# Patient Record
Sex: FEMALE | Race: WHITE | NOT HISPANIC OR LATINO | Employment: UNEMPLOYED | URBAN - METROPOLITAN AREA
[De-identification: names, ages, dates, MRNs, and addresses within clinical notes are randomized per-mention and may not be internally consistent; named-entity substitution may affect disease eponyms.]

---

## 2017-01-04 ENCOUNTER — HOME CARE/HOSPICE - HEALTHEAST (OUTPATIENT)
Dept: HOME HEALTH SERVICES | Facility: HOME HEALTH | Age: 57
End: 2017-01-04

## 2017-01-06 ENCOUNTER — TELEPHONE (OUTPATIENT)
Dept: PEDIATRICS | Facility: CLINIC | Age: 57
End: 2017-01-06

## 2017-01-06 NOTE — TELEPHONE ENCOUNTER
Call from [facility].  She can be reached at [phone]    Approved SN 1 x week for 9 weeks for wound care and assessment of nonhealing ulcer on the right ankle and INR checks.    Zohreh Marquez RN  Message handled by Nurse Triage.

## 2017-01-10 ENCOUNTER — ANTICOAGULATION THERAPY VISIT (OUTPATIENT)
Dept: NURSING | Facility: CLINIC | Age: 57
End: 2017-01-10
Payer: MEDICARE

## 2017-01-10 ENCOUNTER — HOME CARE/HOSPICE - HEALTHEAST (OUTPATIENT)
Dept: HOME HEALTH SERVICES | Facility: HOME HEALTH | Age: 57
End: 2017-01-10

## 2017-01-10 DIAGNOSIS — Z79.01 LONG-TERM (CURRENT) USE OF ANTICOAGULANTS: Primary | ICD-10-CM

## 2017-01-10 DIAGNOSIS — I82.409 ACUTE DEEP VENOUS THROMBOSIS (H): ICD-10-CM

## 2017-01-10 LAB — INR PPP: 1.8

## 2017-01-10 PROCEDURE — 99207 ZZC NO CHARGE NURSE ONLY: CPT

## 2017-01-10 NOTE — PROGRESS NOTES
ANTICOAGULATION FOLLOW-UP     Patient Name:  Linda Otero  Date:  1/10/2017  Contact Type:  Telephone  Call received from [facility] with INR result.   Follow up instructions given over the phone to Home Care nurse for coumadin management.    SUBJECTIVE:     Patient Findings     Positives Nose Bleeds, Med error    Comments She had a nose bleed on Saturday. Took 5 minutes to stop it.  She took 10 mg on Saturday instead of her usual 12.5mg.    Recommended she:  - increase the humidity in her home  - try putting a small amount of vaseline inside her nose to lubricate her nasal passages.             OBJECTIVE    INR   Date Value Ref Range Status   01/10/2017 1.8  Final       ASSESSMENT / PLAN  INR assessment SUB    Recheck INR In: 2 WEEKS    INR Location [facility] INR      Anticoagulation Summary as of 1/10/2017     INR goal 2.0-3.0   Selected INR 1.8! (1/10/2017)   Maintenance plan 10 mg (5 mg x 2) on Sun; 12.5 mg (5 mg x 2.5) all other days   Full instructions 10 mg on Sun; 12.5 mg all other days   Weekly total 85 mg   No change documented Vy Gallo, RN   Plan last modified Vy Gallo, RN (12/20/2016)   Next INR check 1/24/2017   Target end date 4/20/2016    Indications   Long-term (current) use of anticoagulants [Z79.01] [Z79.01]  Acute deep venous thrombosis (H) [I82.409]         Anticoagulation Episode Summary     INR check location Home Draw    Preferred lab     Send INR reminders to [facility]    Comments 5mg tabs // [facility] [phone] Ch[place]      Anticoagulation Care Providers     Provider Role Specialty Phone number    Jaja Orta MD Referring Internal Medicine [phone]            See the Encounter Report to view Anticoagulation Flowsheet and Dosing Calendar (Go to Encounters tab in chart review, and find the Anticoagulation Therapy Visit)        Vy Gallo RN

## 2017-01-11 ENCOUNTER — TRANSFERRED RECORDS (OUTPATIENT)
Dept: HEALTH INFORMATION MANAGEMENT | Facility: CLINIC | Age: 57
End: 2017-01-11

## 2017-01-17 ENCOUNTER — HOME CARE/HOSPICE - HEALTHEAST (OUTPATIENT)
Dept: HOME HEALTH SERVICES | Facility: HOME HEALTH | Age: 57
End: 2017-01-17

## 2017-01-17 ENCOUNTER — ANTICOAGULATION THERAPY VISIT (OUTPATIENT)
Dept: NURSING | Facility: CLINIC | Age: 57
End: 2017-01-17
Payer: MEDICARE

## 2017-01-17 DIAGNOSIS — Z79.01 LONG-TERM (CURRENT) USE OF ANTICOAGULANTS: Primary | ICD-10-CM

## 2017-01-17 DIAGNOSIS — I82.409 ACUTE DEEP VENOUS THROMBOSIS (H): ICD-10-CM

## 2017-01-17 LAB — INR PPP: 2.3

## 2017-01-17 PROCEDURE — 99207 ZZC NO CHARGE NURSE ONLY: CPT

## 2017-01-17 NOTE — PROGRESS NOTES
ANTICOAGULATION FOLLOW-UP     Patient Name:  Linda Otero  Date:  1/17/2017  Contact Type:  Telephone  Call received [facility], with INR result.   Follow up instructions given over the phone to Home Care nurse for coumadin management.    SUBJECTIVE:     Patient Findings     Positives No Problem Findings    Comments She was ill with stomach flu symptoms, but they have resolved.           OBJECTIVE    INR   Date Value Ref Range Status   01/17/2017 2.3  Final       ASSESSMENT / PLAN  INR assessment THER    Recheck INR In: 2 WEEKS    INR Location [facility] INR      Anticoagulation Summary as of 1/17/2017     INR goal 2.0-3.0   Selected INR 2.3 (1/17/2017)   Maintenance plan 10 mg (5 mg x 2) on Sun; 12.5 mg (5 mg x 2.5) all other days   Full instructions 10 mg on Sun; 12.5 mg all other days   Weekly total 85 mg   No change documented Vy Gallo RN   Plan last modified Vy Gallo RN (12/20/2016)   Next INR check 1/31/2017   Target end date 4/20/2016    Indications   Long-term (current) use of anticoagulants [Z79.01] [Z79.01]  Acute deep venous thrombosis (H) [I82.409]         Anticoagulation Episode Summary     INR check location Home Draw    Preferred lab     Send INR reminders to [facility]    Comments 5mg tabs // [facility] [phone] [place]      Anticoagulation Care Providers     Provider Role Specialty Phone number    Jaja Orta MD Referring Internal Medicine [phone]            See the Encounter Report to view Anticoagulation Flowsheet and Dosing Calendar (Go to Encounters tab in chart review, and find the Anticoagulation Therapy Visit)        Vy Gallo RN

## 2017-01-24 ENCOUNTER — MEDICAL CORRESPONDENCE (OUTPATIENT)
Dept: HEALTH INFORMATION MANAGEMENT | Facility: CLINIC | Age: 57
End: 2017-01-24

## 2017-01-24 ENCOUNTER — HOME CARE/HOSPICE - HEALTHEAST (OUTPATIENT)
Dept: HOME HEALTH SERVICES | Facility: HOME HEALTH | Age: 57
End: 2017-01-24

## 2017-01-31 ENCOUNTER — ANTICOAGULATION THERAPY VISIT (OUTPATIENT)
Dept: NURSING | Facility: CLINIC | Age: 57
End: 2017-01-31
Payer: MEDICARE

## 2017-01-31 ENCOUNTER — HOME CARE/HOSPICE - HEALTHEAST (OUTPATIENT)
Dept: HOME HEALTH SERVICES | Facility: HOME HEALTH | Age: 57
End: 2017-01-31

## 2017-01-31 DIAGNOSIS — I82.409 ACUTE DEEP VENOUS THROMBOSIS (H): ICD-10-CM

## 2017-01-31 DIAGNOSIS — Z79.01 LONG-TERM (CURRENT) USE OF ANTICOAGULANTS: Primary | ICD-10-CM

## 2017-01-31 LAB — INR PPP: 1.9

## 2017-01-31 PROCEDURE — 99207 ZZC NO CHARGE NURSE ONLY: CPT

## 2017-01-31 NOTE — PROGRESS NOTES
ANTICOAGULATION FOLLOW-UP CLINIC VISIT    Patient Name:  Linda Otero  Date:  1/31/2017  Contact Type:  Telephone  Call received from [facility], with INR result.   Follow up instructions given over the phone to Home Care nurse for coumadin management.    SUBJECTIVE:     Patient Findings     Positives No Problem Findings           OBJECTIVE    INR   Date Value Ref Range Status   01/31/2017 1.9  Final       ASSESSMENT / PLAN  INR assessment THER    Recheck INR In: 2 WEEKS    INR Location [facility] INR      Anticoagulation Summary as of 1/31/2017     INR goal 2.0-3.0   Selected INR 1.9! (1/31/2017)   Maintenance plan 10 mg (5 mg x 2) on Sun; 12.5 mg (5 mg x 2.5) all other days   Full instructions 10 mg on Sun; 12.5 mg all other days   Weekly total 85 mg   No change documented Vy Gallo RN   Plan last modified Vy Gallo RN (12/20/2016)   Next INR check 2/14/2017   Target end date 4/20/2016    Indications   Long-term (current) use of anticoagulants [Z79.01] [Z79.01]  Acute deep venous thrombosis (H) [I82.409]         Anticoagulation Episode Summary     INR check location Home Draw    Preferred lab     Send INR reminders to [facility]    Comments 5mg tabs // [facility] [phone] Chio      Anticoagulation Care Providers     Provider Role Specialty Phone number    Jaja Orta MD Referring Internal Medicine [phone]            See the Encounter Report to view Anticoagulation Flowsheet and Dosing Calendar (Go to Encounters tab in chart review, and find the Anticoagulation Therapy Visit)        Vy Gallo RN

## 2017-02-06 ENCOUNTER — HOME CARE/HOSPICE - HEALTHEAST (OUTPATIENT)
Dept: HOME HEALTH SERVICES | Facility: HOME HEALTH | Age: 57
End: 2017-02-06

## 2017-02-08 ENCOUNTER — HOME CARE/HOSPICE - HEALTHEAST (OUTPATIENT)
Dept: HOME HEALTH SERVICES | Facility: HOME HEALTH | Age: 57
End: 2017-02-08

## 2017-02-14 ENCOUNTER — ANTICOAGULATION THERAPY VISIT (OUTPATIENT)
Dept: NURSING | Facility: CLINIC | Age: 57
End: 2017-02-14
Payer: MEDICARE

## 2017-02-14 ENCOUNTER — HOME CARE/HOSPICE - HEALTHEAST (OUTPATIENT)
Dept: HOME HEALTH SERVICES | Facility: HOME HEALTH | Age: 57
End: 2017-02-14

## 2017-02-14 DIAGNOSIS — Z79.01 LONG-TERM (CURRENT) USE OF ANTICOAGULANTS: ICD-10-CM

## 2017-02-14 DIAGNOSIS — I82.409 ACUTE DEEP VENOUS THROMBOSIS (H): ICD-10-CM

## 2017-02-14 LAB — INR PPP: 2

## 2017-02-14 PROCEDURE — 99207 ZZC NO CHARGE NURSE ONLY: CPT

## 2017-02-14 NOTE — MR AVS SNAPSHOT
Linda Shanna   2/14/2017 4:15 PM   Anticoagulation Therapy Visit    Description:  56 year old female   Provider:  [facility]   Department:  [facility]           INR as of 2/14/2017     Today's INR 2.0      Anticoagulation Summary as of 2/14/2017     INR goal 2.0-3.0   Today's INR 2.0   Full instructions 10 mg on Sun; 12.5 mg all other days   Next INR check 2/28/2017    Indications   Long-term (current) use of anticoagulants [Z79.01] [Z79.01]  Acute deep venous thrombosis (H) [I82.409]         Your next Anticoagulation Clinic appointment(s)     Feb 28, 2017  4:15 PM CST   Anticoagulation Visit with  ANTICOAGULATION CLINIC   [facility]    [address] [place] [address]  [address]  [facility] [address] [phone]              Contact Numbers     [facility]  Please call  [phone] to cancel and/or reschedule your appointment   Please call  [phone] with any problems or questions regarding your therapy.        February 2017 Details    Sun Mon Tue Wed Thu Fri Sat        1               2               3               4                 5               6               7               8               9               10               11                 12               13               14      12.5 mg   See details      15      12.5 mg         16      12.5 mg         17      12.5 mg         18      12.5 mg           19      10 mg         20      12.5 mg         21      12.5 mg         22      12.5 mg         23      12.5 mg         24      12.5 mg         25      12.5 mg           26      10 mg         27      12.5 mg         28                 Date Details   02/14 This INR check       Date of next INR:  2/28/2017         How to take your warfarin dose     To take:  10 mg Take 2 of the 5 mg tablets.    To take:  12.5 mg Take 2.5 of the 5 mg tablets.

## 2017-02-14 NOTE — PROGRESS NOTES
ANTICOAGULATION FOLLOW-UP CLINIC VISIT    Patient Name:  Linda Otero  Date:  2/14/2017  Contact Type:  Telephone  Call received [facility], with INR result.   Follow up instructions given over the phone to Home Care nurse for coumadin management.    SUBJECTIVE:     Patient Findings     Positives No Problem Findings           OBJECTIVE    INR   Date Value Ref Range Status   02/14/2017 2.0  Final       ASSESSMENT / PLAN  INR assessment THER    Recheck INR In: 2 WEEKS    INR Location [facility] INR      Anticoagulation Summary as of 2/14/2017     INR goal 2.0-3.0   Today's INR 2.0   Maintenance plan 10 mg (5 mg x 2) on Sun; 12.5 mg (5 mg x 2.5) all other days   Full instructions 10 mg on Sun; 12.5 mg all other days   Weekly total 85 mg   No change documented Vy Gallo RN   Plan last modified Vy Gallo RN (12/20/2016)   Next INR check 2/28/2017   Target end date 4/20/2016    Indications   Long-term (current) use of anticoagulants [Z79.01] [Z79.01]  Acute deep venous thrombosis (H) [I82.409]         Anticoagulation Episode Summary     INR check location Home Draw    Preferred lab     Send INR reminders to [facility]    Comments 5mg tabs // Count includes the [facility] [phone] [place]      Anticoagulation Care Providers     Provider Role Specialty Phone number    Jaja Orta MD Referring Internal Medicine [phone]            See the Encounter Report to view Anticoagulation Flowsheet and Dosing Calendar (Go to Encounters tab in chart review, and find the Anticoagulation Therapy Visit)        Vy Gallo RN

## 2017-02-16 ENCOUNTER — TELEPHONE (OUTPATIENT)
Dept: PEDIATRICS | Facility: CLINIC | Age: 57
End: 2017-02-16

## 2017-02-16 DIAGNOSIS — J42 CHRONIC BRONCHITIS, UNSPECIFIED CHRONIC BRONCHITIS TYPE (H): ICD-10-CM

## 2017-02-16 NOTE — TELEPHONE ENCOUNTER
Ventolin       Last Written Prescription Date: 11-1-16  Last Fill Quantity: 2, # refills: 1    Last Office Visit with [facility] or [facility] prescribing provider:  11-1-16   Future Office Visit:       Date of Last Asthma Action Plan Letter:   There are no preventive care reminders to display for this patient.   Asthma Control Test: No flowsheet data found.    Date of Last Spirometry Test:   No results found for this or any previous visit.        [facility]  [facility]  On Behalf of [facility]

## 2017-02-17 RX ORDER — ALBUTEROL SULFATE 90 UG/1
AEROSOL, METERED RESPIRATORY (INHALATION)
Qty: 2 INHALER | Refills: 1 | Status: SHIPPED | OUTPATIENT
Start: 2017-02-17 | End: 2017-05-30

## 2017-02-17 NOTE — TELEPHONE ENCOUNTER
Routing refill request to provider for review/approval because:  Drug not on the [facility] refill protocol for this indication (bronchitis)    Bernadette Cyr, PharmD  [facility]  On behalf of [facility]

## 2017-02-21 ENCOUNTER — HOME CARE/HOSPICE - HEALTHEAST (OUTPATIENT)
Dept: HOME HEALTH SERVICES | Facility: HOME HEALTH | Age: 57
End: 2017-02-21

## 2017-02-22 ENCOUNTER — HOME CARE/HOSPICE - HEALTHEAST (OUTPATIENT)
Dept: HOME HEALTH SERVICES | Facility: HOME HEALTH | Age: 57
End: 2017-02-22

## 2017-02-28 ENCOUNTER — HOME CARE/HOSPICE - HEALTHEAST (OUTPATIENT)
Dept: HOME HEALTH SERVICES | Facility: HOME HEALTH | Age: 57
End: 2017-02-28

## 2017-03-02 ENCOUNTER — HOME CARE/HOSPICE - HEALTHEAST (OUTPATIENT)
Dept: HOME HEALTH SERVICES | Facility: HOME HEALTH | Age: 57
End: 2017-03-02

## 2017-03-02 ENCOUNTER — ANTICOAGULATION THERAPY VISIT (OUTPATIENT)
Dept: NURSING | Facility: CLINIC | Age: 57
End: 2017-03-02
Payer: MEDICARE

## 2017-03-02 DIAGNOSIS — I82.409 ACUTE DEEP VENOUS THROMBOSIS (H): ICD-10-CM

## 2017-03-02 DIAGNOSIS — Z79.01 LONG-TERM (CURRENT) USE OF ANTICOAGULANTS: ICD-10-CM

## 2017-03-02 LAB — INR PPP: 2.5

## 2017-03-02 PROCEDURE — 99207 ZZC NO CHARGE NURSE ONLY: CPT

## 2017-03-02 NOTE — MR AVS SNAPSHOT
Linda Otero   3/2/2017 4:30 PM   Anticoagulation Therapy Visit    Description:  56 year old female   Provider:  [facility]   Department:  [facility]           INR as of 3/2/2017     Today's INR 2.5      Anticoagulation Summary as of 3/2/2017     INR goal 2.0-3.0   Today's INR 2.5   Full instructions 10 mg on Sun; 12.5 mg all other days   Next INR check 3/14/2017    Indications   Long-term (current) use of anticoagulants [Z79.01] [Z79.01]  Acute deep venous thrombosis (H) [I82.409]         Your next Anticoagulation Clinic appointment(s)     Mar 14, 2017  4:30 PM CDT   Anticoagulation Visit with  ANTICOAGULATION CLINIC   [facility] ([facility]    [address] [place] [address]  [address]  [facility] [address] [phone]              Contact Numbers     [facility]  Please call  [phone] to cancel and/or reschedule your appointment   Please call  [phone] with any problems or questions regarding your therapy.        March 2017 Details    Sun Mon Tue Wed Thu Fri Sat        1               2      12.5 mg   See details      3      12.5 mg         4      12.5 mg           5      10 mg         6      12.5 mg         7      12.5 mg         8      12.5 mg         9      12.5 mg         10      12.5 mg         11      12.5 mg           12      10 mg         13      12.5 mg         14            15               16               17               18                 19               20               21               22               23               24               25                 26               27               28               29               30               31                 Date Details   03/02 This INR check       Date of next INR:  3/14/2017         How to take your warfarin dose     To take:  10 mg Take 2 of the 5 mg tablets.    To take:  12.5 mg Take 2.5 of the 5 mg tablets.

## 2017-03-03 ENCOUNTER — DOCUMENTATION ONLY (OUTPATIENT)
Dept: PEDIATRICS | Facility: CLINIC | Age: 57
End: 2017-03-03

## 2017-03-03 NOTE — PROGRESS NOTES
[facility] left a voicemail on my triage line requesting a call back to ok verbal orders.     Called back, left voicemail giving verbal ok per standing order for weekly SN visits from 3/6-5/6.

## 2017-03-03 NOTE — PROGRESS NOTES
ANTICOAGULATION FOLLOW-UP     Patient Name:  Linda Otero  Date:  3/2/2017  Contact Type:  Telephone  Call received [facility], with INR result.   Follow up instructions given over the phone to Home Care nurse for coumadin management.    SUBJECTIVE:     Patient Findings     Positives No Problem Findings           OBJECTIVE    INR   Date Value Ref Range Status   03/02/2017 2.5  Final       ASSESSMENT / PLAN  INR assessment THER    Recheck INR In: 2 WEEKS    INR Location [facility] INR      Anticoagulation Summary as of 3/2/2017     INR goal 2.0-3.0   Today's INR 2.5   Maintenance plan 10 mg (5 mg x 2) on Sun; 12.5 mg (5 mg x 2.5) all other days   Full instructions 10 mg on Sun; 12.5 mg all other days   Weekly total 85 mg   No change documented Vy Gallo RN   Plan last modified Vy Gallo RN (12/20/2016)   Next INR check 3/14/2017   Target end date 4/20/2016    Indications   Long-term (current) use of anticoagulants [Z79.01] [Z79.01]  Acute deep venous thrombosis (H) [I82.409]         Anticoagulation Episode Summary     INR check location Home Draw    Preferred lab     Send INR reminders to [facility]    Comments 5mg tabs // [facility] [phone] [place]      Anticoagulation Care Providers     Provider Role Specialty Phone number    Jaja Orta MD Referring Internal Medicine [phone]            See the Encounter Report to view Anticoagulation Flowsheet and Dosing Calendar (Go to Encounters tab in chart review, and find the Anticoagulation Therapy Visit)        Vy Gallo RN

## 2017-03-06 PROCEDURE — 99207 C MD CERTIFICATION HHA PATIENT: CPT | Performed by: INTERNAL MEDICINE

## 2017-03-07 ENCOUNTER — HOME CARE/HOSPICE - HEALTHEAST (OUTPATIENT)
Dept: HOME HEALTH SERVICES | Facility: HOME HEALTH | Age: 57
End: 2017-03-07

## 2017-03-13 DIAGNOSIS — I27.20 PULMONARY HYPERTENSION (H): ICD-10-CM

## 2017-03-13 DIAGNOSIS — I10 HYPERTENSION GOAL BP (BLOOD PRESSURE) < 140/90: ICD-10-CM

## 2017-03-13 DIAGNOSIS — G47.33 OBSTRUCTIVE SLEEP APNEA: Primary | ICD-10-CM

## 2017-03-13 DIAGNOSIS — R60.0 BILATERAL LEG EDEMA: ICD-10-CM

## 2017-03-13 DIAGNOSIS — J42 CHRONIC BRONCHITIS, UNSPECIFIED CHRONIC BRONCHITIS TYPE (H): ICD-10-CM

## 2017-03-13 NOTE — TELEPHONE ENCOUNTER
Order was printed and faxed to [facility] as that is where she wanted to go for ECHO.  Agree that doing it locally is likely to be easier for her and order placed.  Agree with cpap strap order - ok to sign.

## 2017-03-13 NOTE — TELEPHONE ENCOUNTER
1. Patient needs order faxed to [facility] for new CPAP cushion mask aguilar with strap.  Order is pending.  Triage, would you please print this, stamp and fax to [facility] at [phone].  Thanks.    2.  Dr. Orta-Per phone encounter of 12/29/16-Needs echo to better understand cause of edema.    Should be seen to  discuss options for treating edema   Patient waiting to schedule echo after 1/11/17 after she sees Dr. Solis. She continues to do well on Spiriva, using oxygen less.     Patient understood that she should have the echo done prior to seeing Dr. Orta-is this correct.   There is no order placed and it appears Dr Orta wanted to see patient to assess edema?  We will have Echocardiogram here at the end of March-would patient be able to wait until then?  States she has transportation issues, and when she uses [facility] she always runs out of oxygen.  Prefers to come to our location for the Echo or to go somewhere in [facility] where she is more familiar.  GODFREY Bazzi RN

## 2017-03-14 ENCOUNTER — ANTICOAGULATION THERAPY VISIT (OUTPATIENT)
Dept: NURSING | Facility: CLINIC | Age: 57
End: 2017-03-14
Payer: MEDICARE

## 2017-03-14 ENCOUNTER — HOME CARE/HOSPICE - HEALTHEAST (OUTPATIENT)
Dept: HOME HEALTH SERVICES | Facility: HOME HEALTH | Age: 57
End: 2017-03-14

## 2017-03-14 DIAGNOSIS — Z79.01 LONG-TERM (CURRENT) USE OF ANTICOAGULANTS: ICD-10-CM

## 2017-03-14 DIAGNOSIS — I82.409 ACUTE DEEP VENOUS THROMBOSIS (H): ICD-10-CM

## 2017-03-14 LAB — INR PPP: 3.2

## 2017-03-14 PROCEDURE — 99207 ZZC NO CHARGE NURSE ONLY: CPT

## 2017-03-14 NOTE — PROGRESS NOTES
ANTICOAGULATION FOLLOW-UP     Patient Name:  Linda Otero  Date:  3/14/2017  Contact Type:  Telephone  Call received from [facility], with INR result.   Follow up instructions given over the phone to Home Care nurse for coumadin management.    SUBJECTIVE:     Patient Findings     Positives Change in diet/appetite    Comments Eating less fresh green vegetables. She is having some financial trouble.  She is getting some legal help. [employer] ran out.  Recommended changing to frozen vegetables, they can be cheaper.           OBJECTIVE    INR   Date Value Ref Range Status   03/14/2017 3.2  Final       ASSESSMENT / PLAN  INR assessment SUPRA    Recheck INR In: 1 WEEK    INR Location [facility] INR      Anticoagulation Summary as of 3/14/2017     INR goal 2.0-3.0   Today's INR 3.2!   Maintenance plan 10 mg (5 mg x 2) on Sun, Thu; 12.5 mg (5 mg x 2.5) all other days   Full instructions 10 mg on Sun, Thu; 12.5 mg all other days   Weekly total 82.5 mg   Plan last modified Vy Gallo RN (3/14/2017)   Next INR check 3/21/2017   Target end date 4/20/2016    Indications   Long-term (current) use of anticoagulants [Z79.01] [Z79.01]  Acute deep venous thrombosis (H) [I82.409]         Anticoagulation Episode Summary     INR check location Home Draw    Preferred lab     Send INR reminders to [facility]    Comments 5mg tabs // [facility] [phone] Chio      Anticoagulation Care Providers     Provider Role Specialty Phone number    Jaja Orta MD Referring Internal Medicine [phone]            See the Encounter Report to view Anticoagulation Flowsheet and Dosing Calendar (Go to Encounters tab in chart review, and find the Anticoagulation Therapy Visit)        Vy Gallo, FRANCISCO

## 2017-03-14 NOTE — MR AVS SNAPSHOT
Linda Dallasstan   3/14/2017 4:30 PM   Anticoagulation Therapy Visit    Description:  56 year old female   Provider:  [facility]   Department:  [facility]           INR as of 3/14/2017     Today's INR 3.2!      Anticoagulation Summary as of 3/14/2017     INR goal 2.0-3.0   Today's INR 3.2!   Full instructions 10 mg on Sun, Thu; 12.5 mg all other days   Next INR check 3/21/2017    Indications   Long-term (current) use of anticoagulants [Z79.01] [Z79.01]  Acute deep venous thrombosis (H) [I82.409]         Contact Numbers     [facility]  Please call  [phone] to cancel and/or reschedule your appointment   Please call  [phone] with any problems or questions regarding your therapy.        March 2017 Details    Sun Mon Tue Wed Thu Fri Sat        1               2               3               4                 5               6               7               8               9               10               11                 12               13               14      12.5 mg   See details      15      12.5 mg         16      10 mg         17      12.5 mg         18      12.5 mg           19      10 mg         20      12.5 mg         21            22               23               24               25                 26               27               28               29               30               31                 Date Details   03/14 This INR check       Date of next INR:  3/21/2017         How to take your warfarin dose     To take:  10 mg Take 2 of the 5 mg tablets.    To take:  12.5 mg Take 2.5 of the 5 mg tablets.

## 2017-03-21 ENCOUNTER — HOME CARE/HOSPICE - HEALTHEAST (OUTPATIENT)
Dept: HOME HEALTH SERVICES | Facility: HOME HEALTH | Age: 57
End: 2017-03-21

## 2017-03-21 ENCOUNTER — ANTICOAGULATION THERAPY VISIT (OUTPATIENT)
Dept: NURSING | Facility: CLINIC | Age: 57
End: 2017-03-21
Payer: MEDICARE

## 2017-03-21 DIAGNOSIS — Z79.01 LONG-TERM (CURRENT) USE OF ANTICOAGULANTS: ICD-10-CM

## 2017-03-21 DIAGNOSIS — I82.409 ACUTE DEEP VENOUS THROMBOSIS (H): ICD-10-CM

## 2017-03-21 LAB — INR PPP: 2.3

## 2017-03-21 PROCEDURE — 99207 ZZC NO CHARGE NURSE ONLY: CPT

## 2017-03-21 RX ORDER — WARFARIN SODIUM 5 MG/1
TABLET ORAL
Qty: 220 TABLET | Refills: 0
Start: 2017-03-14 | End: 2017-09-12

## 2017-03-21 NOTE — MR AVS SNAPSHOT
Linda Dallasstan   3/21/2017 4:30 PM   Anticoagulation Therapy Visit    Description:  57 year old female   Provider:  [facility]   Department:   Nurse           INR as of 3/21/2017     Today's INR 2.3      Anticoagulation Summary as of 3/21/2017     INR goal 2.0-3.0   Today's INR 2.3   Full instructions 10 mg on Sun, Thu; 12.5 mg all other days   Next INR check 4/4/2017    Indications   Long-term (current) use of anticoagulants [Z79.01] [Z79.01]  Acute deep venous thrombosis (H) [I82.409]         Your next Anticoagulation Clinic appointment(s)     Apr 04, 2017  4:00 PM CDT   Anticoagulation Visit with  ANTICOAGULATION CLINIC   [facility] ([facility]    [address] [place] [address]  [address]  [facility] [address] [phone]              Contact Numbers     [facility]  Please call  [phone] to cancel and/or reschedule your appointment   Please call  [phone] with any problems or questions regarding your therapy.        March 2017 Details    Sun Mon Tue Wed Thu Fri Sat        1               2               3               4                 5               6               7               8               9               10               11                 12               13               14               15               16               17               18                 19               20               21      12.5 mg   See details      22      12.5 mg         23      10 mg         24      12.5 mg         25      12.5 mg           26      10 mg         27      12.5 mg         28      12.5 mg         29      12.5 mg         30      10 mg         31      12.5 mg           Date Details   03/21 This INR check               How to take your warfarin dose     To take:  10 mg Take 2 of the 5 mg tablets.    To take:  12.5 mg Take 2.5 of the 5 mg tablets.           April 2017 Details    Sun Mon Tue Wed Thu Fri Sat           1      12.5 mg           2      10  mg         3      12.5 mg         4            5               6               7               8                 9               10               11               12               13               14               15                 16               17               18               19               20               21               22                 23               24               25               26               27               28               29                 30                      Date Details   No additional details    Date of next INR:  4/4/2017         How to take your warfarin dose     To take:  10 mg Take 2 of the 5 mg tablets.    To take:  12.5 mg Take 2.5 of the 5 mg tablets.

## 2017-03-21 NOTE — PROGRESS NOTES
ANTICOAGULATION FOLLOW-UP     Patient Name:  Linda Otero  Date:  3/21/2017  Contact Type:  Telephone  Call received from [facility], with INR result.   Follow up instructions given over the phone to Home Care nurse for coumadin management.    SUBJECTIVE:     Patient Findings     Positives No Problem Findings           OBJECTIVE    INR   Date Value Ref Range Status   03/21/2017 2.3  Final       ASSESSMENT / PLAN  INR assessment THER    Recheck INR In: 2 WEEKS    INR Location [facility] INR      Anticoagulation Summary as of 3/21/2017     INR goal 2.0-3.0   Today's INR 2.3   Maintenance plan 10 mg (5 mg x 2) on Sun, Thu; 12.5 mg (5 mg x 2.5) all other days   Full instructions 10 mg on Sun, Thu; 12.5 mg all other days   Weekly total 82.5 mg   No change documented Vy Gallo RN   Plan last modified Vy Gallo RN (3/14/2017)   Next INR check 4/4/2017   Target end date 4/20/2016    Indications   Long-term (current) use of anticoagulants [Z79.01] [Z79.01]  Acute deep venous thrombosis (H) [I82.409]         Anticoagulation Episode Summary     INR check location Home Draw    Preferred lab     Send INR reminders to [facility]    Comments 5mg tabs // [facility] [phone] [place]      Anticoagulation Care Providers     Provider Role Specialty Phone number    Jaja Orta MD Referring Internal Medicine [phone]            See the Encounter Report to view Anticoagulation Flowsheet and Dosing Calendar (Go to Encounters tab in chart review, and find the Anticoagulation Therapy Visit)        Vy Gallo RN

## 2017-03-22 DIAGNOSIS — Z79.01 LONG-TERM (CURRENT) USE OF ANTICOAGULANTS: ICD-10-CM

## 2017-03-22 DIAGNOSIS — S91.309D: ICD-10-CM

## 2017-03-23 NOTE — TELEPHONE ENCOUNTER
B COMPLEX C TABS      Last Written Prescription Date: 1/29/2016  Last Fill Quantity: 100,  # refills: 3   Last Office Visit with [facility], [facility] or [facility] prescribing provider: 11/1/2016    *COULDN'T FIND JANTOVEN IN MED LIST OR HX.

## 2017-03-27 RX ORDER — WARFARIN SODIUM 5 MG/1
TABLET ORAL
Qty: 220 TABLET | Refills: 0 | Status: SHIPPED | OUTPATIENT
Start: 2017-03-27 | End: 2017-06-28

## 2017-03-27 RX ORDER — MULTIVITAMIN WITH IRON
TABLET ORAL
Qty: 100 TABLET | Refills: 3 | Status: SHIPPED | OUTPATIENT
Start: 2017-03-27 | End: 2024-01-01

## 2017-03-27 NOTE — TELEPHONE ENCOUNTER
Routing refill request to provider for review/approval because:  Drug not active on patient's medication list-Jantoven.  Please advise. Routing to partner in PCP absence.  Evelyn Pritchard RN  Triage Nurse

## 2017-03-29 ENCOUNTER — HOME CARE/HOSPICE - HEALTHEAST (OUTPATIENT)
Dept: HOME HEALTH SERVICES | Facility: HOME HEALTH | Age: 57
End: 2017-03-29

## 2017-04-04 ENCOUNTER — ANTICOAGULATION THERAPY VISIT (OUTPATIENT)
Dept: NURSING | Facility: CLINIC | Age: 57
End: 2017-04-04
Payer: MEDICARE

## 2017-04-04 ENCOUNTER — HOME CARE/HOSPICE - HEALTHEAST (OUTPATIENT)
Dept: HOME HEALTH SERVICES | Facility: HOME HEALTH | Age: 57
End: 2017-04-04

## 2017-04-04 DIAGNOSIS — I82.409 ACUTE DEEP VENOUS THROMBOSIS (H): ICD-10-CM

## 2017-04-04 DIAGNOSIS — Z79.01 LONG-TERM (CURRENT) USE OF ANTICOAGULANTS: ICD-10-CM

## 2017-04-04 LAB — INR PPP: 2.5

## 2017-04-04 PROCEDURE — 99207 ZZC NO CHARGE NURSE ONLY: CPT

## 2017-04-04 NOTE — PROGRESS NOTES
ANTICOAGULATION FOLLOW-UP     Patient Name:  Linda Otero  Date:  4/4/2017  Contact Type:  Telephone  Call received from [facility], with INR result.   Follow up instructions given over the phone to Home Care nurse for coumadin management.    SUBJECTIVE:     Patient Findings     Positives No Problem Findings           OBJECTIVE    INR   Date Value Ref Range Status   04/04/2017 2.5  Final       ASSESSMENT / PLAN  INR assessment THER    Recheck INR In: 2 WEEKS    INR Location [facility]      Anticoagulation Summary as of 4/4/2017     INR goal 2.0-3.0   Today's INR 2.5   Maintenance plan 10 mg (5 mg x 2) on Sun, Thu; 12.5 mg (5 mg x 2.5) all other days   Full instructions 10 mg on Sun, Thu; 12.5 mg all other days   Weekly total 82.5 mg   No change documented Vy Gallo RN   Plan last modified Vy Gallo RN (3/14/2017)   Next INR check 4/18/2017   Target end date 4/20/2016    Indications   Long-term (current) use of anticoagulants [Z79.01] [Z79.01]  Acute deep venous thrombosis (H) [I82.409]         Anticoagulation Episode Summary     INR check location Home [facility]    Preferred lab     Send INR reminders to [facility]    Comments 5mg tabs // [facility] [phone] [place]      Anticoagulation Care Providers     Provider Role Specialty Phone number    Jaja Orta MD Referring Internal Medicine [phone]            See the Encounter Report to view Anticoagulation Flowsheet and Dosing Calendar (Go to Encounters tab in chart review, and find the Anticoagulation Therapy Visit)        Vy Gallo RN

## 2017-04-04 NOTE — MR AVS SNAPSHOT
Lindaaureliano Otero   4/4/2017 4:00 PM   Anticoagulation Therapy Visit    Description:  57 year old female   Provider:  [facility]   Department:  [facility]           INR as of 4/4/2017     Today's INR 2.5      Anticoagulation Summary as of 4/4/2017     INR goal 2.0-3.0   Today's INR 2.5   Full instructions 10 mg on Sun, Thu; 12.5 mg all other days   Next INR check 4/18/2017    Indications   Long-term (current) use of anticoagulants [Z79.01] [Z79.01]  Acute deep venous thrombosis (H) [I82.409]         Your next Anticoagulation Clinic appointment(s)     Apr 18, 2017  3:15 PM CDT   Anticoagulation Visit with  ANTICOAGULATION CLINIC   [facility] ([facility])    [address]  [address]  [place] [address] [phone]              Contact Numbers     [facility]  Please call  [phone] to cancel and/or reschedule your appointment   Please call  [phone] with any problems or questions regarding your therapy.        April 2017 Details    Sun Mon Tue Wed Thu Fri Sat           1                 2               3               4      12.5 mg   See details      5      12.5 mg         6      10 mg         7      12.5 mg         8      12.5 mg           9      10 mg         10      12.5 mg         11      12.5 mg         12      12.5 mg         13      10 mg         14      12.5 mg         15      12.5 mg           16      10 mg         17      12.5 mg         18            19               20               21               22                 23               24               25               26               27               28               29                 30                      Date Details   04/04 This INR check       Date of next INR:  4/18/2017         How to take your warfarin dose     To take:  10 mg Take 2 of the 5 mg tablets.    To take:  12.5 mg Take 2.5 of the 5 mg tablets.

## 2017-04-12 ENCOUNTER — HOME CARE/HOSPICE - HEALTHEAST (OUTPATIENT)
Dept: HOME HEALTH SERVICES | Facility: HOME HEALTH | Age: 57
End: 2017-04-12

## 2017-04-18 ENCOUNTER — ANTICOAGULATION THERAPY VISIT (OUTPATIENT)
Dept: NURSING | Facility: CLINIC | Age: 57
End: 2017-04-18
Payer: MEDICARE

## 2017-04-18 ENCOUNTER — HOME CARE/HOSPICE - HEALTHEAST (OUTPATIENT)
Dept: HOME HEALTH SERVICES | Facility: HOME HEALTH | Age: 57
End: 2017-04-18

## 2017-04-18 DIAGNOSIS — Z79.01 LONG-TERM (CURRENT) USE OF ANTICOAGULANTS: ICD-10-CM

## 2017-04-18 DIAGNOSIS — I82.409 ACUTE DEEP VENOUS THROMBOSIS (H): ICD-10-CM

## 2017-04-18 LAB — INR PPP: 2.5

## 2017-04-18 PROCEDURE — 99207 ZZC NO CHARGE NURSE ONLY: CPT

## 2017-04-18 NOTE — MR AVS SNAPSHOT
Linda Dallasstan   4/18/2017 3:15 PM   Anticoagulation Therapy Visit    Description:  57 year old female   Provider:  [facility]   Department:  [facility]           INR as of 4/18/2017     Today's INR 2.5      Anticoagulation Summary as of 4/18/2017     INR goal 2.0-3.0   Today's INR 2.5   Full instructions 10 mg on Sun, Thu; 12.5 mg all other days   Next INR check 5/2/2017    Indications   Long-term (current) use of anticoagulants [Z79.01] [Z79.01]  Acute deep venous thrombosis (H) [I82.409]         Contact Numbers     [facility]  Please call  [phone] to cancel and/or reschedule your appointment   Please call  [phone] with any problems or questions regarding your therapy.        April 2017 Details    Sun Mon Tue Wed Thu Fri Sat           1                 2               3               4               5               6               7               8                 9               10               11               12               13               14               15                 16               17               18      12.5 mg   See details      19      12.5 mg         20      10 mg         21      12.5 mg         22      12.5 mg           23      10 mg         24      12.5 mg         25      12.5 mg         26      12.5 mg         27      10 mg         28      12.5 mg         29      12.5 mg           30      10 mg                Date Details   04/18 This INR check               How to take your warfarin dose     To take:  10 mg Take 2 of the 5 mg tablets.    To take:  12.5 mg Take 2.5 of the 5 mg tablets.           May 2017 Details    Sun Mon Tue Wed Thu Fri Sat      1      12.5 mg         2            3               4               5               6                 7               8               9               10               11               12               13                 14               15               16               17               18               19                20                 21               22               23               24               25               26               27                 28               29               30               31                   Date Details   No additional details    Date of next INR:  5/2/2017         How to take your warfarin dose     To take:  12.5 mg Take 2.5 of the 5 mg tablets.

## 2017-04-19 NOTE — PROGRESS NOTES
ANTICOAGULATION FOLLOW-UP     Patient Name:  Linda Otero  Date:  4/18/2017  Contact Type:  Telephone  [facility] received [facility], with INR result.   Follow up instructions given over the phone to Home Care nurse for coumadin management.    SUBJECTIVE:     Patient Findings     Positives No Problem Findings           OBJECTIVE    INR   Date Value Ref Range Status   04/18/2017 2.5  Final       ASSESSMENT / PLAN  INR assessment THER    Recheck INR In: 2 WEEKS    INR Location [facility]      Anticoagulation Summary as of 4/18/2017     INR goal 2.0-3.0   Today's INR 2.5   Maintenance plan 10 mg (5 mg x 2) on Sun, Thu; 12.5 mg (5 mg x 2.5) all other days   Full instructions 10 mg on Sun, Thu; 12.5 mg all other days   Weekly total 82.5 mg   No change documented Vy Gallo RN   Plan last modified Vy Gallo RN (3/14/2017)   Next INR check 5/2/2017   Target end date 4/20/2016    Indications   Long-term (current) use of anticoagulants [Z79.01] [Z79.01]  Acute deep venous thrombosis (H) [I82.409]         Anticoagulation Episode Summary     INR check location [facility]    Preferred lab     Send INR reminders to [facility]    Comments 5mg tabs // [facility] [phone] [place]      Anticoagulation Care Providers     Provider Role Specialty Phone number    Jaja Orta MD Referring Internal Medicine [phone]            See the Encounter Report to view Anticoagulation Flowsheet and Dosing Calendar (Go to Encounters tab in chart review, and find the Anticoagulation Therapy Visit)        Vy Gallo RN

## 2017-04-25 ENCOUNTER — HOME CARE/HOSPICE - HEALTHEAST (OUTPATIENT)
Dept: HOME HEALTH SERVICES | Facility: HOME HEALTH | Age: 57
End: 2017-04-25

## 2017-04-25 ENCOUNTER — TELEPHONE (OUTPATIENT)
Dept: PEDIATRICS | Facility: CLINIC | Age: 57
End: 2017-04-25

## 2017-04-25 NOTE — TELEPHONE ENCOUNTER
JOAQUIN to Dr. Orta & Nurse Angel, watch for form from [employer]. I have the info below that is needed for the form.     Leonie, RN with [facility] calling back ([phone]).     She reviewed current wound care treatment & wound care details. Mainly, we need a rx from [facility] for Dr. Orta to fill out/sign. I see one scanned in from end of January (see Media tab, date 2/21/17).    Called [facility] ([phone]), asked that they fax the rx to us at [phone].     Patient's wound is located on her right outer ankle, measures as 1 cm (length) x 1.3 cm (width) x 1 cm (depth), stasis ulcer, pink wound bed. Wound has been stable.   Patient uses: Xerofoam, Abd pad, gentle edge tape, wound cleanser, 4x4 gauze, Tubi  size G. She changes her dressing daily.

## 2017-05-02 ENCOUNTER — ANTICOAGULATION THERAPY VISIT (OUTPATIENT)
Dept: NURSING | Facility: CLINIC | Age: 57
End: 2017-05-02
Payer: MEDICARE

## 2017-05-02 ENCOUNTER — HOME CARE/HOSPICE - HEALTHEAST (OUTPATIENT)
Dept: HOME HEALTH SERVICES | Facility: HOME HEALTH | Age: 57
End: 2017-05-02

## 2017-05-02 DIAGNOSIS — I82.409 ACUTE DEEP VENOUS THROMBOSIS (H): ICD-10-CM

## 2017-05-02 DIAGNOSIS — Z79.01 LONG-TERM (CURRENT) USE OF ANTICOAGULANTS: ICD-10-CM

## 2017-05-02 LAB — INR PPP: 2.3

## 2017-05-02 PROCEDURE — 99207 ZZC NO CHARGE NURSE ONLY: CPT

## 2017-05-02 NOTE — MR AVS SNAPSHOT
Lindaaureliano Otero   5/2/2017 4:45 PM   Anticoagulation Therapy Visit    Description:  57 year old female   Provider:  [facility]   Department:   Nurse           INR as of 5/2/2017     Today's INR 2.3      Anticoagulation Summary as of 5/2/2017     INR goal 2.0-3.0   Today's INR 2.3   Full instructions 10 mg on Sun, Thu; 12.5 mg all other days   Next INR check 5/16/2017    Indications   Long-term (current) use of anticoagulants [Z79.01] [Z79.01]  Acute deep venous thrombosis (H) [I82.409]         Your next Anticoagulation Clinic appointment(s)     May 16, 2017  3:15 PM CDT   Anticoagulation Visit with  ANTICOAGULATION CLINIC   [facility] ([facility])    [address]  [address]  [place] [address] [phone]              Contact Numbers     [facility]  Please call  [phone] to cancel and/or reschedule your appointment   Please call  [phone] with any problems or questions regarding your therapy.        May 2017 Details    Sun Mon Tue Wed Thu Fri Sat      1               2      12.5 mg   See details      3      12.5 mg         4      10 mg         5      12.5 mg         6      12.5 mg           7      10 mg         8      12.5 mg         9      12.5 mg         10      12.5 mg         11      10 mg         12      12.5 mg         13      12.5 mg           14      10 mg         15      12.5 mg         16            17               18               19               20                 21               22               23               24               25               26               27                 28               29               30               31                   Date Details   05/02 This INR check       Date of next INR:  5/16/2017         How to take your warfarin dose     To take:  10 mg Take 2 of the 5 mg tablets.    To take:  12.5 mg Take 2.5 of the 5 mg tablets.

## 2017-05-02 NOTE — PROGRESS NOTES
ANTICOAGULATION FOLLOW-UP     Patient Name:  Linda Otero  Date:  5/2/2017  Contact Type:  Telephone  Call received [facility], with INR result.   Follow up instructions given over the phone to Home Care nurse for coumadin management.    SUBJECTIVE:     Patient Findings     Positives No Problem Findings           OBJECTIVE    INR   Date Value Ref Range Status   05/02/2017 2.3  Final       ASSESSMENT / PLAN  INR assessment THER    Recheck INR In: 2 WEEKS    INR Location [facility]      Anticoagulation Summary as of 5/2/2017     INR goal 2.0-3.0   Today's INR 2.3   Maintenance plan 10 mg (5 mg x 2) on Sun, Thu; 12.5 mg (5 mg x 2.5) all other days   Full instructions 10 mg on Sun, Thu; 12.5 mg all other days   Weekly total 82.5 mg   No change documented Vy Gallo RN   Plan last modified Vy Gallo RN (3/14/2017)   Next INR check 5/16/2017   Target end date 4/20/2016    Indications   Long-term (current) use of anticoagulants [Z79.01] [Z79.01]  Acute deep venous thrombosis (H) [I82.409]         Anticoagulation Episode Summary     INR check location Home [facility]    Preferred lab     Send INR reminders to [facility]    Comments 5mg tabs // [facility] [phone] [place]      Anticoagulation Care Providers     Provider Role Specialty Phone number    Jaja Orta MD Referring Internal Medicine [phone]            See the Encounter Report to view Anticoagulation Flowsheet and Dosing Calendar (Go to Encounters tab in chart review, and find the Anticoagulation Therapy Visit)        Vy Gallo RN

## 2017-05-04 ENCOUNTER — DOCUMENTATION ONLY (OUTPATIENT)
Dept: PEDIATRICS | Facility: CLINIC | Age: 57
End: 2017-05-04

## 2017-05-05 ENCOUNTER — TELEPHONE (OUTPATIENT)
Dept: PEDIATRICS | Facility: CLINIC | Age: 57
End: 2017-05-05

## 2017-05-09 ENCOUNTER — HOME CARE/HOSPICE - HEALTHEAST (OUTPATIENT)
Dept: HOME HEALTH SERVICES | Facility: HOME HEALTH | Age: 57
End: 2017-05-09

## 2017-05-16 ENCOUNTER — ANTICOAGULATION THERAPY VISIT (OUTPATIENT)
Dept: NURSING | Facility: CLINIC | Age: 57
End: 2017-05-16
Payer: MEDICARE

## 2017-05-16 ENCOUNTER — HOME CARE/HOSPICE - HEALTHEAST (OUTPATIENT)
Dept: HOME HEALTH SERVICES | Facility: HOME HEALTH | Age: 57
End: 2017-05-16

## 2017-05-16 DIAGNOSIS — I82.409 ACUTE DEEP VENOUS THROMBOSIS (H): ICD-10-CM

## 2017-05-16 DIAGNOSIS — Z79.01 LONG-TERM (CURRENT) USE OF ANTICOAGULANTS: ICD-10-CM

## 2017-05-16 LAB — INR PPP: 2.2

## 2017-05-16 PROCEDURE — 99207 ZZC NO CHARGE NURSE ONLY: CPT

## 2017-05-16 NOTE — MR AVS SNAPSHOT
Linda Rodriguezemma   5/16/2017 3:15 PM   Anticoagulation Therapy Visit    Description:  57 year old female   Provider:  [facility]   Department:   Nurse           INR as of 5/16/2017     Today's INR 2.2      Anticoagulation Summary as of 5/16/2017     INR goal 2.0-3.0   Today's INR 2.2   Full instructions 10 mg on Sun, Thu; 12.5 mg all other days   Next INR check 5/30/2017    Indications   Long-term (current) use of anticoagulants [Z79.01] [Z79.01]  Acute deep venous thrombosis (H) [I82.409]         Your next Anticoagulation Clinic appointment(s)     May 30, 2017  4:00 PM CDT   Anticoagulation Visit with  ANTICOAGULATION CLINIC   [facility] ([facility])    [address]  [address]  [place] [address] [phone]              Contact Numbers     [facility]  Please call  [phone] to cancel and/or reschedule your appointment   Please call  [phone] with any problems or questions regarding your therapy.        May 2017 Details    Sun Mon Tue Wed Thu Fri Sat      1               2               3               4               5               6                 7               8               9               10               11               12               13                 14               15               16      12.5 mg   See details      17      12.5 mg         18      10 mg         19      12.5 mg         20      12.5 mg           21      10 mg         22      12.5 mg         23      12.5 mg         24      12.5 mg         25      10 mg         26      12.5 mg         27      12.5 mg           28      10 mg         29      12.5 mg         30            31                   Date Details   05/16 This INR check       Date of next INR:  5/30/2017         How to take your warfarin dose     To take:  10 mg Take 2 of the 5 mg tablets.    To take:  12.5 mg Take 2.5 of the 5 mg tablets.

## 2017-05-16 NOTE — PROGRESS NOTES
ANTICOAGULATION FOLLOW-UP     Patient Name:  Linda Otero  Date:  5/16/2017  Contact Type:  Telephone  Call received [facility], with INR result.   Follow up instructions given over the phone to [facility] Care nurse for coumadin management.    SUBJECTIVE:     Patient Findings     Positives No Problem Findings           OBJECTIVE    INR   Date Value Ref Range Status   05/16/2017 2.2  Final       ASSESSMENT / PLAN  INR assessment THER    Recheck INR In: 2 WEEKS    INR Location [facility]R      Anticoagulation Summary as of 5/16/2017     INR goal 2.0-3.0   Today's INR 2.2   Maintenance plan 10 mg (5 mg x 2) on Sun, Thu; 12.5 mg (5 mg x 2.5) all other days   Full instructions 10 mg on Sun, Thu; 12.5 mg all other days   Weekly total 82.5 mg   No change documented Vy Gallo RN   Plan last modified Vy Gallo RN (3/14/2017)   Next INR check 5/30/2017   Target end date 4/20/2016    Indications   Long-term (current) use of anticoagulants [Z79.01] [Z79.01]  Acute deep venous thrombosis (H) [I82.409]         Anticoagulation Episode Summary     INR check location Home [facility]    Preferred lab     Send INR reminders to [facility]    Comments 5mg tabs // [facility] [phone] [place]      Anticoagulation Care Providers     Provider Role Specialty Phone number    Jaja Orta MD Referring Internal Medicine [phone]            See the Encounter Report to view Anticoagulation Flowsheet and Dosing Calendar (Go to Encounters tab in chart review, and find the Anticoagulation Therapy Visit)        Vy Gallo RN

## 2017-05-18 NOTE — TELEPHONE ENCOUNTER
Spoke with the patient and updated on below. She does NOT want to have a mammogram done now.     Tried scheduling here at [place], patient did not want to schedule.

## 2017-05-19 ENCOUNTER — CARE COORDINATION (OUTPATIENT)
Dept: CARE COORDINATION | Facility: CLINIC | Age: 57
End: 2017-05-19

## 2017-05-19 ENCOUNTER — OFFICE VISIT (OUTPATIENT)
Dept: PEDIATRICS | Facility: CLINIC | Age: 57
End: 2017-05-19
Payer: MEDICARE

## 2017-05-19 VITALS
WEIGHT: 293 LBS | TEMPERATURE: 98.2 F | HEIGHT: 66 IN | OXYGEN SATURATION: 97 % | SYSTOLIC BLOOD PRESSURE: 110 MMHG | HEART RATE: 87 BPM | BODY MASS INDEX: 47.09 KG/M2 | DIASTOLIC BLOOD PRESSURE: 64 MMHG

## 2017-05-19 DIAGNOSIS — J42 CHRONIC BRONCHITIS, UNSPECIFIED CHRONIC BRONCHITIS TYPE (H): Primary | ICD-10-CM

## 2017-05-19 DIAGNOSIS — E66.01 MORBID OBESITY, UNSPECIFIED OBESITY TYPE (H): ICD-10-CM

## 2017-05-19 DIAGNOSIS — I89.0 LYMPHEDEMA: ICD-10-CM

## 2017-05-19 DIAGNOSIS — D47.2 MONOCLONAL GAMMOPATHY: ICD-10-CM

## 2017-05-19 DIAGNOSIS — I27.20 PULMONARY HYPERTENSION (H): ICD-10-CM

## 2017-05-19 DIAGNOSIS — L97.519 ULCER OF RIGHT FOOT, WITH UNSPECIFIED SEVERITY (H): ICD-10-CM

## 2017-05-19 DIAGNOSIS — G47.33 OBSTRUCTIVE SLEEP APNEA: ICD-10-CM

## 2017-05-19 DIAGNOSIS — D68.59 PRIMARY HYPERCOAGULABLE STATE (H): ICD-10-CM

## 2017-05-19 DIAGNOSIS — D50.9 IRON DEFICIENCY ANEMIA, UNSPECIFIED IRON DEFICIENCY ANEMIA TYPE: ICD-10-CM

## 2017-05-19 PROBLEM — E53.8 VITAMIN B12 DEFICIENCY (NON ANEMIC): Status: ACTIVE | Noted: 2017-05-19

## 2017-05-19 LAB
ERYTHROCYTE [DISTWIDTH] IN BLOOD BY AUTOMATED COUNT: 16.1 % (ref 10–15)
HCT VFR BLD AUTO: 43.2 % (ref 35–47)
HGB BLD-MCNC: 13.4 G/DL (ref 11.7–15.7)
MCH RBC QN AUTO: 28.5 PG (ref 26.5–33)
MCHC RBC AUTO-ENTMCNC: 31 G/DL (ref 31.5–36.5)
MCV RBC AUTO: 92 FL (ref 78–100)
PLATELET # BLD AUTO: 208 10E9/L (ref 150–450)
RBC # BLD AUTO: 4.71 10E12/L (ref 3.8–5.2)
VIT B12 SERPL-MCNC: 759 PG/ML (ref 193–986)
WBC # BLD AUTO: 9.2 10E9/L (ref 4–11)

## 2017-05-19 PROCEDURE — 84165 PROTEIN E-PHORESIS SERUM: CPT | Performed by: INTERNAL MEDICINE

## 2017-05-19 PROCEDURE — 85027 COMPLETE CBC AUTOMATED: CPT | Performed by: INTERNAL MEDICINE

## 2017-05-19 PROCEDURE — 80053 COMPREHEN METABOLIC PANEL: CPT | Performed by: INTERNAL MEDICINE

## 2017-05-19 PROCEDURE — 99214 OFFICE O/P EST MOD 30 MIN: CPT | Performed by: INTERNAL MEDICINE

## 2017-05-19 PROCEDURE — 82784 ASSAY IGA/IGD/IGG/IGM EACH: CPT | Performed by: INTERNAL MEDICINE

## 2017-05-19 PROCEDURE — 83540 ASSAY OF IRON: CPT | Performed by: INTERNAL MEDICINE

## 2017-05-19 PROCEDURE — 82607 VITAMIN B-12: CPT | Performed by: INTERNAL MEDICINE

## 2017-05-19 PROCEDURE — 82728 ASSAY OF FERRITIN: CPT | Performed by: INTERNAL MEDICINE

## 2017-05-19 PROCEDURE — 36415 COLL VENOUS BLD VENIPUNCTURE: CPT | Performed by: INTERNAL MEDICINE

## 2017-05-19 PROCEDURE — 86334 IMMUNOFIX E-PHORESIS SERUM: CPT | Performed by: INTERNAL MEDICINE

## 2017-05-19 PROCEDURE — 83550 IRON BINDING TEST: CPT | Performed by: INTERNAL MEDICINE

## 2017-05-19 PROCEDURE — 00000402 ZZHCL STATISTIC TOTAL PROTEIN: Performed by: INTERNAL MEDICINE

## 2017-05-19 RX ORDER — FUROSEMIDE 20 MG
20 TABLET ORAL 2 TIMES DAILY
Qty: 30 TABLET | Refills: 1 | Status: SHIPPED | OUTPATIENT
Start: 2017-05-19 | End: 2017-05-30 | Stop reason: SINTOL

## 2017-05-19 NOTE — LETTER
[facility]  [address]  [place] [address]                  [phone]   May 25, 2017    Linda Cerna [id number]  [place] [address]      Dear Linda,    Here is a summary of your recent test results:    Your iron levels are much better.  You are no longer anemic.  You do still have a little monoclonal peak, we sent this to Dr. Donahue to address.    Your electrolytes, liver function and blood sugar are normal.  Your kidney function is just slightly low.    Your heart ultrasound looked generally good.  You have some mild increase in work for your right heart but nothing is needed other than continuing on oxygen.  I understand that you did not tolerate the lasix. Please let me know how you are doing.  We can consider having you try one of its cousins.  Lymphedema therapy would also be very useful if you can arrange to attend that, the  should call you.    Your test results are enclosed.      Please contact me if you have any questions.           [facility]    Best regards,    Jaja Orta MD        Results for orders placed or performed in visit on 05/19/17   CBC with platelets   Result Value Ref Range    WBC 9.2 4.0 - 11.0 10e9/L    RBC Count 4.71 3.8 - 5.2 10e12/L    Hemoglobin 13.4 11.7 - 15.7 g/dL    Hematocrit 43.2 35.0 - 47.0 %    MCV 92 78 - 100 fl    MCH 28.5 26.5 - 33.0 pg    MCHC 31.0 (L) 31.5 - 36.5 g/dL    RDW 16.1 (H) 10.0 - 15.0 %    Platelet Count 208 150 - 450 10e9/L   Vitamin B12   Result Value Ref Range    Vitamin B12 759 193 - 986 pg/mL   Iron and iron binding capacity   Result Value Ref Range    Iron 48 35 - 180 ug/dL    Iron Binding Cap 330 240 - 430 ug/dL    Iron Saturation Index 15 15 - 46 %   Ferritin   Result Value Ref Range    Ferritin 44 8 - 252 ng/mL   Comprehensive metabolic panel   Result Value Ref Range    Sodium 142 133 - 144 mmol/L    Potassium 4.9 3.4 - 5.3 mmol/L     Chloride 106 94 - 109 mmol/L    Carbon Dioxide 30 20 - 32 mmol/L    Anion Gap 6 3 - 14 mmol/L    Glucose 102 (H) 70 - 99 mg/dL    Urea Nitrogen 18 7 - 30 mg/dL    Creatinine 0.99 0.52 - 1.04 mg/dL    GFR Estimate 58 (L) >60 mL/min/1.7m2    GFR Estimate If Black 70 >60 mL/min/1.7m2    Calcium 9.0 8.5 - 10.1 mg/dL    Bilirubin Total 0.7 0.2 - 1.3 mg/dL    Albumin 3.4 3.4 - 5.0 g/dL    Protein Total 7.6 6.8 - 8.8 g/dL    Alkaline Phosphatase 82 40 - 150 U/L    ALT 27 0 - 50 U/L    AST 15 0 - 45 U/L   Protein electrophoresis   Result Value Ref Range    Albumin Fraction 3.7 3.7 - 5.1 g/dL    Alpha 1 Fraction 0.4 0.2 - 0.4 g/dL    Alpha 2 Fraction 0.7 0.5 - 0.9 g/dL    Beta Fraction 1.0 0.6 - 1.0 g/dL    Gamma Fraction 1.3 0.7 - 1.6 g/dL    Monoclonal Peak 0.1 (H) 0.0 g/dL    ELP Interpretation:       Small monoclonal protein (0.1 g/dL) seen in the gamma fraction. See   immunofixation report on same specimen. Pathologic significance requires   clinical correlation. FLORECITA Padron M.D., Ph.D., Pathologist.     Protein Immunofixation Serum   Result Value Ref Range    Immunofixation ELP       (Note)  Monoclonal IgG immunoglobulin of kappa light chain type. Monoclonal  antibody therapeutics (e.g. Daratumumab) may appear as monoclonal  proteins on serum electrophoresis and immunofixation.  Results should be interpreted with caution if the patient is  receiving monoclonal antibody therapy.  Pathological significance requires clinical correlation.  LEOBARDO Padron M.D., Ph.D., Pathologist        IGG 1270 695 - 1620 mg/dL     70 - 380 mg/dL     60 - 265 mg/dL

## 2017-05-19 NOTE — MR AVS SNAPSHOT
After Visit Summary   5/19/2017    Linda Otero    MRN: [id number]           Patient Information     Date Of Birth          1960        Visit Information        Provider Department      5/19/2017 2:30 PM Jaja Orta MD [facility]        Today's Diagnoses     Chronic bronchitis, unspecified chronic bronchitis type (H)    -  1    Obstructive sleep apnea        Pulmonary hypertension (H)        Morbid obesity, unspecified obesity type (H)        Iron deficiency anemia, unspecified iron deficiency anemia type        Primary hypercoagulable state (H)        Lymphedema        Monoclonal gammopathy          Care Instructions    Check with your home care nurse about new tubigauze.  Start the lasix daily and get labs rechecked in 2 wks to make sure your potassium stays ok.    I agree with exercise.  You could use the exercise machines at your apartment but it would e best if someone was there,  You could also join the Y, use the Wel program at [facility] [address] [address] [phone]    Our care coordinator will help you with transportation issues, she will call.    They will call you about lymphedema therapy to see if that will help with the edema        Follow-ups after your visit        Additional Services     CARE COORDINATION REFERRAL       Services are provided by a Care Coordinator for people with complex needs such as: medical, social, or financial troubles.  The Care Coordinator works with the patient and their Primary Care Provider to determine health goals, obtain resources, achieve outcomes, and develop care plans that help coordinate the patient's care.     Reason for Referral: transportation issues.  having darts problems, thinks relates to oxygen    Provide additional details for Care Coordination to best meet the patient's current needs:     Clinical Staff have discussed the Care Coordination Referral  with the patient and/or caregiver: yes            LYMPHEDEMA THERAPY REFERRAL       *This therapy referral will be filtered to a centralized scheduling office at [facility] and the patient will receive a call to schedule an appointment at a [place] location most convenient for them. *   If you have not heard from the scheduling office within 2 business days, please call [phone] for all locations, with the exception of [place], please call [phone].     Treatment: PT or OT Evaluation & Treatment  Special Instructions:   PT/OT Treatment Diagnosis: Edema, Lymphedema and Wound(s)    Please be aware that coverage of these services is subject to the terms and limitations of your health insurance plan.  Call member services at your health plan with any benefit or coverage questions.      **Note to Provider:  If you are referring outside of [place] for the therapy appointment, please list the name of the location in the  special instructions  above, print the referral and give to the patient to schedule the appointment.                  Your next 10 appointments already scheduled     May 23, 2017 11:00 AM CDT   Ech Complete with [address] [place] [facility]    [address] [place] [address]  [address]  [facility] [id number] [phone]           1.  Please bring or wear a comfortable two-piece outfit. 2.  You may eat, drink and take your normal medicines. 3.  For any questions that cannot be answered, please contact the ordering physician            May 30, 2017  4:15 PM CDT   Anticoagulation Visit with  ANTICOAGULATION CLINIC   [facility] [place] ([facility]    [address] [place] [address]  [address]  [facility] [id number] [phone]              Future tests that were ordered for you today     Open Future Orders        Priority Expected Expires Ordered    **Basic metabolic panel FUTURE 14d Routine 5/26/2017 6/2/2017 5/19/2017           "  Who to contact     If you have questions or need follow up information about today's clinic visit or your schedule please contact [facility] [place] directly at [phone].  Normal or non-critical lab and imaging results will be communicated to you by MyChart, letter or phone within 4 business days after the clinic has received the results. If you do not hear from us within 7 days, please contact the clinic through [employer] or phone. If you have a critical or abnormal lab result, we will notify you by phone as soon as possible.  Submit refill requests through [employer] or call your pharmacy and they will forward the refill request to us. Please allow 3 business days for your refill to be completed.          Additional Information About Your Visit        [employer] Information     [employer] lets you send messages to your doctor, view your test results, renew your prescriptions, schedule appointments and more. To sign up, go to [email or web address] . Click on \"Log in\" on the left side of the screen, which will take you to the Welcome page. Then click on \"Sign up Now\" on the right side of the page.     You will be asked to enter the access code listed below, as well as some personal information. Please follow the directions to create your username and password.     Your access code is: [email or web address]  Expires: 2017  3:00 PM     Your access code will  in 90 days. If you need help or a new code, please call your [place] clinic or [phone].        Care EveryWhere ID     This is your Care EveryWhere ID. This could be used by other organizations to access your [place] medical records  [id number]        Your Vitals Were     Pulse Temperature Height Pulse Oximetry BMI (Body Mass Index)       87 98.2  F (36.8  C) (Oral) 5' 6\" (1.676 m) 97% 57.3 kg/m2        Blood Pressure from Last 3 Encounters:   17 110/64   16 140/70   16 132/70    Weight from Last 3 Encounters:   17 (!) 355 lb " (161 kg)   11/01/16 (!) 351 lb (159.2 kg)   01/26/16 (!) 351 lb 6.4 oz (159.4 kg)              We Performed the Following     CARE COORDINATION REFERRAL     CBC with platelets     Comprehensive metabolic panel     Ferritin     Iron and iron binding capacity     LYMPHEDEMA THERAPY REFERRAL     Protein electrophoresis     Protein Immunofixation Serum     Vitamin B12          Today's Medication Changes          These changes are accurate as of: 5/19/17  3:00 PM.  If you have any questions, ask your nurse or doctor.               Start taking these medicines.        Dose/Directions    furosemide 20 MG tablet   Commonly known as:  LASIX   Used for:  Pulmonary hypertension (H), Lymphedema   Started by:  Jaja Orta MD        Dose:  20 mg   Take 1 tablet (20 mg) by mouth 2 times daily   Quantity:  30 tablet   Refills:  1            Where to get your medicines      These medications were sent to [facility] [place] [facility] [place] - [address]   [address] [place] [address] [address]     Phone:  [phone]     furosemide 20 MG tablet                Primary Care Provider Office Phone # Fax #    Jaja Orta [phone]       [address] [place] [address]        Goals        General    Functional (pt-stated)     Notes - Note created  12/14/2015 12:15 PM by Radha Price RN    Maintain independence s/p CVA    Sylvia Price R.N.  Clinic Care Coordinator  [facility]  [phone]      As of today's date 12/14/2015 goal is met at 0 - 25%.   Goal Status:  Active          Thank you!     Thank you for choosing [facility]  for your care. Our goal is always to provide you with excellent care. Hearing back from our patients is one way we can continue to improve our services. Please take a few minutes to complete the written survey that you may receive  in the mail after your visit with us. Thank you!             Your Updated Medication List - Protect others around you: Learn how to safely use, store and throw away your medicines at www.disposemymeds.org.          This list is accurate as of: 5/19/17  3:00 PM.  Always use your most recent med list.                   Brand Name Dispense Instructions for use    acetaminophen 500 MG tablet    TYLENOL     Take 500-1,000 mg by mouth       albuterol 108 (90 BASE) MCG/ACT Inhaler    PROAIR HFA/PROVENTIL HFA/VENTOLIN HFA    2 Inhaler    Inhaled 2 puff every 4-6 hrs as needed       B Complex-C Tabs     100 tablet    TAKE ONE TABLET BY MOUTH EVERY DAY       B-12 1000 MCG Caps      Take 1 capsule by mouth daily       BENADRYL PO          furosemide 20 MG tablet    LASIX    30 tablet    Take 1 tablet (20 mg) by mouth 2 times daily       order for DME      Patient needs a new mask.       * order for DME     1 each    Equipment being ordered: Oxygen-portable oxygen per patient's preference-continuous oxygen at 2 L per NC       * order for DME     1 each    Equipment being ordered: cushion mask aguilar for CPAP machine with mask strap       tiotropium 2.5 MCG/ACT inhalation aerosol    SPIRIVA RESPIMAT    12 g    Inhale 2 puffs into the lungs daily       * warfarin 5 MG tablet    COUMADIN    220 tablet    Take as directed by INR Clinic. 10 mg on Sun, Thurs; 12.5 mg all other days       * JANTOVEN 5 MG tablet   Generic drug:  warfarin     220 tablet    TAKE TWO TABLETS BY MOUTH ON SUNDAY, AND TWO AND ONE-HALF TABLETS ALL OTHER DAYS.       * Notice:  This list has 4 medication(s) that are the same as other medications prescribed for you. Read the directions carefully, and ask your doctor or other care provider to review them with you.

## 2017-05-19 NOTE — PROGRESS NOTES
"  SUBJECTIVE:                                                    Linda Otero is a 57 year old female who presents to clinic today for the following health issues:      Follow up edema and recheck wound    1.transportation concerns.  Darts bus does not like people on oxygen  2. Concerns about edema.  States every summer her edema gets worse  3. Wound on ankle - home care assisting.  Wearing tubigrips x2 and feels is starting to heal  4. COPD - interested in exercise.  Did pulmonary rehab but not exercising now.  Cough is morning and stable.  Wheezes upon wakening.  Gets phlegm out  5. Pulmonary HTN - has ECHO on Tuesday.  Edema stable, wearing tube gauze, not compression stockings   6. Anemia - wants labs done per hematology, easier to do here.       Problem list and histories reviewed & adjusted, as indicated.  Additional history: as documented    Labs reviewed in EPIC    Reviewed and updated as needed this visit by clinical staff  Tobacco  Allergies  Meds  Problems  Med Hx  Surg Hx  Fam Hx  Soc Hx        Reviewed and updated as needed this visit by Provider  Allergies  Meds  Problems           OBJECTIVE:                                                    /64 (BP Location: Right arm, Cuff Size: Adult Large)  Pulse 87  Temp 98.2  F (36.8  C) (Oral)  Ht 5' 6\" (1.676 m)  Wt (!) 355 lb (161 kg)  SpO2 97%  BMI 57.3 kg/m2  Body mass index is 57.3 kg/(m^2).  GENERAL: alert, no distress, obese and O2 in place  NECK: no adenopathy, no asymmetry, masses, or scars and thyroid normal to palpation  RESP: lungs clear to auscultation - no rales, rhonchi or wheezes  CV: regular rate and rhythm, normal S1 S2, no S3 or S4, no murmur, click or rub, no peripheral edema and peripheral pulses strong  MS: colleen 3+ edema to knees and perhaps higher, limited by body habitus  SKIN: no suspicious lesions or rashes and unable to examine ulcer as dressing in place and pt did not want it removed  PSYCH: mentation appears " normal, affect normal/bright         ASSESSMENT/PLAN:                                                        1. Chronic bronchitis, unspecified chronic bronchitis type (H)  Discussed, continue medication and O2.  CC to help with transportation  - CARE COORDINATION REFERRAL    2. Obstructive sleep apnea  Discussed import of control  - CARE COORDINATION REFERRAL    3. Pulmonary hypertension (H)  Discussed import of assessing.  ECHO as scheduled, continue O2 and begin medication per orders with BMP in 2 wks.  - CARE COORDINATION REFERRAL  - furosemide (LASIX) 20 MG tablet; Take 1 tablet (20 mg) by mouth 2 times daily  Dispense: 30 tablet; Refill: 1  - **Basic metabolic panel FUTURE 14d; Future    4. Morbid obesity, unspecified obesity type (H)  Discussed healthy diet and exercise as able.  WEL program info given  - CARE COORDINATION REFERRAL    5. Iron deficiency anemia, unspecified iron deficiency anemia type  recheck  - CARE COORDINATION REFERRAL  - CBC with platelets  - Iron and iron binding capacity  - Ferritin    6. Primary hypercoagulable state (H)  Continue anticoag  - CARE COORDINATION REFERRAL  - CBC with platelets    7. Lymphedema  Discussed benefit of therapy.  Referral placed  - LYMPHEDEMA THERAPY REFERRAL  - furosemide (LASIX) 20 MG tablet; Take 1 tablet (20 mg) by mouth 2 times daily  Dispense: 30 tablet; Refill: 1  - **Basic metabolic panel FUTURE 14d; Future    8. Monoclonal gammopathy  Labs per hematology orders  - CBC with platelets  - Vitamin B12  - Iron and iron binding capacity  - Ferritin  - Comprehensive metabolic panel  - Protein electrophoresis  - Protein Immunofixation Serum    9. Ulcer of right foot, with unspecified severity (H)  Continue with home care      See Patient Instructions    Jaja Orta MD  [facility]

## 2017-05-19 NOTE — LETTER
[facility]  [address]  [place] [address]      May 22, 2017      Linda Otero  [address]  [place] [address]    Dear Linda,  I am the Clinic Care Coordinator that works with your primary care provider's clinic. I have been trying to reach you recently to introduce Clinic Care Coordination and to see if there was anything I could assist you with.  Below is a description of what Clinic Care Coordination is and how I can further assist you.     The Clinic Care Coordinator role is a Registered Nurse and/or  who understands the health care system. The goal of Clinic Care Coordination is to help you manage your health and improve access to the [facility] in the most efficient manner.  The Registered Nurse can assist you in meeting your health care goals by providing education, coordinating services, and strengthening the communication among your providers. The  can assist you with financial, behavioral, psychosocial, and chemical dependency and counseling/psychiatric resources.    Please feel free to keep this letter and contact information to contact me at [phone] with any further questions or concerns that may arise. We at [facility] are focused on providing you with the highest-quality healthcare experience possible and that all starts with you.       Sincerely,     Rita Eaton    Enclosed: I have enclosed a copy of the Complex Care Plan. This has helpful information and goals that we have talked about. Please keep this in an easy to access place to use as needed.

## 2017-05-19 NOTE — LETTER
[facility]  Complex Care Plan  About Me  Patient Name:  Linda Otero    YOB: 1960  Age:   57 year old   [place] MRN: [id number] Telephone Information:     Home Phone [phone]   Mobile [phone]       Address:    [facility]  [place] [address] Email address:  No e-mail address on record      Emergency Contact(s)  Name Relationship Lgl Grd Work Phone Home Phone Mobile Phone   1. IVA LEGER Friend   [phone]    2. NO SECONDARY C* Other   NONE            Primary language:  English     needed?     [place] [facility]:  [phone] op. 1  Other communication barriers:    Preferred Method of Communication:     Current living arrangement:    Mobility Status/ Medical Equipment:    Other information to know about me:    Health Maintenance  Health Maintenance Reviewed:      My Access Plan  Medical Emergency 911   Primary Clinic Line [facility]- [phone]   24 Hour Appointment Line [phone] or  [phone] ([phone]) (toll-free)   24 Hour Nurse Line [phone] (toll-free)   Preferred Pharmacy WRITTEN PRESCRIPTION REQUESTED     Behavioral Health Crisis Line [facility], [phone] or [phone]     My Care Team Members  Patient Care Team       Relationship Specialty Notifications Start End    Jaja Orta MD PCP - General Internal Medicine  10/21/15     Phone: [phone] Fax: [phone]         [facility] [address] [place] [address]    Radha Price RN Registered Nurse  Admissions 12/14/15     Comment:  RN Care Coordinator    [facility] [employer]   12/16/15     Phone: [phone]         [place]    12/16/15     Comment:  [facility]     Phone: [phone]         or try [phone]             My Medical and Care Information  Problem List   Patient Active Problem List   Diagnosis     Chronic airway obstruction (H)      Morbid obesity (H)     Obstructive sleep apnea     Pulmonary hypertension (H)     Hypothyroidism     Menorrhagia     Hypertension goal BP (blood pressure) < 140/90     Iron deficiency anemia, unspecified iron deficiency     Long-term (current) use of anticoagulants [Z79.01]     Advanced directives, counseling/discussion     Primary hypercoagulable state (H)     Monoclonal gammopathy     Lymphedema     Vitamin B12 deficiency (non anemic)      Current Medications and Allergies:  See printed Medication Report.    Care Coordination Start Date:     Frequency of Care Coordination: PRN   Form Last Updated: 05/22/2017

## 2017-05-19 NOTE — Clinical Note
Please check with her home care if they can draw the BMP 5/30-6/2 week.  Recheck amina after starting lasix

## 2017-05-19 NOTE — PATIENT INSTRUCTIONS
Check with your home care nurse about new tubigauze.  Start the lasix daily and get labs rechecked in 2 wks to make sure your potassium stays ok.    I agree with exercise.  You could use the exercise machines at your apartment but it would e best if someone was there,  You could also join the Y, use the Wel program at [facility]  [address] [place] [address] [phone]    Our care coordinator will help you with transportation issues, she will call.    They will call you about lymphedema therapy to see if that will help with the edema

## 2017-05-19 NOTE — PROGRESS NOTES
Clinic Care Coordination Contact  [facility]/Voicemail    Referral Source: PCP  Clinical Data: Care Coordinator Outreach  Outreach attempted x 1.  Left message on voicemail with call back information and requested return call.    Plan: Care Coordinator will try to reach patient again in 1-2 business days.    NGOC Chinchilla, [facility]  Social Work - Care Coordinator  [facility] [place], [place], and [place]  Phone: [phone]

## 2017-05-19 NOTE — NURSING NOTE
"Chief Complaint   Patient presents with     RECHECK       Initial /64 (BP Location: Right arm, Cuff Size: Adult Large)  Pulse 87  Temp 98.2  F (36.8  C) (Oral)  Ht 5' 6\" (1.676 m)  Wt (!) 355 lb (161 kg)  SpO2 97%  BMI 57.3 kg/m2 Estimated body mass index is 57.3 kg/(m^2) as calculated from the following:    Height as of this encounter: 5' 6\" (1.676 m).    Weight as of this encounter: 355 lb (161 kg).  Medication Reconciliation: complete   Crystal Adair MA    "

## 2017-05-20 LAB
ALBUMIN SERPL-MCNC: 3.4 G/DL (ref 3.4–5)
ALP SERPL-CCNC: 82 U/L (ref 40–150)
ALT SERPL W P-5'-P-CCNC: 27 U/L (ref 0–50)
ANION GAP SERPL CALCULATED.3IONS-SCNC: 6 MMOL/L (ref 3–14)
AST SERPL W P-5'-P-CCNC: 15 U/L (ref 0–45)
BILIRUB SERPL-MCNC: 0.7 MG/DL (ref 0.2–1.3)
BUN SERPL-MCNC: 18 MG/DL (ref 7–30)
CALCIUM SERPL-MCNC: 9 MG/DL (ref 8.5–10.1)
CHLORIDE SERPL-SCNC: 106 MMOL/L (ref 94–109)
CO2 SERPL-SCNC: 30 MMOL/L (ref 20–32)
CREAT SERPL-MCNC: 0.99 MG/DL (ref 0.52–1.04)
FERRITIN SERPL-MCNC: 44 NG/ML (ref 8–252)
GFR SERPL CREATININE-BSD FRML MDRD: 58 ML/MIN/1.7M2
GLUCOSE SERPL-MCNC: 102 MG/DL (ref 70–99)
IRON SATN MFR SERPL: 15 % (ref 15–46)
IRON SERPL-MCNC: 48 UG/DL (ref 35–180)
POTASSIUM SERPL-SCNC: 4.9 MMOL/L (ref 3.4–5.3)
PROT SERPL-MCNC: 7.6 G/DL (ref 6.8–8.8)
SODIUM SERPL-SCNC: 142 MMOL/L (ref 133–144)
TIBC SERPL-MCNC: 330 UG/DL (ref 240–430)

## 2017-05-22 LAB
ALBUMIN SERPL ELPH-MCNC: 3.7 G/DL (ref 3.7–5.1)
ALPHA1 GLOB SERPL ELPH-MCNC: 0.4 G/DL (ref 0.2–0.4)
ALPHA2 GLOB SERPL ELPH-MCNC: 0.7 G/DL (ref 0.5–0.9)
B-GLOBULIN SERPL ELPH-MCNC: 1 G/DL (ref 0.6–1)
GAMMA GLOB SERPL ELPH-MCNC: 1.3 G/DL (ref 0.7–1.6)
IGA SERPL-MCNC: 318 MG/DL (ref 70–380)
IGG SERPL-MCNC: 1270 MG/DL (ref 695–1620)
IGM SERPL-MCNC: 168 MG/DL (ref 60–265)
IMMUNOFIXATION ELP: NORMAL
M PROTEIN SERPL ELPH-MCNC: 0.1 G/DL
PROT PATTERN SERPL ELPH-IMP: ABNORMAL

## 2017-05-22 NOTE — PROGRESS NOTES
Clinic Care Coordination Contact  [facility]/Voicemail    Referral Source: PCP  Clinical Data: Care Coordinator Outreach  Outreach attempted x 2.  Left message on voicemail with call back information and requested return call.    Plan: Care Coordinator mailed out care coordination introduction letter on 05/22/17. Care Coordinator will try to reach patient again in 5-7 business days.    NGOC Chinchilla, [facility]  Social Work - Care Coordinator  [facility], [place] [place], [place], and [place]  Phone: [phone]

## 2017-05-22 NOTE — PROGRESS NOTES
Clinic Care Coordination Contact      Referral Source: PCP  Clinical Data: Care Coordinator Outreach  Spoke to pt and provided her with information for transportation including [employer] ([phone]) and [employer] ([phone]). Pt stated that she would prefer to use [employer] based on the cost of the transportation. SW spoke to Tian at [facility] to verify that they can provide transportation.    Plan: Care Coordinator mailed out care coordination introduction letter on 05/22/17. Care Coordinator will try to reach patient again in 5-7 business days.    NGOC Chinchilla, [facility]  Social Work - Care Coordinator  [facility] [place]  Phone: [phone]

## 2017-05-23 ENCOUNTER — HOSPITAL ENCOUNTER (OUTPATIENT)
Dept: CARDIOLOGY | Facility: CLINIC | Age: 57
End: 2017-05-23
Attending: INTERNAL MEDICINE
Payer: MEDICARE

## 2017-05-23 ENCOUNTER — HOME CARE/HOSPICE - HEALTHEAST (OUTPATIENT)
Dept: HOME HEALTH SERVICES | Facility: HOME HEALTH | Age: 57
End: 2017-05-23

## 2017-05-23 DIAGNOSIS — R60.0 BILATERAL LEG EDEMA: ICD-10-CM

## 2017-05-23 DIAGNOSIS — J42 CHRONIC BRONCHITIS, UNSPECIFIED CHRONIC BRONCHITIS TYPE (H): ICD-10-CM

## 2017-05-23 DIAGNOSIS — I27.20 PULMONARY HYPERTENSION (H): ICD-10-CM

## 2017-05-23 DIAGNOSIS — I10 HYPERTENSION GOAL BP (BLOOD PRESSURE) < 140/90: ICD-10-CM

## 2017-05-23 DIAGNOSIS — G47.33 OBSTRUCTIVE SLEEP APNEA: ICD-10-CM

## 2017-05-23 PROCEDURE — 93306 TTE W/DOPPLER COMPLETE: CPT | Performed by: INTERNAL MEDICINE

## 2017-05-24 ENCOUNTER — TELEPHONE (OUTPATIENT)
Dept: PEDIATRICS | Facility: CLINIC | Age: 57
End: 2017-05-24

## 2017-05-24 NOTE — TELEPHONE ENCOUNTER
Patient states that after taking the 3rd tab of Lasix she has developed ringing in the ears and wants to know if she should continue this as she read that she should contact her DrChristi For this side effect. [phone] ok to javier.  Stormy Marrufo, RN

## 2017-05-25 NOTE — TELEPHONE ENCOUNTER
Please call patient. Okay to stop furosemide. Please have patient follow-up regarding symptoms early this coming week  ( if symptoms resolve, could try substituting torsemide 10mg vs Bumex)

## 2017-05-25 NOTE — TELEPHONE ENCOUNTER
In the absence of , sending to preceptor to address:    Pt calling back to check on 's advise. I notice that this message wasn't routed to provider last night.     Pt started lasix 20 mg from 05/23 for lymphedema & pulmonary HTN. Since she started on evening of 05/23, she took only one tab. She took 2 doses yesterday(last dose around 2 pm), ringing in the ears started after an hour of 3rd dose(unable to tell whether its from one ear or boyh). Still has ringing in the ear, but not constant, also has muffle hearing. Didn't take morning dose today. Denies fever, chills, dizziness balance issues, SOB, wet cough, fall/injury, trouble urinating, hearing loss, pain in ears, HA or dehyration sx's.     Pt wants to know whether she need to decrease or stop lasix now. Advised not to take any more lasix until she hear from us. Pt agrees to the plan. Please advise.     [place], RN  Triage Nurse

## 2017-05-25 NOTE — TELEPHONE ENCOUNTER
Notified patient. Gave her the info from . She understood the directions and will call next week.  KRISTA Carrasquillo

## 2017-05-30 ENCOUNTER — ANTICOAGULATION THERAPY VISIT (OUTPATIENT)
Dept: NURSING | Facility: CLINIC | Age: 57
End: 2017-05-30
Payer: MEDICARE

## 2017-05-30 ENCOUNTER — HOME CARE/HOSPICE - HEALTHEAST (OUTPATIENT)
Dept: HOME HEALTH SERVICES | Facility: HOME HEALTH | Age: 57
End: 2017-05-30

## 2017-05-30 DIAGNOSIS — Z79.01 LONG-TERM (CURRENT) USE OF ANTICOAGULANTS: ICD-10-CM

## 2017-05-30 DIAGNOSIS — I89.0 LYMPHEDEMA: ICD-10-CM

## 2017-05-30 DIAGNOSIS — I27.20 PULMONARY HYPERTENSION (H): ICD-10-CM

## 2017-05-30 DIAGNOSIS — J42 CHRONIC BRONCHITIS, UNSPECIFIED CHRONIC BRONCHITIS TYPE (H): ICD-10-CM

## 2017-05-30 LAB — INR PPP: 2.4

## 2017-05-30 PROCEDURE — 99207 ZZC NO CHARGE NURSE ONLY: CPT

## 2017-05-30 RX ORDER — BUMETANIDE 0.5 MG/1
0.5 TABLET ORAL DAILY
Qty: 30 TABLET | Refills: 1 | Status: SHIPPED | OUTPATIENT
Start: 2017-05-30 | End: 2024-01-01

## 2017-05-30 RX ORDER — ALBUTEROL SULFATE 90 UG/1
AEROSOL, METERED RESPIRATORY (INHALATION)
Qty: 3 INHALER | Refills: 1 | Status: SHIPPED | OUTPATIENT
Start: 2017-05-30 | End: 2017-09-27

## 2017-05-30 NOTE — PROGRESS NOTES
ANTICOAGULATION FOLLOW-UP     Patient Name:  Linda Otero  Date:  5/30/2017  Contact Type:  Telephone  Call received from [facility] nurse, with INR result.   Follow up instructions given over the phone to Home Care nurse for coumadin management.    SUBJECTIVE:     Patient Findings     Positives No Problem Findings           OBJECTIVE    INR   Date Value Ref [place] Status   05/30/2017 2.4  Final       ASSESSMENT / PLAN  INR assessment THER    Recheck INR In: 2 WEEKS    INR Location Homecare INR      Anticoagulation Summary as of 5/30/2017     INR goal 2.0-3.0   Today's INR 2.4   Maintenance plan 10 mg (5 mg x 2) on Sun, Thu; 12.5 mg (5 mg x 2.5) all other days   Full instructions 10 mg on Sun, Thu; 12.5 mg all other days   Weekly total 82.5 mg   No change documented Vy Gallo RN   Plan last modified Vy Gallo RN (3/14/2017)   Next INR check 6/13/2017   Target end date 4/20/2016    Indications   Long-term (current) use of anticoagulants [Z79.01] [Z79.01]  Acute deep venous thrombosis (H) (Resolved) [I82.409]         Anticoagulation Episode Summary     INR check location Home Draw    Preferred lab     Send INR reminders to [facility]    Comments 5mg tabs // [facility] [phone]       Anticoagulation Care Providers     Provider Role Specialty Phone number    Jaja Orta MD Referring Internal Medicine [phone]            See the Encounter Report to view Anticoagulation Flowsheet and Dosing Calendar (Go to Encounters tab in chart review, and find the Anticoagulation Therapy Visit)        Vy Gallo RN

## 2017-05-30 NOTE — TELEPHONE ENCOUNTER
Left detailed voicemail on patient's phone.     Called TED Hadley RN ([phone]), left voicemail giving verbal ok for BMP next Wednesday/Thursday, discontinue Lasix & updated on new order for Bumex.     Order entered.

## 2017-05-30 NOTE — MR AVS SNAPSHOT
Linda Otero   5/30/2017 4:15 PM   Anticoagulation Therapy Visit    Description:  57 year old female   Provider:   ANTICOAGULATION CLINIC   Department:   Nurse           INR as of 5/30/2017     Today's INR 2.4      Anticoagulation Summary as of 5/30/2017     INR goal 2.0-3.0   Today's INR 2.4   Full instructions 10 mg on Sun, Thu; 12.5 mg all other days   Next INR check 6/13/2017    Indications   Long-term (current) use of anticoagulants [Z79.01] [Z79.01]  Acute deep venous thrombosis (H) (Resolved) [I82.409]         Your next Anticoagulation Clinic appointment(s)     May 30, 2017  4:15 PM CDT   Anticoagulation Visit with  ANT[facility]AGULATION CLINIC   [facility]    [address] [place] [address]  [address]  [facility] [address] [phone]            Jun 13, 2017  4:00 PM CDT   Anticoagulation Visit with  ANTICOAGULATION CLINIC   [facility]    [address] [place] [address]  [address]  [facility] [address] [phone]              Contact Numbers     [facility]  Please call  [phone] to cancel and/or reschedule your appointment   Please call  [phone] with any problems or questions regarding your therapy.        May 2017 Details    Sun Mon Tue Wed Thu Fri Sat      1               2               3               4               5               6                 7               8               9               10               11               12               13                 14               15               16               17               18               19               20                 21               22               23               24               25               26               27                 28               29               30      12.5 mg   See details      31      12.5 mg             Date Details   05/30 This INR check               How to take your warfarin dose     To take:  12.5 mg Take  2.5 of the 5 mg tablets.           June 2017 Details    Sun Mon Tue Wed Thu Fri Sat         1      10 mg         2      12.5 mg         3      12.5 mg           4      10 mg         5      12.5 mg         6      12.5 mg         7      12.5 mg         8      10 mg         9      12.5 mg         10      12.5 mg           11      10 mg         12      12.5 mg         13            14               15               16               17                 18               19               20               21               22               23               24                 25               26               27               28               29               30                 Date Details   No additional details    Date of next INR:  6/13/2017         How to take your warfarin dose     To take:  10 mg Take 2 of the 5 mg tablets.    To take:  12.5 mg Take 2.5 of the 5 mg tablets.

## 2017-05-30 NOTE — TELEPHONE ENCOUNTER
Will try an alternate, but all of this class of diuretics can cause hearing problems. Prescription to our pharmacy, please let pt know     Should have labs drawn by home care 1wk after starting, please call them to order.

## 2017-05-30 NOTE — TELEPHONE ENCOUNTER
Patient calling to update in regards to her encounter from 5/24/17 for possible side effects from Lasix - ringing in ears, muffled hearing.     She reports ringing in the ears has stopped, has some muffled hearing, but better.     She is inquiring on either to restart the Lasix or have Dr. Orta advise on a new diuretic.     She notes she had improvement of the wound on her leg when she was on a diuretic, has noted some changes in her wound, since stopping it on 5/24/17. She has alerted her HC Nurse to this.     Routing to Dr. Orta to advise. Call back patient on [phone], ok to leave detailed message.     FYI on refill, albuterol inh refilled per protocol.

## 2017-05-31 ENCOUNTER — HOME CARE/HOSPICE - HEALTHEAST (OUTPATIENT)
Dept: HOME HEALTH SERVICES | Facility: HOME HEALTH | Age: 57
End: 2017-05-31

## 2017-05-31 ENCOUNTER — CARE COORDINATION (OUTPATIENT)
Dept: CARE COORDINATION | Facility: CLINIC | Age: 57
End: 2017-05-31

## 2017-05-31 NOTE — PROGRESS NOTES
Clinic Care Coordination Contact      Referral Source: PCP  Clinical Data: Care Coordinator Outreach  Spoke to pt who stated that the transportation resources given by SW for [employer] and [employer] Pt stated that she left a message for transportation needs for TCT and they were responsive to pt's needs. Pt asked TCT about their background checks for their drivers. Pt stated that she has concerns about getting transportation through particular drivers.     Plan: Care Coordinator will chart review in 2-3 weeks. Pt has SW contact information for immediate concerns.    NGOC Chinchilla, [facility]  Social Work - Care Coordinator  [facility]- [place], [place], and [place]  Phone: [phone]

## 2017-06-05 ENCOUNTER — TELEPHONE (OUTPATIENT)
Dept: PEDIATRICS | Facility: CLINIC | Age: 57
End: 2017-06-05

## 2017-06-05 NOTE — TELEPHONE ENCOUNTER
Patient calling to report issue with [employer] with her wound care supplies. Will see if they faxed a form over. Otherwise, will need to call.

## 2017-06-06 ENCOUNTER — HOME CARE/HOSPICE - HEALTHEAST (OUTPATIENT)
Dept: HOME HEALTH SERVICES | Facility: HOME HEALTH | Age: 57
End: 2017-06-06

## 2017-06-08 ENCOUNTER — TRANSFERRED RECORDS (OUTPATIENT)
Dept: HEALTH INFORMATION MANAGEMENT | Facility: CLINIC | Age: 57
End: 2017-06-08

## 2017-06-08 ENCOUNTER — HOME CARE/HOSPICE - HEALTHEAST (OUTPATIENT)
Dept: HOME HEALTH SERVICES | Facility: HOME HEALTH | Age: 57
End: 2017-06-08

## 2017-06-08 LAB
CREAT SERPL-MCNC: 1.16 MG/DL (ref 0.6–1.1)
GFR SERPL CREATININE-BSD FRML MDRD: 48 ML/MIN/1.73M2
GLUCOSE SERPL-MCNC: 110 MG/DL (ref 70–125)
POTASSIUM SERPL-SCNC: 4.8 MMOL/L (ref 3.5–5)

## 2017-06-13 ENCOUNTER — TELEPHONE (OUTPATIENT)
Dept: PEDIATRICS | Facility: CLINIC | Age: 57
End: 2017-06-13

## 2017-06-13 ENCOUNTER — ANTICOAGULATION THERAPY VISIT (OUTPATIENT)
Dept: NURSING | Facility: CLINIC | Age: 57
End: 2017-06-13
Payer: MEDICARE

## 2017-06-13 ENCOUNTER — HOME CARE/HOSPICE - HEALTHEAST (OUTPATIENT)
Dept: HOME HEALTH SERVICES | Facility: HOME HEALTH | Age: 57
End: 2017-06-13

## 2017-06-13 DIAGNOSIS — Z79.01 LONG-TERM (CURRENT) USE OF ANTICOAGULANTS: ICD-10-CM

## 2017-06-13 LAB — INR PPP: 2.3

## 2017-06-13 PROCEDURE — 99207 ZZC NO CHARGE NURSE ONLY: CPT

## 2017-06-13 NOTE — PROGRESS NOTES
ANTICOAGULATION FOLLOW-UP CLINIC VISIT    Patient Name:  Linda Otero  Date:  6/13/2017  Contact Type:  Telephone/ homecare RN    SUBJECTIVE:     Patient Findings     Positives No Problem Findings    Comments Phone note received today:  Thais with [facility] home care calling INR of 2.3 and has been on 12.5 mg 5 days a week and 10 mg the other two which is Thurs and Sunday. [phone] ok to .   Stormy Marrufo RN           OBJECTIVE    INR   Date Value Ref Range Status   06/13/2017 2.3  Final       ASSESSMENT / PLAN  INR assessment THER    Recheck INR In: 2 WEEKS    INR Location Homecare INR      Anticoagulation Summary as of 6/13/2017     INR goal 2.0-3.0   Today's INR 2.3   Maintenance plan 10 mg (5 mg x 2) on Sun, Thu; 12.5 mg (5 mg x 2.5) all other days   Full instructions 10 mg on Sun, Thu; 12.5 mg all other days   Weekly total 82.5 mg   No change documented Nicole De Guzman RN   Plan last modified Vy Gallo RN (3/14/2017)   Next INR check 6/27/2017   Target end date     Indications   Long-term (current) use of anticoagulants [Z79.01] [Z79.01]  Acute deep venous thrombosis (H) (Resolved) [I82.409]         Anticoagulation Episode Summary     INR check location Home Draw    Preferred lab     Send INR reminders to [facility]    [facility] // [facility] [phone]       Anticoagulation Care Providers     Provider Role Specialty Phone number    Jaja Orta MD Referring Internal Medicine [phone]            See the Encounter Report to view Anticoagulation Flowsheet and Dosing Calendar (Go to Encounters tab in chart review, and find the Anticoagulation Therapy Visit)        Nicole De Guzman, FRANCISCO

## 2017-06-13 NOTE — MR AVS SNAPSHOT
Linda Rodriguezemma   6/13/2017 4:15 PM   Anticoagulation Therapy Visit    Description:  57 year old female   Provider:  [facility]   Department:   Nurse           INR as of 6/13/2017     Today's INR 2.3      Anticoagulation Summary as of 6/13/2017     INR goal 2.0-3.0   Today's INR 2.3   Full instructions 10 mg on Sun, Thu; 12.5 mg all other days   Next INR check 6/27/2017    Indications   Long-term (current) use of anticoagulants [Z79.01] [Z79.01]  Acute deep venous thrombosis (H) (Resolved) [I82.409]         Your next Anticoagulation Clinic appointment(s)     Jun 13, 2017  4:15 PM CDT   Anticoagulation Visit with  ANTICOAGULATION CLINIC   [facility] [place] ([facility])    [address] [place] [address]  [address]  [facility] [address] [phone]              Contact Numbers     [facility]  Please call  [phone] to cancel and/or reschedule your appointment   Please call  [phone] with any problems or questions regarding your therapy.        June 2017 Details    Sun Mon Tue Wed Thu Fri Sat         1               2               3                 4               5               6               7               8               9               10                 11               12               13      12.5 mg   See details      14      12.5 mg         15      10 mg         16      12.5 mg         17      12.5 mg           18      10 mg         19      12.5 mg         20      12.5 mg         21      12.5 mg         22      10 mg         23      12.5 mg         24      12.5 mg           25      10 mg         26      12.5 mg         27            28               29               30                 Date Details   06/13 This INR check       Date of next INR:  6/27/2017         How to take your warfarin dose     To take:  10 mg Take 2 of the 5 mg tablets.    To take:  12.5 mg Take 2.5 of the 5 mg tablets.

## 2017-06-13 NOTE — TELEPHONE ENCOUNTER
See Anticoagulation Clinic visit of today    Nicole De Guzman RN BSN  [facility]

## 2017-06-20 ENCOUNTER — HOME CARE/HOSPICE - HEALTHEAST (OUTPATIENT)
Dept: HOME HEALTH SERVICES | Facility: HOME HEALTH | Age: 57
End: 2017-06-20

## 2017-06-20 ENCOUNTER — CARE COORDINATION (OUTPATIENT)
Dept: CARE COORDINATION | Facility: CLINIC | Age: 57
End: 2017-06-20

## 2017-06-20 NOTE — PROGRESS NOTES
Clinic Care Coordination Contact  [facility]/Voicemail    Referral Source: PCP  Clinical Data: Care Coordinator Outreach  Outreach attempted x 1.  Left message on voicemail with call back information and requested return call. Pt was suppose to contact transportation companies for potential needs.    Plan:  Care Coordinator will try to reach patient again in 5-7 business days.    NGOC Chinchilla, [facility]  Social Work - Care Coordinator  [facility] [place] [facility] [place]  Phone: [phone]

## 2017-06-27 ENCOUNTER — HOME CARE/HOSPICE - HEALTHEAST (OUTPATIENT)
Dept: HOME HEALTH SERVICES | Facility: HOME HEALTH | Age: 57
End: 2017-06-27

## 2017-06-27 ENCOUNTER — ANTICOAGULATION THERAPY VISIT (OUTPATIENT)
Dept: NURSING | Facility: CLINIC | Age: 57
End: 2017-06-27
Payer: MEDICARE

## 2017-06-27 DIAGNOSIS — Z79.01 LONG-TERM (CURRENT) USE OF ANTICOAGULANTS: ICD-10-CM

## 2017-06-27 LAB — INR PPP: 1.8

## 2017-06-27 PROCEDURE — 99207 ZZC NO CHARGE NURSE ONLY: CPT

## 2017-06-27 NOTE — MR AVS SNAPSHOT
Linda Shanna   6/27/2017 2:45 PM   Anticoagulation Therapy Visit    Description:  57 year old female   Provider:  [facility]   Department:  [facility]           INR as of 6/27/2017     Today's INR 1.8!      Anticoagulation Summary as of 6/27/2017     INR goal 2.0-3.0   Today's INR 1.8!   Full instructions 6/27: 15 mg; Otherwise 10 mg on Sun, Thu; 12.5 mg all other days   Next INR check 7/11/2017    Indications   Long-term (current) use of anticoagulants [Z79.01] [Z79.01]  Acute deep venous thrombosis (H) (Resolved) [I82.409]         Your next Anticoagulation Clinic appointment(s)     Jul 11, 2017  2:45 PM CDT   Anticoagulation Visit with  ANTICOAGULATION CLINIC   [facility] ([facility])    [address] [place] [address]  [address]  [facility] [address] [phone]              Contact Numbers     [facility]  Please call  [phone] to cancel and/or reschedule your appointment   Please call  [phone] with any problems or questions regarding your therapy.        June 2017 Details    Sun Mon Tue Wed Thu Fri Sat         1               2               3                 4               5               6               7               8               9               10                 11               12               13               14               15               16               17                 18               19               20               21               22               23               24                 25               26               27      15 mg   See details      28      12.5 mg         29      10 mg         30      12.5 mg           Date Details   06/27 This INR check               How to take your warfarin dose     To take:  10 mg Take 2 of the 5 mg tablets.    To take:  12.5 mg Take 2.5 of the 5 mg tablets.    To take:  15 mg Take 3 of the 5 mg tablets.           July 2017 Details    Sun Mon Tue Wed Thu Fri Sat           1      12.5 mg            2      10 mg         3      12.5 mg         4      12.5 mg         5      12.5 mg         6      10 mg         7      12.5 mg         8      12.5 mg           9      10 mg         10      12.5 mg         11            12               13               14               15                 16               17               18               19               20               21               22                 23               24               25               26               27               28               29                 30               31                     Date Details   No additional details    Date of next INR:  7/11/2017         How to take your warfarin dose     To take:  10 mg Take 2 of the 5 mg tablets.    To take:  12.5 mg Take 2.5 of the 5 mg tablets.

## 2017-06-27 NOTE — PROGRESS NOTES
ANTICOAGULATION FOLLOW-UP     Patient Name:  Linda Otero  Date:  6/27/2017  Contact Type:  Telephone  Call received from [facility], with INR result.   Follow up instructions given over the phone to Home Care nurse for coumadin management.    SUBJECTIVE:     Patient Findings     Positives Change in diet/appetite (Ate a huge salad last night)           OBJECTIVE    INR   Date Value Ref Range Status   06/27/2017 1.8  Final       ASSESSMENT / PLAN  INR assessment SUB    Recheck INR In: 2 WEEKS    INR Location Homecare INR      Anticoagulation Summary as of 6/27/2017     INR goal 2.0-3.0   Today's INR 1.8!   Maintenance plan 10 mg (5 mg x 2) on Sun, Thu; 12.5 mg (5 mg x 2.5) all other days   Full instructions 6/27: 15 mg; Otherwise 10 mg on Sun, Thu; 12.5 mg all other days   Weekly total 82.5 mg   Plan last modified Vy Gallo RN (3/14/2017)   Next INR check 7/11/2017   Target end date     Indications   Long-term (current) use of anticoagulants [Z79.01] [Z79.01]  Acute deep venous thrombosis (H) (Resolved) [I82.409]         Anticoagulation Episode Summary     INR check location Home Draw    Preferred lab     Send INR reminders to [facility]    Comments 5mg tabs // [facility] [phone]       Anticoagulation Care Providers     Provider Role Specialty Phone number    Jaja Orta MD Referring Internal Medicine [phone]            See the Encounter Report to view Anticoagulation Flowsheet and Dosing Calendar (Go to Encounters tab in chart review, and find the Anticoagulation Therapy Visit)        Vy Gallo RN

## 2017-06-28 ENCOUNTER — CARE COORDINATION (OUTPATIENT)
Dept: CARE COORDINATION | Facility: CLINIC | Age: 57
End: 2017-06-28

## 2017-06-28 DIAGNOSIS — Z79.01 LONG-TERM (CURRENT) USE OF ANTICOAGULANTS: ICD-10-CM

## 2017-06-28 NOTE — PROGRESS NOTES
Clinic Care Coordination Contact      Referral Source: PCP  Clinical Data: Care Coordinator Outreach  Spoke to pt who stated that she has not used [employer] or [employer] for transportation.  Pt stated that she has passed along the information for transportation for INR RN. Pt stated that she is interested in using the transportation when she needs it. Pt stated that she would prefer to use other agencies than [employer]. Pt declined further clinic care coordination needs. Pt mentioned that transportation for seniors to see [employer] and have a thierry outing with others.     Plan: Care Coordinator will do no further outreaches at this time. Pt has SW contact information for immediate concerns.    NGOC Chinchilla, [facility]  Social Work - Care Coordinator  [facility]- [place], [place], and [place]  Phone: [phone]

## 2017-06-28 NOTE — TELEPHONE ENCOUNTER
JANTOVEN 5 MG tablet    Last Written Prescription Date: 3/27/2017  Last Fill Qty: 220, # refills: 0  Last Office Visit with G, P or [facility] prescribing provider: 5/19/2017       Date and Result of Last PT/INR:   Lab Results   Component Value Date    INR 1.8 06/27/2017    INR 2.3 06/13/2017

## 2017-06-30 RX ORDER — WARFARIN SODIUM 5 MG/1
TABLET ORAL
Qty: 220 TABLET | Refills: 0 | Status: SHIPPED | OUTPATIENT
Start: 2017-06-30 | End: 2017-09-27

## 2017-06-30 NOTE — TELEPHONE ENCOUNTER
Prescription approved per [facility] Refill Protocol.    Mariia TERRAZAS RN, BSN, PHN  [place] [facility] RN

## 2017-07-02 ENCOUNTER — HOME CARE/HOSPICE - HEALTHEAST (OUTPATIENT)
Dept: HOME HEALTH SERVICES | Facility: HOME HEALTH | Age: 57
End: 2017-07-02

## 2017-07-06 ENCOUNTER — DOCUMENTATION ONLY (OUTPATIENT)
Dept: PEDIATRICS | Facility: CLINIC | Age: 57
End: 2017-07-06

## 2017-07-06 ENCOUNTER — HOME CARE/HOSPICE - HEALTHEAST (OUTPATIENT)
Dept: HOME HEALTH SERVICES | Facility: HOME HEALTH | Age: 57
End: 2017-07-06

## 2017-07-06 NOTE — PROGRESS NOTES
Leonie RN with [facility] calling ([phone]). Gave verbal ok per standing order for weekly SN visits times 9 weeks with 2 prn, date from 7/4-9/1.     Nehal Nazario RN Triage/Clinic Lead RN  [facility]  [phone]

## 2017-07-11 ENCOUNTER — ANTICOAGULATION THERAPY VISIT (OUTPATIENT)
Dept: NURSING | Facility: CLINIC | Age: 57
End: 2017-07-11
Payer: MEDICARE

## 2017-07-11 ENCOUNTER — HOME CARE/HOSPICE - HEALTHEAST (OUTPATIENT)
Dept: HOME HEALTH SERVICES | Facility: HOME HEALTH | Age: 57
End: 2017-07-11

## 2017-07-11 DIAGNOSIS — Z79.01 LONG-TERM (CURRENT) USE OF ANTICOAGULANTS: ICD-10-CM

## 2017-07-11 LAB — INR PPP: 2.5

## 2017-07-11 PROCEDURE — 99207 ZZC NO CHARGE NURSE ONLY: CPT

## 2017-07-11 NOTE — PROGRESS NOTES
ANTICOAGULATION FOLLOW-UP     Patient Name:  Linda Otero  Date:  7/11/2017  Contact Type:  Telephone  Call received from [facility], with INR result.   Follow up instructions given over the phone to Home Care nurse for coumadin management.    SUBJECTIVE:     Patient Findings     Positives No Problem Findings           OBJECTIVE    INR   Date Value Ref Range Status   07/11/2017 2.5  Final       ASSESSMENT / PLAN  INR assessment THER    Recheck INR In: 2 WEEKS    INR Location Homecare INR      Anticoagulation Summary as of 7/11/2017     INR goal 2.0-3.0   Today's INR 2.5   Maintenance plan 10 mg (5 mg x 2) on Sun, Thu; 12.5 mg (5 mg x 2.5) all other days   Full instructions 10 mg on Sun, Thu; 12.5 mg all other days   Weekly total 82.5 mg   No change documented Vy Gallo RN   Plan last modified Vy Gallo RN (3/14/2017)   Next INR check 7/25/2017   Target end date     Indications   Long-term (current) use of anticoagulants [Z79.01] [Z79.01]  Acute deep venous thrombosis (H) (Resolved) [I82.409]         Anticoagulation Episode Summary     INR check location Home Draw    Preferred lab     Send INR reminders to [facility]    Comments 5mg tabs // [facility] [place] [phone]       Anticoagulation Care Providers     Provider Role Specialty Phone number    Jaja Orta MD Referring Internal Medicine [phone]            See the Encounter Report to view Anticoagulation Flowsheet and Dosing Calendar (Go to Encounters tab in chart review, and find the Anticoagulation Therapy Visit)        Vy Gallo RN

## 2017-07-11 NOTE — MR AVS SNAPSHOT
Linda Dallasstan   7/11/2017 2:45 PM   Anticoagulation Therapy Visit    Description:  57 year old female   Provider:  [facility]   Department:  [facility] Nurse           INR as of 7/11/2017     Today's INR 2.5      Anticoagulation Summary as of 7/11/2017     INR goal 2.0-3.0   Today's INR 2.5   Full instructions 10 mg on Sun, Thu; 12.5 mg all other days   Next INR check 7/25/2017    Indications   Long-term (current) use of anticoagulants [Z79.01] [Z79.01]  Acute deep venous thrombosis (H) (Resolved) [I82.409]         Contact Numbers     [facility]  Please call  [phone] to cancel and/or reschedule your appointment   Please call  [phone] with any problems or questions regarding your therapy.        July 2017 Details    Sun Mon Tue Wed Thu Fri Sat           1                 2               3               4               5               6               7               8                 9               10               11      12.5 mg   See details      12      12.5 mg         13      10 mg         14      12.5 mg         15      12.5 mg           16      10 mg         17      12.5 mg         18      12.5 mg         19      12.5 mg         20      10 mg         21      12.5 mg         22      12.5 mg           23      10 mg         24      12.5 mg         25            26               27               28               29                 30               31                     Date Details   07/11 This INR check       Date of next INR:  7/25/2017         How to take your warfarin dose     To take:  10 mg Take 2 of the 5 mg tablets.    To take:  12.5 mg Take 2.5 of the 5 mg tablets.

## 2017-07-18 ENCOUNTER — HOME CARE/HOSPICE - HEALTHEAST (OUTPATIENT)
Dept: HOME HEALTH SERVICES | Facility: HOME HEALTH | Age: 57
End: 2017-07-18

## 2017-07-25 ENCOUNTER — HOME CARE/HOSPICE - HEALTHEAST (OUTPATIENT)
Dept: HOME HEALTH SERVICES | Facility: HOME HEALTH | Age: 57
End: 2017-07-25

## 2017-07-25 ENCOUNTER — ANTICOAGULATION THERAPY VISIT (OUTPATIENT)
Dept: NURSING | Facility: CLINIC | Age: 57
End: 2017-07-25
Payer: MEDICARE

## 2017-07-25 DIAGNOSIS — Z79.01 LONG-TERM (CURRENT) USE OF ANTICOAGULANTS: ICD-10-CM

## 2017-07-25 LAB — INR PPP: 2.5

## 2017-07-25 PROCEDURE — 99207 ZZC NO CHARGE NURSE ONLY: CPT

## 2017-07-25 NOTE — MR AVS SNAPSHOT
Linda Rodriguezemma   7/25/2017 4:30 PM   Anticoagulation Therapy Visit    Description:  57 year old female   Provider:  [facility]   Department:   Nurse           INR as of 7/25/2017     Today's INR 2.5      Anticoagulation Summary as of 7/25/2017     INR goal 2.0-3.0   Today's INR 2.5   Full instructions 10 mg on Sun, Thu; 12.5 mg all other days   Next INR check 8/8/2017    Indications   Long-term (current) use of anticoagulants [Z79.01] [Z79.01]  Acute deep venous thrombosis (H) (Resolved) [I82.409]         Your next Anticoagulation Clinic appointment(s)     Aug 08, 2017  4:30 PM CDT   Anticoagulation Visit with  ANTICOAGULATION CLINIC   [facility] ([facility])    [address] [place] [address]  [address]  [facility] [address] [phone]              Contact Numbers     [facility]  Please call  [phone] to cancel and/or reschedule your appointment   Please call  [phone] with any problems or questions regarding your therapy.        July 2017 Details    Sun Mon Tue Wed Thu Fri Sat           1                 2               3               4               5               6               7               8                 9               10               11               12               13               14               15                 16               17               18               19               20               21               22                 23               24               25      12.5 mg   See details      26      12.5 mg         27      10 mg         28      12.5 mg         29      12.5 mg           30      10 mg         31      12.5 mg               Date Details   07/25 This INR check               How to take your warfarin dose     To take:  10 mg Take 2 of the 5 mg tablets.    To take:  12.5 mg Take 2.5 of the 5 mg tablets.           August 2017 Details    Sun Mon Tue Wed Thu Fri Sat       1      12.5 mg         2      12.5 mg          3      10 mg         4      12.5 mg         5      12.5 mg           6      10 mg         7      12.5 mg         8            9               10               11               12                 13               14               15               16               17               18               19                 20               21               22               23               24               25               26                 27               28               29               30               31                  Date Details   No additional details    Date of next INR:  8/8/2017         How to take your warfarin dose     To take:  10 mg Take 2 of the 5 mg tablets.    To take:  12.5 mg Take 2.5 of the 5 mg tablets.

## 2017-07-25 NOTE — PROGRESS NOTES
ANTICOAGULATION FOLLOW-UP     Patient Name:  Linda Otero  Date:  7/25/2017  Contact Type:  Telephone  Call received [facility], with INR result.   Follow up instructions given over the phone to Home Care nurse for coumadin management.    SUBJECTIVE:     Patient Findings     Positives No Problem Findings           OBJECTIVE    INR   Date Value Ref Range Status   07/25/2017 2.5  Final       ASSESSMENT / PLAN  INR assessment THER    Recheck INR In: 2 WEEKS    INR Location [facility]care INR      Anticoagulation Summary as of 7/25/2017     INR goal 2.0-3.0   Today's INR 2.5   Maintenance plan 10 mg (5 mg x 2) on Sun, Thu; 12.5 mg (5 mg x 2.5) all other days   Full instructions 10 mg on Sun, Thu; 12.5 mg all other days   Weekly total 82.5 mg   No change documented Vy Gallo RN   Plan last modified Vy Gallo RN (3/14/2017)   Next INR check 8/8/2017   Target end date     Indications   Long-term (current) use of anticoagulants [Z79.01] [Z79.01]  Acute deep venous thrombosis (H) (Resolved) [I82.409]         Anticoagulation Episode Summary     INR check location Home Draw    Preferred lab     Send INR reminders to [facility]    Comments 5mg tabs // [facility] [place] [phone]       Anticoagulation Care Providers     Provider Role Specialty Phone number    Jaja Orta MD Referring Internal Medicine [phone]            See the Encounter Report to view Anticoagulation Flowsheet and Dosing Calendar (Go to Encounters tab in chart review, and find the Anticoagulation Therapy Visit)        Vy Gallo RN

## 2017-08-01 ENCOUNTER — HOME CARE/HOSPICE - HEALTHEAST (OUTPATIENT)
Dept: HOME HEALTH SERVICES | Facility: HOME HEALTH | Age: 57
End: 2017-08-01

## 2017-08-08 ENCOUNTER — ANTICOAGULATION THERAPY VISIT (OUTPATIENT)
Dept: NURSING | Facility: CLINIC | Age: 57
End: 2017-08-08
Payer: MEDICARE

## 2017-08-08 ENCOUNTER — HOME CARE/HOSPICE - HEALTHEAST (OUTPATIENT)
Dept: HOME HEALTH SERVICES | Facility: HOME HEALTH | Age: 57
End: 2017-08-08

## 2017-08-08 DIAGNOSIS — Z79.01 LONG-TERM (CURRENT) USE OF ANTICOAGULANTS: ICD-10-CM

## 2017-08-08 LAB — INR PPP: 2.5

## 2017-08-08 PROCEDURE — 99207 ZZC NO CHARGE NURSE ONLY: CPT

## 2017-08-08 NOTE — PROGRESS NOTES
ANTICOAGULATION FOLLOW-UP     Patient Name:  Linda Otero  Date:  8/8/2017  Contact Type:  Telephone  [facility] received [facility], with INR result.   Follow up instructions given over the phone to Home Care nurse for coumadin management.    SUBJECTIVE:     Patient Findings     Positives No Problem Findings           OBJECTIVE    INR   Date Value Ref Range Status   08/08/2017 2.5  Final       ASSESSMENT / PLAN  INR assessment THER    Recheck INR In: 2 WEEKS    INR Location [facility] INR      Anticoagulation Summary as of 8/8/2017     INR goal 2.0-3.0   Today's INR 2.5   Maintenance plan 10 mg (5 mg x 2) on Sun, Thu; 12.5 mg (5 mg x 2.5) all other days   Full instructions 10 mg on Sun, Thu; 12.5 mg all other days   Weekly total 82.5 mg   No change documented Vy Gallo RN   Plan last modified Vy Gallo RN (3/14/2017)   Next INR check 8/22/2017   Target end date     Indications   Long-term (current) use of anticoagulants [Z79.01] [Z79.01]  Acute deep venous thrombosis (H) (Resolved) [I82.409]         Anticoagulation Episode Summary     INR check location Home Draw    Preferred lab     Send INR reminders to [facility]    Comments 5mg tabs // [facility] [phone]       Anticoagulation Care Providers     Provider Role Specialty Phone number    Jaja Orta MD Referring Internal Medicine [phone]            See the Encounter Report to view Anticoagulation Flowsheet and Dosing Calendar (Go to Encounters tab in chart review, and find the Anticoagulation Therapy Visit)        Vy Gallo RN

## 2017-08-08 NOTE — MR AVS SNAPSHOT
Linda Dallasstan   8/8/2017 4:30 PM   Anticoagulation Therapy Visit    Description:  57 year old female   Provider:  [facility]   Department:  [facility]           INR as of 8/8/2017     Today's INR 2.5      Anticoagulation Summary as of 8/8/2017     INR goal 2.0-3.0   Today's INR 2.5   Full instructions 10 mg on Sun, Thu; 12.5 mg all other days   Next INR check 8/22/2017    Indications   Long-term (current) use of anticoagulants [Z79.01] [Z79.01]  Acute deep venous thrombosis (H) (Resolved) [I82.409]         Contact Numbers     [facility]  Please call  [phone] to cancel and/or reschedule your appointment   Please call  [phone] with any problems or questions regarding your therapy.        August 2017 Details    Sun Mon Tue Wed Thu Fri Sat       1               2               3               4               5                 6               7               8      12.5 mg   See details      9      12.5 mg         10      10 mg         11      12.5 mg         12      12.5 mg           13      10 mg         14      12.5 mg         15      12.5 mg         16      12.5 mg         17      10 mg         18      12.5 mg         19      12.5 mg           20      10 mg         21      12.5 mg         22            23               24               25               26                 27               28               29               30               31                  Date Details   08/08 This INR check       Date of next INR:  8/22/2017         How to take your warfarin dose     To take:  10 mg Take 2 of the 5 mg tablets.    To take:  12.5 mg Take 2.5 of the 5 mg tablets.

## 2017-08-16 ENCOUNTER — HOME CARE/HOSPICE - HEALTHEAST (OUTPATIENT)
Dept: HOME HEALTH SERVICES | Facility: HOME HEALTH | Age: 57
End: 2017-08-16

## 2017-08-17 ENCOUNTER — TELEPHONE (OUTPATIENT)
Dept: PEDIATRICS | Facility: CLINIC | Age: 57
End: 2017-08-17

## 2017-08-17 NOTE — TELEPHONE ENCOUNTER
Needs to be seen.  Today or tomorrow if wants to wait for me and able to speak complete sentences.

## 2017-08-17 NOTE — TELEPHONE ENCOUNTER
"Patient calls.  She states she has been coughing a lot and the nurse saw her yesterday and said that she may need prednisone to treat as patient has COPD.  Patient states it has been 3-4 days with symptoms and the symptoms are worsening.  No fevers, chills, or sweats.  Using both inhalers and does not think that they are helping.  The cough is very productive and persistent.  Appointment was advised, patient said: \"no, thank you.  I only need the prednisone, I know what I need\"  and hung up the phone.    FYI to Dr. Orta.      Zohreh Marquez, RN  Message handled by Nurse Triage.        "

## 2017-08-18 ENCOUNTER — HOME CARE/HOSPICE - HEALTHEAST (OUTPATIENT)
Dept: HOME HEALTH SERVICES | Facility: HOME HEALTH | Age: 57
End: 2017-08-18

## 2017-08-22 ENCOUNTER — HOME CARE/HOSPICE - HEALTHEAST (OUTPATIENT)
Dept: HOME HEALTH SERVICES | Facility: HOME HEALTH | Age: 57
End: 2017-08-22

## 2017-08-22 ENCOUNTER — ANTICOAGULATION THERAPY VISIT (OUTPATIENT)
Dept: NURSING | Facility: CLINIC | Age: 57
End: 2017-08-22
Payer: MEDICARE

## 2017-08-22 DIAGNOSIS — Z79.01 LONG-TERM (CURRENT) USE OF ANTICOAGULANTS: ICD-10-CM

## 2017-08-22 LAB — INR PPP: 2.3

## 2017-08-22 PROCEDURE — 99207 ZZC NO CHARGE NURSE ONLY: CPT

## 2017-08-22 NOTE — MR AVS SNAPSHOT
Linda Dallasstan   8/22/2017 4:15 PM   Anticoagulation Therapy Visit    Description:  57 year old female   Provider:  [facility]   Department:   Nurse           INR as of 8/22/2017     Today's INR 2.3      Anticoagulation Summary as of 8/22/2017     INR goal 2.0-3.0   Today's INR 2.3   Full instructions 10 mg on Sun, Thu; 12.5 mg all other days   Next INR check 9/12/2017    Indications   Long-term (current) use of anticoagulants [Z79.01] [Z79.01]  Acute deep venous thrombosis (H) (Resolved) [I82.409]         Your next Anticoagulation Clinic appointment(s)     Sep 12, 2017  4:45 PM CDT   Anticoagulation Visit with  ANTICOAGULATION CLINIC   [facility] ([facility])    [address] [place] [address]  [address]  [facility] [address] [phone]              Contact Numbers     [facility]  Please call  [phone] to cancel and/or reschedule your appointment   Please call  [phone] with any problems or questions regarding your therapy.        August 2017 Details    Sun Mon Tue Wed Thu Fri Sat       1               2               3               4               5                 6               7               8               9               10               11               12                 13               14               15               16               17               18               19                 20               21               22      12.5 mg   See details      23      12.5 mg         24      10 mg         25      12.5 mg         26      12.5 mg           27      10 mg         28      12.5 mg         29      12.5 mg         30      12.5 mg         31      10 mg            Date Details   08/22 This INR check               How to take your warfarin dose     To take:  10 mg Take 2 of the 5 mg tablets.    To take:  12.5 mg Take 2.5 of the 5 mg tablets.           September 2017 Details    Sun Mon Tue Wed Thu Fri Sat          1      12.5 mg         2       12.5 mg           3      10 mg         4      12.5 mg         5      12.5 mg         6      12.5 mg         7      10 mg         8      12.5 mg         9      12.5 mg           10      10 mg         11      12.5 mg         12            13               14               15               16                 17               18               19               20               21               22               23                 24               25               26               27               28               29               30                Date Details   No additional details    Date of next INR:  9/12/2017         How to take your warfarin dose     To take:  10 mg Take 2 of the 5 mg tablets.    To take:  12.5 mg Take 2.5 of the 5 mg tablets.

## 2017-08-22 NOTE — PROGRESS NOTES
ANTICOAGULATION FOLLOW-UP     Patient Name:  Linda Otero  Date:  8/22/2017  Contact Type:  Telephone  Call received [facility], with INR result.   Follow up instructions given over the phone to Home Care nurse for coumadin management.    SUBJECTIVE:     Patient Findings     Positives No Problem Findings           OBJECTIVE    INR   Date Value Ref Range Status   08/22/2017 2.3  Final       ASSESSMENT / PLAN  INR assessment THER    Recheck INR In: 3 WEEKS    INR Location [facility]R      Anticoagulation Summary as of 8/22/2017     INR goal 2.0-3.0   Today's INR 2.3   Maintenance plan 10 mg (5 mg x 2) on Sun, Thu; 12.5 mg (5 mg x 2.5) all other days   Full instructions 10 mg on Sun, Thu; 12.5 mg all other days   Weekly total 82.5 mg   No change documented Vy Gallo RN   Plan last modified Vy Gallo RN (3/14/2017)   Next INR check 9/12/2017   Target end date     Indications   Long-term (current) use of anticoagulants [Z79.01] [Z79.01]  Acute deep venous thrombosis (H) (Resolved) [I82.409]         Anticoagulation Episode Summary     INR check location Home [facility]    Preferred lab     Send INR reminders to [facility]    Comments 5mg tabs // [facility] [phone]       Anticoagulation Care Providers     Provider Role Specialty Phone number    Jaja Orta MD Referring Internal Medicine [phone]            See the Encounter Report to view Anticoagulation Flowsheet and Dosing Calendar (Go to Encounters tab in chart review, and find the Anticoagulation Therapy Visit)        Vy Gallo RN

## 2017-08-29 ENCOUNTER — HOME CARE/HOSPICE - HEALTHEAST (OUTPATIENT)
Dept: HOME HEALTH SERVICES | Facility: HOME HEALTH | Age: 57
End: 2017-08-29

## 2017-08-31 ENCOUNTER — TELEPHONE (OUTPATIENT)
Dept: PEDIATRICS | Facility: CLINIC | Age: 57
End: 2017-08-31

## 2017-08-31 ENCOUNTER — HOME CARE/HOSPICE - HEALTHEAST (OUTPATIENT)
Dept: HOME HEALTH SERVICES | Facility: HOME HEALTH | Age: 57
End: 2017-08-31

## 2017-08-31 NOTE — TELEPHONE ENCOUNTER
[facility] calling from [phone]:    Requesting orders for:  - Recertify nurse visit for wound monitor & INR check every 2 weeks  - has ulcer on her R ankle, pt does her own wound care qd    Verbal OK given as per nursing protocol.    Dora WATSON, RN  Triage Nurse

## 2017-09-08 ENCOUNTER — HOME CARE/HOSPICE - HEALTHEAST (OUTPATIENT)
Dept: HOME HEALTH SERVICES | Facility: HOME HEALTH | Age: 57
End: 2017-09-08

## 2017-09-12 ENCOUNTER — ANTICOAGULATION THERAPY VISIT (OUTPATIENT)
Dept: NURSING | Facility: CLINIC | Age: 57
End: 2017-09-12
Payer: MEDICARE

## 2017-09-12 ENCOUNTER — HOME CARE/HOSPICE - HEALTHEAST (OUTPATIENT)
Dept: HOME HEALTH SERVICES | Facility: HOME HEALTH | Age: 57
End: 2017-09-12

## 2017-09-12 DIAGNOSIS — Z79.01 LONG-TERM (CURRENT) USE OF ANTICOAGULANTS: ICD-10-CM

## 2017-09-12 LAB — INR PPP: 2.3

## 2017-09-12 PROCEDURE — 99207 ZZC NO CHARGE NURSE ONLY: CPT

## 2017-09-12 NOTE — MR AVS SNAPSHOT
Linda Dallasstan   9/12/2017 4:45 PM   Anticoagulation Therapy Visit    Description:  57 year old female   Provider:  [facility]   Department:  [facility]           INR as of 9/12/2017     Today's INR 2.3      Anticoagulation Summary as of 9/12/2017     INR goal 2.0-3.0   Today's INR 2.3   Full instructions 10 mg on Sun, Thu; 12.5 mg all other days   Next INR check 10/3/2017    Indications   Long-term (current) use of anticoagulants [Z79.01] [Z79.01]  Acute deep venous thrombosis (H) (Resolved) [I82.409]         Your next Anticoagulation Clinic appointment(s)     Oct 03, 2017  3:15 PM CDT   Anticoagulation Visit with  ANTICOAGULATION CLINIC   [facility] ([facility])    [address] [place] [address]  [address]  [facility] [address] [phone]              Contact Numbers     [facility]  Please call  [phone] to cancel and/or reschedule your appointment   Please call  [phone] with any problems or questions regarding your therapy.        September 2017 Details    Sun Mon Tue Wed Thu Fri Sat          1               2                 3               4               5               6               7               8               9                 10               11               12      12.5 mg   See details      13      12.5 mg         14      10 mg         15      12.5 mg         16      12.5 mg           17      10 mg         18      12.5 mg         19      12.5 mg         20      12.5 mg         21      10 mg         22      12.5 mg         23      12.5 mg           24      10 mg         25      12.5 mg         26      12.5 mg         27      12.5 mg         28      10 mg         29      12.5 mg         30      12.5 mg          Date Details   09/12 This INR check               How to take your warfarin dose     To take:  10 mg Take 2 of the 5 mg tablets.    To take:  12.5 mg Take 2.5 of the 5 mg tablets.           October 2017 Details    Sun Mon Tue Wed Thu  Fri Sat     1      10 mg         2      12.5 mg         3            4               5               6               7                 8               9               10               11               12               13               14                 15               16               17               18               19               20               21                 22               23               24               25               26               27               28                 29               30               31                    Date Details   No additional details    Date of next INR:  10/3/2017         How to take your warfarin dose     To take:  10 mg Take 2 of the 5 mg tablets.    To take:  12.5 mg Take 2.5 of the 5 mg tablets.

## 2017-09-12 NOTE — PROGRESS NOTES
[employer]/ ANTICOAGULATION FOLLOW-UP CLINIC VISIT    Patient Name:  Linda Otero  Date:  9/12/2017  Contact Elise [facility], [phone]/ Leonie, [facility], [phone]    SUBJECTIVE:     Patient Findings     Positives No Problem Findings           OBJECTIVE    INR   Date Value Ref Range Status   09/12/2017 2.3  Final       ASSESSMENT / PLAN  INR assessment THER    Recheck INR In: 3 WEEKS    INR Location [facility] INR      Anticoagulation Summary as of 9/12/2017     INR goal 2.0-3.0   Today's INR 2.3   Maintenance plan 10 mg (5 mg x 2) on Sun, Thu; 12.5 mg (5 mg x 2.5) all other days   Full instructions 10 mg on Sun, Thu; 12.5 mg all other days   Weekly total 82.5 mg   No change documented Nessa Feliciano   Plan last modified Vy Gallo RN (3/14/2017)   Next INR check 10/3/2017   Target end date     Indications   Long-term (current) use of anticoagulants [Z79.01] [Z79.01]  Acute deep venous thrombosis (H) (Resolved) [I82.409]         Anticoagulation Episode Summary     INR check location Home [facility]    Preferred lab     Send INR reminders to [facility]    Comments 5mg tabs // [facility] [phone]       Anticoagulation Care Providers     Provider Role Specialty Phone number    Jaja Orta MD Referring Internal Medicine [phone]            See the Encounter Report to view Anticoagulation Flowsheet and Dosing Calendar (Go to Encounters tab in chart review, and find the Anticoagulation Therapy Visit)    Nessa Feliciano RN

## 2017-09-27 DIAGNOSIS — Z79.01 LONG-TERM (CURRENT) USE OF ANTICOAGULANTS: ICD-10-CM

## 2017-09-27 DIAGNOSIS — J42 CHRONIC BRONCHITIS, UNSPECIFIED CHRONIC BRONCHITIS TYPE (H): ICD-10-CM

## 2017-09-27 RX ORDER — WARFARIN SODIUM 5 MG/1
TABLET ORAL
Qty: 220 TABLET | Refills: 1 | Status: SHIPPED | OUTPATIENT
Start: 2017-09-27 | End: 2018-01-17

## 2017-09-27 RX ORDER — ALBUTEROL SULFATE 90 UG/1
AEROSOL, METERED RESPIRATORY (INHALATION)
Qty: 54 G | Refills: 1 | Status: SHIPPED | OUTPATIENT
Start: 2017-09-27 | End: 2017-12-19

## 2017-09-27 NOTE — TELEPHONE ENCOUNTER
Prescription approved per [facility].   -COPD on problem list    Mariia TERRAZAS RN, BSN, PHN  [place] [facility]

## 2017-09-27 NOTE — TELEPHONE ENCOUNTER
VENTOLIN  (90 BASE) MCG/ACT Inhaler       Last Written Prescription Date: 5/30/2017  Last Fill Quantity: 3, # refills: 1    Last Office Visit with [facility], P or [facility] prescribing provider:  5/19/2017   Future Office Visit:       Date of Last Asthma Action Plan Letter:   There are no preventive care reminders to display for this patient.   Asthma Control Test: No flowsheet data found.    Date of Last Spirometry Test:   No results found for this or any previous visit.

## 2017-09-27 NOTE — TELEPHONE ENCOUNTER
JANTOVEN 5 MG tablet    Last Written Prescription Date: 6/30/2017  Last Fill Qty: 220, # refills: 0  Last Office Visit with G, P or [facility] prescribing provider: 5/19/2017       Date and Result of Last PT/INR:   Lab Results   Component Value Date    INR 2.3 09/12/2017    INR 2.3 08/22/2017

## 2017-09-27 NOTE — TELEPHONE ENCOUNTER
Script approved per [facility] Protocol.     Faith Cao RN  [facility]

## 2017-09-28 ENCOUNTER — HOME CARE/HOSPICE - HEALTHEAST (OUTPATIENT)
Dept: HOME HEALTH SERVICES | Facility: HOME HEALTH | Age: 57
End: 2017-09-28

## 2017-10-03 ENCOUNTER — ANTICOAGULATION THERAPY VISIT (OUTPATIENT)
Dept: NURSING | Facility: CLINIC | Age: 57
End: 2017-10-03
Payer: MEDICARE

## 2017-10-03 ENCOUNTER — HOME CARE/HOSPICE - HEALTHEAST (OUTPATIENT)
Dept: HOME HEALTH SERVICES | Facility: HOME HEALTH | Age: 57
End: 2017-10-03

## 2017-10-03 DIAGNOSIS — Z79.01 LONG-TERM (CURRENT) USE OF ANTICOAGULANTS: ICD-10-CM

## 2017-10-03 LAB — INR PPP: 2.6

## 2017-10-03 PROCEDURE — 99207 ZZC NO CHARGE NURSE ONLY: CPT

## 2017-10-03 NOTE — MR AVS SNAPSHOT
Linda Dallasstan   10/3/2017 3:15 PM   Anticoagulation Therapy Visit    Description:  57 year old female   Provider:  [facility]   Department:   Nurse           INR as of 10/3/2017     Today's INR 2.6      Anticoagulation Summary as of 10/3/2017     INR goal 2.0-3.0   Today's INR 2.6   Full instructions 10 mg on Sun, Thu; 12.5 mg all other days   Next INR check 10/24/2017    Indications   Long-term (current) use of anticoagulants [Z79.01] [Z79.01]  Acute deep venous thrombosis (H) (Resolved) [I82.409]         Your next Anticoagulation Clinic appointment(s)     Oct 24, 2017  4:30 PM CDT   Anticoagulation Visit with  ANTICOAGULATION CLINIC   [facility] ([facility])    [address] [place] [address]  [address]  [facility] [address] [phone]              Contact Numbers     [facility]  Please call  [phone] to cancel and/or reschedule your appointment   Please call  [phone] with any problems or questions regarding your therapy.        October 2017 Details    Sun Mon Tue Wed Thu Fri Sat     1               2               3      12.5 mg   See details      4      12.5 mg         5      10 mg         6      12.5 mg         7      12.5 mg           8      10 mg         9      12.5 mg         10      12.5 mg         11      12.5 mg         12      10 mg         13      12.5 mg         14      12.5 mg           15      10 mg         16      12.5 mg         17      12.5 mg         18      12.5 mg         19      10 mg         20      12.5 mg         21      12.5 mg           22      10 mg         23      12.5 mg         24            25               26               27               28                 29               30               31                    Date Details   10/03 This INR check       Date of next INR:  10/24/2017         How to take your warfarin dose     To take:  10 mg Take 2 of the 5 mg tablets.    To take:  12.5 mg Take 2.5 of the 5 mg tablets.

## 2017-10-03 NOTE — PROGRESS NOTES
ANTICOAGULATION FOLLOW-UP     Patient Name:  Linda Otero  Date:  10/3/2017  Contact Type:  Telephone  [facility], with INR result.   Follow up instructions given over the phone to Home Care nurse for coumadin management.    SUBJECTIVE:     Patient Findings     Positives No Problem Findings           OBJECTIVE    INR   Date Value Ref Range Status   10/03/2017 2.6  Final       ASSESSMENT / PLAN  INR assessment THER    Recheck INR In: 3 WEEKS    INR Location [facility]R      Anticoagulation Summary as of 10/3/2017     INR goal 2.0-3.0   Today's INR 2.6   Maintenance plan 10 mg (5 mg x 2) on Sun, Thu; 12.5 mg (5 mg x 2.5) all other days   Full instructions 10 mg on Sun, Thu; 12.5 mg all other days   Weekly total 82.5 mg   No change documented Vy Gallo RN   Plan last modified Vy Gallo RN (3/14/2017)   Next INR check 10/24/2017   Target end date     Indications   Long-term (current) use of anticoagulants [Z79.01] [Z79.01]  Acute deep venous thrombosis (H) (Resolved) [I82.409]         Anticoagulation Episode Summary     INR check location Home Draw    Preferred lab     Send INR reminders to [facility]    Comments 5mg tabs // [facility] [phone]       Anticoagulation Care Providers     Provider Role Specialty Phone number    Jaja Orta MD Referring Internal Medicine [phone]            See the Encounter Report to view Anticoagulation Flowsheet and Dosing Calendar (Go to Encounters tab in chart review, and find the Anticoagulation Therapy Visit)        Vy Gallo RN

## 2017-10-10 ENCOUNTER — HOME CARE/HOSPICE - HEALTHEAST (OUTPATIENT)
Dept: HOME HEALTH SERVICES | Facility: HOME HEALTH | Age: 57
End: 2017-10-10

## 2017-10-17 ENCOUNTER — DOCUMENTATION ONLY (OUTPATIENT)
Dept: PEDIATRICS | Facility: CLINIC | Age: 57
End: 2017-10-17

## 2017-10-17 ENCOUNTER — HOME CARE/HOSPICE - HEALTHEAST (OUTPATIENT)
Dept: HOME HEALTH SERVICES | Facility: HOME HEALTH | Age: 57
End: 2017-10-17

## 2017-10-17 ENCOUNTER — ANTICOAGULATION THERAPY VISIT (OUTPATIENT)
Dept: NURSING | Facility: CLINIC | Age: 57
End: 2017-10-17
Payer: MEDICARE

## 2017-10-17 DIAGNOSIS — Z79.01 LONG-TERM (CURRENT) USE OF ANTICOAGULANTS: ICD-10-CM

## 2017-10-17 LAB — INR PPP: 1.8

## 2017-10-17 PROCEDURE — 99207 ZZC NO CHARGE NURSE ONLY: CPT

## 2017-10-17 NOTE — MR AVS SNAPSHOT
Lindaaureliano Otero   10/17/2017 4:30 PM   Anticoagulation Therapy Visit    Description:  57 year old female   Provider:  [facility]   Department:  [facility]           INR as of 10/17/2017     Today's INR 1.8!      Anticoagulation Summary as of 10/17/2017     INR goal 2.0-3.0   Today's INR 1.8!   Full instructions 10 mg on Sun, Thu; 12.5 mg all other days   Next INR check 10/24/2017    Indications   Long-term (current) use of anticoagulants [Z79.01] [Z79.01]  Acute deep venous thrombosis (H) (Resolved) [I82.409]         Your next Anticoagulation Clinic appointment(s)     Oct 24, 2017  4:30 PM CDT   Anticoagulation Visit with  ANTICOAGULATION CLINIC   [facility] ([facility])    [address] [place] [address]  [address]  [facility] [address] [phone]              Contact Numbers     [facility]  Please call  [phone] to cancel and/or reschedule your appointment   Please call  [phone] with any problems or questions regarding your therapy.        October 2017 Details    Sun Mon Tue Wed Thu Fri Sat     1               2               3               4               5               6               7                 8               9               10               11               12               13               14                 15               16               17      12.5 mg   See details      18      12.5 mg         19      10 mg         20      12.5 mg         21      12.5 mg           22      10 mg         23      12.5 mg         24            25               26               27               28                 29               30               31                    Date Details   10/17 This INR check       Date of next INR:  10/24/2017         How to take your warfarin dose     To take:  10 mg Take 2 of the 5 mg tablets.    To take:  12.5 mg Take 2.5 of the 5 mg tablets.

## 2017-10-17 NOTE — PROGRESS NOTES
Leonie RN with [facility] calling ([phone]). Gave verbal ok per standing order for SN visits - for prn visit today, patient having heavy menstrual bleeding, wanting to have INR checked.     Nehal Plummer RN Triage/Clinic Lead RN  [facility]  [phone]

## 2017-10-17 NOTE — PROGRESS NOTES
ANTICOAGULATION FOLLOW-UP     Patient Name:  Linda Otero  Date:  10/17/2017  Contact Type:  Telephone  [facility] received [facility], with INR result.   Follow up instructions given over the phone to Home Care nurse for coumadin management.    SUBJECTIVE:     Patient Findings     Positives Other complaints    Comments Vaginal bleeding started about a week ago. Normal bleeding amount for her period.   Wed and Thurs started bleeding a lot. Passing clots.   Lowered dose of warfarin on her own.     Encouraged her to schedule an appointment with GYN to investigate source of bleeding.  If she starts to bleed a lot again, will go to the ER.    Barely flowing now.           OBJECTIVE    INR   Date Value Ref Range Status   10/17/2017 1.8  Final       ASSESSMENT / PLAN  INR assessment SUB    Recheck INR In: 1 WEEK    INR Location [facility] INR      Anticoagulation Summary as of 10/17/2017     INR goal 2.0-3.0   Today's INR 1.8!   Maintenance plan 10 mg (5 mg x 2) on Sun, Thu; 12.5 mg (5 mg x 2.5) all other days   Full instructions 10 mg on Sun, Thu; 12.5 mg all other days   Weekly total 82.5 mg   No change documented Vy Gallo, RN   Plan last modified Vy Gallo, RN (3/14/2017)   Next INR check 10/24/2017   Target end date     Indications   Long-term (current) use of anticoagulants [Z79.01] [Z79.01]  Acute deep venous thrombosis (H) (Resolved) [I82.409]         Anticoagulation Episode Summary     INR check location Home Draw    Preferred lab     Send INR reminders to [facility]    Comments 5mg tabs // [facility] [phone]       Anticoagulation Care Providers     Provider Role Specialty Phone number    Jaja Orta MD Referring Internal Medicine [phone]            See the Encounter Report to view Anticoagulation Flowsheet and Dosing Calendar (Go to Encounters tab in chart review, and find the Anticoagulation Therapy Visit)        Vy Gallo  RN

## 2017-10-24 ENCOUNTER — ANTICOAGULATION THERAPY VISIT (OUTPATIENT)
Dept: NURSING | Facility: CLINIC | Age: 57
End: 2017-10-24
Payer: MEDICARE

## 2017-10-24 ENCOUNTER — HOME CARE/HOSPICE - HEALTHEAST (OUTPATIENT)
Dept: HOME HEALTH SERVICES | Facility: HOME HEALTH | Age: 57
End: 2017-10-24

## 2017-10-24 DIAGNOSIS — Z79.01 LONG-TERM (CURRENT) USE OF ANTICOAGULANTS: ICD-10-CM

## 2017-10-24 LAB — INR PPP: 2.7

## 2017-10-24 PROCEDURE — 99207 ZZC NO CHARGE NURSE ONLY: CPT

## 2017-10-24 NOTE — PROGRESS NOTES
ANTICOAGULATION FOLLOW-UP     Patient Name:  Linda Otero  Date:  10/24/2017  Contact Type:  Telephone  Call received from [facility], with INR result.   Follow up instructions given over the phone to Home Care nurse for coumadin management.    SUBJECTIVE:     Patient Findings     Positives No Problem Findings           OBJECTIVE    INR   Date Value Ref Range Status   10/24/2017 2.7  Final       ASSESSMENT / PLAN  INR assessment THER    Recheck INR In: 3 WEEKS    INR Location [facility] INR      Anticoagulation Summary as of 10/24/2017     INR goal 2.0-3.0   Today's INR 2.7   Maintenance plan 10 mg (5 mg x 2) on Sun, Thu; 12.5 mg (5 mg x 2.5) all other days   Full instructions 10 mg on Sun, Thu; 12.5 mg all other days   Weekly total 82.5 mg   No change documented Vy Gallo RN   Plan last modified Vy Gallo RN (3/14/2017)   Next INR check 11/14/2017   Target end date     Indications   Long-term (current) use of anticoagulants [Z79.01] [Z79.01]  Acute deep venous thrombosis (H) (Resolved) [I82.409]         Anticoagulation Episode Summary     INR check location Home Draw    Preferred lab     Send INR reminders to [facility]    Comments 5mg tabs // [facility] [phone]       Anticoagulation Care Providers     Provider Role Specialty Phone number    Jaja Orta MD Referring Internal Medicine [phone]            See the Encounter Report to view Anticoagulation Flowsheet and Dosing Calendar (Go to Encounters tab in chart review, and find the Anticoagulation Therapy Visit)        Vy Gallo RN

## 2017-10-24 NOTE — MR AVS SNAPSHOT
Linda Rodriguezemma   10/24/2017 4:30 PM   Anticoagulation Therapy Visit    Description:  57 year old female   Provider:  [facility]   Department:   Nurse           INR as of 10/24/2017     Today's INR 2.7      Anticoagulation Summary as of 10/24/2017     INR goal 2.0-3.0   Today's INR 2.7   Full instructions 10 mg on Sun, Thu; 12.5 mg all other days   Next INR check 11/14/2017    Indications   Long-term (current) use of anticoagulants [Z79.01] [Z79.01]  Acute deep venous thrombosis (H) (Resolved) [I82.409]         Your next Anticoagulation Clinic appointment(s)     Nov 14, 2017  4:15 PM CST   Anticoagulation Visit with  ANTICOAGULATION CLINIC   [facility] ([facility])    [address] [place] [address]  [address]  [facility] [address] [phone]              Contact Numbers     [facility]  Please call  [phone] to cancel and/or reschedule your appointment   Please call  [phone] with any problems or questions regarding your therapy.        October 2017 Details    Sun Mon Tue Wed Thu Fri Sat     1               2               3               4               5               6               7                 8               9               10               11               12               13               14                 15               16               17               18               19               20               21                 22               23               24      12.5 mg   See details      25      12.5 mg         26      10 mg         27      12.5 mg         28      12.5 mg           29      10 mg         30      12.5 mg         31      12.5 mg              Date Details   10/24 This INR check               How to take your warfarin dose     To take:  10 mg Take 2 of the 5 mg tablets.    To take:  12.5 mg Take 2.5 of the 5 mg tablets.           November 2017 Details    Sun Mon Tue Wed Thu Fri Sat        1      12.5 mg         2      10 mg          3      12.5 mg         4      12.5 mg           5      10 mg         6      12.5 mg         7      12.5 mg         8      12.5 mg         9      10 mg         10      12.5 mg         11      12.5 mg           12      10 mg         13      12.5 mg         14            15               16               17               18                 19               20               21               22               23               24               25                 26               27               28               29               30                  Date Details   No additional details    Date of next INR:  11/14/2017         How to take your warfarin dose     To take:  10 mg Take 2 of the 5 mg tablets.    To take:  12.5 mg Take 2.5 of the 5 mg tablets.

## 2017-10-26 ENCOUNTER — HOME CARE/HOSPICE - HEALTHEAST (OUTPATIENT)
Dept: HOME HEALTH SERVICES | Facility: HOME HEALTH | Age: 57
End: 2017-10-26

## 2017-11-01 ENCOUNTER — HOME CARE/HOSPICE - HEALTHEAST (OUTPATIENT)
Dept: HOME HEALTH SERVICES | Facility: HOME HEALTH | Age: 57
End: 2017-11-01

## 2017-11-01 ENCOUNTER — DOCUMENTATION ONLY (OUTPATIENT)
Dept: PEDIATRICS | Facility: CLINIC | Age: 57
End: 2017-11-01

## 2017-11-01 ASSESSMENT — MIFFLIN-ST. JEOR: SCORE: 2174.34

## 2017-11-01 NOTE — PROGRESS NOTES
Received a voicemail from Leonie with [facility] requesting an order for a 1 time recert visit for today. She will call back to get further orders after recert. [phone]  Called and left message that verbal ok for recert visit.   Stormy Marrufo RN

## 2017-11-02 ENCOUNTER — DOCUMENTATION ONLY (OUTPATIENT)
Dept: PEDIATRICS | Facility: CLINIC | Age: 57
End: 2017-11-02

## 2017-11-02 NOTE — PROGRESS NOTES
Leonie RN with [facility] calling ([phone]). Gave verbal ok per standing order for SN visits - every other week with 4 prn visits, good through 12/30/17.   Nehal Plummer RN Triage/Clinic Lead RN  [facility]  [phone]

## 2017-11-14 ENCOUNTER — HOME CARE/HOSPICE - HEALTHEAST (OUTPATIENT)
Dept: HOME HEALTH SERVICES | Facility: HOME HEALTH | Age: 57
End: 2017-11-14

## 2017-11-14 ENCOUNTER — ANTICOAGULATION THERAPY VISIT (OUTPATIENT)
Dept: NURSING | Facility: CLINIC | Age: 57
End: 2017-11-14
Payer: MEDICARE

## 2017-11-14 DIAGNOSIS — D68.59 PRIMARY HYPERCOAGULABLE STATE (H): ICD-10-CM

## 2017-11-14 DIAGNOSIS — Z79.01 LONG-TERM (CURRENT) USE OF ANTICOAGULANTS: Primary | ICD-10-CM

## 2017-11-14 DIAGNOSIS — Z86.718 HISTORY OF DEEP VENOUS THROMBOSIS: ICD-10-CM

## 2017-11-14 LAB — INR PPP: 2.4

## 2017-11-14 PROCEDURE — 99207 ZZC NO CHARGE NURSE ONLY: CPT

## 2017-11-14 NOTE — PROGRESS NOTES
ANTICOAGULATION FOLLOW-UP     Patient Name:  Linda Otero  Date:  11/14/2017  Contact Type:  Telephone  Call received from [facility], with INR result.   Follow up instructions given over the phone to Home Care nurse for warfarin management.    SUBJECTIVE:     Patient Findings     Positives No Problem Findings           OBJECTIVE    INR   Date Value Ref Range Status   11/14/2017 2.4  Final       ASSESSMENT / PLAN  No question data found.  Anticoagulation Summary as of 11/14/2017     INR goal 2.0-3.0   Today's INR 2.4   Maintenance plan 10 mg (5 mg x 2) on Sun, Thu; 12.5 mg (5 mg x 2.5) all other days   Full instructions 10 mg on Sun, Thu; 12.5 mg all other days   Weekly total 82.5 mg   No change documented Vy Gallo RN   Plan last modified Vy Gallo RN (3/14/2017)   Next INR check 12/5/2017   Target end date     Indications   Long-term (current) use of anticoagulants [Z79.01] [Z79.01]  Acute deep venous thrombosis (H) (Resolved) [I82.409]         Anticoagulation Episode Summary     INR check location Home Draw    Preferred lab     Send INR reminders to [facility]    Comments 5mg tabs // [facility] [phone]       Anticoagulation Care Providers     Provider Role Specialty Phone number    Jaja Orta MD Referring Internal Medicine [phone]            See the Encounter Report to view Anticoagulation Flowsheet and Dosing Calendar (Go to Encounters tab in chart review, and find the Anticoagulation Therapy Visit)        Vy Gallo RN

## 2017-11-29 ENCOUNTER — HOME CARE/HOSPICE - HEALTHEAST (OUTPATIENT)
Dept: HOME HEALTH SERVICES | Facility: HOME HEALTH | Age: 57
End: 2017-11-29

## 2017-12-05 ENCOUNTER — HOME CARE/HOSPICE - HEALTHEAST (OUTPATIENT)
Dept: HOME HEALTH SERVICES | Facility: HOME HEALTH | Age: 57
End: 2017-12-05

## 2017-12-05 ENCOUNTER — ANTICOAGULATION THERAPY VISIT (OUTPATIENT)
Dept: NURSING | Facility: CLINIC | Age: 57
End: 2017-12-05
Payer: MEDICARE

## 2017-12-05 DIAGNOSIS — D68.59 PRIMARY HYPERCOAGULABLE STATE (H): ICD-10-CM

## 2017-12-05 DIAGNOSIS — Z79.01 LONG-TERM (CURRENT) USE OF ANTICOAGULANTS: ICD-10-CM

## 2017-12-05 LAB — INR PPP: 2.2

## 2017-12-05 PROCEDURE — 99207 ZZC NO CHARGE NURSE ONLY: CPT

## 2017-12-05 NOTE — MR AVS SNAPSHOT
Lindaaureliano Otero   12/5/2017 4:15 PM   Anticoagulation Therapy Visit    Description:  57 year old female   Provider:   ANTICOAGULATION CLINIC   Department:   Nurse           INR as of 12/5/2017     Today's INR 2.2      Anticoagulation Summary as of 12/5/2017     INR goal 2.0-3.0   Today's INR 2.2   Full instructions 10 mg on Sun, Thu; 12.5 mg all other days   Next INR check 12/19/2017    Indications   Primary hypercoagulable state (H) [D68.59]  Long-term (current) use of anticoagulants [Z79.01] [Z79.01]         Your next Anticoagulation Clinic appointment(s)     Dec 19, 2017  3:15 PM CST   Anticoagulation Visit with  ANTICOAGULATION CLINIC   [facility] ([facility])    [address]  [address]  [facility] [address] [phone]              Contact Numbers     [facility]  Please call  [phone] to cancel and/or reschedule your appointment   Please call  [phone] with any problems or questions regarding your therapy.        December 2017 Details    Sun Mon Tue Wed Thu Fri Sat          1               2                 3               4               5      12.5 mg   See details      6      12.5 mg         7      10 mg         8      12.5 mg         9      12.5 mg           10      10 mg         11      12.5 mg         12      12.5 mg         13      12.5 mg         14      10 mg         15      12.5 mg         16      12.5 mg           17      10 mg         18      12.5 mg         19            20               21               22               23                 24               25               26               27               28               29               30                 31                      Date Details   12/05 This INR check       Date of next INR:  12/19/2017         How to take your warfarin dose     To take:  10 mg Take 2 of the 5 mg tablets.    To take:  12.5 mg Take 2.5 of the 5 mg tablets.

## 2017-12-05 NOTE — PROGRESS NOTES
ANTICOAGULATION FOLLOW-UP     Patient Name:  Linda Otero  Date:  12/5/2017  Contact Type:  Telephone  Call received [facility], with INR result.   Follow up instructions given over the phone to Home Care nurse for warfarin management.    SUBJECTIVE:     Patient Findings     Positives No Problem Findings           OBJECTIVE    INR   Date Value Ref Range Status   12/05/2017 2.2  Final       ASSESSMENT / PLAN  INR assessment THER    Recheck INR In: 2 WEEKS    INR Location [facility] INR      Anticoagulation Summary as of 12/5/2017     INR goal 2.0-3.0   Today's INR 2.2   Maintenance plan 10 mg (5 mg x 2) on Sun, Thu; 12.5 mg (5 mg x 2.5) all other days   Full instructions 10 mg on Sun, Thu; 12.5 mg all other days   Weekly total 82.5 mg   No change documented Vy Gallo RN   Plan last modified Vy Gallo RN (3/14/2017)   Next INR check 12/19/2017   Target end date     Indications   Primary hypercoagulable state (H) [D68.59]  Long-term (current) use of anticoagulants [Z79.01] [Z79.01]         Anticoagulation Episode Summary     INR check location Home Draw    Preferred lab     Send INR reminders to [facility]    Comments 5mg tabs // [facility] [phone]       Anticoagulation Care Providers     Provider Role Specialty Phone number    Jaja Orta MD Referring Internal Medicine [phone]            See the Encounter Report to view Anticoagulation Flowsheet and Dosing Calendar (Go to Encounters tab in chart review, and find the Anticoagulation Therapy Visit)        Vy Gallo RN

## 2017-12-07 ENCOUNTER — HOME CARE/HOSPICE - HEALTHEAST (OUTPATIENT)
Dept: HOME HEALTH SERVICES | Facility: HOME HEALTH | Age: 57
End: 2017-12-07

## 2017-12-08 ENCOUNTER — HOME CARE/HOSPICE - HEALTHEAST (OUTPATIENT)
Dept: HOME HEALTH SERVICES | Facility: HOME HEALTH | Age: 57
End: 2017-12-08

## 2017-12-11 ENCOUNTER — HOME CARE/HOSPICE - HEALTHEAST (OUTPATIENT)
Dept: HOME HEALTH SERVICES | Facility: HOME HEALTH | Age: 57
End: 2017-12-11

## 2017-12-19 ENCOUNTER — DOCUMENTATION ONLY (OUTPATIENT)
Dept: PEDIATRICS | Facility: CLINIC | Age: 57
End: 2017-12-19

## 2017-12-19 ENCOUNTER — HOME CARE/HOSPICE - HEALTHEAST (OUTPATIENT)
Dept: HOME HEALTH SERVICES | Facility: HOME HEALTH | Age: 57
End: 2017-12-19

## 2017-12-19 ENCOUNTER — ANTICOAGULATION THERAPY VISIT (OUTPATIENT)
Dept: NURSING | Facility: CLINIC | Age: 57
End: 2017-12-19
Payer: MEDICARE

## 2017-12-19 DIAGNOSIS — D68.59 PRIMARY HYPERCOAGULABLE STATE (H): ICD-10-CM

## 2017-12-19 DIAGNOSIS — J42 CHRONIC BRONCHITIS, UNSPECIFIED CHRONIC BRONCHITIS TYPE (H): ICD-10-CM

## 2017-12-19 DIAGNOSIS — Z79.01 LONG-TERM (CURRENT) USE OF ANTICOAGULANTS: ICD-10-CM

## 2017-12-19 LAB — INR PPP: 2.2

## 2017-12-19 PROCEDURE — 99207 ZZC NO CHARGE NURSE ONLY: CPT

## 2017-12-19 NOTE — PROGRESS NOTES
Leonie RN with [facility] calling ([phone]). Gave verbal ok per standing order for NELLI torres to help with monetary issues.     Nehal Plummer RN Triage/Clinic Lead RN  [facility]  [phone]

## 2017-12-19 NOTE — PROGRESS NOTES
ANTICOAGULATION FOLLOW-UP     Patient Name:  Linda Otero  Date:  12/19/2017  Contact Type:  Telephone  Call received from [facility], with INR result.   Follow up instructions given over the phone to Home Care nurse for warfarin management.    SUBJECTIVE:     Patient Findings     Positives Change in diet/appetite, Missed doses    Comments Cut way back on her green vegetables.  She decided to take 10mg daily on her own.  Homecare nurse says patient is worried about money.             OBJECTIVE    INR   Date Value Ref Range Status   12/19/2017 2.2  Final       ASSESSMENT / PLAN  INR assessment THER    Recheck INR In: 2 WEEKS    INR Location [facility] INR      Anticoagulation Summary as of 12/19/2017     INR goal 2.0-3.0   Today's INR 2.2   Maintenance plan 10 mg (5 mg x 2) every day   Full instructions 10 mg every day   Weekly total 70 mg   Plan last modified Vy Gallo, RN (12/19/2017)   Next INR check 1/2/2018   Target end date     Indications   Primary hypercoagulable state (H) [D68.59]  Long-term (current) use of anticoagulants [Z79.01] [Z79.01]         Anticoagulation Episode Summary     INR check location Home Draw    Preferred lab     Send INR reminders to  ANTICO CLINIC    Comments 5mg tabs // [facility] [phone]       Anticoagulation Care Providers     Provider Role Specialty Phone number    Jaja Orta MD Referring Internal Medicine [phone]            See the Encounter Report to view Anticoagulation Flowsheet and Dosing Calendar (Go to Encounters tab in chart review, and find the Anticoagulation Therapy Visit)        Vy Gallo, RN

## 2017-12-19 NOTE — MR AVS SNAPSHOT
Linda Dallasstan   12/19/2017 3:15 PM   Anticoagulation Therapy Visit    Description:  57 year old female   Provider:  [facility]   Department:  [facility]           INR as of 12/19/2017     Today's INR 2.2      Anticoagulation Summary as of 12/19/2017     INR goal 2.0-3.0   Today's INR 2.2   Full instructions 10 mg every day   Next INR check 1/2/2018    Indications   Primary hypercoagulable state (H) [D68.59]  Long-term (current) use of anticoagulants [Z79.01] [Z79.01]         Contact Numbers     [facility]  Please call  [phone] to cancel and/or reschedule your appointment   Please call  [phone] with any problems or questions regarding your therapy.        December 2017 Details    Sun Mon Tue Wed Thu Fri Sat          1               2                 3               4               5               6               7               8               9                 10               11               12               13               14               15               16                 17               18               19      10 mg   See details      20      10 mg         21      10 mg         22      10 mg         23      10 mg           24      10 mg         25      10 mg         26      10 mg         27      10 mg         28      10 mg         29      10 mg         30      10 mg           31      10 mg                Date Details   12/19 This INR check               How to take your warfarin dose     To take:  10 mg Take 2 of the 5 mg tablets.           January 2018 Details    Sun Mon Tue Wed Thu Fri Sat      1      10 mg         2            3               4               5               6                 7               8               9               10               11               12               13                 14               15               16               17               18               19               20                 21               22               23                24               25               26               27                 28               29               30               31                   Date Details   No additional details    Date of next INR:  1/2/2018         How to take your warfarin dose     To take:  10 mg Take 2 of the 5 mg tablets.

## 2017-12-22 ENCOUNTER — HOME CARE/HOSPICE - HEALTHEAST (OUTPATIENT)
Dept: HOME HEALTH SERVICES | Facility: HOME HEALTH | Age: 57
End: 2017-12-22

## 2017-12-22 RX ORDER — ALBUTEROL SULFATE 90 UG/1
AEROSOL, METERED RESPIRATORY (INHALATION)
Qty: 54 G | Refills: 1 | Status: SHIPPED | OUTPATIENT
Start: 2017-12-22 | End: 2018-03-16

## 2017-12-22 NOTE — TELEPHONE ENCOUNTER
Requested Prescriptions   Pending Prescriptions Disp Refills     VENTOLIN  (90 BASE) MCG/ACT Inhaler [Pharmacy Med Name: VENTOLIN HFA 108MCG/ACT AERS] 54 g 1     Sig: INHALE 2 PUFFS INTO THE LUNGS EVERY 4-6 HOURS AS NEEDED.    Asthma Maintenance Inhalers - Anticholinergics Passed    12/20/2017 11:54 AM       Passed - Patient is age 12 years or older       Passed - Recent or future visit with authorizing provider's specialty    Patient had office visit in the last year or has a visit in the next 30 days with authorizing provider.  See chart review.             Called pharmacy to verify if there are refills left, but there are not.  Refills are sent. Uses for her COPD.  Evelyn Pritchard, RN  Triage Nurse

## 2017-12-27 ENCOUNTER — HOME CARE/HOSPICE - HEALTHEAST (OUTPATIENT)
Dept: HOME HEALTH SERVICES | Facility: HOME HEALTH | Age: 57
End: 2017-12-27

## 2017-12-29 ENCOUNTER — DOCUMENTATION ONLY (OUTPATIENT)
Dept: PEDIATRICS | Facility: CLINIC | Age: 57
End: 2017-12-29

## 2017-12-29 NOTE — PROGRESS NOTES
Leonie, RN with [facility] ([phone]) left a voicemail on my triage line requesting a call back to ok orders.     Called Leonie, gave verbal ok per standing order for SN visits - every other week from 12/31/17-2/28/18 with 4 prn visits to continue wound & INR checks.

## 2018-01-02 ENCOUNTER — ANTICOAGULATION THERAPY VISIT (OUTPATIENT)
Dept: NURSING | Facility: CLINIC | Age: 58
End: 2018-01-02
Payer: MEDICARE

## 2018-01-02 ENCOUNTER — HOME CARE/HOSPICE - HEALTHEAST (OUTPATIENT)
Dept: HOME HEALTH SERVICES | Facility: HOME HEALTH | Age: 58
End: 2018-01-02

## 2018-01-02 DIAGNOSIS — D68.59 PRIMARY HYPERCOAGULABLE STATE (H): ICD-10-CM

## 2018-01-02 DIAGNOSIS — Z79.01 LONG-TERM (CURRENT) USE OF ANTICOAGULANTS: ICD-10-CM

## 2018-01-02 LAB
INR POINT OF CARE: NORMAL (ref 0.9–1.1)
INR PPP: 2.2
POC INR - HE - HISTORICAL: 2.2 (ref 0.9–1.1)

## 2018-01-02 PROCEDURE — 99207 ZZC NO CHARGE NURSE ONLY: CPT

## 2018-01-02 PROCEDURE — 85610 PROTHROMBIN TIME: CPT | Mod: QW

## 2018-01-02 PROCEDURE — 36416 COLLJ CAPILLARY BLOOD SPEC: CPT

## 2018-01-02 NOTE — MR AVS SNAPSHOT
Linda Shanna   1/2/2018 4:15 PM   Anticoagulation Therapy Visit    Description:  57 year old female   Provider:   [facility]ICO[facility]ULATION CLINIC   Department:   Nurse           INR as of 1/2/2018     Today's INR 2.2      Anticoagulation Summary as of 1/2/2018     INR goal 2.0-3.0   Today's INR 2.2   Full instructions 10 mg every day   Next INR check 1/16/2018    Indications   Primary hypercoagulable state (H) [D68.59]  Long-term (current) use of anticoagulants [Z79.01] [Z79.01]         Your next Anticoagulation Clinic appointment(s)     Jan 16, 2018  4:30 PM CST   Anticoagulation Visit with  ANTICOAGULATION CLINIC   [facility]    [address]  [address]  [facility] [address] [phone]              Contact Numbers     [facility]  Please call  [phone] to cancel and/or reschedule your appointment   Please call  [phone] with any problems or questions regarding your therapy.        January 2018 Details    Sun Mon Tue Wed Thu Fri Sat      1               2      10 mg   See details      3      10 mg         4      10 mg         5      10 mg         6      10 mg           7      10 mg         8      10 mg         9      10 mg         10      10 mg         11      10 mg         12      10 mg         13      10 mg           14      10 mg         15      10 mg         16            17               18               19               20                 21               22               23               24               25               26               27                 28               29               30               31                   Date Details   01/02 This INR check       Date of next INR:  1/16/2018         How to take your warfarin dose     To take:  10 mg Take 2 of the 5 mg tablets.

## 2018-01-16 ENCOUNTER — HOME CARE/HOSPICE - HEALTHEAST (OUTPATIENT)
Dept: HOME HEALTH SERVICES | Facility: HOME HEALTH | Age: 58
End: 2018-01-16

## 2018-01-17 ENCOUNTER — ANTICOAGULATION THERAPY VISIT (OUTPATIENT)
Dept: NURSING | Facility: CLINIC | Age: 58
End: 2018-01-17
Payer: MEDICARE

## 2018-01-17 ENCOUNTER — HOME CARE/HOSPICE - HEALTHEAST (OUTPATIENT)
Dept: HOME HEALTH SERVICES | Facility: HOME HEALTH | Age: 58
End: 2018-01-17

## 2018-01-17 DIAGNOSIS — Z79.01 LONG-TERM (CURRENT) USE OF ANTICOAGULANTS: ICD-10-CM

## 2018-01-17 DIAGNOSIS — D68.59 PRIMARY HYPERCOAGULABLE STATE (H): ICD-10-CM

## 2018-01-17 LAB
INR PPP: 1.9
POC INR - HE - HISTORICAL: 1.9 (ref 0.9–1.1)

## 2018-01-17 PROCEDURE — 99207 ZZC NO CHARGE NURSE ONLY: CPT

## 2018-01-17 RX ORDER — WARFARIN SODIUM 5 MG/1
TABLET ORAL
Qty: 220 TABLET | Refills: 1 | COMMUNITY
Start: 2018-01-17 | End: 2024-01-01

## 2018-01-17 NOTE — MR AVS SNAPSHOT
Linda Rodriguezemma   1/17/2018 1:45 PM   Anticoagulation Therapy Visit    Description:  57 year old female   Provider:  [facility]   Department:   Nurse           INR as of 1/17/2018     Today's INR 1.9!      Anticoagulation Summary as of 1/17/2018     INR goal 2.0-3.0   Today's INR 1.9!   Full instructions 10 mg every day   Next INR check 1/30/2018    Indications   Primary hypercoagulable state (H) [D68.59]  Long-term (current) use of anticoagulants [Z79.01] [Z79.01]         Your next Anticoagulation Clinic appointment(s)     Jan 30, 2018  1:00 PM CST   Anticoagulation Visit with  ANTICOAGULATION CLINIC   [facility] ([facility]    [address] [place] [address]  [address]  [facility] [address] [phone]              Contact Numbers     [facility]  Please call to cancel and/or reschedule your appointment, or with any problems or questions regarding your therapy.  Anticoagulation Nurse: [phone]  Main Clinic: [phone]             January 2018 Details    Sun Mon Tue Wed Thu Fri Sat      1               2               3               4               5               6                 7               8               9               10               11               12               13                 14               15               16               17      10 mg   See details      18      10 mg         19      10 mg         20      10 mg           21      10 mg         22      10 mg         23      10 mg         24      10 mg         25      10 mg         26      10 mg         27      10 mg           28      10 mg         29      10 mg         30            31                   Date Details   01/17 This INR check       Date of next INR:  1/30/2018         How to take your warfarin dose     To take:  10 mg Take 2 of the 5 mg tablets.

## 2018-01-17 NOTE — PROGRESS NOTES
ANTICOAGULATION FOLLOW-UP    Patient Name:  Linda Otero  Date:  1/17/2018  Contact Type:  Telephone/ [facility]    SUBJECTIVE:     Patient Findings     Positives No Problem Findings           OBJECTIVE    INR   Date Value Ref Range Status   01/17/2018 1.9  Final       ASSESSMENT / PLAN  INR assessment THER    Recheck INR In: 2 WEEKS    INR Location [facility] INR      Anticoagulation Summary as of 1/17/2018     INR goal 2.0-3.0   Today's INR 1.9!   Maintenance plan 10 mg (5 mg x 2) every day   Full instructions 10 mg every day   Weekly total 70 mg   No change documented Faith Cao, RN   Plan last modified Vy Gallo RN (12/19/2017)   Next INR check 1/30/2018   Target end date     Indications   Primary hypercoagulable state (H) [D68.59]  Long-term (current) use of anticoagulants [Z79.01] [Z79.01]         Anticoagulation Episode Summary     INR check location Home Draw    Preferred lab     Send INR reminders to [facility]    Comments 5mg tabs // [facility] [phone]       Anticoagulation Care Providers     Provider Role Specialty Phone number    Jaja Orta MD Referring Internal Medicine [phone]            See the Encounter Report to view Anticoagulation Flowsheet and Dosing Calendar (Go to Encounters tab in chart review, and find the Anticoagulation Therapy Visit)    Pt visited by [facility] today, call received from Jammie with INR result.   Warfarin orders and f/u INR discussed with the Home[facility] nurse.       Faith Cao, RN

## 2018-01-19 ENCOUNTER — HOME CARE/HOSPICE - HEALTHEAST (OUTPATIENT)
Dept: HOME HEALTH SERVICES | Facility: HOME HEALTH | Age: 58
End: 2018-01-19

## 2018-01-24 ENCOUNTER — HOME CARE/HOSPICE - HEALTHEAST (OUTPATIENT)
Dept: HOME HEALTH SERVICES | Facility: HOME HEALTH | Age: 58
End: 2018-01-24

## 2018-02-01 ENCOUNTER — ANTICOAGULATION THERAPY VISIT (OUTPATIENT)
Dept: NURSING | Facility: CLINIC | Age: 58
End: 2018-02-01
Payer: MEDICARE

## 2018-02-01 ENCOUNTER — HOME CARE/HOSPICE - HEALTHEAST (OUTPATIENT)
Dept: HOME HEALTH SERVICES | Facility: HOME HEALTH | Age: 58
End: 2018-02-01

## 2018-02-01 DIAGNOSIS — Z79.01 LONG-TERM (CURRENT) USE OF ANTICOAGULANTS: ICD-10-CM

## 2018-02-01 DIAGNOSIS — D68.59 PRIMARY HYPERCOAGULABLE STATE (H): ICD-10-CM

## 2018-02-01 LAB
INR PPP: 3.5
POC INR - HE - HISTORICAL: 3.5 (ref 0.9–1.1)

## 2018-02-01 PROCEDURE — 99207 ZZC NO CHARGE NURSE ONLY: CPT

## 2018-02-01 NOTE — PROGRESS NOTES
ANTICOAGULATION FOLLOW-UP     Patient Name:  Linda Otero  Date:  2/1/2018  Contact Type:  Telephone  Call received from [facility] nurse, with INR result.   Follow up instructions given over the phone to Home Care nurse for warfarin management. Message left on Christa's voicemail.    SUBJECTIVE:     Patient Findings     Positives Extra doses    Comments She took 12.5 mg yesterday instead of 10 mg.           OBJECTIVE    INR   Date Value Ref Range Status   02/01/2018 3.5  Final       ASSESSMENT / PLAN  INR assessment SUPRA    Recheck INR In: 2 WEEKS    INR Location [facility] INR      Anticoagulation Summary as of 2/1/2018     INR goal 2.0-3.0   Today's INR 3.5!   Maintenance plan 10 mg (5 mg x 2) every day   Full instructions 2/1: 5 mg; Otherwise 10 mg every day   Weekly total 70 mg   Plan last modified Vy Gallo, RN (12/19/2017)   Next INR check 2/15/2018   Target end date     Indications   Primary hypercoagulable state (H) [D68.59]  Long-term (current) use of anticoagulants [Z79.01] [Z79.01]         Anticoagulation Episode Summary     INR check location Home Draw    Preferred lab     Send INR reminders to  [facility]    Comments 5mg tabs // [facility] [phone]       Anticoagulation Care Providers     Provider Role Specialty Phone number    Jaja Orta MD Referring Internal Medicine [phone]            See the Encounter Report to view Anticoagulation Flowsheet and Dosing Calendar (Go to Encounters tab in chart review, and find the Anticoagulation Therapy Visit)        Vy Gallo RN

## 2018-02-01 NOTE — MR AVS SNAPSHOT
Linda Dallasstan   2/1/2018 4:00 PM   Anticoagulation Therapy Visit    Description:  57 year old female   Provider:  [facility]   Department:  [facility]           INR as of 2/1/2018     Today's INR 3.5!      Anticoagulation Summary as of 2/1/2018     INR goal 2.0-3.0   Today's INR 3.5!   Full instructions 2/1: 5 mg; Otherwise 10 mg every day   Next INR check 2/15/2018    Indications   Primary hypercoagulable state (H) [D68.59]  Long-term (current) use of anticoagulants [Z79.01] [Z79.01]         Contact Numbers     [facility]  Please call  [phone] to cancel and/or reschedule your appointment   Please call  [phone] with any problems or questions regarding your therapy.        February 2018 Details    Sun Mon Tue Wed Thu Fri Sat         1      5 mg   See details      2      10 mg         3      10 mg           4      10 mg         5      10 mg         6      10 mg         7      10 mg         8      10 mg         9      10 mg         10      10 mg           11      10 mg         12      10 mg         13      10 mg         14      10 mg         15            16               17                 18               19               20               21               22               23               24                 25               26               27               28                   Date Details   02/01 This INR check       Date of next INR:  2/15/2018         How to take your warfarin dose     To take:  5 mg Take 1 of the 5 mg tablets.    To take:  10 mg Take 2 of the 5 mg tablets.

## 2018-02-03 ENCOUNTER — HOME CARE/HOSPICE - HEALTHEAST (OUTPATIENT)
Dept: HOME HEALTH SERVICES | Facility: HOME HEALTH | Age: 58
End: 2018-02-03

## 2018-02-14 ENCOUNTER — HOME CARE/HOSPICE - HEALTHEAST (OUTPATIENT)
Dept: HOME HEALTH SERVICES | Facility: HOME HEALTH | Age: 58
End: 2018-02-14

## 2018-02-14 ENCOUNTER — ANTICOAGULATION THERAPY VISIT (OUTPATIENT)
Dept: NURSING | Facility: CLINIC | Age: 58
End: 2018-02-14
Payer: MEDICARE

## 2018-02-14 DIAGNOSIS — D68.59 PRIMARY HYPERCOAGULABLE STATE (H): ICD-10-CM

## 2018-02-14 DIAGNOSIS — Z79.01 LONG-TERM (CURRENT) USE OF ANTICOAGULANTS: ICD-10-CM

## 2018-02-14 LAB
INR PPP: 3
INR PPP: NORMAL

## 2018-02-14 PROCEDURE — 99207 ZZC NO CHARGE NURSE ONLY: CPT

## 2018-02-14 NOTE — MR AVS SNAPSHOT
Linda Dallasstan   2/14/2018 2:45 PM   Anticoagulation Therapy Visit    Description:  57 year old female   Provider:   ANTICOAGULATION CLINIC   Department:   Nurse           INR as of 2/14/2018     Today's INR 3.0      Anticoagulation Summary as of 2/14/2018     INR goal 2.0-3.0   Today's INR 3.0   Full instructions 10 mg every day   Next INR check 2/27/2018    Indications   Primary hypercoagulable state (H) [D68.59]  Long-term (current) use of anticoagulants [Z79.01] [Z79.01]         Contact Numbers     [facility]  Please call to cancel and/or reschedule your appointment, or with any problems or questions regarding your therapy.  Anticoagulation Nurse: [phone]  Main Clinic: [phone]             February 2018 Details    Sun Mon Tue Wed Thu Fri Sat         1               2               3                 4               5               6               7               8               9               10                 11               12               13               14      10 mg   See details      15      10 mg         16      10 mg         17      10 mg           18      10 mg         19      10 mg         20      10 mg         21      10 mg         22      10 mg         23      10 mg         24      10 mg           25      10 mg         26      10 mg         27            28                   Date Details   02/14 This INR check       Date of next INR:  2/27/2018         How to take your warfarin dose     To take:  10 mg Take 2 of the 5 mg tablets.

## 2018-02-14 NOTE — PROGRESS NOTES
ANTICOAGULATION FOLLOW-UP    Patient Name:  Linda Otero  Date:  2/14/2018  Contact Type:  Telephone/ Homecare    SUBJECTIVE:     Patient Findings     Positives No Problem Findings    Comments Christa nurse from [facility] called with INR result.   Due to patient's Medicaid [place],   Homecare agency will change to [facility] in about [facility] weeks.       Correction above - nurse's name is Christa.      OBJECTIVE    INR   Date Value Ref Range Status   02/14/2018 d  Corrected   02/14/2018 3.0  Final       ASSESSMENT / PLAN  INR assessment THER    Recheck INR In: 2 WEEKS    INR Location Homecare INR      Anticoagulation Summary as of 2/14/2018     INR goal 2.0-3.0   Today's INR 3.0   Maintenance plan 10 mg (5 mg x 2) every day   Full instructions 10 mg every day   Weekly total 70 mg   No change documented Faith Cao, RN   Plan last modified Vy Gallo RN (12/19/2017)   Next INR check 2/27/2018   Target end date     Indications   Primary hypercoagulable state (H) [D68.59]  Long-term (current) use of anticoagulants [Z79.01] [Z79.01]         Anticoagulation Episode Summary     INR check location Home Draw    Preferred lab     Send INR reminders to [facility]    Comments 5mg tabs // [facility] [phone]       Anticoagulation Care Providers     Provider Role Specialty Phone number    Jaja Orta MD Referring Internal Medicine [phone]            See the Encounter Report to view Anticoagulation Flowsheet and Dosing Calendar (Go to Encounters tab in chart review, and find the Anticoagulation Therapy Visit)    Dosage adjustment made based on physician directed care plan.    Pt visited by [facility] today, call received from Christa with INR result.   Warfarin orders and f/u INR discussed with the Homecare nurse.     Faith Cao, RN

## 2018-02-16 ENCOUNTER — HOME CARE/HOSPICE - HEALTHEAST (OUTPATIENT)
Dept: ADMINISTRATIVE | Facility: OTHER | Age: 58
End: 2018-02-16

## 2018-02-28 ENCOUNTER — ANTICOAGULATION THERAPY VISIT (OUTPATIENT)
Dept: NURSING | Facility: CLINIC | Age: 58
End: 2018-02-28
Payer: MEDICARE

## 2018-02-28 DIAGNOSIS — Z79.01 LONG-TERM (CURRENT) USE OF ANTICOAGULANTS: ICD-10-CM

## 2018-02-28 DIAGNOSIS — D68.59 PRIMARY HYPERCOAGULABLE STATE (H): ICD-10-CM

## 2018-02-28 LAB — INR PPP: 3.1

## 2018-02-28 PROCEDURE — 99207 ZZC NO CHARGE NURSE ONLY: CPT

## 2018-02-28 NOTE — PROGRESS NOTES
ANTICOAGULATION FOLLOW-UP    Patient Name:  Linda Otero  Date:  2/28/2018  Contact Type:  Telephone/ [facility]    SUBJECTIVE:     Patient Findings     Positives Extra doses (patient took Warfarin 12.5mg on 2/17 & 2/24 - instructions were 10mg daily), Other complaints (COPD flare - increased labored breathing, SOB, decreased activity)    Comments [facility] started today - replaces [facility].           OBJECTIVE    INR   Date Value Ref Range Status   02/28/2018 3.1  Final       ASSESSMENT / PLAN  INR assessment THER    Recheck INR In: 8 DAYS    INR Location Homecare INR      Anticoagulation Summary as of 2/28/2018     INR goal 2.0-3.0   Today's INR 3.1!   Maintenance plan 10 mg (5 mg x 2) every day   Full instructions 10 mg every day   Weekly total 70 mg   No change documented Faith Cao, RN   Plan last modified Vy Gallo RN (12/19/2017)   Next INR check 3/8/2018   Target end date     Indications   Primary hypercoagulable state (H) [D68.59]  Long-term (current) use of anticoagulants [Z79.01] [Z79.01]         Anticoagulation Episode Summary     INR check location Home Draw    Preferred lab     Send INR reminders to  ANTICOAG CLINIC    Comments 5mg tabs // [facility] - started 2/28/18 // will be moving to [facility] in March      Anticoagulation Care Providers     Provider Role Specialty Phone number    Jaja Orta MD Referring Internal Medicine [phone]            See the Encounter Report to view Anticoagulation Flowsheet and Dosing Calendar (Go to Encounters tab in chart review, and find the Anticoagulation Therapy Visit)    Dosage adjustment made based on physician directed care plan.    Pt visited by [facility] today, call received from [facility] (ph# [phone]) with INR result.   Warfarin orders and f/u INR discussed with the Homecare nurse.       Faith Cao, RN

## 2018-02-28 NOTE — MR AVS SNAPSHOT
Linda Dallasstan   2/28/2018 1:30 PM   Anticoagulation Therapy Visit    Description:  57 year old female   Provider:  [facility]   Department:   Nurse           INR as of 2/28/2018     Today's INR 3.1!      Anticoagulation Summary as of 2/28/2018     INR goal 2.0-3.0   Today's INR 3.1!   Full instructions 10 mg every day   Next INR check 3/8/2018    Indications   Primary hypercoagulable state (H) [D68.59]  Long-term (current) use of anticoagulants [Z79.01] [Z79.01]         Your next Anticoagulation Clinic appointment(s)     Mar 08, 2018  2:30 PM CST   Anticoagulation Visit with  ANTICOAGULATION CLINIC   [facility]    [address]  [address]  [facility] [place] [address] [phone]              Contact Numbers     [facility]  Please call to cancel and/or reschedule your appointment, or with any problems or questions regarding your therapy.  Anticoagulation Nurse: [phone]  Main Clinic: [phone]             February 2018 Details    Sun Mon Tue Wed Thu Fri Sat         1               2               3                 4               5               6               7               8               9               10                 11               12               13               14               15               16               17                 18               19               20               21               22               23               24                 25               26               27               28      10 mg   See details          Date Details   02/28 This INR check               How to take your warfarin dose     To take:  10 mg Take 2 of the 5 mg tablets.           March 2018 Details    Sun Mon Tue Wed Thu Fri Sat         1      10 mg         2      10 mg         3      10 mg           4      10 mg         5      10 mg         6      10 mg         7      10 mg         8            9               10                  11               12               13               14               15               16               17                 18               19               20               21               22               23               24                 25               26               27               28               29               30               31                Date Details   No additional details    Date of next INR:  3/8/2018         How to take your warfarin dose     To take:  10 mg Take 2 of the 5 mg tablets.

## 2018-03-01 DIAGNOSIS — Z53.9 DIAGNOSIS NOT YET DEFINED: Primary | ICD-10-CM

## 2018-03-01 PROCEDURE — G0180 MD CERTIFICATION HHA PATIENT: HCPCS | Performed by: INTERNAL MEDICINE

## 2018-03-07 ENCOUNTER — TELEPHONE (OUTPATIENT)
Dept: PEDIATRICS | Facility: CLINIC | Age: 58
End: 2018-03-07

## 2018-03-07 DIAGNOSIS — J42 CHRONIC BRONCHITIS, UNSPECIFIED CHRONIC BRONCHITIS TYPE (H): Primary | ICD-10-CM

## 2018-03-07 NOTE — LETTER
[facility]  [address] [place] [address]  [place] [address]  [phone]      March 21, 2018    Linda Otero                                                                                                                                                       [address]  [facility] [address]              Dear Linda,      We received your message for your request for a wheelchair, due to medicare rules we would have to see you in clinic to actually get a wheelchair covered.    You can make an appointment by calling our clinic at [phone].Please let us know if you have any questions or concerns.                Sincerely,  Jaja Orta MD

## 2018-03-07 NOTE — TELEPHONE ENCOUNTER
Patient requesting a prescription for a wheelchair due to her breathing issues from COPD. Her number is [phone] ok to [facility].  Fax to [phone] Jammie Marrufo RN

## 2018-03-07 NOTE — TELEPHONE ENCOUNTER
By medicare rules, I have to actually see her in clinic to get a wheelchair covered.  She needs to make an appointment.

## 2018-03-09 LAB — INR PPP: 2.1

## 2018-03-12 ENCOUNTER — ANTICOAGULATION THERAPY VISIT (OUTPATIENT)
Dept: NURSING | Facility: CLINIC | Age: 58
End: 2018-03-12
Payer: MEDICARE

## 2018-03-12 DIAGNOSIS — D68.59 PRIMARY HYPERCOAGULABLE STATE (H): ICD-10-CM

## 2018-03-12 DIAGNOSIS — Z79.01 LONG-TERM (CURRENT) USE OF ANTICOAGULANTS: ICD-10-CM

## 2018-03-12 PROCEDURE — 99207 ZZC NO CHARGE NURSE ONLY: CPT

## 2018-03-12 NOTE — PROGRESS NOTES
"    ANTICOAGULATION FOLLOW-UP CLINIC VISIT    Patient Name:  Linda Otero  Date:  3/12/2018  Contact Type:  Telephone/ Pat Burns, [facility], [phone] and pt    SUBJECTIVE:     Patient Findings     Positives No Problem Findings    Comments Pat Burns LM on 3-9-18 at 4:45PM that pt's INR was 2.1.  Called today and informed we're not here in the afternoons on any Friday.  She did not know what dose of warfarin pt was taking as pt sets up her own meds.  Called pt and she said she takes 10mg warfarin most days but takes 12.5mg if she eats a lot of greens.  She \"sort of\" says now she has not been taking the 12.5mg as her INRs have been running high.           OBJECTIVE    INR   Date Value Ref Range Status   03/09/2018 2.1  Final       ASSESSMENT / PLAN  INR assessment THER    Recheck INR In: 2 WEEKS    INR Location Homecare INR      Anticoagulation Summary as of 3/12/2018     INR goal 2.0-3.0   Today's INR 2.1 (3/9/2018)   Maintenance plan 10 mg (5 mg x 2) every day   Full instructions 10 mg every day   Weekly total 70 mg   No change documented Nessa Feliciano   Plan last modified Vy Gallo, RN (12/19/2017)   Next INR check 3/22/2018   Target end date     Indications   Primary hypercoagulable state (H) [D68.59]  Long-term (current) use of anticoagulants [Z79.01] [Z79.01]         Anticoagulation Episode Summary     INR check location Home Draw    Preferred lab     Send INR reminders to [facility]    Comments 5mg tabs // [facility] - started 2/28/18 // will be moving to [facility] in March      Anticoagulation Care Providers     Provider Role Specialty Phone number    Jaja Orta MD Referring Internal Medicine [phone]            See the Encounter Report to view Anticoagulation Flowsheet and Dosing Calendar (Go to Encounters tab in chart review, and find the Anticoagulation Therapy Visit)    Dosage adjustment made based on physician directed care plan.  Pat Burns from [facility] " will do next INR on a MT or Th    Nessa Feliciano RN

## 2018-03-13 ENCOUNTER — TELEPHONE (OUTPATIENT)
Dept: PEDIATRICS | Facility: CLINIC | Age: 58
End: 2018-03-13

## 2018-03-13 DIAGNOSIS — Z71.89 ADVANCED DIRECTIVES, COUNSELING/DISCUSSION: Primary | Chronic | ICD-10-CM

## 2018-03-13 NOTE — TELEPHONE ENCOUNTER
[employer] 3/13/2018: Receipt of ACP document:  Received: Resuscitation Guidelines order which was signed and dated by provider on 3/6/18.  Code status updated to reflect choice in document. Copy sent to margarito Chavez RN

## 2018-03-23 ENCOUNTER — ANTICOAGULATION THERAPY VISIT (OUTPATIENT)
Dept: NURSING | Facility: CLINIC | Age: 58
End: 2018-03-23
Payer: MEDICARE

## 2018-03-23 DIAGNOSIS — D68.59 PRIMARY HYPERCOAGULABLE STATE (H): ICD-10-CM

## 2018-03-23 DIAGNOSIS — Z79.01 LONG-TERM (CURRENT) USE OF ANTICOAGULANTS: ICD-10-CM

## 2018-03-23 LAB — INR PPP: 1.9

## 2018-03-23 PROCEDURE — 99207 ZZC NO CHARGE NURSE ONLY: CPT

## 2018-03-23 RX ORDER — WARFARIN SODIUM 5 MG/1
TABLET ORAL
Qty: 220 TABLET | Refills: 0 | Status: SHIPPED | OUTPATIENT
Start: 2018-03-23 | End: 2024-01-01

## 2018-03-23 NOTE — TELEPHONE ENCOUNTER
Patient calling to expedite Warfarin refill. She is moving this weekend. Will be out tomorrow. Confirmed she takes 5 mg tabs.     Generally INR RN refills these. Due to urgency, prescription approved per [facility].

## 2018-03-23 NOTE — MR AVS SNAPSHOT
Linda Rodriugezemma   3/23/2018 11:00 AM   Anticoagulation Therapy Visit    Description:  58 year old female   Provider:  [facility]   Department:  [facility]           INR as of 3/23/2018     Today's INR 1.9!      Anticoagulation Summary as of 3/23/2018     INR goal 2.0-3.0   Today's INR 1.9!   Full instructions 10 mg every day   Next INR check 3/29/2018    Indications   Primary hypercoagulable state (H) [D68.59]  Long-term (current) use of anticoagulants [Z79.01] [Z79.01]         Contact Numbers     [facility]  Please call  [phone] to cancel and/or reschedule your appointment   Please call  [phone] with any problems or questions regarding your therapy.        March 2018 Details    Sun Mon Tue Wed Thu Fri Sat         1               2               3                 4               5               6               7               8               9               10                 11               12               13               14               15               16               17                 18               19               20               21               22               23      10 mg   See details      24      10 mg           25      10 mg         26      10 mg         27      10 mg         28      10 mg         29            30               31                Date Details   03/23 This INR check       Date of next INR:  3/29/2018         How to take your warfarin dose     To take:  10 mg Take 2 of the 5 mg tablets.

## 2018-03-23 NOTE — PROGRESS NOTES
ANTICOAGULATION FOLLOW-UP     Patient Name:  Linda Otero  Date:  3/23/2018  Contact Type:  Telephone  Call received from [facility] nurse, with INR result.   Follow up instructions given over the phone to Home Care nurse for warfarin management.    SUBJECTIVE:     Patient Findings     Positives No Problem Findings    Comments Patient will be moving on 3/31/18.           OBJECTIVE    INR   Date Value Ref Range Status   03/23/2018 1.9  Final       ASSESSMENT / PLAN  INR assessment THER    Recheck INR In: 1 WEEK    INR Location Homecare INR      Anticoagulation Summary as of 3/23/2018     INR goal 2.0-3.0   Today's INR 1.9!   Maintenance plan 10 mg (5 mg x 2) every day   Full instructions 10 mg every day   Weekly total 70 mg   No change documented Vy Gallo RN   Plan last modified Vy Gallo RN (12/19/2017)   Next INR check 3/29/2018   Target end date     Indications   Primary hypercoagulable state (H) [D68.59]  Long-term (current) use of anticoagulants [Z79.01] [Z79.01]         Anticoagulation Episode Summary     INR check location Home Draw    Preferred lab     Send INR reminders to  ANTICO CLINIC    Comments 5mg tabs // [facility] - started 2/28/18 // will be moving to [facility] in March      Anticoagulation Care Providers     Provider Role Specialty Phone number    Jaja Orta MD Referring Internal Medicine [phone]            See the Encounter Report to view Anticoagulation Flowsheet and Dosing Calendar (Go to Encounters tab in chart review, and find the Anticoagulation Therapy Visit)        Vy Gallo, FRANCISCO

## 2018-03-29 ENCOUNTER — ANTICOAGULATION THERAPY VISIT (OUTPATIENT)
Dept: NURSING | Facility: CLINIC | Age: 58
End: 2018-03-29
Payer: MEDICARE

## 2018-03-29 DIAGNOSIS — D68.59 PRIMARY HYPERCOAGULABLE STATE (H): ICD-10-CM

## 2018-03-29 DIAGNOSIS — Z79.01 LONG-TERM (CURRENT) USE OF ANTICOAGULANTS: ICD-10-CM

## 2018-03-29 LAB — INR PPP: 1.4

## 2018-03-29 PROCEDURE — 99207 ZZC NO CHARGE NURSE ONLY: CPT

## 2018-03-29 NOTE — PROGRESS NOTES
ANTICOAGULATION FOLLOW-UP     Patient Name:  Linda Otero  Date:  3/29/2018  Contact Type:  Telephone  Call received from [facility] nurse, with INR result.   Follow up instructions given over the phone to Home Care nurse for warfarin management.      SUBJECTIVE:     Patient Findings     Comments She is very stressed. Denies missing any doses.  Has to move this weekend and   doesn't know yet where she is going to go.  Her truck won't start.   She is going to have someone look at it today.           OBJECTIVE    INR   Date Value Ref Range Status   03/29/2018 1.4  Final       ASSESSMENT / PLAN  INR assessment SUB    Recheck INR In: 1 WEEK    INR Location Homecare INR      Anticoagulation Summary as of 3/29/2018     INR goal 2.0-3.0   Today's INR 1.4!   Maintenance plan 10 mg (5 mg x 2) every day   Full instructions 3/29: 15 mg; Otherwise 10 mg every day   Weekly total 70 mg   Plan last modified Vy Gallo RN (12/19/2017)   Next INR check 4/5/2018   Target end date     Indications   Primary hypercoagulable state (H) [D68.59]  Long-term (current) use of anticoagulants [Z79.01] [Z79.01]         Anticoagulation Episode Summary     INR check location Home Draw    Preferred lab     Send INR reminders to  ANTICO CLINIC    Comments 5mg tabs // [facility] - started 2/28/18 // will be moving to [facility] in March      Anticoagulation Care Providers     Provider Role Specialty Phone number    Jaja Orta MD Referring Internal Medicine [phone]            See the Encounter Report to view Anticoagulation Flowsheet and Dosing Calendar (Go to Encounters tab in chart review, and find the Anticoagulation Therapy Visit)        Vy Gallo RN

## 2018-03-29 NOTE — MR AVS SNAPSHOT
Linda Shanna   3/29/2018 1:30 PM   Anticoagulation Therapy Visit    Description:  58 year old female   Provider:  [facility]   Department:  [facility] Nurse           INR as of 3/29/2018     Today's INR 1.4!      Anticoagulation Summary as of 3/29/2018     INR goal 2.0-3.0   Today's INR 1.4!   Full instructions 3/29: 15 mg; Otherwise 10 mg every day   Next INR check 4/5/2018    Indications   Primary hypercoagulable state (H) [D68.59]  Long-term (current) use of anticoagulants [Z79.01] [Z79.01]         Your next Anticoagulation Clinic appointment(s)     Mar 29, 2018  1:30 PM CDT   Anticoagulation Visit with  ANTICOAGULATION CLINIC   [facility]    [address] [place] [address]  [address]  [facility] [address] [phone]              Contact Numbers     [facility]  Please call  [phone] to cancel and/or reschedule your appointment   Please call  [phone] with any problems or questions regarding your therapy.        March 2018 Details    Sun Mon Tue Wed Thu Fri Sat         1               2               3                 4               5               6               7               8               9               10                 11               12               13               14               15               16               17                 18               19               20               21               22               23               24                 25               26               27               28               29      15 mg   See details      30      10 mg         31      10 mg          Date Details   03/29 This INR check               How to take your warfarin dose     To take:  10 mg Take 2 of the 5 mg tablets.    To take:  15 mg Take 3 of the 5 mg tablets.           April 2018 Details    Sun Mon Tue Wed Thu Fri Sat     1      10 mg         2      10 mg         3      10 mg         4      10 mg         5            6                7                 8               9               10               11               12               13               14                 15               16               17               18               19               20               21                 22               23               24               25               26               27               28                 29               30                     Date Details   No additional details    Date of next INR:  4/5/2018         How to take your warfarin dose     To take:  10 mg Take 2 of the 5 mg tablets.

## 2018-03-30 ENCOUNTER — TELEPHONE (OUTPATIENT)
Dept: PEDIATRICS | Facility: CLINIC | Age: 58
End: 2018-03-30

## 2018-03-30 NOTE — TELEPHONE ENCOUNTER
Received DANK. Emailed med list, and last office visit back to email provided on DANK.    Thanks  Tung TEIXEIRA  Team Coodinator

## 2018-03-30 NOTE — TELEPHONE ENCOUNTER
Patient's CABY  calling. Patient must get out of her residence today but can't get into the new assisted living until tomorrow and only if they receive an updated med list and medical records today. She is not at the office today and is going to call to see if one of her co-workers can fax over the release as she only has e-mail where she is at now. Advised we do not have the ability to receive E-mail. Please watch for release.  Stormy Marrufo RN

## 2018-04-05 ENCOUNTER — ANTICOAGULATION THERAPY VISIT (OUTPATIENT)
Dept: NURSING | Facility: CLINIC | Age: 58
End: 2018-04-05
Payer: MEDICARE

## 2018-04-05 DIAGNOSIS — Z79.01 LONG-TERM (CURRENT) USE OF ANTICOAGULANTS: ICD-10-CM

## 2018-04-05 DIAGNOSIS — D68.59 PRIMARY HYPERCOAGULABLE STATE (H): ICD-10-CM

## 2018-04-05 LAB — INR PPP: 1.9

## 2018-04-05 PROCEDURE — 99207 ZZC NO CHARGE NURSE ONLY: CPT

## 2018-04-05 NOTE — MR AVS SNAPSHOT
Linda Rodriguezemma   4/5/2018 1:45 PM   Anticoagulation Therapy Visit    Description:  58 year old female   Provider:  [facility]   Department:  [facility]           INR as of 4/5/2018     Today's INR 1.9!      Anticoagulation Summary as of 4/5/2018     INR goal 2.0-3.0   Today's INR 1.9!   Full instructions 15 mg on Thu; 10 mg all other days   Next INR check 4/12/2018    Indications   Primary hypercoagulable state (H) [D68.59]  Long-term (current) use of anticoagulants [Z79.01] [Z79.01]         Your next Anticoagulation Clinic appointment(s)     Apr 12, 2018  2:45 PM CDT   Anticoagulation Visit with  ANTICOAGULATION CLINIC   [facility]    [address]  [address]  [facility] [address] [phone]              Contact Numbers     [facility]  Please call  [phone] to cancel and/or reschedule your appointment   Please call  [phone] with any problems or questions regarding your therapy.        April 2018 Details    Sun Mon Tue Wed Thu Fri Sat     1               2               3               4               5      15 mg   See details      6      10 mg         7      10 mg           8      10 mg         9      10 mg         10      10 mg         11      10 mg         12            13               14                 15               16               17               18               19               20               21                 22               23               24               25               26               27               28                 29               30                     Date Details   04/05 This INR check       Date of next INR:  4/12/2018         How to take your warfarin dose     To take:  10 mg Take 2 of the 5 mg tablets.    To take:  15 mg Take 3 of the 5 mg tablets.

## 2018-04-05 NOTE — PROGRESS NOTES
ANTICOAGULATION FOLLOW-UP     Patient Name:  Linda Otero  Date:  4/5/2018  Contact Type:  Telephone  Call received from [facility] nurse, with INR result.   Follow up instructions given over the phone to Home Care nurse for warfarin management.    SUBJECTIVE:     Patient Findings     Comments She was going to move, but new place had too many stairs.  02 levels dropped.  Not sure where she is going now.    Maintenance dose increased.             OBJECTIVE    INR   Date Value Ref Range Status   04/05/2018 1.9  Final       ASSESSMENT / PLAN  INR assessment THER    Recheck INR In: 1 WEEK    INR Location [facility] INR      Anticoagulation Summary as of 4/5/2018     INR goal 2.0-3.0   Today's INR 1.9!   Maintenance plan 15 mg (5 mg x 3) on Thu; 10 mg (5 mg x 2) all other days   Full instructions 15 mg on Thu; 10 mg all other days   Weekly total 75 mg   Plan last modified Vy Gallo RN (4/5/2018)   Next INR check 4/12/2018   Target end date     Indications   Primary hypercoagulable state (H) [D68.59]  Long-term (current) use of anticoagulants [Z79.01] [Z79.01]         Anticoagulation Episode Summary     INR check location Home Draw    Preferred lab     Send INR reminders to  ANTICOAG CLINIC    Comments 5mg tabs // [facility] - started 2/28/18 // will be moving to [facility] in March      Anticoagulation Care Providers     Provider Role Specialty Phone number    Jaja Orta MD Referring Internal Medicine [phone]            See the Encounter Report to view Anticoagulation Flowsheet and Dosing Calendar (Go to Encounters tab in chart review, and find the Anticoagulation Therapy Visit)        Vy Gallo RN

## 2018-04-06 ENCOUNTER — TRANSFERRED RECORDS (OUTPATIENT)
Dept: HEALTH INFORMATION MANAGEMENT | Facility: CLINIC | Age: 58
End: 2018-04-06

## 2018-04-06 ENCOUNTER — RECORDS - HEALTHEAST (OUTPATIENT)
Dept: ADMINISTRATIVE | Facility: OTHER | Age: 58
End: 2018-04-06

## 2018-04-06 ASSESSMENT — MIFFLIN-ST. JEOR
SCORE: 2228.77
SCORE: 2174.34

## 2018-04-09 ENCOUNTER — ANESTHESIA - HEALTHEAST (OUTPATIENT)
Dept: SURGERY | Facility: CLINIC | Age: 58
End: 2018-04-09

## 2018-04-09 ENCOUNTER — SURGERY - HEALTHEAST (OUTPATIENT)
Dept: SURGERY | Facility: CLINIC | Age: 58
End: 2018-04-09

## 2018-04-10 ASSESSMENT — MIFFLIN-ST. JEOR: SCORE: 2273.22

## 2018-04-13 ENCOUNTER — AMBULATORY - HEALTHEAST (OUTPATIENT)
Dept: OTHER | Facility: CLINIC | Age: 58
End: 2018-04-13

## 2018-04-16 ENCOUNTER — RECORDS - HEALTHEAST (OUTPATIENT)
Dept: LAB | Facility: CLINIC | Age: 58
End: 2018-04-16

## 2018-04-16 LAB — INR PPP: 1.86 (ref 0.9–1.1)

## 2018-04-17 ENCOUNTER — ANTICOAGULATION THERAPY VISIT (OUTPATIENT)
Dept: NURSING | Facility: CLINIC | Age: 58
End: 2018-04-17
Payer: MEDICARE

## 2018-04-17 DIAGNOSIS — Z79.01 LONG-TERM (CURRENT) USE OF ANTICOAGULANTS: ICD-10-CM

## 2018-04-17 DIAGNOSIS — D68.59 PRIMARY HYPERCOAGULABLE STATE (H): ICD-10-CM

## 2018-04-17 LAB — INR PPP: 1.86

## 2018-04-17 PROCEDURE — 99207 ZZC NO CHARGE NURSE ONLY: CPT

## 2018-04-17 NOTE — MR AVS SNAPSHOT
Linda Shanna   4/17/2018 3:00 PM   Anticoagulation Therapy Visit    Description:  58 year old female   Provider:  [facility]   Department:   Nurse           INR as of 4/17/2018     Today's INR 1.86!      Anticoagulation Summary as of 4/17/2018     INR goal 2.0-3.0   Today's INR 1.86!   Full instructions 12.5 mg on Tue, Sat; 10 mg all other days   Next INR check 4/24/2018    Indications   Primary hypercoagulable state (H) [D68.59]  Long-term (current) use of anticoagulants [Z79.01] [Z79.01]         Your next Anticoagulation Clinic appointment(s)     Apr 24, 2018  1:15 PM CDT   Anticoagulation Visit with  ANTICOAGULATION CLINIC   [facility]    [address] [place] [address]  [address]  [facility] [address] [phone]              Contact Numbers     [facility]  Please call  [phone] to cancel and/or reschedule your appointment   Please call  [phone] with any problems or questions regarding your therapy.        April 2018 Details    Sun Mon Tue Wed Thu Fri Sat     1               2               3               4               5               6               7                 8               9               10               11               12               13               14                 15               16               17      12.5 mg   See details      18      10 mg         19      10 mg         20      10 mg         21      12.5 mg           22      10 mg         23      10 mg         24            25               26               27               28                 29               30                     Date Details   04/17 This INR check       Date of next INR:  4/24/2018         How to take your warfarin dose     To take:  10 mg Take 2 of the 5 mg tablets.    To take:  12.5 mg Take 2.5 of the 5 mg tablets.

## 2018-04-17 NOTE — PROGRESS NOTES
ANTICOAGULATION FOLLOW-UP     Patient Name:  Linda Otero  Date:  4/17/2018  Contact Type:  Telephone/ Cameron from [facility]    SUBJECTIVE:     Patient Findings     Positives No Problem Findings           OBJECTIVE    INR   Date Value Ref Range Status   04/17/2018 1.86  Final       ASSESSMENT / PLAN  INR assessment THER    Recheck INR In: 1 WEEK    INR Location [facility]      Anticoagulation Summary as of 4/17/2018     INR goal 2.0-3.0   Today's INR 1.86!   Maintenance plan 12.5 mg (5 mg x 2.5) on Tue, Sat; 10 mg (5 mg x 2) all other days   Full instructions 12.5 mg on Tue, Sat; 10 mg all other days   Weekly total 75 mg   Plan last modified Stormy Marrufo RN (4/17/2018)   Next INR check 4/24/2018   Target end date     Indications   Primary hypercoagulable state (H) [D68.59]  Long-term (current) use of anticoagulants [Z79.01] [Z79.01]         Anticoagulation Episode Summary     INR check location Home Draw    Preferred lab     Send INR reminders to [facility]    Comments 5mg tabs // [facility] [phone]      Anticoagulation Care Providers     Provider Role Specialty Phone number    Jaja Orta MD Referring Internal Medicine [phone]            See the Encounter Report to view Anticoagulation Flowsheet and Dosing Calendar (Go to Encounters tab in chart review, and find the Anticoagulation Therapy Visit)  Verbal orders given to Cameron [facility].      Stormy Marrufo, RN

## 2018-04-18 ENCOUNTER — AMBULATORY - HEALTHEAST (OUTPATIENT)
Dept: OTHER | Facility: CLINIC | Age: 58
End: 2018-04-18

## 2018-04-19 DIAGNOSIS — J42 CHRONIC BRONCHITIS, UNSPECIFIED CHRONIC BRONCHITIS TYPE (H): ICD-10-CM

## 2018-04-19 DIAGNOSIS — D50.9 IRON DEFICIENCY ANEMIA, UNSPECIFIED IRON DEFICIENCY ANEMIA TYPE: Primary | ICD-10-CM

## 2018-04-19 NOTE — TELEPHONE ENCOUNTER
Ferrous sulfate, Directions: 1 Tab by mouth daily w/breakfast, is different then what we have listed.

## 2018-04-19 NOTE — TELEPHONE ENCOUNTER
"Requested Prescriptions   Pending Prescriptions Disp Refills     albuterol (VENTOLIN HFA) 108 (90 Base) MCG/ACT Inhaler    *not due for a refill. Last filled on 3/20/2018 qty 54g with 1 refills.    54 g 1    Asthma Maintenance Inhalers - Anticholinergics Passed    4/19/2018 12:11 PM       Passed - Patient is age 12 years or older       Passed - Recent (12 mo) or future (30 days) visit within the authorizing provider's specialty    Patient had office visit in the last 12 months or has a visit in the next 30 days with authorizing provider or within the authorizing provider's specialty.  See \"Patient Info\" tab in inbasket, or \"Choose Columns\" in Meds & Orders section of the refill encounter.            diphenhydrAMINE (BENADRYL) 25 MG capsule    *Not listed in current medication list or history.  56 capsule     Antihistamines Protocol Passed    4/19/2018 12:11 PM       Passed - Recent (12 mo) or future (30 days) visit within the authorizing provider's specialty    Patient had office visit in the last 12 months or has a visit in the next 30 days with authorizing provider or within the authorizing provider's specialty.  See \"Patient Info\" tab in inbasket, or \"Choose Columns\" in Meds & Orders section of the refill encounter.           Passed - Patient is age 3 or older    Apply age and/or weight-based dosing for peds patients age 3 and older.    Forward request to provider for patients under the age of 3.          ferrous sulfate (IRON) 325 (65 Fe) MG tablet    Last Written Prescription Date:  10/29/2015  Last Fill Quantity: 60,  # refills: 2   Last office visit: 5/19/2017 with prescribing provider:  Jaja Orta     Future Office Visit:     60 tablet 2     Sig: Take 1 tablet (325 mg) by mouth 2 times daily    There is no refill protocol information for this order          "

## 2018-04-23 ENCOUNTER — RECORDS - HEALTHEAST (OUTPATIENT)
Dept: LAB | Facility: CLINIC | Age: 58
End: 2018-04-23

## 2018-04-23 RX ORDER — DIPHENHYDRAMINE HCL 25 MG
25-50 CAPSULE ORAL EVERY 6 HOURS PRN
Qty: 100 CAPSULE | Refills: 0 | Status: SHIPPED | OUTPATIENT
Start: 2018-04-23 | End: 2024-01-01

## 2018-04-23 RX ORDER — FERROUS SULFATE 325(65) MG
325 TABLET ORAL
Qty: 30 TABLET | Refills: 0 | Status: SHIPPED | OUTPATIENT
Start: 2018-04-23 | End: 2024-01-01

## 2018-04-23 RX ORDER — ALBUTEROL SULFATE 90 UG/1
AEROSOL, METERED RESPIRATORY (INHALATION)
Qty: 54 G | Refills: 0 | Status: SHIPPED | OUTPATIENT
Start: 2018-04-23 | End: 2022-05-06

## 2018-04-23 NOTE — TELEPHONE ENCOUNTER
Albuterol Inhaler.     Medication is being filled for 1 time refill only due to:  Patient needs to be seen because due for annual physical with medication f/u in May .    Prescription approved per [facility] Refill Protocol.    Mariia TERRAZAS RN, BSN, PHN  [facility] RN

## 2018-04-23 NOTE — TELEPHONE ENCOUNTER
Routing refill request to provider for review/approval because:  Medication is reported/historical    Fe supplement- not active on medication list.     Mariia TERRAZAS RN, BSN, PHN  [place] [facility] RN

## 2018-04-24 ENCOUNTER — ANTICOAGULATION THERAPY VISIT (OUTPATIENT)
Dept: NURSING | Facility: CLINIC | Age: 58
End: 2018-04-24
Payer: MEDICARE

## 2018-04-24 ENCOUNTER — AMBULATORY - HEALTHEAST (OUTPATIENT)
Dept: PULMONOLOGY | Facility: OTHER | Age: 58
End: 2018-04-24

## 2018-04-24 DIAGNOSIS — J44.9 COPD (CHRONIC OBSTRUCTIVE PULMONARY DISEASE) (H): ICD-10-CM

## 2018-04-24 DIAGNOSIS — Z79.01 LONG-TERM (CURRENT) USE OF ANTICOAGULANTS: ICD-10-CM

## 2018-04-24 DIAGNOSIS — D68.59 PRIMARY HYPERCOAGULABLE STATE (H): ICD-10-CM

## 2018-04-24 LAB
INR PPP: 1.69
INR PPP: 1.69 (ref 0.9–1.1)

## 2018-04-24 PROCEDURE — 99207 ZZC NO CHARGE NURSE ONLY: CPT

## 2018-04-24 NOTE — PROGRESS NOTES
ANTICOAGULATION FOLLOW-UP     Patient Name:  Linda Otero  Date:  4/24/2018  Contact Type:  Telephone  Call received from Cameron, [facility], with INR result.   Follow up instructions given over the phone for warfarin management.    SUBJECTIVE:     Patient Findings     Positives Unexplained INR or factor level change           OBJECTIVE    INR   Date Value Ref Range Status   04/24/2018 1.69  Final       ASSESSMENT / PLAN  INR assessment SUB    Recheck INR In: 1 WEEK    INR Location [facility]      Anticoagulation Summary as of 4/24/2018     INR goal 2.0-3.0   Today's INR 1.69!   Maintenance plan 12.5 mg (5 mg x 2.5) on Tue, Sat; 10 mg (5 mg x 2) all other days   Full instructions 4/25: 12.5 mg; 4/26: 12.5 mg; Otherwise 12.5 mg on Tue, Sat; 10 mg all other days   Weekly total 75 mg   Plan last modified Stormy Marrufo RN (4/17/2018)   Next INR check 5/1/2018   Target end date     Indications   Primary hypercoagulable state (H) [D68.59]  Long-term (current) use of anticoagulants [Z79.01] [Z79.01]         Anticoagulation Episode Summary     INR check location Home Draw    Preferred lab     Send INR reminders to  [facility]    Comments 5mg tabs // [facility] [phone] Cameron      Anticoagulation Care Providers     Provider Role Specialty Phone number    Jaja Orta MD Referring Internal Medicine [phone]            See the Encounter Report to view Anticoagulation Flowsheet and Dosing Calendar (Go to Encounters tab in chart review, and find the Anticoagulation Therapy Visit)        Vy Gallo RN

## 2018-04-24 NOTE — MR AVS SNAPSHOT
Linda Dallasstan   4/24/2018 4:00 PM   Anticoagulation Therapy Visit    Description:  58 year old female   Provider:  [facility]   Department:  [facility]           INR as of 4/24/2018     Today's INR 1.69!      Anticoagulation Summary as of 4/24/2018     INR goal 2.0-3.0   Today's INR 1.69!   Full instructions 4/25: 12.5 mg; 4/26: 12.5 mg; Otherwise 12.5 mg on Tue, Sat; 10 mg all other days   Next INR check 5/1/2018    Indications   Primary hypercoagulable state (H) [D68.59]  Long-term (current) use of anticoagulants [Z79.01] [Z79.01]         Contact Numbers     [facility]  Please call  [phone] to cancel and/or reschedule your appointment   Please call  [phone] with any problems or questions regarding your therapy.        April 2018 Details    Sun Mon Tue Wed Thu Fri Sat     1               2               3               4               5               6               7                 8               9               10               11               12               13               14                 15               16               17               18               19               20               21                 22               23               24      12.5 mg   See details      25      12.5 mg         26      12.5 mg         27      10 mg         28      12.5 mg           29      10 mg         30      10 mg               Date Details   04/24 This INR check               How to take your warfarin dose     To take:  10 mg Take 2 of the 5 mg tablets.    To take:  12.5 mg Take 2.5 of the 5 mg tablets.           May 2018 Details    Sun Mon Tue Wed Thu Fri Sat       1            2               3               4               5                 6               7               8               9               10               11               12                 13               14               15               16               17               18               19                 20                21               22               23               24               25               26                 27               28               29               30               31                  Date Details   No additional details    Date of next INR:  5/1/2018         How to take your warfarin dose     To take:  12.5 mg Take 2.5 of the 5 mg tablets.

## 2018-04-30 ENCOUNTER — COMMUNICATION - HEALTHEAST (OUTPATIENT)
Dept: PULMONOLOGY | Facility: OTHER | Age: 58
End: 2018-04-30

## 2018-05-01 ENCOUNTER — ANTICOAGULATION THERAPY VISIT (OUTPATIENT)
Dept: NURSING | Facility: CLINIC | Age: 58
End: 2018-05-01
Payer: MEDICARE

## 2018-05-01 ENCOUNTER — RECORDS - HEALTHEAST (OUTPATIENT)
Dept: LAB | Facility: CLINIC | Age: 58
End: 2018-05-01

## 2018-05-01 DIAGNOSIS — Z79.01 LONG-TERM (CURRENT) USE OF ANTICOAGULANTS: ICD-10-CM

## 2018-05-01 DIAGNOSIS — D68.59 PRIMARY HYPERCOAGULABLE STATE (H): ICD-10-CM

## 2018-05-01 LAB
INR PPP: 2.97
INR PPP: 2.97 (ref 0.9–1.1)

## 2018-05-01 PROCEDURE — 99207 ZZC NO CHARGE NURSE ONLY: CPT

## 2018-05-01 NOTE — PROGRESS NOTES
ANTICOAGULATION FOLLOW-UP     Patient Name:  Linda Otero  Date:  5/1/2018  Contact Type:  Telephone  Call received from [facility], with INR result.   Follow up instructions given over the phone for warfarin management.    SUBJECTIVE:     Patient Findings     Positives No Problem Findings           OBJECTIVE    INR   Date Value Ref Range Status   05/01/2018 2.97  Final       ASSESSMENT / PLAN  INR assessment THER    Recheck INR In: 2 WEEKS    INR Location [facility]      Anticoagulation Summary as of 5/1/2018     INR goal 2.0-3.0   Today's INR 2.97   Maintenance plan 10 mg (5 mg x 2) on Mon, Wed, Fri; 12.5 mg (5 mg x 2.5) all other days   Full instructions 10 mg on Mon, Wed, Fri; 12.5 mg all other days   Weekly total 80 mg   Plan last modified Vy Gallo, RN (5/1/2018)   Next INR check 5/15/2018   Target end date     Indications   Primary hypercoagulable state (H) [D68.59]  Long-term (current) use of anticoagulants [Z79.01] [Z79.01]         Anticoagulation Episode Summary     INR check location Home Draw    Preferred lab     Send INR reminders to [facility]    Comments 5mg tabs // Fax orders to [facility]: Fax [phone] / Ph [phone] Cameron      Anticoagulation Care Providers     Provider Role Specialty Phone number    Jaja Orta MD Referring Internal Medicine [phone]            See the Encounter Report to view Anticoagulation Flowsheet and Dosing Calendar (Go to Encounters tab in chart review, and find the Anticoagulation Therapy Visit)        Vy Gallo, RN

## 2018-05-08 ENCOUNTER — HOME CARE/HOSPICE - HEALTHEAST (OUTPATIENT)
Dept: HOME HEALTH SERVICES | Facility: HOME HEALTH | Age: 58
End: 2018-05-08

## 2018-05-09 ENCOUNTER — HOME CARE/HOSPICE - HEALTHEAST (OUTPATIENT)
Dept: HOME HEALTH SERVICES | Facility: HOME HEALTH | Age: 58
End: 2018-05-09

## 2018-05-10 ENCOUNTER — HOME CARE/HOSPICE - HEALTHEAST (OUTPATIENT)
Dept: HOME HEALTH SERVICES | Facility: HOME HEALTH | Age: 58
End: 2018-05-10

## 2018-05-11 ENCOUNTER — HOME CARE/HOSPICE - HEALTHEAST (OUTPATIENT)
Dept: HOME HEALTH SERVICES | Facility: HOME HEALTH | Age: 58
End: 2018-05-11

## 2018-05-14 ENCOUNTER — HOME CARE/HOSPICE - HEALTHEAST (OUTPATIENT)
Dept: HOME HEALTH SERVICES | Facility: HOME HEALTH | Age: 58
End: 2018-05-14

## 2018-05-14 ENCOUNTER — RECORDS - HEALTHEAST (OUTPATIENT)
Dept: LAB | Facility: CLINIC | Age: 58
End: 2018-05-14

## 2018-05-14 LAB
ANION GAP SERPL CALCULATED.3IONS-SCNC: 11 MMOL/L (ref 5–18)
BUN SERPL-MCNC: 18 MG/DL (ref 8–22)
CALCIUM SERPL-MCNC: 9.9 MG/DL (ref 8.5–10.5)
CHLORIDE BLD-SCNC: 98 MMOL/L (ref 98–107)
CO2 SERPL-SCNC: 32 MMOL/L (ref 22–31)
CREAT SERPL-MCNC: 1.1 MG/DL (ref 0.6–1.1)
GFR SERPL CREATININE-BSD FRML MDRD: 51 ML/MIN/1.73M2
GLUCOSE BLD-MCNC: 80 MG/DL (ref 70–125)
INR PPP: 1.36 (ref 0.9–1.1)
MAGNESIUM SERPL-MCNC: 1.9 MG/DL (ref 1.8–2.6)
POTASSIUM BLD-SCNC: 3.7 MMOL/L (ref 3.5–5)
SODIUM SERPL-SCNC: 141 MMOL/L (ref 136–145)

## 2018-05-15 ENCOUNTER — HOME CARE/HOSPICE - HEALTHEAST (OUTPATIENT)
Dept: HOME HEALTH SERVICES | Facility: HOME HEALTH | Age: 58
End: 2018-05-15

## 2018-05-17 ENCOUNTER — HOME CARE/HOSPICE - HEALTHEAST (OUTPATIENT)
Dept: HOME HEALTH SERVICES | Facility: HOME HEALTH | Age: 58
End: 2018-05-17

## 2018-05-18 ENCOUNTER — DOCUMENTATION ONLY (OUTPATIENT)
Dept: PEDIATRICS | Facility: CLINIC | Age: 58
End: 2018-05-18

## 2018-05-18 ENCOUNTER — HOME CARE/HOSPICE - HEALTHEAST (OUTPATIENT)
Dept: HOME HEALTH SERVICES | Facility: HOME HEALTH | Age: 58
End: 2018-05-18

## 2018-05-18 NOTE — PROGRESS NOTES
Rachell, PT with [facility] calling ([phone]). Gave verbal ok per standing order to continue PT visits - 2 times a week for 3 weeks.     Nehal Plummer RN Triage/Clinic Lead RN  [facility]  [phone]

## 2018-05-21 ENCOUNTER — HOME CARE/HOSPICE - HEALTHEAST (OUTPATIENT)
Dept: HOME HEALTH SERVICES | Facility: HOME HEALTH | Age: 58
End: 2018-05-21

## 2018-05-22 ENCOUNTER — RECORDS - HEALTHEAST (OUTPATIENT)
Dept: LAB | Facility: CLINIC | Age: 58
End: 2018-05-22

## 2018-05-22 LAB
ANION GAP SERPL CALCULATED.3IONS-SCNC: 9 MMOL/L (ref 5–18)
BUN SERPL-MCNC: 11 MG/DL (ref 8–22)
CALCIUM SERPL-MCNC: 9.5 MG/DL (ref 8.5–10.5)
CHLORIDE BLD-SCNC: 103 MMOL/L (ref 98–107)
CO2 SERPL-SCNC: 31 MMOL/L (ref 22–31)
CREAT SERPL-MCNC: 0.84 MG/DL (ref 0.6–1.1)
ERYTHROCYTE [DISTWIDTH] IN BLOOD BY AUTOMATED COUNT: 19 % (ref 11–14.5)
GFR SERPL CREATININE-BSD FRML MDRD: >60 ML/MIN/1.73M2
GLUCOSE BLD-MCNC: 76 MG/DL (ref 70–125)
HCT VFR BLD AUTO: 38.2 % (ref 35–47)
HGB BLD-MCNC: 11.4 G/DL (ref 12–16)
INR PPP: 4.07 (ref 0.9–1.1)
MCH RBC QN AUTO: 27.1 PG (ref 27–34)
MCHC RBC AUTO-ENTMCNC: 29.8 G/DL (ref 32–36)
MCV RBC AUTO: 91 FL (ref 80–100)
PLATELET # BLD AUTO: 202 THOU/UL (ref 140–440)
PMV BLD AUTO: 9.9 FL (ref 8.5–12.5)
POTASSIUM BLD-SCNC: 3.3 MMOL/L (ref 3.5–5)
RBC # BLD AUTO: 4.21 MILL/UL (ref 3.8–5.4)
SODIUM SERPL-SCNC: 143 MMOL/L (ref 136–145)
WBC: 5.4 THOU/UL (ref 4–11)

## 2018-05-23 ENCOUNTER — HOME CARE/HOSPICE - HEALTHEAST (OUTPATIENT)
Dept: HOME HEALTH SERVICES | Facility: HOME HEALTH | Age: 58
End: 2018-05-23

## 2018-05-24 ENCOUNTER — HOME CARE/HOSPICE - HEALTHEAST (OUTPATIENT)
Dept: HOME HEALTH SERVICES | Facility: HOME HEALTH | Age: 58
End: 2018-05-24

## 2018-05-27 NOTE — TELEPHONE ENCOUNTER
Call from patient-states that [employer] needs approval for refills for her wound supplies.  States she uses bandages and a spray.  Per chart review, patient has [facility]-nurse's name is Leonie-She can be reached at [phone].    Call to Leonie to inquire which orders to approve for patient-LM for her to call back.  Is there a form for [employer] to sign, or a verbal approval?    Waiting her call back.    Zohreh Marquez RN  Message handled by Nurse Triage.     No significant past surgical history

## 2018-05-28 ENCOUNTER — HOME CARE/HOSPICE - HEALTHEAST (OUTPATIENT)
Dept: HOME HEALTH SERVICES | Facility: HOME HEALTH | Age: 58
End: 2018-05-28

## 2018-05-29 ENCOUNTER — HOME CARE/HOSPICE - HEALTHEAST (OUTPATIENT)
Dept: HOME HEALTH SERVICES | Facility: HOME HEALTH | Age: 58
End: 2018-05-29

## 2018-05-29 ENCOUNTER — RECORDS - HEALTHEAST (OUTPATIENT)
Dept: LAB | Facility: CLINIC | Age: 58
End: 2018-05-29

## 2018-05-29 LAB — INR PPP: 1.83 (ref 0.9–1.1)

## 2018-05-30 ENCOUNTER — HOME CARE/HOSPICE - HEALTHEAST (OUTPATIENT)
Dept: HOME HEALTH SERVICES | Facility: HOME HEALTH | Age: 58
End: 2018-05-30

## 2018-05-31 ENCOUNTER — HOME CARE/HOSPICE - HEALTHEAST (OUTPATIENT)
Dept: HOME HEALTH SERVICES | Facility: HOME HEALTH | Age: 58
End: 2018-05-31

## 2018-06-05 ENCOUNTER — HOME CARE/HOSPICE - HEALTHEAST (OUTPATIENT)
Dept: HOME HEALTH SERVICES | Facility: HOME HEALTH | Age: 58
End: 2018-06-05

## 2018-06-05 ENCOUNTER — RECORDS - HEALTHEAST (OUTPATIENT)
Dept: LAB | Facility: CLINIC | Age: 58
End: 2018-06-05

## 2018-06-05 LAB — INR PPP: 2.3 (ref 0.9–1.1)

## 2018-06-07 ENCOUNTER — PATIENT OUTREACH (OUTPATIENT)
Dept: CARE COORDINATION | Facility: CLINIC | Age: 58
End: 2018-06-07

## 2018-06-08 ENCOUNTER — HOME CARE/HOSPICE - HEALTHEAST (OUTPATIENT)
Dept: HOME HEALTH SERVICES | Facility: HOME HEALTH | Age: 58
End: 2018-06-08

## 2018-06-11 ENCOUNTER — HOME CARE/HOSPICE - HEALTHEAST (OUTPATIENT)
Dept: HOME HEALTH SERVICES | Facility: HOME HEALTH | Age: 58
End: 2018-06-11

## 2018-06-15 ENCOUNTER — HOME CARE/HOSPICE - HEALTHEAST (OUTPATIENT)
Dept: HOME HEALTH SERVICES | Facility: HOME HEALTH | Age: 58
End: 2018-06-15

## 2018-06-18 ENCOUNTER — HOME CARE/HOSPICE - HEALTHEAST (OUTPATIENT)
Dept: HOME HEALTH SERVICES | Facility: HOME HEALTH | Age: 58
End: 2018-06-18

## 2018-06-19 ENCOUNTER — RECORDS - HEALTHEAST (OUTPATIENT)
Dept: LAB | Facility: CLINIC | Age: 58
End: 2018-06-19

## 2018-06-19 ENCOUNTER — HOME CARE/HOSPICE - HEALTHEAST (OUTPATIENT)
Dept: HOME HEALTH SERVICES | Facility: HOME HEALTH | Age: 58
End: 2018-06-19

## 2018-06-20 LAB — INR PPP: 2.81 (ref 0.9–1.1)

## 2018-06-25 ENCOUNTER — RECORDS - HEALTHEAST (OUTPATIENT)
Dept: LAB | Facility: CLINIC | Age: 58
End: 2018-06-25

## 2018-06-25 LAB
ALBUMIN UR-MCNC: NEGATIVE MG/DL
APPEARANCE UR: CLEAR
BACTERIA #/AREA URNS HPF: ABNORMAL HPF
BILIRUB UR QL STRIP: NEGATIVE
COLOR UR AUTO: ABNORMAL
GLUCOSE UR STRIP-MCNC: NEGATIVE MG/DL
HGB UR QL STRIP: ABNORMAL
KETONES UR STRIP-MCNC: NEGATIVE MG/DL
LEUKOCYTE ESTERASE UR QL STRIP: NEGATIVE
NITRATE UR QL: NEGATIVE
PH UR STRIP: 6.5 [PH] (ref 4.5–8)
RBC #/AREA URNS AUTO: ABNORMAL HPF
SP GR UR STRIP: 1.01 (ref 1–1.03)
SQUAMOUS #/AREA URNS AUTO: ABNORMAL LPF
UROBILINOGEN UR STRIP-ACNC: ABNORMAL
WBC #/AREA URNS AUTO: ABNORMAL HPF

## 2018-07-09 ENCOUNTER — ANTICOAGULATION THERAPY VISIT (OUTPATIENT)
Dept: PEDIATRICS | Facility: CLINIC | Age: 58
End: 2018-07-09
Payer: MEDICARE

## 2018-07-09 ENCOUNTER — TELEPHONE (OUTPATIENT)
Dept: PEDIATRICS | Facility: CLINIC | Age: 58
End: 2018-07-09

## 2018-07-09 DIAGNOSIS — D68.59 PRIMARY HYPERCOAGULABLE STATE (H): ICD-10-CM

## 2018-07-09 PROCEDURE — 99207 ZZC NO CHARGE NURSE ONLY: CPT | Performed by: INTERNAL MEDICINE

## 2018-07-09 NOTE — MR AVS SNAPSHOT
Linda Otero   7/9/2018   Anticoagulation Therapy Visit    Description:  58 year old female   Provider:  Jaja Orta MD   Department:  [facility]           INR as of 7/9/2018     Today's INR       Anticoagulation Summary as of 7/9/2018     INR goal 2.0-3.0   Today's INR    Full warfarin instructions 10 mg on Mon, Wed, Fri; 12.5 mg all other days   Next INR check     Indications   Primary hypercoagulable state (H) [D68.59]  Long-term (current) use of anticoagulants [Z79.01] [Z79.01]         Anticoagulation Episode Summary     Resolved date 7/10/2018    Resolved reason Patient moved

## 2018-07-10 NOTE — TELEPHONE ENCOUNTER
Spoke with pt and states she lives in assisted living now [facility] in [facility] and they have providers there. Routing to PCP.  Aster Henderson MA

## 2018-07-11 ENCOUNTER — RECORDS - HEALTHEAST (OUTPATIENT)
Dept: LAB | Facility: CLINIC | Age: 58
End: 2018-07-11

## 2018-07-11 LAB — INR PPP: 1.76 (ref 0.9–1.1)

## 2018-07-17 ENCOUNTER — RECORDS - HEALTHEAST (OUTPATIENT)
Dept: LAB | Facility: CLINIC | Age: 58
End: 2018-07-17

## 2018-07-17 LAB — INR PPP: 1.8 (ref 0.9–1.1)

## 2018-07-18 ENCOUNTER — OFFICE VISIT - HEALTHEAST (OUTPATIENT)
Dept: PULMONOLOGY | Facility: OTHER | Age: 58
End: 2018-07-18

## 2018-07-18 ENCOUNTER — RECORDS - HEALTHEAST (OUTPATIENT)
Dept: PULMONOLOGY | Facility: OTHER | Age: 58
End: 2018-07-18

## 2018-07-18 ENCOUNTER — RECORDS - HEALTHEAST (OUTPATIENT)
Dept: ADMINISTRATIVE | Facility: OTHER | Age: 58
End: 2018-07-18

## 2018-07-18 ENCOUNTER — AMBULATORY - HEALTHEAST (OUTPATIENT)
Dept: PULMONOLOGY | Facility: OTHER | Age: 58
End: 2018-07-18

## 2018-07-18 DIAGNOSIS — J44.9 CHRONIC OBSTRUCTIVE PULMONARY DISEASE, UNSPECIFIED (H): ICD-10-CM

## 2018-07-18 DIAGNOSIS — J44.9 COPD, GROUP C, BY GOLD 2017 CLASSIFICATION (H): ICD-10-CM

## 2018-07-26 ENCOUNTER — RECORDS - HEALTHEAST (OUTPATIENT)
Dept: LAB | Facility: CLINIC | Age: 58
End: 2018-07-26

## 2018-07-26 LAB — INR PPP: 1.9 (ref 0.9–1.1)

## 2018-07-31 ENCOUNTER — AMBULATORY - HEALTHEAST (OUTPATIENT)
Dept: OTHER | Facility: CLINIC | Age: 58
End: 2018-07-31

## 2018-08-10 ENCOUNTER — RECORDS - HEALTHEAST (OUTPATIENT)
Dept: LAB | Facility: CLINIC | Age: 58
End: 2018-08-10

## 2018-08-10 LAB
INR PPP: 1.51 (ref 0.9–1.1)
TSH SERPL DL<=0.005 MIU/L-ACNC: 2.09 UIU/ML (ref 0.3–5)

## 2018-08-20 ENCOUNTER — RECORDS - HEALTHEAST (OUTPATIENT)
Dept: LAB | Facility: CLINIC | Age: 58
End: 2018-08-20

## 2018-08-20 LAB — INR PPP: 1.09 (ref 0.9–1.1)

## 2018-09-04 ENCOUNTER — RECORDS - HEALTHEAST (OUTPATIENT)
Dept: LAB | Facility: CLINIC | Age: 58
End: 2018-09-04

## 2018-09-04 LAB — INR PPP: 1.41 (ref 0.9–1.1)

## 2018-09-14 ENCOUNTER — RECORDS - HEALTHEAST (OUTPATIENT)
Dept: LAB | Facility: CLINIC | Age: 58
End: 2018-09-14

## 2018-09-17 LAB — INR PPP: 1.6 (ref 0.9–1.1)

## 2018-09-18 ENCOUNTER — RECORDS - HEALTHEAST (OUTPATIENT)
Dept: ADMINISTRATIVE | Facility: OTHER | Age: 58
End: 2018-09-18

## 2018-09-18 ENCOUNTER — COMMUNICATION - HEALTHEAST (OUTPATIENT)
Dept: PULMONOLOGY | Facility: OTHER | Age: 58
End: 2018-09-18

## 2018-09-18 DIAGNOSIS — J44.1 COPD EXACERBATION (H): ICD-10-CM

## 2018-09-25 ENCOUNTER — AMBULATORY - HEALTHEAST (OUTPATIENT)
Dept: OTHER | Facility: CLINIC | Age: 58
End: 2018-09-25

## 2018-10-03 ENCOUNTER — TRANSFERRED RECORDS (OUTPATIENT)
Dept: HEALTH INFORMATION MANAGEMENT | Facility: CLINIC | Age: 58
End: 2018-10-03

## 2018-10-03 ENCOUNTER — OFFICE VISIT - HEALTHEAST (OUTPATIENT)
Dept: PULMONOLOGY | Facility: OTHER | Age: 58
End: 2018-10-03

## 2018-10-03 DIAGNOSIS — I27.20 PULMONARY HYPERTENSION (H): ICD-10-CM

## 2018-10-03 DIAGNOSIS — J44.9 COPD (CHRONIC OBSTRUCTIVE PULMONARY DISEASE) (H): ICD-10-CM

## 2018-10-03 DIAGNOSIS — R06.02 SOB (SHORTNESS OF BREATH): ICD-10-CM

## 2018-10-03 DIAGNOSIS — R94.2 DIFFUSION CAPACITY OF LUNG (DL), DECREASED: ICD-10-CM

## 2018-10-03 ASSESSMENT — MIFFLIN-ST. JEOR: SCORE: 1893.11

## 2018-10-04 ENCOUNTER — RECORDS - HEALTHEAST (OUTPATIENT)
Dept: LAB | Facility: CLINIC | Age: 58
End: 2018-10-04

## 2018-10-04 LAB — INR PPP: 1.63 (ref 0.9–1.1)

## 2018-10-08 NOTE — PROGRESS NOTES
ANTICOAGULATION FOLLOW-UP     Patient Name:  Linda Otero  Date: 7/10/2018    SUBJECTIVE:     Patient Findings     Comments Aster Henderson CMA at 7/10/2018 10:12 AM   Spoke with pt and states she lives in assisted living now [facility] [place] and they have providers there.   Aster Henderson MA    Patient removed from [facility] patient list.             OBJECTIVE        ASSESSMENT / PLAN  No question data found.  Anticoagulation Summary as of 7/9/2018     INR goal 2.0-3.0   Today's INR    Warfarin maintenance plan 10 mg (5 mg x 2) on Mon, Wed, Fri; 12.5 mg (5 mg x 2.5) all other days   Full warfarin instructions 10 mg on Mon, Wed, Fri; 12.5 mg all other days   Weekly warfarin total 80 mg   Plan last modified Vy Gallo RN (5/1/2018)   Next INR check    Target end date     Indications   Primary hypercoagulable state (H) [D68.59]  Long-term (current) use of anticoagulants [Z79.01] [Z79.01]         Anticoagulation Episode Summary     INR check location Home Draw    Preferred lab     Resolved date 7/10/2018    Resolved reason Patient moved    Send INR reminders to  [facility]    Comments 5mg tabs // Fax orders to [facility]: Fax [phone] / Ph [phone] Cameron      Anticoagulation Care Providers     Provider Role Specialty Phone number    Jaja Orta MD Referring Internal Medicine [phone]            See the Encounter Report to view Anticoagulation Flowsheet and Dosing Calendar (Go to Encounters tab in chart review, and find the Anticoagulation Therapy Visit)        Vy Gallo RN

## 2018-10-12 ENCOUNTER — HOSPITAL ENCOUNTER (OUTPATIENT)
Dept: CARDIOLOGY | Facility: CLINIC | Age: 58
Discharge: HOME | End: 2018-10-12
Attending: INTERNAL MEDICINE

## 2018-10-12 DIAGNOSIS — I27.20 PULMONARY HYPERTENSION (H): ICD-10-CM

## 2018-10-12 DIAGNOSIS — R06.02 SOB (SHORTNESS OF BREATH): ICD-10-CM

## 2018-10-12 DIAGNOSIS — R94.2 DIFFUSION CAPACITY OF LUNG (DL), DECREASED: ICD-10-CM

## 2018-10-12 ASSESSMENT — MIFFLIN-ST. JEOR: SCORE: 1893.11

## 2018-10-15 ENCOUNTER — RECORDS - HEALTHEAST (OUTPATIENT)
Dept: LAB | Facility: CLINIC | Age: 58
End: 2018-10-15

## 2018-10-15 LAB
AORTIC VALVE MEAN VELOCITY: 124 CM/S
AV DIMENSIONLESS INDEX VTI: 0.8
AV MEAN GRADIENT: 7 MMHG
AV PEAK GRADIENT: 11.8 MMHG
AV VALVE AREA: 3.2 CM2
AV VELOCITY RATIO: 0.9
BSA FOR ECHO PROCEDURE: 2.46 M2
CV BLOOD PRESSURE: NORMAL MMHG
CV ECHO HEIGHT: 66 IN
CV ECHO WEIGHT: 288 LBS
DOP CALC AO PEAK VEL: 172 CM/S
DOP CALC AO VTI: 39.6 CM
DOP CALC LVOT AREA: 3.8 CM2
DOP CALC LVOT DIAMETER: 2.2 CM
DOP CALC LVOT PEAK VEL: 149 CM/S
DOP CALC LVOT STROKE VOLUME: 127.3 CM3
DOP CALCLVOT PEAK VEL VTI: 33.5 CM
EJECTION FRACTION: 59 % (ref 55–75)
FRACTIONAL SHORTENING: 42.6 % (ref 28–44)
INR PPP: 2.3 (ref 0.9–1.1)
INTERVENTRICULAR SEPTUM IN END DIASTOLE: 1.1 CM (ref 0.6–0.9)
IVS/PW RATIO: 1.4
LA AREA 1: 30.3 CM2
LA AREA 2: 25.9 CM2
LEFT ATRIUM LENGTH: 5.86 CM
LEFT ATRIUM VOLUME INDEX: 46.3 ML/M2
LEFT ATRIUM VOLUME: 113.8 ML
LEFT VENTRICLE DIASTOLIC VOLUME INDEX: 81.3 CM3/M2 (ref 34–74)
LEFT VENTRICLE DIASTOLIC VOLUME: 200 CM3 (ref 46–106)
LEFT VENTRICLE HEART RATE: 68 BPM
LEFT VENTRICLE HEART RATE: 73 BPM
LEFT VENTRICLE HEART RATE: 84 BPM
LEFT VENTRICLE MASS INDEX: 78.6 G/M2
LEFT VENTRICLE SYSTOLIC VOLUME INDEX: 33.7 CM3/M2 (ref 11–31)
LEFT VENTRICLE SYSTOLIC VOLUME: 83 CM3 (ref 14–42)
LEFT VENTRICULAR INTERNAL DIMENSION IN DIASTOLE: 5.4 CM (ref 3.8–5.2)
LEFT VENTRICULAR INTERNAL DIMENSION IN SYSTOLE: 3.1 CM (ref 2.2–3.5)
LEFT VENTRICULAR MASS: 193.3 G
LEFT VENTRICULAR OUTFLOW TRACT MEAN GRADIENT: 4 MMHG
LEFT VENTRICULAR OUTFLOW TRACT MEAN VELOCITY: 95.3 CM/S
LEFT VENTRICULAR OUTFLOW TRACT PEAK GRADIENT: 9 MMHG
LEFT VENTRICULAR POSTERIOR WALL IN END DIASTOLE: 0.8 CM (ref 0.6–0.9)
LV STROKE VOLUME INDEX: 51.7 ML/M2
MITRAL VALVE E/A RATIO: 1
MV AVERAGE E/E' RATIO: 13.2 CM/S
MV DECELERATION TIME: 275 MS
MV E'TISSUE VEL-LAT: 9.94 CM/S
MV E'TISSUE VEL-MED: 7.7 CM/S
MV LATERAL E/E' RATIO: 11.7
MV MEDIAL E/E' RATIO: 15.1
MV PEAK A VELOCITY: 117 CM/S
MV PEAK E VELOCITY: 116 CM/S
NUC REST DIASTOLIC VOLUME INDEX: 4608 LBS
NUC REST SYSTOLIC VOLUME INDEX: 66 IN
TRICUSPID REGURGITATION PEAK PRESSURE GRADIENT: 42.8 MMHG
TRICUSPID VALVE ANULAR PLANE SYSTOLIC EXCURSION: 2.6 CM
TRICUSPID VALVE PEAK REGURGITANT VELOCITY: 327 CM/S

## 2018-10-23 ENCOUNTER — RECORDS - HEALTHEAST (OUTPATIENT)
Dept: LAB | Facility: CLINIC | Age: 58
End: 2018-10-23

## 2018-10-23 LAB — INR PPP: 2.27 (ref 0.9–1.1)

## 2018-10-30 ENCOUNTER — AMBULATORY - HEALTHEAST (OUTPATIENT)
Dept: OTHER | Facility: CLINIC | Age: 58
End: 2018-10-30

## 2018-11-05 ENCOUNTER — RECORDS - HEALTHEAST (OUTPATIENT)
Dept: LAB | Facility: CLINIC | Age: 58
End: 2018-11-05

## 2018-11-05 LAB — INR PPP: 1.89 (ref 0.9–1.1)

## 2018-11-07 ENCOUNTER — COMMUNICATION - HEALTHEAST (OUTPATIENT)
Dept: PULMONOLOGY | Facility: OTHER | Age: 58
End: 2018-11-07

## 2018-11-20 ENCOUNTER — RECORDS - HEALTHEAST (OUTPATIENT)
Dept: LAB | Facility: CLINIC | Age: 58
End: 2018-11-20

## 2018-11-20 LAB
ANION GAP SERPL CALCULATED.3IONS-SCNC: 5 MMOL/L (ref 5–18)
BUN SERPL-MCNC: 14 MG/DL (ref 8–22)
CALCIUM SERPL-MCNC: 9.3 MG/DL (ref 8.5–10.5)
CHLORIDE BLD-SCNC: 103 MMOL/L (ref 98–107)
CO2 SERPL-SCNC: 34 MMOL/L (ref 22–31)
CREAT SERPL-MCNC: 0.92 MG/DL (ref 0.6–1.1)
ERYTHROCYTE [DISTWIDTH] IN BLOOD BY AUTOMATED COUNT: 15.4 % (ref 11–14.5)
GFR SERPL CREATININE-BSD FRML MDRD: >60 ML/MIN/1.73M2
GLUCOSE BLD-MCNC: 99 MG/DL (ref 70–125)
HCT VFR BLD AUTO: 39.1 % (ref 35–47)
HGB BLD-MCNC: 12 G/DL (ref 12–16)
MCH RBC QN AUTO: 29.9 PG (ref 27–34)
MCHC RBC AUTO-ENTMCNC: 30.7 G/DL (ref 32–36)
MCV RBC AUTO: 98 FL (ref 80–100)
PLATELET # BLD AUTO: 184 THOU/UL (ref 140–440)
PMV BLD AUTO: 10.3 FL (ref 8.5–12.5)
POTASSIUM BLD-SCNC: 4.2 MMOL/L (ref 3.5–5)
RBC # BLD AUTO: 4.01 MILL/UL (ref 3.8–5.4)
SODIUM SERPL-SCNC: 142 MMOL/L (ref 136–145)
WBC: 12.1 THOU/UL (ref 4–11)

## 2018-11-21 ENCOUNTER — COMMUNICATION - HEALTHEAST (OUTPATIENT)
Dept: PULMONOLOGY | Facility: OTHER | Age: 58
End: 2018-11-21

## 2018-11-21 ENCOUNTER — RECORDS - HEALTHEAST (OUTPATIENT)
Dept: ADMINISTRATIVE | Facility: OTHER | Age: 58
End: 2018-11-21

## 2018-11-21 DIAGNOSIS — J44.1 COPD EXACERBATION (H): ICD-10-CM

## 2018-11-21 NOTE — TELEPHONE ENCOUNTER
Panel Management Review      Patient has the following on her problem list: None      Composite cancer screening  Chart review shows that this patient is due/due soon for the following Mammogram  Summary:    Patient is due/failing the following:   MAMMOGRAM    Action needed:   Patient needs office visit for mammogram.    Type of outreach:    Phone, spoke to patient.  and she chose not to schedule at this time was ok with clinic checking back at later date    Questions for provider review:    None                                                                                                                                    Bárbara JAMESON, KRISTA,JO ANN       Chart routed to Care Team .          
I will SWITCH the dose or number of times a day I take the medications listed below when I get home from the hospital:    Lantus 100 units/mL subcutaneous solution  -- 20 unit(s) subcutaneous once a day every morning    HumaLOG 100 units/mL injectable solution  -- 5 unit(s) injectable 3 times a day (before meals)

## 2018-11-23 ENCOUNTER — RECORDS - HEALTHEAST (OUTPATIENT)
Dept: LAB | Facility: CLINIC | Age: 58
End: 2018-11-23

## 2018-11-23 LAB — INR PPP: 1.84 (ref 0.9–1.1)

## 2018-11-28 ENCOUNTER — RECORDS - HEALTHEAST (OUTPATIENT)
Dept: LAB | Facility: CLINIC | Age: 58
End: 2018-11-28

## 2018-11-29 LAB — INR PPP: 1.79 (ref 0.9–1.1)

## 2018-12-06 ENCOUNTER — RECORDS - HEALTHEAST (OUTPATIENT)
Dept: LAB | Facility: CLINIC | Age: 58
End: 2018-12-06

## 2018-12-06 LAB — INR PPP: 2.34 (ref 0.9–1.1)

## 2018-12-21 ENCOUNTER — RECORDS - HEALTHEAST (OUTPATIENT)
Dept: LAB | Facility: CLINIC | Age: 58
End: 2018-12-21

## 2018-12-21 LAB — INR PPP: 2.28 (ref 0.9–1.1)

## 2019-01-03 ENCOUNTER — COMMUNICATION - HEALTHEAST (OUTPATIENT)
Dept: SCHEDULING | Facility: CLINIC | Age: 59
End: 2019-01-03

## 2019-01-09 ENCOUNTER — OFFICE VISIT - HEALTHEAST (OUTPATIENT)
Dept: SURGERY | Facility: CLINIC | Age: 59
End: 2019-01-09

## 2019-01-09 DIAGNOSIS — K43.9 VENTRAL HERNIA WITHOUT OBSTRUCTION OR GANGRENE: ICD-10-CM

## 2019-01-09 ASSESSMENT — MIFFLIN-ST. JEOR: SCORE: 1879.5

## 2019-01-10 ENCOUNTER — TRANSFERRED RECORDS (OUTPATIENT)
Dept: HEALTH INFORMATION MANAGEMENT | Facility: CLINIC | Age: 59
End: 2019-01-10

## 2019-01-10 ENCOUNTER — OFFICE VISIT - HEALTHEAST (OUTPATIENT)
Dept: CARDIOLOGY | Facility: CLINIC | Age: 59
End: 2019-01-10

## 2019-01-10 DIAGNOSIS — Z86.73 HISTORY OF CVA (CEREBROVASCULAR ACCIDENT): ICD-10-CM

## 2019-01-10 DIAGNOSIS — Z01.818 PRE-OP EXAM: ICD-10-CM

## 2019-01-10 ASSESSMENT — MIFFLIN-ST. JEOR: SCORE: 1943.01

## 2019-01-11 LAB
ATRIAL RATE - MUSE: 81 BPM
DIASTOLIC BLOOD PRESSURE - MUSE: NORMAL MMHG
INTERPRETATION ECG - MUSE: NORMAL
P AXIS - MUSE: 74 DEGREES
PR INTERVAL - MUSE: 162 MS
QRS DURATION - MUSE: 100 MS
QT - MUSE: 404 MS
QTC - MUSE: 469 MS
R AXIS - MUSE: 72 DEGREES
SYSTOLIC BLOOD PRESSURE - MUSE: NORMAL MMHG
T AXIS - MUSE: 47 DEGREES
VENTRICULAR RATE- MUSE: 81 BPM

## 2019-01-22 ENCOUNTER — COMMUNICATION - HEALTHEAST (OUTPATIENT)
Dept: RESPIRATORY THERAPY | Facility: CLINIC | Age: 59
End: 2019-01-22

## 2019-01-24 ENCOUNTER — COMMUNICATION - HEALTHEAST (OUTPATIENT)
Dept: RESPIRATORY THERAPY | Facility: CLINIC | Age: 59
End: 2019-01-24

## 2019-01-28 ENCOUNTER — COMMUNICATION - HEALTHEAST (OUTPATIENT)
Dept: RESPIRATORY THERAPY | Facility: CLINIC | Age: 59
End: 2019-01-28

## 2019-02-04 ENCOUNTER — COMMUNICATION - HEALTHEAST (OUTPATIENT)
Dept: RESPIRATORY THERAPY | Facility: CLINIC | Age: 59
End: 2019-02-04

## 2019-02-22 ENCOUNTER — COMMUNICATION - HEALTHEAST (OUTPATIENT)
Dept: RESPIRATORY THERAPY | Facility: CLINIC | Age: 59
End: 2019-02-22

## 2019-03-19 ENCOUNTER — OFFICE VISIT - HEALTHEAST (OUTPATIENT)
Dept: PULMONOLOGY | Facility: OTHER | Age: 59
End: 2019-03-19

## 2019-03-19 DIAGNOSIS — J96.11 CHRONIC HYPOXEMIC RESPIRATORY FAILURE (H): ICD-10-CM

## 2019-03-19 DIAGNOSIS — E66.2 OBESITY HYPOVENTILATION SYNDROME (H): ICD-10-CM

## 2019-03-19 ASSESSMENT — MIFFLIN-ST. JEOR: SCORE: 1920.33

## 2019-03-21 ENCOUNTER — COMMUNICATION - HEALTHEAST (OUTPATIENT)
Dept: RESPIRATORY THERAPY | Facility: CLINIC | Age: 59
End: 2019-03-21

## 2019-03-25 ENCOUNTER — COMMUNICATION - HEALTHEAST (OUTPATIENT)
Dept: CARDIOLOGY | Facility: CLINIC | Age: 59
End: 2019-03-25

## 2019-03-27 ENCOUNTER — RECORDS - HEALTHEAST (OUTPATIENT)
Dept: LAB | Facility: CLINIC | Age: 59
End: 2019-03-27

## 2019-03-27 LAB
CHOLEST SERPL-MCNC: 181 MG/DL
FASTING STATUS PATIENT QL REPORTED: NORMAL
HDLC SERPL-MCNC: 61 MG/DL
LDLC SERPL CALC-MCNC: 102 MG/DL
TRIGL SERPL-MCNC: 90 MG/DL

## 2019-04-04 ENCOUNTER — RECORDS - HEALTHEAST (OUTPATIENT)
Dept: ADMINISTRATIVE | Facility: OTHER | Age: 59
End: 2019-04-04

## 2019-04-05 ENCOUNTER — RECORDS - HEALTHEAST (OUTPATIENT)
Dept: ADMINISTRATIVE | Facility: OTHER | Age: 59
End: 2019-04-05

## 2019-04-08 ENCOUNTER — RECORDS - HEALTHEAST (OUTPATIENT)
Dept: ADMINISTRATIVE | Facility: OTHER | Age: 59
End: 2019-04-08

## 2019-04-09 ENCOUNTER — AMBULATORY - HEALTHEAST (OUTPATIENT)
Dept: CARDIOLOGY | Facility: CLINIC | Age: 59
End: 2019-04-09

## 2019-04-09 ENCOUNTER — OFFICE VISIT - HEALTHEAST (OUTPATIENT)
Dept: CARDIOLOGY | Facility: CLINIC | Age: 59
End: 2019-04-09

## 2019-04-09 DIAGNOSIS — J44.1 COPD EXACERBATION (H): ICD-10-CM

## 2019-04-09 DIAGNOSIS — R06.00 DYSPNEA: ICD-10-CM

## 2019-04-09 ASSESSMENT — MIFFLIN-ST. JEOR: SCORE: 2020.12

## 2019-04-11 ENCOUNTER — SURGERY - HEALTHEAST (OUTPATIENT)
Dept: CARDIOLOGY | Facility: CLINIC | Age: 59
End: 2019-04-11

## 2019-04-12 ENCOUNTER — SURGERY - HEALTHEAST (OUTPATIENT)
Dept: CARDIOLOGY | Facility: CLINIC | Age: 59
End: 2019-04-12

## 2019-04-12 ASSESSMENT — MIFFLIN-ST. JEOR: SCORE: 2038.54

## 2019-04-15 ENCOUNTER — COMMUNICATION - HEALTHEAST (OUTPATIENT)
Dept: CARDIOLOGY | Facility: CLINIC | Age: 59
End: 2019-04-15

## 2019-04-15 DIAGNOSIS — I77.0 ACQUIRED ARTERIOVENOUS FISTULA (H): ICD-10-CM

## 2019-04-15 DIAGNOSIS — R06.09 DYSPNEA ON EXERTION: ICD-10-CM

## 2019-04-19 ENCOUNTER — COMMUNICATION - HEALTHEAST (OUTPATIENT)
Dept: PULMONOLOGY | Facility: OTHER | Age: 59
End: 2019-04-19

## 2019-04-19 DIAGNOSIS — J44.9 COPD (CHRONIC OBSTRUCTIVE PULMONARY DISEASE) (H): ICD-10-CM

## 2019-04-22 ENCOUNTER — AMBULATORY - HEALTHEAST (OUTPATIENT)
Dept: CARDIOLOGY | Facility: CLINIC | Age: 59
End: 2019-04-22

## 2019-04-22 ENCOUNTER — HOSPITAL ENCOUNTER (OUTPATIENT)
Dept: ULTRASOUND IMAGING | Facility: CLINIC | Age: 59
Discharge: HOME | End: 2019-04-22
Attending: INTERNAL MEDICINE

## 2019-04-22 ENCOUNTER — COMMUNICATION - HEALTHEAST (OUTPATIENT)
Dept: RESPIRATORY THERAPY | Facility: CLINIC | Age: 59
End: 2019-04-22

## 2019-04-22 ENCOUNTER — RECORDS - HEALTHEAST (OUTPATIENT)
Dept: ADMINISTRATIVE | Facility: OTHER | Age: 59
End: 2019-04-22

## 2019-04-22 DIAGNOSIS — R06.09 OTHER FORMS OF DYSPNEA: ICD-10-CM

## 2019-04-22 DIAGNOSIS — R06.09 DYSPNEA ON EXERTION: ICD-10-CM

## 2019-04-22 DIAGNOSIS — I77.0 ACQUIRED ARTERIOVENOUS FISTULA (H): ICD-10-CM

## 2019-04-30 ENCOUNTER — RECORDS - HEALTHEAST (OUTPATIENT)
Dept: ADMINISTRATIVE | Facility: OTHER | Age: 59
End: 2019-04-30

## 2019-05-06 ENCOUNTER — RECORDS - HEALTHEAST (OUTPATIENT)
Dept: ADMINISTRATIVE | Facility: OTHER | Age: 59
End: 2019-05-06

## 2019-05-10 ENCOUNTER — COMMUNICATION - HEALTHEAST (OUTPATIENT)
Dept: SCHEDULING | Facility: CLINIC | Age: 59
End: 2019-05-10

## 2019-05-10 ENCOUNTER — RECORDS - HEALTHEAST (OUTPATIENT)
Dept: ADMINISTRATIVE | Facility: OTHER | Age: 59
End: 2019-05-10

## 2019-05-21 ENCOUNTER — COMMUNICATION - HEALTHEAST (OUTPATIENT)
Dept: RESPIRATORY THERAPY | Facility: CLINIC | Age: 59
End: 2019-05-21

## 2019-06-21 ENCOUNTER — COMMUNICATION - HEALTHEAST (OUTPATIENT)
Dept: RESPIRATORY THERAPY | Facility: CLINIC | Age: 59
End: 2019-06-21

## 2019-06-24 ENCOUNTER — AMBULATORY - HEALTHEAST (OUTPATIENT)
Dept: OTHER | Facility: CLINIC | Age: 59
End: 2019-06-24

## 2019-06-25 ENCOUNTER — OFFICE VISIT - HEALTHEAST (OUTPATIENT)
Dept: PULMONOLOGY | Facility: OTHER | Age: 59
End: 2019-06-25

## 2019-06-25 DIAGNOSIS — J96.11 CHRONIC HYPOXEMIC RESPIRATORY FAILURE (H): ICD-10-CM

## 2019-06-25 DIAGNOSIS — I77.0 A-V FISTULA (H): ICD-10-CM

## 2019-06-25 DIAGNOSIS — J44.9 COPD, GROUP C, BY GOLD 2017 CLASSIFICATION (H): ICD-10-CM

## 2019-06-25 DIAGNOSIS — E66.2 OBESITY HYPOVENTILATION SYNDROME (H): ICD-10-CM

## 2019-06-25 ASSESSMENT — MIFFLIN-ST. JEOR: SCORE: 1992.9

## 2019-07-02 ENCOUNTER — COMMUNICATION - HEALTHEAST (OUTPATIENT)
Dept: PULMONOLOGY | Facility: OTHER | Age: 59
End: 2019-07-02

## 2019-07-02 DIAGNOSIS — J44.1 COPD EXACERBATION (H): ICD-10-CM

## 2019-07-10 ENCOUNTER — RECORDS - HEALTHEAST (OUTPATIENT)
Dept: ADMINISTRATIVE | Facility: OTHER | Age: 59
End: 2019-07-10

## 2019-07-12 ENCOUNTER — AMBULATORY - HEALTHEAST (OUTPATIENT)
Dept: PULMONOLOGY | Facility: OTHER | Age: 59
End: 2019-07-12

## 2019-07-18 ENCOUNTER — RECORDS - HEALTHEAST (OUTPATIENT)
Dept: LAB | Facility: CLINIC | Age: 59
End: 2019-07-18

## 2019-07-18 LAB
ANION GAP SERPL CALCULATED.3IONS-SCNC: 4 MMOL/L (ref 5–18)
BUN SERPL-MCNC: 23 MG/DL (ref 8–22)
CALCIUM SERPL-MCNC: 9.6 MG/DL (ref 8.5–10.5)
CHLORIDE BLD-SCNC: 104 MMOL/L (ref 98–107)
CO2 SERPL-SCNC: 35 MMOL/L (ref 22–31)
CREAT SERPL-MCNC: 0.95 MG/DL (ref 0.6–1.1)
ERYTHROCYTE [DISTWIDTH] IN BLOOD BY AUTOMATED COUNT: 14.6 % (ref 11–14.5)
GFR SERPL CREATININE-BSD FRML MDRD: 60 ML/MIN/1.73M2
GLUCOSE BLD-MCNC: 102 MG/DL (ref 70–125)
HCT VFR BLD AUTO: 37.4 % (ref 35–47)
HGB BLD-MCNC: 11.2 G/DL (ref 12–16)
MCH RBC QN AUTO: 29.1 PG (ref 27–34)
MCHC RBC AUTO-ENTMCNC: 29.9 G/DL (ref 32–36)
MCV RBC AUTO: 97 FL (ref 80–100)
PLATELET # BLD AUTO: 168 THOU/UL (ref 140–440)
PMV BLD AUTO: 10.5 FL (ref 8.5–12.5)
POTASSIUM BLD-SCNC: 4.5 MMOL/L (ref 3.5–5)
RBC # BLD AUTO: 3.85 MILL/UL (ref 3.8–5.4)
SODIUM SERPL-SCNC: 143 MMOL/L (ref 136–145)
WBC: 7.8 THOU/UL (ref 4–11)

## 2019-07-22 ENCOUNTER — ANESTHESIA - HEALTHEAST (OUTPATIENT)
Dept: SURGERY | Facility: CLINIC | Age: 59
End: 2019-07-22

## 2019-07-22 ENCOUNTER — SURGERY - HEALTHEAST (OUTPATIENT)
Dept: SURGERY | Facility: CLINIC | Age: 59
End: 2019-07-22

## 2019-07-22 ASSESSMENT — MIFFLIN-ST. JEOR
SCORE: 2052.15
SCORE: 2017.85

## 2019-07-24 ENCOUNTER — COMMUNICATION - HEALTHEAST (OUTPATIENT)
Dept: RESPIRATORY THERAPY | Facility: CLINIC | Age: 59
End: 2019-07-24

## 2019-08-13 ENCOUNTER — RECORDS - HEALTHEAST (OUTPATIENT)
Dept: ADMINISTRATIVE | Facility: OTHER | Age: 59
End: 2019-08-13

## 2019-08-13 ENCOUNTER — AMBULATORY - HEALTHEAST (OUTPATIENT)
Dept: OTHER | Facility: CLINIC | Age: 59
End: 2019-08-13

## 2019-08-21 ENCOUNTER — COMMUNICATION - HEALTHEAST (OUTPATIENT)
Dept: RESPIRATORY THERAPY | Facility: CLINIC | Age: 59
End: 2019-08-21

## 2019-08-23 ENCOUNTER — COMMUNICATION - HEALTHEAST (OUTPATIENT)
Dept: CARDIOLOGY | Facility: CLINIC | Age: 59
End: 2019-08-23

## 2019-08-23 DIAGNOSIS — I27.20 PULMONARY HTN (H): ICD-10-CM

## 2019-08-27 ENCOUNTER — HOSPITAL ENCOUNTER (OUTPATIENT)
Dept: CARDIOLOGY | Facility: CLINIC | Age: 59
Discharge: HOME | End: 2019-08-27
Attending: INTERNAL MEDICINE

## 2019-08-27 DIAGNOSIS — I27.20 PULMONARY HTN (H): ICD-10-CM

## 2019-08-27 LAB
BSA FOR ECHO PROCEDURE: 2.61 M2
CV ECHO HEIGHT: 66 IN
CV ECHO WEIGHT: 323 LBS
EJECTION FRACTION: 54 % (ref 55–75)
FRACTIONAL SHORTENING: 41.5 % (ref 28–44)
INTERVENTRICULAR SEPTUM IN END DIASTOLE: 1.3 CM (ref 0.6–0.9)
IVS/PW RATIO: 0.9
LEFT ATRIUM SIZE: 4 CM
LEFT VENTRICLE DIASTOLIC VOLUME INDEX: 36 CM3/M2 (ref 29–61)
LEFT VENTRICLE DIASTOLIC VOLUME: 94 CM3 (ref 46–106)
LEFT VENTRICLE HEART RATE: 85 BPM
LEFT VENTRICLE MASS INDEX: 78.5 G/M2
LEFT VENTRICLE SYSTOLIC VOLUME INDEX: 16.5 CM3/M2 (ref 8–24)
LEFT VENTRICLE SYSTOLIC VOLUME: 43 CM3 (ref 14–42)
LEFT VENTRICULAR INTERNAL DIMENSION IN DIASTOLE: 4.1 CM (ref 3.8–5.2)
LEFT VENTRICULAR INTERNAL DIMENSION IN SYSTOLE: 2.4 CM (ref 2.2–3.5)
LEFT VENTRICULAR MASS: 204.9 G
LEFT VENTRICULAR POSTERIOR WALL IN END DIASTOLE: 1.4 CM (ref 0.6–0.9)
NUC REST DIASTOLIC VOLUME INDEX: 5168 LBS
NUC REST SYSTOLIC VOLUME INDEX: 66 IN
RIGHT VENTRICULAR INTERNAL DIMENSION IN DYSTOLE: 3.7 CM
TRICUSPID REGURGITATION PEAK PRESSURE GRADIENT: 43.8 MMHG
TRICUSPID VALVE PEAK REGURGITANT VELOCITY: 331 CM/S

## 2019-08-27 ASSESSMENT — MIFFLIN-ST. JEOR: SCORE: 2051.87

## 2019-08-28 ENCOUNTER — OFFICE VISIT - HEALTHEAST (OUTPATIENT)
Dept: PULMONOLOGY | Facility: OTHER | Age: 59
End: 2019-08-28

## 2019-08-28 DIAGNOSIS — J44.1 COPD EXACERBATION (H): ICD-10-CM

## 2019-08-28 DIAGNOSIS — J44.9 CHRONIC OBSTRUCTIVE PULMONARY DISEASE, UNSPECIFIED COPD TYPE (H): ICD-10-CM

## 2019-09-18 ENCOUNTER — COMMUNICATION - HEALTHEAST (OUTPATIENT)
Dept: PULMONOLOGY | Facility: OTHER | Age: 59
End: 2019-09-18

## 2019-09-18 DIAGNOSIS — J44.9 COPD, GROUP C, BY GOLD 2017 CLASSIFICATION (H): ICD-10-CM

## 2019-09-19 ENCOUNTER — RECORDS - HEALTHEAST (OUTPATIENT)
Dept: ADMINISTRATIVE | Facility: OTHER | Age: 59
End: 2019-09-19

## 2019-09-19 ENCOUNTER — COMMUNICATION - HEALTHEAST (OUTPATIENT)
Dept: PULMONOLOGY | Facility: OTHER | Age: 59
End: 2019-09-19

## 2019-09-19 DIAGNOSIS — J44.1 COPD EXACERBATION (H): ICD-10-CM

## 2019-09-20 ENCOUNTER — COMMUNICATION - HEALTHEAST (OUTPATIENT)
Dept: RESPIRATORY THERAPY | Facility: CLINIC | Age: 59
End: 2019-09-20

## 2019-09-23 ENCOUNTER — AMBULATORY - HEALTHEAST (OUTPATIENT)
Dept: CARDIOLOGY | Facility: CLINIC | Age: 59
End: 2019-09-23

## 2019-09-23 DIAGNOSIS — I27.20 PULMONARY HTN (H): ICD-10-CM

## 2019-09-27 ENCOUNTER — AMBULATORY - HEALTHEAST (OUTPATIENT)
Dept: OTHER | Facility: CLINIC | Age: 59
End: 2019-09-27

## 2019-10-22 ENCOUNTER — COMMUNICATION - HEALTHEAST (OUTPATIENT)
Dept: RESPIRATORY THERAPY | Facility: CLINIC | Age: 59
End: 2019-10-22

## 2019-11-12 ENCOUNTER — RECORDS - HEALTHEAST (OUTPATIENT)
Dept: ADMINISTRATIVE | Facility: OTHER | Age: 59
End: 2019-11-12

## 2019-11-14 ENCOUNTER — HOSPITAL ENCOUNTER (OUTPATIENT)
Dept: CARDIOLOGY | Facility: CLINIC | Age: 59
Discharge: HOME | End: 2019-11-14
Attending: INTERNAL MEDICINE

## 2019-11-14 DIAGNOSIS — I27.20 PULMONARY HTN (H): ICD-10-CM

## 2019-11-14 LAB
BSA FOR ECHO PROCEDURE: 2.59 M2
CV BLOOD PRESSURE: ABNORMAL MMHG
CV ECHO HEIGHT: 66 IN
CV ECHO WEIGHT: 317 LBS
EJECTION FRACTION: 61 % (ref 55–75)
FRACTIONAL SHORTENING: 37.3 % (ref 28–44)
INTERVENTRICULAR SEPTUM IN END DIASTOLE: 1 CM (ref 0.6–0.9)
IVS/PW RATIO: 0.8
LEFT ATRIUM SIZE: 4.3 CM
LEFT VENTRICLE DIASTOLIC VOLUME INDEX: 36.7 CM3/M2 (ref 29–61)
LEFT VENTRICLE DIASTOLIC VOLUME: 95 CM3 (ref 46–106)
LEFT VENTRICLE HEART RATE: 90 BPM
LEFT VENTRICLE MASS INDEX: 82.6 G/M2
LEFT VENTRICLE SYSTOLIC VOLUME INDEX: 14.3 CM3/M2 (ref 8–24)
LEFT VENTRICLE SYSTOLIC VOLUME: 37 CM3 (ref 14–42)
LEFT VENTRICULAR INTERNAL DIMENSION IN DIASTOLE: 5.1 CM (ref 3.8–5.2)
LEFT VENTRICULAR INTERNAL DIMENSION IN SYSTOLE: 3.2 CM (ref 2.2–3.5)
LEFT VENTRICULAR MASS: 213.9 G
LEFT VENTRICULAR POSTERIOR WALL IN END DIASTOLE: 1.2 CM (ref 0.6–0.9)
NUC REST DIASTOLIC VOLUME INDEX: 5078.4 LBS
NUC REST SYSTOLIC VOLUME INDEX: 66 IN

## 2019-11-14 ASSESSMENT — MIFFLIN-ST. JEOR: SCORE: 2026.47

## 2019-11-19 ENCOUNTER — RECORDS - HEALTHEAST (OUTPATIENT)
Dept: ADMINISTRATIVE | Facility: OTHER | Age: 59
End: 2019-11-19

## 2019-11-20 ENCOUNTER — OFFICE VISIT - HEALTHEAST (OUTPATIENT)
Dept: CARDIOLOGY | Facility: CLINIC | Age: 59
End: 2019-11-20

## 2019-11-20 DIAGNOSIS — M79.671 RIGHT FOOT PAIN: ICD-10-CM

## 2019-11-20 DIAGNOSIS — Z86.79 S/P ARTERIOVENOUS (AV) FISTULA REPAIR: ICD-10-CM

## 2019-11-20 DIAGNOSIS — Z98.890 S/P ARTERIOVENOUS (AV) FISTULA REPAIR: ICD-10-CM

## 2019-11-20 ASSESSMENT — MIFFLIN-ST. JEOR: SCORE: 2041.44

## 2019-11-21 ENCOUNTER — COMMUNICATION - HEALTHEAST (OUTPATIENT)
Dept: RESPIRATORY THERAPY | Facility: CLINIC | Age: 59
End: 2019-11-21

## 2019-11-25 ENCOUNTER — COMMUNICATION - HEALTHEAST (OUTPATIENT)
Dept: OTHER | Facility: CLINIC | Age: 59
End: 2019-11-25

## 2019-12-04 ENCOUNTER — COMMUNICATION - HEALTHEAST (OUTPATIENT)
Dept: OTHER | Facility: CLINIC | Age: 59
End: 2019-12-04

## 2019-12-19 ENCOUNTER — COMMUNICATION - HEALTHEAST (OUTPATIENT)
Dept: RESPIRATORY THERAPY | Facility: CLINIC | Age: 59
End: 2019-12-19

## 2019-12-24 ENCOUNTER — COMMUNICATION - HEALTHEAST (OUTPATIENT)
Dept: OTHER | Facility: CLINIC | Age: 59
End: 2019-12-24

## 2019-12-26 ENCOUNTER — COMMUNICATION - HEALTHEAST (OUTPATIENT)
Dept: PULMONOLOGY | Facility: OTHER | Age: 59
End: 2019-12-26

## 2019-12-26 ENCOUNTER — RECORDS - HEALTHEAST (OUTPATIENT)
Dept: ADMINISTRATIVE | Facility: OTHER | Age: 59
End: 2019-12-26

## 2019-12-26 DIAGNOSIS — J44.1 COPD EXACERBATION (H): ICD-10-CM

## 2020-01-13 ENCOUNTER — COMMUNICATION - HEALTHEAST (OUTPATIENT)
Dept: PULMONOLOGY | Facility: OTHER | Age: 60
End: 2020-01-13

## 2020-01-13 ENCOUNTER — RECORDS - HEALTHEAST (OUTPATIENT)
Dept: ADMINISTRATIVE | Facility: OTHER | Age: 60
End: 2020-01-13

## 2020-01-13 DIAGNOSIS — J44.9 COPD (CHRONIC OBSTRUCTIVE PULMONARY DISEASE) (H): ICD-10-CM

## 2020-01-13 DIAGNOSIS — J44.1 COPD EXACERBATION (H): ICD-10-CM

## 2020-01-16 ENCOUNTER — COMMUNICATION - HEALTHEAST (OUTPATIENT)
Dept: PULMONOLOGY | Facility: OTHER | Age: 60
End: 2020-01-16

## 2020-01-16 ENCOUNTER — RECORDS - HEALTHEAST (OUTPATIENT)
Dept: ADMINISTRATIVE | Facility: OTHER | Age: 60
End: 2020-01-16

## 2020-01-16 ENCOUNTER — RECORDS - HEALTHEAST (OUTPATIENT)
Dept: LAB | Facility: CLINIC | Age: 60
End: 2020-01-16

## 2020-01-18 LAB
BACTERIA SPEC CULT: NORMAL
GRAM STAIN RESULT: NORMAL

## 2020-01-21 ENCOUNTER — COMMUNICATION - HEALTHEAST (OUTPATIENT)
Dept: RESPIRATORY THERAPY | Facility: CLINIC | Age: 60
End: 2020-01-21

## 2020-01-24 ENCOUNTER — RECORDS - HEALTHEAST (OUTPATIENT)
Dept: ADMINISTRATIVE | Facility: OTHER | Age: 60
End: 2020-01-24

## 2020-01-24 ENCOUNTER — RECORDS - HEALTHEAST (OUTPATIENT)
Dept: PULMONOLOGY | Facility: OTHER | Age: 60
End: 2020-01-24

## 2020-01-24 ENCOUNTER — OFFICE VISIT - HEALTHEAST (OUTPATIENT)
Dept: PULMONOLOGY | Facility: OTHER | Age: 60
End: 2020-01-24

## 2020-01-24 DIAGNOSIS — J98.4 SMALL AIRWAYS DISEASE: ICD-10-CM

## 2020-01-24 DIAGNOSIS — J44.9 CHRONIC OBSTRUCTIVE PULMONARY DISEASE, UNSPECIFIED (H): ICD-10-CM

## 2020-01-24 DIAGNOSIS — J44.9 COPD, GROUP C, BY GOLD 2017 CLASSIFICATION (H): ICD-10-CM

## 2020-01-24 LAB — HGB BLD-MCNC: 13 G/DL

## 2020-01-27 ENCOUNTER — COMMUNICATION - HEALTHEAST (OUTPATIENT)
Dept: OTHER | Facility: CLINIC | Age: 60
End: 2020-01-27

## 2020-01-30 ENCOUNTER — COMMUNICATION - HEALTHEAST (OUTPATIENT)
Dept: OTHER | Facility: CLINIC | Age: 60
End: 2020-01-30

## 2020-01-31 ENCOUNTER — AMBULATORY - HEALTHEAST (OUTPATIENT)
Dept: OTHER | Facility: CLINIC | Age: 60
End: 2020-01-31

## 2020-02-05 ENCOUNTER — RECORDS - HEALTHEAST (OUTPATIENT)
Dept: LAB | Facility: CLINIC | Age: 60
End: 2020-02-05

## 2020-02-05 LAB
ANION GAP SERPL CALCULATED.3IONS-SCNC: 7 MMOL/L (ref 5–18)
BUN SERPL-MCNC: 23 MG/DL (ref 8–22)
CALCIUM SERPL-MCNC: 9.7 MG/DL (ref 8.5–10.5)
CHLORIDE BLD-SCNC: 101 MMOL/L (ref 98–107)
CO2 SERPL-SCNC: 35 MMOL/L (ref 22–31)
CREAT SERPL-MCNC: 0.89 MG/DL (ref 0.6–1.1)
ERYTHROCYTE [DISTWIDTH] IN BLOOD BY AUTOMATED COUNT: 14.4 % (ref 11–14.5)
GFR SERPL CREATININE-BSD FRML MDRD: >60 ML/MIN/1.73M2
GLUCOSE BLD-MCNC: 110 MG/DL (ref 70–125)
HCT VFR BLD AUTO: 41.3 % (ref 35–47)
HGB BLD-MCNC: 12.3 G/DL (ref 12–16)
MCH RBC QN AUTO: 28.3 PG (ref 27–34)
MCHC RBC AUTO-ENTMCNC: 29.8 G/DL (ref 32–36)
MCV RBC AUTO: 95 FL (ref 80–100)
PLATELET # BLD AUTO: 204 THOU/UL (ref 140–440)
PMV BLD AUTO: 10.7 FL (ref 8.5–12.5)
POTASSIUM BLD-SCNC: 4.1 MMOL/L (ref 3.5–5)
RBC # BLD AUTO: 4.34 MILL/UL (ref 3.8–5.4)
SODIUM SERPL-SCNC: 143 MMOL/L (ref 136–145)
WBC: 6.5 THOU/UL (ref 4–11)

## 2020-02-27 ENCOUNTER — RECORDS - HEALTHEAST (OUTPATIENT)
Dept: ADMINISTRATIVE | Facility: OTHER | Age: 60
End: 2020-02-27

## 2020-02-27 ENCOUNTER — COMMUNICATION - HEALTHEAST (OUTPATIENT)
Dept: PULMONOLOGY | Facility: OTHER | Age: 60
End: 2020-02-27

## 2020-02-27 DIAGNOSIS — J44.9 COPD (CHRONIC OBSTRUCTIVE PULMONARY DISEASE) (H): ICD-10-CM

## 2020-03-04 ENCOUNTER — COMMUNICATION - HEALTHEAST (OUTPATIENT)
Dept: PULMONOLOGY | Facility: OTHER | Age: 60
End: 2020-03-04

## 2020-03-12 ENCOUNTER — RECORDS - HEALTHEAST (OUTPATIENT)
Dept: LAB | Facility: CLINIC | Age: 60
End: 2020-03-12

## 2020-03-12 LAB
ANION GAP SERPL CALCULATED.3IONS-SCNC: 8 MMOL/L (ref 5–18)
BUN SERPL-MCNC: 18 MG/DL (ref 8–22)
CALCIUM SERPL-MCNC: 9.3 MG/DL (ref 8.5–10.5)
CHLORIDE BLD-SCNC: 101 MMOL/L (ref 98–107)
CO2 SERPL-SCNC: 31 MMOL/L (ref 22–31)
CREAT SERPL-MCNC: 0.89 MG/DL (ref 0.6–1.1)
ERYTHROCYTE [DISTWIDTH] IN BLOOD BY AUTOMATED COUNT: 14.5 % (ref 11–14.5)
GFR SERPL CREATININE-BSD FRML MDRD: >60 ML/MIN/1.73M2
GLUCOSE BLD-MCNC: 128 MG/DL (ref 70–125)
HCT VFR BLD AUTO: 41 % (ref 35–47)
HGB BLD-MCNC: 12.1 G/DL (ref 12–16)
MCH RBC QN AUTO: 28 PG (ref 27–34)
MCHC RBC AUTO-ENTMCNC: 29.5 G/DL (ref 32–36)
MCV RBC AUTO: 95 FL (ref 80–100)
PLATELET # BLD AUTO: 178 THOU/UL (ref 140–440)
PMV BLD AUTO: 10 FL (ref 8.5–12.5)
POTASSIUM BLD-SCNC: 4.5 MMOL/L (ref 3.5–5)
RBC # BLD AUTO: 4.32 MILL/UL (ref 3.8–5.4)
SODIUM SERPL-SCNC: 140 MMOL/L (ref 136–145)
VIT B12 SERPL-MCNC: 422 PG/ML (ref 213–816)
WBC: 7.4 THOU/UL (ref 4–11)

## 2020-03-24 ENCOUNTER — COMMUNICATION - HEALTHEAST (OUTPATIENT)
Dept: OTHER | Facility: CLINIC | Age: 60
End: 2020-03-24

## 2020-04-23 ENCOUNTER — COMMUNICATION - HEALTHEAST (OUTPATIENT)
Dept: PULMONOLOGY | Facility: OTHER | Age: 60
End: 2020-04-23

## 2020-04-23 DIAGNOSIS — J44.9 COPD (CHRONIC OBSTRUCTIVE PULMONARY DISEASE) (H): ICD-10-CM

## 2020-05-08 ENCOUNTER — COMMUNICATION - HEALTHEAST (OUTPATIENT)
Dept: OTHER | Facility: CLINIC | Age: 60
End: 2020-05-08

## 2020-05-11 ENCOUNTER — COMMUNICATION - HEALTHEAST (OUTPATIENT)
Dept: OTHER | Facility: CLINIC | Age: 60
End: 2020-05-11

## 2020-06-23 ENCOUNTER — COMMUNICATION - HEALTHEAST (OUTPATIENT)
Dept: PULMONOLOGY | Facility: OTHER | Age: 60
End: 2020-06-23

## 2020-06-23 ENCOUNTER — AMBULATORY - HEALTHEAST (OUTPATIENT)
Dept: PULMONOLOGY | Facility: OTHER | Age: 60
End: 2020-06-23

## 2020-06-23 DIAGNOSIS — R91.8 PULMONARY NODULES: ICD-10-CM

## 2020-06-25 ENCOUNTER — RECORDS - HEALTHEAST (OUTPATIENT)
Dept: ADMINISTRATIVE | Facility: OTHER | Age: 60
End: 2020-06-25

## 2020-07-15 ENCOUNTER — HOSPITAL ENCOUNTER (OUTPATIENT)
Dept: CT IMAGING | Facility: CLINIC | Age: 60
Discharge: HOME | End: 2020-07-15
Attending: INTERNAL MEDICINE

## 2020-07-15 DIAGNOSIS — R91.8 PULMONARY NODULES: ICD-10-CM

## 2020-07-22 ENCOUNTER — OFFICE VISIT - HEALTHEAST (OUTPATIENT)
Dept: PULMONOLOGY | Facility: OTHER | Age: 60
End: 2020-07-22

## 2020-07-22 DIAGNOSIS — R91.1 PULMONARY NODULE: ICD-10-CM

## 2020-08-20 ENCOUNTER — AMBULATORY - HEALTHEAST (OUTPATIENT)
Dept: OTHER | Facility: CLINIC | Age: 60
End: 2020-08-20

## 2020-08-31 ENCOUNTER — RECORDS - HEALTHEAST (OUTPATIENT)
Dept: LAB | Facility: CLINIC | Age: 60
End: 2020-08-31

## 2020-09-01 LAB
ANION GAP SERPL CALCULATED.3IONS-SCNC: 6 MMOL/L (ref 5–18)
BUN SERPL-MCNC: 18 MG/DL (ref 8–22)
CALCIUM SERPL-MCNC: 8.7 MG/DL (ref 8.5–10.5)
CHLORIDE BLD-SCNC: 102 MMOL/L (ref 98–107)
CO2 SERPL-SCNC: 33 MMOL/L (ref 22–31)
CREAT SERPL-MCNC: 0.94 MG/DL (ref 0.6–1.1)
ERYTHROCYTE [DISTWIDTH] IN BLOOD BY AUTOMATED COUNT: 14.5 % (ref 11–14.5)
GFR SERPL CREATININE-BSD FRML MDRD: >60 ML/MIN/1.73M2
GLUCOSE BLD-MCNC: 117 MG/DL (ref 70–125)
HCT VFR BLD AUTO: 38.2 % (ref 35–47)
HGB BLD-MCNC: 11.6 G/DL (ref 12–16)
MCH RBC QN AUTO: 28.6 PG (ref 27–34)
MCHC RBC AUTO-ENTMCNC: 30.4 G/DL (ref 32–36)
MCV RBC AUTO: 94 FL (ref 80–100)
PLATELET # BLD AUTO: 182 THOU/UL (ref 140–440)
PMV BLD AUTO: 10.5 FL (ref 8.5–12.5)
POTASSIUM BLD-SCNC: 4 MMOL/L (ref 3.5–5)
RBC # BLD AUTO: 4.06 MILL/UL (ref 3.8–5.4)
SODIUM SERPL-SCNC: 141 MMOL/L (ref 136–145)
VIT B12 SERPL-MCNC: 695 PG/ML (ref 213–816)
WBC: 7.3 THOU/UL (ref 4–11)

## 2020-09-21 NOTE — TELEPHONE ENCOUNTER
Chief Complaint   Patient presents with   • Medicare Wellness Visit     Health Maintenance Due   Topic Date Due   • Shingles Vaccine (1 of 2) 03/12/2018   • Medicare Wellness Visit  07/01/2020   • DM/CKD Microalbumin  08/02/2020   • Influenza Vaccine (1) 09/01/2020       Patient is due for topics listed above, he wishes to proceed with MWV (Medicare Wellness Visit), but is not proceeding with Immunization(s) Influenza and Shingles at this time.                  Type of outreach:  Phone, left message for patient to call back.   Health Maintenance Due   Topic Date Due     MAMMO SCREEN Q2 YR (SYSTEM ASSIGNED)  03/21/2001     Liyah Wolfe CMA ([facility])

## 2020-10-27 ENCOUNTER — AMBULATORY - HEALTHEAST (OUTPATIENT)
Dept: OTHER | Facility: CLINIC | Age: 60
End: 2020-10-27

## 2020-11-18 ENCOUNTER — RECORDS - HEALTHEAST (OUTPATIENT)
Dept: LAB | Facility: CLINIC | Age: 60
End: 2020-11-18

## 2020-11-20 LAB
ALBUMIN SERPL-MCNC: 3.4 G/DL (ref 3.5–5)
ALP SERPL-CCNC: 87 U/L (ref 45–120)
ALT SERPL W P-5'-P-CCNC: 18 U/L (ref 0–45)
ANION GAP SERPL CALCULATED.3IONS-SCNC: 10 MMOL/L (ref 5–18)
AST SERPL W P-5'-P-CCNC: 16 U/L (ref 0–40)
BILIRUB SERPL-MCNC: 0.5 MG/DL (ref 0–1)
BUN SERPL-MCNC: 21 MG/DL (ref 8–22)
CALCIUM SERPL-MCNC: 8.7 MG/DL (ref 8.5–10.5)
CHLORIDE BLD-SCNC: 105 MMOL/L (ref 98–107)
CHOLEST SERPL-MCNC: 178 MG/DL
CO2 SERPL-SCNC: 29 MMOL/L (ref 22–31)
CREAT SERPL-MCNC: 0.87 MG/DL (ref 0.6–1.1)
ERYTHROCYTE [DISTWIDTH] IN BLOOD BY AUTOMATED COUNT: 14.5 % (ref 11–14.5)
FASTING STATUS PATIENT QL REPORTED: NORMAL
GFR SERPL CREATININE-BSD FRML MDRD: >60 ML/MIN/1.73M2
GLUCOSE BLD-MCNC: 104 MG/DL (ref 70–125)
HBA1C MFR BLD: 5.1 %
HCT VFR BLD AUTO: 41.2 % (ref 35–47)
HDLC SERPL-MCNC: 59 MG/DL
HGB BLD-MCNC: 12.4 G/DL (ref 12–16)
LDLC SERPL CALC-MCNC: 100 MG/DL
MCH RBC QN AUTO: 28.7 PG (ref 27–34)
MCHC RBC AUTO-ENTMCNC: 30.1 G/DL (ref 32–36)
MCV RBC AUTO: 95 FL (ref 80–100)
PLATELET # BLD AUTO: 200 THOU/UL (ref 140–440)
PMV BLD AUTO: 10.5 FL (ref 8.5–12.5)
POTASSIUM BLD-SCNC: 4.3 MMOL/L (ref 3.5–5)
PROT SERPL-MCNC: 6.5 G/DL (ref 6–8)
RBC # BLD AUTO: 4.32 MILL/UL (ref 3.8–5.4)
SODIUM SERPL-SCNC: 144 MMOL/L (ref 136–145)
TRIGL SERPL-MCNC: 97 MG/DL
VIT B12 SERPL-MCNC: >2000 PG/ML (ref 213–816)
WBC: 7.3 THOU/UL (ref 4–11)

## 2021-03-05 ENCOUNTER — AMBULATORY - HEALTHEAST (OUTPATIENT)
Dept: OTHER | Facility: CLINIC | Age: 61
End: 2021-03-05

## 2021-04-12 ENCOUNTER — RECORDS - HEALTHEAST (OUTPATIENT)
Dept: ADMINISTRATIVE | Facility: OTHER | Age: 61
End: 2021-04-12

## 2021-04-12 ENCOUNTER — COMMUNICATION - HEALTHEAST (OUTPATIENT)
Dept: PULMONOLOGY | Facility: OTHER | Age: 61
End: 2021-04-12

## 2021-04-12 DIAGNOSIS — J44.9 COPD (CHRONIC OBSTRUCTIVE PULMONARY DISEASE) (H): ICD-10-CM

## 2021-04-23 ENCOUNTER — HOSPITAL ENCOUNTER (OUTPATIENT)
Dept: CT IMAGING | Facility: HOSPITAL | Age: 61
Discharge: HOME | End: 2021-04-23
Attending: INTERNAL MEDICINE
Payer: MEDICARE

## 2021-04-23 DIAGNOSIS — R91.1 PULMONARY NODULE: ICD-10-CM

## 2021-04-27 ENCOUNTER — RECORDS - HEALTHEAST (OUTPATIENT)
Dept: ADMINISTRATIVE | Facility: OTHER | Age: 61
End: 2021-04-27

## 2021-04-30 ENCOUNTER — AMBULATORY - HEALTHEAST (OUTPATIENT)
Dept: PULMONOLOGY | Facility: OTHER | Age: 61
End: 2021-04-30

## 2021-05-06 ENCOUNTER — AMBULATORY - HEALTHEAST (OUTPATIENT)
Dept: PULMONOLOGY | Facility: OTHER | Age: 61
End: 2021-05-06

## 2021-05-06 ENCOUNTER — OFFICE VISIT - HEALTHEAST (OUTPATIENT)
Dept: PULMONOLOGY | Facility: OTHER | Age: 61
End: 2021-05-06

## 2021-05-06 DIAGNOSIS — J43.9 PULMONARY EMPHYSEMA, UNSPECIFIED EMPHYSEMA TYPE (H): ICD-10-CM

## 2021-05-27 VITALS
HEART RATE: 96 BPM | SYSTOLIC BLOOD PRESSURE: 110 MMHG | DIASTOLIC BLOOD PRESSURE: 82 MMHG | HEIGHT: 66 IN | BODY MASS INDEX: 57.3 KG/M2 | OXYGEN SATURATION: 97 %

## 2021-05-27 NOTE — TELEPHONE ENCOUNTER
----- Message -----  From: Sparkle Funes RN  Sent: 3/25/2019   3:03 PM  To: Brett Montalvo MD    I called patient to review recommended testing. She requested follow-up appointment with you to review. I sent message to scheduling pool to arrange. -bob

## 2021-05-27 NOTE — TELEPHONE ENCOUNTER
----- Message from Brett Montalvo MD sent at 4/13/2019  9:24 AM CDT -----  Please schedule US of left femoral artery/vein and right brachial artery/cephalic vein to screen for fistula and then referral to surgery  Thank you  Brett

## 2021-05-27 NOTE — TELEPHONE ENCOUNTER
----- Message from Brett Montalvo MD sent at 3/25/2019 11:26 AM CDT -----  Please set up with right, left heart cath, angiography, shunt run and vasodilator study if pulm HTN signfiicant  Thank you

## 2021-05-27 NOTE — PROGRESS NOTES
Linda Otero  [address] [place] [address]  [phone] (home)     Procedure cardiologist:  Dr. Montalvo  PCP:  Jayy Henson MD  H&P completed by:  Dr. Montalvo, 4/9/19  Admit date 4/12/19  Arrival time:  0930  Anticoagulation: None  CPAP: Yes  Previous PCI: yes, 11/2012  Bypass Grafts: No  Renal Issues: No  Diabetic?: No  Device?: No  Type:  n/a    Angiogram Teaching    Reason for Visit:  Patient seen for pre-procedure education in preparation for: Cors poss PCI, RHC    Procedure Prep:  Primary Cardiologist note dated: 4/9/19  EKG results obtained, dated: morning of procedure  Hemogram results obtained: morning of procedure  Basic Metabolic Panel results obtained: morning of procedure  Lipid Profile results obtained: 3/27/19    Patient Education  Patient declined to review indications/risks of procedure.    Pre-procedure instructions  Patient instructed to be NPO after midnight.  Patient instructed to shower the evening before or the morning of the procedure.  Patient instructed to arrange for transportation home following procedure from a responsible family member of friend. No driving for at least 24 hours. Patient taking [employer] for transportation.  Patient instructed to have a responsible adult with them for 24 hours post-procedure. The patient reports she lives in assisted living with staff onsite, who will be present after procedure.  Post-procedure follow up process.  Conscious sedation discussed.    Pre-procedure medication instructions  Patient instructed on antiplatelet medication.  Continue medications as scheduled, with a small amount of water on the day of the procedure unless indicated.  Patient instructed to take 324 mg of Aspirin am of procedure: Yes  Other medication: instructed to only take aspirin a.m. of the procedure.    *PATIENTS RECORDS AVAILABLE IN [facility] UNLESS OTHERWISE INDICATED*      Patient Active Problem List   Diagnosis     Cellulitis and abscess of foot     DVT  (deep venous thrombosis) (H)     Hypertension     Hyperlipidemia     Obesity     Chronic obstructive pulmonary disease, unspecified COPD type (H)     Microcytic anemia     Obstructive sleep apnea treated with BiPAP     Chronic respiratory failure with hypoxia (H)     Iron deficiency     History of DVT (deep vein thrombosis)     HARITHA (iron deficiency anemia)     Acute on chronic respiratory failure with hypoxia and hypercapnia (H)     Morbid obesity (H)     Acute on chronic respiratory failure with hypercapnia (H)     Acute on chronic diastolic heart failure (H)     Pneumonia, organism unspecified(486)     Warfarin-induced coagulopathy (H)     Leukocytosis, unspecified type     History of CVA (cerebrovascular accident)     MGUS (monoclonal gammopathy of unknown significance)     SONAL (acute kidney injury) (H)     COPD (chronic obstructive pulmonary disease) (H)     COPD exacerbation (H)     Dyspnea       Current Outpatient Medications   Medication Sig Dispense Refill     acetaminophen (TYLENOL) 500 MG tablet Take 1 tablet (500 mg total) by mouth every 6 (six) hours as needed for pain or fever. Not to exceed 4000 mg of acetaminophen per 24 hours from any source. 30 tablet 0     albuterol (PROVENTIL HFA;VENTOLIN HFA) 90 mcg/actuation inhaler Inhale 2 puffs every 4 (four) hours as needed.       aspirin 81 mg chewable tablet Chew 81 mg 2 (two) times a day.       calcium-vitamin D (CALCIUM-VITAMIN D) 500 mg(1,250mg) -200 unit per tablet Take 1 tablet by mouth daily.       cyanocobalamin 1,000 mcg/mL injection Inject 1,000 mcg into the shoulder, thigh, or buttocks every 30 (thirty) days.       fluticasone-vilanterol (BREO ELLIPTA) 200-25 mcg/dose DsDv inhaler Inhale 1 puff daily. 1 each 12     furosemide (LASIX) 20 MG tablet Take 1 tablet (20 mg total) by mouth 2 (two) times a day at 9am and 6pm. 60 tablet 3     ipratropium-albuterol (DUO-NEB) 0.5-2.5 mg/3 mL nebulizer Take 3 mL by nebulization every 4 (four) hours as  "needed. 30 vial 0     multivitamin therapeutic tablet Take 1 tablet by mouth daily.       OXYGEN-AIR DELIVERY SYSTEMS MISC 3-4 L/min into each nostril as needed.        tiotropium bromide (SPIRIVA RESPIMAT) 2.5 mcg/actuation Mist Inhale 2 puffs daily. 4 g 0     No current facility-administered medications for this visit.        Allergies   Allergen Reactions     Penicillins Anaphylaxis, Shortness Of Breath and Swelling     Symbicort [Budesonide-Formoterol] Shortness Of Breath     Amoxicillin Other (See Comments)     Chest tightness     Clindamycin Other (See Comments)     Chest tightness     Eliquis [Apixaban] Hives and Itching     Hives/hives down throat was the reaction based off of information from the patient on 12/3/2015. \"Itching\"/\"Eye itching and swelling, throat itching\" per prior allergy documentation.     Erythromycin Unknown     Peanut Hives     Peanut Oil      Tetracycline Unknown         FRANCISCO Funes                           "

## 2021-05-27 NOTE — TELEPHONE ENCOUNTER
Patient called back to review recommendations per Dr. Montalvo. Patient agreeable to proceed with ultrasound. Orders placed and patient was transferred to Central Scheduling to arrange. -bob

## 2021-05-28 NOTE — TELEPHONE ENCOUNTER
Pt calling for pre-op instructions.    Advised pt to contact [facility] where she sees the Surgeon who will be performing surgery.     Pt verbalizes understanding and agrees to plan.     Reason for Disposition    [1] Follow-up call from patient regarding patient's clinical status AND [2] information urgent    Protocols used: PCP CALL - NO TRIAGE-A-AH

## 2021-05-29 NOTE — PROGRESS NOTES
6/24/19 I saw pt today for our [facility] NIV home visit. She feels the pressures on her machine are adequate and feels benefit from it. She uses a full face mask and her settings are as follows: PS-SVT (pressure support with safety tidal volume)  PS: 5-15, EPAP 5, RR 12, , TI 0.5-1.2. She has been on this since April, 2018. She needs a new RX to renew this, will fax to provider as she has an appointment tomorrow. Her vitals are as follows: RR 18, HR: 79, Sats 94% on 3lpm.     TJ Almonte ME ([facility])  [phone]

## 2021-05-30 VITALS — BODY MASS INDEX: 56.49 KG/M2 | WEIGHT: 293 LBS

## 2021-05-30 VITALS — WEIGHT: 293 LBS | BODY MASS INDEX: 56.81 KG/M2

## 2021-05-30 NOTE — PROGRESS NOTES
Call from patient. She wanted Dr. Albright to know that she is having surgery on July 22, 11:00am at [facility] with Dr. Torres.

## 2021-05-30 NOTE — ANESTHESIA POSTPROCEDURE EVALUATION
Patient: Linda Otero  REPAIR LEFT FEMORAL ARTERIOVENOUS FISTULA WITH INTRAOPERATIVE ULTRASOUND  Anesthesia type: general    Patient location: PACU  Last vitals:   Vitals Value Taken Time   /59 7/22/2019  2:00 PM   Temp 36.6  C (97.8  F) 7/22/2019  1:30 PM   Pulse 85 7/22/2019  2:10 PM   Resp 24 7/22/2019  2:10 PM   SpO2 97 % 7/22/2019  2:10 PM   Vitals shown include unvalidated device data.  Post vital signs: stable  Level of consciousness: awake and responds to simple questions  Post-anesthesia pain: pain controlled  Post-anesthesia nausea and vomiting: no  Pulmonary: unassisted, return to baseline, nasal cannula  Cardiovascular: stable and blood pressure at baseline  Hydration: adequate  Anesthetic events: no    QCDR Measures:  ASA# 11 - Caroline-op Cardiac Arrest: ASA11B - Patient did NOT experience unanticipated cardiac arrest  ASA# 12 - Caroline-op Mortality Rate: ASA12B - Patient did NOT die  ASA# 13 - PACU Re-Intubation Rate: ASA13B - Patient did NOT require a new airway mgmt  ASA# 10 - Composite Anes Safety: ASA10A - No serious adverse event    Additional Notes:

## 2021-05-30 NOTE — ANESTHESIA PREPROCEDURE EVALUATION
Anesthesia Evaluation      Patient summary reviewed   No history of anesthetic complications     Airway   Mallampati: II  Neck ROM: full   Pulmonary - normal exam   (+) pneumonia, COPD (on 3L oxygen at home) severe, shortness of breath, sleep apnea on CPAP, severe,                          Cardiovascular - normal exam  Exercise tolerance: < 4 METS  (+) hypertension poorly controlled, CAD, CABG/stent, , PVD     Neuro/Psych    (+) neuromuscular disease,  CVA (residual neuropathy right lower extremity) ,     Endo/Other    (+) hypothyroidism, arthritis, obesity (morbid),      GI/Hepatic/Renal    (+)   chronic renal disease,           Dental    (+) poor dentition                       Anesthesia Plan  Planned anesthetic: general endotracheal  50 mg ketamine IV on induction.    ASA 4   Induction: intravenous   Anesthetic plan and risks discussed with: patient    Post-op plan: routine recovery

## 2021-05-30 NOTE — ANESTHESIA CARE TRANSFER NOTE
Last vitals:   Vitals:    07/22/19 1326   BP:    Pulse: 90   Resp: 20   Temp:    SpO2: 100%     Patient's level of consciousness is drowsy  Spontaneous respirations: yes  Maintains airway independently: yes  Dentition unchanged: yes  Oropharynx: oropharynx clear of all foreign objects    QCDR Measures:  ASA# 20 - Surgical Safety Checklist: WHO surgical safety checklist completed prior to induction    PQRS# 430 - Adult PONV Prevention: 4558F - Pt received => 2 anti-emetic agents (different classes) preop & intraop  ASA# 8 - Peds PONV Prevention: NA - Not pediatric patient, not GA or 2 or more risk factors NOT present  PQRS# 424 - Caroline-op Temp Management: 4559F - At least one body temp DOCUMENTED => 35.5C or 95.9F within required timeframe  PQRS# 426 - PACU Transfer Protocol: - Transfer of care checklist used  ASA# 14 - Acute Post-op Pain: ASA14B - Patient did NOT experience pain >= 7 out of 10

## 2021-05-30 NOTE — TELEPHONE ENCOUNTER
Call from dilan. States she needs to start her prednisone and antibiotic. She is coughing up green phlegm, very tired, hoarse voice.  No fever.     Will send Rx for last medications prescribed:      Prednisone 40mg daily x 5 days  Levaquin 750mg daily x 7 days    Orders faxed to nurse at [facility]  Fax:  [phone]    Orders also sent to [facility] pharmacy    VM message also left on [facility]'s nurse line:  [phone]

## 2021-05-30 NOTE — PROGRESS NOTES
CCx: Follow-up for hypoxia    HPI: Ms. Otero is a 58-year-old lady with a past medical history significant for morbid obesity, cervical carcinoma, COPD, prior history of CVA, diastolic heart failure, right leg DVT, pulmonary emboli on chronic anticoagulation, hyperlipidemia, hypertension, MGUS and arthritis amongst other medical problems who presents to clinic for follow-up visit.  At her last clinic visit we had noted that the patient had developed severe hypoxia requiring 8-10 L of oxygen to bring her oxygen saturations back up above 88% on ambulation.  I discussed this case with Dr. Montalvo as I was concerned for pulmonary hypertension and the patient underwent both a left and right heart cath which actually revealed significant hypoxia on a central arterial gases.  Subsequent shunt study demonstrated an arteriovenous shunt in her left femoral region that was thought to be from when the patient had a PFO closure few years previously.  She was set up to see Dr. Torres in the general surgery clinic to undergo shunt closure and was scheduled to undergo this, however, the procedure has been postponed as the patient needs 2 teeth extracted before this.  She continues to deny any symptoms specifically states that she has no fevers, chills, night sweats or weight change.    ROS:  Positives alluded to in the HPI.  Remainder of 10 point review of systems is negative.    PMH (unchanged from prior visit):  1. Morbid obesity  2. Hypercapnic hypoxemic respiratory failure  3. Cervical carcinoma  4. Very severe gold stage C COPD  5. CVA  6. Diastolic heart failure  7. Right leg DVT  8. Pulmonary emboli on chronic anticoagulation  9. Hyperlipidemia  10. HTN  11. MGUS  12. Arthritis    PSH (unchanged from prior visit):  1. Coronary angiography  2. Colonoscopy    Allergies (unchanged from prior visit):  Reviewed in [facility]    Family HX (unchanged from prior visit):  1. Mother: CVA, tobacco abuse  2. Father: CVA,  at 56  years  3. Brother: MI,  at 52 years    Social Hx (unchanged from prior visit):  Marital status: Single  Number of children: 3 children, the patient has no contact with her children  Resides in a senior assisted living facility.  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 Not to exceed 4000 mg of acetaminophen per 24 hours from any source. 30 tablet 0     albuterol (PROAIR HFA;PROVENTIL HFA;VENTOLIN HFA) 90 mcg/actuation inhaler Inhale 2 puffs every 4 (four) hours as needed. 1 Inhaler 11     aspirin 81 mg chewable tablet Chew 81 mg 2 (two) times a day.       calcium-vitamin D (CALCIUM-VITAMIN D) 500 mg(1,250mg) -200 unit per tablet Take 1 tablet by mouth daily.       furosemide (LASIX) 20 MG tablet Take 1 tablet (20 mg total) by mouth 2 (two) times a day at 9am and 6pm. 60 tablet 3     ipratropium-albuterol (DUO-NEB) 0.5-2.5 mg/3 mL nebulizer Take 3 mL by nebulization every 4 (four) hours as needed. 30 vial 0     multivitamin therapeutic tablet Take 1 tablet by mouth daily.       OXYGEN-AIR DELIVERY SYSTEMS MISC 3-4 L/min into each nostril as needed.        tiotropium bromide (SPIRIVA RESPIMAT) 2.5 mcg/actuation Mist Inhale 2 puffs daily. 4 g 0     cyanocobalamin 1,000 mcg/mL injection Inject 1,000 mcg into the shoulder, thigh, or buttocks every 30 (thirty) days.       fluticasone-vilanterol (BREO ELLIPTA) 200-25 mcg/dose DsDv inhaler Inhale 1 puff daily. 1 each 12     No current facility-administered medications for this visit.        Labs:  Reviewed.     Imaging studies:  US Arterial Left leg 19:  1.   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  2.  Although the distinction indication is not identified sonographically on the CTA I favor there to be a small truncation between the proximal left inferior epigastric artery and vein. This is seen on image 184. The patient does state she has had previous percutaneous access in the groin    CTA Chest Abdomen Pelvis 4/12/19:  1.  Findings consistent with arteriovenous fistula between the left common femoral artery and left common femoral vein. Ultrasound may be useful to better define the fistula.  2.  Cholelithiasis.  3.  Colonic diverticulosis.    Unchanged from prior visit:  CT Chest w/o Contrast 1/20/19:  1.  Prominent areas of groundglass opacity, mosaic perfusion and more focal areas of airspace disease likely infectious or inflammatory. Stable pulmonary nodule when compared to most recent study not identified on older exam. Continued follow-up recommended.  2.  Mildly prominent mediastinal adenopathy likely infectious or inflammatory. Follow-up recommended to confirm and exclude other pathology.    TTE with bubble 10/12/18:    Left ventricle is normal in size and systolic function.    The calculated left ventricular ejection fraction is 59%.    Right ventricle is moderately dilated with normal systolic function.    Both atria are significantly enlarged.    No hemodynamically significant valvular heart abnormalities.    Agitated saline bubble study was negative for intracardiac shunt, however the accuracy of this was limited by body habitus and generally poor acoustic windows.    When compared to the previous study dated 4/10/2018, no significant change.      Unchanged from prior visit:  CTA Chest 7/3/18:  1.  No evidence pulmonary embolism.  2.  Enlarged pulmonary artery and evidence of right ventricular strain consistent with pulmonary hypertension.  3.  Lung mosaic attenuation which could be due to pulmonary hypertension. Other etiologies are  "hypersensitivity pneumonitis or constrictive bronchiolitis.    PFT's 7/18/18 (unchanged from prior visit):  FEV1/FVC is 46% and is reduced.  FEV1 is 0.80L (29%) predicted and is reduced.  FVC is 1.74L (49%) predicted and reduced.  There was no improvement in spirometry after a single inhaled dose of bronchodilator.  TLC is 6.61L (125%) predicted and is reduced.  RV is 4.88L (250%) predicted and is increased.  DLCO is 8.58ml/min/hg (37%) predicted and is reduced when it is corrected for hemoglobin.  Flow volume loops indicate severe scooping of the expiratory limb.    Impression:  Full Pulmonary Function Test is abnormal.  PFTs are consistent with very severe obstructive disease.  Spirometry is not consistent with reversibility.  There is hyperinflation.  There is air-trapping.  Diffusion capacity when corrected for hemoglobin is severely reduced.    Physical Exam:  /70   Pulse (!) 105   Resp 12   Ht 5' 6\" (1.676 m)   Wt (!) 310 lb (140.6 kg)   LMP  (LMP Unknown)   SpO2 92% Comment: 1.5 lpm con  Breastfeeding? No   BMI 50.04 kg/m    Heart Rate: (!) 105  Resp: 12  BP: 118/70  SpO2: 92 % (1.5 lpm con)  Height: 5' 6\" (1.676 m)  Weight: (!) 310 lb (140.6 kg)    Physical Exam   Constitutional:   NAD, slightly disinhibited lady.   HENT:   Head: Normocephalic and atraumatic.   Poor dentition.   Eyes: Pupils are equal, round, and reactive to light.   Neck: Normal range of motion.   Cardiovascular: Normal rate and regular rhythm.   Pulmonary/Chest: Effort normal. No respiratory distress. She has no wheezes.   Abdominal: Soft.   Musculoskeletal: Normal range of motion. She exhibits edema.   Neurological: She is alert.   Skin: Skin is warm.       Assessment and Plan:Linda Otero is a 59 y.o. with a past medical history significant for morbid obesity, cervical carcinoma, COPD, prior history of CVA, diastolic heart failure, right leg DVT, pulmonary emboli on chronic anticoagulation, hyperlipidemia, " hypertension, MGUS and arthritis  who presents to clinic today for follow-up visit for her very severe hypoxia.  The course of her work-up has revealed that this latter is secondary to an AV fistula due to a communication between the left common femoral artery and the left common femoral vein..   For the present we would recommend;    1. 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  2. Gold stage C COPD: Likely secondary to her history of tobacco abuse.  Presently claims significant compliance with both her Breo and Spiriva has significant worsening of symptoms.  She was ambulated in clinic and desaturated to the mid 60s and I wonder if her pulmonary hypertension is worsening and contributing to this.  I suspect that should the patient have severe pulmonary hypertension this would be WHO group 3 and thus would not be amenable to treatment with any vasodilator therapy.    Continue Spiriva 2 puffs daily.    Continue Breo 1 puff daily.  Reminded to gargle after using this inhaler.    As needed albuterol for shortness of breath.    Continued compliance to her supplemental oxygen.    I have sent a note to the patient's primary cardiologist Dr. Montalvo to see if or not we could perform a right and left heart cath on the patient to document severity of her pulmonary hypertension.  3. Pulmonary nodule: Noted to have a stable nodule in the right upper lobe compared to CT scan from July 2018.  We will order a CT scan for 18 months from now per the guidelines.  4. Mosaic attenuation on CT scan: May be related to small airways disease given her body habitus versus pulmonary hypertension which has been noted previously on echocardiogram.  She has no evidence of emphysema which is why her reduced DLCO raises concerns for severe pulmonary hypertension.  5. HCM:     Has been added to the  lung cancer screening registry.    Is up-to-date with her influenza vaccine and Pneumovax 23 booster.  6. Follow-up: 3 months.      Amy DOWNING \[facility]\""  Pulmonary and Critical Care  [id number]

## 2021-05-31 VITALS — WEIGHT: 293 LBS | BODY MASS INDEX: 47.09 KG/M2 | HEIGHT: 66 IN

## 2021-05-31 VITALS — BODY MASS INDEX: 53.26 KG/M2 | WEIGHT: 293 LBS

## 2021-05-31 NOTE — TELEPHONE ENCOUNTER
----- Message from Brett Montalvo MD sent at 8/22/2019  3:32 PM CDT -----  Please set up echo limited to see if pulm HTN improved  thanks  ----- Message -----  From: Thais Harmon  Sent: 8/13/2019  11:02 AM  To: Brett Montalvo MD

## 2021-05-31 NOTE — PROGRESS NOTES
CCx: Follow-up for hypoxia s/p closure of Femoral AV shunt    HPI: Ms. Otero is a 58-year-old lady with a past medical history significant for morbid obesity, cervical carcinoma, COPD, prior history of CVA, diastolic heart failure, right leg DVT, pulmonary emboli on chronic anticoagulation, hyperlipidemia, hypertension, MGUS and arthritis amongst other medical problems who presents to clinic for follow-up visit.  Over the course of the last several months we determined that a significant proportion of her hypoxia was related to the fact that she had developed a left femoral AV fistula likely as a result of her prior coronary procedure several years ago.  She underwent intraoperative imaging studies which did reveal closure of this shunt.  She is here to follow-up with me after this and states that she has continued to be fairly active and often feels fairly lightheaded when she has a supplemental oxygen on and that this improves once she removes this.  She denies any chest pain, chest tightness, fevers, chills but does describe feeling significant fatigue since the time of her discharge.  She is very keen to get back to her prior level of functioning.    Pertinent Medical History:  At her last clinic visit we had noted that the patient had developed severe hypoxia requiring 8-10 L of oxygen to bring her oxygen saturations back up above 88% on ambulation.  I discussed this case with Dr. Montalvo as I was concerned for pulmonary hypertension and the patient underwent both a left and right heart cath which actually revealed significant hypoxia on a central arterial gases.  Subsequent shunt study demonstrated an arteriovenous shunt in her left femoral region that was thought to be from when the patient had a PFO closure few years previously.  She was set up to see Dr. Torres in the general surgery clinic to undergo shunt closure and was scheduled to undergo this, however, the procedure has been postponed as the  patient needs 2 teeth extracted before this.  She continues to deny any symptoms specifically states that she has no fevers, chills, night sweats or weight change.    ROS:  Positives alluded to in the HPI.  Remainder of 10 point review of systems is negative.    PMH (unchanged from prior visit):  1. Morbid obesity  2. Hypercapnic hypoxemic respiratory failure  3. Cervical carcinoma  4. Very severe gold stage C COPD  5. CVA  6. Diastolic heart failure  7. Right leg DVT  8. Pulmonary emboli on chronic anticoagulation  9. Hyperlipidemia  10. HTN  11. MGUS  12. Arthritis    PSH (unchanged from prior visit):  1. Coronary angiography  2. Colonoscopy    Allergies (unchanged from prior visit):  Reviewed in [facility]    Family HX (unchanged from prior visit):  1. Mother: CVA, tobacco abuse  2. Father: CVA,  at 56 years  3. Brother: MI,  at 52 years    Social Hx (unchanged from prior visit):  Marital status: Single  Number of children: 3 children, the patient has no contact with her children  Resides in a senior assisted living facility.  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 Not to exceed 4000 mg of acetaminophen per 24 hours from any source. (Patient taking differently: Take 500-1,000 mg by mouth every 6 (six) hours as needed for pain or fever. Not to exceed 4000 mg of acetaminophen per 24 hours from any source.       ) 30 tablet 0     albuterol (PROAIR HFA;PROVENTIL HFA;VENTOLIN HFA) 90 mcg/actuation inhaler Inhale 2 puffs every 4 (four) hours as needed. 1 Inhaler 11     aspirin 81 mg chewable tablet Chew  81 mg 2 (two) times a day.       calcium-vitamin D (CALCIUM-VITAMIN D) 500 mg(1,250mg) -200 unit per tablet Take 1 tablet by mouth daily.       cyanocobalamin 1,000 mcg/mL injection Inject 1,000 mcg into the shoulder, thigh, or buttocks every 30 (thirty) days.       fluticasone-vilanterol (BREO ELLIPTA) 200-25 mcg/dose DsDv inhaler Inhale 1 puff daily. (Patient taking differently: Inhale 1 puff daily as needed.       ) 1 each 12     furosemide (LASIX) 20 MG tablet Take 1 tablet (20 mg total) by mouth 2 (two) times a day at 9am and 6pm. 60 tablet 3     ipratropium-albuterol (DUO-NEB) 0.5-2.5 mg/3 mL nebulizer Take 3 mL by nebulization every 4 (four) hours as needed. 30 vial 0     multivitamin therapeutic tablet Take 1 tablet by mouth daily.       OXYGEN-AIR DELIVERY SYSTEMS MISC 2-3 L/min into each nostril as needed. 2-3L   [facility]             tiotropium bromide (SPIRIVA RESPIMAT) 2.5 mcg/actuation Mist Inhale 2 puffs daily. (Patient taking differently: Inhale 2 puffs every evening.       ) 4 g 0     No current facility-administered medications for this visit.        Labs:  Reviewed.     Imaging studies:  TTE 8/27/19:    1.Left ventricle ejection fraction is normal. The calculated left ventricular ejection fraction is 54%.    2.Normal right ventricular size and systolic function.    3.No hemodynamically significant valvular heart abnormalities, although this was a limited study without complete Doppler assessment of valves performed.    4.Mild pulmonary hypertension suggested.    5.When compared to the previous study dated 10/12/2018, no significant change. Specifically pulmonic pressure is 54 estimated and prior 51 mmHg.     US Operative 7/22/19:  1.  Apparent successful ligation of the left groin AV fistula communication with return of normal venous waveforms to the common femoral vein during this intraoperative procedure.    CT Abdomen Pelvis without oral with IV contrast 6/27/19:  1.  Small fat-containing  supraumbilical hernia shows no significant change in size since prior, however, demonstrates minimal new fluid and/or inflammation along this hernia. No adjacent skin thickening. No collections.  2.  Cholelithiasis.  3.  No diverticulitis, appendicitis or bowel obstruction.  4.  Significant mosaicism in the lung bases. Constrictive bronchiolitis not excluded. Airway thickening. Bronchiectasis.      Unchanged from prior visit:  US Arterial Left leg 4/22/19:  1.  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  2.  Although the distinction indication is not identified sonographically on the CTA I favor there to be a small truncation between the proximal left inferior epigastric artery and vein. This is seen on image 184. The patient does state she has had previous percutaneous access in the groin    CV Cath 4/12/19:    The LM vessel was moderate and is considered normal.    Estimated blood loss was <20 ml.    The LAD vessel was moderate and is considered normal .    The left circumflex was moderate and is considered normal.    The RCA was moderate and is considered normal.     Pt s/p CVA s/p PFO closure with pulm HTN, morbid obesity and dyspnea, along with JODI on CPAP and home oxygen.     Right heart cath  RA mean 10mmHg  PA 55/30/39mmHg  PCWP mean 15  [id number]  Ao 122/68     On 3 l oxygen:  Ao 99%  PA 80%  SVC 76%  IVC 91%     Mix venous calculated 79.75%     CO enid 7.     Angiography via right radial  Normal right dominant coronary anatomy     CTA Chest Abdomen Pelvis 4/12/19:  1.  Findings consistent with arteriovenous fistula between the left common femoral artery and left common femoral vein. Ultrasound may be useful to better define the fistula.  2.  Cholelithiasis.  3.  Colonic diverticulosis.    CT Chest w/o Contrast 1/20/19:  1.  Prominent areas of groundglass opacity, mosaic perfusion and more focal areas of airspace disease likely infectious or inflammatory. Stable pulmonary nodule when compared to  most recent study not identified on older exam. Continued follow-up recommended.  2.  Mildly prominent mediastinal adenopathy likely infectious or inflammatory. Follow-up recommended to confirm and exclude other pathology.    TTE with bubble 10/12/18:    Left ventricle is normal in size and systolic function.    The calculated left ventricular ejection fraction is 59%.    Right ventricle is moderately dilated with normal systolic function.    Both atria are significantly enlarged.    No hemodynamically significant valvular heart abnormalities.    Agitated saline bubble study was negative for intracardiac shunt, however the accuracy of this was limited by body habitus and generally poor acoustic windows.    When compared to the previous study dated 4/10/2018, no significant change.    CTA Chest 7/3/18:  1.  No evidence pulmonary embolism.  2.  Enlarged pulmonary artery and evidence of right ventricular strain consistent with pulmonary hypertension.  3.  Lung mosaic attenuation which could be due to pulmonary hypertension. Other etiologies are hypersensitivity pneumonitis or constrictive bronchiolitis.    PFT's 7/18/18 (unchanged from prior visit):  FEV1/FVC is 46% and is reduced.  FEV1 is 0.80L (29%) predicted and is reduced.  FVC is 1.74L (49%) predicted and reduced.  There was no improvement in spirometry after a single inhaled dose of bronchodilator.  TLC is 6.61L (125%) predicted and is reduced.  RV is 4.88L (250%) predicted and is increased.  DLCO is 8.58ml/min/hg (37%) predicted and is reduced when it is corrected for hemoglobin.  Flow volume loops indicate severe scooping of the expiratory limb.    Impression:  Full Pulmonary Function Test is abnormal.  PFTs are consistent with very severe obstructive disease.  Spirometry is not consistent with reversibility.  There is hyperinflation.  There is air-trapping.  Diffusion capacity when corrected for hemoglobin is severely reduced.    Physical Exam:  /72    "Pulse 88   Resp 20   Wt (!) 317 lb 6.4 oz (144 kg)   LMP  (LMP Unknown)   SpO2 92% Comment: RA  BMI 51.23 kg/m    Heart Rate: 88  Resp: 20  BP: 114/72  SpO2: 92 % (RA)  Height: 5' 6\" (1.676 m)  Weight: (!) 317 lb 6.4 oz (144 kg)    Physical Exam   Constitutional:   NAD, slightly disinhibited lady.   HENT:   Head: Normocephalic and atraumatic.   Poor dentition.   Eyes: Pupils are equal, round, and reactive to light.   Neck: Normal range of motion.   Cardiovascular: Normal rate and regular rhythm.   Pulmonary/Chest: Effort normal. No respiratory distress. She has no wheezes.   Abdominal: Soft.   Musculoskeletal: Normal range of motion. She exhibits edema.   Neurological: She is alert.   Skin: Skin is warm.       Assessment and Plan:Linda Otero is a 59 y.o. with a past medical history significant for morbid obesity, cervical carcinoma, COPD, prior history of CVA, diastolic heart failure, right leg DVT, pulmonary emboli on chronic anticoagulation, hyperlipidemia, hypertension, MGUS and arthritis  who presents to clinic today for follow-up visit for her very severe hypoxia following a closure of her AV fistula.  For the present we would recommend;    1. AV fistula in the left femoral region: Underwent a closure of this at the end of July and was noted to have radiographic improvement intraoperatively.  Oxygen requirements have significantly improved subsequent to this procedure.    We will obtain ambulatory oximetry;     Patient was noted to desaturate to 87% on room air with minimal activity and required 3-4 L of supplemental oxygen to maintain his saturations above 88%.  It was noted that her saturations normalized within a minute of sitting down and her supplemental oxygen was discontinued at that time.  2. Gold stage C COPD: Likely secondary to her history of tobacco abuse.  Presently claims significant compliance with both her Breo and Spiriva and is doing fairly well.    Continue Spiriva 2 puffs " daily.    Continue Breo 1 puff daily.  Reminded to gargle after using this inhaler.    As needed albuterol for shortness of breath.    Continued compliance to her supplemental oxygen.    I explained to the patient that I was not entirely clear what proportion of her reduced diffusion capacity was secondary to her arteriovenous fistula and have asked that requested her to undergo repeat pulmonary function testing prior to her next clinic visit with me.  3. Pulmonary nodule: Noted to have a stable nodule in the right upper lobe compared to CT scan from July 2018.  We will order a CT scan for 18 months from now per the guidelines.  4. Mosaic attenuation on CT scan: May be related to small airways disease given her body habitus versus pulmonary hypertension which has been noted previously on echocardiogram.   5. HCM:     Has been added to the lung cancer screening registry.    Is up-to-date with her influenza vaccine and Pneumovax 23 booster.  6. Follow-up: 4 months.      Amy Albright  Pulmonary and Critical Care  [id number]

## 2021-05-31 NOTE — PROGRESS NOTES
Oxygen saturation walk test    Patient oxygen saturation on RA at rest is 89-90%.  Oxygen saturation while ambulating 25ft on RA is 86%.     Oxygen continuous dose testing  While ambulating 150ft on 3LPM continuous dose, oxygen saturation is 86%.  While ambulating 150ft on 4LPM continuous dose, oxygen saturation is 88%.      DME Provider: [facility]     Patient is ambulatory within his/her home.

## 2021-05-31 NOTE — TELEPHONE ENCOUNTER
Called patient and reviewed recommendation to complete Limited Echo to assess Pulm HTN.   Patient agreeable.   Order placed per Dr. Montalvo.  Patient transferred to scheduling to arrange. -bob

## 2021-05-31 NOTE — PATIENT INSTRUCTIONS - HE
1. Please use your albuterol for rescue only in the case that rest after walking does not alleviate your shortness of breath. If you chest continues to feel tight or wheezy, certainly go ahead and use this.  2. Please remember to use your oxygen when you walk as your oxygen saturation will likely drop.

## 2021-06-01 VITALS — BODY MASS INDEX: 49.87 KG/M2 | WEIGHT: 293 LBS

## 2021-06-01 VITALS — HEIGHT: 66 IN | BODY MASS INDEX: 47.09 KG/M2 | WEIGHT: 293 LBS

## 2021-06-01 VITALS — BODY MASS INDEX: 47.13 KG/M2 | WEIGHT: 292 LBS

## 2021-06-01 VITALS — WEIGHT: 293 LBS | BODY MASS INDEX: 49.55 KG/M2

## 2021-06-01 VITALS — BODY MASS INDEX: 49.71 KG/M2 | WEIGHT: 293 LBS

## 2021-06-01 VITALS — BODY MASS INDEX: 46.28 KG/M2 | HEIGHT: 66 IN | WEIGHT: 288 LBS

## 2021-06-01 VITALS — HEIGHT: 66 IN | BODY MASS INDEX: 46.28 KG/M2 | WEIGHT: 288 LBS

## 2021-06-01 NOTE — TELEPHONE ENCOUNTER
"Call from Linda.  States she has developed increased shortness of breath and chest \"tightness\"  Is also coughing up lots of really thick green phlegm.  Asking to start her Action Plan.     Will send Rx for last medications prescribed:       Prednisone 40mg daily x 5 days  Levaquin 750mg daily x 7 days     Orders faxed to nurse at [facility]  Fax:  [phone]     Orders also sent to [facility] pharmacy    VM message also left on [facility]'s nurse line:  [phone]  "

## 2021-06-02 VITALS — HEIGHT: 66 IN | WEIGHT: 293 LBS | BODY MASS INDEX: 47.09 KG/M2

## 2021-06-02 VITALS — BODY MASS INDEX: 47.09 KG/M2 | WEIGHT: 293 LBS | HEIGHT: 66 IN

## 2021-06-02 VITALS — HEIGHT: 66 IN | WEIGHT: 285 LBS | BODY MASS INDEX: 45.8 KG/M2

## 2021-06-03 VITALS
RESPIRATION RATE: 24 BRPM | DIASTOLIC BLOOD PRESSURE: 70 MMHG | HEART RATE: 92 BPM | SYSTOLIC BLOOD PRESSURE: 118 MMHG | WEIGHT: 293 LBS | BODY MASS INDEX: 47.09 KG/M2 | HEIGHT: 66 IN

## 2021-06-03 VITALS — WEIGHT: 293 LBS | HEIGHT: 66 IN | BODY MASS INDEX: 47.09 KG/M2

## 2021-06-03 VITALS — WEIGHT: 293 LBS | BODY MASS INDEX: 51.23 KG/M2

## 2021-06-03 VITALS — WEIGHT: 293 LBS | BODY MASS INDEX: 47.09 KG/M2 | HEIGHT: 66 IN

## 2021-06-03 VITALS — HEIGHT: 66 IN | BODY MASS INDEX: 47.09 KG/M2 | WEIGHT: 293 LBS

## 2021-06-03 NOTE — TELEPHONE ENCOUNTER
Reviewed download and patient is using NIV daily. Left patient voicemail to schedule [facility]IV home visit to meet patient as [facility] is out until mid January. Asked that patient return my call, provided my direct phone number.

## 2021-06-04 VITALS
WEIGHT: 293 LBS | RESPIRATION RATE: 24 BRPM | HEART RATE: 104 BPM | SYSTOLIC BLOOD PRESSURE: 128 MMHG | OXYGEN SATURATION: 93 % | BODY MASS INDEX: 51.63 KG/M2 | DIASTOLIC BLOOD PRESSURE: 68 MMHG

## 2021-06-04 NOTE — TELEPHONE ENCOUNTER
Called [facility] and left voicemail for  to return my call regarding patient, provided my direct phone number.

## 2021-06-04 NOTE — TELEPHONE ENCOUNTER
Called emergency contact listed on patient's account. Spoke to Rehana, and informed her that patient is currently renting a non-invasive ventilator through us and we have not been able to get a hold of her. Rehana stated she hasn't been in touch with patient for 6 months. Provided me with patient's phone number that she has which is the same one we've been calling. Will try assisted living.

## 2021-06-04 NOTE — TELEPHONE ENCOUNTER
Phone call from [facility].  Shoshana.    States [facility] told her Dr. Albright was going to send Rx for prednisone and antibiotic, but they have not seen the Rx's yet.     I have not been able to reach Linda to find out where her prescriptions need to be sent to.    Will send orders to [facility]:  Fax:  [phone]  Will send Rx to Located within [facility]:  FAx: [phone]    Shoshana phone: [phone]

## 2021-06-05 NOTE — PROGRESS NOTES
CCx: Follow-up for hypoxia s/p closure of Femoral AV shunt    HPI: Ms. Otero is a 58-year-old lady with a past medical history significant for morbid obesity, cervical carcinoma, COPD, prior history of CVA, diastolic heart failure, right leg DVT, pulmonary emboli on chronic anticoagulation, hyperlipidemia, hypertension, MGUS and arthritis amongst other medical problems who presents to clinic for follow-up visit.  Since the last clinic visit, she has experienced COPD exacerbation likely related to bacterial infection required 2 different courses of Augmentin with prednisone.  She does think that she feels better now compared to that time.  She continues to be as active as possible at her assisted living facility and thinks that her breathing is not holding her back.  Of note she is unable to check oxygen levels at the facility as a device is often do not work and she is trying to look for different assisted living facility to live that.      Pertinent Medical History:  At her last clinic visit we had noted that the patient had developed severe hypoxia requiring 8-10 L of oxygen to bring her oxygen saturations back up above 88% on ambulation.  I discussed this case with Dr. Montalvo as I was concerned for pulmonary hypertension and the patient underwent both a left and right heart cath which actually revealed significant hypoxia on a central arterial gases.  Subsequent shunt study demonstrated an arteriovenous shunt in her left femoral region that was thought to be from when the patient had a PFO closure few years previously.   Over the course of the last several months we determined that a significant proportion of her hypoxia was related to the fact that she had developed a left femoral AV fistula likely as a result of her prior coronary procedure several years ago.  She underwent intraoperative imaging studies which did reveal closure of this shunt.  .    ROS:  Positives alluded to in the HPI.  Remainder of 10  point review of systems is negative.    PMH (unchanged from prior visit):  1. Morbid obesity  2. Hypercapnic hypoxemic respiratory failure  3. Cervical carcinoma  4. Very severe gold stage C COPD  5. CVA  6. Diastolic heart failure  7. Right leg DVT  8. Pulmonary emboli on chronic anticoagulation  9. Hyperlipidemia  10. HTN  11. MGUS  12. Arthritis    PSH (unchanged from prior visit):  1. Coronary angiography  2. Colonoscopy    Allergies (unchanged from prior visit):  Reviewed in [facility]    Family HX (unchanged from prior visit):  1. Mother: CVA, tobacco abuse  2. Father: CVA,  at 56 years  3. Brother: MI,  at 52 years    Social Hx (unchanged from prior visit):  Marital status: Single  Number of children: 3 children, the patient has no contact with her children  Resides in a senior assisted living facility.  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 Not to exceed 4000 mg of acetaminophen per 24 hours from any source. (Patient taking differently: Take 500-1,000 mg by mouth every 6 (six) hours as needed for pain or fever. Not to exceed 4000 mg of acetaminophen per 24 hours from any source.       ) 30 tablet 0     albuterol (PROAIR HFA;PROVENTIL HFA;VENTOLIN HFA) 90 mcg/actuation inhaler Inhale 2 puffs every 4 (four) hours as needed. 1 Inhaler 11     aspirin 81 mg chewable tablet Chew 81 mg 2 (two) times a day.       calcium-vitamin D (CALCIUM-VITAMIN D) 500 mg(1,250mg) -200 unit per tablet Take 1 tablet by mouth daily.       DAILY-CELINA tablet        fluticasone furoate-vilanterol (BREO ELLIPTA) 200-25  mcg/dose DsDv inhaler Inhale 1 puff daily. 1 each 12     furosemide (LASIX) 20 MG tablet Take 1 tablet (20 mg total) by mouth 2 (two) times a day at 9am and 6pm. 60 tablet 3     ipratropium-albuterol (DUO-NEB) 0.5-2.5 mg/3 mL nebulizer Take 3 mL by nebulization every 4 (four) hours as needed. 30 vial 0     miscellaneous medical supply Misc Patient needs a new mask.       multivitamin therapeutic tablet Take 1 tablet by mouth daily.       OXYGEN-AIR DELIVERY SYSTEMS MISC 2-3 L/min into each nostril as needed. 2-3L   [facility]             tiotropium bromide (SPIRIVA RESPIMAT) 2.5 mcg/actuation Mist Inhale 2 puffs daily. (Patient taking differently: Inhale 2 puffs every evening.       ) 4 g 0     cyanocobalamin 1,000 mcg/mL injection Inject 1,000 mcg into the shoulder, thigh, or buttocks every 30 (thirty) days.       No current facility-administered medications for this visit.        Labs:  Reviewed.     Imaging studies:  TTE 8/27/19:    1.Left ventricle ejection fraction is normal. The calculated left ventricular ejection fraction is 54%.    2.Normal right ventricular size and systolic function.    3.No hemodynamically significant valvular heart abnormalities, although this was a limited study without complete Doppler assessment of valves performed.    4.Mild pulmonary hypertension suggested.    5.When compared to the previous study dated 10/12/2018, no significant change. Specifically pulmonic pressure is 54 estimated and prior 51 mmHg.     US Operative 7/22/19:  1.  Apparent successful ligation of the left groin AV fistula communication with return of normal venous waveforms to the common femoral vein during this intraoperative procedure.    CT Abdomen Pelvis without oral with IV contrast 6/27/19:  1.  Small fat-containing supraumbilical hernia shows no significant change in size since prior, however, demonstrates minimal new fluid and/or inflammation along this hernia. No adjacent skin thickening. No  collections.  2.  Cholelithiasis.  3.  No diverticulitis, appendicitis or bowel obstruction.  4.  Significant mosaicism in the lung bases. Constrictive bronchiolitis not excluded. Airway thickening. Bronchiectasis.      Unchanged from prior visit:  US Arterial Left leg 4/22/19:  1.  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  2.  Although the distinction indication is not identified sonographically on the CTA I favor there to be a small truncation between the proximal left inferior epigastric artery and vein. This is seen on image 184. The patient does state she has had previous percutaneous access in the groin    CV Cath 4/12/19:    The LM vessel was moderate and is considered normal.    Estimated blood loss was <20 ml.    The LAD vessel was moderate and is considered normal .    The left circumflex was moderate and is considered normal.    The RCA was moderate and is considered normal.     Pt s/p CVA s/p PFO closure with pulm HTN, morbid obesity and dyspnea, along with JODI on CPAP and home oxygen.     Right heart cath  RA mean 10mmHg  PA 55/30/39mmHg  PCWP mean 15  [id number]  Ao 122/68     On 3 l oxygen:  Ao 99%  PA 80%  SVC 76%  IVC 91%     Mix venous calculated 79.75%     CO enid 7.     Angiography via right radial  Normal right dominant coronary anatomy     CTA Chest Abdomen Pelvis 4/12/19:  1.  Findings consistent with arteriovenous fistula between the left common femoral artery and left common femoral vein. Ultrasound may be useful to better define the fistula.  2.  Cholelithiasis.  3.  Colonic diverticulosis.    CT Chest w/o Contrast 1/20/19:  1.  Prominent areas of groundglass opacity, mosaic perfusion and more focal areas of airspace disease likely infectious or inflammatory. Stable pulmonary nodule when compared to most recent study not identified on older exam. Continued follow-up recommended.  2.  Mildly prominent mediastinal adenopathy likely infectious or inflammatory. Follow-up recommended  to confirm and exclude other pathology.    TTE with bubble 10/12/18:    Left ventricle is normal in size and systolic function.    The calculated left ventricular ejection fraction is 59%.    Right ventricle is moderately dilated with normal systolic function.    Both atria are significantly enlarged.    No hemodynamically significant valvular heart abnormalities.    Agitated saline bubble study was negative for intracardiac shunt, however the accuracy of this was limited by body habitus and generally poor acoustic windows.    When compared to the previous study dated 4/10/2018, no significant change.    CTA Chest 7/3/18:  1.  No evidence pulmonary embolism.  2.  Enlarged pulmonary artery and evidence of right ventricular strain consistent with pulmonary hypertension.  3.  Lung mosaic attenuation which could be due to pulmonary hypertension. Other etiologies are hypersensitivity pneumonitis or constrictive bronchiolitis.    PFT's 1/24/20:  FEV1/FVC is 41% and is reduced.  FEV1 is 0.81L (29%) predicted and is reduced.  FVC is 1.97L (56%) predicted and reduced.  There was no improvement in spirometry after a single inhaled dose of bronchodilator.  TLC is 6.31L (119%) predicted and is normal.  RV is 4.34L (220%) predicted and is increased.  DLCO is 9.11ml/min/hg (41%) predicted and is reduced when it is corrected for hemoglobin.  Flow volume loops indicate severe scooping of the expiratory limb.    Impression:  Full Pulmonary Function Test is abnormal.  PFTs are consistent with very severe obstructive disease.  Spirometry is not consistent with reversibility.  There is no hyperinflation.  There is air-trapping.  Diffusion capacity when corrected for hemoglobin is severely reduced.    The ATS criteria for acceptability and reproducibility was met.  The results of cleft testing appear to be valid although ATS criteria were not met despite 8 efforts and only 2 efforts were saved.      PFT's 7/18/18 (unchanged from prior  visit):  FEV1/FVC is 46% and is reduced.  FEV1 is 0.80L (29%) predicted and is reduced.  FVC is 1.74L (49%) predicted and reduced.  There was no improvement in spirometry after a single inhaled dose of bronchodilator.  TLC is 6.61L (125%) predicted and is reduced.  RV is 4.88L (250%) predicted and is increased.  DLCO is 8.58ml/min/hg (37%) predicted and is reduced when it is corrected for hemoglobin.  Flow volume loops indicate severe scooping of the expiratory limb.    Impression:  Full Pulmonary Function Test is abnormal.  PFTs are consistent with very severe obstructive disease.  Spirometry is not consistent with reversibility.  There is hyperinflation.  There is air-trapping.  Diffusion capacity when corrected for hemoglobin is severely reduced.    Physical Exam:  /68   Pulse (!) 104   Resp 24   Wt (!) 319 lb 14.4 oz (145.1 kg)   LMP  (LMP Unknown)   SpO2 93% Comment: 4 LPM  BMI 51.63 kg/m    Heart Rate: (!) 104  Resp: 24  BP: 128/68  SpO2: 93 % (4 LPM)  Weight: (!) 319 lb 14.4 oz (145.1 kg)    Physical Exam   Constitutional:   NAD, slightly disinhibited lady.   HENT:   Head: Normocephalic and atraumatic.   Poor dentition.   Eyes: Pupils are equal, round, and reactive to light.   Neck: Normal range of motion.   Cardiovascular: Normal rate and regular rhythm.   Pulmonary/Chest: Effort normal. No respiratory distress. She has no wheezes.   Abdominal: Soft.   Musculoskeletal: Normal range of motion.         General: Edema present.   Neurological: She is alert.   Skin: Skin is warm.       Assessment and Plan:Linda Otero is a 59 y.o. with a past medical history significant for morbid obesity, cervical carcinoma, COPD, prior history of CVA, diastolic heart failure, right leg DVT, pulmonary emboli on chronic anticoagulation, hyperlipidemia, hypertension, MGUS and arthritis  who presents to clinic today for follow-up visit for her very severe hypoxia following a closure of her AV fistula.  For the  present we would recommend;    1. AV fistula in the left femoral region: Underwent a closure of this at the end of July and was noted to have radiographic improvement intraoperatively.  Oxygen requirements have significantly improved subsequent to this procedure.    We will obtain ambulatory oximetry;     Patient was noted to desaturate to 87% on room air with minimal activity and required 3-4 L of supplemental oxygen to maintain his saturations above 88%.  It was noted that her saturations normalized within a minute of sitting down and her supplemental oxygen was discontinued at that time.  2. Gold stage C COPD: Likely secondary to her history of tobacco abuse.  Does tell me that she has been using her Brio Ellipta as needed Spiriva only 1 puff daily with intermittent albuterol use.  She underwent repeat pulmonary function testing which did not demonstrate any worsening of her spirometry, however, demonstrated slight improvement in her diffusion capacity.    Continue Spiriva 2 puffs daily.    Minded her to use Breo 1 puff daily.  Reminded to gargle after using this inhaler.    As needed albuterol for shortness of breath.    Continued compliance to her supplemental oxygen.  3. Pulmonary nodule: Noted to have a stable nodule in the right upper lobe compared to CT scan from July 2018.  We will order a CT scan for 18 months from now per the guidelines.  4. Mosaic attenuation on CT scan: May be related to small airways disease given her body habitus versus pulmonary hypertension which has been noted previously on echocardiogram.   5. HCM:     Has been added to the lung cancer screening registry.    Is up-to-date with her influenza vaccine and Pneumovax 23 booster.    Was requesting a refill in ointment for a foot ulcer and also administer B12 injections.  I explained to her that this was her at the purview of the pulmonary clinic but have sent a note to her primary care provider Dr. Henson.  6. Follow-up: 6  months.      Amy Duenasqvi  Pulmonary and Critical Care  [id number]

## 2021-06-05 NOTE — TELEPHONE ENCOUNTER
Left voicemail for patient to schedule home visit as her unit is due for maintenance. Asked that she return my call, provided my direct phone number. If I'm not available to call [phone], choose customer service and ask to speak to RT. I am handing off patient to another fellow colleague.

## 2021-06-05 NOTE — PATIENT INSTRUCTIONS - HE
1. Please use your Breo inhaler once a day every day whether or not you are short of breath, this is not a rescue inhaler so do not use this if you are acutely short of breath.  2. Please be sure to gargle your mouth after using your Breo inhaler.  3. Please use your Spiriva once a day every day whether or not you are short of breath, this is not a rescue inhaler so do not use this if you are acutely short of breath.  4. Please use your albuterol inhaler 2 puffs up to 6 times a day for rescue. If you are not short of breath, you do not need to use this.

## 2021-06-05 NOTE — TELEPHONE ENCOUNTER
Call from Linda mendez am. States she is feeling better after her second round of her action plan (prednisone and Levaquin).  But, is still coughing up green phlegm.  She states she has a day left of her Levaquin.    Reviewed with Dr. Albright in clinic and have ordered a sputum culture.      Orders faxed to [facility] at [phone]  Also, left VM message for nurse, Shoshana, at [phone].  Left VM message for patient as well, was not able to reach her in person.

## 2021-06-05 NOTE — TELEPHONE ENCOUNTER
Patient returned call. Spoke to patient at noon. She states she heard that we've been trying to get a hold of her. I informed her that we have and have left several voicemails. Patient states her voicemail doesn't work and if she doesn't recognize a number, she doesn't answer. She states that she has [phone] saved into her phone as a contact, but not sure why it would not populate when we call her. Informed patient that her unit needs to be exchanged as hers is due for maintenance. Will bring out supplies, as well. Informed her that Keyonna no longer works for [employer], and a new RT will be monitoring her. Patient expressed understanding. Scheduled NIV home visit tomorrow at 10AM with [facility] RT.

## 2021-06-05 NOTE — TELEPHONE ENCOUNTER
Ahh....ok.  I just left a VM message for the nurse (Shoshana) at [facility] where she lives.  I am hoping she can get a message to Linda to give you a call.  Gave her the phone number listed below.      If I hear anything more, will let you know.

## 2021-06-05 NOTE — TELEPHONE ENCOUNTER
"Call from Linda stating she is \"so sick\" and needs to restart her Action Plan.  Completed prednisone and levaquin (x5days) on 12/31.  She states this helped her feel much better, but only lasted about a couple of weeks and then she got sick again.  There are many sick at her assisted living facility right now with some in the hospital as well.    Very congested cough, states she has had \"chills\" but no fever, coughing up green phlegm.      Will re-prescribe her action plan and repeat that.  Instructed that she is still not feeling well, she will need to be seen in the ED for evaluation.    [facility]:  Fax:  [phone]  Will send Rx to [facility]:  FAx: [phone]     Shoshana phone: [phone]  "

## 2021-06-06 NOTE — TELEPHONE ENCOUNTER
"Call from Linda. States she is coughing up \"green phlegm\" again.  Says her nurse at the [facility] has been sick as well as other residents.  Asking to start her action plan again before things get \"out of control\".    Will send action plan to [facility] pharmacy:  Prednisone 40mg daily x 5 days AND levaquin 750mg daily x 5 days.    [facility] to [facility]:  Fax:  [phone]  Shoshana (nurse) phone: [phone]    "

## 2021-06-06 NOTE — TELEPHONE ENCOUNTER
Nursing facility called and Linda received her prednisone as 20 mg a day x 10 instead of the 40 mg x 5. Said her symptoms were better and she is feeling better. Will update Dr. Albright.

## 2021-06-07 NOTE — TELEPHONE ENCOUNTER
Called patient to follow up. Confirmed she is aware that my former colleague that was monitoring and following up no longer works at [employer] [facility] [employer]. I will be stepping in to help manage/monitor, but any of my colleagues are able to assist her as well. Patient expressed understanding. She is not needing anything at this time. Patient would like me to follow up with her every other month; will skip April, and follow up in May. Instructed patient to call if she needs anything sooner. Offered to provide patient with my direct phone number, however, she states she always calls through the main line which is fine, she can ask to speak to a respiratory therapist.

## 2021-06-07 NOTE — TELEPHONE ENCOUNTER
"Call from Linda. States she is more shortness of breath the past few days and coughing up \"green phlegm\" again.  Has no fever.  Left lung \"aches\". Asking to start her action plan again before things get \"out of control\".     Will send action plan to [facility] pharmacy:  Prednisone 40mg daily x 5 days AND levaquin 750mg daily x 5 days.     [facility] to [facility]:  Fax:  [phone]  Shoshana (nurse) phone: [phone]    Also sending orders for a new nebulizer machine as patient states hers is \"broken\". Asking for new order be sent to [facility].     "

## 2021-06-08 NOTE — TELEPHONE ENCOUNTER
Left voicemail for patient to return my call; provided my direct phone number and I'm in the office Mon-Fri 8AM-4:30PM.

## 2021-06-09 NOTE — PROGRESS NOTES
"Linda Otero is a 60 y.o. female who is being evaluated via a billable telephone visit.      The patient has been notified of following:     \"This telephone visit will be conducted via a call between you and your physician/provider. We have found that certain health care needs can be provided without the need for a physical exam.  This service lets us provide the care you need with a short phone conversation.  If a prescription is necessary we can send it directly to your pharmacy.  If lab work is needed we can place an order for that and you can then stop by our lab to have the test done at a later time.    Telephone visits are billed at different rates depending on your insurance coverage. During this emergency period, for some insurers they may be billed the same as an in-person visit.  Please reach out to your insurance provider with any questions.    If during the course of the call the physician/provider feels a telephone visit is not appropriate, you will not be charged for this service.\"    Patient has given verbal consent to a Telephone visit? Yes    What phone number would you like to be contacted at? [phone]    Patient would like to receive their AVS by AVS Preference: Mail a copy.     Additional provider notes:   CCx: Follow-up for hypoxia likely secondary to a combination of emphysema and obesity hypoventilation syndrome s/p closure of Femoral AV shunt    HPI: Ms. Otero is a 58-year-old lady with a past medical history significant for morbid obesity, cervical carcinoma, COPD, prior history of CVA, diastolic heart failure, right leg DVT, pulmonary emboli on chronic anticoagulation, status post closure of AV shunt, hyperlipidemia, hypertension, MGUS and arthritis amongst other medical problems who presents to clinic for follow-up visit.   She states that she continues to be compliant with her Breo and Spiriva inhaler but has found that over the last several weeks particularly on the more humid " days she needs to use her albuterol for rescue 2-3 times a day and another days does not need to use this at all.  She denies fevers, chills, night sweats, change in her weight but does allude to some pain in her foot which is related to a wound that she has developed.  She tells me that her oxygen saturations at rest are about 92-93% on 2 L and that she does bump her oxygen up to 4 L with activity.  COPD Assessment Test  How often do you cough?: 1  How much phlegm (mucous) do you have in your chest?: 2  How tight does your chest feel?: 2  How breathless are you after climbing a hill or flight of stairs?: 5  How limited are your activities at home?: 1  How confident are you leaving home despite your lung condition?: 0  How soundly do you sleep?: 0  How much energy do you have?: 4  Total Score: 15    Pertinent Medical History:  At her last clinic visit we had noted that the patient had developed severe hypoxia requiring 8-10 L of oxygen to bring her oxygen saturations back up above 88% on ambulation.  I discussed this case with Dr. Montalvo as I was concerned for pulmonary hypertension and the patient underwent both a left and right heart cath which actually revealed significant hypoxia on a central arterial gases.  Subsequent shunt study demonstrated an arteriovenous shunt in her left femoral region that was thought to be from when the patient had a PFO closure few years previously.   Over the course of the last several months we determined that a significant proportion of her hypoxia was related to the fact that she had developed a left femoral AV fistula likely as a result of her prior coronary procedure several years ago.  She underwent intraoperative imaging studies which did reveal closure of this shunt.  .    ROS:  Positives alluded to in the HPI.  Remainder of 10 point review of systems is negative.    PMH (unchanged from prior visit):  1. Morbid obesity  2. Hypercapnic hypoxemic respiratory  failure  3. Cervical carcinoma  4. Very severe gold stage C COPD  5. CVA  6. Diastolic heart failure  7. Right leg DVT  8. Pulmonary emboli on chronic anticoagulation  9. Hyperlipidemia  10. HTN  11. MGUS  12. Arthritis    PSH (unchanged from prior visit):  1. Coronary angiography  2. Colonoscopy    Allergies (unchanged from prior visit):  Reviewed in Psych[facility]    Family HX (unchanged from prior visit):  1. Mother: CVA, tobacco abuse  2. Father: CVA,  at 56 years  3. Brother: MI,  at 52 years    Social Hx (unchanged from prior visit):  Marital status: Single  Number of children: 3 children, the patient has no contact with her children  Resides in a senior assisted living facility.  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 Not to exceed 4000 mg of acetaminophen per 24 hours from any source. (Patient taking differently: Take 500-1,000 mg by mouth every 6 (six) hours as needed for pain or fever. Not to exceed 4000 mg of acetaminophen per 24 hours from any source.       ) 30 tablet 0     albuterol (PROAIR HFA;PROVENTIL HFA;VENTOLIN HFA) 90 mcg/actuation inhaler Inhale 2 puffs every 4 (four) hours as needed. 1 Inhaler 11     aspirin 81 mg chewable tablet Chew 81 mg 2 (two) times a day.       calcium-vitamin D (CALCIUM-VITAMIN D) 500 mg(1,250mg) -200 unit per tablet Take 1 tablet by mouth daily.       cyanocobalamin 1,000 mcg/mL injection Inject 1,000 mcg into the shoulder, thigh, or buttocks every 30 (thirty) days.       DAILY-CELINA tablet        fluticasone furoate-vilanterol (BREO ELLIPTA) 200-25 mcg/dose  DsDv inhaler Inhale 1 puff daily. 1 each 12     furosemide (LASIX) 20 MG tablet Take 1 tablet (20 mg total) by mouth 2 (two) times a day at 9am and 6pm. 60 tablet 3     ipratropium-albuterol (DUO-NEB) 0.5-2.5 mg/3 mL nebulizer Take 3 mL by nebulization every 4 (four) hours as needed. 30 vial 0     miscellaneous medical supply Misc Patient needs a new mask.       multivitamin therapeutic tablet Take 1 tablet by mouth daily.       OXYGEN-AIR DELIVERY SYSTEMS MISC 2-3 L/min into each nostril as needed. 2-3L   [facility]             tiotropium bromide (SPIRIVA RESPIMAT) 2.5 mcg/actuation Mist Inhale 2 puffs daily. (Patient taking differently: Inhale 2 puffs every evening.       ) 4 g 0     No current facility-administered medications for this visit.        Labs:  Reviewed.     Imaging studies:  CT Chest W/o Contrast 7/15/20:  1.  Stable pulmonary nodules measuring up to 0.5 cm.    (Unchanged from previous visit)  TTE 8/27/19:    1.Left ventricle ejection fraction is normal. The calculated left ventricular ejection fraction is 54%.    2.Normal right ventricular size and systolic function.    3.No hemodynamically significant valvular heart abnormalities, although this was a limited study without complete Doppler assessment of valves performed.    4.Mild pulmonary hypertension suggested.    5.When compared to the previous study dated 10/12/2018, no significant change. Specifically pulmonic pressure is 54 estimated and prior 51 mmHg.     US Operative 7/22/19:  1.  Apparent successful ligation of the left groin AV fistula communication with return of normal venous waveforms to the common femoral vein during this intraoperative procedure.    CT Abdomen Pelvis without oral with IV contrast 6/27/19:  1.  Small fat-containing supraumbilical hernia shows no significant change in size since prior, however, demonstrates minimal new fluid and/or inflammation along this hernia. No adjacent skin thickening. No collections.  2.   Cholelithiasis.  3.  No diverticulitis, appendicitis or bowel obstruction.  4.  Significant mosaicism in the lung bases. Constrictive bronchiolitis not excluded. Airway thickening. Bronchiectasis.      Unchanged from prior visit:  US Arterial Left leg 4/22/19:  1.  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  2.  Although the distinction indication is not identified sonographically on the CTA I favor there to be a small truncation between the proximal left inferior epigastric artery and vein. This is seen on image 184. The patient does state she has had previous percutaneous access in the groin    CV Cath 4/12/19:    The LM vessel was moderate and is considered normal.    Estimated blood loss was <20 ml.    The LAD vessel was moderate and is considered normal .    The left circumflex was moderate and is considered normal.    The RCA was moderate and is considered normal.     Pt s/p CVA s/p PFO closure with pulm HTN, morbid obesity and dyspnea, along with JODI on CPAP and home oxygen.     Right heart cath  RA mean 10mmHg  PA 55/30/39mmHg  PCWP mean 15  [id number]  Ao 122/68     On 3 l oxygen:  Ao 99%  PA 80%  SVC 76%  IVC 91%     Mix venous calculated 79.75%     CO enid 7.     Angiography via right radial  Normal right dominant coronary anatomy     CTA Chest Abdomen Pelvis 4/12/19:  1.  Findings consistent with arteriovenous fistula between the left common femoral artery and left common femoral vein. Ultrasound may be useful to better define the fistula.  2.  Cholelithiasis.  3.  Colonic diverticulosis.    CT Chest w/o Contrast 1/20/19:  1.  Prominent areas of groundglass opacity, mosaic perfusion and more focal areas of airspace disease likely infectious or inflammatory. Stable pulmonary nodule when compared to most recent study not identified on older exam. Continued follow-up recommended.  2.  Mildly prominent mediastinal adenopathy likely infectious or inflammatory. Follow-up recommended to confirm and  exclude other pathology.    TTE with bubble 10/12/18:    Left ventricle is normal in size and systolic function.    The calculated left ventricular ejection fraction is 59%.    Right ventricle is moderately dilated with normal systolic function.    Both atria are significantly enlarged.    No hemodynamically significant valvular heart abnormalities.    Agitated saline bubble study was negative for intracardiac shunt, however the accuracy of this was limited by body habitus and generally poor acoustic windows.    When compared to the previous study dated 4/10/2018, no significant change.    CTA Chest 7/3/18:  1.  No evidence pulmonary embolism.  2.  Enlarged pulmonary artery and evidence of right ventricular strain consistent with pulmonary hypertension.  3.  Lung mosaic attenuation which could be due to pulmonary hypertension. Other etiologies are hypersensitivity pneumonitis or constrictive bronchiolitis.    PFT's 1/24/20:  FEV1/FVC is 41% and is reduced.  FEV1 is 0.81L (29%) predicted and is reduced.  FVC is 1.97L (56%) predicted and reduced.  There was no improvement in spirometry after a single inhaled dose of bronchodilator.  TLC is 6.31L (119%) predicted and is normal.  RV is 4.34L (220%) predicted and is increased.  DLCO is 9.11ml/min/hg (41%) predicted and is reduced when it is corrected for hemoglobin.  Flow volume loops indicate severe scooping of the expiratory limb.    Impression:  Full Pulmonary Function Test is abnormal.  PFTs are consistent with very severe obstructive disease.  Spirometry is not consistent with reversibility.  There is no hyperinflation.  There is air-trapping.  Diffusion capacity when corrected for hemoglobin is severely reduced.    The ATS criteria for acceptability and reproducibility was met.  The results of cleft testing appear to be valid although ATS criteria were not met despite 8 efforts and only 2 efforts were saved.      PFT's 7/18/18 (unchanged from prior  visit):  FEV1/FVC is 46% and is reduced.  FEV1 is 0.80L (29%) predicted and is reduced.  FVC is 1.74L (49%) predicted and reduced.  There was no improvement in spirometry after a single inhaled dose of bronchodilator.  TLC is 6.61L (125%) predicted and is reduced.  RV is 4.88L (250%) predicted and is increased.  DLCO is 8.58ml/min/hg (37%) predicted and is reduced when it is corrected for hemoglobin.  Flow volume loops indicate severe scooping of the expiratory limb.    Impression:  Full Pulmonary Function Test is abnormal.  PFTs are consistent with very severe obstructive disease.  Spirometry is not consistent with reversibility.  There is hyperinflation.  There is air-trapping.  Diffusion capacity when corrected for hemoglobin is severely reduced.    Assessment and Plan:Linda Otero is a 60 y.o. with a past medical history significant for morbid obesity, cervical carcinoma, COPD, prior history of CVA, diastolic heart failure, right leg DVT, pulmonary emboli on chronic anticoagulation, hyperlipidemia, hypertension, MGUS and arthritis  who presents to clinic today for follow-up visit for her very severe hypoxia following a closure of her AV fistula.  For the present we would recommend;    1. Gold stage C COPD: Likely secondary to her history of tobacco abuse.  Has been compliant with her Breo and Spiriva inhaler and has had days when she has needed to use her albuterol more frequently finds that this is related to the humidity levels.  She underwent repeat pulmonary function testing which did not demonstrate any worsening of her spirometry, however, demonstrated slight improvement in her diffusion capacity.    Continue Spiriva 2 puffs daily.    Minded her to use Breo 1 puff daily.  Reminded to gargle after using this inhaler.    As needed albuterol for shortness of breath.    Continued compliance to her supplemental oxygen.  2. Pulmonary nodule: Noted to have a stable nodule in the right upper lobe compared to  CT scan from July 2018.  Repeat CT scan demonstrates stability in the size of her nodule.    Will order follow-up CT scan for 1 year from now.  3. AV fistula in the left femoral region: Underwent a closure of this at the end of July 2019 and was noted to have radiographic improvement intraoperatively.  Oxygen requirements have significantly improved subsequent to this procedure.  4. Mosaic attenuation on CT scan: May be related to small airways disease given her body habitus versus pulmonary hypertension which has been noted previously on echocardiogram.   5. HCM:     Has been added to the lung cancer screening registry.    Is up-to-date with her influenza vaccine and Pneumovax 23 booster.  6. Follow-up: 6 months.      Amy Albright  Pulmonary and Critical Care  [id number]    Phone call duration: 8 minutes

## 2021-06-16 NOTE — TELEPHONE ENCOUNTER
Phone call from Linda. States she is having increased coughing and thick greenish/yellow secretions.  Asking to start her action plan.  State she has these symptoms every spring.      Will prescribe prednisone 40mg daily x 5 and Levaquin 750mg daily x 5 days      Faxing orders to [facility], ATTN: Maday.  Fax:  [phone]    Sending prescription orders to [facility] ([facility]) at [phone]

## 2021-06-16 NOTE — TELEPHONE ENCOUNTER
Telephone Encounter by Noris Harmon LRT at 12/4/2019  4:14 PM     Author: Noris Harmon LRT Service: -- Author Type: Respiratory Therapist    Filed: 12/4/2019  4:18 PM Encounter Date: 12/4/2019 Status: Addendum    : Nrois Harmon LRT (Respiratory Therapist)    Related Notes: Original Note by Noris Harmon LRT (Respiratory Therapist) filed at 12/4/2019  4:15 PM       Left voicemail for patient informing her that we've tried several times to get a hold of her to schedule a home visit, and line keeps going straight to voicemail. Asked that she return my call, provided my direct phone number.

## 2021-06-16 NOTE — TELEPHONE ENCOUNTER
Telephone Encounter by Noris Harmon LRT at 12/24/2019  8:48 AM     Author: Noris Harmon LRT Service: -- Author Type: Respiratory Therapist    Filed: 12/24/2019  9:09 AM Encounter Date: 12/24/2019 Status: Signed    : Noris Harmon LRT (Respiratory Therapist)       Left voicemail for patient to return my call regarding NIV, provided my direct phone number. Patient continues to use NIV daily.

## 2021-06-17 NOTE — ANESTHESIA CARE TRANSFER NOTE
Last vitals:   Vitals:    04/09/18 1418   BP: 129/66   Pulse: 80   Resp: 20   Temp: 37.2  C (98.9  F)   SpO2: 100%     Patient's level of consciousness is drowsy  Spontaneous respirations: no: on vent  Maintains airway independently: no: has ETT  Dentition unchanged: yes  Oropharynx: endotracheal tube in place    QCDR Measures:  ASA# 20 - Surgical Safety Checklist: WHO surgical safety checklist completed prior to induction  PQRS# 430 - Adult PONV Prevention: 4558F - Pt received => 2 anti-emetic agents (different classes) preop & intraop  ASA# 8 - Peds PONV Prevention: NA - Not pediatric patient, not GA or 2 or more risk factors NOT present  PQRS# 424 - Caroline-op Temp Management: 4559F - At least one body temp DOCUMENTED => 35.5C or 95.9F within required timeframe  PQRS# 426 - PACU Transfer Protocol: - Transfer of care checklist used  ASA# 14 - Acute Post-op Pain: ASA14B - Patient did NOT experience pain >= 7 out of 10

## 2021-06-17 NOTE — ANESTHESIA PREPROCEDURE EVALUATION
Anesthesia Evaluation      Patient summary reviewed     Airway   Mallampati: III   Pulmonary - normal exam   (+) COPD moderate, shortness of breath, sleep apnea on CPAP, moderate,                          Cardiovascular - normal exam  (+) hypertension, ,     ECG reviewed        Neuro/Psych    (+) CVA ,     Endo/Other       GI/Hepatic/Renal       Other findings: Hb 7.7, K 3.7      Dental - normal exam                        Anesthesia Plan  Planned anesthetic: general endotracheal  Transfuse additional 2units PRBC's  ASA 4   Induction: intravenous   Anesthetic plan and risks discussed with: patient    Post-op plan: routine recovery

## 2021-06-17 NOTE — TELEPHONE ENCOUNTER
Telephone Encounter by Noris Harmon LRT at 5/11/2020  2:39 PM     Author: Noris Harmon LRT Service: -- Author Type: Respiratory Therapist    Filed: 5/11/2020  2:49 PM Encounter Date: 5/11/2020 Status: Addendum    : Noris Harmon LRT (Respiratory Therapist)    Related Notes: Original Note by Noris Harmon LRT (Respiratory Therapist) filed at 5/11/2020  2:46 PM       Called patient to inform her of my leave at [facility] at the end of the month and a new RT will be assigned to her. Patient was upset and asked if she was always going to get a new RT assigned to her. She brought up the high turn around with the PCAs at her assisted living due to not getting paid enough and poor benefits. Informed patient that people leave for their own reasons. However, if her assigned RT is not available to assist in an urgent matter with NIV, then another RT would be able to assist, as we are a team here at [facility]. Patient was not happy with this answer. Offered to have my supervisor call her, patient declined, and stated that is the policy and it won't change, so it doesn't matter. [facility] RT will call again next month. Patient not requiring any supplies for this month.    DX: COPD, CRF    SETUP DATE: 4/13/2018  DEVICE TYPE: RESMED ASTRAL  SETTINGS (include mode): PS SV: PS 5-15, EPAP 5, RR 12, , TI .5-1.2  COMFORT SETTINGS: RISE 200, TRIGGER HIGH, CYCLE 50%  INTERFACE: FFM  CIRCUIT/HUMIDITY: STD TUBING/NO HUMIDITY  ALARMS: HIGH PRESSURE 55, APNEA DETECTION 60S, ALARM VOLUME 3  O2 BLEED IN: YES

## 2021-06-17 NOTE — PROGRESS NOTES
Pt was discharged from [facility] to [facility] living [facility].  I met her there to set her up with the Astral Non Invasive Ventilator.  She was given educated on use and maintenance of the NIV and was able to demonstrate good understanding of it.  Her settings are PS SV PS MIN 5 PS MAX 15,  RR 12, PEEP 5, ,  O2 3-4 LPM BLEED IN.  She is using a Resmed F20 medium full face mask.  She will be followed closely by [facility] for any problem and concerns.  Keyonna Joseph RRT  [facility]  [phone]

## 2021-06-17 NOTE — ANESTHESIA POSTPROCEDURE EVALUATION
Patient: Linda Otero  COLONOSCOPY, ESOPHAGOGASTRODUODENOSCOPY (EGD)  Anesthesia type: general    Patient location: PACU  Last vitals:   Vitals:    04/09/18 1556   BP:    Pulse: 84   Resp:    Temp:    SpO2: 100%     Post vital signs: stable  Level of consciousness: awake and responds to simple questions  Post-anesthesia pain: pain controlled  Post-anesthesia nausea and vomiting: no  Pulmonary: ETT  Cardiovascular: stable  Hydration: adequate  Anesthetic events: no    QCDR Measures:  ASA# 11 - Caroline-op Cardiac Arrest: ASA11B - Patient did NOT experience unanticipated cardiac arrest  ASA# 12 - Caroline-op Mortality Rate: ASA12B - Patient did NOT die  ASA# 13 - PACU Re-Intubation Rate: ASA13X - Exclusion: organ donor or direct ICU transfer  ASA# 10 - Composite Anes Safety: ASA10A - No serious adverse event    Additional Notes: Attempted to extubate patient in OR; however, unable to take adequate TV breaths. Propofol gtt started, placed on vent, and monitored for half hour. Attempted again to extubate, this time in recovery area. Patient able to follow commands and shake head to yes/no questions appropriately. FiO2 reduced to 40% and a trial of CPAP was initiated. Patient desatted and unable to make adequate respiratory effort. Spoke with patient who nodded yes to having difficulty breathing and no she didn't feel she could take a breath. Discussed with her that she will be resedated and brought to the ICU on vent. Signout given to Dr. Kaba, intensivist, around 1530.

## 2021-06-17 NOTE — PROGRESS NOTES
Pt was seen in her home for follow up, she was set up with the Astral NIV on 4/13/18.  I have reviewed her download, her usage is 100% daily, she is using the NIV throughout the day to relieve her SOB and during naps also.   NIV settings remain at PS SV PS MIN 5 PS MAX 15,  RR 12, PEEP 5, ,  O2 3-4 LPM BLEED IN. PT settings:  RR 28, , MV 8.8 Ti .66, 0 LEAK  She has adapted to using the NIV very well, [facility] will continue to monitor and make home visit regularly.  Keyonna Joseph RRT  [facility]  [phone]

## 2021-06-17 NOTE — PROGRESS NOTES
CCx: Follow-up for hypoxia likely secondary to a combination of emphysema and obesity hypoventilation syndrome s/p closure of Femoral AV shunt    HPI: Ms. Otero is a 58-year-old lady with a past medical history significant for morbid obesity, cervical carcinoma, COPD, prior history of CVA, diastolic heart failure, right leg DVT, pulmonary emboli on chronic anticoagulation, status post closure of AV shunt, hyperlipidemia, hypertension, MGUS and arthritis amongst other medical problems who presents to clinic for follow-up visit.   Since her last clinic visit she is continue to be compliant with her Spiriva and Breo Ellipta inhalers and uses her albuterol a couple of times a day with activity.  Because of the current pandemic, she is fairly bound to her room and thinks that she may have gained some weight.  Her oxygen saturations at rest are in the high 90s on 1 L of oxygen.  She denies fevers, chills, night sweats, chest pain, chest tightness, night sweats and endorses some minimal abdominal pain.  How often do you cough?: 3  How much phlegm (mucous) do you have in your chest?: 3  How tight does your chest feel?: 2  How breathless are you after climbing a hill or flight of stairs?: 3  How limited are your activities at home?: 0  How confident are you leaving home despite your lung condition?: 0  How soundly do you sleep?: 0  How much energy do you have?: 4  Total Score: 15      Pertinent Medical History:  At her last clinic visit we had noted that the patient had developed severe hypoxia requiring 8-10 L of oxygen to bring her oxygen saturations back up above 88% on ambulation.  I discussed this case with Dr. Montalvo as I was concerned for pulmonary hypertension and the patient underwent both a left and right heart cath which actually revealed significant hypoxia on a central arterial gases.  Subsequent shunt study demonstrated an arteriovenous shunt in her left femoral region that was thought to be from when the  patient had a PFO closure few years previously.   Over the course of the last several months we determined that a significant proportion of her hypoxia was related to the fact that she had developed a left femoral AV fistula likely as a result of her prior coronary procedure several years ago.  She underwent intraoperative imaging studies which did reveal closure of this shunt.  .    ROS:  Positives alluded to in the HPI.  Remainder of 10 point review of systems is negative.    PMH (unchanged from prior visit):  1. Morbid obesity  2. Hypercapnic hypoxemic respiratory failure  3. Cervical carcinoma  4. Very severe gold stage C COPD  5. CVA  6. Diastolic heart failure  7. Right leg DVT  8. Pulmonary emboli on chronic anticoagulation  9. Hyperlipidemia  10. HTN  11. MGUS  12. Arthritis    PSH (unchanged from prior visit):  1. Coronary angiography  2. Colonoscopy    Allergies (unchanged from prior visit):  Reviewed in [facility]    Family HX (unchanged from prior visit):  1. Mother: CVA, tobacco abuse  2. Father: CVA,  at 56 years  3. Brother: MI,  at 52 years    Social Hx (unchanged from prior visit):  Marital status: Single  Number of children: 3 children, the patient has no contact with her children  Resides in a senior assisted living facility.  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 Not to exceed 4000 mg of acetaminophen per 24 hours from any source. (Patient taking differently: Take 500-1,000 mg by mouth every 6 (six) hours as needed for pain or fever. Not to exceed 4000 mg of acetaminophen per 24  hours from any source.       ) 30 tablet 0     albuterol (PROAIR HFA;PROVENTIL HFA;VENTOLIN HFA) 90 mcg/actuation inhaler Inhale 2 puffs every 4 (four) hours as needed. 1 Inhaler 11     aspirin 81 mg chewable tablet Chew 81 mg 2 (two) times a day.       calcium-vitamin D (CALCIUM-VITAMIN D) 500 mg(1,250mg) -200 unit per tablet Take 1 tablet by mouth daily.       cyanocobalamin 1,000 mcg/mL injection Inject 1,000 mcg into the shoulder, thigh, or buttocks every 30 (thirty) days.       DAILY-CELINA tablet        fluticasone furoate-vilanterol (BREO ELLIPTA) 200-25 mcg/dose DsDv inhaler Inhale 1 puff daily. 1 each 12     furosemide (LASIX) 20 MG tablet Take 1 tablet (20 mg total) by mouth 2 (two) times a day at 9am and 6pm. 60 tablet 3     ipratropium-albuterol (DUO-NEB) 0.5-2.5 mg/3 mL nebulizer Take 3 mL by nebulization every 4 (four) hours as needed. 30 vial 0     miscellaneous medical supply Misc Patient needs a new mask.       multivitamin therapeutic tablet Take 1 tablet by mouth daily.       OXYGEN-AIR DELIVERY SYSTEMS MISC 2-3 L/min into each nostril as needed. 2-3L   [facility]             tiotropium bromide (SPIRIVA RESPIMAT) 2.5 mcg/actuation Mist Inhale 2 puffs daily. (Patient taking differently: Inhale 2 puffs every evening.       ) 4 g 0     No current facility-administered medications for this visit.        Labs:  Reviewed.     Imaging studies:  CT Chest w/o Contrast 4/23/21:  1.  Enlarged main pulmonary artery as well as tortuous proximal subsegmental pulmonary arteries around the hilum consistent with chronic pulmonary hypertension.  2.  Similar pattern of lung parenchymal mosaicism. This could represent regional air trapping or alternatively heterogeneous pulmonary vascular resistance (mosaic perfusion) in the setting of pulmonary hypertension high-resolution chest CT with expiratory images would be helpful to distinguish between the two.  3.  Only mild emphysema.  4.  Small, 3 x 5 mm right upper  lobe pulmonary nodule is stable from 2019 consistent with a benign nodule. This does not require any further dedicated imaging follow-up.    (Unchanged from previous visit)  CT Chest W/o Contrast 7/15/20:  1.  Stable pulmonary nodules measuring up to 0.5 cm.    TTE 8/27/19:    1.Left ventricle ejection fraction is normal. The calculated left ventricular ejection fraction is 54%.    2.Normal right ventricular size and systolic function.    3.No hemodynamically significant valvular heart abnormalities, although this was a limited study without complete Doppler assessment of valves performed.    4.Mild pulmonary hypertension suggested.    5.When compared to the previous study dated 10/12/2018, no significant change. Specifically pulmonic pressure is 54 estimated and prior 51 mmHg.     US Operative 7/22/19:  1.  Apparent successful ligation of the left groin AV fistula communication with return of normal venous waveforms to the common femoral vein during this intraoperative procedure.    CT Abdomen Pelvis without oral with IV contrast 6/27/19:  1.  Small fat-containing supraumbilical hernia shows no significant change in size since prior, however, demonstrates minimal new fluid and/or inflammation along this hernia. No adjacent skin thickening. No collections.  2.  Cholelithiasis.  3.  No diverticulitis, appendicitis or bowel obstruction.  4.  Significant mosaicism in the lung bases. Constrictive bronchiolitis not excluded. Airway thickening. Bronchiectasis.      Unchanged from prior visit:  US Arterial Left leg 4/22/19:  1.  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  2.  Although the distinction indication is not identified sonographically on the CTA I favor there to be a small truncation between the proximal left inferior epigastric artery and vein. This is seen on image 184. The patient does state she has had previous percutaneous access in the groin    CV Cath 4/12/19:    The LM vessel was moderate and is  considered normal.    Estimated blood loss was <20 ml.    The LAD vessel was moderate and is considered normal .    The left circumflex was moderate and is considered normal.    The RCA was moderate and is considered normal.     Pt s/p CVA s/p PFO closure with pulm HTN, morbid obesity and dyspnea, along with JODI on CPAP and home oxygen.     Right heart cath  RA mean 10mmHg  PA 55/30/39mmHg  PCWP mean 15  [id number]  Ao 122/68     On 3 l oxygen:  Ao 99%  PA 80%  SVC 76%  IVC 91%     Mix venous calculated 79.75%     CO enid 7.     Angiography via right radial  Normal right dominant coronary anatomy     CTA Chest Abdomen Pelvis 4/12/19:  1.  Findings consistent with arteriovenous fistula between the left common femoral artery and left common femoral vein. Ultrasound may be useful to better define the fistula.  2.  Cholelithiasis.  3.  Colonic diverticulosis.    CT Chest w/o Contrast 1/20/19:  1.  Prominent areas of groundglass opacity, mosaic perfusion and more focal areas of airspace disease likely infectious or inflammatory. Stable pulmonary nodule when compared to most recent study not identified on older exam. Continued follow-up recommended.  2.  Mildly prominent mediastinal adenopathy likely infectious or inflammatory. Follow-up recommended to confirm and exclude other pathology.    TTE with bubble 10/12/18:    Left ventricle is normal in size and systolic function.    The calculated left ventricular ejection fraction is 59%.    Right ventricle is moderately dilated with normal systolic function.    Both atria are significantly enlarged.    No hemodynamically significant valvular heart abnormalities.    Agitated saline bubble study was negative for intracardiac shunt, however the accuracy of this was limited by body habitus and generally poor acoustic windows.    When compared to the previous study dated 4/10/2018, no significant change.    CTA Chest 7/3/18:  1.  No evidence pulmonary embolism.  2.  Enlarged  pulmonary artery and evidence of right ventricular strain consistent with pulmonary hypertension.  3.  Lung mosaic attenuation which could be due to pulmonary hypertension. Other etiologies are hypersensitivity pneumonitis or constrictive bronchiolitis.    PFT's 1/24/20:  FEV1/FVC is 41% and is reduced.  FEV1 is 0.81L (29%) predicted and is reduced.  FVC is 1.97L (56%) predicted and reduced.  There was no improvement in spirometry after a single inhaled dose of bronchodilator.  TLC is 6.31L (119%) predicted and is normal.  RV is 4.34L (220%) predicted and is increased.  DLCO is 9.11ml/min/hg (41%) predicted and is reduced when it is corrected for hemoglobin.  Flow volume loops indicate severe scooping of the expiratory limb.    Impression:  Full Pulmonary Function Test is abnormal.  PFTs are consistent with very severe obstructive disease.  Spirometry is not consistent with reversibility.  There is no hyperinflation.  There is air-trapping.  Diffusion capacity when corrected for hemoglobin is severely reduced.    The ATS criteria for acceptability and reproducibility was met.  The results of cleft testing appear to be valid although ATS criteria were not met despite 8 efforts and only 2 efforts were saved.      PFT's 7/18/18 (unchanged from prior visit):  FEV1/FVC is 46% and is reduced.  FEV1 is 0.80L (29%) predicted and is reduced.  FVC is 1.74L (49%) predicted and reduced.  There was no improvement in spirometry after a single inhaled dose of bronchodilator.  TLC is 6.61L (125%) predicted and is reduced.  RV is 4.88L (250%) predicted and is increased.  DLCO is 8.58ml/min/hg (37%) predicted and is reduced when it is corrected for hemoglobin.  Flow volume loops indicate severe scooping of the expiratory limb.    Impression:  Full Pulmonary Function Test is abnormal.  PFTs are consistent with very severe obstructive disease.  Spirometry is not consistent with reversibility.  There is hyperinflation.  There is  "air-trapping.  Diffusion capacity when corrected for hemoglobin is severely reduced.    Physical Exam:  /82   Pulse 96   Ht 5' 6\" (1.676 m)   LMP  (LMP Unknown)   SpO2 97% Comment: 4 lpm cont  Breastfeeding No   BMI 51.63 kg/m    Physical Exam   Constitutional:   Morbidly obese lady in NAD.   HENT:   Head: Normocephalic and atraumatic.   Eyes: Pupils are equal, round, and reactive to light.   Neck: Normal range of motion.   Cardiovascular:   Sinus tachycardia.  S1 and S2 audible.   Pulmonary/Chest: Effort normal and breath sounds normal.   Abdominal: Soft.   Musculoskeletal:         General: Edema present.   Neurological: She is alert.   Skin: Skin is warm.         Assessment and Plan:Linda Otero is a 60 y.o. with a past medical history significant for morbid obesity, cervical carcinoma, COPD, prior history of CVA, diastolic heart failure, right leg DVT, pulmonary emboli on chronic anticoagulation, hyperlipidemia, hypertension, MGUS and arthritis  who presents to clinic today for follow-up visit for her very severe hypoxia likely related to her OHS/JODI and COPD.  For the present we would recommend;    1. Gold stage C COPD: Likely secondary to her history of tobacco abuse.  Has been compliant with her Breo and Spiriva inhaler and has had days when she has needed to use her albuterol more frequently finds that this is related to the humidity levels.     Continue Spiriva 2 puffs daily.    Reminded her to use Breo 1 puff daily.  Reminded to gargle after using this inhaler.    As needed albuterol for shortness of breath.    Obtain ambulatory oximetry:    The patient desaturated to 83% while walking 150 feet on 3 L supplemental oxygen and was able to maintain her saturations at 88% on 5 L of supplemental oxygen.  2. Pulmonary nodule: Noted to have a stable nodule in the right upper lobe compared to CT scan from July 2018.  No need for further followup.  3. AV fistula in the left femoral region: Underwent " a closure of this at the end of July 2019 and was noted to have radiographic improvement intraoperatively.  Oxygen requirements have significantly improved subsequent to this procedure.  4. Mosaic attenuation on CT scan: May be related to small airways disease given her body habitus versus pulmonary hypertension which has been noted previously on echocardiogram.   5. HCM:     Has been added to the lung cancer screening registry.    Is up-to-date with her influenza vaccine and Pneumovax 23 booster.     Has received her Covid-19 vaccines.  6. Follow-up: 6 months.      Amy Albright MD  Pulmonary and Critical Care  (P) [phone]

## 2021-06-17 NOTE — PROGRESS NOTES
Have been unable to reach Linda by phone.      Per Dr. Albright: please let Linda know that her pulmonary nodule is stable and we do not need to perform any further testing to follow this through.    She does have a follow up visit scheduled with Dr. Albright on 5/6/21

## 2021-06-17 NOTE — PROGRESS NOTES
Oxygen saturation walk test    Patient oxygen saturation on RA at rest is 78%.    Patient oxygen saturation on 3 LPM at rest is 93%.       Oxygen continuous dose testing  While ambulating 150ft on 3LPM continuous dose, oxygen saturation is 83%.    While ambulating 150ft on 5LPM continuous dose, oxygen saturation is 88%.     DME Provider: [place]    Patient is ambulatory within his/her home.

## 2021-06-19 NOTE — PROGRESS NOTES
"CCx: Establishment of care after hospital discharge for COPD    HPI: Ms. Otero is a 58-year-old lady with a past medical history significant for morbid obesity, cervical carcinoma, COPD, prior history of CVA, diastolic heart failure, right leg DVT, pulmonary emboli on chronic anticoagulation, hyperlipidemia, hypertension, MGUS and arthritis amongst other medical problems had a number of recent hospitalizations related to acute on chronic respiratory failure and also with significant anemia.  She was admitted to a transitional care facility after hospitalization earlier in the year and is rehabbing while there.  She states that her shortness of breath is fairly stable that she is able to ambulate with the help of her wheelchair at a fairly fast clip while on supplemental oxygen.  She denies chest pain, chest tightness, nighttime awakenings and is compliant with her AVAPS device.  She states that she uses her Spiriva daily but that she only uses 1 puff a day as she feels like she gets \"high\" if she uses 2 puffs.  She uses albuterol once a day and sometimes more than this depending on how she is feeling.  She states that through the course of this year she has been on fairly regular diuresis and that she has continued to lose significant water weight from this.    ROS:  Positives alluded to in the HPI.  Remainder of 10 point review of systems is negative.    PMH:  1. Morbid obesity  2. Hypercapnic hypoxemic respiratory failure  3. Cervical carcinoma  4. Very severe gold stage C COPD  5. CVA  6. Diastolic heart failure  7. Right leg DVT  8. Pulmonary emboli on chronic anticoagulation  9. Hyperlipidemia  10. HTN  11. MGUS  12. Arthritis    PSH:  1. Coronary angiography  2. Colonoscopy    Allergies:  Reviewed in epic    Family HX:  1. Mother: CVA, tobacco abuse  2. Father: CVA,  at 56 years  3. Brother: MI,  at 52 years    Social Hx:  Marital status: Single  Number of children: 3 children, the patient " has no contact with her children  Resides in a senior assisted living facility.  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  0     albuterol (PROVENTIL HFA;VENTOLIN HFA) 90 mcg/actuation inhaler Inhale 2 puffs every 4 (four) hours as needed.       calcium-vitamin D (CALCIUM-VITAMIN D) 500 mg(1,250mg) -200 unit per tablet Take 1 tablet by mouth daily.       cyanocobalamin 1000 MCG tablet Take 1,000 mcg by mouth daily.       diphenhydrAMINE (BENADRYL) 25 mg capsule Take 1 capsule (25 mg total) by mouth at bedtime as needed for sleep.  0     ferrous sulfate 325 (65 FE) MG tablet Take 1 tablet (325 mg total) by mouth daily with breakfast.  0     furosemide (LASIX) 20 MG tablet Take 1 tablet (20 mg total) by mouth 2 (two) times a day at 9am and 6pm. 60 tablet 3     magnesium oxide (MAG-OX) 400 mg tablet Take 1 tablet (400 mg total) by mouth daily.  0     menthol-zinc oxide (GOLD BOND MEDICATED) 0.8-5 % Powd Apply 1 application topically daily as needed.       OXYGEN-AIR DELIVERY SYSTEMS MISC 3-4 L/min into each nostril as needed.        tiotropium bromide (SPIRIVA RESPIMAT) 2.5 mcg/actuation Mist Inhale 2 puffs daily. 4 g 0     warfarin (COUMADIN) 5 MG tablet Take 10-12.5 mg by mouth See Admin Instructions. Take 12.5 mg on Mon, Wed Fri, and 10 mg four days weekly (on all other days of the week). Adjust dose based on INR results as directed.       fluticasone-vilanterol (BREO ELLIPTA) 200-25 mcg/dose DsDv inhaler Inhale 1 puff daily. 1 each 12     No current facility-administered medications for  this visit.        Labs:  Reviewed.     Imaging studies:  CTA Chest 7/3/18:  1.  No evidence pulmonary embolism.  2.  Enlarged pulmonary artery and evidence of right ventricular strain consistent with pulmonary hypertension.  3.   Lung mosaic attenuation which could be due to pulmonary hypertension. Other etiologies are hypersensitivity pneumonitis or constrictive bronchiolitis.    PFT's 7/18/18:  FEV1/FVC is 46% and is reduced.  FEV1 is 0.80L (29%) predicted and is reduced.  FVC is 1.74L (49%) predicted and reduced.  There was no improvement in spirometry after a single inhaled dose of bronchodilator.  TLC is 6.61L (125%) predicted and is reduced.  RV is 4.88L (250%) predicted and is increased.  DLCO is 8.58ml/min/hg (37%) predicted and is reduced when it is corrected for hemoglobin.  Flow volume loops indicate severe scooping of the expiratory limb.    Impression:  Full Pulmonary Function Test is abnormal.  PFTs are consistent with very severe obstructive disease.  Spirometry is not consistent with reversibility.  There is hyperinflation.  There is air-trapping.  Diffusion capacity when corrected for hemoglobin is severely reduced.    Physical Exam:  /70  Pulse 72  Resp 18  Wt (!) 292 lb (132.5 kg)  LMP  (LMP Unknown)  SpO2 97% Comment: 3LPM  BMI 47.13 kg/m2  Heart Rate: 72  Resp: 18  BP: 112/70  SpO2: 97 % (3LPM)  Weight: 292 lb (132.5 kg)  Physical Exam   Constitutional:   NAD, slightly disinhibited lady.   HENT:   Head: Normocephalic and atraumatic.   Poor dentition.   Eyes: Pupils are equal, round, and reactive to light.   Neck: Normal range of motion.   Cardiovascular: Normal rate and regular rhythm.    Pulmonary/Chest: Effort normal. No respiratory distress. She has no wheezes.   Abdominal: Soft.   Musculoskeletal: Normal range of motion. She exhibits edema.   Neurological: She is alert.   Skin: Skin is warm.         Assessment and Plan:Linda Rodriguezlucerostan is a 58 y.o. with a past medical history  significant for morbid obesity, cervical carcinoma, COPD, prior history of CVA, diastolic heart failure, right leg DVT, pulmonary emboli on chronic anticoagulation, hyperlipidemia, hypertension, MGUS and arthritis  who presents to clinic today for establishment of care for her respiratory disease.  Her pulmonary function testing is consistent with very severe COPD, a CT scan of her chest demonstrates air trapping likely consistent with small airways disease.  For the present we would recommend;    1. Gold stage C COPD: Likely secondary to her history of tobacco abuse.  She is not entirely compliant with her inhaled bronchodilator regimen but is willing to try this as prescribed.    Instructed her to use Spiriva 2 puffs daily.    Initiated Breo 1 puff daily.  It is noted that the patient had a scratchy throat when she tried Advair once in the past and I have instructed her to call us if she has a similar reaction to the Breo.  She was also explained to gargle after using this inhaler.    As needed albuterol for shortness of breath.    Continued compliance to her supplemental oxygen.  2. Small airways disease: Demonstrates mosaic attenuation on her chest CT scan and this would likely be even more pronounced on inspiratory and expiratory cuts likely also leading to worsened diffusion capacity.  She has never had an echocardiogram with a bubble study to look for shunt to explain her diffusion limitation either.    We will presently continue her inhaled steroid therapy and not initiate Singulair or azithromycin presently.    We will likely pursue an echo with a bubble study at the next clinic visit.  3. HCM: Has recently undergone a CT scan of her chest but does qualify for low-dose lung cancer screening.  Given that her CT scan did not demonstrate any abnormalities she will be added to the lung cancer screening registry and undergo CT scan next year.  4. Follow-up: 5-6 weeks.      Amy Albright  Pulmonary and Critical  Care  [id number]

## 2021-06-19 NOTE — LETTER
Letter by Amy Albright MD at      Author: Amy Albright MD Service: -- Author Type: --    Filed:  Encounter Date: 3/19/2019 Status: (Other)         Jayy eHnson MD  [address] [place] [id number]                                  March 19, 2019    Patient: Linda Otero   MR Number: [id number]   YOB: 1960   Date of Visit: 3/19/2019     Dear Dr. Sixto MD:    Thank you for referring Linda Otero to me for evaluation. Below are the relevant portions of my assessment and plan of care.    If you have questions, please do not hesitate to call me. I look forward to following Linda along with you.    Sincerely,        Amy Albright MD            MD Natalee Espana Sakina Batool, MD  3/19/2019  4:58 PM  Sign at close encounter  CCx: Follow-up for COPD    HPI: Ms. Otero is a 58-year-old lady with a past medical history significant for morbid obesity, cervical carcinoma, COPD, prior history of CVA, diastolic heart failure, right leg DVT, pulmonary emboli on chronic anticoagulation, hyperlipidemia, hypertension, MGUS and arthritis amongst other medical problems who presents to clinic for follow-up visit.  She continues to be compliant with her Breo and Spiriva inhalers but states that over the last month or so she has felt more short of breath and has been using her albuterol 3-5 times a day for rescue.  She continues to be active at her nursing home and wonders if we could prescribe her an oxygen concentrator so she can get around more easily.  Her CAT today is 1+ 1+ 0+5+0+0 +3 +3=13    ROS:  Positives alluded to in the HPI.  Remainder of 10 point review of systems is negative.    PMH (unchanged from prior visit):  1. Morbid obesity  2. Hypercapnic hypoxemic respiratory failure  3. Cervical carcinoma  4. Very severe gold stage C COPD  5. CVA  6. Diastolic heart failure  7. Right leg DVT  8. Pulmonary emboli on chronic  anticoagulation  9. Hyperlipidemia  10. HTN  11. MGUS  12. Arthritis    PSH (unchanged from prior visit):  1. Coronary angiography  2. Colonoscopy    Allergies (unchanged from prior visit):  Reviewed in [facility]    Family HX (unchanged from prior visit):  1. Mother: CVA, tobacco abuse  2. Father: CVA,  at 56 years  3. Brother: MI,  at 52 years    Social Hx (unchanged from prior visit):  Marital status: Single  Number of children: 3 children, the patient has no contact with her children  Resides in a senior assisted living facility.  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 aspirin 81 mg chewable tablet Chew 81 mg 2 (two) times a day.     ? acetaminophen (TYLENOL) 500 MG tablet Take 1 tablet (500 mg total) by mouth every 6 (six) hours as needed for pain or fever. Not to exceed 4000 mg of acetaminophen per 24 hours from any source. 30 tablet 0   ? albuterol (PROVENTIL HFA;VENTOLIN HFA) 90 mcg/actuation inhaler Inhale 2 puffs every 4 (four) hours as needed.     ? calcium-vitamin D (CALCIUM-VITAMIN D) 500 mg(1,250mg) -200 unit per tablet Take 1 tablet by mouth daily.     ? cyanocobalamin 1,000 mcg/mL injection Inject 1,000 mcg into the shoulder, thigh, or buttocks every 30 (thirty) days.     ? fluticasone-vilanterol (BREO ELLIPTA) 200-25 mcg/dose DsDv inhaler Inhale 1 puff daily. 1 each 12   ? furosemide (LASIX) 20 MG tablet Take 1 tablet (20 mg total) by mouth 2 (two) times a day at 9am and 6pm. 60 tablet 3   ? ipratropium-albuterol (DUO-NEB) 0.5-2.5 mg/3 mL nebulizer Take 3 mL by nebulization every 4 (four) hours as needed. 30 vial 0   ? multivitamin therapeutic tablet Take 1 tablet by mouth daily.     ? OXYGEN-AIR DELIVERY  SYSTEMS MISC 3-4 L/min into each nostril as needed.      ? tiotropium bromide (SPIRIVA RESPIMAT) 2.5 mcg/actuation Mist Inhale 2 puffs daily. 4 g 0     No current facility-administered medications for this visit.        Labs:  Reviewed.     Imaging studies:  CT Chest w/o Contrast 1/20/19:  1.  Prominent areas of groundglass opacity, mosaic perfusion and more focal areas of airspace disease likely infectious or inflammatory. Stable pulmonary nodule when compared to most recent study not identified on older exam. Continued follow-up recommended.  2.  Mildly prominent mediastinal adenopathy likely infectious or inflammatory. Follow-up recommended to confirm and exclude other pathology.    TTE with bubble 10/12/18:    Left ventricle is normal in size and systolic function.    The calculated left ventricular ejection fraction is 59%.    Right ventricle is moderately dilated with normal systolic function.    Both atria are significantly enlarged.    No hemodynamically significant valvular heart abnormalities.    Agitated saline bubble study was negative for intracardiac shunt, however the accuracy of this was limited by body habitus and generally poor acoustic windows.    When compared to the previous study dated 4/10/2018, no significant change.      Unchanged from prior visit:  CTA Chest 7/3/18:  1.  No evidence pulmonary embolism.  2.  Enlarged pulmonary artery and evidence of right ventricular strain consistent with pulmonary hypertension.  3.  Lung mosaic attenuation which could be due to pulmonary hypertension. Other etiologies are hypersensitivity pneumonitis or constrictive bronchiolitis.    PFT's 7/18/18 (unchanged from prior visit):  FEV1/FVC is 46% and is reduced.  FEV1 is 0.80L (29%) predicted and is reduced.  FVC is 1.74L (49%) predicted and reduced.  There was no improvement in spirometry after a single inhaled dose of bronchodilator.  TLC is 6.61L (125%) predicted and is reduced.  RV is 4.88L (250%)  "predicted and is increased.  DLCO is 8.58ml/min/hg (37%) predicted and is reduced when it is corrected for hemoglobin.  Flow volume loops indicate severe scooping of the expiratory limb.    Impression:  Full Pulmonary Function Test is abnormal.  PFTs are consistent with very severe obstructive disease.  Spirometry is not consistent with reversibility.  There is hyperinflation.  There is air-trapping.  Diffusion capacity when corrected for hemoglobin is severely reduced.    Physical Exam:  /62   Pulse 93   Resp 12   Ht 5' 6\" (1.676 m)   Wt (!) 294 lb (133.4 kg)   LMP  (LMP Unknown)   SpO2 97% Comment: 2 lpm continuous  BMI 47.45 kg/m     Heart Rate: 93  Resp: 12  BP: 112/62  SpO2: 97 % (2 lpm continuous)  Height: 5' 6\" (1.676 m)  Weight: (!) 294 lb (133.4 kg)    Physical Exam   Constitutional:   NAD, slightly disinhibited lady.   HENT:   Head: Normocephalic and atraumatic.   Poor dentition.   Eyes: Pupils are equal, round, and reactive to light.   Neck: Normal range of motion.   Cardiovascular: Normal rate and regular rhythm.   Pulmonary/Chest: Effort normal. No respiratory distress. She has no wheezes.   Abdominal: Soft.   Musculoskeletal: Normal range of motion. She exhibits edema.   Neurological: She is alert.   Skin: Skin is warm.       Assessment and Plan:Linda Otero is a 58 y.o. with a past medical history significant for morbid obesity, cervical carcinoma, COPD, prior history of CVA, diastolic heart failure, right leg DVT, pulmonary emboli on chronic anticoagulation, hyperlipidemia, hypertension, MGUS and arthritis  who presents to clinic today for follow-up visit for her very severe COPD, ?  Small airways disease and concern for significant pulmonary hypertension with desaturation.   For the present we would recommend;    1. Gold stage C COPD: Likely secondary to her history of tobacco abuse.  Presently claims significant compliance with both her Breo and Spiriva has significant worsening " of symptoms.  She was ambulated in clinic and desaturated to the mid 60s and I wonder if her pulmonary hypertension is worsening and contributing to this.  I suspect that should the patient have severe pulmonary hypertension this would be WHO group 3 and thus would not be amenable to treatment with any vasodilator therapy.    Continue Spiriva 2 puffs daily.    Continue Breo 1 puff daily.  Reminded to gargle after using this inhaler.    As needed albuterol for shortness of breath.    Continued compliance to her supplemental oxygen.    I have sent a note to the patient's primary cardiologist Dr. Montalvo to see if or not we could perform a right and left heart cath on the patient to document severity of her pulmonary hypertension.  2. Pulmonary nodule: Noted to have a stable nodule in the right upper lobe compared to CT scan from July 2018.  We will order a CT scan for 18 months from now per the guidelines.  3. Mosaic attenuation on CT scan: May be related to small airways disease given her body habitus versus pulmonary hypertension which has been noted previously on echocardiogram.  She has no evidence of emphysema which is why her reduced DLCO raises concerns for severe pulmonary hypertension.  4. HCM:     Has been added to the lung cancer screening registry.    Will receive a Pneumovax 23 booster today.    Is up-to-date with her influenza vaccine.  5. Follow-up: 4 months.      Amy Duenasqvi  Pulmonary and Critical Care  [id number]

## 2021-06-19 NOTE — LETTER
Letter by Amy Albright MD at      Author: Amy Albright MD Service: -- Author Type: --    Filed:  Encounter Date: 8/28/2019 Status: (Other)         Jayy Henson MD  [address] [place] [id number]                                  August 28, 2019    Patient: Linda Otero   MR Number: [id number]   YOB: 1960   Date of Visit: 8/28/2019     Dear Dr. Sixto MD:    Thank you for referring Linda Otero to me for evaluation. Below are the relevant portions of my assessment and plan of care.    If you have questions, please do not hesitate to call me. I look forward to following Linda along with you.    Sincerely,        Amy Albright MD          CC  No Recipients  Amy Albright MD  8/28/2019  1:12 PM  Sign at close encounter  CCx: Follow-up for hypoxia s/p closure of Femoral AV shunt    HPI: Ms. Otero is a 58-year-old lady with a past medical history significant for morbid obesity, cervical carcinoma, COPD, prior history of CVA, diastolic heart failure, right leg DVT, pulmonary emboli on chronic anticoagulation, hyperlipidemia, hypertension, MGUS and arthritis amongst other medical problems who presents to clinic for follow-up visit.  Over the course of the last several months we determined that a significant proportion of her hypoxia was related to the fact that she had developed a left femoral AV fistula likely as a result of her prior coronary procedure several years ago.  She underwent intraoperative imaging studies which did reveal closure of this shunt.  She is here to follow-up with me after this and states that she has continued to be fairly active and often feels fairly lightheaded when she has a supplemental oxygen on and that this improves once she removes this.  She denies any chest pain, chest tightness, fevers, chills but does describe feeling significant fatigue since the time of her discharge.  She is very keen to get back to her prior  level of functioning.    Pertinent Medical History:  At her last clinic visit we had noted that the patient had developed severe hypoxia requiring 8-10 L of oxygen to bring her oxygen saturations back up above 88% on ambulation.  I discussed this case with Dr. Montalvo as I was concerned for pulmonary hypertension and the patient underwent both a left and right heart cath which actually revealed significant hypoxia on a central arterial gases.  Subsequent shunt study demonstrated an arteriovenous shunt in her left femoral region that was thought to be from when the patient had a PFO closure few years previously.  She was set up to see Dr. Torres in the general surgery clinic to undergo shunt closure and was scheduled to undergo this, however, the procedure has been postponed as the patient needs 2 teeth extracted before this.  She continues to deny any symptoms specifically states that she has no fevers, chills, night sweats or weight change.    ROS:  Positives alluded to in the HPI.  Remainder of 10 point review of systems is negative.    PMH (unchanged from prior visit):  1. Morbid obesity  2. Hypercapnic hypoxemic respiratory failure  3. Cervical carcinoma  4. Very severe gold stage C COPD  5. CVA  6. Diastolic heart failure  7. Right leg DVT  8. Pulmonary emboli on chronic anticoagulation  9. Hyperlipidemia  10. HTN  11. MGUS  12. Arthritis    PSH (unchanged from prior visit):  1. Coronary angiography  2. Colonoscopy    Allergies (unchanged from prior visit):  Reviewed in [facility]    Family HX (unchanged from prior visit):  1. Mother: CVA, tobacco abuse  2. Father: CVA,  at 56 years  3. Brother: MI,  at 52 years    Social Hx (unchanged from prior visit):  Marital status: Single  Number of children: 3 children, the patient has no contact with her children  Resides in a senior assisted living facility.  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 acetaminophen (TYLENOL) 500 MG tablet Take 1 tablet (500 mg total) by mouth every 6 (six) hours as needed for pain or fever. Not to exceed 4000 mg of acetaminophen per 24 hours from any source. (Patient taking differently: Take 500-1,000 mg by mouth every 6 (six) hours as needed for pain or fever. Not to exceed 4000 mg of acetaminophen per 24 hours from any source.       ) 30 tablet 0   ? albuterol (PROAIR HFA;PROVENTIL HFA;VENTOLIN HFA) 90 mcg/actuation inhaler Inhale 2 puffs every 4 (four) hours as needed. 1 Inhaler 11   ? aspirin 81 mg chewable tablet Chew 81 mg 2 (two) times a day.     ? calcium-vitamin D (CALCIUM-VITAMIN D) 500 mg(1,250mg) -200 unit per tablet Take 1 tablet by mouth daily.     ? cyanocobalamin 1,000 mcg/mL injection Inject 1,000 mcg into the shoulder, thigh, or buttocks every 30 (thirty) days.     ? fluticasone-vilanterol (BREO ELLIPTA) 200-25 mcg/dose DsDv inhaler Inhale 1 puff daily. (Patient taking differently: Inhale 1 puff daily as needed.       ) 1 each 12   ? furosemide (LASIX) 20 MG tablet Take 1 tablet (20 mg total) by mouth 2 (two) times a day at 9am and 6pm. 60 tablet 3   ? ipratropium-albuterol (DUO-NEB) 0.5-2.5 mg/3 mL nebulizer Take 3 mL by nebulization every 4 (four) hours as needed. 30 vial 0   ? multivitamin therapeutic tablet Take 1 tablet by mouth daily.     ? OXYGEN-AIR DELIVERY SYSTEMS MISC 2-3 L/min into each nostril as needed. 2-3L   [facility]           ? tiotropium bromide (SPIRIVA RESPIMAT) 2.5 mcg/actuation Mist Inhale 2 puffs daily. (Patient taking differently: Inhale 2 puffs every evening.       ) 4 g 0     No current facility-administered medications for this visit.        Labs:  Reviewed.     Imaging studies:  TTE 8/27/19:    1.Left  ventricle ejection fraction is normal. The calculated left ventricular ejection fraction is 54%.    2.Normal right ventricular size and systolic function.    3.No hemodynamically significant valvular heart abnormalities, although this was a limited study without complete Doppler assessment of valves performed.    4.Mild pulmonary hypertension suggested.    5.When compared to the previous study dated 10/12/2018, no significant change. Specifically pulmonic pressure is 54 estimated and prior 51 mmHg.     US Operative 7/22/19:  1.  Apparent successful ligation of the left groin AV fistula communication with return of normal venous waveforms to the common femoral vein during this intraoperative procedure.    CT Abdomen Pelvis without oral with IV contrast 6/27/19:  1.  Small fat-containing supraumbilical hernia shows no significant change in size since prior, however, demonstrates minimal new fluid and/or inflammation along this hernia. No adjacent skin thickening. No collections.  2.  Cholelithiasis.  3.  No diverticulitis, appendicitis or bowel obstruction.  4.  Significant mosaicism in the lung bases. Constrictive bronchiolitis not excluded. Airway thickening. Bronchiectasis.      Unchanged from prior visit:  US Arterial Left leg 4/22/19:  1.  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  2.  Although the distinction indication is not identified sonographically on the CTA I favor there to be a small truncation between the proximal left inferior epigastric artery and vein. This is seen on image 184. The patient does state she has had previous percutaneous access in the groin    CV Cath 4/12/19:    The LM vessel was moderate and is considered normal.    Estimated blood loss was <20 ml.    The LAD vessel was moderate and is considered normal .    The left circumflex was moderate and is considered normal.    The RCA was moderate and is considered normal.     Pt s/p CVA s/p PFO closure with pulm HTN, morbid obesity  and dyspnea, along with JODI on CPAP and home oxygen.     Right heart cath  RA mean 10mmHg  PA 55/30/39mmHg  PCWP mean 15  [id number]  Ao 122/68     On 3 l oxygen:  Ao 99%  PA 80%  SVC 76%  IVC 91%     Mix venous calculated 79.75%     CO enid 7.     Angiography via right radial  Normal right dominant coronary anatomy     CTA Chest Abdomen Pelvis 4/12/19:  1.  Findings consistent with arteriovenous fistula between the left common femoral artery and left common femoral vein. Ultrasound may be useful to better define the fistula.  2.  Cholelithiasis.  3.  Colonic diverticulosis.    CT Chest w/o Contrast 1/20/19:  1.  Prominent areas of groundglass opacity, mosaic perfusion and more focal areas of airspace disease likely infectious or inflammatory. Stable pulmonary nodule when compared to most recent study not identified on older exam. Continued follow-up recommended.  2.  Mildly prominent mediastinal adenopathy likely infectious or inflammatory. Follow-up recommended to confirm and exclude other pathology.    TTE with bubble 10/12/18:    Left ventricle is normal in size and systolic function.    The calculated left ventricular ejection fraction is 59%.    Right ventricle is moderately dilated with normal systolic function.    Both atria are significantly enlarged.    No hemodynamically significant valvular heart abnormalities.    Agitated saline bubble study was negative for intracardiac shunt, however the accuracy of this was limited by body habitus and generally poor acoustic windows.    When compared to the previous study dated 4/10/2018, no significant change.    CTA Chest 7/3/18:  1.  No evidence pulmonary embolism.  2.  Enlarged pulmonary artery and evidence of right ventricular strain consistent with pulmonary hypertension.  3.  Lung mosaic attenuation which could be due to pulmonary hypertension. Other etiologies are hypersensitivity pneumonitis or constrictive bronchiolitis.    PFT's 7/18/18 (unchanged from  "prior visit):  FEV1/FVC is 46% and is reduced.  FEV1 is 0.80L (29%) predicted and is reduced.  FVC is 1.74L (49%) predicted and reduced.  There was no improvement in spirometry after a single inhaled dose of bronchodilator.  TLC is 6.61L (125%) predicted and is reduced.  RV is 4.88L (250%) predicted and is increased.  DLCO is 8.58ml/min/hg (37%) predicted and is reduced when it is corrected for hemoglobin.  Flow volume loops indicate severe scooping of the expiratory limb.    Impression:  Full Pulmonary Function Test is abnormal.  PFTs are consistent with very severe obstructive disease.  Spirometry is not consistent with reversibility.  There is hyperinflation.  There is air-trapping.  Diffusion capacity when corrected for hemoglobin is severely reduced.    Physical Exam:  /72   Pulse 88   Resp 20   Wt (!) 317 lb 6.4 oz (144 kg)   LMP  (LMP Unknown)   SpO2 92% Comment: RA  BMI 51.23 kg/m     Heart Rate: 88  Resp: 20  BP: 114/72  SpO2: 92 % (RA)  Height: 5' 6\" (1.676 m)  Weight: (!) 317 lb 6.4 oz (144 kg)    Physical Exam   Constitutional:   NAD, slightly disinhibited lady.   HENT:   Head: Normocephalic and atraumatic.   Poor dentition.   Eyes: Pupils are equal, round, and reactive to light.   Neck: Normal range of motion.   Cardiovascular: Normal rate and regular rhythm.   Pulmonary/Chest: Effort normal. No respiratory distress. She has no wheezes.   Abdominal: Soft.   Musculoskeletal: Normal range of motion. She exhibits edema.   Neurological: She is alert.   Skin: Skin is warm.       Assessment and Plan:Linda Otero is a 59 y.o. with a past medical history significant for morbid obesity, cervical carcinoma, COPD, prior history of CVA, diastolic heart failure, right leg DVT, pulmonary emboli on chronic anticoagulation, hyperlipidemia, hypertension, MGUS and arthritis  who presents to clinic today for follow-up visit for her very severe hypoxia following a closure of her AV fistula.  For the " present we would recommend;    1. AV fistula in the left femoral region: Underwent a closure of this at the end of July and was noted to have radiographic improvement intraoperatively.  Oxygen requirements have significantly improved subsequent to this procedure.    We will obtain ambulatory oximetry;     Patient was noted to desaturate to 87% on room air with minimal activity and required 3-4 L of supplemental oxygen to maintain his saturations above 88%.  It was noted that her saturations normalized within a minute of sitting down and her supplemental oxygen was discontinued at that time.  2. Gold stage C COPD: Likely secondary to her history of tobacco abuse.  Presently claims significant compliance with both her Breo and Spiriva and is doing fairly well.    Continue Spiriva 2 puffs daily.    Continue Breo 1 puff daily.  Reminded to gargle after using this inhaler.    As needed albuterol for shortness of breath.    Continued compliance to her supplemental oxygen.    I explained to the patient that I was not entirely clear what proportion of her reduced diffusion capacity was secondary to her arteriovenous fistula and have asked that requested her to undergo repeat pulmonary function testing prior to her next clinic visit with me.  3. Pulmonary nodule: Noted to have a stable nodule in the right upper lobe compared to CT scan from July 2018.  We will order a CT scan for 18 months from now per the guidelines.  4. Mosaic attenuation on CT scan: May be related to small airways disease given her body habitus versus pulmonary hypertension which has been noted previously on echocardiogram.   5. HCM:     Has been added to the lung cancer screening registry.    Is up-to-date with her influenza vaccine and Pneumovax 23 booster.  6. Follow-up: 4 months.      Amy Duenasqvi  Pulmonary and Critical Care  [id number]

## 2021-06-20 NOTE — PROGRESS NOTES
9/18/18 Stopped by patient's assisted living building to check on her and her NIV (non-invasive ventilator). I changed out her supplies (new mask, filters). She was unavailable when I was there, I instructed the staff that was there to have the patient call me so we can discuss her therapy and possible try on some new masks. Her settings are as follows:    PSSV: PS 5-15, RR 12, PEEP 5, SAFETY TIDAL VOLUME: 500  COMFORT SETTINGS: RISE 200, TRIGGER HIGH, TI 0.5-1.2, CYCLE 50%    Suzy, RRT [facility]

## 2021-06-20 NOTE — PROGRESS NOTES
"CCx: Follow-up for COPD    HPI: Ms. Otero is a 58-year-old lady with a past medical history significant for morbid obesity, cervical carcinoma, COPD, prior history of CVA, diastolic heart failure, right leg DVT, pulmonary emboli on chronic anticoagulation, hyperlipidemia, hypertension, MGUS and arthritis amongst other medical problems who presents to clinic for follow-up visit.  Since the last time that she saw me when she had been initiated on Breo in addition to her Spiriva she thinks that her breathing is much better and that she is able to \"run down the hallway\" using her wheelchair for support.  She did experience a COPD exacerbation a couple weeks ago and had called the clinic to activate his COPD action plan.  She received levofloxacin and prednisone.  Notably, she continues to be compliant with his CPAP which she states that she uses for at least 8 hours a night and that she also uses this when she is napping in the afternoon.  Her CAT today is 2+1+2+1+1+2+2+1 = 12    ROS:  Positives alluded to in the HPI.  Remainder of 10 point review of systems is negative.    PMH (unchanged from prior visit):  1. Morbid obesity  2. Hypercapnic hypoxemic respiratory failure  3. Cervical carcinoma  4. Very severe gold stage C COPD  5. CVA  6. Diastolic heart failure  7. Right leg DVT  8. Pulmonary emboli on chronic anticoagulation  9. Hyperlipidemia  10. HTN  11. MGUS  12. Arthritis    PSH (unchanged from prior visit):  1. Coronary angiography  2. Colonoscopy    Allergies (unchanged from prior visit):  Reviewed in [facility]    Family HX (unchanged from prior visit):  1. Mother: CVA, tobacco abuse  2. Father: CVA,  at 56 years  3. Brother: MI,  at 52 years    Social Hx (unchanged from prior visit):  Marital status: Single  Number of children: 3 children, the patient has no contact with her children  Resides in a senior assisted living facility.  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  0     albuterol (PROVENTIL HFA;VENTOLIN HFA) 90 mcg/actuation inhaler Inhale 2 puffs every 4 (four) hours as needed.       calcium-vitamin D (CALCIUM-VITAMIN D) 500 mg(1,250mg) -200 unit per tablet Take 1 tablet by mouth daily.       cyanocobalamin 1000 MCG tablet Take 1,000 mcg by mouth daily.       diphenhydrAMINE (BENADRYL) 25 mg capsule Take 1 capsule (25 mg total) by mouth at bedtime as needed for sleep.  0     ferrous sulfate 325 (65 FE) MG tablet Take 1 tablet (325 mg total) by mouth daily with breakfast.  0     fluticasone-vilanterol (BREO ELLIPTA) 200-25 mcg/dose DsDv inhaler Inhale 1 puff daily. 1 each 12     furosemide (LASIX) 20 MG tablet Take 1 tablet (20 mg total) by mouth 2 (two) times a day at 9am and 6pm. 60 tablet 3     magnesium oxide (MAG-OX) 400 mg tablet Take 1 tablet (400 mg total) by mouth daily.  0     menthol-zinc oxide (GOLD BOND MEDICATED) 0.8-5 % Powd Apply 1 application topically daily as needed.       OXYGEN-AIR DELIVERY SYSTEMS MISC 3-4 L/min into each nostril as needed.        predniSONE (DELTASONE) 20 MG tablet Take 2 tabs (40mg total) daily for 5 days 10 tablet 0     tiotropium bromide (SPIRIVA RESPIMAT) 2.5 mcg/actuation Mist Inhale 2 puffs daily. 4 g 0     warfarin (COUMADIN) 5 MG tablet Take 10-12.5 mg by mouth See Admin Instructions. Take 12.5 mg on Mon, Wed Fri, and 10 mg four days weekly (on all other days of the week). Adjust dose based on INR results as directed.       No current facility-administered medications for this visit.        Labs:  Reviewed.     Imaging studies  "(unchanged from prior visit):  CTA Chest 7/3/18:  1.  No evidence pulmonary embolism.  2.  Enlarged pulmonary artery and evidence of right ventricular strain consistent with pulmonary hypertension.  3.   Lung mosaic attenuation which could be due to pulmonary hypertension. Other etiologies are hypersensitivity pneumonitis or constrictive bronchiolitis.    PFT's 7/18/18 (unchanged from prior visit):  FEV1/FVC is 46% and is reduced.  FEV1 is 0.80L (29%) predicted and is reduced.  FVC is 1.74L (49%) predicted and reduced.  There was no improvement in spirometry after a single inhaled dose of bronchodilator.  TLC is 6.61L (125%) predicted and is reduced.  RV is 4.88L (250%) predicted and is increased.  DLCO is 8.58ml/min/hg (37%) predicted and is reduced when it is corrected for hemoglobin.  Flow volume loops indicate severe scooping of the expiratory limb.    Impression:  Full Pulmonary Function Test is abnormal.  PFTs are consistent with very severe obstructive disease.  Spirometry is not consistent with reversibility.  There is hyperinflation.  There is air-trapping.  Diffusion capacity when corrected for hemoglobin is severely reduced.    Physical Exam:  /64  Pulse 86  Resp 10  Ht 5' 6\" (1.676 m)  Wt (!) 288 lb (130.6 kg)  LMP  (LMP Unknown)  SpO2 92% Comment: 2 ltr  Breastfeeding? No  BMI 46.48 kg/m2  Height: 5' 6\" (1.676 m)  Physical Exam   Constitutional:   NAD, slightly disinhibited lady.   HENT:   Head: Normocephalic and atraumatic.   Poor dentition.   Eyes: Pupils are equal, round, and reactive to light.   Neck: Normal range of motion.   Cardiovascular: Normal rate and regular rhythm.    Pulmonary/Chest: Effort normal. No respiratory distress. She has no wheezes.   Abdominal: Soft.   Musculoskeletal: Normal range of motion. She exhibits edema.   Neurological: She is alert.   Skin: Skin is warm.         Assessment and Plan:Linda Otero is a 58 y.o. with a past medical history significant for " morbid obesity, cervical carcinoma, COPD, prior history of CVA, diastolic heart failure, right leg DVT, pulmonary emboli on chronic anticoagulation, hyperlipidemia, hypertension, MGUS and arthritis  who presents to clinic today for follow-up visit for her very severe COPD, ?  Small airways disease and concern for significant pulmonary hypertension.   For the present we would recommend;    1. Gold stage C COPD: Likely secondary to her history of tobacco abuse.  Presently claims significant compliance with both her Breo and Spiriva and is symptomatically significantly improved.    Continue Spiriva 2 puffs daily.    Continue Breo 1 puff daily.  Reminded to gargle after using this inhaler.    As needed albuterol for shortness of breath.    Continued compliance to her supplemental oxygen.  2. Mosaic attenuation on CT scan: May be related to small airways disease given her body habitus versus pulmonary hypertension which has been noted previously on echocardiogram.  She has no evidence of emphysema which is why her reduced DLCO raises concerns for severe pulmonary hypertension.    I have ordered an echocardiogram with a bubble study to look for pulmonary hypertension which has been seen before.  If observed, we would likely recommend a right and left heart cath.  3. HCM:     Has been added to the lung cancer screening registry.    Will receive a Pneumovax 23 booster today.    Is up-to-date with her influenza vaccine.  4. Follow-up: 4 months.      Amy Albright  Pulmonary and Critical Care  [id number]

## 2021-06-20 NOTE — LETTER
Letter by Pam Messer RN at      Author: Pam Messer RN Service: -- Author Type: --    Filed:  Encounter Date: 6/23/2020 Status: (Other)         Linda Otero  [address] [place] [address]      June 23, 2020      Dear Nehal Mckeon from the [facility]!  Our records show that you are due next month for a follow-up CT scan of your lungs and visit with your doctor. We are currently doing virtual patient visits at this time. Please call us when it is most convenient to schedule your scan and appointment with your doctor.       If we do not hear from you, we will contact you in the coming weeks to schedule by phone.     Location: [address]                  [place] [address]  Phone: [phone]     If you need to cancel or reschedule your appointment, please notify us at least 24 hours prior to your appointment time so we are able to make this time available for another patient.     Thank you for choosing [facility] for your health care needs.     Sincerely,  [facility] Staff

## 2021-06-21 NOTE — PROGRESS NOTES
HOME VISIT APPT DATE: 10/30/18    PS SVT:   PS 5-15, Peep 5, RR 12, Rise 200, Ti .5-1.2, Vt 500, Trig high, cycle 50%      ASSESSMENT: BREATH SOUNDS clear and diminished RR 18       HR 76,  SpO2 95% on 3lpm nasal cannula ORIENTATION/WAKEFULNESS: appropriate    VENT CHECK: done  LEARN CIRCUIT: not done  ALARMS: checked, off    Saw pt in her home for a NIV monthly home visit. She wanted to try a new mask today, Tried on the Dreamwear FFm in medium and it did not work, blowing a lot of air in her eyes. She will continue to use her F20 in medium until the F30s come out next month, then we will try that one. I went over how to turn on and off her NIV, as she has been leaving it running full time. I had her practice this a few times. I also changed her tubing and both filters today. She is not very confident in changing out supplies herself yet. She feels the pressures still feel good and is very compliant with machine. Noted in HE Epic.  Linked my note to her Pulmonologist, Dr. Albright.       Suzy, RRT, [facility]  [phone]

## 2021-06-22 NOTE — PROGRESS NOTES
GENERAL SURGICAL CONSULTATION    I was requested by Jayy Henson MD to consult on this pt to evaluate them for ventral and umbilical hernias    HPI:  This is a 58 y.o. female here today with complaints of abdominal pain.  The patient was recently in the [facility] emergency department for evaluation of a lump on the abdominal wall which looks to be a small ventral hernias above the umbilicus a bit of an umbilical hernia as well, and both of these hernias look to be fat-containing.    Allergies:Penicillins; Symbicort [budesonide-formoterol]; Amoxicillin; Clindamycin; Eliquis [apixaban]; Erythromycin; Peanut; Peanut; Peanut oil; Peanut oil; and Tetracycline    Past Medical History:   Diagnosis Date     Aneurysm (H) 2012     Arthritis     toes, thumb, elbow     Cancer (H) 1985    cervical     Cellulitis right foot     COPD (chronic obstructive pulmonary disease) (H)      CVA (cerebral vascular accident) (H) 12/23/2012     DVT (deep venous thrombosis) (H)     Right leg     History of transfusion      Hyperlipidemia      Hypertension      Obesity      Pulmonary emboli (H) 2013     Skin cancer 2014     Stroke (H) 2012       Past Surgical History:   Procedure Laterality Date     CARDIAC SURGERY  11/2012    2 stents      CERVICAL CONE BIOPSY  1985     COLONOSCOPY N/A 4/9/2018    Procedure: COLONOSCOPY;  Surgeon: Dorene Araujo MD;  Location: [facility];  Service:      ESOPHAGOGASTRODUODENOSCOPY N/A 4/9/2018    Procedure: ESOPHAGOGASTRODUODENOSCOPY (EGD);  Surgeon: Dorene Araujo MD;  Location: [facility];  Service:      SKIN BIOPSY         CURRENT MEDS:  Current Outpatient Medications   Medication Sig Dispense Refill     acetaminophen (TYLENOL) 500 MG tablet Take 500-1,000 mg by mouth every 6 (six) hours as needed for pain or fever. Not to exceed 4000 mg of acetaminophen per 24 hours from any source.       acetaminophen-codeine (TYLENOL #3) 300-30 mg per tablet Take 1 tablet by mouth every 6 (six)  hours as needed for pain. Not to exceed 4000 mg of acetaminophen per 24 hours from any source.       albuterol (PROVENTIL HFA;VENTOLIN HFA) 90 mcg/actuation inhaler Inhale 2 puffs every 4 (four) hours as needed.       bisacodyl (GENTLE LAXATIVE) 5 mg EC tablet Take 5 mg by mouth daily as needed.       calcium-vitamin D (CALCIUM-VITAMIN D) 500 mg(1,250mg) -200 unit per tablet Take 1 tablet by mouth daily.       cyanocobalamin 1,000 mcg/mL injection Inject 1,000 mcg into the shoulder, thigh, or buttocks every 30 (thirty) days.       diphenhydrAMINE (BENADRYL) 25 mg capsule Take 1 capsule (25 mg total) by mouth at bedtime as needed for sleep.  0     fluticasone-vilanterol (BREO ELLIPTA) 200-25 mcg/dose DsDv inhaler Inhale 1 puff daily. 1 each 12     furosemide (LASIX) 20 MG tablet Take 1 tablet (20 mg total) by mouth 2 (two) times a day at 9am and 6pm. 60 tablet 3     menthol-zinc oxide (GOLD BOND MEDICATED) 0.8-5 % Powd Apply 1 application topically daily as needed.       multivitamin therapeutic tablet Take 1 tablet by mouth daily.       OXYGEN-AIR DELIVERY SYSTEMS MISC 3-4 L/min into each nostril as needed.        tiotropium bromide (SPIRIVA RESPIMAT) 2.5 mcg/actuation Mist Inhale 2 puffs daily. 4 g 0     warfarin (COUMADIN) 5 MG tablet Take 12.5-15 mg by mouth See Admin Instructions. Take 15 mg one day weekly (on Fridays) and 12.5 mg six days weekly (on all other days of the week). Adjust dose based on INR results as directed.             No current facility-administered medications for this visit.        Family History   Problem Relation Age of Onset     Stroke Father      Heart attack Brother      Family history is not pertinent to this patients Chief Complaint.     reports that she quit smoking about 7 years ago. she has never used smokeless tobacco. She reports that she does not drink alcohol or use drugs.    Review of Systems -   10 point Review of systems is negative except for; as mentioned above in HPI and  PMHx    LMP  (LMP Unknown)   There is no height or weight on file to calculate BMI.    EXAM:  GENERAL: Morbidly obese female,  On O2   HEENT: EOMI, Anicteric Sclera, Moist Mucous Membranes,  In Mouth the pt does not have redness or bleeding gums  CARDIOVASCULAR: RRR w/out murmur   CHEST/LUNG: Clear to Auscultation  ABDOMEN:  Non tender to palpation, +BS, palpable mass along the abdominal wall midline (hernia)  MUSCULOSKELETAL:  No deformities with good range of motion in all extremities  NEURO: She is ambulatory with good strength in both legs.  HEME/LYMPH: No Cervical Adenopathy or tenderness.     IMAGES:  CT ABDOMEN PELVIS WO ORAL WO IV CONTRAST  1/4/2019 1:39 PM     INDICATION: Abdominal pain. Mass right anterior. Evaluate for incarceration hernia.  TECHNIQUE: Routine CT abdomen and pelvis without oral or IV contrast. Multiplanar reformation images (MPR). Dose reduction techniques were used.  COMPARISON: 10/18/2013 and 7/3/2018     FINDINGS:  LUNG BASES: Again seen is mosaic attenuation at the lung bases, which could be seen with small airways disease or small vessel disease.      ABDOMEN: Normal spleen, pancreas, adrenal glands, and liver. There is cholelithiasis. Normal kidneys and ureters. Normal stomach. Normal caliber of the small bowel.      PELVIS: Normal urinary bladder. Normal uterus. No adnexal masses. There is colonic diverticulosis without diverticulitis. Normal appendix.      MUSCULOSKELETAL: There is a fat and fluid containing ventral abdominal wall hernia with an opening measuring 9 mm transverse by 8 mm craniocaudal which lies 3 cm cephalad to and 1.5 cm to the right of the umbilicus. There is minimal stranding of the fat   in the hernia. There is a small fat-containing umbilical hernia.      IMPRESSION:   CONCLUSION:  1.  Small fat and fluid containing ventral abdominal wall hernia. Mild fat stranding in the hernia suggests strangulation or other acute process.  2.  Colonic diverticulosis without  diverticulitis.    Assessment/Plan:  Pt who has a symptomatic ventral hernia.  She has COPD and is on O2 at rest.    Laparoscopic Ventral Hernia Repair    Balbir Lopez MD  [facility]  [phone]

## 2021-06-22 NOTE — TELEPHONE ENCOUNTER
"PCP Dr Henson @ [facility].   \"I think I have a hernia\" I have this bulge coming out of my stomach. About a month ago it was soft. I have lost 66 #. It feels like a golf ball. A week ago it began turning hard. It is up from my belly button 2-3\" to the right. If I push on it it hurts. If not touched it doesn't bother me. Once when I was lying on my right side there was a dull ache.\" When lying on her back it goes inside some, but she can still feel it.   She states she has COPD and is on oxygen: improved since she lost the weight.   She states she lost 22# within 24 hrs on Lasix.   Also has a stent in her heart and is on anticoagulant.   Feels much better since she lost the weight. More active.     Reason for Disposition    Can't reduce the hernia (NO pain, local tenderness, or vomiting)    Protocols used: HERNIA-A-    Appointment 1/10/2018 Heart Dr. Sr Tal Crowder will not be seeing until 1/14/2018.     Triaged to a disposition of See Physician within 24 hrs. She lives near --intends to go to  ER if worsening sx.   Also given [facility]: General surgery office number.   Advised to see primary MD first and then she may be referred to surgeon if appropriate.     Lizzeth Galloway RN [facility] Triage Nurse  "

## 2021-06-23 ENCOUNTER — AMBULATORY - HEALTHEAST (OUTPATIENT)
Dept: PULMONOLOGY | Facility: OTHER | Age: 61
End: 2021-06-23

## 2021-06-23 DIAGNOSIS — J44.9 COPD (CHRONIC OBSTRUCTIVE PULMONARY DISEASE) (H): ICD-10-CM

## 2021-06-23 NOTE — PROGRESS NOTES
[facility] Care Note    Assessment/Plan:    Linda Otero is a 58 y.o. old female with:  1. Pre op  For hernia repair with normal ECG and normal echo - no change in exercise tolerance, pt low risk for cardiovascular event with surgery and would proceed  2. CVA presumed due to paradoxical embolus s/p closure of PFO - no hx of afib, would not require warfarin to prevent cardiac related source (eg paradoxical embolus or afib).      Follow up prn    Fax to Dr. Henson [phone]      Dr. Brett Montalvo  [facility] HEART [facility]  [phone]  ______________________________________________________________________    Subjective:    I had the opportunity to see Linda Otero at the [facility]. Linda Otero is a 58 y.o. female with a known history of CVA s/p PFO closure, JODI COPD, here for discussion of warfarin post CVA and pt planning on hernia surgery.  Pt with increase in exercise tolerance ove rht epast several months trying to lose wt for surgery. No chest pain/pressure, PND, orhtonp;ea, uses CPAP for JODI.  Spokeiw htDr. Sixto andsonja both could not find a cardiac reason warfarin.    ______________________________________________________________________      Review of Systems:                                            Problem List:  Patient Active Problem List   Diagnosis     Cellulitis and abscess of foot     DVT (deep venous thrombosis) (H)     Hypertension     Hyperlipidemia     Obesity     Chronic obstructive pulmonary disease, unspecified COPD type (H)     Microcytic anemia     Obstructive sleep apnea treated with BiPAP     Chronic respiratory failure with hypoxia (H)     Iron deficiency     History of DVT (deep vein thrombosis)     HARITHA (iron deficiency anemia)     Acute on chronic respiratory failure with hypoxia and hypercapnia (H)     Morbid obesity (H)     Acute on chronic respiratory failure with hypercapnia (H)     Acute on chronic diastolic heart failure (H)     Pneumonia,  "organism unspecified(486)     Warfarin-induced coagulopathy (H)     Leukocytosis, unspecified type     History of CVA (cerebrovascular accident)     MGUS (monoclonal gammopathy of unknown significance)     SONAL (acute kidney injury) (H)     Medical History:  Past Medical History:   Diagnosis Date     Aneurysm (H) 2012     Arthritis     toes, thumb, elbow     Cancer (H) 1985    cervical     Cellulitis right foot     COPD (chronic obstructive pulmonary disease) (H)      CVA (cerebral vascular accident) (H) 12/23/2012     DVT (deep venous thrombosis) (H)     Right leg     History of transfusion      Hyperlipidemia      Hypertension      Obesity      Pulmonary emboli (H) 2013     Skin cancer 2014     Stroke (H) 2012     Surgical History:  Past Surgical History:   Procedure Laterality Date     CARDIAC SURGERY  11/2012    2 stents      CERVICAL CONE BIOPSY  1985     COLONOSCOPY N/A 4/9/2018    Procedure: COLONOSCOPY;  Surgeon: Dorene Araujo MD;  Location: [facility];  Service:      ESOPHAGOGASTRODUODENOSCOPY N/A 4/9/2018    Procedure: ESOPHAGOGASTRODUODENOSCOPY (EGD);  Surgeon: Dorene Araujo MD;  Location: [facility];  Service:      SKIN BIOPSY       Social History:  No pertinent social hx related to patient's chief complaint other than above in subjective        Family History:  No pertinent family hx related to patient's chief complaint other than above in subjective      Allergies:  Allergies   Allergen Reactions     Penicillins Anaphylaxis, Shortness Of Breath and Swelling     Symbicort [Budesonide-Formoterol] Shortness Of Breath     Amoxicillin Other (See Comments)     Chest tightness     Clindamycin Other (See Comments)     Chest tightness     Eliquis [Apixaban] Hives and Itching     Hives/hives down throat was the reaction based off of information from the patient on 12/3/2015. \"Itching\"/\"Eye itching and swelling, throat itching\" per prior allergy documentation.     Erythromycin Unknown     " Peanut Hives     Peanut Hives     Peanut Oil      Peanut Oil      Tetracycline Unknown       Medications:  Current Outpatient Medications   Medication Sig Dispense Refill     acetaminophen (TYLENOL) 500 MG tablet Take 500-1,000 mg by mouth every 6 (six) hours as needed for pain or fever. Not to exceed 4000 mg of acetaminophen per 24 hours from any source.       albuterol (PROVENTIL HFA;VENTOLIN HFA) 90 mcg/actuation inhaler Inhale 2 puffs every 4 (four) hours as needed.       bisacodyl (GENTLE LAXATIVE) 5 mg EC tablet Take 5 mg by mouth daily as needed.       calcium-vitamin D (CALCIUM-VITAMIN D) 500 mg(1,250mg) -200 unit per tablet Take 1 tablet by mouth daily.       cyanocobalamin 1,000 mcg/mL injection Inject 1,000 mcg into the shoulder, thigh, or buttocks every 30 (thirty) days.       diphenhydrAMINE (BENADRYL) 25 mg capsule Take 1 capsule (25 mg total) by mouth at bedtime as needed for sleep.  0     fluticasone-vilanterol (BREO ELLIPTA) 200-25 mcg/dose DsDv inhaler Inhale 1 puff daily. 1 each 12     furosemide (LASIX) 20 MG tablet Take 1 tablet (20 mg total) by mouth 2 (two) times a day at 9am and 6pm. 60 tablet 3     menthol-zinc oxide (GOLD BOND MEDICATED) 0.8-5 % Powd Apply 1 application topically daily as needed.       multivitamin therapeutic tablet Take 1 tablet by mouth daily.       OXYGEN-AIR DELIVERY SYSTEMS MISC 3-4 L/min into each nostril as needed.        tiotropium bromide (SPIRIVA RESPIMAT) 2.5 mcg/actuation Mist Inhale 2 puffs daily. 4 g 0     warfarin (COUMADIN) 5 MG tablet Take 12.5-15 mg by mouth See Admin Instructions. Take 15 mg one day weekly (on Fridays) and 12.5 mg six days weekly (on all other days of the week). Adjust dose based on INR results as directed.             No current facility-administered medications for this visit.        Objective:   Vital signs:  LMP  (LMP Unknown)       Physical Exam:    GENERAL APPEARANCE: Alert, cooperative and in no acute distress.  HEENT: No scleral  icterus. No Xanthelasma. Oral mucuos membranes pink and moist.  NECK: JVP<6cm. No Hepatojugular reflux. Thyroid not visualized  CHEST: clear to auscultation  CARDIOVASCULAR: S1, S2 without murmur ,clicks or rubs. Brachial, radial and posterior tibial pulses are intact and symetric. No carotid bruits noted.    ABDOMEN: Nontender. BS+. No bruits.  EXTREMITIES: No cyanosis, clubbing or edema.    Lab Results:  LIPIDS:  Lab Results   Component Value Date    CHOL 129 12/24/2012     Lab Results   Component Value Date    HDL 25 (L) 12/24/2012     Lab Results   Component Value Date    LDLCALC 75 12/24/2012     Lab Results   Component Value Date    TRIG 142 12/24/2012    TRIG 142 12/24/2012     No components found for: CHOLHDL    BMP:  Lab Results   Component Value Date    CREATININE 1.03 01/04/2019    BUN 18 01/04/2019     01/04/2019    K 3.8 01/04/2019     01/04/2019    CO2 31 01/04/2019         Brett Montalvo MD  [facility]  [phone]

## 2021-06-24 ENCOUNTER — AMBULATORY - HEALTHEAST (OUTPATIENT)
Dept: PULMONOLOGY | Facility: OTHER | Age: 61
End: 2021-06-24

## 2021-06-24 DIAGNOSIS — J44.1 COPD EXACERBATION (H): ICD-10-CM

## 2021-06-25 NOTE — PROGRESS NOTES
CCx: Follow-up for COPD    HPI: Ms. Otero is a 58-year-old lady with a past medical history significant for morbid obesity, cervical carcinoma, COPD, prior history of CVA, diastolic heart failure, right leg DVT, pulmonary emboli on chronic anticoagulation, hyperlipidemia, hypertension, MGUS and arthritis amongst other medical problems who presents to clinic for follow-up visit.  She continues to be compliant with her Breo and Spiriva inhalers but states that over the last month or so she has felt more short of breath and has been using her albuterol 3-5 times a day for rescue.  She continues to be active at her nursing home and wonders if we could prescribe her an oxygen concentrator so she can get around more easily.  Her CAT today is 1+ 1+ 0+5+0+0 +3 +3=13    ROS:  Positives alluded to in the HPI.  Remainder of 10 point review of systems is negative.    PMH (unchanged from prior visit):  1. Morbid obesity  2. Hypercapnic hypoxemic respiratory failure  3. Cervical carcinoma  4. Very severe gold stage C COPD  5. CVA  6. Diastolic heart failure  7. Right leg DVT  8. Pulmonary emboli on chronic anticoagulation  9. Hyperlipidemia  10. HTN  11. MGUS  12. Arthritis    PSH (unchanged from prior visit):  1. Coronary angiography  2. Colonoscopy    Allergies (unchanged from prior visit):  Reviewed in epic    Family HX (unchanged from prior visit):  1. Mother: CVA, tobacco abuse  2. Father: CVA,  at 56 years  3. Brother: MI,  at 52 years    Social Hx (unchanged from prior visit):  Marital status: Single  Number of children: 3 children, the patient has no contact with her children  Resides in a senior assisted living facility.  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       acetaminophen (TYLENOL) 500 MG tablet Take 1 tablet (500 mg total) by mouth every 6 (six) hours as needed for pain or fever. Not to exceed 4000 mg of acetaminophen per 24 hours from any source. 30 tablet 0     albuterol (PROVENTIL HFA;VENTOLIN HFA) 90 mcg/actuation inhaler Inhale 2 puffs every 4 (four) hours as needed.       calcium-vitamin D (CALCIUM-VITAMIN D) 500 mg(1,250mg) -200 unit per tablet Take 1 tablet by mouth daily.       cyanocobalamin 1,000 mcg/mL injection Inject 1,000 mcg into the shoulder, thigh, or buttocks every 30 (thirty) days.       fluticasone-vilanterol (BREO ELLIPTA) 200-25 mcg/dose DsDv inhaler Inhale 1 puff daily. 1 each 12     furosemide (LASIX) 20 MG tablet Take 1 tablet (20 mg total) by mouth 2 (two) times a day at 9am and 6pm. 60 tablet 3     ipratropium-albuterol (DUO-NEB) 0.5-2.5 mg/3 mL nebulizer Take 3 mL by nebulization every 4 (four) hours as needed. 30 vial 0     multivitamin therapeutic tablet Take 1 tablet by mouth daily.       OXYGEN-AIR DELIVERY SYSTEMS MISC 3-4 L/min into each nostril as needed.        tiotropium bromide (SPIRIVA RESPIMAT) 2.5 mcg/actuation Mist Inhale 2 puffs daily. 4 g 0     No current facility-administered medications for this visit.        Labs:  Reviewed.     Imaging studies:  CT Chest w/o Contrast 1/20/19:  1.  Prominent areas of groundglass opacity, mosaic perfusion and more focal areas of airspace disease likely infectious or inflammatory. Stable pulmonary nodule when compared to most recent study not identified on older exam. Continued follow-up recommended.  2.  Mildly prominent mediastinal adenopathy likely infectious or inflammatory. Follow-up recommended to confirm and exclude other pathology.    TTE with bubble 10/12/18:    Left ventricle is normal in size and systolic function.    The calculated left ventricular ejection  "fraction is 59%.    Right ventricle is moderately dilated with normal systolic function.    Both atria are significantly enlarged.    No hemodynamically significant valvular heart abnormalities.    Agitated saline bubble study was negative for intracardiac shunt, however the accuracy of this was limited by body habitus and generally poor acoustic windows.    When compared to the previous study dated 4/10/2018, no significant change.      Unchanged from prior visit:  CTA Chest 7/3/18:  1.  No evidence pulmonary embolism.  2.  Enlarged pulmonary artery and evidence of right ventricular strain consistent with pulmonary hypertension.  3.  Lung mosaic attenuation which could be due to pulmonary hypertension. Other etiologies are hypersensitivity pneumonitis or constrictive bronchiolitis.    PFT's 7/18/18 (unchanged from prior visit):  FEV1/FVC is 46% and is reduced.  FEV1 is 0.80L (29%) predicted and is reduced.  FVC is 1.74L (49%) predicted and reduced.  There was no improvement in spirometry after a single inhaled dose of bronchodilator.  TLC is 6.61L (125%) predicted and is reduced.  RV is 4.88L (250%) predicted and is increased.  DLCO is 8.58ml/min/hg (37%) predicted and is reduced when it is corrected for hemoglobin.  Flow volume loops indicate severe scooping of the expiratory limb.    Impression:  Full Pulmonary Function Test is abnormal.  PFTs are consistent with very severe obstructive disease.  Spirometry is not consistent with reversibility.  There is hyperinflation.  There is air-trapping.  Diffusion capacity when corrected for hemoglobin is severely reduced.    Physical Exam:  /62   Pulse 93   Resp 12   Ht 5' 6\" (1.676 m)   Wt (!) 294 lb (133.4 kg)   LMP  (LMP Unknown)   SpO2 97% Comment: 2 lpm continuous  BMI 47.45 kg/m    Heart Rate: 93  Resp: 12  BP: 112/62  SpO2: 97 % (2 lpm continuous)  Height: 5' 6\" (1.676 m)  Weight: (!) 294 lb (133.4 kg)    Physical Exam   Constitutional:   NAD, slightly " disinhibited lady.   HENT:   Head: Normocephalic and atraumatic.   Poor dentition.   Eyes: Pupils are equal, round, and reactive to light.   Neck: Normal range of motion.   Cardiovascular: Normal rate and regular rhythm.   Pulmonary/Chest: Effort normal. No respiratory distress. She has no wheezes.   Abdominal: Soft.   Musculoskeletal: Normal range of motion. She exhibits edema.   Neurological: She is alert.   Skin: Skin is warm.       Assessment and Plan:Linda Otero is a 58 y.o. with a past medical history significant for morbid obesity, cervical carcinoma, COPD, prior history of CVA, diastolic heart failure, right leg DVT, pulmonary emboli on chronic anticoagulation, hyperlipidemia, hypertension, MGUS and arthritis  who presents to clinic today for follow-up visit for her very severe COPD, ?  Small airways disease and concern for significant pulmonary hypertension with desaturation.   For the present we would recommend;    1. Gold stage C COPD: Likely secondary to her history of tobacco abuse.  Presently claims significant compliance with both her Breo and Spiriva has significant worsening of symptoms.  She was ambulated in clinic and desaturated to the mid 60s and I wonder if her pulmonary hypertension is worsening and contributing to this.  I suspect that should the patient have severe pulmonary hypertension this would be WHO group 3 and thus would not be amenable to treatment with any vasodilator therapy.    Continue Spiriva 2 puffs daily.    Continue Breo 1 puff daily.  Reminded to gargle after using this inhaler.    As needed albuterol for shortness of breath.    Continued compliance to her supplemental oxygen.    I have sent a note to the patient's primary cardiologist Dr. Montalvo to see if or not we could perform a right and left heart cath on the patient to document severity of her pulmonary hypertension.  2. Pulmonary nodule: Noted to have a stable nodule in the right upper lobe compared to CT scan  from July 2018.  We will order a CT scan for 18 months from now per the guidelines.  3. Mosaic attenuation on CT scan: May be related to small airways disease given her body habitus versus pulmonary hypertension which has been noted previously on echocardiogram.  She has no evidence of emphysema which is why her reduced DLCO raises concerns for severe pulmonary hypertension.  4. HCM:     Has been added to the lung cancer screening registry.    Will receive a Pneumovax 23 booster today.    Is up-to-date with her influenza vaccine.  5. Follow-up: 4 months.      Amy Duenasqvi  Pulmonary and Critical Care  [phone]

## 2021-06-25 NOTE — PROGRESS NOTES
Oxygen saturation walk test    Patient oxygen saturation on RA at rest is 90-91%.  Oxygen saturation after ambulating 150ft on RA is 65%.     Oxygen continuous dose testing  While ambulating 150ft on 2LPM continuous dose, oxygen saturation is 77%.  While ambulating 150ft on 4LPM continuous dose, oxygen saturation is 79%.      DME Provider: [facility]    Patient is ambulatory within his/her home.

## 2021-06-25 NOTE — TELEPHONE ENCOUNTER
Spoke with Linda, she had a few questions regarding her inhaler and chest tightness, I suggested that she have one of the staff members listen to her lungs when she feels it and see if she is wheezing and then after she has taken the inhaler in see if she is more open then and possibly wheezing.  This would help her know if it is her lungs causing the chest tightness feeling she has been getting  She is doing well otherwise.  Wished her Happy Birthday and let her know to call with any questions.  Priya Villatoro, LRT, COPD Educator

## 2021-06-26 NOTE — PROGRESS NOTES
Progress Notes by Suzy Weaver LRT at 7/31/2018  2:40 PM     Author: Suzy Weaver LRT Service: -- Author Type: Respiratory Therapist    Filed: 7/31/2018  3:03 PM Encounter Date: 7/31/2018 Status: Signed    : Suzy Weaver LRT (Respiratory Therapist)       [facility] is assisting this patient with her home NIV (non-invasive ventilator). She was originally set up with this on 4/13/18 after discharging from the hospital. We have been doing home assessments with her monthly, and we last saw her 7/23/18. She is currently on the ResMed Astral, in PSSVT mode (AVAPS). She is using this for her chronic respiratory failure/COPD/ JODI. Her settings are as follows:   PS: 5-15, EPAP 5, Safety , RR 12. With her normal oxygen liter flow bled in. She feels the machine is working well for her and has no issues at this time. She has the machine running all day, but her usage is great at night and as needed during the day.     She has some concerns about her oxygen prescription. She currently has her oxygen through another company, but I told her I would relay the message. She said her current prescription is for 6lpm with activity, and she wants that lowered. I explained to her that is something that her Doctor would need to decide if appropriate for her. She is asking because she wants a small portable concentrator instead of her tanks. Here is a snapshot example of a recent nights sleep data on her NIV:                                                   Please contact [facility] ([facility]) for any questions.  [phone]    TJ [place]- direct line [phone]

## 2021-06-27 NOTE — PROGRESS NOTES
Progress Notes by Suzy Weaver LRT at 8/13/2019  9:37 AM     Author: Suzy Weaver LRT Service: -- Author Type: Respiratory Therapist    Filed: 8/13/2019  9:49 AM Encounter Date: 8/13/2019 Status: Signed    : Suzy Weaver LRT (Respiratory Therapist)       8/12/19 I saw patient today for Non-invasive ventilator home visit. She states everything is going ok with her machine, and has no questions or concerns about the pressures at this time. She wears her oxygen (nasal cannula) under her mask.     Vitals: HR 79, RR 16, Spo2 96% on 3lpm    NIV Settings: PS-SVT (pressure support with safety tidal volume:   PS-5-15, PEEP 5, RR: 12, STV: 500, Ti: .5-1.2, trigger: high, cycle: 50%    Pt values: PIP 11, peep 5, rr 18, vte: 650, mve: 11    She has an appointment with her pulmonologist on 8/28/19. Will route this note to her and faxing renewal RX to her.     7 day therapy report:                           Suzy SALAS RRT  [facility] hospital ([facility])  [phone]

## 2021-06-28 NOTE — PROGRESS NOTES
Progress Notes by Suzy Weaver LRT at 9/27/2019  4:26 PM     Author: Suzy Weaver LRT Service: -- Author Type: Respiratory Therapist    Filed: 9/28/2019  8:28 AM Encounter Date: 9/27/2019 Status: Signed    : Suzy Weaver LRT (Respiratory Therapist)       Saw patient in her home for our monthly NIV home visit. She had no questions or concerns regarding her machine at this time. Feels the pressures are adequate. Vitals are as follows:   RR 16 HR 97 SpO2 92% on 2lpm    Settings on NIV: PS SV: PS 5-15, EPAP 5, RR 12, , TI .5-1.2, TRIGGER HIGH, CYCLE 50    30 Day download:             Suzy HERNANDEZ, RRT   [facility]  [phone]

## 2021-06-28 NOTE — PROGRESS NOTES
Progress Notes by Brett Montalvo MD at 11/20/2019 11:30 AM     Author: Brett Montalvo MD Service: -- Author Type: Physician    Filed: 11/20/2019 11:45 AM Encounter Date: 11/20/2019 Status: Signed    : Brett Montalvo MD (Physician)         Thank you, Dr. Henson, for asking the [facility] team to see Ms. Linda Otero to evaluate       Assessment/Recommendations   Assessment/Plan:  1. Pulmonary HTN - no resovled by echo and no evidence on exam of pulm HTN,  would encourge exercise, noted foot problems referred to podiatrist  2. S/p CVA s/p closure with no further CVA    Follow up prn       History of Present Illness/Subjective    Ms. Linda Otero is a 59 y.o. female with pulmonary HTN felt related to AV fistula that was closed in June with resolution of pulm HTN by echo, s/p CVA s/p PFO closure in 2013, wants to exercise but has right foot pain she is worried about a wound.  She is on oxygen for her lung issues and followed by Dr. Albright          Physical Examination Review of Systems   Vitals:    11/20/19 1128   BP: 118/70   Pulse: 92   Resp: 24     Body mass index is 51.76 kg/m .  Wt Readings from Last 3 Encounters:   11/20/19 (!) 320 lb 11.2 oz (145.5 kg)   11/14/19 (!) 317 lb 6.4 oz (144 kg)   08/28/19 (!) 317 lb 6.4 oz (144 kg)     [unfilled]  General Appearance:   no distress, normal body habitus   ENT/Mouth: membranes moist, no oral lesions or bleeding gums.      EYES:  no scleral icterus, normal conjunctivae   Neck: no carotid bruits or thyromegaly   Chest/Lungs:   lungs are clear to auscultation, no rales or wheezing,  sternal scar, equal chest wall expansion    Cardiovascular:   Regular. Normal first and second heart sounds with no murmurs, rubs, or gallops; the carotid, radial and posterior tibial pulses are intact, Jugular venous pressure , edema bilaterally    Abdomen:  no organomegaly, masses, bruits, or tenderness; bowel sounds are present    Extremities: no cyanosis or clubbing   Skin: no xanthelasma, warm.    Neurologic: normal  bilateral, no tremors     Psychiatric: alert and oriented x3, calm     Review of Systems - 12 points nega other than above      Medical History  Surgical History Family History Social History   Past Medical History:   Diagnosis Date   ? Abnormality of gait due to impairment of balance    ? Acute and chronic respiratory failure with hypercapnia (H)    ? Anemia     Iron deficiency   ? Aneurysm (H) 2012   ? Arthritis     toes, thumb, elbow   ? B12 deficiency    ? Cancer (H) 1985    cervical   ? Cellulitis right foot   ? Chronic diastolic heart failure (H)    ? Chronic kidney disease    ? COPD (chronic obstructive pulmonary disease) (H)    ? Coronary artery disease    ? CVA (cerebral vascular accident) (H) 12/23/2012    Left thalamic stroke, MCA branch infarcts   ? Dermatitis    ? DVT (deep venous thrombosis) (H)     Right leg   ? History of transfusion    ? Hyperlipidemia    ? Hypertension    ? Hypothyroidism    ? Lymphedema    ? MGUS (monoclonal gammopathy of unknown significance)    ? Neuropathy of right lower extremity    ? Obesity    ? On home oxygen therapy    ? PFO (patent foramen ovale)    ? Pneumonia    ? Primary hypercoagulable state (H)    ? Pulmonary emboli (H) 2013   ? Pulmonary HTN (H)    ? Seizures (H)     at age 31   ? Skin cancer 2014   ? Sleep apnea     CPAP   ? Stroke (H) 2012   ? Umbilical hernia    ? Wound of right ankle     Past Surgical History:   Procedure Laterality Date   ? ASD REPAIR     ? ASD REPAIR     ? CARDIAC CATHETERIZATION  2012    2 stents   ? Cardiac stents     ? CERVICAL CONE BIOPSY  1985   ? COLONOSCOPY N/A 4/9/2018    Procedure: COLONOSCOPY;  Surgeon: Dorene Araujo MD;  Location: [facility] Main OR;  Service:    ? CV CORONARY ANGIOGRAM N/A 4/12/2019    Procedure: Coronary Angiogram;  Surgeon: Brett Montalvo MD;  Location: [facility] Cath Lab;  Service: Cardiology   ? CV LEFT  HEART CATHETERIZATION WO LEFT VETRICULOGRAM Left 2019    Procedure: Left Heart Catheterization Without Left Ventriculogram;  Surgeon: Brett Montalvo MD;  Location: [facility] Cath Lab;  Service: Cardiology   ? CV RIGHT HEART CATH N/A 2019    Procedure: Right Heart Catheterization;  Surgeon: Brett Montalvo MD;  Location: [facility] Cath Lab;  Service: Cardiology   ? ESOPHAGOGASTRODUODENOSCOPY N/A 2018    Procedure: ESOPHAGOGASTRODUODENOSCOPY (EGD);  Surgeon: Dorene Araujo MD;  Location: [facility] OR;  Service:    ? PATENT FORAMEN OVALE CLOSURE     ? WA REMV ART CLOT ILIAC-POP,LEG INCIS Left 2019    Procedure: REPAIR LEFT FEMORAL ARTERIOVENOUS FISTULA WITH INTRAOPERATIVE ULTRASOUND;  Surgeon: Salinas Torres MD;  Location: [facility] OR;  Service: General   ? SKIN BIOPSY     ? XR SACRO ILIAC JOINT INJECTION LEFT  2012    Family History   Problem Relation Age of Onset   ? Stroke Father    ? Heart attack Brother     Social History     Socioeconomic History   ? Marital status:      Spouse name: Not on file   ? Number of children: Not on file   ? Years of education: Not on file   ? Highest education level: Not on file   Occupational History   ? Not on file   Social Needs   ? Financial resource strain: Not on file   ? Food insecurity:     Worry: Not on file     Inability: Not on file   ? Transportation needs:     Medical: Not on file     Non-medical: Not on file   Tobacco Use   ? Smoking status: Former Smoker     Last attempt to quit: 2012     Years since quittin.9   ? Smokeless tobacco: Never Used   Substance and Sexual Activity   ? Alcohol use: No   ? Drug use: No   ? Sexual activity: Not on file   Lifestyle   ? Physical activity:     Days per week: Not on file     Minutes per session: Not on file   ? Stress: Not on file   Relationships   ? Social connections:     Talks on phone: Not on file     Gets together: Not on file     Attends Taoist  "service: Not on file     Active member of club or organization: Not on file     Attends meetings of clubs or organizations: Not on file     Relationship status: Not on file   ? Intimate partner violence:     Fear of current or ex partner: Not on file     Emotionally abused: Not on file     Physically abused: Not on file     Forced sexual activity: Not on file   Other Topics Concern   ? Not on file   Social History Narrative    Resides in assisted living facility.           Medications  Allergies   Scheduled Meds:  Continuous Infusions:  PRN Meds:. Allergies   Allergen Reactions   ? Penicillins Anaphylaxis, Shortness Of Breath and Swelling   ? Symbicort [Budesonide-Formoterol] Shortness Of Breath   ? Amoxicillin Other (See Comments)     Chest tightness   ? Clindamycin Other (See Comments)     Chest tightness   ? Eliquis [Apixaban] Hives and Itching     Hives/hives down throat was the reaction based off of information from the patient on 12/3/2015. \"Itching\"/\"Eye itching and swelling, throat itching\" per prior allergy documentation.   ? Peanut Hives   ? Peanut Oil    ? Erythromycin Rash   ? Tetracycline Rash         Lab Results    Chemistry/lipid CBC Cardiac Enzymes/BNP/TSH/INR   Lab Results   Component Value Date    CHOL 181 03/27/2019    HDL 61 03/27/2019    LDLCALC 102 03/27/2019    TRIG 90 03/27/2019    CREATININE 0.95 07/18/2019    BUN 23 (H) 07/18/2019    K 4.5 07/18/2019     07/18/2019     07/18/2019    CO2 35 (H) 07/18/2019    Lab Results   Component Value Date    WBC 7.8 07/18/2019    HGB 11.2 (L) 07/18/2019    HCT 37.4 07/18/2019    MCV 97 07/18/2019     07/18/2019    Lab Results   Component Value Date    CKTOTAL 996 (HH) 12/28/2012    CKMB 134 (HH) 12/21/2012    TROPONINI <0.01 01/19/2019    BNP 96 (H) 01/20/2019    TSH 2.09 08/10/2018    INR 0.98 06/27/2019              Brett Montalvo MD  Interventional Cardiology  [facility]                                  "

## 2021-06-28 NOTE — PROGRESS NOTES
Progress Notes by Sondra Gonzalez LRT at 1/31/2020  3:29 PM     Author: Sondra Gonzalez LRT Service: -- Author Type: Respiratory Therapist    Filed: 2/11/2020 10:00 PM Encounter Date: 1/31/2020 Status: Signed    : Sondra Gonzalez LRT (Respiratory Therapist)       S: PATIENT NEEDED A HOME VISIT AND HER OLD ASTRAL NEEDED PM ON IT SO HER DEVICE NEEDED TO BE CHANGED.  B: WE HAVE BEEN TRYING TO GET A HOLD OF THE PATIENT BUT SHE WAS NOT ANSWERING OUR PHONE CALLS, WE FINALLY DID GET IN TOUCH WITH HER SO WE COULD EXCHANGE HER EQUIPMENT AND GIVE HER SOME NEW SUPPLIES  A: PATIENT IS DOING WELL, SHE IS VERY SELF SUFFICIENT AND HER EQUIPMENT IS RELATIVELY CLEAN. PATIENT WAS HAPPY I WAS THERE TO GET HER NEW SUPPLIES AND INSTRUCT HER ON USE AND CARE OF THE MACHINE.  R: PATIENT WOULD LIKE ME TO COME EVERY MONTH TO ASSESS HER AND REPLACE HER SUPPLIES, PATIENT ALSO REALLY LIKED THAT I BROUGHT A DOWNLOAD SO SHE COULD SEE HOW SHE WAS DOING ON HER DEVICE AT NIGHT. I SWITCHED OUT HER MACHINE FOR PM AND PATIENT TRIED HER NEW DEVICE AND SAID IT FELT GREAT LIKE HER OLD ONE.    DX: COPD, CRF  VITALS: BS CLEAR/DIMINSHED SpO2 94% HR 67 RR 16 A/O PATIENT WAS ALERT AND ORIENTED    SETUP DATE: 4/13/2018  DEVICE TYPE: ASTRAL NIV  SETTINGS (include mode): PS SV: PS 5-15, EPAP 5, RR 12, , TI .5-1.2  COMFORT SETTINGS: RISE 200, TRIGGER HIGH, CYCLE 50%  INTERFACE: FFM  CIRCUIT/HUMIDITY: STD TUBING/NO HUMIDITY  ALARMS: HIGH PRESSURE 55, APNEA DETECTION 60S, ALARM VOLUME 3  O2 BLEED IN (YES/NO):     NOTES: EXCHANGED NIV DUE TO PM NEEDING TO BE DONE    MD NAME/PHONE/FAX: DAMIÁN SANDERS MD-[facility] PULMONARY  CAREGIVER NAME/PHONE: [facility] [phone]    SONDRA ESCAMILLA - RRT

## 2021-06-29 NOTE — PROGRESS NOTES
Progress Notes by Sondra Gonzalez LRT at 10/27/2020  2:22 PM     Author: Sondra Gonzalez LRT Service: -- Author Type: Respiratory Therapist    Filed: 10/27/2020  2:26 PM Encounter Date: 10/27/2020 Status: Signed    : Sondra Gonzalez LRT (Respiratory Therapist)       30 Day NIV Download:

## 2021-06-29 NOTE — PROGRESS NOTES
Progress Notes by Sondra Gonzalez LRT at 8/20/2020  3:04 PM     Author: Sondra Gonzalez LRT Service: -- Author Type: Respiratory Therapist    Filed: 8/20/2020  3:07 PM Encounter Date: 8/20/2020 Status: Signed    : Sondra Gonzalez LRT (Respiratory Therapist)       NIV 30 DAY DOWNLOAD:

## 2021-06-30 NOTE — PROGRESS NOTES
Progress Notes by Sondra Gonzalez LRT at 3/5/2021 12:41 PM     Author: Sondra Gonzalez LRT Service: -- Author Type: Respiratory Therapist    Filed: 3/5/2021 12:54 PM Encounter Date: 3/5/2021 Status: Signed    : Sondra Gonzalez LRT (Respiratory Therapist)     Summary: NIV HOME VISIT AND DOWNLOAD      NEXT HOME VISIT: 3/5/2021    WAS ABLE TO GET AHOLD OF PATIENT AND SEE HER AT HER HOME TODAY. PATIENT IS DOING REALLY WELL WITH HER NIV DEVICE AND SHE IS USING IT EVERYNIGHT. SHE DID TELL ME THAT ONE NIGHT, MONTHS AGO, PATIENT CAN'T REMEMBER WHEN, THE NIV LIT UP AND JUST SHUT OFF ON HER. THERE WAS NO ALARM OR ANYTHING. SHE DID HAVE TO GET UP AND TURN THE DEVICE ON MANUALLY AGAIN. SHE STATED IT ONLY HAPPENED THAT ONE TIME SO SHE DIDN'T FEEL THE NEED TO CALL. I DID CHANGE ALL OF HER EQUIPMENT AND ANSWERED ANY QUESTIONS SHE HAD. PATIENT ALSO STATED SHE WAS MOVING SOON AND I TOLD HER TO MAKE SURE SHE LET'S US KNOW WHEN THAT HAPPENS.    DX: COPD, CRF  VITALS: BS DECREASED RR 16 A/O ALERT AND ORIENTED    SETUP DATE: 4/13/2018  DEVICE TYPE: RESMED ASTRAL  SETTINGS (include mode): PS SV: PS 5-15, EPAP 5, RR 12, , TI .5-1.2  COMFORT SETTINGS: RISE 200, TRIGGER HIGH, CYCLE 50%  INTERFACE: AIRFIT F20 MEDIUM FFM  CIRCUIT/HUMIDITY: STD TUBING/NO HUMIDITY  ALARMS: HIGH PRESSURE 55, APNEA DETECTION 60S, ALARM VOLUME 3  O2 BLEED IN: 2.5L/M (PATIENT WEARS HER NC UNDER HER MASK)    NOTES: RESEARCH NIV LOGS TO SEE IF THERE IS ANYTHING WEIRD ALARM WISE, MAYBE CALL [facility]CH [facility]PPORT.    MD NAME/PHONE/FAX: DR. MARILYN PERSON Z-RRT  [phone]

## 2021-07-03 NOTE — ADDENDUM NOTE
Addendum Note by Angelica Nunn RN at 11/21/2018  2:48 PM     Author: Angelica Nunn RN Service: -- Author Type: Registered Nurse    Filed: 11/21/2018  2:48 PM Encounter Date: 11/21/2018 Status: Signed    : Angelica Nunn RN (Registered Nurse)    Addended by: ANGELICA NUNN on: 11/21/2018 02:48 PM        Modules accepted: Orders

## 2021-08-20 ENCOUNTER — TELEPHONE (OUTPATIENT)
Dept: PULMONOLOGY | Facility: OTHER | Age: 61
End: 2021-08-20

## 2021-08-20 DIAGNOSIS — J44.1 COPD EXACERBATION (H): Primary | ICD-10-CM

## 2021-08-20 RX ORDER — LEVOFLOXACIN 750 MG/1
750 TABLET, FILM COATED ORAL DAILY
Qty: 5 TABLET | Refills: 0 | Status: SHIPPED | OUTPATIENT
Start: 2021-08-20 | End: 2021-08-25

## 2021-08-20 RX ORDER — PREDNISONE 20 MG/1
TABLET ORAL
Qty: 10 TABLET | Refills: 0 | Status: SHIPPED | OUTPATIENT
Start: 2021-08-20 | End: 2024-01-01

## 2021-08-20 NOTE — TELEPHONE ENCOUNTER
Phone call from Linda. States she is having increased coughing and thick greenish/yellow secretions.  Asking to start her action plan.     Will prescribe prednisone 40mg daily x 5 and Levaquin 750mg daily x 5 days        Faxing orders to [facility], ATTN: Nurse    Fax:  [phone]     Sending prescription orders to [facility] at [phone]

## 2022-02-15 NOTE — PROGRESS NOTES
[facility] Heart Care Note    Assessment/Plan:    Linda Otero is a 59 y.o. old female with:  1. Dyspnea - presume related to COPD but also component of JODI and diastolic dysfunction, will schedule right/left heart catherization, coronary angiography to better define pressures/CAD.    Plan via right radial and right cephalic vein with 5F.     2. Pre op - pt would like to have hernia surgery in the future.await above results.        Dr. Brett Montalvo  [facility] HEART CARE  [phone]  ______________________________________________________________________    Subjective:    I had the opportunity to see Linda Otero at the [facility]. Linda Otero is a 59 y.o. female with a known history of COPD, s/p PFO closure for CVA, with PA systolic pressure 51mmHg on echo 10/18 with cont dyspnea, concern for diastolic dysfunction.  Hx of PE, off warfarin and on only asp now.  No change in dyspnea, chest pain/pressure, syncopal events.    ______________________________________________________________________      Review of Systems:   General: WNL  Eyes: WNL  Ears/Nose/Throat: WNL  Lungs: Shortness of Breath, Wheezing  Heart: Shortness of Breath with activity, Leg Swelling  Stomach: WNL  Bladder: WNL  Muscle/Joints: WNL  Skin: Poor Wound Healing  Nervous System: WNL  Mental Health: WNL     Blood: WNL    Problem List:  Patient Active Problem List   Diagnosis     Cellulitis and abscess of foot     DVT (deep venous thrombosis) (H)     Hypertension     Hyperlipidemia     Obesity     Chronic obstructive pulmonary disease, unspecified COPD type (H)     Microcytic anemia     Obstructive sleep apnea treated with BiPAP     Chronic respiratory failure with hypoxia (H)     Iron deficiency     History of DVT (deep vein thrombosis)     HARITHA (iron deficiency anemia)     Acute on chronic respiratory failure with hypoxia and hypercapnia (H)     Morbid obesity (H)     Acute on chronic respiratory failure with  hypercapnia (H)     Acute on chronic diastolic heart failure (H)     Pneumonia, organism unspecified(486)     Warfarin-induced coagulopathy (H)     Leukocytosis, unspecified type     History of CVA (cerebrovascular accident)     MGUS (monoclonal gammopathy of unknown significance)     SONAL (acute kidney injury) (H)     COPD (chronic obstructive pulmonary disease) (H)     COPD exacerbation (H)     Medical History:  Past Medical History:   Diagnosis Date     Anemia      Aneurysm (H) 2012     Arthritis     toes, thumb, elbow     Cancer (H) 1985    cervical     Cellulitis right foot     COPD (chronic obstructive pulmonary disease) (H)      CVA (cerebral vascular accident) (H) 12/23/2012     DVT (deep venous thrombosis) (H)     Right leg     History of transfusion      Hyperlipidemia      Hypertension      Obesity      Pneumonia      Pulmonary emboli (H) 2013     Skin cancer 2014     Stroke (H) 2012     Surgical History:  Past Surgical History:   Procedure Laterality Date     CARDIAC SURGERY  11/2012    2 stents      CERVICAL CONE BIOPSY  1985     COLONOSCOPY N/A 4/9/2018    Procedure: COLONOSCOPY;  Surgeon: Dorene Araujo MD;  Location: [facility];  Service:      ESOPHAGOGASTRODUODENOSCOPY N/A 4/9/2018    Procedure: ESOPHAGOGASTRODUODENOSCOPY (EGD);  Surgeon: Dorene Araujo MD;  Location: [facility];  Service:      SKIN BIOPSY       Social History:  No pertinent social hx related to patient's chief complaint other than above in subjective        Family History:  No pertinent family hx related to patient's chief complaint other than above in subjective      Allergies:  Allergies   Allergen Reactions     Penicillins Anaphylaxis, Shortness Of Breath and Swelling     Symbicort [Budesonide-Formoterol] Shortness Of Breath     Amoxicillin Other (See Comments)     Chest tightness     Clindamycin Other (See Comments)     Chest tightness     Eliquis [Apixaban] Hives and Itching     Hives/hives down throat was the  "reaction based off of information from the patient on 12/3/2015. \"Itching\"/\"Eye itching and swelling, throat itching\" per prior allergy documentation.     Erythromycin Unknown     Peanut Hives     Peanut Oil      Tetracycline Unknown       Medications:  Current Outpatient Medications   Medication Sig Dispense Refill     acetaminophen (TYLENOL) 500 MG tablet Take 1 tablet (500 mg total) by mouth every 6 (six) hours as needed for pain or fever. Not to exceed 4000 mg of acetaminophen per 24 hours from any source. 30 tablet 0     albuterol (PROVENTIL HFA;VENTOLIN HFA) 90 mcg/actuation inhaler Inhale 2 puffs every 4 (four) hours as needed.       aspirin 81 mg chewable tablet Chew 81 mg 2 (two) times a day.       calcium-vitamin D (CALCIUM-VITAMIN D) 500 mg(1,250mg) -200 unit per tablet Take 1 tablet by mouth daily.       cyanocobalamin 1,000 mcg/mL injection Inject 1,000 mcg into the shoulder, thigh, or buttocks every 30 (thirty) days.       fluticasone-vilanterol (BREO ELLIPTA) 200-25 mcg/dose DsDv inhaler Inhale 1 puff daily. 1 each 12     furosemide (LASIX) 20 MG tablet Take 1 tablet (20 mg total) by mouth 2 (two) times a day at 9am and 6pm. 60 tablet 3     ipratropium-albuterol (DUO-NEB) 0.5-2.5 mg/3 mL nebulizer Take 3 mL by nebulization every 4 (four) hours as needed. 30 vial 0     multivitamin therapeutic tablet Take 1 tablet by mouth daily.       OXYGEN-AIR DELIVERY SYSTEMS MISC 3-4 L/min into each nostril as needed.        tiotropium bromide (SPIRIVA RESPIMAT) 2.5 mcg/actuation Mist Inhale 2 puffs daily. 4 g 0     No current facility-administered medications for this visit.        Objective:   Vital signs:  /78 (Patient Site: Left Arm, Patient Position: Sitting, Cuff Size: Adult Large)   Pulse 67   Resp 22   Ht 5' 6\" (1.676 m)   Wt (!) 316 lb (143.3 kg)   LMP  (LMP Unknown)   BMI 51.00 kg/m        Physical Exam:    GENERAL APPEARANCE: Alert, cooperative and in no acute distress.  HEENT: No scleral " icterus. No Xanthelasma. Oral mucuos membranes pink and moist.  NECK: JVP<6cm. No Hepatojugular reflux. Thyroid not visualized  CHEST: clear to auscultation  CARDIOVASCULAR: S1, S2 without murmur ,clicks or rubs. Brachial, radial and posterior tibial pulses are intact and symetric. No carotid bruits noted.    ABDOMEN: Nontender. BS+. No bruits.  EXTREMITIES: No cyanosis, clubbing or edema.    Lab Results:  LIPIDS:  Lab Results   Component Value Date    CHOL 181 03/27/2019    CHOL 129 12/24/2012     Lab Results   Component Value Date    HDL 61 03/27/2019    HDL 25 (L) 12/24/2012     Lab Results   Component Value Date    LDLCALC 102 03/27/2019    LDLCALC 75 12/24/2012     Lab Results   Component Value Date    TRIG 90 03/27/2019    TRIG 142 12/24/2012    TRIG 142 12/24/2012     No components found for: CHOLHDL    BMP:  Lab Results   Component Value Date    CREATININE 1.12 (H) 01/19/2019    BUN 16 01/19/2019     01/19/2019    K 4.1 01/19/2019    CL 99 01/19/2019    CO2 36 (H) 01/19/2019         Brett Montalvo MD  [facility]  [phone]               no

## 2022-03-05 ENCOUNTER — LAB REQUISITION (OUTPATIENT)
Dept: LAB | Facility: CLINIC | Age: 62
End: 2022-03-05
Payer: MEDICARE

## 2022-03-05 DIAGNOSIS — D51.9 VITAMIN B12 DEFICIENCY ANEMIA, UNSPECIFIED: ICD-10-CM

## 2022-03-05 DIAGNOSIS — I10 ESSENTIAL (PRIMARY) HYPERTENSION: ICD-10-CM

## 2022-03-07 LAB
ANION GAP SERPL CALCULATED.3IONS-SCNC: 10 MMOL/L (ref 5–18)
BUN SERPL-MCNC: 19 MG/DL (ref 8–22)
CALCIUM SERPL-MCNC: 9.3 MG/DL (ref 8.5–10.5)
CHLORIDE BLD-SCNC: 103 MMOL/L (ref 98–107)
CO2 SERPL-SCNC: 31 MMOL/L (ref 22–31)
CREAT SERPL-MCNC: 1 MG/DL (ref 0.6–1.1)
GFR SERPL CREATININE-BSD FRML MDRD: 64 ML/MIN/1.73M2
GLUCOSE BLD-MCNC: 102 MG/DL (ref 70–125)
POTASSIUM BLD-SCNC: 4.2 MMOL/L (ref 3.5–5)
SODIUM SERPL-SCNC: 144 MMOL/L (ref 136–145)
VIT B12 SERPL-MCNC: 522 PG/ML (ref 213–816)

## 2022-03-07 PROCEDURE — P9603 ONE-WAY ALLOW PRORATED MILES: HCPCS | Mod: ORL | Performed by: NURSE PRACTITIONER

## 2022-03-07 PROCEDURE — 36415 COLL VENOUS BLD VENIPUNCTURE: CPT | Mod: ORL | Performed by: NURSE PRACTITIONER

## 2022-03-07 PROCEDURE — 80048 BASIC METABOLIC PNL TOTAL CA: CPT | Mod: ORL | Performed by: NURSE PRACTITIONER

## 2022-03-07 PROCEDURE — 82607 VITAMIN B-12: CPT | Mod: ORL | Performed by: NURSE PRACTITIONER

## 2022-04-21 ENCOUNTER — LAB REQUISITION (OUTPATIENT)
Dept: LAB | Facility: CLINIC | Age: 62
End: 2022-04-21
Payer: MEDICARE

## 2022-04-21 DIAGNOSIS — D63.1 ANEMIA IN CHRONIC KIDNEY DISEASE (CODE): ICD-10-CM

## 2022-04-25 LAB
BASOPHILS # BLD AUTO: 0.1 10E3/UL (ref 0–0.2)
BASOPHILS NFR BLD AUTO: 1 %
EOSINOPHIL # BLD AUTO: 0.3 10E3/UL (ref 0–0.7)
EOSINOPHIL NFR BLD AUTO: 4 %
ERYTHROCYTE [DISTWIDTH] IN BLOOD BY AUTOMATED COUNT: 15.5 % (ref 10–15)
HCT VFR BLD AUTO: 38.3 % (ref 35–47)
HGB BLD-MCNC: 11.3 G/DL (ref 11.7–15.7)
IMM GRANULOCYTES # BLD: 0.1 10E3/UL
IMM GRANULOCYTES NFR BLD: 1 %
LYMPHOCYTES # BLD AUTO: 0.9 10E3/UL (ref 0.8–5.3)
LYMPHOCYTES NFR BLD AUTO: 11 %
MCH RBC QN AUTO: 27.4 PG (ref 26.5–33)
MCHC RBC AUTO-ENTMCNC: 29.5 G/DL (ref 31.5–36.5)
MCV RBC AUTO: 93 FL (ref 78–100)
MONOCYTES # BLD AUTO: 0.5 10E3/UL (ref 0–1.3)
MONOCYTES NFR BLD AUTO: 6 %
NEUTROPHILS # BLD AUTO: 6.2 10E3/UL (ref 1.6–8.3)
NEUTROPHILS NFR BLD AUTO: 77 %
NRBC # BLD AUTO: 0 10E3/UL
NRBC BLD AUTO-RTO: 0 /100
PLATELET # BLD AUTO: 191 10E3/UL (ref 150–450)
RBC # BLD AUTO: 4.13 10E6/UL (ref 3.8–5.2)
WBC # BLD AUTO: 8.1 10E3/UL (ref 4–11)

## 2022-04-25 PROCEDURE — 36415 COLL VENOUS BLD VENIPUNCTURE: CPT | Mod: ORL | Performed by: NURSE PRACTITIONER

## 2022-04-25 PROCEDURE — P9603 ONE-WAY ALLOW PRORATED MILES: HCPCS | Mod: ORL | Performed by: NURSE PRACTITIONER

## 2022-04-25 PROCEDURE — 85025 COMPLETE CBC W/AUTO DIFF WBC: CPT | Mod: ORL | Performed by: NURSE PRACTITIONER

## 2022-05-06 DIAGNOSIS — J42 CHRONIC BRONCHITIS, UNSPECIFIED CHRONIC BRONCHITIS TYPE (H): ICD-10-CM

## 2022-05-06 DIAGNOSIS — J44.9 COPD (CHRONIC OBSTRUCTIVE PULMONARY DISEASE) (H): Primary | ICD-10-CM

## 2022-05-06 RX ORDER — ALBUTEROL SULFATE 0.83 MG/ML
2.5 SOLUTION RESPIRATORY (INHALATION) EVERY 6 HOURS PRN
Qty: 90 ML | Refills: 3 | Status: SHIPPED | OUTPATIENT
Start: 2022-05-06 | End: 2023-05-26

## 2022-05-06 RX ORDER — TIOTROPIUM BROMIDE INHALATION SPRAY 3.12 UG/1
SPRAY, METERED RESPIRATORY (INHALATION)
Qty: 4 G | Refills: 3 | Status: SHIPPED | OUTPATIENT
Start: 2022-05-06 | End: 2022-08-23

## 2022-05-06 RX ORDER — ALBUTEROL SULFATE 90 UG/1
AEROSOL, METERED RESPIRATORY (INHALATION)
Qty: 18 G | Refills: 3 | Status: SHIPPED | OUTPATIENT
Start: 2022-05-06 | End: 2022-07-05

## 2022-05-06 NOTE — PROGRESS NOTES
Patient requests new prescriptions be sent to [facility] assisted living facility.     Faxed Rx for Breo, spiriva, albuterol HFA and albuterol nebs to:  [phone] ATTN: Aaliyah       Patient also scheduled for follow up visit with Dr. Albright on July 13.

## 2022-05-16 ENCOUNTER — MEDICAL CORRESPONDENCE (OUTPATIENT)
Dept: HEALTH INFORMATION MANAGEMENT | Facility: CLINIC | Age: 62
End: 2022-05-16
Payer: MEDICARE

## 2022-05-16 ENCOUNTER — DOCUMENTATION ONLY (OUTPATIENT)
Dept: PULMONOLOGY | Facility: OTHER | Age: 62
End: 2022-05-16
Payer: MEDICARE

## 2022-05-16 NOTE — PROGRESS NOTES
Prescription for Air Conditioner faxed to [facility] at [phone] ( per request of patient).  Paper prescription scanned to patient chart.  Ph. [phone]

## 2022-06-15 ENCOUNTER — TELEPHONE (OUTPATIENT)
Dept: PULMONOLOGY | Facility: OTHER | Age: 62
End: 2022-06-15
Payer: MEDICARE

## 2022-06-15 NOTE — TELEPHONE ENCOUNTER
Linda called to request order to have her Albuterol rescue inhaler on her person at her new facility. Orders faxed to [phone].

## 2022-07-05 DIAGNOSIS — J42 CHRONIC BRONCHITIS, UNSPECIFIED CHRONIC BRONCHITIS TYPE (H): ICD-10-CM

## 2022-07-05 RX ORDER — ALBUTEROL SULFATE 90 UG/1
AEROSOL, METERED RESPIRATORY (INHALATION)
Qty: 18 G | Refills: 3 | Status: SHIPPED | OUTPATIENT
Start: 2022-07-05 | End: 2022-07-21

## 2022-07-05 NOTE — TELEPHONE ENCOUNTER
Patient called requesting new order for albuterol inhaler.  She broke her inhaler and needs a new one.      Orders sent to [facility] and message left for Marielena (nurse) at [phone]

## 2022-07-13 ENCOUNTER — OFFICE VISIT (OUTPATIENT)
Dept: PULMONOLOGY | Facility: OTHER | Age: 62
End: 2022-07-13
Payer: MEDICARE

## 2022-07-13 VITALS — SYSTOLIC BLOOD PRESSURE: 105 MMHG | OXYGEN SATURATION: 95 % | HEART RATE: 96 BPM | DIASTOLIC BLOOD PRESSURE: 63 MMHG

## 2022-07-13 DIAGNOSIS — F17.201 TOBACCO ABUSE, IN REMISSION: Primary | ICD-10-CM

## 2022-07-13 DIAGNOSIS — Z87.891 PERSONAL HISTORY OF TOBACCO USE: ICD-10-CM

## 2022-07-13 PROCEDURE — 99214 OFFICE O/P EST MOD 30 MIN: CPT | Mod: 25 | Performed by: INTERNAL MEDICINE

## 2022-07-13 PROCEDURE — G0296 VISIT TO DETERM LDCT ELIG: HCPCS | Performed by: INTERNAL MEDICINE

## 2022-07-13 NOTE — PATIENT INSTRUCTIONS
Lung Cancer Screening   Frequently Asked Questions  If you are at high-risk for lung cancer, getting screened with low-dose computed tomography (LDCT) every year can help save your life. This handout offers answers to some of the most common questions about lung cancer screening. If you have other questions, please call [phone] ([phone]).     What is it?  Lung cancer screening uses special X-ray technology to create an image of your lung tissue. The exam is quick and easy and takes less than 10 seconds. We don t give you any medicine or use any needles. You can eat before and after the exam. You don t need to change your clothes as long as the clothing on your chest doesn t contain metal. But, you do need to be able to hold your breath for at least 6 seconds during the exam.    What is the goal of lung cancer screening?  The goal of lung cancer screening is to save lives. Many times, lung cancer is not found until a person starts having physical symptoms. Lung cancer screening can help detect lung cancer in the earliest stages when it may be easier to treat.    Who should be screened for lung cancer?  We suggest lung cancer screening for anyone who is at high-risk for lung cancer. You are in the high-risk group if you:      are between the ages of 55 and 79, and    have smoked at least 1 pack of cigarettes a day for 20 or more years, and    still smoke or have quit within the past 15 years.    However, if you have a new cough or shortness of breath, you should talk to your doctor before being screened.    Why does it matter if I have symptoms?  Certain symptoms can be a sign that you have a condition in your lungs that should be checked and treated by your doctor. These symptoms include fever, chest pain, a new or changing cough, shortness of breath that you have never felt before, coughing up blood or unexplained weight loss. Having any of these symptoms can greatly affect the results of lung  cancer screening.       Should all smokers get an LDCT lung cancer screening exam?  It depends. Lung cancer screening is for a very specific group of men and women who have a history of heavy smoking over a long period of time (see  Who should be screened for lung cancer  above).  I am in the high-risk group, but have been diagnosed with cancer in the past. Is LDCT lung cancer screening right for me?  In some cases, you should not have LDCT lung screening, such as when your doctor is already following your cancer with CT scan studies. Your doctor will help you decide if LDCT lung screening is right for you.  Do I need to have a screening exam every year?  Yes. If you are in the high-risk group described earlier, you should get an LDCT lung cancer screening exam every year until you are 79, or are no longer willing or able to undergo screening and possible procedures to diagnose and treat lung cancer.  How effective is LDCT at preventing death from lung cancer?  Studies have shown that LDCT lung cancer screening can lower the risk of death from lung cancer by 20 percent in people who are at high-risk.  What are the risks?  There are some risks and limitations of LDCT lung cancer screening. We want to make sure you understand the risks and benefits, so please let us know if you have any questions. Your doctor may want to talk with you more about these risks.    Radiation exposure: As with any exam that uses radiation, there is a very small increased risk of cancer. The amount of radiation in LDCT is small--about the same amount a person would get from a mammogram. Your doctor orders the exam when he or she feels the potential benefits outweigh the risks.    False negatives: No test is perfect, including LDCT. It is possible that you may have a medical condition, including lung cancer, that is not found during your exam. This is called a false negative result.    False positives and more testing: LDCT very often finds  something in the lung that could be cancer, but in fact is not. This is called a false positive result. False positive tests often cause anxiety. To make sure these findings are not cancer, you may need to have more tests. These tests will be done only if you give us permission. Sometimes patients need a treatment that can have side effects, such as a biopsy. For more information on false positives, see  What can I expect from the results?     Findings not related to lung cancer: Your LDCT exam also takes pictures of areas of your body next to your lungs. In a very small number of cases, the CT scan will show an abnormal finding in one of these areas, such as your kidneys, adrenal glands, liver or thyroid. This finding may not be serious, but you may need more tests. Your doctor can help you decide what other tests you may need, if any.  What can I expect from the results?  About 1 out of 4 LDCT exams will find something that may need more tests. Most of the time, these findings are lung nodules. Lung nodules are very small collections of tissue in the lung. These nodules are very common, and the vast majority--more than 97 percent--are not cancer (benign). Most are normal lymph nodes or small areas of scarring from past infections.  But, if a small lung nodule is found to be cancer, the cancer can be cured more than 90 percent of the time. To know if the nodule is cancer, we may need to get more images before your next yearly screening exam. If the nodule has suspicious features (for example, it is large, has an odd shape or grows over time), we will refer you to a specialist for further testing.  Will my doctor also get the results?  Yes. Your doctor will get a copy of your results.  Is it okay to keep smoking now that there s a cancer screening exam?  No. Tobacco is one of the strongest cancer-causing agents. It causes not only lung cancer, but other cancers and cardiovascular (heart) diseases as well. The damage  caused by smoking builds over time. This means that the longer you smoke, the higher your risk of disease. While it is never too late to quit, the sooner you quit, the better.  Where can I find help to quit smoking?  The best way to prevent lung cancer is to stop smoking. If you have already quit smoking, congratulations and keep it up! For help on quitting smoking, please call [employer] at 3-863-QUITNOW (1-242.426.5150) or the [employer] at 1-383.375.2294 to find local resources near you.  One-on-one health coaching:  If you d prefer to work individually with a health care provider on tobacco cessation, we offer:      Medication Therapy Management:  Our specially trained pharmacists work closely with you and your doctor to help you quit smoking.  Call [phone] or [phone] (toll free).

## 2022-07-13 NOTE — PROGRESS NOTES
CCx: Follow-up for hypoxia likely secondary to a combination of emphysema and obesity hypoventilation syndrome s/p closure of Femoral AV shunt    HPI: Ms. Otero is a 58-year-old lady with a past medical history significant for morbid obesity, cervical carcinoma, COPD, prior history of CVA, diastolic heart failure, right leg DVT, pulmonary emboli on chronic anticoagulation, status post closure of AV shunt, hyperlipidemia, hypertension, MGUS and arthritis amongst other medical problems who presents to clinic for follow-up visit.  Since her last clinic visit with me, she endorses shortness of breath on the very humid days and states that there are some days when she has a productive cough.  She ran out of her rescue inhaler which her assisted living facility was not able to get to her for about 6 days and she thinks that she struggled significantly at the time.  She continues to be compliant with her Spiriva and Breo inhalers and uses albuterol inhalers 2-3 times a day but only uses 1 puff at a time.  She states that her allergies have been acting up a little more than usual and endorses itchy eyes.  She denies fevers, chills, night sweats and does state that she has gained weight over the course of the pandemic.  Of note oxygen saturations on 4 L of supplemental oxygen are 96%.    Patient supplied answers from flow sheet for:  COPD Assessment Test (CAT)  2009 [employer]. All rights reserved.  COPD assessment test (CAT) 1/24/2020 7/22/2020 5/6/2021 7/13/2022   Cough 1 1 3 3   Phlegm 1 2 3 3   Chest tightness 1 2 2 4   Walk up hill 3 5 3 4   Limited activities 2 1 0 3   Leaving my home 3 0 0 3   Sleep 2 0 0 2   Energy 4 4 4 4   Total Score 17 15 15 26      CAT Key:  The CAT consist of 8 items which are each scored 0-5. The total score ranges from 0-40 with higher scores representing a poorer health status. When interpreting CAT scores, the individual s disease severity should be considered.   Low impact  (1-9)  Medium impact   (10-20)  High impact  (21-30)  Very high impact  (31-40)      Pertinent Medical History:  At her last clinic visit we had noted that the patient had developed severe hypoxia requiring 8-10 L of oxygen to bring her oxygen saturations back up above 88% on ambulation.  I discussed this case with Dr. Montalvo as I was concerned for pulmonary hypertension and the patient underwent both a left and right heart cath which actually revealed significant hypoxia on a central arterial gases.  Subsequent shunt study demonstrated an arteriovenous shunt in her left femoral region that was thought to be from when the patient had a PFO closure few years previously.   Over the course of the last several months we determined that a significant proportion of her hypoxia was related to the fact that she had developed a left femoral AV fistula likely as a result of her prior coronary procedure several years ago.  She underwent intraoperative imaging studies which did reveal closure of this shunt.  .    ROS:  Positives alluded to in the HPI.  Remainder of 10 point review of systems is negative.    PMH (unchanged from prior visit):  1. Morbid obesity  2. Hypercapnic hypoxemic respiratory failure  3. Cervical carcinoma  4. Very severe gold stage C COPD  5. CVA  6. Diastolic heart failure  7. Right leg DVT  8. Pulmonary emboli on chronic anticoagulation  9. Hyperlipidemia  10. HTN  11. MGUS  12. Arthritis    PSH (unchanged from prior visit):  1. Coronary angiography  2. Colonoscopy    Allergies (unchanged from prior visit):  Reviewed in [facility]    Family HX (unchanged from prior visit):  1. Mother: CVA, tobacco abuse  2. Father: CVA,  at 56 years  3. Brother: MI,  at 52 years    Social Hx (unchanged from prior visit):  Marital status: Single  Number of children: 3 children, the patient has no contact with her children  Resides in a senior assisted living facility.  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   service: No  Pets: No  Smoking history: 80 pack year history; quit smoking 5 years ago  Alcohol use: No  Recreational drug use: In her 20s  Hobbies: None  Recent Travel: No    Current Meds:  Current Outpatient Medications   Medication Sig Dispense Refill     acetaminophen (TYLENOL) 500 MG tablet Take 500-1,000 mg by mouth every 6 hours as needed       albuterol (PROVENTIL) (2.5 MG/3ML) 0.083% neb solution Take 1 vial (2.5 mg) by nebulization every 6 hours as needed for shortness of breath / dyspnea or wheezing 90 mL 3     albuterol (VENTOLIN HFA) 108 (90 Base) MCG/ACT inhaler INHALE 2 PUFFS INTO THE LUNGS EVERY 4-6 HOURS AS NEEDED. 18 g 3     diphenhydrAMINE (BENADRYL) 25 MG capsule Take 1-2 capsules (25-50 mg) by mouth every 6 hours as needed for itching or allergies 100 capsule 0     fluticasone-vilanterol (BREO ELLIPTA) 200-25 MCG/INH inhaler Inhale 1 puff into the lungs daily 60 each 3     JANTOVEN 5 MG tablet TAKE TWO TABLETS (10MG) BY MOUTH ON MONDAY, AND TWO AND ONE-HALF TABLETS (12.5MG) BY MOUTH ALL OTHER DAYS OR AS DIRECTED BY INR CLINIC. 220 tablet 0     order for DME Equipment being ordered: cushion mask aguilar for CPAP machine with mask strap 1 each 1     order for DME Equipment being ordered: Oxygen-portable oxygen per patient's preference-continuous oxygen at 2 L per NC 1 each 0     order for DME Patient needs a new mask.       tiotropium (SPIRIVA RESPIMAT) 2.5 MCG/ACT inhaler INHALE TWO PUFFS BY MOUTH EVERY DAY 4 g 3     warfarin ANTICOAGULANT (COUMADIN) 5 MG tablet Take 10mg (2 tablets) daily or as directed by INR Clinic. 220 tablet 1     B Complex-C TABS TAKE ONE TABLET BY MOUTH EVERY  tablet 3     bumetanide (BUMEX) 0.5 MG tablet Take 1 tablet (0.5 mg) by mouth daily 30 tablet 1     Cyanocobalamin (B-12) 1000 MCG CAPS Take 1 capsule by mouth daily       ferrous sulfate (IRON) 325 (65 Fe) MG tablet Take 1 tablet (325 mg) by mouth daily (with breakfast) 30 tablet 0      predniSONE (DELTASONE) 20 MG tablet Take 2 tabs daily x 5 days (Patient not taking: Reported on 7/13/2022) 10 tablet 0       Labs:  Reviewed.     Imaging studies:  (Unchanged from previous visit)  CT Chest w/o Contrast 4/23/21:  1.  Enlarged main pulmonary artery as well as tortuous proximal subsegmental pulmonary arteries around the hilum consistent with chronic pulmonary hypertension.  2.  Similar pattern of lung parenchymal mosaicism. This could represent regional air trapping or alternatively heterogeneous pulmonary vascular resistance (mosaic perfusion) in the setting of pulmonary hypertension high-resolution chest CT with expiratory images would be helpful to distinguish between the two.  3.  Only mild emphysema.  4.  Small, 3 x 5 mm right upper lobe pulmonary nodule is stable from 2019 consistent with a benign nodule. This does not require any further dedicated imaging follow-up.    (Unchanged from previous visit)  CT Chest W/o Contrast 7/15/20:  1.  Stable pulmonary nodules measuring up to 0.5 cm.    TTE 8/27/19:    1.Left ventricle ejection fraction is normal. The calculated left ventricular ejection fraction is 54%.    2.Normal right ventricular size and systolic function.    3.No hemodynamically significant valvular heart abnormalities, although this was a limited study without complete Doppler assessment of valves performed.    4.Mild pulmonary hypertension suggested.    5.When compared to the previous study dated 10/12/2018, no significant change. Specifically pulmonic pressure is 54 estimated and prior 51 mmHg.     US Operative 7/22/19:  1.  Apparent successful ligation of the left groin AV fistula communication with return of normal venous waveforms to the common femoral vein during this intraoperative procedure.    CT Abdomen Pelvis without oral with IV contrast 6/27/19:  1.  Small fat-containing supraumbilical hernia shows no significant change in size since prior, however, demonstrates minimal new  fluid and/or inflammation along this hernia. No adjacent skin thickening. No collections.  2.  Cholelithiasis.  3.  No diverticulitis, appendicitis or bowel obstruction.  4.  Significant mosaicism in the lung bases. Constrictive bronchiolitis not excluded. Airway thickening. Bronchiectasis.      Unchanged from prior visit:  US Arterial Left leg 4/22/19:  1.  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  2.  Although the distinction indication is not identified sonographically on the CTA I favor there to be a small truncation between the proximal left inferior epigastric artery and vein. This is seen on image 184. The patient does state she has had previous percutaneous access in the groin    CV Cath 4/12/19:    The LM vessel was moderate and is considered normal.    Estimated blood loss was <20 ml.    The LAD vessel was moderate and is considered normal .    The left circumflex was moderate and is considered normal.    The RCA was moderate and is considered normal.     Pt s/p CVA s/p PFO closure with pulm HTN, morbid obesity and dyspnea, along with JODI on CPAP and home oxygen.     Right heart cath  RA mean 10mmHg  PA 55/30/39mmHg  PCWP mean 15  [id number]  Ao 122/68     On 3 l oxygen:  Ao 99%  PA 80%  SVC 76%  IVC 91%     Mix venous calculated 79.75%     CO enid 7.     Angiography via right radial  Normal right dominant coronary anatomy     CTA Chest Abdomen Pelvis 4/12/19:  1.  Findings consistent with arteriovenous fistula between the left common femoral artery and left common femoral vein. Ultrasound may be useful to better define the fistula.  2.  Cholelithiasis.  3.  Colonic diverticulosis.    CT Chest w/o Contrast 1/20/19:  1.  Prominent areas of groundglass opacity, mosaic perfusion and more focal areas of airspace disease likely infectious or inflammatory. Stable pulmonary nodule when compared to most recent study not identified on older exam. Continued follow-up recommended.  2.  Mildly prominent  mediastinal adenopathy likely infectious or inflammatory. Follow-up recommended to confirm and exclude other pathology.    TTE with bubble 10/12/18:    Left ventricle is normal in size and systolic function.    The calculated left ventricular ejection fraction is 59%.    Right ventricle is moderately dilated with normal systolic function.    Both atria are significantly enlarged.    No hemodynamically significant valvular heart abnormalities.    Agitated saline bubble study was negative for intracardiac shunt, however the accuracy of this was limited by body habitus and generally poor acoustic windows.    When compared to the previous study dated 4/10/2018, no significant change.    CTA Chest 7/3/18:  1.  No evidence pulmonary embolism.  2.  Enlarged pulmonary artery and evidence of right ventricular strain consistent with pulmonary hypertension.  3.  Lung mosaic attenuation which could be due to pulmonary hypertension. Other etiologies are hypersensitivity pneumonitis or constrictive bronchiolitis.    PFT's 1/24/20:  FEV1/FVC is 41% and is reduced.  FEV1 is 0.81L (29%) predicted and is reduced.  FVC is 1.97L (56%) predicted and reduced.  There was no improvement in spirometry after a single inhaled dose of bronchodilator.  TLC is 6.31L (119%) predicted and is normal.  RV is 4.34L (220%) predicted and is increased.  DLCO is 9.11ml/min/hg (41%) predicted and is reduced when it is corrected for hemoglobin.  Flow volume loops indicate severe scooping of the expiratory limb.    Impression:  Full Pulmonary Function Test is abnormal.  PFTs are consistent with very severe obstructive disease.  Spirometry is not consistent with reversibility.  There is no hyperinflation.  There is air-trapping.  Diffusion capacity when corrected for hemoglobin is severely reduced.    The ATS criteria for acceptability and reproducibility was met.  The results of cleft testing appear to be valid although ATS criteria were not met despite 8  efforts and only 2 efforts were saved.      PFT's 7/18/18 (unchanged from prior visit):  FEV1/FVC is 46% and is reduced.  FEV1 is 0.80L (29%) predicted and is reduced.  FVC is 1.74L (49%) predicted and reduced.  There was no improvement in spirometry after a single inhaled dose of bronchodilator.  TLC is 6.61L (125%) predicted and is reduced.  RV is 4.88L (250%) predicted and is increased.  DLCO is 8.58ml/min/hg (37%) predicted and is reduced when it is corrected for hemoglobin.  Flow volume loops indicate severe scooping of the expiratory limb.    Impression:  Full Pulmonary Function Test is abnormal.  PFTs are consistent with very severe obstructive disease.  Spirometry is not consistent with reversibility.  There is hyperinflation.  There is air-trapping.  Diffusion capacity when corrected for hemoglobin is severely reduced.    Physical Exam:  /63 (BP Location: Right arm, Patient Position: Chair)   Pulse 96   SpO2 95%   Physical Exam  Constitutional:       Appearance: Normal appearance.   HENT:      Mouth/Throat:      Mouth: Mucous membranes are moist.   Cardiovascular:      Comments: Sinus tachycardia.  S1 and S2 audible.  Pulmonary:      Effort: Pulmonary effort is normal.   Abdominal:      General: Abdomen is flat.      Comments: Umbilical hernia noted.   Musculoskeletal:      Right lower leg: Edema present.      Left lower leg: Edema present.   Skin:     General: Skin is warm.   Neurological:      General: No focal deficit present.      Mental Status: She is alert.         Assessment and Plan:Linda Otero is a 60 y.o. with a past medical history significant for morbid obesity, cervical carcinoma, COPD, prior history of CVA, diastolic heart failure, right leg DVT, pulmonary emboli on chronic anticoagulation, hyperlipidemia, hypertension, MGUS and arthritis  who presents to clinic today for follow-up visit for her very severe hypoxia likely related to her OHS/JODI and COPD.  For the present we would  recommend;    1. Gold stage C COPD: Likely secondary to her history of tobacco abuse.  Has been compliant with her Breo and Spiriva inhaler and has had days when she has needed to use her albuterol more frequently finds that this is related to the humidity levels.     Continue Spiriva 2 puffs daily.    Reminded her to use Breo 1 puff daily.  Reminded to gargle after using this inhaler.    As needed albuterol for shortness of breath.    Continue supplemental oxygen to maintain saturations greater than 88%.  2. AV fistula in the left femoral region: Underwent a closure of this at the end of July 2019 and was noted to have radiographic improvement intraoperatively.  Oxygen requirements have significantly improved subsequent to this procedure.  3. Mosaic attenuation on CT scan: May be related to small airways disease given her body habitus versus pulmonary hypertension which has been noted previously on echocardiogram.   4. HCM:     Has been added to the lung cancer screening registry.    Is up-to-date with her influenza vaccine and Pneumovax 23 booster.     Has received her Covid-19 vaccines and booster.  5. Follow-up: 6 months.      Amy Albright MD  Pulmonary and Critical Care  (P) [phone]    Lung Cancer Screening Shared Decision Making Visit      Linda Otero, a 62 year old female, is eligible for lung cancer screening          History   Smoking Status     Former Smoker     Packs/day: 1.00     Years: 44.00     Quit date: 12/23/2012   Smokeless Tobacco     Never Used        I have discussed with patient the risks and benefits of screening for lung cancer with low-dose CT.      The risks include:     radiation exposure: one low dose chest CT has as much ionizing radiation as about 15 chest x-rays, or 6 months of background radiation living in [place]       false positives: most findings/nodules are NOT cancer, but some might still require additional diagnostic evaluation, including  biopsy     over-diagnosis: some slow growing cancers that might never have been clinically significant will be detected and treated unnecessarily      The benefit of early detection of lung cancer is contingent upon adherence to annual screening or more frequent follow up if indicated.      Furthermore, to benefit from screening, Linda must be willing and able to undergo diagnostic procedures, if indicated. Although no specific guide is available for determining severity of comorbidities, it is reasonable to withhold screening in patients who have greater mortality risk from other diseases.      We did discuss that the best way to prevent lung cancer is to not smoke.     Some patients may value a numeric estimation of lung cancer risk when evaluating if lung cancer screening is right for them, here is one calculator:     ShouldIScreen

## 2022-07-13 NOTE — PROGRESS NOTES
CCx: Follow-up for hypoxia likely secondary to a combination of emphysema and obesity hypoventilation syndrome s/p closure of Femoral AV shunt    HPI: Ms. Otero is a 58-year-old lady with a past medical history significant for morbid obesity, cervical carcinoma, COPD, prior history of CVA, diastolic heart failure, right leg DVT, pulmonary emboli on chronic anticoagulation, status post closure of AV shunt, hyperlipidemia, hypertension, MGUS and arthritis amongst other medical problems who presents to clinic for follow-up visit.   Since her last clinic visit she is continue to be compliant with her Spiriva and Breo Ellipta inhalers and uses her albuterol a couple of times a day with activity.  Because of the current pandemic, she is fairly bound to her room and thinks that she may have gained some weight.  Her oxygen saturations at rest are in the high 90s on 1 L of oxygen.  She denies fevers, chills, night sweats, chest pain, chest tightness, night sweats and endorses some minimal abdominal pain.  Patient supplied answers from flow sheet for:  COPD Assessment Test (CAT)  2009 [employer]. All rights reserved.  COPD assessment test (CAT) 1/24/2020 7/22/2020 5/6/2021 7/13/2022   Cough 1 1 3 3   Phlegm 1 2 3 3   Chest tightness 1 2 2 4   Walk up hill 3 5 3 4   Limited activities 2 1 0 3   Leaving my home 3 0 0 3   Sleep 2 0 0 2   Energy 4 4 4 4   Total Score 17 15 15 26      CAT Key:  The CAT consist of 8 items which are each scored 0-5. The total score ranges from 0-40 with higher scores representing a poorer health status. When interpreting CAT scores, the individual s disease severity should be considered.   Low impact  (1-9)  Medium impact  (10-20)  High impact  (21-30)  Very high impact  (31-40)          Pertinent Medical History:  At her last clinic visit we had noted that the patient had developed severe hypoxia requiring 8-10 L of oxygen to bring her oxygen saturations back up above 88% on ambulation.  I discussed  this case with Dr. Montalvo as I was concerned for pulmonary hypertension and the patient underwent both a left and right heart cath which actually revealed significant hypoxia on a central arterial gases.  Subsequent shunt study demonstrated an arteriovenous shunt in her left femoral region that was thought to be from when the patient had a PFO closure few years previously.   Over the course of the last several months we determined that a significant proportion of her hypoxia was related to the fact that she had developed a left femoral AV fistula likely as a result of her prior coronary procedure several years ago.  She underwent intraoperative imaging studies which did reveal closure of this shunt.  .    ROS:  Positives alluded to in the HPI.  Remainder of 10 point review of systems is negative.    PMH (unchanged from prior visit):  1. Morbid obesity  2. Hypercapnic hypoxemic respiratory failure  3. Cervical carcinoma  4. Very severe gold stage C COPD  5. CVA  6. Diastolic heart failure  7. Right leg DVT  8. Pulmonary emboli on chronic anticoagulation  9. Hyperlipidemia  10. HTN  11. MGUS  12. Arthritis    PSH (unchanged from prior visit):  1. Coronary angiography  2. Colonoscopy    Allergies (unchanged from prior visit):  Reviewed in [facility]    Family HX (unchanged from prior visit):  1. Mother: CVA, tobacco abuse  2. Father: CVA,  at 56 years  3. Brother: MI,  at 52 years    Social Hx (unchanged from prior visit):  Marital status: Single  Number of children: 3 children, the patient has no contact with her children  Resides in a senior assisted living facility.  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   service: No  Pets: No  Smoking history: 80 pack year history; quit smoking 5 years ago  Alcohol use: No  Recreational drug use: In her 20s  Hobbies: None  Recent Travel: No    Current Meds:  Current Outpatient Medications   Medication Sig Dispense Refill     acetaminophen  (TYLENOL) 500 MG tablet Take 500-1,000 mg by mouth every 6 hours as needed       albuterol (PROVENTIL) (2.5 MG/3ML) 0.083% neb solution Take 1 vial (2.5 mg) by nebulization every 6 hours as needed for shortness of breath / dyspnea or wheezing 90 mL 3     albuterol (VENTOLIN HFA) 108 (90 Base) MCG/ACT inhaler INHALE 2 PUFFS INTO THE LUNGS EVERY 4-6 HOURS AS NEEDED. 18 g 3     diphenhydrAMINE (BENADRYL) 25 MG capsule Take 1-2 capsules (25-50 mg) by mouth every 6 hours as needed for itching or allergies 100 capsule 0     fluticasone-vilanterol (BREO ELLIPTA) 200-25 MCG/INH inhaler Inhale 1 puff into the lungs daily 60 each 3     JANTOVEN 5 MG tablet TAKE TWO TABLETS (10MG) BY MOUTH ON MONDAY, AND TWO AND ONE-HALF TABLETS (12.5MG) BY MOUTH ALL OTHER DAYS OR AS DIRECTED BY INR CLINIC. 220 tablet 0     order for DME Equipment being ordered: cushion mask aguilar for CPAP machine with mask strap 1 each 1     order for DME Equipment being ordered: Oxygen-portable oxygen per patient's preference-continuous oxygen at 2 L per NC 1 each 0     order for DME Patient needs a new mask.       tiotropium (SPIRIVA RESPIMAT) 2.5 MCG/ACT inhaler INHALE TWO PUFFS BY MOUTH EVERY DAY 4 g 3     warfarin ANTICOAGULANT (COUMADIN) 5 MG tablet Take 10mg (2 tablets) daily or as directed by INR Clinic. 220 tablet 1     B Complex-C TABS TAKE ONE TABLET BY MOUTH EVERY  tablet 3     bumetanide (BUMEX) 0.5 MG tablet Take 1 tablet (0.5 mg) by mouth daily 30 tablet 1     Cyanocobalamin (B-12) 1000 MCG CAPS Take 1 capsule by mouth daily       ferrous sulfate (IRON) 325 (65 Fe) MG tablet Take 1 tablet (325 mg) by mouth daily (with breakfast) 30 tablet 0     predniSONE (DELTASONE) 20 MG tablet Take 2 tabs daily x 5 days (Patient not taking: Reported on 7/13/2022) 10 tablet 0         Labs:  Reviewed.     Imaging studies:  CT Chest w/o Contrast 4/23/21:  1.  Enlarged main pulmonary artery as well as tortuous proximal subsegmental pulmonary arteries  around the hilum consistent with chronic pulmonary hypertension.  2.  Similar pattern of lung parenchymal mosaicism. This could represent regional air trapping or alternatively heterogeneous pulmonary vascular resistance (mosaic perfusion) in the setting of pulmonary hypertension high-resolution chest CT with expiratory images would be helpful to distinguish between the two.  3.  Only mild emphysema.  4.  Small, 3 x 5 mm right upper lobe pulmonary nodule is stable from 2019 consistent with a benign nodule. This does not require any further dedicated imaging follow-up.    (Unchanged from previous visit)  CT Chest W/o Contrast 7/15/20:  1.  Stable pulmonary nodules measuring up to 0.5 cm.    TTE 8/27/19:    1.Left ventricle ejection fraction is normal. The calculated left ventricular ejection fraction is 54%.    2.Normal right ventricular size and systolic function.    3.No hemodynamically significant valvular heart abnormalities, although this was a limited study without complete Doppler assessment of valves performed.    4.Mild pulmonary hypertension suggested.    5.When compared to the previous study dated 10/12/2018, no significant change. Specifically pulmonic pressure is 54 estimated and prior 51 mmHg.     US Operative 7/22/19:  1.  Apparent successful ligation of the left groin AV fistula communication with return of normal venous waveforms to the common femoral vein during this intraoperative procedure.    CT Abdomen Pelvis without oral with IV contrast 6/27/19:  1.  Small fat-containing supraumbilical hernia shows no significant change in size since prior, however, demonstrates minimal new fluid and/or inflammation along this hernia. No adjacent skin thickening. No collections.  2.  Cholelithiasis.  3.  No diverticulitis, appendicitis or bowel obstruction.  4.  Significant mosaicism in the lung bases. Constrictive bronchiolitis not excluded. Airway thickening. Bronchiectasis.      Unchanged from prior  visit:  US Arterial Left leg 4/22/19:  1.  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  2.  Although the distinction indication is not identified sonographically on the CTA I favor there to be a small truncation between the proximal left inferior epigastric artery and vein. This is seen on image 184. The patient does state she has had previous percutaneous access in the groin    CV Cath 4/12/19:    The LM vessel was moderate and is considered normal.    Estimated blood loss was <20 ml.    The LAD vessel was moderate and is considered normal .    The left circumflex was moderate and is considered normal.    The RCA was moderate and is considered normal.     Pt s/p CVA s/p PFO closure with pulm HTN, morbid obesity and dyspnea, along with JODI on CPAP and home oxygen.     Right heart cath  RA mean 10mmHg  PA 55/30/39mmHg  PCWP mean 15  [id number]  Ao 122/68     On 3 l oxygen:  Ao 99%  PA 80%  SVC 76%  IVC 91%     Mix venous calculated 79.75%     CO enid 7.     Angiography via right radial  Normal right dominant coronary anatomy     CTA Chest Abdomen Pelvis 4/12/19:  1.  Findings consistent with arteriovenous fistula between the left common femoral artery and left common femoral vein. Ultrasound may be useful to better define the fistula.  2.  Cholelithiasis.  3.  Colonic diverticulosis.    CT Chest w/o Contrast 1/20/19:  1.  Prominent areas of groundglass opacity, mosaic perfusion and more focal areas of airspace disease likely infectious or inflammatory. Stable pulmonary nodule when compared to most recent study not identified on older exam. Continued follow-up recommended.  2.  Mildly prominent mediastinal adenopathy likely infectious or inflammatory. Follow-up recommended to confirm and exclude other pathology.    TTE with bubble 10/12/18:    Left ventricle is normal in size and systolic function.    The calculated left ventricular ejection fraction is 59%.    Right ventricle is moderately dilated with normal  systolic function.    Both atria are significantly enlarged.    No hemodynamically significant valvular heart abnormalities.    Agitated saline bubble study was negative for intracardiac shunt, however the accuracy of this was limited by body habitus and generally poor acoustic windows.    When compared to the previous study dated 4/10/2018, no significant change.    CTA Chest 7/3/18:  1.  No evidence pulmonary embolism.  2.  Enlarged pulmonary artery and evidence of right ventricular strain consistent with pulmonary hypertension.  3.  Lung mosaic attenuation which could be due to pulmonary hypertension. Other etiologies are hypersensitivity pneumonitis or constrictive bronchiolitis.    PFT's 1/24/20:  FEV1/FVC is 41% and is reduced.  FEV1 is 0.81L (29%) predicted and is reduced.  FVC is 1.97L (56%) predicted and reduced.  There was no improvement in spirometry after a single inhaled dose of bronchodilator.  TLC is 6.31L (119%) predicted and is normal.  RV is 4.34L (220%) predicted and is increased.  DLCO is 9.11ml/min/hg (41%) predicted and is reduced when it is corrected for hemoglobin.  Flow volume loops indicate severe scooping of the expiratory limb.    Impression:  Full Pulmonary Function Test is abnormal.  PFTs are consistent with very severe obstructive disease.  Spirometry is not consistent with reversibility.  There is no hyperinflation.  There is air-trapping.  Diffusion capacity when corrected for hemoglobin is severely reduced.    The ATS criteria for acceptability and reproducibility was met.  The results of cleft testing appear to be valid although ATS criteria were not met despite 8 efforts and only 2 efforts were saved.      PFT's 7/18/18 (unchanged from prior visit):  FEV1/FVC is 46% and is reduced.  FEV1 is 0.80L (29%) predicted and is reduced.  FVC is 1.74L (49%) predicted and reduced.  There was no improvement in spirometry after a single inhaled dose of bronchodilator.  TLC is 6.61L (125%)  predicted and is reduced.  RV is 4.88L (250%) predicted and is increased.  DLCO is 8.58ml/min/hg (37%) predicted and is reduced when it is corrected for hemoglobin.  Flow volume loops indicate severe scooping of the expiratory limb.    Impression:  Full Pulmonary Function Test is abnormal.  PFTs are consistent with very severe obstructive disease.  Spirometry is not consistent with reversibility.  There is hyperinflation.  There is air-trapping.  Diffusion capacity when corrected for hemoglobin is severely reduced.    Physical Exam:  /63 (BP Location: Right arm, Patient Position: Chair)   Pulse 96   SpO2 95%   Physical Exam          Assessment and Plan:Linda Otero is a 60 y.o. with a past medical history significant for morbid obesity, cervical carcinoma, COPD, prior history of CVA, diastolic heart failure, right leg DVT, pulmonary emboli on chronic anticoagulation, hyperlipidemia, hypertension, MGUS and arthritis  who presents to clinic today for follow-up visit for her very severe hypoxia likely related to her OHS/JODI and COPD.  For the present we would recommend;    1. Gold stage C COPD: Likely secondary to her history of tobacco abuse.  Has been compliant with her Breo and Spiriva inhaler and has had days when she has needed to use her albuterol more frequently finds that this is related to the humidity levels.     Continue Spiriva 2 puffs daily.    Reminded her to use Breo 1 puff daily.  Reminded to gargle after using this inhaler.    As needed albuterol for shortness of breath.    Obtain ambulatory oximetry:    The patient desaturated to 83% while walking 150 feet on 3 L supplemental oxygen and was able to maintain her saturations at 88% on 5 L of supplemental oxygen.  2. Pulmonary nodule: Noted to have a stable nodule in the right upper lobe compared to CT scan from July 2018.  No need for further followup.  3. AV fistula in the left femoral region: Underwent a closure of this at the end of July  2019 and was noted to have radiographic improvement intraoperatively.  Oxygen requirements have significantly improved subsequent to this procedure.  4. Mosaic attenuation on CT scan: May be related to small airways disease given her body habitus versus pulmonary hypertension which has been noted previously on echocardiogram.   5. HCM:     Has been added to the lung cancer screening registry.    Is up-to-date with her influenza vaccine and Pneumovax 23 booster.     Has received her Covid-19 vaccines.  6. Follow-up: 6 months.      Amy Albright MD  Pulmonary and Critical Care  (P) [phone]      Lung Cancer Screening Shared Decision Making Visit     Linda Otero, a 62 year old female, is eligible for lung cancer screening    History   Smoking Status     Former Smoker     Packs/day: 1.00     Years: 44.00     Quit date: 12/23/2012   Smokeless Tobacco     Never Used       I have discussed with patient the risks and benefits of screening for lung cancer with low-dose CT.     The risks include:    radiation exposure: one low dose chest CT has as much ionizing radiation as about 15 chest x-rays, or 6 months of background radiation living in [place]      false positives: most findings/nodules are NOT cancer, but some might still require additional diagnostic evaluation, including biopsy    over-diagnosis: some slow growing cancers that might never have been clinically significant will be detected and treated unnecessarily     The benefit of early detection of lung cancer is contingent upon adherence to annual screening or more frequent follow up if indicated.     Furthermore, to benefit from screening, Linda must be willing and able to undergo diagnostic procedures, if indicated. Although no specific guide is available for determining severity of comorbidities, it is reasonable to withhold screening in patients who have greater mortality risk from other diseases.     We did discuss that the best way to  prevent lung cancer is to not smoke.    Some patients may value a numeric estimation of lung cancer risk when evaluating if lung cancer screening is right for them, here is one calculator:    ShouldIScreen

## 2022-07-21 DIAGNOSIS — J42 CHRONIC BRONCHITIS, UNSPECIFIED CHRONIC BRONCHITIS TYPE (H): ICD-10-CM

## 2022-07-21 RX ORDER — ALBUTEROL SULFATE 90 UG/1
AEROSOL, METERED RESPIRATORY (INHALATION)
Qty: 18 G | Refills: 3 | Status: SHIPPED | OUTPATIENT
Start: 2022-07-21 | End: 2022-07-26

## 2022-07-25 ENCOUNTER — HOSPITAL ENCOUNTER (OUTPATIENT)
Dept: CT IMAGING | Facility: CLINIC | Age: 62
Discharge: HOME | End: 2022-07-25
Attending: INTERNAL MEDICINE | Admitting: INTERNAL MEDICINE
Payer: MEDICARE

## 2022-07-25 DIAGNOSIS — Z87.891 PERSONAL HISTORY OF TOBACCO USE: ICD-10-CM

## 2022-07-25 DIAGNOSIS — F17.201 TOBACCO ABUSE, IN REMISSION: ICD-10-CM

## 2022-07-25 PROCEDURE — 71271 CT THORAX LUNG CANCER SCR C-: CPT

## 2022-07-26 ENCOUNTER — TELEPHONE (OUTPATIENT)
Dept: PULMONOLOGY | Facility: OTHER | Age: 62
End: 2022-07-26

## 2022-07-26 DIAGNOSIS — J42 CHRONIC BRONCHITIS, UNSPECIFIED CHRONIC BRONCHITIS TYPE (H): ICD-10-CM

## 2022-07-26 RX ORDER — ALBUTEROL SULFATE 90 UG/1
AEROSOL, METERED RESPIRATORY (INHALATION)
Qty: 18 G | Refills: 3 | Status: SHIPPED | OUTPATIENT
Start: 2022-07-26 | End: 2023-03-20

## 2022-07-26 NOTE — TELEPHONE ENCOUNTER
Linda called in regards to her albuterol inhaler broke after only a couple of puffs. She had reported it to her assisted living but they did not get her a new one. She called [facility] and told them what happened with the inhaler. They will send her 2 replacement inhalers. Will send new refill orders to [facility] today as well.

## 2022-08-04 ENCOUNTER — TELEPHONE (OUTPATIENT)
Dept: PULMONOLOGY | Facility: OTHER | Age: 62
End: 2022-08-04

## 2022-08-04 DIAGNOSIS — J44.9 CHRONIC OBSTRUCTIVE PULMONARY DISEASE, UNSPECIFIED COPD TYPE (H): Primary | ICD-10-CM

## 2022-08-04 RX ORDER — GUAIFENESIN 600 MG/1
1200 TABLET, EXTENDED RELEASE ORAL 2 TIMES DAILY
Qty: 56 TABLET | Refills: 0 | Status: SHIPPED | OUTPATIENT
Start: 2022-08-04 | End: 2022-08-18

## 2022-08-04 NOTE — TELEPHONE ENCOUNTER
Spoke with Linda about her chest CT results. One year follow up imaging recommended. She has no further questions about this.     She reports some intermittent difficulty with breathing when the weather is humid. Her sat will go down to the low 80's but will rebound quickly. She is also having intermittent thick green sputum that will clear in a day or two. No increased SOB, fever, or chills. She was encouraged to try Mucinex and use her nebs to see if this helps loosen her sputum. On 4L of O2 continuous.    She is agreeable to this plan and has no further questions.     Tami Berrios, CNP  Pulmonary Medicine  [facility]   [phone]

## 2022-08-23 DIAGNOSIS — J42 CHRONIC BRONCHITIS, UNSPECIFIED CHRONIC BRONCHITIS TYPE (H): ICD-10-CM

## 2022-08-23 RX ORDER — TIOTROPIUM BROMIDE INHALATION SPRAY 3.12 UG/1
SPRAY, METERED RESPIRATORY (INHALATION)
Qty: 12 G | Refills: 3 | Status: SHIPPED | OUTPATIENT
Start: 2022-08-23 | End: 2023-01-01

## 2022-09-14 ENCOUNTER — MEDICAL CORRESPONDENCE (OUTPATIENT)
Dept: HEALTH INFORMATION MANAGEMENT | Facility: CLINIC | Age: 62
End: 2022-09-14

## 2022-09-16 ENCOUNTER — LAB REQUISITION (OUTPATIENT)
Dept: LAB | Facility: CLINIC | Age: 62
End: 2022-09-16
Payer: MEDICARE

## 2022-09-16 DIAGNOSIS — E66.01 MORBID (SEVERE) OBESITY DUE TO EXCESS CALORIES (H): ICD-10-CM

## 2022-09-16 DIAGNOSIS — D51.9 VITAMIN B12 DEFICIENCY ANEMIA, UNSPECIFIED: ICD-10-CM

## 2022-09-16 DIAGNOSIS — I10 ESSENTIAL (PRIMARY) HYPERTENSION: ICD-10-CM

## 2022-09-19 LAB
ANION GAP SERPL CALCULATED.3IONS-SCNC: 10 MMOL/L (ref 7–15)
BASOPHILS # BLD AUTO: 0.1 10E3/UL (ref 0–0.2)
BASOPHILS NFR BLD AUTO: 1 %
BUN SERPL-MCNC: 22.9 MG/DL (ref 8–23)
CALCIUM SERPL-MCNC: 9.5 MG/DL (ref 8.8–10.2)
CHLORIDE SERPL-SCNC: 100 MMOL/L (ref 98–107)
CREAT SERPL-MCNC: 1.01 MG/DL (ref 0.51–0.95)
DEPRECATED HCO3 PLAS-SCNC: 31 MMOL/L (ref 22–29)
EOSINOPHIL # BLD AUTO: 0.3 10E3/UL (ref 0–0.7)
EOSINOPHIL NFR BLD AUTO: 3 %
ERYTHROCYTE [DISTWIDTH] IN BLOOD BY AUTOMATED COUNT: 16.3 % (ref 10–15)
GFR SERPL CREATININE-BSD FRML MDRD: 63 ML/MIN/1.73M2
GLUCOSE SERPL-MCNC: 86 MG/DL (ref 70–99)
HBA1C MFR BLD: 5.5 %
HCT VFR BLD AUTO: 39.6 % (ref 35–47)
HGB BLD-MCNC: 11.4 G/DL (ref 11.7–15.7)
IMM GRANULOCYTES # BLD: 0.1 10E3/UL
IMM GRANULOCYTES NFR BLD: 1 %
LYMPHOCYTES # BLD AUTO: 0.8 10E3/UL (ref 0.8–5.3)
LYMPHOCYTES NFR BLD AUTO: 10 %
MCH RBC QN AUTO: 27 PG (ref 26.5–33)
MCHC RBC AUTO-ENTMCNC: 28.8 G/DL (ref 31.5–36.5)
MCV RBC AUTO: 94 FL (ref 78–100)
MONOCYTES # BLD AUTO: 0.5 10E3/UL (ref 0–1.3)
MONOCYTES NFR BLD AUTO: 6 %
NEUTROPHILS # BLD AUTO: 6.5 10E3/UL (ref 1.6–8.3)
NEUTROPHILS NFR BLD AUTO: 79 %
NRBC # BLD AUTO: 0 10E3/UL
NRBC BLD AUTO-RTO: 0 /100
PLATELET # BLD AUTO: 214 10E3/UL (ref 150–450)
POTASSIUM SERPL-SCNC: 4.3 MMOL/L (ref 3.4–5.3)
RBC # BLD AUTO: 4.23 10E6/UL (ref 3.8–5.2)
SODIUM SERPL-SCNC: 141 MMOL/L (ref 136–145)
VIT B12 SERPL-MCNC: 511 PG/ML (ref 232–1245)
WBC # BLD AUTO: 8.3 10E3/UL (ref 4–11)

## 2022-09-19 PROCEDURE — 83036 HEMOGLOBIN GLYCOSYLATED A1C: CPT | Mod: ORL | Performed by: NURSE PRACTITIONER

## 2022-09-19 PROCEDURE — 82607 VITAMIN B-12: CPT | Mod: ORL | Performed by: NURSE PRACTITIONER

## 2022-09-19 PROCEDURE — 80048 BASIC METABOLIC PNL TOTAL CA: CPT | Mod: ORL | Performed by: NURSE PRACTITIONER

## 2022-09-19 PROCEDURE — P9603 ONE-WAY ALLOW PRORATED MILES: HCPCS | Mod: ORL | Performed by: NURSE PRACTITIONER

## 2022-09-19 PROCEDURE — 85025 COMPLETE CBC W/AUTO DIFF WBC: CPT | Mod: ORL | Performed by: NURSE PRACTITIONER

## 2023-01-01 ENCOUNTER — TELEPHONE (OUTPATIENT)
Dept: PULMONOLOGY | Facility: CLINIC | Age: 63
End: 2023-01-01
Payer: MEDICARE

## 2023-01-01 ENCOUNTER — DOCUMENTATION ONLY (OUTPATIENT)
Facility: CLINIC | Age: 63
End: 2023-01-01
Payer: MEDICARE

## 2023-01-01 ENCOUNTER — OFFICE VISIT (OUTPATIENT)
Dept: PULMONOLOGY | Facility: CLINIC | Age: 63
End: 2023-01-01
Payer: MEDICARE

## 2023-01-01 VITALS — DIASTOLIC BLOOD PRESSURE: 86 MMHG | HEART RATE: 94 BPM | OXYGEN SATURATION: 91 % | SYSTOLIC BLOOD PRESSURE: 132 MMHG

## 2023-01-01 DIAGNOSIS — F17.201 TOBACCO ABUSE, IN REMISSION: ICD-10-CM

## 2023-01-01 DIAGNOSIS — J96.11 CHRONIC HYPOXIC RESPIRATORY FAILURE (H): ICD-10-CM

## 2023-01-01 DIAGNOSIS — J43.8 OTHER EMPHYSEMA (H): ICD-10-CM

## 2023-01-01 DIAGNOSIS — Z23 ENCOUNTER FOR IMMUNIZATION: ICD-10-CM

## 2023-01-01 DIAGNOSIS — Z87.891 PERSONAL HISTORY OF TOBACCO USE: ICD-10-CM

## 2023-01-01 DIAGNOSIS — J44.9 COPD, SEVERE (H): Primary | ICD-10-CM

## 2023-01-01 PROCEDURE — G0009 ADMIN PNEUMOCOCCAL VACCINE: HCPCS | Performed by: NURSE PRACTITIONER

## 2023-01-01 PROCEDURE — 90677 PCV20 VACCINE IM: CPT | Performed by: NURSE PRACTITIONER

## 2023-01-01 PROCEDURE — 99214 OFFICE O/P EST MOD 30 MIN: CPT | Mod: 25 | Performed by: NURSE PRACTITIONER

## 2023-01-01 RX ORDER — TIOTROPIUM BROMIDE INHALATION SPRAY 3.12 UG/1
SPRAY, METERED RESPIRATORY (INHALATION)
Qty: 12 G | Refills: 3 | Status: SHIPPED | OUTPATIENT
Start: 2023-01-01 | End: 2024-01-01

## 2023-01-01 RX ORDER — FLUTICASONE FUROATE AND VILANTEROL 200; 25 UG/1; UG/1
1 POWDER RESPIRATORY (INHALATION) DAILY
Qty: 30 EACH | Refills: 11 | Status: SHIPPED | OUTPATIENT
Start: 2023-01-01 | End: 2024-01-01

## 2023-01-01 RX ORDER — ALBUTEROL SULFATE 0.83 MG/ML
2.5 SOLUTION RESPIRATORY (INHALATION) EVERY 6 HOURS PRN
Qty: 90 ML | Refills: 11 | Status: SHIPPED | OUTPATIENT
Start: 2023-01-01 | End: 2024-01-01

## 2023-02-08 ENCOUNTER — MEDICAL CORRESPONDENCE (OUTPATIENT)
Dept: HEALTH INFORMATION MANAGEMENT | Facility: CLINIC | Age: 63
End: 2023-02-08

## 2023-03-20 DIAGNOSIS — J42 CHRONIC BRONCHITIS, UNSPECIFIED CHRONIC BRONCHITIS TYPE (H): ICD-10-CM

## 2023-03-20 RX ORDER — ALBUTEROL SULFATE 90 UG/1
AEROSOL, METERED RESPIRATORY (INHALATION)
Qty: 18 G | Refills: 3 | Status: SHIPPED | OUTPATIENT
Start: 2023-03-20 | End: 2024-01-01 | Stop reason: ALTCHOICE

## 2023-05-26 DIAGNOSIS — J44.9 COPD (CHRONIC OBSTRUCTIVE PULMONARY DISEASE) (H): ICD-10-CM

## 2023-05-26 RX ORDER — ALBUTEROL SULFATE 0.83 MG/ML
2.5 SOLUTION RESPIRATORY (INHALATION) EVERY 6 HOURS PRN
Qty: 90 ML | Refills: 0 | Status: SHIPPED | OUTPATIENT
Start: 2023-05-26 | End: 2023-07-11

## 2023-07-08 ENCOUNTER — LAB REQUISITION (OUTPATIENT)
Dept: LAB | Facility: CLINIC | Age: 63
End: 2023-07-08
Payer: MEDICARE

## 2023-07-08 DIAGNOSIS — L03.316 CELLULITIS OF UMBILICUS: ICD-10-CM

## 2023-07-10 LAB
ALBUMIN SERPL BCG-MCNC: 3.8 G/DL (ref 3.5–5.2)
ALP SERPL-CCNC: 84 U/L (ref 35–104)
ALT SERPL W P-5'-P-CCNC: 15 U/L (ref 0–50)
ANION GAP SERPL CALCULATED.3IONS-SCNC: 10 MMOL/L (ref 7–15)
AST SERPL W P-5'-P-CCNC: 19 U/L (ref 0–45)
BILIRUB SERPL-MCNC: 0.4 MG/DL
BUN SERPL-MCNC: 30.2 MG/DL (ref 8–23)
CALCIUM SERPL-MCNC: 9.5 MG/DL (ref 8.8–10.2)
CHLORIDE SERPL-SCNC: 103 MMOL/L (ref 98–107)
CREAT SERPL-MCNC: 1.08 MG/DL (ref 0.51–0.95)
DEPRECATED HCO3 PLAS-SCNC: 29 MMOL/L (ref 22–29)
GFR SERPL CREATININE-BSD FRML MDRD: 58 ML/MIN/1.73M2
GLUCOSE SERPL-MCNC: 107 MG/DL (ref 70–99)
POTASSIUM SERPL-SCNC: 4.4 MMOL/L (ref 3.4–5.3)
PROT SERPL-MCNC: 6.9 G/DL (ref 6.4–8.3)
SODIUM SERPL-SCNC: 142 MMOL/L (ref 136–145)

## 2023-07-10 PROCEDURE — 36415 COLL VENOUS BLD VENIPUNCTURE: CPT | Mod: ORL

## 2023-07-10 PROCEDURE — 80053 COMPREHEN METABOLIC PANEL: CPT | Mod: ORL

## 2023-07-10 PROCEDURE — P9603 ONE-WAY ALLOW PRORATED MILES: HCPCS | Mod: ORL

## 2023-07-11 DIAGNOSIS — J44.9 COPD (CHRONIC OBSTRUCTIVE PULMONARY DISEASE) (H): ICD-10-CM

## 2023-07-11 RX ORDER — ALBUTEROL SULFATE 0.83 MG/ML
2.5 SOLUTION RESPIRATORY (INHALATION) EVERY 6 HOURS PRN
Qty: 90 ML | Refills: 0 | Status: SHIPPED | OUTPATIENT
Start: 2023-07-11 | End: 2023-01-01

## 2023-09-15 NOTE — PROGRESS NOTES
Leonie RN with [facility] calling ([phone]). Gave verbal ok per standing order for SN visits - 1 time a week for 9 weeks for INR's, wound care.     Nehal Nazario RN Triage/Clinic Lead RN  [facility] [address]  [phone]       minimum assist (75% patients effort)

## 2023-11-01 NOTE — TELEPHONE ENCOUNTER
[facility] Call Center    Phone Message    May a detailed message be left on voicemail: yes     Reason for Call: Other: Per pt would like to know if he can get a follow up this month of next month on a Tuesday or Wednesday afternoon since her ALS care taker is her only transportation? Per pt wasnt able to see  since her last appt (2022) due to the  air quality they we have had since then.       Per pt would also like to ask if anything can be done at her assistant living apartment. Per pt now has a roommate and she would have to turn on the heater which it gets to hot in her room so she turns it off but the employees are telling her no matter what it has to be on. Per pt claims even if she turns if down to a lower temp her room still get hot. Per pt would like to know if anything can be done on our side due to her COPD.    Please call pt back to discuss. Thank you!    Action Taken: Message routed to:  [facility] PULM    Travel Screening: Not Applicable

## 2023-12-13 NOTE — PROGRESS NOTES
Pulmonary Clinic Follow-Up          Assessment/Plan:     Linda Otero is a 63 y.o. with a past medical history significant for COPD, morbid obesity, cervical carcinoma, prior history of CVA, diastolic heart failure, right leg DVT, pulmonary emboli on chronic anticoagulation, hyperlipidemia, hypertension, MGUS and arthritis - who presents to clinic today for annual follow-up.    COPD - GOLD E  Possible pulmonary hypertension  Emphysema  Chronic hypoxic respiratory failure  Former smoker with 44 pack year smoking history.  Pulmonary function testing with severe obstructive airway disease (FEV1 29% predicted), air-trapping, and moderately reduced diffusion capacity (41% predicted).  Lung imaging with  mild emphysema, parenchymal mosaicism and enlarged central pulmonary arteries suggesting pulmonary hypertension.  Patient underwent both a left and right heart cath which actually revealed significant hypoxia on a central arterial gases.  Subsequent shunt study demonstrated an arteriovenous shunt in her left femoral region that was thought to be from when the patient had a PFO closure few years previously.   Significant proportion of her hypoxia was related to the fact that she had developed a left femoral AV fistula likely as a result of her prior coronary procedure several years ago.  She underwent intraoperative imaging studies which did reveal closure of this shunt. Last LDCT in  7/2022, for lung cancer screening.  Last visit her walk test in clinic showed oxygen desaturations with activity, she required 5L oxygen.  She reports stable breathing but required an action plan x3 over the last year, provided by primary care.  Plan:  - continue Breo Ellipta (fluticasone/vilanterol) 200/25, one inhalation daily, rinse/gargle after use.  - continue Spiriva (tiotropium), two inhalations daily.  - continue Albuterol inhaler or nebulized PRN  - continue oxygen 3.5 - 5L with activity, to keep oxygen saturations 88-92%.  -  she is due for COVID booster, annual influenza, and pneumococcal vaccine.  She is agreeable to receiving prevnar 20 in clinic today.  - she is due for LDCT for lung cancer screening.  She is agreeable to continue these, order placed today.  - Action plan: prednisone 40mg x5 days (multiple antibiotic allergies, discuss with doc of day if needing antibiotic, may need levaquin)    Follow-up  - annually      Lizbeth Mcmahon, CNP  Pulmonary Medicine  [facility]  [phone]       CC:     COPD follow-up     HPI:     Linda Otero is a 63 y.o. with a past medical history significant for COPD, morbid obesity, cervical carcinoma, prior history of CVA, diastolic heart failure, right leg DVT, pulmonary emboli on chronic anticoagulation, hyperlipidemia, hypertension, MGUS and arthritis - who presents to clinic today for annual follow-up.    Since last visit (7/2022, Dr Albright), had recent leg wound, long recovery, felt this affected her breathing.  Albuterol use 2-3 times daily.  Prednisone + antibiotic through primary care for exacerbations, about 3 times over past year.  Mostly during summer with poor air quality.  3.5 - 5L oxygen depending on activity.      Patient supplied answers from flow sheet for:  COPD Assessment Test (CAT)  2009 [employer]. All rights reserved.      1/24/2020     3:00 PM 7/22/2020    10:00 AM 5/6/2021     1:00 PM 7/13/2022    11:00 AM   COPD assessment test (CAT)   Cough 1 1 3 3   Phlegm 1 2 3 3   Chest tightness 1 2 2 4   Walk up hill 3 5 3 4   Limited activities 2 1 0 3   Leaving my home 3 0 0 3   Sleep 2 0 0 2   Energy 4 4 4 4   Total Score 17 15 15 26      CAT Key:  The CAT consist of 8 items which are each scored 0-5. The total score ranges from 0-40 with higher scores representing a poorer health status. When interpreting CAT scores, the individual s disease severity should be considered.   Low impact  (1-9)  Medium impact  (10-20)  High impact  (21-30)  Very high  impact  (31-40)       ROS:     10-point ROS performed and is negative aside from those listed in HPI.       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  left thalamic stroke as well as patchy MCA branch infarcts    CVA (cerebral vascular accident) (H) 12/23/2012    Left thalamic stroke, MCA branch infarcts    Dermatitis     DVT (deep venous thrombosis) (H)     Right leg    Encephalopathy     after stroke, believed related to hypoxia    History of transfusion     Hyperlipidemia     Hypertension     Hypothyroidism     Lymphedema     MGUS (monoclonal gammopathy of unknown significance)     Neuropathy of right lower extremity     Obesity     On home oxygen therapy     PFO (patent foramen ovale)     closed after stroke, see cardiology notes care everywhere    PFO (patent foramen ovale)     Pneumonia     Primary hypercoagulable state (H24)     Pulmonary emboli (H) 2013    Pulmonary HTN (H)     Respiratory failure (H)     after stroke    Seizure (H)     Seizures (H)     at age 31    Skin cancer 2014    Sleep apnea     CPAP    Stroke (H) 2012    Umbilical hernia     Wound of right ankle      Past Surgical History:   Procedure Laterality Date    ASD REPAIR      ASD REPAIR      BIOPSY SKIN (LOCATION)      CARDIAC CATHETERIZATION  2012    2 stents     COLONOSCOPY N/A 4/9/2018    Procedure: COLONOSCOPY;  Surgeon: Dorene Araujo MD;  Location: [facility] OR;  Service:     CONIZATION  1985    CV CORONARY ANGIOGRAM N/A 4/12/2019    Procedure: Coronary Angiogram;  Surgeon: Brett Montalvo MD;  Location: [facility];  Service: Cardiology    CV LEFT HEART CATHETERIZATION WITHOUT LEFT VENTRICULOGRAM Left 4/12/2019    Procedure: Left Heart Catheterization Without Left Ventriculogram;  Surgeon: Brett Montalvo MD;  Location: [facility];  Service: Cardiology    CV RIGHT HEART CATHETERIZATION N/A 4/12/2019    Procedure: Right Heart Catheterization;  Surgeon: Brett Montalvo MD;  Location: [facility];  Service: Cardiology    ESOPHAGOSCOPY, GASTROSCOPY, DUODENOSCOPY (EGD), COMBINED N/A 4/9/2018    Procedure: ESOPHAGOGASTRODUODENOSCOPY (EGD);  Surgeon: Dorene Araujo MD;  Location: [facility];  Service:     IR CAROTID ANGIOGRAM  12/26/2012    IR CAROTID ANGIOGRAM  12/26/2012    IR MISCELLANEOUS PROCEDURE  12/26/2012    IR MISCELLANEOUS PROCEDURE  12/26/2012    IR MISCELLANEOUS PROCEDURE  12/26/2012    OTHER SURGICAL HISTORY      Cardiac stents    PATENT FORAMEN OVALE CLOSURE      REPAIR PATENT FORAMEN OVALE  2013    Helex device    XR SACRO ILIAC JOINT INJECTION LEFT  11/2012    C REMV ART CLOT ILIAC-POP,LEG INCIS Left 7/22/2019    Procedure: REPAIR LEFT FEMORAL ARTERIOVENOUS FISTULA WITH INTRAOPERATIVE ULTRASOUND;  Surgeon: Salinas Torres MD;  Location: [facility];  Service: General     Social History     Tobacco Use    Smoking status: Former     Packs/day: 1.00     Years: 44.00     Additional pack years: 0.00     Total pack years: 44.00     Types: Cigarettes     Quit date: 12/23/2012     Years since quitting: 10.9    Smokeless tobacco: Never   Substance Use Topics    Alcohol use: No     Alcohol/week: 0.0 standard drinks of alcohol    Drug use: No       Current Meds:  Current Outpatient  Medications   Medication Sig Dispense Refill    albuterol (PROVENTIL) (2.5 MG/3ML) 0.083% neb solution Take 1 vial (2.5 mg) by nebulization every 6 hours as needed for shortness of breath or wheezing OFFICE VISIT NEEDED FOR ANY FURTHER REFILLS 90 mL 11    fluticasone-vilanterol (BREO ELLIPTA) 200-25 MCG/ACT inhaler Inhale 1 puff into the lungs daily 30 each 11    tiotropium (SPIRIVA RESPIMAT) 2.5 MCG/ACT inhaler INHALE TWO PUFFS BY MOUTH EVERY DAY 12 g 3    acetaminophen (TYLENOL) 500 MG tablet Take 500-1,000 mg by mouth every 6 hours as needed      albuterol (VENTOLIN HFA) 108 (90 Base) MCG/ACT inhaler INHALE 2 PUFFS INTO THE LUNGS EVERY 4-6 HOURS AS NEEDED. 18 g 3    B Complex-C TABS TAKE ONE TABLET BY MOUTH EVERY  tablet 3    bumetanide (BUMEX) 0.5 MG tablet Take 1 tablet (0.5 mg) by mouth daily 30 tablet 1    Cyanocobalamin (B-12) 1000 MCG CAPS Take 1 capsule by mouth daily      diphenhydrAMINE (BENADRYL) 25 MG capsule Take 1-2 capsules (25-50 mg) by mouth every 6 hours as needed for itching or allergies 100 capsule 0    ferrous sulfate (IRON) 325 (65 Fe) MG tablet Take 1 tablet (325 mg) by mouth daily (with breakfast) 30 tablet 0    JANTOVEN 5 MG tablet TAKE TWO TABLETS (10MG) BY MOUTH ON MONDAY, AND TWO AND ONE-HALF TABLETS (12.5MG) BY MOUTH ALL OTHER DAYS OR AS DIRECTED BY INR CLINIC. 220 tablet 0    order for DME Equipment being ordered: cushion mask aguilar for CPAP machine with mask strap 1 each 1    order for DME Equipment being ordered: Oxygen-portable oxygen per patient's preference-continuous oxygen at 2 L per NC 1 each 0    order for DME Patient needs a new mask.      predniSONE (DELTASONE) 20 MG tablet Take 2 tabs daily x 5 days (Patient not taking: Reported on 7/13/2022) 10 tablet 0    warfarin ANTICOAGULANT (COUMADIN) 5 MG tablet Take 10mg (2 tablets) daily or as directed by INR Clinic. 220 tablet 1        Physical Exam:     /86 (BP Location: Left arm, Patient Position: Sitting, Cuff  Size: Adult Regular)   Pulse 94   SpO2 91%   Gen: adult female , appears in NAD, obese.  HEENT: clear conjunctivae, moist mucous membranes  CV: RRR, no M/G/R  Resp: CTAB, no focal crackles or wheezes.  Respirations even and unlabored.  On 4L oxygen via NC.  Skin: no apparent rashes on visible skin  Ext: no cyanosis, clubbing.  Non-pitting edema in LEs.  Neuro: alert and answering questions appropriately       Data:       Labs:  Reviewed.     Imaging studies:  CT Chest w/o Contrast 4/23/21:  1.  Enlarged main pulmonary artery as well as tortuous proximal subsegmental pulmonary arteries around the hilum consistent with chronic pulmonary hypertension.  2.  Similar pattern of lung parenchymal mosaicism. This could represent regional air trapping or alternatively heterogeneous pulmonary vascular resistance (mosaic perfusion) in the setting of pulmonary hypertension high-resolution chest CT with expiratory images would be helpful to distinguish between the two.  3.  Only mild emphysema.  4.  Small, 3 x 5 mm right upper lobe pulmonary nodule is stable from 2019 consistent with a benign nodule. This does not require any further dedicated imaging follow-up.      CT Chest W/o Contrast 7/15/20:  1.  Stable pulmonary nodules measuring up to 0.5 cm.    TTE 8/27/19:  1.Left ventricle ejection fraction is normal. The calculated left ventricular ejection fraction is 54%.  2.Normal right ventricular size and systolic function.  3.No hemodynamically significant valvular heart abnormalities, although this was a limited study without complete Doppler assessment of valves performed.  4.Mild pulmonary hypertension suggested.  5.When compared to the previous study dated 10/12/2018, no significant change. Specifically pulmonic pressure is 54 estimated and prior 51 mmHg.     US Operative 7/22/19:  1.  Apparent successful ligation of the left groin AV fistula communication with return of normal venous waveforms to the common femoral vein  during this intraoperative procedure.    CT Abdomen Pelvis without oral with IV contrast 6/27/19:  1.  Small fat-containing supraumbilical hernia shows no significant change in size since prior, however, demonstrates minimal new fluid and/or inflammation along this hernia. No adjacent skin thickening. No collections.  2.  Cholelithiasis.  3.  No diverticulitis, appendicitis or bowel obstruction.  4.  Significant mosaicism in the lung bases. Constrictive bronchiolitis not excluded. Airway thickening. Bronchiectasis.     CTA Chest Abdomen Pelvis 4/12/19:  1.  Findings consistent with arteriovenous fistula between the left common femoral artery and left common femoral vein. Ultrasound may be useful to better define the fistula.  2.  Cholelithiasis.  3.  Colonic diverticulosis.    CT Chest w/o Contrast 1/20/19:  1.  Prominent areas of groundglass opacity, mosaic perfusion and more focal areas of airspace disease likely infectious or inflammatory. Stable pulmonary nodule when compared to most recent study not identified on older exam. Continued follow-up recommended.  2.  Mildly prominent mediastinal adenopathy likely infectious or inflammatory. Follow-up recommended to confirm and exclude other pathology.    TTE with bubble 10/12/18:  Left ventricle is normal in size and systolic function.  The calculated left ventricular ejection fraction is 59%.  Right ventricle is moderately dilated with normal systolic function.  Both atria are significantly enlarged.  No hemodynamically significant valvular heart abnormalities.  Agitated saline bubble study was negative for intracardiac shunt, however the accuracy of this was limited by body habitus and generally poor acoustic windows.  When compared to the previous study dated 4/10/2018, no significant change.    CTA Chest 7/3/18:  1.  No evidence pulmonary embolism.  2.  Enlarged pulmonary artery and evidence of right ventricular strain consistent with pulmonary hypertension.  3.   Lung mosaic attenuation which could be due to pulmonary hypertension. Other etiologies are hypersensitivity pneumonitis or constrictive bronchiolitis.        PFT's 1/24/20:  FEV1/FVC is 41% and is reduced.  FEV1 is 0.81L (29%) predicted and is reduced.  FVC is 1.97L (56%) predicted and reduced.  There was no improvement in spirometry after a single inhaled dose of bronchodilator.  TLC is 6.31L (119%) predicted and is normal.  RV is 4.34L (220%) predicted and is increased.  DLCO is 9.11ml/min/hg (41%) predicted and is reduced when it is corrected for hemoglobin.  Flow volume loops indicate severe scooping of the expiratory limb.    Impression:  Full Pulmonary Function Test is abnormal.  PFTs are consistent with very severe obstructive disease.  Spirometry is not consistent with reversibility.  There is no hyperinflation.  There is air-trapping.  Diffusion capacity when corrected for hemoglobin is severely reduced.    The ATS criteria for acceptability and reproducibility was met.  The results of cleft testing appear to be valid although ATS criteria were not met despite 8 efforts and only 2 efforts were saved.      PFT's 7/18/18  FEV1/FVC is 46% and is reduced.  FEV1 is 0.80L (29%) predicted and is reduced.  FVC is 1.74L (49%) predicted and reduced.  There was no improvement in spirometry after a single inhaled dose of bronchodilator.  TLC is 6.61L (125%) predicted and is reduced.  RV is 4.88L (250%) predicted and is increased.  DLCO is 8.58ml/min/hg (37%) predicted and is reduced when it is corrected for hemoglobin.  Flow volume loops indicate severe scooping of the expiratory limb.    Impression:  Full Pulmonary Function Test is abnormal.  PFTs are consistent with very severe obstructive disease.  Spirometry is not consistent with reversibility.  There is hyperinflation.  There is air-trapping.  Diffusion capacity when corrected for hemoglobin is severely reduced.

## 2023-12-13 NOTE — PATIENT INSTRUCTIONS
It was a pleasure to see you in clinic today.   Here is what we discussed:    Continue Breo Ellipta one inhalation daily, rinse/gargle after use.  Continue Spiriva two inhalations daily.  Continue Albuterol inhaler or nebulized every 4-6 hours as needed for shortness of breath or wheezing.  Continue oxygen 3.5 - 5L to keep oxygen saturations >88%.  Call my nurse, Milton ([phone]) with any change or worsening of your breathing.  We can activate your 'action plan' : prednisone 40mg x5 days.  Call us anytime for this medication.  We gave you the pneumonia vaccine (prevnar 20) in clinic today.  You are due for chest CT for lung cancer screening.  Follow-up annually, sooner with any issues.    Lizbeth Mcmahon, CNP  Pulmonary Medicine  [facility]  [phone]

## 2024-01-01 ENCOUNTER — APPOINTMENT (OUTPATIENT)
Dept: OCCUPATIONAL THERAPY | Facility: CLINIC | Age: 64
DRG: 286 | End: 2024-01-01
Payer: MEDICARE

## 2024-01-01 ENCOUNTER — LAB REQUISITION (OUTPATIENT)
Dept: LAB | Facility: CLINIC | Age: 64
End: 2024-01-01
Payer: MEDICARE

## 2024-01-01 ENCOUNTER — TRANSITIONAL CARE UNIT VISIT (OUTPATIENT)
Dept: GERIATRICS | Facility: CLINIC | Age: 64
End: 2024-01-01
Payer: MEDICARE

## 2024-01-01 ENCOUNTER — ALLIED HEALTH/NURSE VISIT (OUTPATIENT)
Dept: CARDIOLOGY | Facility: CLINIC | Age: 64
End: 2024-01-01

## 2024-01-01 ENCOUNTER — APPOINTMENT (OUTPATIENT)
Dept: PHYSICAL THERAPY | Facility: CLINIC | Age: 64
DRG: 286 | End: 2024-01-01
Payer: MEDICARE

## 2024-01-01 ENCOUNTER — DOCUMENTATION ONLY (OUTPATIENT)
Dept: GERIATRICS | Facility: CLINIC | Age: 64
End: 2024-01-01
Payer: MEDICARE

## 2024-01-01 ENCOUNTER — HOSPITAL ENCOUNTER (INPATIENT)
Facility: CLINIC | Age: 64
LOS: 39 days | Discharge: NURSING, REHAB OR LONG-TERM CARE | DRG: 286 | End: 2024-05-07
Attending: EMERGENCY MEDICINE | Admitting: HOSPITALIST
Payer: MEDICARE

## 2024-01-01 ENCOUNTER — APPOINTMENT (OUTPATIENT)
Dept: NUCLEAR MEDICINE | Facility: CLINIC | Age: 64
DRG: 286 | End: 2024-01-01
Attending: INTERNAL MEDICINE
Payer: MEDICARE

## 2024-01-01 ENCOUNTER — TELEPHONE (OUTPATIENT)
Dept: CARDIOLOGY | Facility: CLINIC | Age: 64
End: 2024-01-01

## 2024-01-01 ENCOUNTER — LAB (OUTPATIENT)
Dept: LAB | Facility: CLINIC | Age: 64
End: 2024-01-01
Payer: MEDICARE

## 2024-01-01 ENCOUNTER — APPOINTMENT (OUTPATIENT)
Dept: CT IMAGING | Facility: CLINIC | Age: 64
DRG: 286 | End: 2024-01-01
Attending: EMERGENCY MEDICINE
Payer: MEDICARE

## 2024-01-01 ENCOUNTER — TELEPHONE (OUTPATIENT)
Dept: CARDIOLOGY | Facility: CLINIC | Age: 64
End: 2024-01-01
Payer: MEDICARE

## 2024-01-01 ENCOUNTER — APPOINTMENT (OUTPATIENT)
Dept: PHYSICAL THERAPY | Facility: CLINIC | Age: 64
DRG: 871 | End: 2024-01-01
Attending: HOSPITALIST
Payer: MEDICARE

## 2024-01-01 ENCOUNTER — APPOINTMENT (OUTPATIENT)
Dept: OCCUPATIONAL THERAPY | Facility: CLINIC | Age: 64
DRG: 871 | End: 2024-01-01
Attending: HOSPITALIST
Payer: MEDICARE

## 2024-01-01 ENCOUNTER — OFFICE VISIT (OUTPATIENT)
Dept: CARDIOLOGY | Facility: CLINIC | Age: 64
End: 2024-01-01
Attending: INTERNAL MEDICINE
Payer: MEDICARE

## 2024-01-01 ENCOUNTER — APPOINTMENT (OUTPATIENT)
Dept: ULTRASOUND IMAGING | Facility: CLINIC | Age: 64
DRG: 871 | End: 2024-01-01
Attending: PODIATRIST
Payer: MEDICARE

## 2024-01-01 ENCOUNTER — MEDICAL CORRESPONDENCE (OUTPATIENT)
Dept: HEALTH INFORMATION MANAGEMENT | Facility: CLINIC | Age: 64
End: 2024-01-01

## 2024-01-01 ENCOUNTER — APPOINTMENT (OUTPATIENT)
Dept: OCCUPATIONAL THERAPY | Facility: CLINIC | Age: 64
DRG: 286 | End: 2024-01-01
Attending: HOSPITALIST
Payer: MEDICARE

## 2024-01-01 ENCOUNTER — DOCUMENTATION ONLY (OUTPATIENT)
Dept: OTHER | Facility: CLINIC | Age: 64
End: 2024-01-01

## 2024-01-01 ENCOUNTER — PATIENT OUTREACH (OUTPATIENT)
Dept: CARE COORDINATION | Facility: CLINIC | Age: 64
End: 2024-01-01
Payer: MEDICARE

## 2024-01-01 ENCOUNTER — PRE VISIT (OUTPATIENT)
Dept: CARDIOLOGY | Facility: CLINIC | Age: 64
End: 2024-01-01
Payer: MEDICARE

## 2024-01-01 ENCOUNTER — HOSPITAL ENCOUNTER (INPATIENT)
Facility: CLINIC | Age: 64
LOS: 4 days | DRG: 871 | End: 2024-10-30
Attending: EMERGENCY MEDICINE | Admitting: HOSPITALIST
Payer: MEDICARE

## 2024-01-01 ENCOUNTER — TELEPHONE (OUTPATIENT)
Dept: PULMONOLOGY | Facility: CLINIC | Age: 64
End: 2024-01-01
Payer: MEDICARE

## 2024-01-01 ENCOUNTER — APPOINTMENT (OUTPATIENT)
Dept: CARDIOLOGY | Facility: CLINIC | Age: 64
DRG: 286 | End: 2024-01-01
Attending: HOSPITALIST
Payer: MEDICARE

## 2024-01-01 ENCOUNTER — APPOINTMENT (OUTPATIENT)
Dept: CARDIOLOGY | Facility: CLINIC | Age: 64
DRG: 871 | End: 2024-01-01
Attending: HOSPITALIST
Payer: MEDICARE

## 2024-01-01 ENCOUNTER — APPOINTMENT (OUTPATIENT)
Dept: MRI IMAGING | Facility: CLINIC | Age: 64
DRG: 871 | End: 2024-01-01
Attending: INTERNAL MEDICINE
Payer: MEDICARE

## 2024-01-01 ENCOUNTER — OFFICE VISIT (OUTPATIENT)
Dept: CARDIOLOGY | Facility: CLINIC | Age: 64
End: 2024-01-01
Payer: MEDICARE

## 2024-01-01 ENCOUNTER — APPOINTMENT (OUTPATIENT)
Dept: GENERAL RADIOLOGY | Facility: CLINIC | Age: 64
DRG: 286 | End: 2024-01-01
Attending: STUDENT IN AN ORGANIZED HEALTH CARE EDUCATION/TRAINING PROGRAM
Payer: MEDICARE

## 2024-01-01 ENCOUNTER — APPOINTMENT (OUTPATIENT)
Dept: GENERAL RADIOLOGY | Facility: CLINIC | Age: 64
DRG: 286 | End: 2024-01-01
Attending: EMERGENCY MEDICINE
Payer: MEDICARE

## 2024-01-01 ENCOUNTER — APPOINTMENT (OUTPATIENT)
Dept: PHYSICAL THERAPY | Facility: CLINIC | Age: 64
DRG: 286 | End: 2024-01-01
Attending: INTERNAL MEDICINE
Payer: MEDICARE

## 2024-01-01 ENCOUNTER — APPOINTMENT (OUTPATIENT)
Dept: PHYSICAL THERAPY | Facility: CLINIC | Age: 64
DRG: 286 | End: 2024-01-01
Attending: HOSPITALIST
Payer: MEDICARE

## 2024-01-01 ENCOUNTER — LAB (OUTPATIENT)
Dept: CARDIOLOGY | Facility: CLINIC | Age: 64
End: 2024-01-01
Payer: MEDICARE

## 2024-01-01 ENCOUNTER — CARE COORDINATION (OUTPATIENT)
Dept: CARDIOLOGY | Facility: CLINIC | Age: 64
End: 2024-01-01
Payer: MEDICARE

## 2024-01-01 ENCOUNTER — DOCUMENTATION ONLY (OUTPATIENT)
Facility: CLINIC | Age: 64
End: 2024-01-01
Payer: MEDICARE

## 2024-01-01 ENCOUNTER — APPOINTMENT (OUTPATIENT)
Dept: ULTRASOUND IMAGING | Facility: CLINIC | Age: 64
DRG: 871 | End: 2024-01-01
Attending: ANESTHESIOLOGY
Payer: MEDICARE

## 2024-01-01 ENCOUNTER — DISCHARGE SUMMARY NURSING HOME (OUTPATIENT)
Dept: GERIATRICS | Facility: CLINIC | Age: 64
End: 2024-01-01
Payer: MEDICARE

## 2024-01-01 ENCOUNTER — APPOINTMENT (OUTPATIENT)
Dept: GENERAL RADIOLOGY | Facility: CLINIC | Age: 64
DRG: 871 | End: 2024-01-01
Attending: EMERGENCY MEDICINE
Payer: MEDICARE

## 2024-01-01 ENCOUNTER — APPOINTMENT (OUTPATIENT)
Dept: GENERAL RADIOLOGY | Facility: CLINIC | Age: 64
DRG: 286 | End: 2024-01-01
Attending: INTERNAL MEDICINE
Payer: MEDICARE

## 2024-01-01 VITALS
WEIGHT: 288.2 LBS | BODY MASS INDEX: 46.32 KG/M2 | TEMPERATURE: 97.3 F | SYSTOLIC BLOOD PRESSURE: 111 MMHG | RESPIRATION RATE: 18 BRPM | OXYGEN SATURATION: 93 % | HEIGHT: 66 IN | HEART RATE: 92 BPM | DIASTOLIC BLOOD PRESSURE: 72 MMHG

## 2024-01-01 VITALS
TEMPERATURE: 97.7 F | SYSTOLIC BLOOD PRESSURE: 108 MMHG | HEIGHT: 66 IN | WEIGHT: 287.8 LBS | RESPIRATION RATE: 20 BRPM | OXYGEN SATURATION: 94 % | DIASTOLIC BLOOD PRESSURE: 69 MMHG | HEART RATE: 88 BPM | BODY MASS INDEX: 46.25 KG/M2

## 2024-01-01 VITALS
WEIGHT: 293 LBS | OXYGEN SATURATION: 87 % | BODY MASS INDEX: 47.09 KG/M2 | HEIGHT: 66 IN | RESPIRATION RATE: 36 BRPM | HEART RATE: 93 BPM | SYSTOLIC BLOOD PRESSURE: 113 MMHG | TEMPERATURE: 97.4 F | DIASTOLIC BLOOD PRESSURE: 95 MMHG

## 2024-01-01 VITALS
TEMPERATURE: 97.8 F | RESPIRATION RATE: 16 BRPM | HEART RATE: 93 BPM | OXYGEN SATURATION: 91 % | HEIGHT: 66 IN | DIASTOLIC BLOOD PRESSURE: 81 MMHG | WEIGHT: 284.7 LBS | BODY MASS INDEX: 45.76 KG/M2 | SYSTOLIC BLOOD PRESSURE: 130 MMHG

## 2024-01-01 VITALS
TEMPERATURE: 98.4 F | HEART RATE: 98 BPM | DIASTOLIC BLOOD PRESSURE: 74 MMHG | BODY MASS INDEX: 46.25 KG/M2 | SYSTOLIC BLOOD PRESSURE: 107 MMHG | HEIGHT: 66 IN | WEIGHT: 287.8 LBS | OXYGEN SATURATION: 89 % | RESPIRATION RATE: 20 BRPM

## 2024-01-01 VITALS
RESPIRATION RATE: 19 BRPM | TEMPERATURE: 97.9 F | HEART RATE: 97 BPM | OXYGEN SATURATION: 94 % | BODY MASS INDEX: 45.64 KG/M2 | HEIGHT: 66 IN | DIASTOLIC BLOOD PRESSURE: 82 MMHG | WEIGHT: 284 LBS | SYSTOLIC BLOOD PRESSURE: 134 MMHG

## 2024-01-01 VITALS
WEIGHT: 293 LBS | HEART RATE: 104 BPM | HEIGHT: 66 IN | RESPIRATION RATE: 12 BRPM | BODY MASS INDEX: 47.09 KG/M2 | SYSTOLIC BLOOD PRESSURE: 112 MMHG | DIASTOLIC BLOOD PRESSURE: 50 MMHG

## 2024-01-01 VITALS
SYSTOLIC BLOOD PRESSURE: 106 MMHG | RESPIRATION RATE: 20 BRPM | WEIGHT: 286.2 LBS | HEIGHT: 66 IN | BODY MASS INDEX: 46 KG/M2 | OXYGEN SATURATION: 92 % | HEART RATE: 100 BPM | DIASTOLIC BLOOD PRESSURE: 75 MMHG | TEMPERATURE: 98.8 F

## 2024-01-01 VITALS
HEIGHT: 66 IN | RESPIRATION RATE: 24 BRPM | WEIGHT: 293 LBS | SYSTOLIC BLOOD PRESSURE: 104 MMHG | HEART RATE: 92 BPM | BODY MASS INDEX: 47.09 KG/M2 | DIASTOLIC BLOOD PRESSURE: 72 MMHG

## 2024-01-01 VITALS
RESPIRATION RATE: 14 BRPM | DIASTOLIC BLOOD PRESSURE: 72 MMHG | BODY MASS INDEX: 45.35 KG/M2 | WEIGHT: 281 LBS | HEART RATE: 93 BPM | SYSTOLIC BLOOD PRESSURE: 113 MMHG

## 2024-01-01 VITALS
WEIGHT: 288.4 LBS | OXYGEN SATURATION: 95 % | BODY MASS INDEX: 46.35 KG/M2 | TEMPERATURE: 97.7 F | HEART RATE: 86 BPM | RESPIRATION RATE: 18 BRPM | HEIGHT: 66 IN | DIASTOLIC BLOOD PRESSURE: 70 MMHG | SYSTOLIC BLOOD PRESSURE: 115 MMHG

## 2024-01-01 VITALS
HEIGHT: 66 IN | RESPIRATION RATE: 18 BRPM | WEIGHT: 288.6 LBS | OXYGEN SATURATION: 92 % | HEART RATE: 84 BPM | DIASTOLIC BLOOD PRESSURE: 70 MMHG | BODY MASS INDEX: 46.38 KG/M2 | SYSTOLIC BLOOD PRESSURE: 115 MMHG | TEMPERATURE: 97.9 F

## 2024-01-01 DIAGNOSIS — K59.00 CONSTIPATION, UNSPECIFIED CONSTIPATION TYPE: ICD-10-CM

## 2024-01-01 DIAGNOSIS — I27.20 PULMONARY HYPERTENSION (H): ICD-10-CM

## 2024-01-01 DIAGNOSIS — J96.21 ACUTE ON CHRONIC RESPIRATORY FAILURE WITH HYPOXIA (H): ICD-10-CM

## 2024-01-01 DIAGNOSIS — Z86.711 HISTORY OF PULMONARY EMBOLISM: ICD-10-CM

## 2024-01-01 DIAGNOSIS — E44.0 MODERATE MALNUTRITION (H): ICD-10-CM

## 2024-01-01 DIAGNOSIS — E53.8 VITAMIN B12 DEFICIENCY (NON ANEMIC): ICD-10-CM

## 2024-01-01 DIAGNOSIS — N17.9 AKI (ACUTE KIDNEY INJURY) (H): ICD-10-CM

## 2024-01-01 DIAGNOSIS — I50.9 ACUTE ON CHRONIC CONGESTIVE HEART FAILURE, UNSPECIFIED HEART FAILURE TYPE (H): ICD-10-CM

## 2024-01-01 DIAGNOSIS — L97.319 LOWER LIMB ULCER, ANKLE, RIGHT, WITH UNSPECIFIED SEVERITY (H): ICD-10-CM

## 2024-01-01 DIAGNOSIS — E78.2 MIXED HYPERLIPIDEMIA: ICD-10-CM

## 2024-01-01 DIAGNOSIS — J18.9 COMMUNITY ACQUIRED PNEUMONIA, UNSPECIFIED LATERALITY: ICD-10-CM

## 2024-01-01 DIAGNOSIS — E78.5 DYSLIPIDEMIA: ICD-10-CM

## 2024-01-01 DIAGNOSIS — I50.33 ACUTE ON CHRONIC HEART FAILURE WITH PRESERVED EJECTION FRACTION (H): Primary | ICD-10-CM

## 2024-01-01 DIAGNOSIS — R53.81 PHYSICAL DECONDITIONING: Primary | ICD-10-CM

## 2024-01-01 DIAGNOSIS — R06.09 DOE (DYSPNEA ON EXERTION): ICD-10-CM

## 2024-01-01 DIAGNOSIS — Z86.718 HISTORY OF DEEP VENOUS THROMBOSIS: ICD-10-CM

## 2024-01-01 DIAGNOSIS — I25.119 CORONARY ARTERY DISEASE INVOLVING NATIVE CORONARY ARTERY OF NATIVE HEART WITH ANGINA PECTORIS (H): ICD-10-CM

## 2024-01-01 DIAGNOSIS — Z86.718 PERSONAL HISTORY OF DVT (DEEP VEIN THROMBOSIS): ICD-10-CM

## 2024-01-01 DIAGNOSIS — N18.31 STAGE 3A CHRONIC KIDNEY DISEASE (H): ICD-10-CM

## 2024-01-01 DIAGNOSIS — E87.29 RESPIRATORY ACIDOSIS: ICD-10-CM

## 2024-01-01 DIAGNOSIS — J44.9 CHRONIC OBSTRUCTIVE PULMONARY DISEASE, UNSPECIFIED (H): ICD-10-CM

## 2024-01-01 DIAGNOSIS — Z86.73 HISTORY OF CVA (CEREBROVASCULAR ACCIDENT): ICD-10-CM

## 2024-01-01 DIAGNOSIS — D47.2 MGUS (MONOCLONAL GAMMOPATHY OF UNKNOWN SIGNIFICANCE): ICD-10-CM

## 2024-01-01 DIAGNOSIS — Z86.39 HISTORY OF HYPOTHYROIDISM: ICD-10-CM

## 2024-01-01 DIAGNOSIS — I27.20 SEVERE PULMONARY HYPERTENSION (H): ICD-10-CM

## 2024-01-01 DIAGNOSIS — G47.33 OSA (OBSTRUCTIVE SLEEP APNEA): ICD-10-CM

## 2024-01-01 DIAGNOSIS — R53.81 PHYSICAL DECONDITIONING: ICD-10-CM

## 2024-01-01 DIAGNOSIS — E66.01 MORBID OBESITY (H): ICD-10-CM

## 2024-01-01 DIAGNOSIS — L97.311 SKIN ULCER OF RIGHT ANKLE, LIMITED TO BREAKDOWN OF SKIN (H): ICD-10-CM

## 2024-01-01 DIAGNOSIS — I50.30 HEART FAILURE WITH PRESERVED EJECTION FRACTION, NYHA CLASS I (H): Primary | ICD-10-CM

## 2024-01-01 DIAGNOSIS — Z11.59 ENCOUNTER FOR SCREENING FOR OTHER VIRAL DISEASES: ICD-10-CM

## 2024-01-01 DIAGNOSIS — I50.33 ACUTE ON CHRONIC HEART FAILURE WITH PRESERVED EJECTION FRACTION (HFPEF) (H): Primary | ICD-10-CM

## 2024-01-01 DIAGNOSIS — Z86.69 HX OF SEIZURE DISORDER: ICD-10-CM

## 2024-01-01 DIAGNOSIS — J18.9 COMMUNITY ACQUIRED PNEUMONIA OF RIGHT LUNG, UNSPECIFIED PART OF LUNG: ICD-10-CM

## 2024-01-01 DIAGNOSIS — I27.20 PULMONARY HTN (H): Primary | ICD-10-CM

## 2024-01-01 DIAGNOSIS — I50.33 ACUTE ON CHRONIC HEART FAILURE WITH PRESERVED EJECTION FRACTION (H): ICD-10-CM

## 2024-01-01 DIAGNOSIS — R79.1 ABNORMAL COAGULATION PROFILE: ICD-10-CM

## 2024-01-01 DIAGNOSIS — I50.33 ACUTE ON CHRONIC DIASTOLIC (CONGESTIVE) HEART FAILURE (H): ICD-10-CM

## 2024-01-01 DIAGNOSIS — J44.1 COPD WITH ACUTE EXACERBATION (H): ICD-10-CM

## 2024-01-01 DIAGNOSIS — E03.9 HYPOTHYROIDISM, UNSPECIFIED TYPE: ICD-10-CM

## 2024-01-01 DIAGNOSIS — E53.8 B12 DEFICIENCY: ICD-10-CM

## 2024-01-01 DIAGNOSIS — M62.81 GENERALIZED MUSCLE WEAKNESS: ICD-10-CM

## 2024-01-01 DIAGNOSIS — T14.8XXA OPEN WOUND: Primary | ICD-10-CM

## 2024-01-01 DIAGNOSIS — J44.1 COPD EXACERBATION (H): ICD-10-CM

## 2024-01-01 DIAGNOSIS — I27.20 PULMONARY HYPERTENSION (H): Primary | ICD-10-CM

## 2024-01-01 DIAGNOSIS — I10 ESSENTIAL (PRIMARY) HYPERTENSION: ICD-10-CM

## 2024-01-01 DIAGNOSIS — R09.02 HYPOXIA: ICD-10-CM

## 2024-01-01 DIAGNOSIS — R04.2 HEMOPTYSIS: ICD-10-CM

## 2024-01-01 DIAGNOSIS — L97.301 ULCER OF ANKLE, UNSPECIFIED LATERALITY, LIMITED TO BREAKDOWN OF SKIN (H): ICD-10-CM

## 2024-01-01 DIAGNOSIS — D64.9 ANEMIA, UNSPECIFIED: ICD-10-CM

## 2024-01-01 DIAGNOSIS — B37.89 CANDIDIASIS OF BREAST: ICD-10-CM

## 2024-01-01 DIAGNOSIS — J44.1 COPD EXACERBATION (H): Primary | ICD-10-CM

## 2024-01-01 DIAGNOSIS — J42 CHRONIC BRONCHITIS, UNSPECIFIED CHRONIC BRONCHITIS TYPE (H): ICD-10-CM

## 2024-01-01 DIAGNOSIS — N18.30 CHRONIC KIDNEY DISEASE, STAGE 3 UNSPECIFIED (H): ICD-10-CM

## 2024-01-01 DIAGNOSIS — R79.9 ABNORMAL FINDING OF BLOOD CHEMISTRY, UNSPECIFIED: ICD-10-CM

## 2024-01-01 DIAGNOSIS — I50.30 HEART FAILURE WITH PRESERVED EJECTION FRACTION, NYHA CLASS I (H): ICD-10-CM

## 2024-01-01 DIAGNOSIS — D69.6 THROMBOCYTOPENIA (H): ICD-10-CM

## 2024-01-01 DIAGNOSIS — E87.6 HYPOKALEMIA: ICD-10-CM

## 2024-01-01 DIAGNOSIS — D64.9 ACUTE ON CHRONIC ANEMIA: ICD-10-CM

## 2024-01-01 DIAGNOSIS — I27.20 PULMONARY HTN (H): ICD-10-CM

## 2024-01-01 DIAGNOSIS — Z11.4 ENCOUNTER FOR SCREENING FOR HUMAN IMMUNODEFICIENCY VIRUS (HIV): ICD-10-CM

## 2024-01-01 DIAGNOSIS — I10 ESSENTIAL HYPERTENSION: ICD-10-CM

## 2024-01-01 DIAGNOSIS — J96.01 ACUTE RESPIRATORY FAILURE WITH HYPOXIA AND HYPERCAPNIA (H): ICD-10-CM

## 2024-01-01 DIAGNOSIS — G47.33 OBSTRUCTIVE SLEEP APNEA: ICD-10-CM

## 2024-01-01 DIAGNOSIS — I48.91 ATRIAL FIBRILLATION WITH RVR (H): ICD-10-CM

## 2024-01-01 DIAGNOSIS — I50.812 CHRONIC RIGHT-SIDED HEART FAILURE (H): ICD-10-CM

## 2024-01-01 DIAGNOSIS — L30.9 DERMATITIS: ICD-10-CM

## 2024-01-01 DIAGNOSIS — N18.30 STAGE 3 CHRONIC KIDNEY DISEASE, UNSPECIFIED WHETHER STAGE 3A OR 3B CKD (H): ICD-10-CM

## 2024-01-01 DIAGNOSIS — K59.01 SLOW TRANSIT CONSTIPATION: ICD-10-CM

## 2024-01-01 DIAGNOSIS — Z11.1 ENCOUNTER FOR SCREENING FOR RESPIRATORY TUBERCULOSIS: ICD-10-CM

## 2024-01-01 DIAGNOSIS — J96.02 ACUTE RESPIRATORY FAILURE WITH HYPOXIA AND HYPERCAPNIA (H): ICD-10-CM

## 2024-01-01 DIAGNOSIS — I89.0 LYMPHEDEMA: ICD-10-CM

## 2024-01-01 DIAGNOSIS — I50.9 HEART FAILURE, UNSPECIFIED (H): ICD-10-CM

## 2024-01-01 DIAGNOSIS — I10 HYPERTENSION GOAL BP (BLOOD PRESSURE) < 140/90: ICD-10-CM

## 2024-01-01 DIAGNOSIS — D50.9 IRON DEFICIENCY ANEMIA: Primary | ICD-10-CM

## 2024-01-01 DIAGNOSIS — J44.9 COPD, SEVERE (H): ICD-10-CM

## 2024-01-01 DIAGNOSIS — E87.70 HYPERVOLEMIA, UNSPECIFIED HYPERVOLEMIA TYPE: ICD-10-CM

## 2024-01-01 LAB
ACT BLD: 166 SECONDS (ref 74–150)
ACT BLD: 189 SECONDS (ref 74–150)
ACT BLD: 220 SECONDS (ref 74–150)
ACT BLD: 305 SECONDS (ref 74–150)
ALBUMIN SERPL BCG-MCNC: 3.5 G/DL (ref 3.5–5.2)
ALBUMIN SERPL BCG-MCNC: 3.6 G/DL (ref 3.5–5.2)
ALBUMIN SERPL BCG-MCNC: 3.7 G/DL (ref 3.5–5.2)
ALBUMIN SERPL BCG-MCNC: 4 G/DL (ref 3.5–5.2)
ALBUMIN SERPL BCG-MCNC: 4.1 G/DL (ref 3.5–5.2)
ALBUMIN UR-MCNC: 20 MG/DL
ALP SERPL-CCNC: 113 U/L (ref 40–150)
ALP SERPL-CCNC: 115 U/L (ref 40–150)
ALP SERPL-CCNC: 115 U/L (ref 40–150)
ALP SERPL-CCNC: 118 U/L (ref 40–150)
ALP SERPL-CCNC: 118 U/L (ref 40–150)
ALP SERPL-CCNC: 119 U/L (ref 40–150)
ALP SERPL-CCNC: 136 U/L (ref 40–150)
ALT SERPL W P-5'-P-CCNC: 11 U/L (ref 0–50)
ALT SERPL W P-5'-P-CCNC: 14 U/L (ref 0–50)
ALT SERPL W P-5'-P-CCNC: 376 U/L (ref 0–50)
ALT SERPL W P-5'-P-CCNC: 416 U/L (ref 0–50)
ALT SERPL W P-5'-P-CCNC: 533 U/L (ref 0–50)
ALT SERPL W P-5'-P-CCNC: 662 U/L (ref 0–50)
ALT SERPL W P-5'-P-CCNC: 788 U/L (ref 0–50)
ANA SER QL IF: NEGATIVE
ANION GAP SERPL CALCULATED.3IONS-SCNC: 10 MMOL/L (ref 7–15)
ANION GAP SERPL CALCULATED.3IONS-SCNC: 11 MMOL/L (ref 7–15)
ANION GAP SERPL CALCULATED.3IONS-SCNC: 12 MMOL/L (ref 7–15)
ANION GAP SERPL CALCULATED.3IONS-SCNC: 13 MMOL/L (ref 7–15)
ANION GAP SERPL CALCULATED.3IONS-SCNC: 14 MMOL/L (ref 7–15)
ANION GAP SERPL CALCULATED.3IONS-SCNC: 15 MMOL/L (ref 7–15)
ANION GAP SERPL CALCULATED.3IONS-SCNC: 16 MMOL/L (ref 7–15)
ANION GAP SERPL CALCULATED.3IONS-SCNC: 17 MMOL/L (ref 7–15)
ANION GAP SERPL CALCULATED.3IONS-SCNC: 18 MMOL/L (ref 7–15)
ANION GAP SERPL CALCULATED.3IONS-SCNC: 19 MMOL/L (ref 7–15)
ANION GAP SERPL CALCULATED.3IONS-SCNC: 20 MMOL/L (ref 7–15)
ANION GAP SERPL CALCULATED.3IONS-SCNC: 23 MMOL/L (ref 7–15)
ANION GAP SERPL CALCULATED.3IONS-SCNC: 6 MMOL/L (ref 7–15)
ANION GAP SERPL CALCULATED.3IONS-SCNC: 7 MMOL/L (ref 7–15)
ANION GAP SERPL CALCULATED.3IONS-SCNC: 8 MMOL/L (ref 7–15)
ANION GAP SERPL CALCULATED.3IONS-SCNC: 8 MMOL/L (ref 7–15)
ANION GAP SERPL CALCULATED.3IONS-SCNC: 9 MMOL/L (ref 7–15)
APPEARANCE UR: ABNORMAL
APTT PPP: 34 SECONDS (ref 22–38)
AST SERPL W P-5'-P-CCNC: 1060 U/L (ref 0–45)
AST SERPL W P-5'-P-CCNC: 142 U/L (ref 0–45)
AST SERPL W P-5'-P-CCNC: 18 U/L (ref 0–45)
AST SERPL W P-5'-P-CCNC: 19 U/L (ref 0–45)
AST SERPL W P-5'-P-CCNC: 195 U/L (ref 0–45)
AST SERPL W P-5'-P-CCNC: 278 U/L (ref 0–45)
AST SERPL W P-5'-P-CCNC: 557 U/L (ref 0–45)
ATRIAL RATE - MUSE: 100 BPM
ATRIAL RATE - MUSE: 82 BPM
ATRIAL RATE - MUSE: NORMAL BPM
B-OH-BUTYR SERPL-SCNC: 1.53 MMOL/L
BACTERIA BLD CULT: ABNORMAL
BACTERIA BLD CULT: ABNORMAL
BACTERIA BLD CULT: NO GROWTH
BACTERIA BLD CULT: NO GROWTH
BACTERIA SPEC CULT: ABNORMAL
BASE EXCESS BLDA CALC-SCNC: 10 MMOL/L (ref -3–3)
BASE EXCESS BLDA CALC-SCNC: 11 MMOL/L (ref -3–3)
BASE EXCESS BLDV CALC-SCNC: -5 MMOL/L (ref -3–3)
BASE EXCESS BLDV CALC-SCNC: 10.3 MMOL/L (ref -3–3)
BASE EXCESS BLDV CALC-SCNC: 13 MMOL/L (ref -3–3)
BASE EXCESS BLDV CALC-SCNC: 4 MMOL/L (ref -3–3)
BASOPHILS # BLD AUTO: 0.1 10E3/UL (ref 0–0.2)
BASOPHILS NFR BLD AUTO: 1 %
BASOPHILS NFR BLD AUTO: 1 %
BASOPHILS NFR BLD AUTO: 2 %
BASOPHILS NFR BLD AUTO: 2 %
BILIRUB DIRECT SERPL-MCNC: 0.7 MG/DL (ref 0–0.3)
BILIRUB SERPL-MCNC: 1 MG/DL
BILIRUB SERPL-MCNC: 1.1 MG/DL
BILIRUB SERPL-MCNC: 1.3 MG/DL
BILIRUB SERPL-MCNC: 1.4 MG/DL
BILIRUB SERPL-MCNC: 1.6 MG/DL
BILIRUB SERPL-MCNC: 1.9 MG/DL
BILIRUB SERPL-MCNC: 2.1 MG/DL
BILIRUB UR QL STRIP: ABNORMAL
BUN SERPL-MCNC: 23 MG/DL (ref 8–23)
BUN SERPL-MCNC: 25.1 MG/DL (ref 8–23)
BUN SERPL-MCNC: 26.5 MG/DL (ref 8–23)
BUN SERPL-MCNC: 26.6 MG/DL (ref 8–23)
BUN SERPL-MCNC: 27.2 MG/DL (ref 8–23)
BUN SERPL-MCNC: 27.9 MG/DL (ref 8–23)
BUN SERPL-MCNC: 29.2 MG/DL (ref 8–23)
BUN SERPL-MCNC: 29.6 MG/DL (ref 8–23)
BUN SERPL-MCNC: 29.9 MG/DL (ref 8–23)
BUN SERPL-MCNC: 30.2 MG/DL (ref 8–23)
BUN SERPL-MCNC: 30.7 MG/DL (ref 8–23)
BUN SERPL-MCNC: 31.3 MG/DL (ref 8–23)
BUN SERPL-MCNC: 31.8 MG/DL (ref 8–23)
BUN SERPL-MCNC: 32.3 MG/DL (ref 8–23)
BUN SERPL-MCNC: 32.9 MG/DL (ref 8–23)
BUN SERPL-MCNC: 32.9 MG/DL (ref 8–23)
BUN SERPL-MCNC: 33.3 MG/DL (ref 8–23)
BUN SERPL-MCNC: 33.7 MG/DL (ref 8–23)
BUN SERPL-MCNC: 33.7 MG/DL (ref 8–23)
BUN SERPL-MCNC: 34 MG/DL (ref 8–23)
BUN SERPL-MCNC: 34.1 MG/DL (ref 8–23)
BUN SERPL-MCNC: 34.4 MG/DL (ref 8–23)
BUN SERPL-MCNC: 35.2 MG/DL (ref 8–23)
BUN SERPL-MCNC: 35.4 MG/DL (ref 8–23)
BUN SERPL-MCNC: 35.8 MG/DL (ref 8–23)
BUN SERPL-MCNC: 36.1 MG/DL (ref 8–23)
BUN SERPL-MCNC: 36.2 MG/DL (ref 8–23)
BUN SERPL-MCNC: 36.5 MG/DL (ref 8–23)
BUN SERPL-MCNC: 36.5 MG/DL (ref 8–23)
BUN SERPL-MCNC: 37.5 MG/DL (ref 8–23)
BUN SERPL-MCNC: 38.2 MG/DL (ref 8–23)
BUN SERPL-MCNC: 40.1 MG/DL (ref 8–23)
BUN SERPL-MCNC: 41.3 MG/DL (ref 8–23)
BUN SERPL-MCNC: 41.7 MG/DL (ref 8–23)
BUN SERPL-MCNC: 47.4 MG/DL (ref 8–23)
BUN SERPL-MCNC: 51 MG/DL (ref 8–23)
BUN SERPL-MCNC: 52.5 MG/DL (ref 8–23)
BUN SERPL-MCNC: 53.2 MG/DL (ref 8–23)
BUN SERPL-MCNC: 57.9 MG/DL (ref 8–23)
BUN SERPL-MCNC: 58 MG/DL (ref 8–23)
BUN SERPL-MCNC: 58.4 MG/DL (ref 8–23)
BUN SERPL-MCNC: 59.8 MG/DL (ref 8–23)
BUN SERPL-MCNC: 62.3 MG/DL (ref 8–23)
BUN SERPL-MCNC: 63.4 MG/DL (ref 8–23)
BUN SERPL-MCNC: 63.7 MG/DL (ref 8–23)
BUN SERPL-MCNC: 63.9 MG/DL (ref 8–23)
BUN SERPL-MCNC: 69.5 MG/DL (ref 8–23)
BUN SERPL-MCNC: 78.7 MG/DL (ref 8–23)
BUN SERPL-MCNC: 87.7 MG/DL (ref 8–23)
BURR CELLS BLD QL SMEAR: ABNORMAL
CALCIUM SERPL-MCNC: 8.9 MG/DL (ref 8.8–10.4)
CALCIUM SERPL-MCNC: 9 MG/DL (ref 8.8–10.2)
CALCIUM SERPL-MCNC: 9.1 MG/DL (ref 8.8–10.2)
CALCIUM SERPL-MCNC: 9.2 MG/DL (ref 8.8–10.2)
CALCIUM SERPL-MCNC: 9.2 MG/DL (ref 8.8–10.4)
CALCIUM SERPL-MCNC: 9.3 MG/DL (ref 8.8–10.2)
CALCIUM SERPL-MCNC: 9.3 MG/DL (ref 8.8–10.4)
CALCIUM SERPL-MCNC: 9.4 MG/DL (ref 8.8–10.2)
CALCIUM SERPL-MCNC: 9.5 MG/DL (ref 8.8–10.2)
CALCIUM SERPL-MCNC: 9.5 MG/DL (ref 8.8–10.2)
CALCIUM SERPL-MCNC: 9.5 MG/DL (ref 8.8–10.4)
CALCIUM SERPL-MCNC: 9.6 MG/DL (ref 8.8–10.2)
CALCIUM SERPL-MCNC: 9.6 MG/DL (ref 8.8–10.4)
CALCIUM SERPL-MCNC: 9.6 MG/DL (ref 8.8–10.4)
CALCIUM SERPL-MCNC: 9.7 MG/DL (ref 8.8–10.2)
CALCIUM SERPL-MCNC: 9.7 MG/DL (ref 8.8–10.2)
CALCIUM SERPL-MCNC: 9.7 MG/DL (ref 8.8–10.4)
CALCIUM SERPL-MCNC: 9.8 MG/DL (ref 8.8–10.2)
CALCIUM SERPL-MCNC: 9.9 MG/DL (ref 8.8–10.2)
CALCIUM SERPL-MCNC: 9.9 MG/DL (ref 8.8–10.2)
CHLORIDE SERPL-SCNC: 100 MMOL/L (ref 98–107)
CHLORIDE SERPL-SCNC: 100 MMOL/L (ref 98–107)
CHLORIDE SERPL-SCNC: 101 MMOL/L (ref 98–107)
CHLORIDE SERPL-SCNC: 101 MMOL/L (ref 98–107)
CHLORIDE SERPL-SCNC: 103 MMOL/L (ref 98–107)
CHLORIDE SERPL-SCNC: 95 MMOL/L (ref 98–107)
CHLORIDE SERPL-SCNC: 96 MMOL/L (ref 98–107)
CHLORIDE SERPL-SCNC: 97 MMOL/L (ref 98–107)
CHLORIDE SERPL-SCNC: 98 MMOL/L (ref 98–107)
CHLORIDE SERPL-SCNC: 99 MMOL/L (ref 98–107)
CHOLEST SERPL-MCNC: 130 MG/DL
COLOR UR AUTO: ABNORMAL
CREAT SERPL-MCNC: 1.1 MG/DL (ref 0.51–0.95)
CREAT SERPL-MCNC: 1.11 MG/DL (ref 0.51–0.95)
CREAT SERPL-MCNC: 1.12 MG/DL (ref 0.51–0.95)
CREAT SERPL-MCNC: 1.13 MG/DL (ref 0.51–0.95)
CREAT SERPL-MCNC: 1.15 MG/DL (ref 0.51–0.95)
CREAT SERPL-MCNC: 1.15 MG/DL (ref 0.51–0.95)
CREAT SERPL-MCNC: 1.16 MG/DL (ref 0.51–0.95)
CREAT SERPL-MCNC: 1.17 MG/DL (ref 0.51–0.95)
CREAT SERPL-MCNC: 1.18 MG/DL (ref 0.51–0.95)
CREAT SERPL-MCNC: 1.19 MG/DL (ref 0.51–0.95)
CREAT SERPL-MCNC: 1.19 MG/DL (ref 0.51–0.95)
CREAT SERPL-MCNC: 1.21 MG/DL (ref 0.51–0.95)
CREAT SERPL-MCNC: 1.21 MG/DL (ref 0.51–0.95)
CREAT SERPL-MCNC: 1.22 MG/DL (ref 0.51–0.95)
CREAT SERPL-MCNC: 1.24 MG/DL (ref 0.51–0.95)
CREAT SERPL-MCNC: 1.25 MG/DL (ref 0.51–0.95)
CREAT SERPL-MCNC: 1.29 MG/DL (ref 0.51–0.95)
CREAT SERPL-MCNC: 1.29 MG/DL (ref 0.51–0.95)
CREAT SERPL-MCNC: 1.31 MG/DL (ref 0.51–0.95)
CREAT SERPL-MCNC: 1.33 MG/DL (ref 0.51–0.95)
CREAT SERPL-MCNC: 1.33 MG/DL (ref 0.51–0.95)
CREAT SERPL-MCNC: 1.37 MG/DL (ref 0.51–0.95)
CREAT SERPL-MCNC: 1.42 MG/DL (ref 0.51–0.95)
CREAT SERPL-MCNC: 1.43 MG/DL (ref 0.51–0.95)
CREAT SERPL-MCNC: 1.43 MG/DL (ref 0.51–0.95)
CREAT SERPL-MCNC: 1.44 MG/DL (ref 0.51–0.95)
CREAT SERPL-MCNC: 1.45 MG/DL (ref 0.51–0.95)
CREAT SERPL-MCNC: 1.46 MG/DL (ref 0.51–0.95)
CREAT SERPL-MCNC: 1.48 MG/DL (ref 0.51–0.95)
CREAT SERPL-MCNC: 1.5 MG/DL (ref 0.51–0.95)
CREAT SERPL-MCNC: 1.53 MG/DL (ref 0.51–0.95)
CREAT SERPL-MCNC: 1.58 MG/DL (ref 0.51–0.95)
CREAT SERPL-MCNC: 2.91 MG/DL (ref 0.51–0.95)
CREAT SERPL-MCNC: 3.01 MG/DL (ref 0.51–0.95)
CREAT SERPL-MCNC: 3.12 MG/DL (ref 0.51–0.95)
CREAT SERPL-MCNC: 3.13 MG/DL (ref 0.51–0.95)
CREAT SERPL-MCNC: 3.33 MG/DL (ref 0.51–0.95)
CREAT SERPL-MCNC: 3.72 MG/DL (ref 0.51–0.95)
CREAT SERPL-MCNC: 4.03 MG/DL (ref 0.51–0.95)
CRP SERPL-MCNC: 33.9 MG/L
D DIMER PPP FEU-MCNC: 3.33 UG/ML FEU (ref 0–0.5)
DEPRECATED HCO3 PLAS-SCNC: 25 MMOL/L (ref 22–29)
DEPRECATED HCO3 PLAS-SCNC: 26 MMOL/L (ref 22–29)
DEPRECATED HCO3 PLAS-SCNC: 27 MMOL/L (ref 22–29)
DEPRECATED HCO3 PLAS-SCNC: 27 MMOL/L (ref 22–29)
DEPRECATED HCO3 PLAS-SCNC: 28 MMOL/L (ref 22–29)
DEPRECATED HCO3 PLAS-SCNC: 29 MMOL/L (ref 22–29)
DEPRECATED HCO3 PLAS-SCNC: 30 MMOL/L (ref 22–29)
DEPRECATED HCO3 PLAS-SCNC: 31 MMOL/L (ref 22–29)
DEPRECATED HCO3 PLAS-SCNC: 32 MMOL/L (ref 22–29)
DEPRECATED HCO3 PLAS-SCNC: 33 MMOL/L (ref 22–29)
DEPRECATED HCO3 PLAS-SCNC: 35 MMOL/L (ref 22–29)
DEPRECATED HCO3 PLAS-SCNC: 36 MMOL/L (ref 22–29)
DEPRECATED HCO3 PLAS-SCNC: 36 MMOL/L (ref 22–29)
DEPRECATED HCO3 PLAS-SCNC: 37 MMOL/L (ref 22–29)
DIASTOLIC BLOOD PRESSURE - MUSE: NORMAL MMHG
DRVVT CONFIRM NORMALIZED RATIO: 1.02
DRVVT SCREEN MIX RATIO: 0.97
DRVVT SCREEN RATIO: 1.08
EGFRCR SERPLBLD CKD-EPI 2021: 12 ML/MIN/1.73M2
EGFRCR SERPLBLD CKD-EPI 2021: 13 ML/MIN/1.73M2
EGFRCR SERPLBLD CKD-EPI 2021: 15 ML/MIN/1.73M2
EGFRCR SERPLBLD CKD-EPI 2021: 16 ML/MIN/1.73M2
EGFRCR SERPLBLD CKD-EPI 2021: 16 ML/MIN/1.73M2
EGFRCR SERPLBLD CKD-EPI 2021: 17 ML/MIN/1.73M2
EGFRCR SERPLBLD CKD-EPI 2021: 17 ML/MIN/1.73M2
EGFRCR SERPLBLD CKD-EPI 2021: 36 ML/MIN/1.73M2
EGFRCR SERPLBLD CKD-EPI 2021: 38 ML/MIN/1.73M2
EGFRCR SERPLBLD CKD-EPI 2021: 39 ML/MIN/1.73M2
EGFRCR SERPLBLD CKD-EPI 2021: 39 ML/MIN/1.73M2
EGFRCR SERPLBLD CKD-EPI 2021: 40 ML/MIN/1.73M2
EGFRCR SERPLBLD CKD-EPI 2021: 40 ML/MIN/1.73M2
EGFRCR SERPLBLD CKD-EPI 2021: 41 ML/MIN/1.73M2
EGFRCR SERPLBLD CKD-EPI 2021: 43 ML/MIN/1.73M2
EGFRCR SERPLBLD CKD-EPI 2021: 45 ML/MIN/1.73M2
EGFRCR SERPLBLD CKD-EPI 2021: 45 ML/MIN/1.73M2
EGFRCR SERPLBLD CKD-EPI 2021: 46 ML/MIN/1.73M2
EGFRCR SERPLBLD CKD-EPI 2021: 48 ML/MIN/1.73M2
EGFRCR SERPLBLD CKD-EPI 2021: 49 ML/MIN/1.73M2
EGFRCR SERPLBLD CKD-EPI 2021: 50 ML/MIN/1.73M2
EGFRCR SERPLBLD CKD-EPI 2021: 51 ML/MIN/1.73M2
EGFRCR SERPLBLD CKD-EPI 2021: 51 ML/MIN/1.73M2
EGFRCR SERPLBLD CKD-EPI 2021: 52 ML/MIN/1.73M2
EGFRCR SERPLBLD CKD-EPI 2021: 53 ML/MIN/1.73M2
EGFRCR SERPLBLD CKD-EPI 2021: 54 ML/MIN/1.73M2
EGFRCR SERPLBLD CKD-EPI 2021: 55 ML/MIN/1.73M2
EGFRCR SERPLBLD CKD-EPI 2021: 56 ML/MIN/1.73M2
ENTEROCOCCUS FAECALIS: NOT DETECTED
ENTEROCOCCUS FAECIUM: NOT DETECTED
EOSINOPHIL # BLD AUTO: 0.1 10E3/UL (ref 0–0.7)
EOSINOPHIL # BLD AUTO: 0.2 10E3/UL (ref 0–0.7)
EOSINOPHIL # BLD AUTO: 0.3 10E3/UL (ref 0–0.7)
EOSINOPHIL # BLD AUTO: 0.4 10E3/UL (ref 0–0.7)
EOSINOPHIL NFR BLD AUTO: 1 %
EOSINOPHIL NFR BLD AUTO: 2 %
EOSINOPHIL NFR BLD AUTO: 4 %
EOSINOPHIL NFR BLD AUTO: 6 %
ERYTHROCYTE [DISTWIDTH] IN BLOOD BY AUTOMATED COUNT: 20.7 % (ref 10–15)
ERYTHROCYTE [DISTWIDTH] IN BLOOD BY AUTOMATED COUNT: 21 % (ref 10–15)
ERYTHROCYTE [DISTWIDTH] IN BLOOD BY AUTOMATED COUNT: 21.2 % (ref 10–15)
ERYTHROCYTE [DISTWIDTH] IN BLOOD BY AUTOMATED COUNT: 21.3 % (ref 10–15)
ERYTHROCYTE [DISTWIDTH] IN BLOOD BY AUTOMATED COUNT: 21.6 % (ref 10–15)
ERYTHROCYTE [DISTWIDTH] IN BLOOD BY AUTOMATED COUNT: 21.9 % (ref 10–15)
ERYTHROCYTE [DISTWIDTH] IN BLOOD BY AUTOMATED COUNT: 22 % (ref 10–15)
ERYTHROCYTE [DISTWIDTH] IN BLOOD BY AUTOMATED COUNT: 22.3 % (ref 10–15)
ERYTHROCYTE [DISTWIDTH] IN BLOOD BY AUTOMATED COUNT: 22.5 % (ref 10–15)
ERYTHROCYTE [DISTWIDTH] IN BLOOD BY AUTOMATED COUNT: 23.9 % (ref 10–15)
ERYTHROCYTE [DISTWIDTH] IN BLOOD BY AUTOMATED COUNT: 23.9 % (ref 10–15)
ERYTHROCYTE [DISTWIDTH] IN BLOOD BY AUTOMATED COUNT: 24.5 % (ref 10–15)
ERYTHROCYTE [DISTWIDTH] IN BLOOD BY AUTOMATED COUNT: 26.2 % (ref 10–15)
ERYTHROCYTE [DISTWIDTH] IN BLOOD BY AUTOMATED COUNT: 26.4 % (ref 10–15)
ERYTHROCYTE [DISTWIDTH] IN BLOOD BY AUTOMATED COUNT: 29.2 % (ref 10–15)
EST. AVERAGE GLUCOSE BLD GHB EST-MCNC: 111 MG/DL
FASTING STATUS PATIENT QL REPORTED: NO
FLUAV RNA SPEC QL NAA+PROBE: NEGATIVE
FLUBV RNA RESP QL NAA+PROBE: NEGATIVE
GAMMA INTERFERON BACKGROUND BLD IA-ACNC: 0.01 IU/ML
GLUCOSE BLDC GLUCOMTR-MCNC: 103 MG/DL (ref 70–99)
GLUCOSE BLDC GLUCOMTR-MCNC: 104 MG/DL (ref 70–99)
GLUCOSE BLDC GLUCOMTR-MCNC: 128 MG/DL (ref 70–99)
GLUCOSE BLDC GLUCOMTR-MCNC: 131 MG/DL (ref 70–99)
GLUCOSE BLDC GLUCOMTR-MCNC: 132 MG/DL (ref 70–99)
GLUCOSE BLDC GLUCOMTR-MCNC: 136 MG/DL (ref 70–99)
GLUCOSE BLDC GLUCOMTR-MCNC: 138 MG/DL (ref 70–99)
GLUCOSE BLDC GLUCOMTR-MCNC: 138 MG/DL (ref 70–99)
GLUCOSE BLDC GLUCOMTR-MCNC: 139 MG/DL (ref 70–99)
GLUCOSE BLDC GLUCOMTR-MCNC: 140 MG/DL (ref 70–99)
GLUCOSE BLDC GLUCOMTR-MCNC: 140 MG/DL (ref 70–99)
GLUCOSE BLDC GLUCOMTR-MCNC: 143 MG/DL (ref 70–99)
GLUCOSE BLDC GLUCOMTR-MCNC: 144 MG/DL (ref 70–99)
GLUCOSE BLDC GLUCOMTR-MCNC: 144 MG/DL (ref 70–99)
GLUCOSE BLDC GLUCOMTR-MCNC: 145 MG/DL (ref 70–99)
GLUCOSE BLDC GLUCOMTR-MCNC: 147 MG/DL (ref 70–99)
GLUCOSE BLDC GLUCOMTR-MCNC: 148 MG/DL (ref 70–99)
GLUCOSE BLDC GLUCOMTR-MCNC: 148 MG/DL (ref 70–99)
GLUCOSE BLDC GLUCOMTR-MCNC: 151 MG/DL (ref 70–99)
GLUCOSE BLDC GLUCOMTR-MCNC: 151 MG/DL (ref 70–99)
GLUCOSE BLDC GLUCOMTR-MCNC: 152 MG/DL (ref 70–99)
GLUCOSE BLDC GLUCOMTR-MCNC: 153 MG/DL (ref 70–99)
GLUCOSE BLDC GLUCOMTR-MCNC: 153 MG/DL (ref 70–99)
GLUCOSE BLDC GLUCOMTR-MCNC: 155 MG/DL (ref 70–99)
GLUCOSE BLDC GLUCOMTR-MCNC: 160 MG/DL (ref 70–99)
GLUCOSE BLDC GLUCOMTR-MCNC: 162 MG/DL (ref 70–99)
GLUCOSE BLDC GLUCOMTR-MCNC: 163 MG/DL (ref 70–99)
GLUCOSE BLDC GLUCOMTR-MCNC: 166 MG/DL (ref 70–99)
GLUCOSE BLDC GLUCOMTR-MCNC: 178 MG/DL (ref 70–99)
GLUCOSE BLDC GLUCOMTR-MCNC: 182 MG/DL (ref 70–99)
GLUCOSE BLDC GLUCOMTR-MCNC: 192 MG/DL (ref 70–99)
GLUCOSE BLDC GLUCOMTR-MCNC: 196 MG/DL (ref 70–99)
GLUCOSE BLDC GLUCOMTR-MCNC: 201 MG/DL (ref 70–99)
GLUCOSE SERPL-MCNC: 100 MG/DL (ref 70–99)
GLUCOSE SERPL-MCNC: 102 MG/DL (ref 70–99)
GLUCOSE SERPL-MCNC: 103 MG/DL (ref 70–99)
GLUCOSE SERPL-MCNC: 104 MG/DL (ref 70–99)
GLUCOSE SERPL-MCNC: 104 MG/DL (ref 70–99)
GLUCOSE SERPL-MCNC: 106 MG/DL (ref 70–99)
GLUCOSE SERPL-MCNC: 107 MG/DL (ref 70–99)
GLUCOSE SERPL-MCNC: 108 MG/DL (ref 70–99)
GLUCOSE SERPL-MCNC: 109 MG/DL (ref 70–99)
GLUCOSE SERPL-MCNC: 111 MG/DL (ref 70–99)
GLUCOSE SERPL-MCNC: 112 MG/DL (ref 70–99)
GLUCOSE SERPL-MCNC: 112 MG/DL (ref 70–99)
GLUCOSE SERPL-MCNC: 113 MG/DL (ref 70–99)
GLUCOSE SERPL-MCNC: 113 MG/DL (ref 70–99)
GLUCOSE SERPL-MCNC: 114 MG/DL (ref 70–99)
GLUCOSE SERPL-MCNC: 114 MG/DL (ref 70–99)
GLUCOSE SERPL-MCNC: 116 MG/DL (ref 70–99)
GLUCOSE SERPL-MCNC: 117 MG/DL (ref 70–99)
GLUCOSE SERPL-MCNC: 117 MG/DL (ref 70–99)
GLUCOSE SERPL-MCNC: 118 MG/DL (ref 70–99)
GLUCOSE SERPL-MCNC: 119 MG/DL (ref 70–99)
GLUCOSE SERPL-MCNC: 120 MG/DL (ref 70–99)
GLUCOSE SERPL-MCNC: 122 MG/DL (ref 70–99)
GLUCOSE SERPL-MCNC: 122 MG/DL (ref 70–99)
GLUCOSE SERPL-MCNC: 124 MG/DL (ref 70–99)
GLUCOSE SERPL-MCNC: 125 MG/DL (ref 70–99)
GLUCOSE SERPL-MCNC: 132 MG/DL (ref 70–99)
GLUCOSE SERPL-MCNC: 132 MG/DL (ref 70–99)
GLUCOSE SERPL-MCNC: 142 MG/DL (ref 70–99)
GLUCOSE SERPL-MCNC: 146 MG/DL (ref 70–99)
GLUCOSE SERPL-MCNC: 147 MG/DL (ref 70–99)
GLUCOSE SERPL-MCNC: 150 MG/DL (ref 70–99)
GLUCOSE SERPL-MCNC: 152 MG/DL (ref 70–99)
GLUCOSE SERPL-MCNC: 154 MG/DL (ref 70–99)
GLUCOSE SERPL-MCNC: 157 MG/DL (ref 70–99)
GLUCOSE SERPL-MCNC: 191 MG/DL (ref 70–99)
GLUCOSE SERPL-MCNC: 226 MG/DL (ref 70–99)
GLUCOSE SERPL-MCNC: 96 MG/DL (ref 70–99)
GLUCOSE SERPL-MCNC: 99 MG/DL (ref 70–99)
GLUCOSE UR STRIP-MCNC: NEGATIVE MG/DL
HBA1C MFR BLD: 5.5 %
HBV CORE AB SERPL QL IA: NONREACTIVE
HBV SURFACE AB SERPL IA-ACNC: <3.5 M[IU]/ML
HBV SURFACE AB SERPL IA-ACNC: NONREACTIVE M[IU]/ML
HBV SURFACE AG SERPL QL IA: NONREACTIVE
HCO3 BLDA-SCNC: 35 MMOL/L (ref 21–28)
HCO3 BLDA-SCNC: 38 MMOL/L (ref 21–28)
HCO3 BLDV-SCNC: 22 MMOL/L (ref 21–28)
HCO3 BLDV-SCNC: 27 MMOL/L (ref 21–28)
HCO3 BLDV-SCNC: 32 MMOL/L (ref 21–28)
HCO3 BLDV-SCNC: 37 MMOL/L (ref 21–28)
HCO3 BLDV-SCNC: 39 MMOL/L (ref 21–28)
HCO3 SERPL-SCNC: 20 MMOL/L (ref 22–29)
HCO3 SERPL-SCNC: 21 MMOL/L (ref 22–29)
HCO3 SERPL-SCNC: 22 MMOL/L (ref 22–29)
HCO3 SERPL-SCNC: 22 MMOL/L (ref 22–29)
HCO3 SERPL-SCNC: 23 MMOL/L (ref 22–29)
HCO3 SERPL-SCNC: 25 MMOL/L (ref 22–29)
HCO3 SERPL-SCNC: 27 MMOL/L (ref 22–29)
HCO3 SERPL-SCNC: 29 MMOL/L (ref 22–29)
HCT VFR BLD AUTO: 28.6 % (ref 35–47)
HCT VFR BLD AUTO: 29.4 % (ref 35–47)
HCT VFR BLD AUTO: 29.9 % (ref 35–47)
HCT VFR BLD AUTO: 30.1 % (ref 35–47)
HCT VFR BLD AUTO: 31.6 % (ref 35–47)
HCT VFR BLD AUTO: 31.7 % (ref 35–47)
HCT VFR BLD AUTO: 31.8 % (ref 35–47)
HCT VFR BLD AUTO: 32.7 % (ref 35–47)
HCT VFR BLD AUTO: 35.2 % (ref 35–47)
HCT VFR BLD AUTO: 35.5 % (ref 35–47)
HCT VFR BLD AUTO: 37.7 % (ref 35–47)
HCT VFR BLD AUTO: 38.4 % (ref 35–47)
HCT VFR BLD AUTO: 40.2 % (ref 35–47)
HCT VFR BLD AUTO: 40.2 % (ref 35–47)
HCT VFR BLD AUTO: 42.3 % (ref 35–47)
HCV AB SERPL QL IA: NONREACTIVE
HDLC SERPL-MCNC: 43 MG/DL
HGB BLD-MCNC: 10.2 G/DL (ref 11.7–15.7)
HGB BLD-MCNC: 10.3 G/DL (ref 11.7–15.7)
HGB BLD-MCNC: 11.2 G/DL (ref 11.7–15.7)
HGB BLD-MCNC: 11.4 G/DL (ref 11.7–15.7)
HGB BLD-MCNC: 11.4 G/DL (ref 11.7–15.7)
HGB BLD-MCNC: 11.6 G/DL (ref 11.7–15.7)
HGB BLD-MCNC: 12.3 G/DL (ref 11.7–15.7)
HGB BLD-MCNC: 8.6 G/DL (ref 11.7–15.7)
HGB BLD-MCNC: 8.7 G/DL (ref 11.7–15.7)
HGB BLD-MCNC: 9.2 G/DL (ref 11.7–15.7)
HGB BLD-MCNC: 9.3 G/DL (ref 11.7–15.7)
HGB BLD-MCNC: 9.4 G/DL (ref 11.7–15.7)
HGB UR QL STRIP: ABNORMAL
HIV 1+2 AB+HIV1 P24 AG SERPL QL IA: NONREACTIVE
HOLD SPECIMEN: NORMAL
HYALINE CASTS: 48 /LPF
IL6 SERPL-MCNC: 43.41 PG/ML
IMM GRANULOCYTES # BLD: 0.1 10E3/UL
IMM GRANULOCYTES # BLD: 0.5 10E3/UL
IMM GRANULOCYTES NFR BLD: 2 %
IMM GRANULOCYTES NFR BLD: 3 %
INR PPP: 1.2 (ref 0.85–1.15)
INR PPP: 1.25 (ref 0.85–1.15)
INTERPRETATION ECG - MUSE: NORMAL
IRON BINDING CAPACITY (ROCHE): 383 UG/DL (ref 240–430)
IRON SATN MFR SERPL: 9 % (ref 15–46)
IRON SERPL-MCNC: 35 UG/DL (ref 37–145)
ISTAT ACT REQUEST ONLY: NORMAL
KETONES UR STRIP-MCNC: NEGATIVE MG/DL
LA PPP-IMP: NEGATIVE
LACTATE BLD-SCNC: 0.5 MMOL/L
LACTATE BLD-SCNC: 0.5 MMOL/L
LACTATE BLD-SCNC: 0.6 MMOL/L
LACTATE BLD-SCNC: 0.9 MMOL/L
LACTATE BLD-SCNC: 1 MMOL/L
LACTATE BLD-SCNC: 3.4 MMOL/L
LACTATE SERPL-SCNC: 0.8 MMOL/L (ref 0.7–2)
LACTATE SERPL-SCNC: 0.8 MMOL/L (ref 0.7–2)
LACTATE SERPL-SCNC: 1.8 MMOL/L (ref 0.7–2)
LACTATE SERPL-SCNC: 1.9 MMOL/L (ref 0.7–2)
LACTATE SERPL-SCNC: 2.7 MMOL/L (ref 0.7–2)
LDLC SERPL CALC-MCNC: 69 MG/DL
LEUKOCYTE ESTERASE UR QL STRIP: NEGATIVE
LISTERIA SPECIES (DETECTED/NOT DETECTED): NOT DETECTED
LUPUS INTERPRETATION: ABNORMAL
LVEF ECHO: NORMAL
LVEF ECHO: NORMAL
LYMPHOCYTES # BLD AUTO: 0.3 10E3/UL (ref 0.8–5.3)
LYMPHOCYTES # BLD AUTO: 0.5 10E3/UL (ref 0.8–5.3)
LYMPHOCYTES # BLD AUTO: 0.7 10E3/UL (ref 0.8–5.3)
LYMPHOCYTES # BLD AUTO: 0.7 10E3/UL (ref 0.8–5.3)
LYMPHOCYTES NFR BLD AUTO: 10 %
LYMPHOCYTES NFR BLD AUTO: 10 %
LYMPHOCYTES NFR BLD AUTO: 2 %
LYMPHOCYTES NFR BLD AUTO: 6 %
M TB IFN-G BLD-IMP: NEGATIVE
M TB IFN-G CD4+ BCKGRND COR BLD-ACNC: 1.61 IU/ML
MAGNESIUM SERPL-MCNC: 1.9 MG/DL (ref 1.7–2.3)
MAGNESIUM SERPL-MCNC: 2.2 MG/DL (ref 1.7–2.3)
MAGNESIUM SERPL-MCNC: 2.4 MG/DL (ref 1.7–2.3)
MAGNESIUM SERPL-MCNC: 2.6 MG/DL (ref 1.7–2.3)
MCH RBC QN AUTO: 22.1 PG (ref 26.5–33)
MCH RBC QN AUTO: 22.3 PG (ref 26.5–33)
MCH RBC QN AUTO: 22.3 PG (ref 26.5–33)
MCH RBC QN AUTO: 22.8 PG (ref 26.5–33)
MCH RBC QN AUTO: 23.4 PG (ref 26.5–33)
MCH RBC QN AUTO: 23.4 PG (ref 26.5–33)
MCH RBC QN AUTO: 23.5 PG (ref 26.5–33)
MCH RBC QN AUTO: 23.8 PG (ref 26.5–33)
MCH RBC QN AUTO: 24.6 PG (ref 26.5–33)
MCH RBC QN AUTO: 25.7 PG (ref 26.5–33)
MCH RBC QN AUTO: 25.9 PG (ref 26.5–33)
MCH RBC QN AUTO: 26 PG (ref 26.5–33)
MCH RBC QN AUTO: 26.2 PG (ref 26.5–33)
MCHC RBC AUTO-ENTMCNC: 28.4 G/DL (ref 31.5–36.5)
MCHC RBC AUTO-ENTMCNC: 28.7 G/DL (ref 31.5–36.5)
MCHC RBC AUTO-ENTMCNC: 28.7 G/DL (ref 31.5–36.5)
MCHC RBC AUTO-ENTMCNC: 28.8 G/DL (ref 31.5–36.5)
MCHC RBC AUTO-ENTMCNC: 28.9 G/DL (ref 31.5–36.5)
MCHC RBC AUTO-ENTMCNC: 29.1 G/DL (ref 31.5–36.5)
MCHC RBC AUTO-ENTMCNC: 29.3 G/DL (ref 31.5–36.5)
MCHC RBC AUTO-ENTMCNC: 29.4 G/DL (ref 31.5–36.5)
MCHC RBC AUTO-ENTMCNC: 29.7 G/DL (ref 31.5–36.5)
MCHC RBC AUTO-ENTMCNC: 29.7 G/DL (ref 31.5–36.5)
MCHC RBC AUTO-ENTMCNC: 30.1 G/DL (ref 31.5–36.5)
MCV RBC AUTO: 76 FL (ref 78–100)
MCV RBC AUTO: 76 FL (ref 78–100)
MCV RBC AUTO: 77 FL (ref 78–100)
MCV RBC AUTO: 78 FL (ref 78–100)
MCV RBC AUTO: 79 FL (ref 78–100)
MCV RBC AUTO: 80 FL (ref 78–100)
MCV RBC AUTO: 80 FL (ref 78–100)
MCV RBC AUTO: 81 FL (ref 78–100)
MCV RBC AUTO: 82 FL (ref 78–100)
MCV RBC AUTO: 89 FL (ref 78–100)
MCV RBC AUTO: 89 FL (ref 78–100)
MCV RBC AUTO: 90 FL (ref 78–100)
MCV RBC AUTO: 91 FL (ref 78–100)
MITOGEN IGNF BCKGRD COR BLD-ACNC: 0.01 IU/ML
MITOGEN IGNF BCKGRD COR BLD-ACNC: 0.01 IU/ML
MONOCYTES # BLD AUTO: 0.5 10E3/UL (ref 0–1.3)
MONOCYTES # BLD AUTO: 0.6 10E3/UL (ref 0–1.3)
MONOCYTES # BLD AUTO: 0.7 10E3/UL (ref 0–1.3)
MONOCYTES # BLD AUTO: 1.5 10E3/UL (ref 0–1.3)
MONOCYTES NFR BLD AUTO: 10 %
MONOCYTES NFR BLD AUTO: 8 %
MONOCYTES NFR BLD AUTO: 8 %
MONOCYTES NFR BLD AUTO: 9 %
MRSA DNA SPEC QL NAA+PROBE: POSITIVE
MUCOUS THREADS #/AREA URNS LPF: PRESENT /LPF
NEUTROPHILS # BLD AUTO: 13.8 10E3/UL (ref 1.6–8.3)
NEUTROPHILS # BLD AUTO: 4.8 10E3/UL (ref 1.6–8.3)
NEUTROPHILS # BLD AUTO: 5 10E3/UL (ref 1.6–8.3)
NEUTROPHILS # BLD AUTO: 5.9 10E3/UL (ref 1.6–8.3)
NEUTROPHILS NFR BLD AUTO: 72 %
NEUTROPHILS NFR BLD AUTO: 72 %
NEUTROPHILS NFR BLD AUTO: 81 %
NEUTROPHILS NFR BLD AUTO: 84 %
NITRATE UR QL: NEGATIVE
NONHDLC SERPL-MCNC: 87 MG/DL
NRBC # BLD AUTO: 0 10E3/UL
NRBC BLD AUTO-RTO: 0 /100
NT-PROBNP SERPL-MCNC: 3606 PG/ML (ref 0–900)
NT-PROBNP SERPL-MCNC: 3788 PG/ML (ref 0–900)
NT-PROBNP SERPL-MCNC: 4421 PG/ML (ref 0–900)
NT-PROBNP SERPL-MCNC: ABNORMAL PG/ML (ref 0–900)
O2/TOTAL GAS SETTING VFR VENT: 5 %
OXYHGB MFR BLDV: 77 % (ref 70–75)
P AXIS - MUSE: 45 DEGREES
P AXIS - MUSE: 53 DEGREES
P AXIS - MUSE: NORMAL DEGREES
PCO2 BLDA: 51 MM HG (ref 35–45)
PCO2 BLDA: 57 MM HG (ref 35–45)
PCO2 BLDV: 51 MM HG (ref 40–50)
PCO2 BLDV: 58 MM HG (ref 40–50)
PCO2 BLDV: 59 MM HG (ref 40–50)
PCO2 BLDV: 60 MM HG (ref 40–50)
PCO2 BLDV: 65 MM HG (ref 40–50)
PH BLDA: 7.43 [PH] (ref 7.35–7.45)
PH BLDA: 7.45 [PH] (ref 7.35–7.45)
PH BLDV: 7.25 [PH] (ref 7.32–7.43)
PH BLDV: 7.26 [PH] (ref 7.32–7.43)
PH BLDV: 7.3 [PH] (ref 7.32–7.43)
PH BLDV: 7.41 [PH] (ref 7.32–7.43)
PH BLDV: 7.43 [PH] (ref 7.32–7.43)
PH UR STRIP: 5 [PH] (ref 5–7)
PHOSPHATE SERPL-MCNC: 5.4 MG/DL (ref 2.5–4.5)
PHOSPHATE SERPL-MCNC: 6.2 MG/DL (ref 2.5–4.5)
PHOSPHATE SERPL-MCNC: 7.1 MG/DL (ref 2.5–4.5)
PLAT MORPH BLD: ABNORMAL
PLATELET # BLD AUTO: 105 10E3/UL (ref 150–450)
PLATELET # BLD AUTO: 105 10E3/UL (ref 150–450)
PLATELET # BLD AUTO: 112 10E3/UL (ref 150–450)
PLATELET # BLD AUTO: 118 10E3/UL (ref 150–450)
PLATELET # BLD AUTO: 120 10E3/UL (ref 150–450)
PLATELET # BLD AUTO: 135 10E3/UL (ref 150–450)
PLATELET # BLD AUTO: 135 10E3/UL (ref 150–450)
PLATELET # BLD AUTO: 137 10E3/UL (ref 150–450)
PLATELET # BLD AUTO: 158 10E3/UL (ref 150–450)
PLATELET # BLD AUTO: 160 10E3/UL (ref 150–450)
PLATELET # BLD AUTO: 179 10E3/UL (ref 150–450)
PLATELET # BLD AUTO: 184 10E3/UL (ref 150–450)
PLATELET # BLD AUTO: 186 10E3/UL (ref 150–450)
PLATELET # BLD AUTO: 192 10E3/UL (ref 150–450)
PLATELET # BLD AUTO: 201 10E3/UL (ref 150–450)
PLATELET # BLD AUTO: 204 10E3/UL (ref 150–450)
PLATELET # BLD AUTO: 207 10E3/UL (ref 150–450)
PLATELET # BLD AUTO: 212 10E3/UL (ref 150–450)
PLATELET # BLD AUTO: 214 10E3/UL (ref 150–450)
PLATELET # BLD AUTO: 217 10E3/UL (ref 150–450)
PLATELET # BLD AUTO: 230 10E3/UL (ref 150–450)
PLATELET # BLD AUTO: 237 10E3/UL (ref 150–450)
PLATELET # BLD AUTO: 77 10E3/UL (ref 150–450)
PLATELET # BLD AUTO: 82 10E3/UL (ref 150–450)
PLATELET # BLD AUTO: 98 10E3/UL (ref 150–450)
PLATELET NEUTRALIZATION: -1 SECONDS
PO2 BLDA: 30 MM HG (ref 80–105)
PO2 BLDA: 48 MM HG (ref 80–105)
PO2 BLDV: 21 MM HG (ref 25–47)
PO2 BLDV: 24 MM HG (ref 25–47)
PO2 BLDV: 26 MM HG (ref 25–47)
PO2 BLDV: 38 MM HG (ref 25–47)
PO2 BLDV: 47 MM HG (ref 25–47)
POTASSIUM SERPL-SCNC: 3.1 MMOL/L (ref 3.4–5.3)
POTASSIUM SERPL-SCNC: 3.2 MMOL/L (ref 3.4–5.3)
POTASSIUM SERPL-SCNC: 3.2 MMOL/L (ref 3.4–5.3)
POTASSIUM SERPL-SCNC: 3.3 MMOL/L (ref 3.4–5.3)
POTASSIUM SERPL-SCNC: 3.4 MMOL/L (ref 3.4–5.3)
POTASSIUM SERPL-SCNC: 3.5 MMOL/L (ref 3.4–5.3)
POTASSIUM SERPL-SCNC: 3.6 MMOL/L (ref 3.4–5.3)
POTASSIUM SERPL-SCNC: 3.7 MMOL/L (ref 3.4–5.3)
POTASSIUM SERPL-SCNC: 3.8 MMOL/L (ref 3.4–5.3)
POTASSIUM SERPL-SCNC: 3.9 MMOL/L (ref 3.4–5.3)
POTASSIUM SERPL-SCNC: 4 MMOL/L (ref 3.4–5.3)
POTASSIUM SERPL-SCNC: 4.1 MMOL/L (ref 3.4–5.3)
POTASSIUM SERPL-SCNC: 4.2 MMOL/L (ref 3.4–5.3)
POTASSIUM SERPL-SCNC: 4.3 MMOL/L (ref 3.4–5.3)
POTASSIUM SERPL-SCNC: 4.4 MMOL/L (ref 3.4–5.3)
POTASSIUM SERPL-SCNC: 4.4 MMOL/L (ref 3.4–5.3)
POTASSIUM SERPL-SCNC: 4.5 MMOL/L (ref 3.4–5.3)
POTASSIUM SERPL-SCNC: 4.5 MMOL/L (ref 3.4–5.3)
POTASSIUM SERPL-SCNC: 4.6 MMOL/L (ref 3.4–5.3)
POTASSIUM SERPL-SCNC: 4.6 MMOL/L (ref 3.4–5.3)
POTASSIUM SERPL-SCNC: 4.8 MMOL/L (ref 3.4–5.3)
POTASSIUM SERPL-SCNC: 4.9 MMOL/L (ref 3.4–5.3)
POTASSIUM SERPL-SCNC: 5 MMOL/L (ref 3.4–5.3)
POTASSIUM SERPL-SCNC: 5.1 MMOL/L (ref 3.4–5.3)
POTASSIUM SERPL-SCNC: 5.2 MMOL/L (ref 3.4–5.3)
POTASSIUM SERPL-SCNC: 5.3 MMOL/L (ref 3.4–5.3)
POTASSIUM SERPL-SCNC: 5.3 MMOL/L (ref 3.4–5.3)
POTASSIUM SERPL-SCNC: 5.4 MMOL/L (ref 3.4–5.3)
POTASSIUM SERPL-SCNC: 5.4 MMOL/L (ref 3.4–5.3)
POTASSIUM SERPL-SCNC: 5.6 MMOL/L (ref 3.4–5.3)
POTASSIUM SERPL-SCNC: 5.9 MMOL/L (ref 3.4–5.3)
PR INTERVAL - MUSE: 154 MS
PR INTERVAL - MUSE: 162 MS
PR INTERVAL - MUSE: NORMAL MS
PROCALCITONIN SERPL IA-MCNC: 0.1 NG/ML
PROCALCITONIN SERPL IA-MCNC: 7.92 NG/ML
PROT SERPL-MCNC: 6.5 G/DL (ref 6.4–8.3)
PROT SERPL-MCNC: 6.8 G/DL (ref 6.4–8.3)
PROT SERPL-MCNC: 6.8 G/DL (ref 6.4–8.3)
PROT SERPL-MCNC: 7 G/DL (ref 6.4–8.3)
PROT SERPL-MCNC: 7.1 G/DL (ref 6.4–8.3)
PROT SERPL-MCNC: 7.2 G/DL (ref 6.4–8.3)
PROT SERPL-MCNC: 7.3 G/DL (ref 6.4–8.3)
PTT 1:2 MIX: 40 SECONDS (ref 31–45)
PTT RATIO: 1.57
QRS DURATION - MUSE: 102 MS
QRS DURATION - MUSE: 104 MS
QRS DURATION - MUSE: 86 MS
QT - MUSE: 280 MS
QT - MUSE: 354 MS
QT - MUSE: 382 MS
QTC - MUSE: 446 MS
QTC - MUSE: 456 MS
QTC - MUSE: 467 MS
QUANTIFERON MITOGEN: 1.62 IU/ML
QUANTIFERON NIL TUBE: 0.01 IU/ML
QUANTIFERON TB1 TUBE: 0.02 IU/ML
QUANTIFERON TB2 TUBE: 0.02
R AXIS - MUSE: 107 DEGREES
R AXIS - MUSE: 115 DEGREES
R AXIS - MUSE: 126 DEGREES
RBC # BLD AUTO: 3.32 10E6/UL (ref 3.8–5.2)
RBC # BLD AUTO: 3.35 10E6/UL (ref 3.8–5.2)
RBC # BLD AUTO: 3.35 10E6/UL (ref 3.8–5.2)
RBC # BLD AUTO: 3.5 10E6/UL (ref 3.8–5.2)
RBC # BLD AUTO: 3.59 10E6/UL (ref 3.8–5.2)
RBC # BLD AUTO: 3.98 10E6/UL (ref 3.8–5.2)
RBC # BLD AUTO: 4.12 10E6/UL (ref 3.8–5.2)
RBC # BLD AUTO: 4.2 10E6/UL (ref 3.8–5.2)
RBC # BLD AUTO: 4.61 10E6/UL (ref 3.8–5.2)
RBC # BLD AUTO: 4.62 10E6/UL (ref 3.8–5.2)
RBC # BLD AUTO: 4.79 10E6/UL (ref 3.8–5.2)
RBC # BLD AUTO: 4.92 10E6/UL (ref 3.8–5.2)
RBC # BLD AUTO: 4.94 10E6/UL (ref 3.8–5.2)
RBC # BLD AUTO: 5.17 10E6/UL (ref 3.8–5.2)
RBC # BLD AUTO: 5.26 10E6/UL (ref 3.8–5.2)
RBC MORPH BLD: ABNORMAL
RBC URINE: 15 /HPF
RHEUMATOID FACT SERPL-ACNC: 12 IU/ML
RSV RNA SPEC NAA+PROBE: NEGATIVE
SA TARGET DNA: POSITIVE
SAO2 % BLDA: 57 % (ref 92–100)
SAO2 % BLDA: 84 % (ref 92–100)
SAO2 % BLDV: 26 % (ref 70–75)
SAO2 % BLDV: 35 % (ref 70–75)
SAO2 % BLDV: 40 % (ref 70–75)
SAO2 % BLDV: 72 % (ref 70–75)
SAO2 % BLDV: 79.7 % (ref 70–75)
SARS-COV-2 RNA RESP QL NAA+PROBE: NEGATIVE
SODIUM SERPL-SCNC: 134 MMOL/L (ref 135–145)
SODIUM SERPL-SCNC: 135 MMOL/L (ref 135–145)
SODIUM SERPL-SCNC: 135 MMOL/L (ref 135–145)
SODIUM SERPL-SCNC: 136 MMOL/L (ref 135–145)
SODIUM SERPL-SCNC: 136 MMOL/L (ref 135–145)
SODIUM SERPL-SCNC: 137 MMOL/L (ref 135–145)
SODIUM SERPL-SCNC: 138 MMOL/L (ref 135–145)
SODIUM SERPL-SCNC: 139 MMOL/L (ref 135–145)
SODIUM SERPL-SCNC: 140 MMOL/L (ref 135–145)
SODIUM SERPL-SCNC: 141 MMOL/L (ref 135–145)
SODIUM SERPL-SCNC: 142 MMOL/L (ref 135–145)
SODIUM SERPL-SCNC: 143 MMOL/L (ref 135–145)
SODIUM SERPL-SCNC: 144 MMOL/L (ref 135–145)
SODIUM SERPL-SCNC: 144 MMOL/L (ref 135–145)
SODIUM SERPL-SCNC: 145 MMOL/L (ref 135–145)
SP GR UR STRIP: 1.02 (ref 1–1.03)
SQUAMOUS EPITHELIAL: 3 /HPF
STAPHYLOCOCCUS AUREUS: NOT DETECTED
STAPHYLOCOCCUS EPIDERMIDIS: NOT DETECTED
STAPHYLOCOCCUS LUGDUNENSIS: NOT DETECTED
STAPHYLOCOCCUS SPECIES: NOT DETECTED
STFR SERPL-MCNC: 9.5 MG/L
STREPTOCOCCUS AGALACTIAE: NOT DETECTED
STREPTOCOCCUS ANGINOSUS GROUP: NOT DETECTED
STREPTOCOCCUS PNEUMONIAE: NOT DETECTED
STREPTOCOCCUS PYOGENES: NOT DETECTED
STREPTOCOCCUS SPECIES: DETECTED
SYSTOLIC BLOOD PRESSURE - MUSE: NORMAL MMHG
T AXIS - MUSE: -1 DEGREES
T AXIS - MUSE: -33 DEGREES
T AXIS - MUSE: 18 DEGREES
T4 FREE SERPL-MCNC: 1.49 NG/DL (ref 0.9–1.7)
THROMBIN TIME: 17.2 SECONDS (ref 13–19)
TRIGL SERPL-MCNC: 89 MG/DL
TROPONIN T SERPL HS-MCNC: 185 NG/L
TROPONIN T SERPL HS-MCNC: 187 NG/L
TROPONIN T SERPL HS-MCNC: 193 NG/L
TROPONIN T SERPL HS-MCNC: 194 NG/L
TROPONIN T SERPL HS-MCNC: 197 NG/L
TROPONIN T SERPL HS-MCNC: 199 NG/L
TROPONIN T SERPL HS-MCNC: 204 NG/L
TROPONIN T SERPL HS-MCNC: 37 NG/L
TSH SERPL DL<=0.005 MIU/L-ACNC: 4.9 UIU/ML (ref 0.3–4.2)
TSH SERPL DL<=0.005 MIU/L-ACNC: 6.28 UIU/ML (ref 0.3–4.2)
UFH PPP CHRO-ACNC: 0.14 IU/ML
UFH PPP CHRO-ACNC: 0.15 IU/ML
UFH PPP CHRO-ACNC: 0.3 IU/ML
UFH PPP CHRO-ACNC: 0.33 IU/ML
UFH PPP CHRO-ACNC: 0.39 IU/ML
UFH PPP CHRO-ACNC: <0.1 IU/ML
UROBILINOGEN UR STRIP-MCNC: 4 MG/DL
VENTRICULAR RATE- MUSE: 100 BPM
VENTRICULAR RATE- MUSE: 167 BPM
VENTRICULAR RATE- MUSE: 82 BPM
WBC # BLD AUTO: 10.2 10E3/UL (ref 4–11)
WBC # BLD AUTO: 13.5 10E3/UL (ref 4–11)
WBC # BLD AUTO: 15.6 10E3/UL (ref 4–11)
WBC # BLD AUTO: 16.4 10E3/UL (ref 4–11)
WBC # BLD AUTO: 4.5 10E3/UL (ref 4–11)
WBC # BLD AUTO: 4.9 10E3/UL (ref 4–11)
WBC # BLD AUTO: 5.6 10E3/UL (ref 4–11)
WBC # BLD AUTO: 6.6 10E3/UL (ref 4–11)
WBC # BLD AUTO: 6.9 10E3/UL (ref 4–11)
WBC # BLD AUTO: 7.3 10E3/UL (ref 4–11)
WBC # BLD AUTO: 7.6 10E3/UL (ref 4–11)
WBC # BLD AUTO: 8.6 10E3/UL (ref 4–11)
WBC # BLD AUTO: 8.8 10E3/UL (ref 4–11)
WBC # BLD AUTO: 9.2 10E3/UL (ref 4–11)
WBC # BLD AUTO: 9.7 10E3/UL (ref 4–11)
WBC URINE: 6 /HPF

## 2024-01-01 PROCEDURE — 80048 BASIC METABOLIC PNL TOTAL CA: CPT | Performed by: HOSPITALIST

## 2024-01-01 PROCEDURE — 258N000003 HC RX IP 258 OP 636: Performed by: INTERNAL MEDICINE

## 2024-01-01 PROCEDURE — 99309 SBSQ NF CARE MODERATE MDM 30: CPT | Performed by: NURSE PRACTITIONER

## 2024-01-01 PROCEDURE — 999N000157 HC STATISTIC RCP TIME EA 10 MIN

## 2024-01-01 PROCEDURE — G2211 COMPLEX E/M VISIT ADD ON: HCPCS | Performed by: NURSE PRACTITIONER

## 2024-01-01 PROCEDURE — 999N000197 HC STATISTIC WOC PT EDUCATION, 0-15 MIN

## 2024-01-01 PROCEDURE — C1751 CATH, INF, PER/CENT/MIDLINE: HCPCS | Performed by: INTERNAL MEDICINE

## 2024-01-01 PROCEDURE — 99233 SBSQ HOSP IP/OBS HIGH 50: CPT | Performed by: INTERNAL MEDICINE

## 2024-01-01 PROCEDURE — 99232 SBSQ HOSP IP/OBS MODERATE 35: CPT | Performed by: HOSPITALIST

## 2024-01-01 PROCEDURE — 250N000011 HC RX IP 250 OP 636: Performed by: HOSPITALIST

## 2024-01-01 PROCEDURE — 250N000011 HC RX IP 250 OP 636: Performed by: STUDENT IN AN ORGANIZED HEALTH CARE EDUCATION/TRAINING PROGRAM

## 2024-01-01 PROCEDURE — 84132 ASSAY OF SERUM POTASSIUM: CPT | Performed by: INTERNAL MEDICINE

## 2024-01-01 PROCEDURE — 99232 SBSQ HOSP IP/OBS MODERATE 35: CPT | Mod: FS | Performed by: NURSE PRACTITIONER

## 2024-01-01 PROCEDURE — 250N000013 HC RX MED GY IP 250 OP 250 PS 637: Performed by: HOSPITALIST

## 2024-01-01 PROCEDURE — 250N000011 HC RX IP 250 OP 636: Performed by: INTERNAL MEDICINE

## 2024-01-01 PROCEDURE — 250N000013 HC RX MED GY IP 250 OP 250 PS 637: Performed by: STUDENT IN AN ORGANIZED HEALTH CARE EDUCATION/TRAINING PROGRAM

## 2024-01-01 PROCEDURE — 250N000012 HC RX MED GY IP 250 OP 636 PS 637: Performed by: INTERNAL MEDICINE

## 2024-01-01 PROCEDURE — 36415 COLL VENOUS BLD VENIPUNCTURE: CPT | Performed by: INTERNAL MEDICINE

## 2024-01-01 PROCEDURE — 84100 ASSAY OF PHOSPHORUS: CPT | Performed by: SURGERY

## 2024-01-01 PROCEDURE — 94640 AIRWAY INHALATION TREATMENT: CPT

## 2024-01-01 PROCEDURE — 83880 ASSAY OF NATRIURETIC PEPTIDE: CPT | Performed by: ANESTHESIOLOGY

## 2024-01-01 PROCEDURE — 36569 INSJ PICC 5 YR+ W/O IMAGING: CPT | Mod: 52

## 2024-01-01 PROCEDURE — 99233 SBSQ HOSP IP/OBS HIGH 50: CPT | Performed by: HOSPITALIST

## 2024-01-01 PROCEDURE — 82040 ASSAY OF SERUM ALBUMIN: CPT | Performed by: ANESTHESIOLOGY

## 2024-01-01 PROCEDURE — 99232 SBSQ HOSP IP/OBS MODERATE 35: CPT | Performed by: INTERNAL MEDICINE

## 2024-01-01 PROCEDURE — 258N000003 HC RX IP 258 OP 636: Performed by: EMERGENCY MEDICINE

## 2024-01-01 PROCEDURE — 120N000001 HC R&B MED SURG/OB

## 2024-01-01 PROCEDURE — 97161 PT EVAL LOW COMPLEX 20 MIN: CPT | Mod: GP

## 2024-01-01 PROCEDURE — 94640 AIRWAY INHALATION TREATMENT: CPT | Mod: 76

## 2024-01-01 PROCEDURE — 250N000009 HC RX 250: Performed by: STUDENT IN AN ORGANIZED HEALTH CARE EDUCATION/TRAINING PROGRAM

## 2024-01-01 PROCEDURE — 85049 AUTOMATED PLATELET COUNT: CPT | Performed by: INTERNAL MEDICINE

## 2024-01-01 PROCEDURE — 84155 ASSAY OF PROTEIN SERUM: CPT | Performed by: ANESTHESIOLOGY

## 2024-01-01 PROCEDURE — 87040 BLOOD CULTURE FOR BACTERIA: CPT | Performed by: EMERGENCY MEDICINE

## 2024-01-01 PROCEDURE — 87077 CULTURE AEROBIC IDENTIFY: CPT | Performed by: SURGERY

## 2024-01-01 PROCEDURE — 93005 ELECTROCARDIOGRAM TRACING: CPT

## 2024-01-01 PROCEDURE — 84484 ASSAY OF TROPONIN QUANT: CPT | Performed by: EMERGENCY MEDICINE

## 2024-01-01 PROCEDURE — 250N000009 HC RX 250: Performed by: INTERNAL MEDICINE

## 2024-01-01 PROCEDURE — 99221 1ST HOSP IP/OBS SF/LOW 40: CPT | Performed by: PODIATRIST

## 2024-01-01 PROCEDURE — 250N000009 HC RX 250: Performed by: HOSPITALIST

## 2024-01-01 PROCEDURE — 83605 ASSAY OF LACTIC ACID: CPT

## 2024-01-01 PROCEDURE — 120N000013 HC R&B IMCU

## 2024-01-01 PROCEDURE — 250N000013 HC RX MED GY IP 250 OP 250 PS 637: Performed by: INTERNAL MEDICINE

## 2024-01-01 PROCEDURE — 99232 SBSQ HOSP IP/OBS MODERATE 35: CPT | Performed by: STUDENT IN AN ORGANIZED HEALTH CARE EDUCATION/TRAINING PROGRAM

## 2024-01-01 PROCEDURE — 99223 1ST HOSP IP/OBS HIGH 75: CPT | Performed by: CLINICAL NURSE SPECIALIST

## 2024-01-01 PROCEDURE — 82374 ASSAY BLOOD CARBON DIOXIDE: CPT | Performed by: HOSPITALIST

## 2024-01-01 PROCEDURE — 5A09357 ASSISTANCE WITH RESPIRATORY VENTILATION, LESS THAN 24 CONSECUTIVE HOURS, CONTINUOUS POSITIVE AIRWAY PRESSURE: ICD-10-PCS | Performed by: EMERGENCY MEDICINE

## 2024-01-01 PROCEDURE — 97110 THERAPEUTIC EXERCISES: CPT | Mod: GP

## 2024-01-01 PROCEDURE — 80048 BASIC METABOLIC PNL TOTAL CA: CPT | Performed by: NURSE PRACTITIONER

## 2024-01-01 PROCEDURE — 85520 HEPARIN ASSAY: CPT | Performed by: HOSPITALIST

## 2024-01-01 PROCEDURE — 36415 COLL VENOUS BLD VENIPUNCTURE: CPT | Performed by: ANESTHESIOLOGY

## 2024-01-01 PROCEDURE — 250N000011 HC RX IP 250 OP 636: Performed by: ANESTHESIOLOGY

## 2024-01-01 PROCEDURE — 36569 INSJ PICC 5 YR+ W/O IMAGING: CPT

## 2024-01-01 PROCEDURE — 83735 ASSAY OF MAGNESIUM: CPT | Performed by: INTERNAL MEDICINE

## 2024-01-01 PROCEDURE — 250N000013 HC RX MED GY IP 250 OP 250 PS 637: Performed by: NURSE PRACTITIONER

## 2024-01-01 PROCEDURE — 99291 CRITICAL CARE FIRST HOUR: CPT | Performed by: INTERNAL MEDICINE

## 2024-01-01 PROCEDURE — 210N000002 HC R&B HEART CARE

## 2024-01-01 PROCEDURE — 71045 X-RAY EXAM CHEST 1 VIEW: CPT

## 2024-01-01 PROCEDURE — 85041 AUTOMATED RBC COUNT: CPT

## 2024-01-01 PROCEDURE — 36415 COLL VENOUS BLD VENIPUNCTURE: CPT

## 2024-01-01 PROCEDURE — B2111ZZ FLUOROSCOPY OF MULTIPLE CORONARY ARTERIES USING LOW OSMOLAR CONTRAST: ICD-10-PCS | Performed by: INTERNAL MEDICINE

## 2024-01-01 PROCEDURE — 85730 THROMBOPLASTIN TIME PARTIAL: CPT | Performed by: EMERGENCY MEDICINE

## 2024-01-01 PROCEDURE — 250N000012 HC RX MED GY IP 250 OP 636 PS 637: Performed by: HOSPITALIST

## 2024-01-01 PROCEDURE — 97140 MANUAL THERAPY 1/> REGIONS: CPT | Mod: GO | Performed by: OCCUPATIONAL THERAPIST

## 2024-01-01 PROCEDURE — 36415 COLL VENOUS BLD VENIPUNCTURE: CPT | Performed by: HOSPITALIST

## 2024-01-01 PROCEDURE — 84443 ASSAY THYROID STIM HORMONE: CPT | Performed by: HOSPITALIST

## 2024-01-01 PROCEDURE — 4A023N6 MEASUREMENT OF CARDIAC SAMPLING AND PRESSURE, RIGHT HEART, PERCUTANEOUS APPROACH: ICD-10-PCS | Performed by: INTERNAL MEDICINE

## 2024-01-01 PROCEDURE — 85014 HEMATOCRIT: CPT | Performed by: ANESTHESIOLOGY

## 2024-01-01 PROCEDURE — C1887 CATHETER, GUIDING: HCPCS | Performed by: INTERNAL MEDICINE

## 2024-01-01 PROCEDURE — 99233 SBSQ HOSP IP/OBS HIGH 50: CPT | Mod: FS | Performed by: NURSE PRACTITIONER

## 2024-01-01 PROCEDURE — 85018 HEMOGLOBIN: CPT | Performed by: HOSPITALIST

## 2024-01-01 PROCEDURE — 83880 ASSAY OF NATRIURETIC PEPTIDE: CPT | Performed by: EMERGENCY MEDICINE

## 2024-01-01 PROCEDURE — 94660 CPAP INITIATION&MGMT: CPT

## 2024-01-01 PROCEDURE — 83036 HEMOGLOBIN GLYCOSYLATED A1C: CPT | Performed by: HOSPITALIST

## 2024-01-01 PROCEDURE — 250N000011 HC RX IP 250 OP 636: Performed by: EMERGENCY MEDICINE

## 2024-01-01 PROCEDURE — 999N000040 HC STATISTIC CONSULT NO CHARGE VASC ACCESS

## 2024-01-01 PROCEDURE — 83605 ASSAY OF LACTIC ACID: CPT | Performed by: HOSPITALIST

## 2024-01-01 PROCEDURE — 82803 BLOOD GASES ANY COMBINATION: CPT

## 2024-01-01 PROCEDURE — 36415 COLL VENOUS BLD VENIPUNCTURE: CPT | Performed by: STUDENT IN AN ORGANIZED HEALTH CARE EDUCATION/TRAINING PROGRAM

## 2024-01-01 PROCEDURE — 93456 R HRT CORONARY ARTERY ANGIO: CPT | Performed by: INTERNAL MEDICINE

## 2024-01-01 PROCEDURE — 92978 ENDOLUMINL IVUS OCT C 1ST: CPT | Mod: 26 | Performed by: INTERNAL MEDICINE

## 2024-01-01 PROCEDURE — 83880 ASSAY OF NATRIURETIC PEPTIDE: CPT

## 2024-01-01 PROCEDURE — 84132 ASSAY OF SERUM POTASSIUM: CPT | Performed by: HOSPITALIST

## 2024-01-01 PROCEDURE — 93306 TTE W/DOPPLER COMPLETE: CPT

## 2024-01-01 PROCEDURE — 99222 1ST HOSP IP/OBS MODERATE 55: CPT | Performed by: STUDENT IN AN ORGANIZED HEALTH CARE EDUCATION/TRAINING PROGRAM

## 2024-01-01 PROCEDURE — 87077 CULTURE AEROBIC IDENTIFY: CPT | Performed by: EMERGENCY MEDICINE

## 2024-01-01 PROCEDURE — 97166 OT EVAL MOD COMPLEX 45 MIN: CPT | Mod: GO | Performed by: OCCUPATIONAL THERAPIST

## 2024-01-01 PROCEDURE — 99285 EMERGENCY DEPT VISIT HI MDM: CPT | Mod: 25

## 2024-01-01 PROCEDURE — 80048 BASIC METABOLIC PNL TOTAL CA: CPT | Performed by: STUDENT IN AN ORGANIZED HEALTH CARE EDUCATION/TRAINING PROGRAM

## 2024-01-01 PROCEDURE — 97535 SELF CARE MNGMENT TRAINING: CPT | Mod: GO

## 2024-01-01 PROCEDURE — 96366 THER/PROPH/DIAG IV INF ADDON: CPT

## 2024-01-01 PROCEDURE — 86481 TB AG RESPONSE T-CELL SUSP: CPT | Performed by: NURSE PRACTITIONER

## 2024-01-01 PROCEDURE — G0463 HOSPITAL OUTPT CLINIC VISIT: HCPCS

## 2024-01-01 PROCEDURE — 250N000013 HC RX MED GY IP 250 OP 250 PS 637: Performed by: ANESTHESIOLOGY

## 2024-01-01 PROCEDURE — 250N000009 HC RX 250: Performed by: ANESTHESIOLOGY

## 2024-01-01 PROCEDURE — 97116 GAIT TRAINING THERAPY: CPT | Mod: GP | Performed by: PHYSICAL THERAPIST

## 2024-01-01 PROCEDURE — 82374 ASSAY BLOOD CARBON DIOXIDE: CPT | Performed by: NURSE PRACTITIONER

## 2024-01-01 PROCEDURE — 250N000009 HC RX 250

## 2024-01-01 PROCEDURE — 80048 BASIC METABOLIC PNL TOTAL CA: CPT | Performed by: EMERGENCY MEDICINE

## 2024-01-01 PROCEDURE — 86706 HEP B SURFACE ANTIBODY: CPT

## 2024-01-01 PROCEDURE — 97530 THERAPEUTIC ACTIVITIES: CPT | Mod: GP

## 2024-01-01 PROCEDURE — 200N000001 HC R&B ICU

## 2024-01-01 PROCEDURE — 85049 AUTOMATED PLATELET COUNT: CPT | Performed by: STUDENT IN AN ORGANIZED HEALTH CARE EDUCATION/TRAINING PROGRAM

## 2024-01-01 PROCEDURE — 82374 ASSAY BLOOD CARBON DIOXIDE: CPT | Performed by: STUDENT IN AN ORGANIZED HEALTH CARE EDUCATION/TRAINING PROGRAM

## 2024-01-01 PROCEDURE — 84100 ASSAY OF PHOSPHORUS: CPT | Performed by: HOSPITALIST

## 2024-01-01 PROCEDURE — 80048 BASIC METABOLIC PNL TOTAL CA: CPT

## 2024-01-01 PROCEDURE — C1760 CLOSURE DEV, VASC: HCPCS | Performed by: INTERNAL MEDICINE

## 2024-01-01 PROCEDURE — 99207 PR NO BILLABLE SERVICE THIS VISIT: CPT | Performed by: INTERNAL MEDICINE

## 2024-01-01 PROCEDURE — 80048 BASIC METABOLIC PNL TOTAL CA: CPT | Performed by: INTERNAL MEDICINE

## 2024-01-01 PROCEDURE — 93976 VASCULAR STUDY: CPT

## 2024-01-01 PROCEDURE — 84484 ASSAY OF TROPONIN QUANT: CPT | Performed by: HOSPITALIST

## 2024-01-01 PROCEDURE — 250N000011 HC RX IP 250 OP 636: Performed by: NURSE PRACTITIONER

## 2024-01-01 PROCEDURE — 83605 ASSAY OF LACTIC ACID: CPT | Performed by: INTERNAL MEDICINE

## 2024-01-01 PROCEDURE — 85027 COMPLETE CBC AUTOMATED: CPT | Performed by: INTERNAL MEDICINE

## 2024-01-01 PROCEDURE — 84238 ASSAY NONENDOCRINE RECEPTOR: CPT

## 2024-01-01 PROCEDURE — 250N000009 HC RX 250: Performed by: SURGERY

## 2024-01-01 PROCEDURE — 250N000009 HC RX 250: Performed by: NURSE PRACTITIONER

## 2024-01-01 PROCEDURE — 83735 ASSAY OF MAGNESIUM: CPT | Performed by: HOSPITALIST

## 2024-01-01 PROCEDURE — 999N000128 HC STATISTIC PERIPHERAL IV START W/O US GUIDANCE

## 2024-01-01 PROCEDURE — 85027 COMPLETE CBC AUTOMATED: CPT | Performed by: ANESTHESIOLOGY

## 2024-01-01 PROCEDURE — 71275 CT ANGIOGRAPHY CHEST: CPT | Mod: ME

## 2024-01-01 PROCEDURE — 85027 COMPLETE CBC AUTOMATED: CPT | Performed by: HOSPITALIST

## 2024-01-01 PROCEDURE — 93306 TTE W/DOPPLER COMPLETE: CPT | Mod: 26 | Performed by: INTERNAL MEDICINE

## 2024-01-01 PROCEDURE — 96368 THER/DIAG CONCURRENT INF: CPT

## 2024-01-01 PROCEDURE — 999N000127 HC STATISTIC PERIPHERAL IV START W US GUIDANCE

## 2024-01-01 PROCEDURE — 99285 EMERGENCY DEPT VISIT HI MDM: CPT

## 2024-01-01 PROCEDURE — 272N000001 HC OR GENERAL SUPPLY STERILE: Performed by: INTERNAL MEDICINE

## 2024-01-01 PROCEDURE — 96361 HYDRATE IV INFUSION ADD-ON: CPT

## 2024-01-01 PROCEDURE — 86140 C-REACTIVE PROTEIN: CPT

## 2024-01-01 PROCEDURE — 272N000448 HC KIT POWER PICC SOLO 5F TRIPLE LUMEN

## 2024-01-01 PROCEDURE — 36415 COLL VENOUS BLD VENIPUNCTURE: CPT | Performed by: NURSE PRACTITIONER

## 2024-01-01 PROCEDURE — 97530 THERAPEUTIC ACTIVITIES: CPT | Mod: GO

## 2024-01-01 PROCEDURE — A9567 TECHNETIUM TC-99M AEROSOL: HCPCS | Performed by: INTERNAL MEDICINE

## 2024-01-01 PROCEDURE — 80061 LIPID PANEL: CPT

## 2024-01-01 PROCEDURE — 84443 ASSAY THYROID STIM HORMONE: CPT

## 2024-01-01 PROCEDURE — 84145 PROCALCITONIN (PCT): CPT | Performed by: EMERGENCY MEDICINE

## 2024-01-01 PROCEDURE — P9604 ONE-WAY ALLOW PRORATED TRIP: HCPCS | Mod: ORL | Performed by: NURSE PRACTITIONER

## 2024-01-01 PROCEDURE — 85379 FIBRIN DEGRADATION QUANT: CPT | Performed by: EMERGENCY MEDICINE

## 2024-01-01 PROCEDURE — 96374 THER/PROPH/DIAG INJ IV PUSH: CPT | Mod: 59

## 2024-01-01 PROCEDURE — 82040 ASSAY OF SERUM ALBUMIN: CPT | Performed by: SURGERY

## 2024-01-01 PROCEDURE — 97110 THERAPEUTIC EXERCISES: CPT | Mod: GO

## 2024-01-01 PROCEDURE — 99207 PR NO CHARGE LOS: CPT

## 2024-01-01 PROCEDURE — 250N000013 HC RX MED GY IP 250 OP 250 PS 637: Performed by: EMERGENCY MEDICINE

## 2024-01-01 PROCEDURE — 85049 AUTOMATED PLATELET COUNT: CPT | Performed by: EMERGENCY MEDICINE

## 2024-01-01 PROCEDURE — 80048 BASIC METABOLIC PNL TOTAL CA: CPT | Mod: ORL | Performed by: NURSE PRACTITIONER

## 2024-01-01 PROCEDURE — 87389 HIV-1 AG W/HIV-1&-2 AB AG IA: CPT

## 2024-01-01 PROCEDURE — 99222 1ST HOSP IP/OBS MODERATE 55: CPT | Performed by: INTERNAL MEDICINE

## 2024-01-01 PROCEDURE — 93451 RIGHT HEART CATH: CPT | Mod: 26 | Performed by: INTERNAL MEDICINE

## 2024-01-01 PROCEDURE — B240ZZ3 ULTRASONOGRAPHY OF SINGLE CORONARY ARTERY, INTRAVASCULAR: ICD-10-PCS | Performed by: INTERNAL MEDICINE

## 2024-01-01 PROCEDURE — 86704 HEP B CORE ANTIBODY TOTAL: CPT

## 2024-01-01 PROCEDURE — 80053 COMPREHEN METABOLIC PANEL: CPT | Performed by: EMERGENCY MEDICINE

## 2024-01-01 PROCEDURE — 99207 PR APP CREDIT; MD BILLING SHARED VISIT: CPT | Performed by: HOSPITALIST

## 2024-01-01 PROCEDURE — 84295 ASSAY OF SERUM SODIUM: CPT | Performed by: NURSE PRACTITIONER

## 2024-01-01 PROCEDURE — 85014 HEMATOCRIT: CPT | Performed by: INTERNAL MEDICINE

## 2024-01-01 PROCEDURE — 99232 SBSQ HOSP IP/OBS MODERATE 35: CPT | Performed by: PODIATRIST

## 2024-01-01 PROCEDURE — 97530 THERAPEUTIC ACTIVITIES: CPT | Mod: GP | Performed by: PHYSICAL THERAPIST

## 2024-01-01 PROCEDURE — 99291 CRITICAL CARE FIRST HOUR: CPT | Performed by: SURGERY

## 2024-01-01 PROCEDURE — 82010 KETONE BODYS QUAN: CPT | Performed by: ANESTHESIOLOGY

## 2024-01-01 PROCEDURE — 83529 ASAY OF INTERLEUKIN-6 (IL-6): CPT

## 2024-01-01 PROCEDURE — 99214 OFFICE O/P EST MOD 30 MIN: CPT | Performed by: INTERNAL MEDICINE

## 2024-01-01 PROCEDURE — 87040 BLOOD CULTURE FOR BACTERIA: CPT | Performed by: INTERNAL MEDICINE

## 2024-01-01 PROCEDURE — 97530 THERAPEUTIC ACTIVITIES: CPT | Mod: GO | Performed by: OCCUPATIONAL THERAPIST

## 2024-01-01 PROCEDURE — 99223 1ST HOSP IP/OBS HIGH 75: CPT | Performed by: STUDENT IN AN ORGANIZED HEALTH CARE EDUCATION/TRAINING PROGRAM

## 2024-01-01 PROCEDURE — 85613 RUSSELL VIPER VENOM DILUTED: CPT

## 2024-01-01 PROCEDURE — 99238 HOSP IP/OBS DSCHRG MGMT 30/<: CPT | Mod: 25 | Performed by: INTERNAL MEDICINE

## 2024-01-01 PROCEDURE — 97162 PT EVAL MOD COMPLEX 30 MIN: CPT | Mod: GP | Performed by: PHYSICAL THERAPIST

## 2024-01-01 PROCEDURE — 99232 SBSQ HOSP IP/OBS MODERATE 35: CPT | Performed by: SPECIALIST

## 2024-01-01 PROCEDURE — 84145 PROCALCITONIN (PCT): CPT | Performed by: HOSPITALIST

## 2024-01-01 PROCEDURE — 99497 ADVNCD CARE PLAN 30 MIN: CPT | Mod: 25 | Performed by: INTERNAL MEDICINE

## 2024-01-01 PROCEDURE — 250N000009 HC RX 250: Performed by: EMERGENCY MEDICINE

## 2024-01-01 PROCEDURE — 85610 PROTHROMBIN TIME: CPT | Performed by: EMERGENCY MEDICINE

## 2024-01-01 PROCEDURE — C1753 CATH, INTRAVAS ULTRASOUND: HCPCS | Performed by: INTERNAL MEDICINE

## 2024-01-01 PROCEDURE — 85610 PROTHROMBIN TIME: CPT

## 2024-01-01 PROCEDURE — 96375 TX/PRO/DX INJ NEW DRUG ADDON: CPT

## 2024-01-01 PROCEDURE — 272N000035 NM LUNG SCAN VENTILATION AND PERFUSION

## 2024-01-01 PROCEDURE — 97165 OT EVAL LOW COMPLEX 30 MIN: CPT | Mod: GO

## 2024-01-01 PROCEDURE — 85014 HEMATOCRIT: CPT | Performed by: HOSPITALIST

## 2024-01-01 PROCEDURE — 36415 COLL VENOUS BLD VENIPUNCTURE: CPT | Performed by: EMERGENCY MEDICINE

## 2024-01-01 PROCEDURE — 83605 ASSAY OF LACTIC ACID: CPT | Performed by: SURGERY

## 2024-01-01 PROCEDURE — 87637 SARSCOV2&INF A&B&RSV AMP PRB: CPT | Performed by: EMERGENCY MEDICINE

## 2024-01-01 PROCEDURE — 86038 ANTINUCLEAR ANTIBODIES: CPT

## 2024-01-01 PROCEDURE — G1010 CDSM STANSON: HCPCS

## 2024-01-01 PROCEDURE — C1769 GUIDE WIRE: HCPCS | Performed by: INTERNAL MEDICINE

## 2024-01-01 PROCEDURE — 99310 SBSQ NF CARE HIGH MDM 45: CPT | Performed by: NURSE PRACTITIONER

## 2024-01-01 PROCEDURE — 87641 MR-STAPH DNA AMP PROBE: CPT | Performed by: SURGERY

## 2024-01-01 PROCEDURE — 3E043XZ INTRODUCTION OF VASOPRESSOR INTO CENTRAL VEIN, PERCUTANEOUS APPROACH: ICD-10-PCS | Performed by: EMERGENCY MEDICINE

## 2024-01-01 PROCEDURE — 71046 X-RAY EXAM CHEST 2 VIEWS: CPT

## 2024-01-01 PROCEDURE — 87149 DNA/RNA DIRECT PROBE: CPT | Performed by: EMERGENCY MEDICINE

## 2024-01-01 PROCEDURE — 258N000003 HC RX IP 258 OP 636: Performed by: HOSPITALIST

## 2024-01-01 PROCEDURE — 85025 COMPLETE CBC W/AUTO DIFF WBC: CPT | Performed by: EMERGENCY MEDICINE

## 2024-01-01 PROCEDURE — G2211 COMPLEX E/M VISIT ADD ON: HCPCS | Performed by: INTERNAL MEDICINE

## 2024-01-01 PROCEDURE — 97140 MANUAL THERAPY 1/> REGIONS: CPT | Mod: GO

## 2024-01-01 PROCEDURE — 96376 TX/PRO/DX INJ SAME DRUG ADON: CPT

## 2024-01-01 PROCEDURE — 85520 HEPARIN ASSAY: CPT | Performed by: INTERNAL MEDICINE

## 2024-01-01 PROCEDURE — 85347 COAGULATION TIME ACTIVATED: CPT

## 2024-01-01 PROCEDURE — 99239 HOSP IP/OBS DSCHRG MGMT >30: CPT | Performed by: HOSPITALIST

## 2024-01-01 PROCEDURE — 83735 ASSAY OF MAGNESIUM: CPT | Performed by: EMERGENCY MEDICINE

## 2024-01-01 PROCEDURE — 96365 THER/PROPH/DIAG IV INF INIT: CPT

## 2024-01-01 PROCEDURE — 36415 COLL VENOUS BLD VENIPUNCTURE: CPT | Mod: ORL | Performed by: NURSE PRACTITIONER

## 2024-01-01 PROCEDURE — 93451 RIGHT HEART CATH: CPT | Performed by: INTERNAL MEDICINE

## 2024-01-01 PROCEDURE — 250N000011 HC RX IP 250 OP 636: Mod: JZ | Performed by: INTERNAL MEDICINE

## 2024-01-01 PROCEDURE — 99223 1ST HOSP IP/OBS HIGH 75: CPT | Mod: AI | Performed by: HOSPITALIST

## 2024-01-01 PROCEDURE — C1894 INTRO/SHEATH, NON-LASER: HCPCS | Performed by: INTERNAL MEDICINE

## 2024-01-01 PROCEDURE — 86803 HEPATITIS C AB TEST: CPT

## 2024-01-01 PROCEDURE — 85018 HEMOGLOBIN: CPT | Performed by: NURSE PRACTITIONER

## 2024-01-01 PROCEDURE — 83880 ASSAY OF NATRIURETIC PEPTIDE: CPT | Performed by: NURSE PRACTITIONER

## 2024-01-01 PROCEDURE — 84450 TRANSFERASE (AST) (SGOT): CPT

## 2024-01-01 PROCEDURE — 99291 CRITICAL CARE FIRST HOUR: CPT | Mod: 25 | Performed by: INTERNAL MEDICINE

## 2024-01-01 PROCEDURE — A9585 GADOBUTROL INJECTION: HCPCS | Performed by: INTERNAL MEDICINE

## 2024-01-01 PROCEDURE — 92978 ENDOLUMINL IVUS OCT C 1ST: CPT | Mod: LM | Performed by: INTERNAL MEDICINE

## 2024-01-01 PROCEDURE — 83735 ASSAY OF MAGNESIUM: CPT | Performed by: NURSE PRACTITIONER

## 2024-01-01 PROCEDURE — 99215 OFFICE O/P EST HI 40 MIN: CPT | Performed by: NURSE PRACTITIONER

## 2024-01-01 PROCEDURE — 85025 COMPLETE CBC W/AUTO DIFF WBC: CPT | Performed by: NURSE PRACTITIONER

## 2024-01-01 PROCEDURE — 84439 ASSAY OF FREE THYROXINE: CPT | Performed by: HOSPITALIST

## 2024-01-01 PROCEDURE — 99205 OFFICE O/P NEW HI 60 MIN: CPT | Performed by: INTERNAL MEDICINE

## 2024-01-01 PROCEDURE — 99316 NF DSCHRG MGMT 30 MIN+: CPT | Performed by: NURSE PRACTITIONER

## 2024-01-01 PROCEDURE — A9540 TC99M MAA: HCPCS | Performed by: INTERNAL MEDICINE

## 2024-01-01 PROCEDURE — 84295 ASSAY OF SERUM SODIUM: CPT | Performed by: INTERNAL MEDICINE

## 2024-01-01 PROCEDURE — 84100 ASSAY OF PHOSPHORUS: CPT | Performed by: INTERNAL MEDICINE

## 2024-01-01 PROCEDURE — P9047 ALBUMIN (HUMAN), 25%, 50ML: HCPCS | Mod: JZ | Performed by: INTERNAL MEDICINE

## 2024-01-01 PROCEDURE — 93925 LOWER EXTREMITY STUDY: CPT

## 2024-01-01 PROCEDURE — 83550 IRON BINDING TEST: CPT

## 2024-01-01 PROCEDURE — 84295 ASSAY OF SERUM SODIUM: CPT

## 2024-01-01 PROCEDURE — 93456 R HRT CORONARY ARTERY ANGIO: CPT | Mod: 26 | Performed by: INTERNAL MEDICINE

## 2024-01-01 PROCEDURE — 82435 ASSAY OF BLOOD CHLORIDE: CPT | Performed by: ANESTHESIOLOGY

## 2024-01-01 PROCEDURE — 343N000001 HC RX 343: Performed by: INTERNAL MEDICINE

## 2024-01-01 PROCEDURE — 97535 SELF CARE MNGMENT TRAINING: CPT | Mod: GO | Performed by: OCCUPATIONAL THERAPIST

## 2024-01-01 PROCEDURE — 99223 1ST HOSP IP/OBS HIGH 75: CPT | Mod: 25 | Performed by: INTERNAL MEDICINE

## 2024-01-01 PROCEDURE — 99305 1ST NF CARE MODERATE MDM 35: CPT | Performed by: INTERNAL MEDICINE

## 2024-01-01 PROCEDURE — 86431 RHEUMATOID FACTOR QUANT: CPT

## 2024-01-01 PROCEDURE — 82805 BLOOD GASES W/O2 SATURATION: CPT | Performed by: INTERNAL MEDICINE

## 2024-01-01 PROCEDURE — 99232 SBSQ HOSP IP/OBS MODERATE 35: CPT | Performed by: CLINICAL NURSE SPECIALIST

## 2024-01-01 PROCEDURE — 87340 HEPATITIS B SURFACE AG IA: CPT

## 2024-01-01 PROCEDURE — 81003 URINALYSIS AUTO W/O SCOPE: CPT | Performed by: INTERNAL MEDICINE

## 2024-01-01 PROCEDURE — 85390 FIBRINOLYSINS SCREEN I&R: CPT | Mod: 26 | Performed by: PATHOLOGY

## 2024-01-01 PROCEDURE — 83735 ASSAY OF MAGNESIUM: CPT | Performed by: SURGERY

## 2024-01-01 PROCEDURE — 93306 TTE W/DOPPLER COMPLETE: CPT | Mod: 52

## 2024-01-01 PROCEDURE — G0463 HOSPITAL OUTPT CLINIC VISIT: HCPCS | Mod: 25

## 2024-01-01 PROCEDURE — 80053 COMPREHEN METABOLIC PANEL: CPT | Performed by: ANESTHESIOLOGY

## 2024-01-01 PROCEDURE — 255N000002 HC RX 255 OP 636: Performed by: INTERNAL MEDICINE

## 2024-01-01 PROCEDURE — 82310 ASSAY OF CALCIUM: CPT | Performed by: INTERNAL MEDICINE

## 2024-01-01 DEVICE — CLOSURE ANGIOSEAL 6FR 610130: Type: IMPLANTABLE DEVICE | Status: FUNCTIONAL

## 2024-01-01 RX ORDER — GADOBUTROL 604.72 MG/ML
13 INJECTION INTRAVENOUS ONCE
Status: COMPLETED | OUTPATIENT
Start: 2024-01-01 | End: 2024-01-01

## 2024-01-01 RX ORDER — LIDOCAINE 40 MG/G
CREAM TOPICAL
Status: CANCELLED | OUTPATIENT
Start: 2024-01-01

## 2024-01-01 RX ORDER — IPRATROPIUM BROMIDE AND ALBUTEROL SULFATE 2.5; .5 MG/3ML; MG/3ML
SOLUTION RESPIRATORY (INHALATION)
Status: COMPLETED
Start: 2024-01-01 | End: 2024-01-01

## 2024-01-01 RX ORDER — DEXTROSE MONOHYDRATE 25 G/50ML
25-50 INJECTION, SOLUTION INTRAVENOUS
Status: DISCONTINUED | OUTPATIENT
Start: 2024-01-01 | End: 2024-01-01

## 2024-01-01 RX ORDER — ASPIRIN 81 MG
TABLET,CHEWABLE ORAL
Qty: 1 TABLET | OUTPATIENT
Start: 2024-01-01

## 2024-01-01 RX ORDER — CEFTRIAXONE 2 G/1
2 INJECTION, POWDER, FOR SOLUTION INTRAMUSCULAR; INTRAVENOUS EVERY 24 HOURS
Status: DISCONTINUED | OUTPATIENT
Start: 2024-01-01 | End: 2024-01-01

## 2024-01-01 RX ORDER — CEFTRIAXONE 1 G/1
1 INJECTION, POWDER, FOR SOLUTION INTRAMUSCULAR; INTRAVENOUS EVERY 24 HOURS
Status: DISCONTINUED | OUTPATIENT
Start: 2024-01-01 | End: 2024-01-01 | Stop reason: ALTCHOICE

## 2024-01-01 RX ORDER — NALOXONE HYDROCHLORIDE 0.4 MG/ML
0.2 INJECTION, SOLUTION INTRAMUSCULAR; INTRAVENOUS; SUBCUTANEOUS
Status: DISCONTINUED | OUTPATIENT
Start: 2024-01-01 | End: 2024-10-31 | Stop reason: HOSPADM

## 2024-01-01 RX ORDER — IOPAMIDOL 755 MG/ML
INJECTION, SOLUTION INTRAVASCULAR
Status: DISCONTINUED | OUTPATIENT
Start: 2024-01-01 | End: 2024-01-01 | Stop reason: HOSPADM

## 2024-01-01 RX ORDER — CHLOROTHIAZIDE SODIUM 500 MG/1
500 INJECTION INTRAVENOUS ONCE
Status: COMPLETED | OUTPATIENT
Start: 2024-01-01 | End: 2024-01-01

## 2024-01-01 RX ORDER — DILTIAZEM HYDROCHLORIDE 5 MG/ML
25 INJECTION INTRAVENOUS ONCE
Status: COMPLETED | OUTPATIENT
Start: 2024-01-01 | End: 2024-01-01

## 2024-01-01 RX ORDER — BUMETANIDE 0.25 MG/ML
2 INJECTION INTRAMUSCULAR; INTRAVENOUS ONCE
Status: DISCONTINUED | OUTPATIENT
Start: 2024-01-01 | End: 2024-01-01

## 2024-01-01 RX ORDER — ASPIRIN 325 MG
325 TABLET ORAL ONCE
Status: COMPLETED | OUTPATIENT
Start: 2024-01-01 | End: 2024-01-01

## 2024-01-01 RX ORDER — NALOXONE HYDROCHLORIDE 0.4 MG/ML
0.4 INJECTION, SOLUTION INTRAMUSCULAR; INTRAVENOUS; SUBCUTANEOUS
Status: DISCONTINUED | OUTPATIENT
Start: 2024-01-01 | End: 2024-10-31 | Stop reason: HOSPADM

## 2024-01-01 RX ORDER — MICONAZOLE NITRATE 20 MG/G
CREAM TOPICAL 2 TIMES DAILY
Status: DISCONTINUED | OUTPATIENT
Start: 2024-01-01 | End: 2024-01-01

## 2024-01-01 RX ORDER — METHYLPREDNISOLONE SODIUM SUCCINATE 125 MG/2ML
125 INJECTION INTRAMUSCULAR; INTRAVENOUS ONCE
Status: COMPLETED | OUTPATIENT
Start: 2024-01-01 | End: 2024-01-01

## 2024-01-01 RX ORDER — PROCHLORPERAZINE 25 MG
25 SUPPOSITORY, RECTAL RECTAL EVERY 12 HOURS PRN
Status: DISCONTINUED | OUTPATIENT
Start: 2024-01-01 | End: 2024-10-31 | Stop reason: HOSPADM

## 2024-01-01 RX ORDER — BISACODYL 10 MG
10 SUPPOSITORY, RECTAL RECTAL
Status: DISCONTINUED | OUTPATIENT
Start: 2024-11-02 | End: 2024-10-31 | Stop reason: HOSPADM

## 2024-01-01 RX ORDER — AMOXICILLIN 250 MG
2 CAPSULE ORAL 2 TIMES DAILY PRN
DISCHARGE
Start: 2024-01-01 | End: 2024-01-01

## 2024-01-01 RX ORDER — DOXYCYCLINE 100 MG/10ML
100 INJECTION, POWDER, LYOPHILIZED, FOR SOLUTION INTRAVENOUS ONCE
Status: COMPLETED | OUTPATIENT
Start: 2024-01-01 | End: 2024-01-01

## 2024-01-01 RX ORDER — IPRATROPIUM BROMIDE AND ALBUTEROL SULFATE 2.5; .5 MG/3ML; MG/3ML
3 SOLUTION RESPIRATORY (INHALATION) 2 TIMES DAILY
Status: DISCONTINUED | OUTPATIENT
Start: 2024-01-01 | End: 2024-10-31 | Stop reason: HOSPADM

## 2024-01-01 RX ORDER — ALBUTEROL SULFATE 0.83 MG/ML
2.5 SOLUTION RESPIRATORY (INHALATION)
OUTPATIENT
Start: 2024-01-01

## 2024-01-01 RX ORDER — MINERAL OIL/HYDROPHIL PETROLAT
OINTMENT (GRAM) TOPICAL
Status: DISCONTINUED | OUTPATIENT
Start: 2024-01-01 | End: 2024-10-31 | Stop reason: HOSPADM

## 2024-01-01 RX ORDER — BUMETANIDE 0.25 MG/ML
2 INJECTION INTRAMUSCULAR; INTRAVENOUS ONCE
Status: COMPLETED | OUTPATIENT
Start: 2024-01-01 | End: 2024-01-01

## 2024-01-01 RX ORDER — ASPIRIN 81 MG/1
81 TABLET, CHEWABLE ORAL 2 TIMES DAILY
Status: DISCONTINUED | OUTPATIENT
Start: 2024-01-01 | End: 2024-01-01 | Stop reason: HOSPADM

## 2024-01-01 RX ORDER — BISACODYL 10 MG
10 SUPPOSITORY, RECTAL RECTAL DAILY PRN
Status: DISCONTINUED | OUTPATIENT
Start: 2024-01-01 | End: 2024-01-01 | Stop reason: HOSPADM

## 2024-01-01 RX ORDER — BUMETANIDE 2 MG/1
4 TABLET ORAL DAILY
Qty: 180 TABLET | Refills: 3 | Status: SHIPPED | OUTPATIENT
Start: 2024-01-01 | End: 2024-01-01

## 2024-01-01 RX ORDER — DEXTROSE MONOHYDRATE 100 MG/ML
INJECTION, SOLUTION INTRAVENOUS CONTINUOUS PRN
Status: DISCONTINUED | OUTPATIENT
Start: 2024-01-01 | End: 2024-10-31 | Stop reason: HOSPADM

## 2024-01-01 RX ORDER — NALOXONE HYDROCHLORIDE 0.4 MG/ML
0.2 INJECTION, SOLUTION INTRAMUSCULAR; INTRAVENOUS; SUBCUTANEOUS
Status: DISCONTINUED | OUTPATIENT
Start: 2024-01-01 | End: 2024-01-01 | Stop reason: HOSPADM

## 2024-01-01 RX ORDER — ENOXAPARIN SODIUM 100 MG/ML
40 INJECTION SUBCUTANEOUS EVERY 12 HOURS
Status: DISCONTINUED | OUTPATIENT
Start: 2024-01-01 | End: 2024-01-01 | Stop reason: HOSPADM

## 2024-01-01 RX ORDER — IPRATROPIUM BROMIDE AND ALBUTEROL SULFATE 2.5; .5 MG/3ML; MG/3ML
3 SOLUTION RESPIRATORY (INHALATION) 2 TIMES DAILY
Qty: 180 ML | Refills: 0 | Status: SHIPPED | OUTPATIENT
Start: 2024-01-01

## 2024-01-01 RX ORDER — IPRATROPIUM BROMIDE AND ALBUTEROL SULFATE 2.5; .5 MG/3ML; MG/3ML
3 SOLUTION RESPIRATORY (INHALATION) 2 TIMES DAILY
DISCHARGE
Start: 2024-01-01 | End: 2024-01-01

## 2024-01-01 RX ORDER — FUROSEMIDE 20 MG
20 TABLET ORAL
Status: DISCONTINUED | OUTPATIENT
Start: 2024-01-01 | End: 2024-01-01

## 2024-01-01 RX ORDER — LIDOCAINE 40 MG/G
CREAM TOPICAL
Status: DISCONTINUED | OUTPATIENT
Start: 2024-01-01 | End: 2024-01-01

## 2024-01-01 RX ORDER — ACETAMINOPHEN 325 MG/1
650 TABLET ORAL EVERY 4 HOURS PRN
Status: DISCONTINUED | OUTPATIENT
Start: 2024-01-01 | End: 2024-10-31 | Stop reason: HOSPADM

## 2024-01-01 RX ORDER — IOPAMIDOL 755 MG/ML
83 INJECTION, SOLUTION INTRAVASCULAR ONCE
Status: COMPLETED | OUTPATIENT
Start: 2024-01-01 | End: 2024-01-01

## 2024-01-01 RX ORDER — BUMETANIDE 2 MG/1
2 TABLET ORAL DAILY
Qty: 30 TABLET | Refills: 0 | Status: SHIPPED | OUTPATIENT
Start: 2024-01-01 | End: 2024-01-01

## 2024-01-01 RX ORDER — NICOTINE POLACRILEX 4 MG
15-30 LOZENGE BUCCAL
Status: DISCONTINUED | OUTPATIENT
Start: 2024-01-01 | End: 2024-10-31 | Stop reason: HOSPADM

## 2024-01-01 RX ORDER — ROPIVACAINE IN 0.9% SOD CHL/PF 0.1 %
.01-.125 PLASTIC BAG, INJECTION (ML) EPIDURAL CONTINUOUS
Status: DISCONTINUED | OUTPATIENT
Start: 2024-01-01 | End: 2024-10-31 | Stop reason: HOSPADM

## 2024-01-01 RX ORDER — SILDENAFIL CITRATE 20 MG/1
20 TABLET ORAL 3 TIMES DAILY
COMMUNITY
Start: 2024-01-01

## 2024-01-01 RX ORDER — CEFTRIAXONE 2 G/1
2 INJECTION, POWDER, FOR SOLUTION INTRAMUSCULAR; INTRAVENOUS ONCE
Status: COMPLETED | OUTPATIENT
Start: 2024-01-01 | End: 2024-01-01

## 2024-01-01 RX ORDER — SILDENAFIL CITRATE 20 MG/1
20 TABLET ORAL 3 TIMES DAILY
Qty: 90 TABLET | Refills: 11 | Status: SHIPPED | OUTPATIENT
Start: 2024-01-01

## 2024-01-01 RX ORDER — AMOXICILLIN 250 MG
2 CAPSULE ORAL 2 TIMES DAILY PRN
Status: DISCONTINUED | OUTPATIENT
Start: 2024-01-01 | End: 2024-01-01 | Stop reason: HOSPADM

## 2024-01-01 RX ORDER — LORAZEPAM 1 MG/1
1 TABLET ORAL
Status: DISCONTINUED | OUTPATIENT
Start: 2024-01-01 | End: 2024-10-31 | Stop reason: HOSPADM

## 2024-01-01 RX ORDER — GUAIFENESIN 200 MG/10ML
200 LIQUID ORAL EVERY 4 HOURS PRN
Status: DISCONTINUED | OUTPATIENT
Start: 2024-01-01 | End: 2024-01-01 | Stop reason: HOSPADM

## 2024-01-01 RX ORDER — AMIODARONE HYDROCHLORIDE 200 MG/1
400 TABLET ORAL DAILY
Status: DISCONTINUED | OUTPATIENT
Start: 2024-01-01 | End: 2024-10-31 | Stop reason: HOSPADM

## 2024-01-01 RX ORDER — DEXTROSE MONOHYDRATE 25 G/50ML
25-50 INJECTION, SOLUTION INTRAVENOUS
Status: DISCONTINUED | OUTPATIENT
Start: 2024-01-01 | End: 2024-10-31 | Stop reason: HOSPADM

## 2024-01-01 RX ORDER — OSTOMY SUPPLY
BOX MISCELLANEOUS PRN
COMMUNITY
End: 2024-01-01

## 2024-01-01 RX ORDER — CYANOCOBALAMIN 1000 UG/ML
1 INJECTION, SOLUTION INTRAMUSCULAR; SUBCUTANEOUS
Qty: 1 ML | Refills: 0 | Status: SHIPPED | OUTPATIENT
Start: 2024-01-01

## 2024-01-01 RX ORDER — POLYETHYLENE GLYCOL 3350 17 G/17G
17 POWDER, FOR SOLUTION ORAL DAILY
Qty: 510 G | Refills: 0 | Status: SHIPPED | OUTPATIENT
Start: 2024-01-01

## 2024-01-01 RX ORDER — LORAZEPAM 0.5 MG/1
0.5 TABLET ORAL
Status: DISCONTINUED | OUTPATIENT
Start: 2024-01-01 | End: 2024-01-01 | Stop reason: HOSPADM

## 2024-01-01 RX ORDER — GLYCOPYRROLATE 1 MG/1
2 TABLET ORAL EVERY 4 HOURS PRN
Status: DISCONTINUED | OUTPATIENT
Start: 2024-01-01 | End: 2024-10-31 | Stop reason: HOSPADM

## 2024-01-01 RX ORDER — ONDANSETRON 2 MG/ML
4 INJECTION INTRAMUSCULAR; INTRAVENOUS EVERY 6 HOURS PRN
Status: DISCONTINUED | OUTPATIENT
Start: 2024-01-01 | End: 2024-10-31 | Stop reason: HOSPADM

## 2024-01-01 RX ORDER — CALCIUM CARBONATE 500(1250)
1 TABLET ORAL AT BEDTIME
COMMUNITY
End: 2024-01-01

## 2024-01-01 RX ORDER — PREDNISONE 20 MG/1
60 TABLET ORAL DAILY
Status: DISCONTINUED | OUTPATIENT
Start: 2024-01-01 | End: 2024-01-01

## 2024-01-01 RX ORDER — TIOTROPIUM BROMIDE INHALATION SPRAY 3.12 UG/1
SPRAY, METERED RESPIRATORY (INHALATION)
Qty: 12 G | Refills: 0 | Status: SHIPPED | OUTPATIENT
Start: 2024-01-01

## 2024-01-01 RX ORDER — IPRATROPIUM BROMIDE AND ALBUTEROL SULFATE 2.5; .5 MG/3ML; MG/3ML
3 SOLUTION RESPIRATORY (INHALATION)
Status: DISCONTINUED | OUTPATIENT
Start: 2024-01-01 | End: 2024-01-01

## 2024-01-01 RX ORDER — METOLAZONE 5 MG/1
10 TABLET ORAL
Status: DISCONTINUED | OUTPATIENT
Start: 2024-01-01 | End: 2024-10-31 | Stop reason: HOSPADM

## 2024-01-01 RX ORDER — TRAMADOL HYDROCHLORIDE 50 MG/1
50 TABLET ORAL
Status: DISCONTINUED | OUTPATIENT
Start: 2024-01-01 | End: 2024-01-01

## 2024-01-01 RX ORDER — FLUTICASONE FUROATE AND VILANTEROL 200; 25 UG/1; UG/1
1 POWDER RESPIRATORY (INHALATION) DAILY
Status: DISCONTINUED | OUTPATIENT
Start: 2024-01-01 | End: 2024-01-01 | Stop reason: HOSPADM

## 2024-01-01 RX ORDER — FUROSEMIDE 10 MG/ML
60 INJECTION INTRAMUSCULAR; INTRAVENOUS ONCE
Status: COMPLETED | OUTPATIENT
Start: 2024-01-01 | End: 2024-01-01

## 2024-01-01 RX ORDER — LIDOCAINE 40 MG/G
CREAM TOPICAL
Status: DISCONTINUED | OUTPATIENT
Start: 2024-01-01 | End: 2024-01-01 | Stop reason: HOSPADM

## 2024-01-01 RX ORDER — DIGOXIN 0.25 MG/ML
125 INJECTION INTRAMUSCULAR; INTRAVENOUS ONCE
Status: COMPLETED | OUTPATIENT
Start: 2024-01-01 | End: 2024-01-01

## 2024-01-01 RX ORDER — LORAZEPAM 2 MG/ML
0.5 INJECTION INTRAMUSCULAR
Status: CANCELLED | OUTPATIENT
Start: 2024-01-01

## 2024-01-01 RX ORDER — ASPIRIN 81 MG/1
81 TABLET, CHEWABLE ORAL DAILY
COMMUNITY
End: 2024-01-01

## 2024-01-01 RX ORDER — DOXYCYCLINE 100 MG/1
100 CAPSULE ORAL EVERY 12 HOURS SCHEDULED
Status: COMPLETED | OUTPATIENT
Start: 2024-01-01 | End: 2024-01-01

## 2024-01-01 RX ORDER — NYSTATIN 100000 [USP'U]/G
POWDER TOPICAL
Qty: 120 G | Refills: 0 | Status: SHIPPED | OUTPATIENT
Start: 2024-01-01

## 2024-01-01 RX ORDER — CYANOCOBALAMIN 1000 UG/ML
1000 INJECTION, SOLUTION INTRAMUSCULAR; SUBCUTANEOUS
Status: DISCONTINUED | OUTPATIENT
Start: 2024-01-01 | End: 2024-10-31 | Stop reason: HOSPADM

## 2024-01-01 RX ORDER — DOBUTAMINE HYDROCHLORIDE 200 MG/100ML
5 INJECTION INTRAVENOUS CONTINUOUS
Status: DISCONTINUED | OUTPATIENT
Start: 2024-01-01 | End: 2024-10-31 | Stop reason: HOSPADM

## 2024-01-01 RX ORDER — POTASSIUM CHLORIDE 1500 MG/1
20 TABLET, EXTENDED RELEASE ORAL
Status: DISCONTINUED | OUTPATIENT
Start: 2024-01-01 | End: 2024-01-01 | Stop reason: HOSPADM

## 2024-01-01 RX ORDER — METHYLPREDNISOLONE SODIUM SUCCINATE 125 MG/2ML
125 INJECTION, POWDER, LYOPHILIZED, FOR SOLUTION INTRAMUSCULAR; INTRAVENOUS
Start: 2024-01-01

## 2024-01-01 RX ORDER — AMOXICILLIN 250 MG
1 CAPSULE ORAL 2 TIMES DAILY PRN
Status: DISCONTINUED | OUTPATIENT
Start: 2024-01-01 | End: 2024-01-01 | Stop reason: HOSPADM

## 2024-01-01 RX ORDER — MIDODRINE HYDROCHLORIDE 10 MG/1
10 TABLET ORAL 3 TIMES DAILY
Status: DISCONTINUED | OUTPATIENT
Start: 2024-01-01 | End: 2024-10-31 | Stop reason: HOSPADM

## 2024-01-01 RX ORDER — LORAZEPAM 2 MG/ML
0.5 INJECTION INTRAMUSCULAR
Status: DISCONTINUED | OUTPATIENT
Start: 2024-01-01 | End: 2024-01-01 | Stop reason: HOSPADM

## 2024-01-01 RX ORDER — FLUTICASONE FUROATE AND VILANTEROL 200; 25 UG/1; UG/1
1 POWDER RESPIRATORY (INHALATION) DAILY
Status: DISCONTINUED | OUTPATIENT
Start: 2024-01-01 | End: 2024-10-31 | Stop reason: HOSPADM

## 2024-01-01 RX ORDER — GUAIFENESIN 200 MG/10ML
200 LIQUID ORAL EVERY 4 HOURS PRN
COMMUNITY
End: 2024-01-01

## 2024-01-01 RX ORDER — POLYETHYLENE GLYCOL 3350 17 G/17G
17 POWDER, FOR SOLUTION ORAL DAILY
Status: DISCONTINUED | OUTPATIENT
Start: 2024-01-01 | End: 2024-01-01 | Stop reason: HOSPADM

## 2024-01-01 RX ORDER — SODIUM CHLORIDE 9 MG/ML
INJECTION, SOLUTION INTRAVENOUS CONTINUOUS
Status: DISCONTINUED | OUTPATIENT
Start: 2024-01-01 | End: 2024-01-01 | Stop reason: HOSPADM

## 2024-01-01 RX ORDER — BUMETANIDE 0.25 MG/ML
4 INJECTION, SOLUTION INTRAMUSCULAR; INTRAVENOUS EVERY 12 HOURS
Status: DISCONTINUED | OUTPATIENT
Start: 2024-01-01 | End: 2024-01-01

## 2024-01-01 RX ORDER — LORAZEPAM 2 MG/ML
1 INJECTION INTRAMUSCULAR
Status: DISCONTINUED | OUTPATIENT
Start: 2024-01-01 | End: 2024-10-31 | Stop reason: HOSPADM

## 2024-01-01 RX ORDER — BUMETANIDE 2 MG/1
2 TABLET ORAL DAILY
Status: SHIPPED
Start: 2024-01-01 | End: 2024-01-01

## 2024-01-01 RX ORDER — CEPHALEXIN 500 MG/1
500 CAPSULE ORAL 4 TIMES DAILY
Status: COMPLETED | OUTPATIENT
Start: 2024-01-01 | End: 2024-01-01

## 2024-01-01 RX ORDER — SENNOSIDES 8.6 MG
1 TABLET ORAL 2 TIMES DAILY PRN
Status: DISCONTINUED | OUTPATIENT
Start: 2024-01-01 | End: 2024-10-31 | Stop reason: HOSPADM

## 2024-01-01 RX ORDER — ALBUTEROL SULFATE 0.83 MG/ML
2.5 SOLUTION RESPIRATORY (INHALATION) EVERY 6 HOURS PRN
Status: DISCONTINUED | OUTPATIENT
Start: 2024-01-01 | End: 2024-10-31 | Stop reason: HOSPADM

## 2024-01-01 RX ORDER — EPINEPHRINE 1 MG/ML
0.3 INJECTION, SOLUTION, CONCENTRATE INTRAVENOUS EVERY 5 MIN PRN
OUTPATIENT
Start: 2024-01-01

## 2024-01-01 RX ORDER — NALOXONE HYDROCHLORIDE 0.4 MG/ML
0.4 INJECTION, SOLUTION INTRAMUSCULAR; INTRAVENOUS; SUBCUTANEOUS
Status: DISCONTINUED | OUTPATIENT
Start: 2024-01-01 | End: 2024-01-01 | Stop reason: HOSPADM

## 2024-01-01 RX ORDER — OXYCODONE HYDROCHLORIDE 5 MG/1
5 TABLET ORAL
Status: DISCONTINUED | OUTPATIENT
Start: 2024-01-01 | End: 2024-10-31 | Stop reason: HOSPADM

## 2024-01-01 RX ORDER — BUMETANIDE 2 MG/1
2 TABLET ORAL DAILY
DISCHARGE
Start: 2024-01-01 | End: 2024-01-01

## 2024-01-01 RX ORDER — LOPERAMIDE HYDROCHLORIDE 2 MG/1
2 TABLET ORAL 4 TIMES DAILY PRN
Qty: 30 TABLET | Refills: 0 | Status: SHIPPED | OUTPATIENT
Start: 2024-01-01

## 2024-01-01 RX ORDER — BUMETANIDE 0.25 MG/ML
2 INJECTION, SOLUTION INTRAMUSCULAR; INTRAVENOUS EVERY 12 HOURS
Status: DISCONTINUED | OUTPATIENT
Start: 2024-01-01 | End: 2024-01-01

## 2024-01-01 RX ORDER — POLYETHYLENE GLYCOL 3350 17 G/17G
17 POWDER, FOR SOLUTION ORAL DAILY
Status: DISCONTINUED | OUTPATIENT
Start: 2024-01-01 | End: 2024-10-31 | Stop reason: HOSPADM

## 2024-01-01 RX ORDER — ASPIRIN 81 MG/1
81 TABLET, CHEWABLE ORAL DAILY
Status: DISCONTINUED | OUTPATIENT
Start: 2024-01-01 | End: 2024-10-31 | Stop reason: HOSPADM

## 2024-01-01 RX ORDER — NICOTINE POLACRILEX 4 MG
15-30 LOZENGE BUCCAL
Status: DISCONTINUED | OUTPATIENT
Start: 2024-01-01 | End: 2024-01-01

## 2024-01-01 RX ORDER — TRAMADOL HYDROCHLORIDE 50 MG/1
50 TABLET ORAL
COMMUNITY

## 2024-01-01 RX ORDER — DOBUTAMINE HYDROCHLORIDE 200 MG/100ML
3.5 INJECTION INTRAVENOUS CONTINUOUS
Status: DISCONTINUED | OUTPATIENT
Start: 2024-01-01 | End: 2024-01-01

## 2024-01-01 RX ORDER — ACETAMINOPHEN 325 MG/1
650 TABLET ORAL EVERY 4 HOURS PRN
Status: DISCONTINUED | OUTPATIENT
Start: 2024-01-01 | End: 2024-01-01 | Stop reason: HOSPADM

## 2024-01-01 RX ORDER — HEPARIN SODIUM (PORCINE) LOCK FLUSH IV SOLN 100 UNIT/ML 100 UNIT/ML
5 SOLUTION INTRAVENOUS
OUTPATIENT
Start: 2024-01-01

## 2024-01-01 RX ORDER — OXYMETAZOLINE HYDROCHLORIDE 0.05 G/100ML
2 SPRAY NASAL 2 TIMES DAILY
Status: DISCONTINUED | OUTPATIENT
Start: 2024-01-01 | End: 2024-10-31 | Stop reason: HOSPADM

## 2024-01-01 RX ORDER — ALBUTEROL SULFATE 90 UG/1
1-2 AEROSOL, METERED RESPIRATORY (INHALATION)
Start: 2024-01-01

## 2024-01-01 RX ORDER — ONDANSETRON 4 MG/1
4 TABLET, ORALLY DISINTEGRATING ORAL EVERY 6 HOURS PRN
Status: DISCONTINUED | OUTPATIENT
Start: 2024-01-01 | End: 2024-01-01 | Stop reason: HOSPADM

## 2024-01-01 RX ORDER — BUMETANIDE 2 MG/1
4 TABLET ORAL 2 TIMES DAILY
Qty: 360 TABLET | Refills: 1 | Status: SHIPPED | OUTPATIENT
Start: 2024-01-01

## 2024-01-01 RX ORDER — ONDANSETRON 2 MG/ML
4 INJECTION INTRAMUSCULAR; INTRAVENOUS EVERY 6 HOURS PRN
Status: DISCONTINUED | OUTPATIENT
Start: 2024-01-01 | End: 2024-01-01 | Stop reason: HOSPADM

## 2024-01-01 RX ORDER — CEFTRIAXONE 2 G/1
2 INJECTION, POWDER, FOR SOLUTION INTRAMUSCULAR; INTRAVENOUS EVERY 24 HOURS
Status: COMPLETED | OUTPATIENT
Start: 2024-01-01 | End: 2024-01-01

## 2024-01-01 RX ORDER — FUROSEMIDE 20 MG
20 TABLET ORAL 2 TIMES DAILY
Status: ON HOLD | COMMUNITY
End: 2024-01-01

## 2024-01-01 RX ORDER — NYSTATIN 100000 [USP'U]/G
POWDER TOPICAL
Qty: 120 G | Refills: 1 | Status: SHIPPED | OUTPATIENT
Start: 2024-01-01 | End: 2024-01-01

## 2024-01-01 RX ORDER — PREDNISONE 20 MG/1
20 TABLET ORAL DAILY
Status: COMPLETED | OUTPATIENT
Start: 2024-01-01 | End: 2024-01-01

## 2024-01-01 RX ORDER — SILDENAFIL CITRATE 20 MG/1
20 TABLET ORAL 3 TIMES DAILY
Status: DISCONTINUED | OUTPATIENT
Start: 2024-01-01 | End: 2024-10-31 | Stop reason: HOSPADM

## 2024-01-01 RX ORDER — HEPARIN SODIUM 10000 [USP'U]/100ML
0-5000 INJECTION, SOLUTION INTRAVENOUS CONTINUOUS
Status: DISCONTINUED | OUTPATIENT
Start: 2024-01-01 | End: 2024-10-31 | Stop reason: HOSPADM

## 2024-01-01 RX ORDER — ASPIRIN 81 MG/1
81 TABLET, CHEWABLE ORAL 2 TIMES DAILY
Status: DISCONTINUED | OUTPATIENT
Start: 2024-01-01 | End: 2024-01-01

## 2024-01-01 RX ORDER — SPIRONOLACTONE 25 MG/1
25 TABLET ORAL DAILY
Qty: 90 TABLET | Refills: 11 | Status: SHIPPED | OUTPATIENT
Start: 2024-01-01 | End: 2024-01-01

## 2024-01-01 RX ORDER — FUROSEMIDE 10 MG/ML
20 INJECTION INTRAMUSCULAR; INTRAVENOUS EVERY 12 HOURS
Status: DISCONTINUED | OUTPATIENT
Start: 2024-01-01 | End: 2024-01-01

## 2024-01-01 RX ORDER — POLYETHYLENE GLYCOL 3350 17 G/17G
17 POWDER, FOR SOLUTION ORAL 2 TIMES DAILY PRN
Status: DISCONTINUED | OUTPATIENT
Start: 2024-01-01 | End: 2024-01-01 | Stop reason: HOSPADM

## 2024-01-01 RX ORDER — FLUTICASONE FUROATE AND VILANTEROL 200; 25 UG/1; UG/1
1 POWDER RESPIRATORY (INHALATION) DAILY
Status: DISCONTINUED | OUTPATIENT
Start: 2024-01-01 | End: 2024-01-01

## 2024-01-01 RX ORDER — CALCIUM CARBONATE 500 MG/1
1000 TABLET, CHEWABLE ORAL 4 TIMES DAILY PRN
Status: DISCONTINUED | OUTPATIENT
Start: 2024-01-01 | End: 2024-01-01 | Stop reason: HOSPADM

## 2024-01-01 RX ORDER — BUMETANIDE 1 MG/1
2 TABLET ORAL DAILY
Status: DISCONTINUED | OUTPATIENT
Start: 2024-01-01 | End: 2024-01-01 | Stop reason: HOSPADM

## 2024-01-01 RX ORDER — AMOXICILLIN 250 MG
2 CAPSULE ORAL 2 TIMES DAILY PRN
Status: DISCONTINUED | OUTPATIENT
Start: 2024-01-01 | End: 2024-10-31 | Stop reason: HOSPADM

## 2024-01-01 RX ORDER — POLYETHYLENE GLYCOL 3350 17 G/17G
POWDER, FOR SOLUTION ORAL
Qty: 510 G | OUTPATIENT
Start: 2024-01-01

## 2024-01-01 RX ORDER — BUMETANIDE 2 MG/1
2 TABLET ORAL DAILY
Status: DISCONTINUED | OUTPATIENT
Start: 2024-01-01 | End: 2024-01-01

## 2024-01-01 RX ORDER — LORAZEPAM 2 MG/ML
0.5 INJECTION INTRAMUSCULAR
Status: DISCONTINUED | OUTPATIENT
Start: 2024-01-01 | End: 2024-01-01

## 2024-01-01 RX ORDER — LOPERAMIDE HYDROCHLORIDE 2 MG/1
2 TABLET ORAL 4 TIMES DAILY PRN
COMMUNITY
End: 2024-01-01

## 2024-01-01 RX ORDER — HEPARIN SODIUM 1000 [USP'U]/ML
INJECTION, SOLUTION INTRAVENOUS; SUBCUTANEOUS
Status: DISCONTINUED | OUTPATIENT
Start: 2024-01-01 | End: 2024-01-01 | Stop reason: HOSPADM

## 2024-01-01 RX ORDER — DIPHENHYDRAMINE HCL 25 MG
25 CAPSULE ORAL EVERY 6 HOURS PRN
Status: SHIPPED
Start: 2024-01-01 | End: 2024-01-01

## 2024-01-01 RX ORDER — DOXYCYCLINE 100 MG/1
100 CAPSULE ORAL EVERY 12 HOURS SCHEDULED
Status: DISCONTINUED | OUTPATIENT
Start: 2024-01-01 | End: 2024-01-01

## 2024-01-01 RX ORDER — CEFTRIAXONE 2 G/1
2 INJECTION, POWDER, FOR SOLUTION INTRAMUSCULAR; INTRAVENOUS EVERY 24 HOURS
Status: DISCONTINUED | OUTPATIENT
Start: 2024-01-01 | End: 2024-10-31 | Stop reason: HOSPADM

## 2024-01-01 RX ORDER — HYDROMORPHONE HYDROCHLORIDE 2 MG/1
2 TABLET ORAL
Status: DISCONTINUED | OUTPATIENT
Start: 2024-01-01 | End: 2024-10-31 | Stop reason: HOSPADM

## 2024-01-01 RX ORDER — LORAZEPAM 0.5 MG/1
0.5 TABLET ORAL
Status: CANCELLED | OUTPATIENT
Start: 2024-01-01

## 2024-01-01 RX ORDER — METHYLPREDNISOLONE SODIUM SUCCINATE 40 MG/ML
40 INJECTION, POWDER, LYOPHILIZED, FOR SOLUTION INTRAMUSCULAR; INTRAVENOUS EVERY 8 HOURS
Status: COMPLETED | OUTPATIENT
Start: 2024-01-01 | End: 2024-01-01

## 2024-01-01 RX ORDER — BISACODYL 5 MG
5 TABLET, DELAYED RELEASE (ENTERIC COATED) ORAL DAILY PRN
Status: DISCONTINUED | OUTPATIENT
Start: 2024-01-01 | End: 2024-01-01 | Stop reason: HOSPADM

## 2024-01-01 RX ORDER — ALBUMIN (HUMAN) 12.5 G/50ML
25 SOLUTION INTRAVENOUS EVERY 6 HOURS
Status: DISCONTINUED | OUTPATIENT
Start: 2024-01-01 | End: 2024-01-01

## 2024-01-01 RX ORDER — LORAZEPAM 0.5 MG/1
0.5 TABLET ORAL
Status: DISCONTINUED | OUTPATIENT
Start: 2024-01-01 | End: 2024-01-01

## 2024-01-01 RX ORDER — FUROSEMIDE 10 MG/ML
40 INJECTION INTRAMUSCULAR; INTRAVENOUS EVERY 8 HOURS
Status: DISCONTINUED | OUTPATIENT
Start: 2024-01-01 | End: 2024-01-01

## 2024-01-01 RX ORDER — ALBUTEROL SULFATE 0.83 MG/ML
2.5 SOLUTION RESPIRATORY (INHALATION) EVERY 6 HOURS PRN
Status: DISCONTINUED | OUTPATIENT
Start: 2024-01-01 | End: 2024-01-01 | Stop reason: HOSPADM

## 2024-01-01 RX ORDER — POLYETHYLENE GLYCOL 3350 17 G/17G
17 POWDER, FOR SOLUTION ORAL DAILY
DISCHARGE
Start: 2024-01-01 | End: 2024-01-01

## 2024-01-01 RX ORDER — HYDROMORPHONE HCL IN WATER/PF 6 MG/30 ML
0.2 PATIENT CONTROLLED ANALGESIA SYRINGE INTRAVENOUS EVERY 4 HOURS PRN
Status: DISCONTINUED | OUTPATIENT
Start: 2024-01-01 | End: 2024-01-01 | Stop reason: HOSPADM

## 2024-01-01 RX ORDER — OXYCODONE HYDROCHLORIDE 5 MG/1
5 TABLET ORAL EVERY 6 HOURS PRN
Status: DISCONTINUED | OUTPATIENT
Start: 2024-01-01 | End: 2024-01-01 | Stop reason: HOSPADM

## 2024-01-01 RX ORDER — MEPERIDINE HYDROCHLORIDE 25 MG/ML
25 INJECTION INTRAMUSCULAR; INTRAVENOUS; SUBCUTANEOUS EVERY 30 MIN PRN
OUTPATIENT
Start: 2024-01-01

## 2024-01-01 RX ORDER — SODIUM CHLORIDE 9 MG/ML
INJECTION, SOLUTION INTRAVENOUS CONTINUOUS
Status: CANCELLED | OUTPATIENT
Start: 2024-01-01

## 2024-01-01 RX ORDER — BISACODYL 5 MG/1
5 TABLET, DELAYED RELEASE ORAL DAILY PRN
Status: ON HOLD | COMMUNITY
End: 2024-01-01

## 2024-01-01 RX ORDER — MULTIPLE VITAMINS W/ MINERALS TAB 9MG-400MCG
1 TAB ORAL DAILY
Status: DISCONTINUED | OUTPATIENT
Start: 2024-01-01 | End: 2024-01-01 | Stop reason: HOSPADM

## 2024-01-01 RX ORDER — HYDROMORPHONE HCL IN WATER/PF 6 MG/30 ML
0.2 PATIENT CONTROLLED ANALGESIA SYRINGE INTRAVENOUS
Status: DISCONTINUED | OUTPATIENT
Start: 2024-01-01 | End: 2024-10-31 | Stop reason: HOSPADM

## 2024-01-01 RX ORDER — ONDANSETRON 4 MG/1
4 TABLET, ORALLY DISINTEGRATING ORAL EVERY 6 HOURS PRN
Status: DISCONTINUED | OUTPATIENT
Start: 2024-01-01 | End: 2024-10-31 | Stop reason: HOSPADM

## 2024-01-01 RX ORDER — ACETAMINOPHEN 650 MG/1
650 SUPPOSITORY RECTAL EVERY 4 HOURS PRN
Status: DISCONTINUED | OUTPATIENT
Start: 2024-01-01 | End: 2024-01-01 | Stop reason: HOSPADM

## 2024-01-01 RX ORDER — POTASSIUM CHLORIDE 1500 MG/1
20 TABLET, EXTENDED RELEASE ORAL DAILY
Qty: 90 TABLET | Refills: 1 | Status: SHIPPED | OUTPATIENT
Start: 2024-01-01

## 2024-01-01 RX ORDER — LOPERAMIDE HYDROCHLORIDE 2 MG/1
2 CAPSULE ORAL 4 TIMES DAILY PRN
Status: DISCONTINUED | OUTPATIENT
Start: 2024-01-01 | End: 2024-10-31 | Stop reason: HOSPADM

## 2024-01-01 RX ORDER — ASPIRIN 325 MG
325 TABLET ORAL ONCE
Qty: 1 TABLET | Refills: 0 | Status: COMPLETED | OUTPATIENT
Start: 2024-01-01 | End: 2024-01-01

## 2024-01-01 RX ORDER — ASPIRIN 325 MG
325 TABLET ORAL ONCE
Status: CANCELLED | OUTPATIENT
Start: 2024-01-01 | End: 2024-01-01

## 2024-01-01 RX ORDER — CARBOXYMETHYLCELLULOSE SODIUM 5 MG/ML
1-2 SOLUTION/ DROPS OPHTHALMIC
Status: DISCONTINUED | OUTPATIENT
Start: 2024-01-01 | End: 2024-10-31 | Stop reason: HOSPADM

## 2024-01-01 RX ORDER — ALBUTEROL SULFATE 0.83 MG/ML
2.5 SOLUTION RESPIRATORY (INHALATION) EVERY 6 HOURS PRN
Qty: 90 ML | Refills: 0 | Status: SHIPPED | OUTPATIENT
Start: 2024-01-01

## 2024-01-01 RX ORDER — BUMETANIDE 0.5 MG/1
0.5 TABLET ORAL DAILY
Status: DISCONTINUED | OUTPATIENT
Start: 2024-01-01 | End: 2024-01-01

## 2024-01-01 RX ORDER — POTASSIUM CHLORIDE 1500 MG/1
40 TABLET, EXTENDED RELEASE ORAL ONCE
Status: COMPLETED | OUTPATIENT
Start: 2024-01-01 | End: 2024-01-01

## 2024-01-01 RX ORDER — METOLAZONE 2.5 MG/1
TABLET ORAL
Qty: 3 TABLET | Refills: 0 | Status: CANCELLED | OUTPATIENT
Start: 2024-01-01

## 2024-01-01 RX ORDER — PROCHLORPERAZINE MALEATE 5 MG/1
10 TABLET ORAL EVERY 6 HOURS PRN
Status: DISCONTINUED | OUTPATIENT
Start: 2024-01-01 | End: 2024-10-31 | Stop reason: HOSPADM

## 2024-01-01 RX ORDER — FUROSEMIDE 10 MG/ML
20 INJECTION INTRAMUSCULAR; INTRAVENOUS
Status: DISCONTINUED | OUTPATIENT
Start: 2024-01-01 | End: 2024-01-01

## 2024-01-01 RX ORDER — SPIRONOLACTONE 25 MG/1
25 TABLET ORAL DAILY
Qty: 90 TABLET | Refills: 11 | Status: SHIPPED | OUTPATIENT
Start: 2024-01-01

## 2024-01-01 RX ORDER — METHYLPREDNISOLONE SODIUM SUCCINATE 40 MG/ML
40 INJECTION INTRAMUSCULAR; INTRAVENOUS EVERY 12 HOURS
Status: DISCONTINUED | OUTPATIENT
Start: 2024-01-01 | End: 2024-01-01

## 2024-01-01 RX ORDER — POTASSIUM CHLORIDE 1500 MG/1
40 TABLET, EXTENDED RELEASE ORAL 3 TIMES DAILY
Status: DISCONTINUED | OUTPATIENT
Start: 2024-01-01 | End: 2024-01-01

## 2024-01-01 RX ORDER — ENOXAPARIN SODIUM 150 MG/ML
1 INJECTION SUBCUTANEOUS ONCE
Status: COMPLETED | OUTPATIENT
Start: 2024-01-01 | End: 2024-01-01

## 2024-01-01 RX ORDER — ATROPINE SULFATE 10 MG/ML
2 SOLUTION/ DROPS OPHTHALMIC EVERY 4 HOURS PRN
Status: DISCONTINUED | OUTPATIENT
Start: 2024-01-01 | End: 2024-10-31 | Stop reason: HOSPADM

## 2024-01-01 RX ORDER — NALOXONE HYDROCHLORIDE 0.4 MG/ML
0.1 INJECTION, SOLUTION INTRAMUSCULAR; INTRAVENOUS; SUBCUTANEOUS
Status: DISCONTINUED | OUTPATIENT
Start: 2024-01-01 | End: 2024-10-31 | Stop reason: HOSPADM

## 2024-01-01 RX ORDER — FLUTICASONE FUROATE AND VILANTEROL 200; 25 UG/1; UG/1
1 POWDER RESPIRATORY (INHALATION) DAILY
Qty: 30 EACH | Refills: 0 | Status: SHIPPED | OUTPATIENT
Start: 2024-01-01

## 2024-01-01 RX ORDER — MULTIVITAMIN,THERAPEUTIC
1 TABLET ORAL AT BEDTIME
COMMUNITY
End: 2024-01-01

## 2024-01-01 RX ORDER — ENOXAPARIN SODIUM 100 MG/ML
40 INJECTION SUBCUTANEOUS EVERY 24 HOURS
Status: DISCONTINUED | OUTPATIENT
Start: 2024-01-01 | End: 2024-01-01

## 2024-01-01 RX ORDER — IPRATROPIUM BROMIDE AND ALBUTEROL SULFATE 2.5; .5 MG/3ML; MG/3ML
6 SOLUTION RESPIRATORY (INHALATION) ONCE
Status: COMPLETED | OUTPATIENT
Start: 2024-01-01 | End: 2024-01-01

## 2024-01-01 RX ORDER — AMOXICILLIN 250 MG
2 CAPSULE ORAL 2 TIMES DAILY PRN
Qty: 60 TABLET | Refills: 0 | Status: SHIPPED | OUTPATIENT
Start: 2024-01-01

## 2024-01-01 RX ORDER — IPRATROPIUM BROMIDE AND ALBUTEROL SULFATE 2.5; .5 MG/3ML; MG/3ML
3 SOLUTION RESPIRATORY (INHALATION) 2 TIMES DAILY
Status: DISCONTINUED | OUTPATIENT
Start: 2024-01-01 | End: 2024-01-01 | Stop reason: HOSPADM

## 2024-01-01 RX ORDER — DIPHENHYDRAMINE HYDROCHLORIDE 50 MG/ML
50 INJECTION INTRAMUSCULAR; INTRAVENOUS
Start: 2024-01-01

## 2024-01-01 RX ORDER — FUROSEMIDE 10 MG/ML
40 INJECTION INTRAMUSCULAR; INTRAVENOUS
Status: DISCONTINUED | OUTPATIENT
Start: 2024-01-01 | End: 2024-01-01

## 2024-01-01 RX ORDER — ASPIRIN 81 MG/1
243 TABLET, CHEWABLE ORAL ONCE
Status: COMPLETED | OUTPATIENT
Start: 2024-01-01 | End: 2024-01-01

## 2024-01-01 RX ORDER — ASPIRIN 81 MG/1
243 TABLET, CHEWABLE ORAL ONCE
Status: CANCELLED | OUTPATIENT
Start: 2024-01-01

## 2024-01-01 RX ORDER — HYDROMORPHONE HYDROCHLORIDE 1 MG/ML
1 SOLUTION ORAL
Status: DISCONTINUED | OUTPATIENT
Start: 2024-01-01 | End: 2024-10-31 | Stop reason: HOSPADM

## 2024-01-01 RX ORDER — HYDROMORPHONE HYDROCHLORIDE 1 MG/ML
2 SOLUTION ORAL
Status: DISCONTINUED | OUTPATIENT
Start: 2024-01-01 | End: 2024-10-31 | Stop reason: HOSPADM

## 2024-01-01 RX ORDER — SPIRONOLACTONE 25 MG/1
25 TABLET ORAL DAILY
Status: DISCONTINUED | OUTPATIENT
Start: 2024-01-01 | End: 2024-01-01

## 2024-01-01 RX ORDER — NYSTATIN 100000 [USP'U]/G
POWDER TOPICAL 2 TIMES DAILY PRN
COMMUNITY
End: 2024-01-01

## 2024-01-01 RX ORDER — DOXYCYCLINE 100 MG/10ML
100 INJECTION, POWDER, LYOPHILIZED, FOR SOLUTION INTRAVENOUS EVERY 12 HOURS
Status: DISCONTINUED | OUTPATIENT
Start: 2024-01-01 | End: 2024-01-01

## 2024-01-01 RX ORDER — THIAMINE HYDROCHLORIDE 100 MG/ML
200 INJECTION, SOLUTION INTRAMUSCULAR; INTRAVENOUS 2 TIMES DAILY
Status: DISCONTINUED | OUTPATIENT
Start: 2024-01-01 | End: 2024-10-31 | Stop reason: HOSPADM

## 2024-01-01 RX ORDER — IPRATROPIUM BROMIDE AND ALBUTEROL SULFATE 2.5; .5 MG/3ML; MG/3ML
3 SOLUTION RESPIRATORY (INHALATION) ONCE
Status: COMPLETED | OUTPATIENT
Start: 2024-01-01 | End: 2024-01-01

## 2024-01-01 RX ORDER — CYANOCOBALAMIN 1000 UG/ML
1 INJECTION, SOLUTION INTRAMUSCULAR; SUBCUTANEOUS
COMMUNITY
Start: 2024-01-01 | End: 2024-01-01

## 2024-01-01 RX ORDER — CALCIUM CARBONATE 500 MG/1
1 TABLET, CHEWABLE ORAL 4 TIMES DAILY PRN
COMMUNITY
End: 2024-01-01

## 2024-01-01 RX ORDER — BISACODYL 10 MG
10 SUPPOSITORY, RECTAL RECTAL DAILY PRN
COMMUNITY
End: 2024-01-01

## 2024-01-01 RX ORDER — ACETAMINOPHEN 325 MG/1
650 TABLET ORAL EVERY 4 HOURS PRN
Qty: 60 TABLET | Refills: 0 | Status: SHIPPED | OUTPATIENT
Start: 2024-01-01

## 2024-01-01 RX ORDER — DILTIAZEM HCL/D5W 125 MG/125
5-15 PLASTIC BAG, INJECTION (ML) INTRAVENOUS CONTINUOUS
Status: DISCONTINUED | OUTPATIENT
Start: 2024-01-01 | End: 2024-01-01

## 2024-01-01 RX ORDER — FUROSEMIDE 10 MG/ML
20 INJECTION INTRAMUSCULAR; INTRAVENOUS ONCE
Status: COMPLETED | OUTPATIENT
Start: 2024-01-01 | End: 2024-01-01

## 2024-01-01 RX ORDER — METHYLPREDNISOLONE SODIUM SUCCINATE 125 MG/2ML
125 INJECTION, POWDER, LYOPHILIZED, FOR SOLUTION INTRAMUSCULAR; INTRAVENOUS ONCE
Status: COMPLETED | OUTPATIENT
Start: 2024-01-01 | End: 2024-01-01

## 2024-01-01 RX ORDER — VITAMINS A AND D OINTMENT 15.5; 53.4 G/100G; G/100G
OINTMENT TOPICAL 2 TIMES DAILY PRN
COMMUNITY
End: 2024-01-01

## 2024-01-01 RX ORDER — PREDNISONE 20 MG/1
40 TABLET ORAL DAILY
Status: DISCONTINUED | OUTPATIENT
Start: 2024-01-01 | End: 2024-01-01

## 2024-01-01 RX ORDER — ASPIRIN 81 MG/1
81 TABLET, CHEWABLE ORAL DAILY
Qty: 30 TABLET | Refills: 0 | Status: SHIPPED | OUTPATIENT
Start: 2024-01-01

## 2024-01-01 RX ORDER — POTASSIUM CHLORIDE 1500 MG/1
20 TABLET, EXTENDED RELEASE ORAL
Status: CANCELLED | OUTPATIENT
Start: 2024-01-01

## 2024-01-01 RX ORDER — POTASSIUM CHLORIDE 1500 MG/1
20 TABLET, EXTENDED RELEASE ORAL
Status: DISCONTINUED | OUTPATIENT
Start: 2024-01-01 | End: 2024-01-01

## 2024-01-01 RX ORDER — HEPARIN SODIUM,PORCINE 10 UNIT/ML
5-20 VIAL (ML) INTRAVENOUS DAILY PRN
OUTPATIENT
Start: 2024-01-01

## 2024-01-01 RX ORDER — CALCIUM CARBONATE 500 MG/1
1 TABLET, CHEWABLE ORAL 4 TIMES DAILY PRN
Qty: 60 TABLET | Refills: 0 | Status: SHIPPED | OUTPATIENT
Start: 2024-01-01 | End: 2024-01-01

## 2024-01-01 RX ORDER — ASPIRIN 81 MG/1
243 TABLET, CHEWABLE ORAL ONCE
Qty: 3 TABLET | Refills: 0 | Status: COMPLETED | OUTPATIENT
Start: 2024-01-01 | End: 2024-01-01

## 2024-01-01 RX ORDER — CHLOROTHIAZIDE SODIUM 500 MG/1
500 INJECTION INTRAVENOUS ONCE
Qty: 20 ML | Refills: 0 | Status: COMPLETED | OUTPATIENT
Start: 2024-01-01 | End: 2024-01-01

## 2024-01-01 RX ADMIN — DOXYCYCLINE HYCLATE 100 MG: 100 CAPSULE ORAL at 21:01

## 2024-01-01 RX ADMIN — IPRATROPIUM BROMIDE AND ALBUTEROL SULFATE 3 ML: .5; 3 SOLUTION RESPIRATORY (INHALATION) at 19:53

## 2024-01-01 RX ADMIN — ASPIRIN 81 MG CHEWABLE TABLET 81 MG: 81 TABLET CHEWABLE at 09:41

## 2024-01-01 RX ADMIN — BUMETANIDE 0.5 MG/HR: 0.25 INJECTION INTRAMUSCULAR; INTRAVENOUS at 10:14

## 2024-01-01 RX ADMIN — MICONAZOLE NITRATE: 2 POWDER TOPICAL at 21:00

## 2024-01-01 RX ADMIN — IPRATROPIUM BROMIDE AND ALBUTEROL SULFATE 3 ML: .5; 3 SOLUTION RESPIRATORY (INHALATION) at 07:32

## 2024-01-01 RX ADMIN — BUMETANIDE 0.25 MG/HR: 0.25 INJECTION, SOLUTION INTRAMUSCULAR; INTRAVENOUS at 14:24

## 2024-01-01 RX ADMIN — ASPIRIN 81 MG CHEWABLE TABLET 81 MG: 81 TABLET CHEWABLE at 20:37

## 2024-01-01 RX ADMIN — CEPHALEXIN 500 MG: 500 CAPSULE ORAL at 17:13

## 2024-01-01 RX ADMIN — BUMETANIDE 2 MG: 0.25 INJECTION INTRAMUSCULAR; INTRAVENOUS at 10:48

## 2024-01-01 RX ADMIN — POTASSIUM CHLORIDE 20 MEQ: 1500 TABLET, EXTENDED RELEASE ORAL at 14:15

## 2024-01-01 RX ADMIN — ASPIRIN 81 MG CHEWABLE TABLET 81 MG: 81 TABLET CHEWABLE at 10:13

## 2024-01-01 RX ADMIN — IPRATROPIUM BROMIDE AND ALBUTEROL SULFATE 3 ML: .5; 3 SOLUTION RESPIRATORY (INHALATION) at 20:58

## 2024-01-01 RX ADMIN — ENOXAPARIN SODIUM 40 MG: 40 INJECTION SUBCUTANEOUS at 21:18

## 2024-01-01 RX ADMIN — MICONAZOLE NITRATE: 2 POWDER TOPICAL at 09:39

## 2024-01-01 RX ADMIN — Medication 1 TABLET: at 13:28

## 2024-01-01 RX ADMIN — MICONAZOLE NITRATE: 20 POWDER TOPICAL at 20:12

## 2024-01-01 RX ADMIN — UMECLIDINIUM 1 PUFF: 62.5 AEROSOL, POWDER ORAL at 08:31

## 2024-01-01 RX ADMIN — EMPAGLIFLOZIN 10 MG: 10 TABLET, FILM COATED ORAL at 09:27

## 2024-01-01 RX ADMIN — PREDNISONE 60 MG: 20 TABLET ORAL at 07:30

## 2024-01-01 RX ADMIN — Medication 1 TABLET: at 12:10

## 2024-01-01 RX ADMIN — ENOXAPARIN SODIUM 40 MG: 40 INJECTION SUBCUTANEOUS at 09:36

## 2024-01-01 RX ADMIN — SILDENAFIL 20 MG: 20 TABLET, FILM COATED ORAL at 08:59

## 2024-01-01 RX ADMIN — POTASSIUM CHLORIDE 40 MEQ: 1500 TABLET, EXTENDED RELEASE ORAL at 21:09

## 2024-01-01 RX ADMIN — ASPIRIN 81 MG CHEWABLE TABLET 81 MG: 81 TABLET CHEWABLE at 08:58

## 2024-01-01 RX ADMIN — CEPHALEXIN 500 MG: 500 CAPSULE ORAL at 21:08

## 2024-01-01 RX ADMIN — FUROSEMIDE 10 MG/HR: 10 INJECTION, SOLUTION INTRAMUSCULAR; INTRAVENOUS at 19:42

## 2024-01-01 RX ADMIN — IPRATROPIUM BROMIDE AND ALBUTEROL SULFATE 3 ML: .5; 3 SOLUTION RESPIRATORY (INHALATION) at 19:51

## 2024-01-01 RX ADMIN — ENOXAPARIN SODIUM 40 MG: 40 INJECTION SUBCUTANEOUS at 21:01

## 2024-01-01 RX ADMIN — POTASSIUM CHLORIDE 40 MEQ: 1500 TABLET, EXTENDED RELEASE ORAL at 16:05

## 2024-01-01 RX ADMIN — FLUTICASONE FUROATE AND VILANTEROL TRIFENATATE 1 PUFF: 200; 25 POWDER RESPIRATORY (INHALATION) at 09:32

## 2024-01-01 RX ADMIN — Medication 1 TABLET: at 13:51

## 2024-01-01 RX ADMIN — POLYETHYLENE GLYCOL 3350 17 G: 17 POWDER, FOR SOLUTION ORAL at 08:36

## 2024-01-01 RX ADMIN — DILTIAZEM HYDROCHLORIDE 25 MG: 5 INJECTION, SOLUTION INTRAVENOUS at 17:41

## 2024-01-01 RX ADMIN — MIDODRINE HYDROCHLORIDE 10 MG: 10 TABLET ORAL at 16:25

## 2024-01-01 RX ADMIN — HYDROMORPHONE HYDROCHLORIDE 0.2 MG: 0.2 INJECTION, SOLUTION INTRAMUSCULAR; INTRAVENOUS; SUBCUTANEOUS at 00:09

## 2024-01-01 RX ADMIN — FLUTICASONE FUROATE AND VILANTEROL TRIFENATATE 1 PUFF: 200; 25 POWDER RESPIRATORY (INHALATION) at 12:33

## 2024-01-01 RX ADMIN — CEFTRIAXONE 2 G: 2 INJECTION, POWDER, FOR SOLUTION INTRAMUSCULAR; INTRAVENOUS at 19:18

## 2024-01-01 RX ADMIN — POTASSIUM CHLORIDE 40 MEQ: 1500 TABLET, EXTENDED RELEASE ORAL at 10:37

## 2024-01-01 RX ADMIN — POTASSIUM CHLORIDE 40 MEQ: 1500 TABLET, EXTENDED RELEASE ORAL at 15:57

## 2024-01-01 RX ADMIN — MICONAZOLE NITRATE: 2 POWDER TOPICAL at 10:07

## 2024-01-01 RX ADMIN — METHYLPREDNISOLONE SODIUM SUCCINATE 40 MG: 40 INJECTION, POWDER, FOR SOLUTION INTRAMUSCULAR; INTRAVENOUS at 10:25

## 2024-01-01 RX ADMIN — FUROSEMIDE 40 MG: 10 INJECTION, SOLUTION INTRAVENOUS at 15:36

## 2024-01-01 RX ADMIN — IPRATROPIUM BROMIDE AND ALBUTEROL SULFATE 3 ML: .5; 3 SOLUTION RESPIRATORY (INHALATION) at 07:52

## 2024-01-01 RX ADMIN — ENOXAPARIN SODIUM 40 MG: 40 INJECTION SUBCUTANEOUS at 09:26

## 2024-01-01 RX ADMIN — SILDENAFIL 20 MG: 20 TABLET, FILM COATED ORAL at 16:09

## 2024-01-01 RX ADMIN — ASPIRIN 81 MG CHEWABLE TABLET 81 MG: 81 TABLET CHEWABLE at 09:10

## 2024-01-01 RX ADMIN — GADOBUTROL 13 ML: 604.72 INJECTION INTRAVENOUS at 13:46

## 2024-01-01 RX ADMIN — CEPHALEXIN 500 MG: 500 CAPSULE ORAL at 17:04

## 2024-01-01 RX ADMIN — THIAMINE HYDROCHLORIDE 200 MG: 100 INJECTION, SOLUTION INTRAMUSCULAR; INTRAVENOUS at 08:59

## 2024-01-01 RX ADMIN — MICONAZOLE NITRATE: 20 POWDER TOPICAL at 08:58

## 2024-01-01 RX ADMIN — FLUTICASONE FUROATE AND VILANTEROL TRIFENATATE 1 PUFF: 200; 25 POWDER RESPIRATORY (INHALATION) at 09:06

## 2024-01-01 RX ADMIN — ASPIRIN 81 MG CHEWABLE TABLET 81 MG: 81 TABLET CHEWABLE at 21:21

## 2024-01-01 RX ADMIN — UMECLIDINIUM 1 PUFF: 62.5 AEROSOL, POWDER ORAL at 09:12

## 2024-01-01 RX ADMIN — POLYETHYLENE GLYCOL 3350 17 G: 17 POWDER, FOR SOLUTION ORAL at 09:26

## 2024-01-01 RX ADMIN — IPRATROPIUM BROMIDE AND ALBUTEROL SULFATE 3 ML: .5; 3 SOLUTION RESPIRATORY (INHALATION) at 14:57

## 2024-01-01 RX ADMIN — EMPAGLIFLOZIN 10 MG: 10 TABLET, FILM COATED ORAL at 08:59

## 2024-01-01 RX ADMIN — UMECLIDINIUM 1 PUFF: 62.5 AEROSOL, POWDER ORAL at 08:27

## 2024-01-01 RX ADMIN — METHYLPREDNISOLONE SODIUM SUCCINATE 40 MG: 40 INJECTION, POWDER, FOR SOLUTION INTRAMUSCULAR; INTRAVENOUS at 08:23

## 2024-01-01 RX ADMIN — CEFTRIAXONE 2 G: 2 INJECTION, POWDER, FOR SOLUTION INTRAMUSCULAR; INTRAVENOUS at 11:57

## 2024-01-01 RX ADMIN — IPRATROPIUM BROMIDE AND ALBUTEROL SULFATE 3 ML: .5; 3 SOLUTION RESPIRATORY (INHALATION) at 15:13

## 2024-01-01 RX ADMIN — BUMETANIDE 0.5 MG/HR: 0.25 INJECTION, SOLUTION INTRAMUSCULAR; INTRAVENOUS at 19:14

## 2024-01-01 RX ADMIN — BUMETANIDE 0.5 MG/HR: 0.25 INJECTION INTRAMUSCULAR; INTRAVENOUS at 13:34

## 2024-01-01 RX ADMIN — HEPARIN SODIUM 2100 UNITS/HR: 10000 INJECTION, SOLUTION INTRAVENOUS at 05:42

## 2024-01-01 RX ADMIN — POTASSIUM CHLORIDE 40 MEQ: 1500 TABLET, EXTENDED RELEASE ORAL at 17:25

## 2024-01-01 RX ADMIN — POLYETHYLENE GLYCOL 3350 17 G: 17 POWDER, FOR SOLUTION ORAL at 14:03

## 2024-01-01 RX ADMIN — ENOXAPARIN SODIUM 40 MG: 40 INJECTION SUBCUTANEOUS at 21:00

## 2024-01-01 RX ADMIN — PREDNISONE 20 MG: 20 TABLET ORAL at 08:28

## 2024-01-01 RX ADMIN — HEPARIN SODIUM 1200 UNITS/HR: 10000 INJECTION, SOLUTION INTRAVENOUS at 00:00

## 2024-01-01 RX ADMIN — IPRATROPIUM BROMIDE AND ALBUTEROL SULFATE 3 ML: .5; 3 SOLUTION RESPIRATORY (INHALATION) at 15:40

## 2024-01-01 RX ADMIN — CEPHALEXIN 500 MG: 500 CAPSULE ORAL at 13:02

## 2024-01-01 RX ADMIN — IPRATROPIUM BROMIDE AND ALBUTEROL SULFATE 3 ML: .5; 3 SOLUTION RESPIRATORY (INHALATION) at 07:51

## 2024-01-01 RX ADMIN — BUMETANIDE 2 MG: 1 TABLET ORAL at 08:59

## 2024-01-01 RX ADMIN — HYDROMORPHONE HYDROCHLORIDE 0.2 MG: 0.2 INJECTION, SOLUTION INTRAMUSCULAR; INTRAVENOUS; SUBCUTANEOUS at 22:44

## 2024-01-01 RX ADMIN — ASPIRIN 81 MG CHEWABLE TABLET 81 MG: 81 TABLET CHEWABLE at 21:47

## 2024-01-01 RX ADMIN — OXYMETAZOLINE HYDROCHLORIDE 2 SPRAY: 0.5 SPRAY NASAL at 21:04

## 2024-01-01 RX ADMIN — BUMETANIDE 2 MG: 1 TABLET ORAL at 09:41

## 2024-01-01 RX ADMIN — POTASSIUM CHLORIDE 40 MEQ: 1500 TABLET, EXTENDED RELEASE ORAL at 08:28

## 2024-01-01 RX ADMIN — ENOXAPARIN SODIUM 40 MG: 40 INJECTION SUBCUTANEOUS at 08:59

## 2024-01-01 RX ADMIN — POTASSIUM CHLORIDE 40 MEQ: 1500 TABLET, EXTENDED RELEASE ORAL at 08:53

## 2024-01-01 RX ADMIN — POTASSIUM CHLORIDE 40 MEQ: 1500 TABLET, EXTENDED RELEASE ORAL at 09:05

## 2024-01-01 RX ADMIN — MICONAZOLE NITRATE: 20 POWDER TOPICAL at 21:31

## 2024-01-01 RX ADMIN — MICONAZOLE NITRATE: 20 CREAM TOPICAL at 09:16

## 2024-01-01 RX ADMIN — CEPHALEXIN 500 MG: 500 CAPSULE ORAL at 12:21

## 2024-01-01 RX ADMIN — ENOXAPARIN SODIUM 40 MG: 40 INJECTION SUBCUTANEOUS at 20:10

## 2024-01-01 RX ADMIN — EMPAGLIFLOZIN 10 MG: 10 TABLET, FILM COATED ORAL at 10:01

## 2024-01-01 RX ADMIN — IPRATROPIUM BROMIDE AND ALBUTEROL SULFATE 3 ML: .5; 3 SOLUTION RESPIRATORY (INHALATION) at 11:04

## 2024-01-01 RX ADMIN — POTASSIUM CHLORIDE 40 MEQ: 1500 TABLET, EXTENDED RELEASE ORAL at 22:08

## 2024-01-01 RX ADMIN — IPRATROPIUM BROMIDE AND ALBUTEROL SULFATE 3 ML: .5; 3 SOLUTION RESPIRATORY (INHALATION) at 09:04

## 2024-01-01 RX ADMIN — FLUTICASONE FUROATE AND VILANTEROL TRIFENATATE 1 PUFF: 200; 25 POWDER RESPIRATORY (INHALATION) at 10:00

## 2024-01-01 RX ADMIN — THIAMINE HYDROCHLORIDE 200 MG: 100 INJECTION, SOLUTION INTRAMUSCULAR; INTRAVENOUS at 10:28

## 2024-01-01 RX ADMIN — POTASSIUM CHLORIDE 40 MEQ: 1500 TABLET, EXTENDED RELEASE ORAL at 21:17

## 2024-01-01 RX ADMIN — MICONAZOLE NITRATE: 20 CREAM TOPICAL at 09:48

## 2024-01-01 RX ADMIN — POTASSIUM CHLORIDE 40 MEQ: 1500 TABLET, EXTENDED RELEASE ORAL at 13:05

## 2024-01-01 RX ADMIN — ASPIRIN 81 MG CHEWABLE TABLET 81 MG: 81 TABLET CHEWABLE at 20:33

## 2024-01-01 RX ADMIN — ENOXAPARIN SODIUM 40 MG: 40 INJECTION SUBCUTANEOUS at 09:42

## 2024-01-01 RX ADMIN — FLUTICASONE FUROATE AND VILANTEROL TRIFENATATE 1 PUFF: 200; 25 POWDER RESPIRATORY (INHALATION) at 10:04

## 2024-01-01 RX ADMIN — POTASSIUM CHLORIDE 40 MEQ: 1500 TABLET, EXTENDED RELEASE ORAL at 10:14

## 2024-01-01 RX ADMIN — PREDNISONE 60 MG: 20 TABLET ORAL at 09:43

## 2024-01-01 RX ADMIN — IPRATROPIUM BROMIDE AND ALBUTEROL SULFATE 3 ML: .5; 3 SOLUTION RESPIRATORY (INHALATION) at 14:32

## 2024-01-01 RX ADMIN — ASPIRIN 81 MG CHEWABLE TABLET 81 MG: 81 TABLET CHEWABLE at 09:27

## 2024-01-01 RX ADMIN — FLUTICASONE FUROATE AND VILANTEROL TRIFENATATE 1 PUFF: 200; 25 POWDER RESPIRATORY (INHALATION) at 09:13

## 2024-01-01 RX ADMIN — ENOXAPARIN SODIUM 40 MG: 40 INJECTION SUBCUTANEOUS at 09:12

## 2024-01-01 RX ADMIN — ENOXAPARIN SODIUM 40 MG: 40 INJECTION SUBCUTANEOUS at 10:44

## 2024-01-01 RX ADMIN — MICONAZOLE NITRATE: 20 CREAM TOPICAL at 08:59

## 2024-01-01 RX ADMIN — ASPIRIN 81 MG CHEWABLE TABLET 81 MG: 81 TABLET CHEWABLE at 20:41

## 2024-01-01 RX ADMIN — OXYCODONE HYDROCHLORIDE 5 MG: 5 TABLET ORAL at 21:34

## 2024-01-01 RX ADMIN — IPRATROPIUM BROMIDE AND ALBUTEROL SULFATE 3 ML: .5; 3 SOLUTION RESPIRATORY (INHALATION) at 20:32

## 2024-01-01 RX ADMIN — METHYLPREDNISOLONE SODIUM SUCCINATE 40 MG: 40 INJECTION, POWDER, FOR SOLUTION INTRAMUSCULAR; INTRAVENOUS at 21:15

## 2024-01-01 RX ADMIN — IPRATROPIUM BROMIDE AND ALBUTEROL SULFATE 3 ML: .5; 3 SOLUTION RESPIRATORY (INHALATION) at 14:54

## 2024-01-01 RX ADMIN — FLUTICASONE FUROATE AND VILANTEROL TRIFENATATE 1 PUFF: 200; 25 POWDER RESPIRATORY (INHALATION) at 09:40

## 2024-01-01 RX ADMIN — FUROSEMIDE 10 MG/HR: 10 INJECTION, SOLUTION INTRAMUSCULAR; INTRAVENOUS at 06:12

## 2024-01-01 RX ADMIN — POLYETHYLENE GLYCOL 3350 17 G: 17 POWDER, FOR SOLUTION ORAL at 12:29

## 2024-01-01 RX ADMIN — POLYETHYLENE GLYCOL 3350 17 G: 17 POWDER, FOR SOLUTION ORAL at 12:06

## 2024-01-01 RX ADMIN — Medication 1 TABLET: at 13:03

## 2024-01-01 RX ADMIN — SILDENAFIL 20 MG: 20 TABLET, FILM COATED ORAL at 21:22

## 2024-01-01 RX ADMIN — MICONAZOLE NITRATE: 2 POWDER TOPICAL at 21:48

## 2024-01-01 RX ADMIN — KIT FOR THE PREPARATION OF TECHNETIUM TC 99M PENTETATE 55.7 MILLICURIE: 20 INJECTION, POWDER, LYOPHILIZED, FOR SOLUTION INTRAVENOUS; RESPIRATORY (INHALATION) at 13:56

## 2024-01-01 RX ADMIN — POTASSIUM CHLORIDE 40 MEQ: 1500 TABLET, EXTENDED RELEASE ORAL at 10:00

## 2024-01-01 RX ADMIN — ACETAMINOPHEN 650 MG: 325 TABLET, FILM COATED ORAL at 20:14

## 2024-01-01 RX ADMIN — FLUTICASONE FUROATE AND VILANTEROL TRIFENATATE 1 PUFF: 200; 25 POWDER RESPIRATORY (INHALATION) at 08:55

## 2024-01-01 RX ADMIN — SENNOSIDES AND DOCUSATE SODIUM 2 TABLET: 8.6; 5 TABLET ORAL at 10:24

## 2024-01-01 RX ADMIN — IPRATROPIUM BROMIDE AND ALBUTEROL SULFATE 3 ML: .5; 3 SOLUTION RESPIRATORY (INHALATION) at 07:36

## 2024-01-01 RX ADMIN — MIDODRINE HYDROCHLORIDE 10 MG: 10 TABLET ORAL at 08:33

## 2024-01-01 RX ADMIN — INSULIN ASPART 1 UNITS: 100 INJECTION, SOLUTION INTRAVENOUS; SUBCUTANEOUS at 09:08

## 2024-01-01 RX ADMIN — POLYETHYLENE GLYCOL 3350 17 G: 17 POWDER, FOR SOLUTION ORAL at 09:35

## 2024-01-01 RX ADMIN — CEPHALEXIN 500 MG: 500 CAPSULE ORAL at 18:40

## 2024-01-01 RX ADMIN — FLUTICASONE FUROATE AND VILANTEROL TRIFENATATE 1 PUFF: 200; 25 POWDER RESPIRATORY (INHALATION) at 08:14

## 2024-01-01 RX ADMIN — ASPIRIN 81 MG CHEWABLE TABLET 81 MG: 81 TABLET CHEWABLE at 20:35

## 2024-01-01 RX ADMIN — IPRATROPIUM BROMIDE AND ALBUTEROL SULFATE 3 ML: .5; 3 SOLUTION RESPIRATORY (INHALATION) at 20:06

## 2024-01-01 RX ADMIN — EMPAGLIFLOZIN 10 MG: 10 TABLET, FILM COATED ORAL at 09:41

## 2024-01-01 RX ADMIN — IPRATROPIUM BROMIDE AND ALBUTEROL SULFATE 3 ML: .5; 3 SOLUTION RESPIRATORY (INHALATION) at 17:00

## 2024-01-01 RX ADMIN — CEPHALEXIN 500 MG: 500 CAPSULE ORAL at 21:15

## 2024-01-01 RX ADMIN — ALBUMIN HUMAN 25 G: 0.25 SOLUTION INTRAVENOUS at 13:24

## 2024-01-01 RX ADMIN — ASPIRIN 81 MG CHEWABLE TABLET 81 MG: 81 TABLET CHEWABLE at 21:00

## 2024-01-01 RX ADMIN — POTASSIUM CHLORIDE 40 MEQ: 1500 TABLET, EXTENDED RELEASE ORAL at 16:29

## 2024-01-01 RX ADMIN — POTASSIUM CHLORIDE 40 MEQ: 1500 TABLET, EXTENDED RELEASE ORAL at 10:01

## 2024-01-01 RX ADMIN — ENOXAPARIN SODIUM 40 MG: 40 INJECTION SUBCUTANEOUS at 09:06

## 2024-01-01 RX ADMIN — FLUTICASONE FUROATE AND VILANTEROL TRIFENATATE 1 PUFF: 200; 25 POWDER RESPIRATORY (INHALATION) at 11:14

## 2024-01-01 RX ADMIN — ACETAMINOPHEN 650 MG: 325 TABLET, FILM COATED ORAL at 18:04

## 2024-01-01 RX ADMIN — CEPHALEXIN 500 MG: 500 CAPSULE ORAL at 08:55

## 2024-01-01 RX ADMIN — Medication 1 TABLET: at 12:20

## 2024-01-01 RX ADMIN — FUROSEMIDE 10 MG/HR: 10 INJECTION, SOLUTION INTRAMUSCULAR; INTRAVENOUS at 10:16

## 2024-01-01 RX ADMIN — Medication 1 TABLET: at 13:59

## 2024-01-01 RX ADMIN — MICONAZOLE NITRATE: 20 POWDER TOPICAL at 10:31

## 2024-01-01 RX ADMIN — ASPIRIN 81 MG CHEWABLE TABLET 81 MG: 81 TABLET CHEWABLE at 08:53

## 2024-01-01 RX ADMIN — FUROSEMIDE 20 MG: 20 TABLET ORAL at 19:49

## 2024-01-01 RX ADMIN — ASPIRIN 81 MG CHEWABLE TABLET 81 MG: 81 TABLET CHEWABLE at 10:44

## 2024-01-01 RX ADMIN — ASPIRIN 81 MG CHEWABLE TABLET 81 MG: 81 TABLET CHEWABLE at 22:08

## 2024-01-01 RX ADMIN — PREDNISONE 60 MG: 20 TABLET ORAL at 09:10

## 2024-01-01 RX ADMIN — FLUTICASONE FUROATE AND VILANTEROL TRIFENATATE 1 PUFF: 200; 25 POWDER RESPIRATORY (INHALATION) at 09:43

## 2024-01-01 RX ADMIN — IPRATROPIUM BROMIDE AND ALBUTEROL SULFATE 3 ML: .5; 3 SOLUTION RESPIRATORY (INHALATION) at 19:47

## 2024-01-01 RX ADMIN — POLYETHYLENE GLYCOL 3350 17 G: 17 POWDER, FOR SOLUTION ORAL at 08:59

## 2024-01-01 RX ADMIN — INSULIN ASPART 1 UNITS: 100 INJECTION, SOLUTION INTRAVENOUS; SUBCUTANEOUS at 16:10

## 2024-01-01 RX ADMIN — MICONAZOLE NITRATE: 20 CREAM TOPICAL at 20:55

## 2024-01-01 RX ADMIN — CEPHALEXIN 500 MG: 500 CAPSULE ORAL at 22:08

## 2024-01-01 RX ADMIN — ASPIRIN 81 MG CHEWABLE TABLET 81 MG: 81 TABLET CHEWABLE at 20:55

## 2024-01-01 RX ADMIN — IPRATROPIUM BROMIDE AND ALBUTEROL SULFATE 3 ML: .5; 3 SOLUTION RESPIRATORY (INHALATION) at 19:49

## 2024-01-01 RX ADMIN — BUMETANIDE 2 MG: 1 TABLET ORAL at 09:13

## 2024-01-01 RX ADMIN — CEPHALEXIN 500 MG: 500 CAPSULE ORAL at 09:24

## 2024-01-01 RX ADMIN — POLYETHYLENE GLYCOL 3350 17 G: 17 POWDER, FOR SOLUTION ORAL at 08:15

## 2024-01-01 RX ADMIN — DOXYCYCLINE 100 MG: 100 INJECTION, POWDER, LYOPHILIZED, FOR SOLUTION INTRAVENOUS at 08:15

## 2024-01-01 RX ADMIN — POLYETHYLENE GLYCOL 3350 17 G: 17 POWDER, FOR SOLUTION ORAL at 08:24

## 2024-01-01 RX ADMIN — ENOXAPARIN SODIUM 40 MG: 40 INJECTION SUBCUTANEOUS at 20:01

## 2024-01-01 RX ADMIN — IPRATROPIUM BROMIDE AND ALBUTEROL SULFATE 3 ML: .5; 3 SOLUTION RESPIRATORY (INHALATION) at 16:43

## 2024-01-01 RX ADMIN — CEPHALEXIN 500 MG: 500 CAPSULE ORAL at 18:10

## 2024-01-01 RX ADMIN — Medication 1 TABLET: at 15:57

## 2024-01-01 RX ADMIN — HYDROMORPHONE HYDROCHLORIDE 0.2 MG: 0.2 INJECTION, SOLUTION INTRAMUSCULAR; INTRAVENOUS; SUBCUTANEOUS at 16:32

## 2024-01-01 RX ADMIN — INSULIN HUMAN 0.5 UNITS/HR: 1 INJECTION, SOLUTION INTRAVENOUS at 11:31

## 2024-01-01 RX ADMIN — HYDROMORPHONE HYDROCHLORIDE 0.2 MG: 0.2 INJECTION, SOLUTION INTRAMUSCULAR; INTRAVENOUS; SUBCUTANEOUS at 16:42

## 2024-01-01 RX ADMIN — Medication 1 TABLET: at 12:07

## 2024-01-01 RX ADMIN — EMPAGLIFLOZIN 10 MG: 10 TABLET, FILM COATED ORAL at 08:12

## 2024-01-01 RX ADMIN — IPRATROPIUM BROMIDE AND ALBUTEROL SULFATE 3 ML: .5; 3 SOLUTION RESPIRATORY (INHALATION) at 20:46

## 2024-01-01 RX ADMIN — ASPIRIN 81 MG CHEWABLE TABLET 81 MG: 81 TABLET CHEWABLE at 10:37

## 2024-01-01 RX ADMIN — IPRATROPIUM BROMIDE AND ALBUTEROL SULFATE 3 ML: .5; 3 SOLUTION RESPIRATORY (INHALATION) at 20:42

## 2024-01-01 RX ADMIN — BUMETANIDE 0.25 MG/HR: 0.25 INJECTION, SOLUTION INTRAMUSCULAR; INTRAVENOUS at 19:28

## 2024-01-01 RX ADMIN — ASPIRIN 81 MG CHEWABLE TABLET 81 MG: 81 TABLET CHEWABLE at 20:14

## 2024-01-01 RX ADMIN — METHYLPREDNISOLONE SODIUM SUCCINATE 40 MG: 40 INJECTION, POWDER, FOR SOLUTION INTRAMUSCULAR; INTRAVENOUS at 16:49

## 2024-01-01 RX ADMIN — MIDODRINE HYDROCHLORIDE 10 MG: 10 TABLET ORAL at 21:22

## 2024-01-01 RX ADMIN — IPRATROPIUM BROMIDE AND ALBUTEROL SULFATE 3 ML: .5; 3 SOLUTION RESPIRATORY (INHALATION) at 07:34

## 2024-01-01 RX ADMIN — ASPIRIN 81 MG CHEWABLE TABLET 81 MG: 81 TABLET CHEWABLE at 08:50

## 2024-01-01 RX ADMIN — BUMETANIDE 2 MG: 1 TABLET ORAL at 09:27

## 2024-01-01 RX ADMIN — HYDROMORPHONE HYDROCHLORIDE 0.2 MG: 0.2 INJECTION, SOLUTION INTRAMUSCULAR; INTRAVENOUS; SUBCUTANEOUS at 14:51

## 2024-01-01 RX ADMIN — POLYETHYLENE GLYCOL 3350 17 G: 17 POWDER, FOR SOLUTION ORAL at 13:18

## 2024-01-01 RX ADMIN — POTASSIUM CHLORIDE 20 MEQ: 1500 TABLET, EXTENDED RELEASE ORAL at 12:28

## 2024-01-01 RX ADMIN — POTASSIUM CHLORIDE 40 MEQ: 1500 TABLET, EXTENDED RELEASE ORAL at 09:27

## 2024-01-01 RX ADMIN — FLUTICASONE FUROATE AND VILANTEROL TRIFENATATE 1 PUFF: 200; 25 POWDER RESPIRATORY (INHALATION) at 08:25

## 2024-01-01 RX ADMIN — Medication 1 TABLET: at 13:34

## 2024-01-01 RX ADMIN — EMPAGLIFLOZIN 10 MG: 10 TABLET, FILM COATED ORAL at 08:53

## 2024-01-01 RX ADMIN — BUMETANIDE 2 MG: 1 TABLET ORAL at 09:06

## 2024-01-01 RX ADMIN — CEFTRIAXONE 2 G: 2 INJECTION, POWDER, FOR SOLUTION INTRAMUSCULAR; INTRAVENOUS at 11:08

## 2024-01-01 RX ADMIN — Medication 1 TABLET: at 12:40

## 2024-01-01 RX ADMIN — IPRATROPIUM BROMIDE AND ALBUTEROL SULFATE 3 ML: .5; 3 SOLUTION RESPIRATORY (INHALATION) at 12:44

## 2024-01-01 RX ADMIN — POLYETHYLENE GLYCOL 3350 17 G: 17 POWDER, FOR SOLUTION ORAL at 13:34

## 2024-01-01 RX ADMIN — IPRATROPIUM BROMIDE AND ALBUTEROL SULFATE 3 ML: .5; 3 SOLUTION RESPIRATORY (INHALATION) at 15:15

## 2024-01-01 RX ADMIN — BUMETANIDE 0.5 MG/HR: 0.25 INJECTION INTRAMUSCULAR; INTRAVENOUS at 16:52

## 2024-01-01 RX ADMIN — ENOXAPARIN SODIUM 40 MG: 40 INJECTION SUBCUTANEOUS at 20:37

## 2024-01-01 RX ADMIN — BUMETANIDE 2 MG: 1 TABLET ORAL at 08:52

## 2024-01-01 RX ADMIN — POTASSIUM CHLORIDE 40 MEQ: 1500 TABLET, EXTENDED RELEASE ORAL at 17:04

## 2024-01-01 RX ADMIN — ENOXAPARIN SODIUM 40 MG: 40 INJECTION SUBCUTANEOUS at 20:09

## 2024-01-01 RX ADMIN — FLUTICASONE FUROATE AND VILANTEROL TRIFENATATE 1 PUFF: 200; 25 POWDER RESPIRATORY (INHALATION) at 09:15

## 2024-01-01 RX ADMIN — SILDENAFIL 20 MG: 20 TABLET, FILM COATED ORAL at 09:14

## 2024-01-01 RX ADMIN — BUMETANIDE 0.25 MG/HR: 0.25 INJECTION, SOLUTION INTRAMUSCULAR; INTRAVENOUS at 13:54

## 2024-01-01 RX ADMIN — UMECLIDINIUM 1 PUFF: 62.5 AEROSOL, POWDER ORAL at 08:55

## 2024-01-01 RX ADMIN — FUROSEMIDE 10 MG/HR: 10 INJECTION, SOLUTION INTRAMUSCULAR; INTRAVENOUS at 21:52

## 2024-01-01 RX ADMIN — IPRATROPIUM BROMIDE AND ALBUTEROL SULFATE 3 ML: .5; 3 SOLUTION RESPIRATORY (INHALATION) at 12:20

## 2024-01-01 RX ADMIN — POTASSIUM CHLORIDE 40 MEQ: 1500 TABLET, EXTENDED RELEASE ORAL at 23:49

## 2024-01-01 RX ADMIN — SODIUM CHLORIDE 100 ML: 9 INJECTION, SOLUTION INTRAVENOUS at 20:30

## 2024-01-01 RX ADMIN — ENOXAPARIN SODIUM 40 MG: 40 INJECTION SUBCUTANEOUS at 20:15

## 2024-01-01 RX ADMIN — METHYLPREDNISOLONE SODIUM SUCCINATE 40 MG: 40 INJECTION, POWDER, FOR SOLUTION INTRAMUSCULAR; INTRAVENOUS at 15:36

## 2024-01-01 RX ADMIN — BUMETANIDE 0.5 MG/HR: 0.25 INJECTION, SOLUTION INTRAMUSCULAR; INTRAVENOUS at 15:39

## 2024-01-01 RX ADMIN — MICONAZOLE NITRATE: 2 POWDER TOPICAL at 13:05

## 2024-01-01 RX ADMIN — BUMETANIDE 2 MG: 0.25 INJECTION INTRAMUSCULAR; INTRAVENOUS at 13:50

## 2024-01-01 RX ADMIN — Medication 5 MG/HR: at 18:22

## 2024-01-01 RX ADMIN — ENOXAPARIN SODIUM 40 MG: 40 INJECTION SUBCUTANEOUS at 20:46

## 2024-01-01 RX ADMIN — BUMETANIDE 0.5 MG/HR: 0.25 INJECTION INTRAMUSCULAR; INTRAVENOUS at 12:49

## 2024-01-01 RX ADMIN — POLYETHYLENE GLYCOL 3350 17 G: 17 POWDER, FOR SOLUTION ORAL at 09:10

## 2024-01-01 RX ADMIN — METHYLPREDNISOLONE SODIUM SUCCINATE 40 MG: 40 INJECTION, POWDER, FOR SOLUTION INTRAMUSCULAR; INTRAVENOUS at 23:35

## 2024-01-01 RX ADMIN — Medication 1 TABLET: at 12:41

## 2024-01-01 RX ADMIN — LORAZEPAM 1 MG: 2 INJECTION INTRAMUSCULAR; INTRAVENOUS at 16:53

## 2024-01-01 RX ADMIN — MICONAZOLE NITRATE: 2 POWDER TOPICAL at 10:42

## 2024-01-01 RX ADMIN — BUMETANIDE 0.5 MG/HR: 0.25 INJECTION, SOLUTION INTRAMUSCULAR; INTRAVENOUS at 16:50

## 2024-01-01 RX ADMIN — EMPAGLIFLOZIN 10 MG: 10 TABLET, FILM COATED ORAL at 10:45

## 2024-01-01 RX ADMIN — FUROSEMIDE 10 MG/HR: 10 INJECTION, SOLUTION INTRAMUSCULAR; INTRAVENOUS at 05:41

## 2024-01-01 RX ADMIN — MICONAZOLE NITRATE: 20 POWDER TOPICAL at 08:49

## 2024-01-01 RX ADMIN — ENOXAPARIN SODIUM 40 MG: 40 INJECTION SUBCUTANEOUS at 20:31

## 2024-01-01 RX ADMIN — SILDENAFIL 20 MG: 20 TABLET, FILM COATED ORAL at 10:24

## 2024-01-01 RX ADMIN — BUMETANIDE 2 MG: 1 TABLET ORAL at 08:53

## 2024-01-01 RX ADMIN — MICONAZOLE NITRATE: 2 POWDER TOPICAL at 20:55

## 2024-01-01 RX ADMIN — IPRATROPIUM BROMIDE AND ALBUTEROL SULFATE 3 ML: .5; 3 SOLUTION RESPIRATORY (INHALATION) at 08:10

## 2024-01-01 RX ADMIN — ENOXAPARIN SODIUM 40 MG: 40 INJECTION SUBCUTANEOUS at 20:33

## 2024-01-01 RX ADMIN — MIDODRINE HYDROCHLORIDE 10 MG: 10 TABLET ORAL at 16:51

## 2024-01-01 RX ADMIN — BUMETANIDE 2 MG: 1 TABLET ORAL at 09:54

## 2024-01-01 RX ADMIN — NOREPINEPHRINE BITARTRATE 0.03 MCG/KG/MIN: 16 SOLUTION INTRAVENOUS at 04:50

## 2024-01-01 RX ADMIN — POTASSIUM CHLORIDE 40 MEQ: 1500 TABLET, EXTENDED RELEASE ORAL at 21:00

## 2024-01-01 RX ADMIN — MICONAZOLE NITRATE: 20 CREAM TOPICAL at 08:30

## 2024-01-01 RX ADMIN — POTASSIUM CHLORIDE 40 MEQ: 1500 TABLET, EXTENDED RELEASE ORAL at 09:00

## 2024-01-01 RX ADMIN — UMECLIDINIUM 1 PUFF: 62.5 AEROSOL, POWDER ORAL at 10:48

## 2024-01-01 RX ADMIN — ENOXAPARIN SODIUM 40 MG: 40 INJECTION SUBCUTANEOUS at 08:56

## 2024-01-01 RX ADMIN — CEFTRIAXONE SODIUM 2 G: 2 INJECTION, POWDER, FOR SOLUTION INTRAMUSCULAR; INTRAVENOUS at 16:40

## 2024-01-01 RX ADMIN — ASPIRIN 81 MG CHEWABLE TABLET 81 MG: 81 TABLET CHEWABLE at 08:28

## 2024-01-01 RX ADMIN — FLUTICASONE FUROATE AND VILANTEROL TRIFENATATE 1 PUFF: 200; 25 POWDER RESPIRATORY (INHALATION) at 08:27

## 2024-01-01 RX ADMIN — BUMETANIDE 2 MG: 0.25 INJECTION INTRAMUSCULAR; INTRAVENOUS at 12:53

## 2024-01-01 RX ADMIN — Medication 1 TABLET: at 13:08

## 2024-01-01 RX ADMIN — MICONAZOLE NITRATE: 20 POWDER TOPICAL at 08:59

## 2024-01-01 RX ADMIN — EMPAGLIFLOZIN 10 MG: 10 TABLET, FILM COATED ORAL at 09:13

## 2024-01-01 RX ADMIN — MICONAZOLE NITRATE: 20 CREAM TOPICAL at 10:42

## 2024-01-01 RX ADMIN — ASPIRIN 81 MG CHEWABLE TABLET 81 MG: 81 TABLET CHEWABLE at 20:09

## 2024-01-01 RX ADMIN — IPRATROPIUM BROMIDE AND ALBUTEROL SULFATE 3 ML: .5; 3 SOLUTION RESPIRATORY (INHALATION) at 07:14

## 2024-01-01 RX ADMIN — POTASSIUM CHLORIDE 40 MEQ: 1500 TABLET, EXTENDED RELEASE ORAL at 20:32

## 2024-01-01 RX ADMIN — AMIODARONE HYDROCHLORIDE 1 MG/MIN: 1.8 INJECTION, SOLUTION INTRAVENOUS at 21:35

## 2024-01-01 RX ADMIN — CEPHALEXIN 500 MG: 500 CAPSULE ORAL at 14:03

## 2024-01-01 RX ADMIN — THIAMINE HYDROCHLORIDE 200 MG: 100 INJECTION, SOLUTION INTRAMUSCULAR; INTRAVENOUS at 20:28

## 2024-01-01 RX ADMIN — IPRATROPIUM BROMIDE AND ALBUTEROL SULFATE 3 ML: .5; 3 SOLUTION RESPIRATORY (INHALATION) at 19:22

## 2024-01-01 RX ADMIN — FUROSEMIDE 10 MG/HR: 10 INJECTION, SOLUTION INTRAMUSCULAR; INTRAVENOUS at 09:44

## 2024-01-01 RX ADMIN — CYANOCOBALAMIN 1000 MCG: 1000 INJECTION, SOLUTION INTRAMUSCULAR; SUBCUTANEOUS at 10:29

## 2024-01-01 RX ADMIN — ASPIRIN 81 MG CHEWABLE TABLET 81 MG: 81 TABLET CHEWABLE at 21:07

## 2024-01-01 RX ADMIN — FUROSEMIDE 10 MG/HR: 10 INJECTION, SOLUTION INTRAMUSCULAR; INTRAVENOUS at 19:22

## 2024-01-01 RX ADMIN — CEPHALEXIN 500 MG: 500 CAPSULE ORAL at 12:40

## 2024-01-01 RX ADMIN — HYDROMORPHONE HYDROCHLORIDE 0.2 MG: 0.2 INJECTION, SOLUTION INTRAMUSCULAR; INTRAVENOUS; SUBCUTANEOUS at 00:12

## 2024-01-01 RX ADMIN — MICONAZOLE NITRATE: 2 POWDER TOPICAL at 21:17

## 2024-01-01 RX ADMIN — IPRATROPIUM BROMIDE AND ALBUTEROL SULFATE 3 ML: .5; 3 SOLUTION RESPIRATORY (INHALATION) at 08:02

## 2024-01-01 RX ADMIN — Medication 1 TABLET: at 14:31

## 2024-01-01 RX ADMIN — BUMETANIDE 2 MG: 1 TABLET ORAL at 08:24

## 2024-01-01 RX ADMIN — POLYETHYLENE GLYCOL 3350 17 G: 17 POWDER, FOR SOLUTION ORAL at 12:40

## 2024-01-01 RX ADMIN — Medication 1 TABLET: at 16:49

## 2024-01-01 RX ADMIN — ENOXAPARIN SODIUM 40 MG: 40 INJECTION SUBCUTANEOUS at 21:14

## 2024-01-01 RX ADMIN — Medication 1 TABLET: at 13:14

## 2024-01-01 RX ADMIN — CEFTRIAXONE 2 G: 2 INJECTION, POWDER, FOR SOLUTION INTRAMUSCULAR; INTRAVENOUS at 15:00

## 2024-01-01 RX ADMIN — MIDODRINE HYDROCHLORIDE 10 MG: 10 TABLET ORAL at 08:59

## 2024-01-01 RX ADMIN — CEPHALEXIN 500 MG: 500 CAPSULE ORAL at 21:13

## 2024-01-01 RX ADMIN — POTASSIUM CHLORIDE 40 MEQ: 1500 TABLET, EXTENDED RELEASE ORAL at 00:18

## 2024-01-01 RX ADMIN — EMPAGLIFLOZIN 10 MG: 10 TABLET, FILM COATED ORAL at 09:39

## 2024-01-01 RX ADMIN — POTASSIUM CHLORIDE 40 MEQ: 1500 TABLET, EXTENDED RELEASE ORAL at 09:39

## 2024-01-01 RX ADMIN — Medication 1 TABLET: at 14:03

## 2024-01-01 RX ADMIN — UMECLIDINIUM 1 PUFF: 62.5 AEROSOL, POWDER ORAL at 08:46

## 2024-01-01 RX ADMIN — ASPIRIN 81 MG CHEWABLE TABLET 81 MG: 81 TABLET CHEWABLE at 13:04

## 2024-01-01 RX ADMIN — ASPIRIN 81 MG CHEWABLE TABLET 81 MG: 81 TABLET CHEWABLE at 21:01

## 2024-01-01 RX ADMIN — CEPHALEXIN 500 MG: 500 CAPSULE ORAL at 14:31

## 2024-01-01 RX ADMIN — BUMETANIDE 2 MG: 1 TABLET ORAL at 10:01

## 2024-01-01 RX ADMIN — IPRATROPIUM BROMIDE AND ALBUTEROL SULFATE 3 ML: .5; 3 SOLUTION RESPIRATORY (INHALATION) at 11:59

## 2024-01-01 RX ADMIN — ASPIRIN 81 MG CHEWABLE TABLET 81 MG: 81 TABLET CHEWABLE at 20:59

## 2024-01-01 RX ADMIN — BUMETANIDE 0.5 MG/HR: 0.25 INJECTION, SOLUTION INTRAMUSCULAR; INTRAVENOUS at 17:10

## 2024-01-01 RX ADMIN — IPRATROPIUM BROMIDE AND ALBUTEROL SULFATE 6 ML: .5; 3 SOLUTION RESPIRATORY (INHALATION) at 14:46

## 2024-01-01 RX ADMIN — BUMETANIDE 2 MG: 1 TABLET ORAL at 10:44

## 2024-01-01 RX ADMIN — ASPIRIN 81 MG CHEWABLE TABLET 81 MG: 81 TABLET CHEWABLE at 14:15

## 2024-01-01 RX ADMIN — EMPAGLIFLOZIN 10 MG: 10 TABLET, FILM COATED ORAL at 08:50

## 2024-01-01 RX ADMIN — IPRATROPIUM BROMIDE AND ALBUTEROL SULFATE 3 ML: .5; 3 SOLUTION RESPIRATORY (INHALATION) at 08:28

## 2024-01-01 RX ADMIN — POTASSIUM CHLORIDE 40 MEQ: 1500 TABLET, EXTENDED RELEASE ORAL at 16:00

## 2024-01-01 RX ADMIN — ASPIRIN 81 MG CHEWABLE TABLET 81 MG: 81 TABLET CHEWABLE at 12:44

## 2024-01-01 RX ADMIN — ASPIRIN 81 MG CHEWABLE TABLET 81 MG: 81 TABLET CHEWABLE at 09:26

## 2024-01-01 RX ADMIN — IPRATROPIUM BROMIDE AND ALBUTEROL SULFATE 3 ML: .5; 3 SOLUTION RESPIRATORY (INHALATION) at 19:14

## 2024-01-01 RX ADMIN — Medication 1 TABLET: at 12:14

## 2024-01-01 RX ADMIN — THIAMINE HYDROCHLORIDE 200 MG: 100 INJECTION, SOLUTION INTRAMUSCULAR; INTRAVENOUS at 21:23

## 2024-01-01 RX ADMIN — FUROSEMIDE 10 MG/HR: 10 INJECTION, SOLUTION INTRAMUSCULAR; INTRAVENOUS at 09:30

## 2024-01-01 RX ADMIN — FLUTICASONE FUROATE AND VILANTEROL TRIFENATATE 1 PUFF: 200; 25 POWDER RESPIRATORY (INHALATION) at 08:16

## 2024-01-01 RX ADMIN — UMECLIDINIUM 1 PUFF: 62.5 AEROSOL, POWDER ORAL at 08:16

## 2024-01-01 RX ADMIN — UMECLIDINIUM 1 PUFF: 62.5 AEROSOL, POWDER ORAL at 08:25

## 2024-01-01 RX ADMIN — ASPIRIN 81 MG CHEWABLE TABLET 81 MG: 81 TABLET CHEWABLE at 20:15

## 2024-01-01 RX ADMIN — AMIODARONE HYDROCHLORIDE 400 MG: 200 TABLET ORAL at 08:58

## 2024-01-01 RX ADMIN — POTASSIUM CHLORIDE 40 MEQ: 1500 TABLET, EXTENDED RELEASE ORAL at 20:55

## 2024-01-01 RX ADMIN — DOXYCYCLINE HYCLATE 100 MG: 100 CAPSULE ORAL at 09:10

## 2024-01-01 RX ADMIN — FLUTICASONE FUROATE AND VILANTEROL TRIFENATATE 1 PUFF: 200; 25 POWDER RESPIRATORY (INHALATION) at 08:53

## 2024-01-01 RX ADMIN — ASPIRIN 81 MG CHEWABLE TABLET 81 MG: 81 TABLET CHEWABLE at 20:36

## 2024-01-01 RX ADMIN — ASPIRIN 81 MG CHEWABLE TABLET 81 MG: 81 TABLET CHEWABLE at 20:54

## 2024-01-01 RX ADMIN — ASPIRIN 81 MG CHEWABLE TABLET 81 MG: 81 TABLET CHEWABLE at 08:52

## 2024-01-01 RX ADMIN — CEFTRIAXONE SODIUM 2 G: 2 INJECTION, POWDER, FOR SOLUTION INTRAMUSCULAR; INTRAVENOUS at 15:31

## 2024-01-01 RX ADMIN — BUMETANIDE 2 MG: 0.25 INJECTION INTRAMUSCULAR; INTRAVENOUS at 11:44

## 2024-01-01 RX ADMIN — KIT FOR THE PREPARATION OF TECHNETIUM TC 99M ALBUMIN AGGREGATED 6.5 MILLICURIE: 2.5 INJECTION, POWDER, FOR SOLUTION INTRAVENOUS at 14:10

## 2024-01-01 RX ADMIN — Medication 1 TABLET: at 12:06

## 2024-01-01 RX ADMIN — FUROSEMIDE 10 MG/HR: 10 INJECTION, SOLUTION INTRAMUSCULAR; INTRAVENOUS at 08:39

## 2024-01-01 RX ADMIN — ASPIRIN 81 MG CHEWABLE TABLET 81 MG: 81 TABLET CHEWABLE at 20:31

## 2024-01-01 RX ADMIN — IPRATROPIUM BROMIDE AND ALBUTEROL SULFATE 3 ML: .5; 3 SOLUTION RESPIRATORY (INHALATION) at 19:26

## 2024-01-01 RX ADMIN — Medication 1 TABLET: at 12:52

## 2024-01-01 RX ADMIN — DOXYCYCLINE HYCLATE 100 MG: 100 CAPSULE ORAL at 20:35

## 2024-01-01 RX ADMIN — MICONAZOLE NITRATE: 20 CREAM TOPICAL at 21:03

## 2024-01-01 RX ADMIN — ENOXAPARIN SODIUM 150 MG: 150 INJECTION SUBCUTANEOUS at 18:34

## 2024-01-01 RX ADMIN — EMPAGLIFLOZIN 10 MG: 10 TABLET, FILM COATED ORAL at 08:45

## 2024-01-01 RX ADMIN — MIDODRINE HYDROCHLORIDE 10 MG: 10 TABLET ORAL at 10:28

## 2024-01-01 RX ADMIN — PREDNISONE 20 MG: 20 TABLET ORAL at 08:40

## 2024-01-01 RX ADMIN — FLUTICASONE FUROATE AND VILANTEROL TRIFENATATE 1 PUFF: 200; 25 POWDER RESPIRATORY (INHALATION) at 08:59

## 2024-01-01 RX ADMIN — MICONAZOLE NITRATE: 2 POWDER TOPICAL at 22:51

## 2024-01-01 RX ADMIN — POTASSIUM CHLORIDE 40 MEQ: 1500 TABLET, EXTENDED RELEASE ORAL at 18:15

## 2024-01-01 RX ADMIN — CEPHALEXIN 500 MG: 500 CAPSULE ORAL at 10:01

## 2024-01-01 RX ADMIN — ASPIRIN 81 MG CHEWABLE TABLET 81 MG: 81 TABLET CHEWABLE at 09:13

## 2024-01-01 RX ADMIN — IPRATROPIUM BROMIDE AND ALBUTEROL SULFATE 3 ML: .5; 3 SOLUTION RESPIRATORY (INHALATION) at 20:15

## 2024-01-01 RX ADMIN — ENOXAPARIN SODIUM 40 MG: 40 INJECTION SUBCUTANEOUS at 10:08

## 2024-01-01 RX ADMIN — ENOXAPARIN SODIUM 40 MG: 40 INJECTION SUBCUTANEOUS at 21:19

## 2024-01-01 RX ADMIN — EMPAGLIFLOZIN 10 MG: 10 TABLET, FILM COATED ORAL at 08:55

## 2024-01-01 RX ADMIN — MICONAZOLE NITRATE: 20 CREAM TOPICAL at 13:05

## 2024-01-01 RX ADMIN — ASPIRIN 81 MG CHEWABLE TABLET 81 MG: 81 TABLET CHEWABLE at 20:10

## 2024-01-01 RX ADMIN — CEPHALEXIN 500 MG: 500 CAPSULE ORAL at 17:24

## 2024-01-01 RX ADMIN — POTASSIUM CHLORIDE 40 MEQ: 1500 TABLET, EXTENDED RELEASE ORAL at 21:27

## 2024-01-01 RX ADMIN — IPRATROPIUM BROMIDE AND ALBUTEROL SULFATE 3 ML: .5; 3 SOLUTION RESPIRATORY (INHALATION) at 13:12

## 2024-01-01 RX ADMIN — ASPIRIN 81 MG CHEWABLE TABLET 81 MG: 81 TABLET CHEWABLE at 10:01

## 2024-01-01 RX ADMIN — IPRATROPIUM BROMIDE AND ALBUTEROL SULFATE 3 ML: .5; 3 SOLUTION RESPIRATORY (INHALATION) at 11:38

## 2024-01-01 RX ADMIN — MICONAZOLE NITRATE: 20 CREAM TOPICAL at 13:00

## 2024-01-01 RX ADMIN — EMPAGLIFLOZIN 10 MG: 10 TABLET, FILM COATED ORAL at 10:13

## 2024-01-01 RX ADMIN — POTASSIUM CHLORIDE 40 MEQ: 1500 TABLET, EXTENDED RELEASE ORAL at 09:59

## 2024-01-01 RX ADMIN — IPRATROPIUM BROMIDE AND ALBUTEROL SULFATE 3 ML: .5; 3 SOLUTION RESPIRATORY (INHALATION) at 20:43

## 2024-01-01 RX ADMIN — FLUTICASONE FUROATE AND VILANTEROL TRIFENATATE 1 PUFF: 200; 25 POWDER RESPIRATORY (INHALATION) at 08:47

## 2024-01-01 RX ADMIN — DOBUTAMINE HYDROCHLORIDE 5 MCG/KG/MIN: 200 INJECTION INTRAVENOUS at 02:34

## 2024-01-01 RX ADMIN — ENOXAPARIN SODIUM 40 MG: 40 INJECTION SUBCUTANEOUS at 12:29

## 2024-01-01 RX ADMIN — MIDODRINE HYDROCHLORIDE 10 MG: 10 TABLET ORAL at 16:09

## 2024-01-01 RX ADMIN — IPRATROPIUM BROMIDE AND ALBUTEROL SULFATE 3 ML: .5; 3 SOLUTION RESPIRATORY (INHALATION) at 07:11

## 2024-01-01 RX ADMIN — ENOXAPARIN SODIUM 40 MG: 40 INJECTION SUBCUTANEOUS at 08:53

## 2024-01-01 RX ADMIN — POLYETHYLENE GLYCOL 3350 17 G: 17 POWDER, FOR SOLUTION ORAL at 12:11

## 2024-01-01 RX ADMIN — IPRATROPIUM BROMIDE AND ALBUTEROL SULFATE 3 ML: .5; 3 SOLUTION RESPIRATORY (INHALATION) at 08:03

## 2024-01-01 RX ADMIN — MICONAZOLE NITRATE: 20 CREAM TOPICAL at 22:52

## 2024-01-01 RX ADMIN — POLYETHYLENE GLYCOL 3350 17 G: 17 POWDER, FOR SOLUTION ORAL at 10:07

## 2024-01-01 RX ADMIN — EMPAGLIFLOZIN 10 MG: 10 TABLET, FILM COATED ORAL at 09:24

## 2024-01-01 RX ADMIN — IPRATROPIUM BROMIDE AND ALBUTEROL SULFATE 3 ML: .5; 3 SOLUTION RESPIRATORY (INHALATION) at 19:48

## 2024-01-01 RX ADMIN — DIGOXIN 125 MCG: 0.25 INJECTION INTRAMUSCULAR; INTRAVENOUS at 12:01

## 2024-01-01 RX ADMIN — UMECLIDINIUM 1 PUFF: 62.5 AEROSOL, POWDER ORAL at 09:00

## 2024-01-01 RX ADMIN — CEPHALEXIN 500 MG: 500 CAPSULE ORAL at 18:30

## 2024-01-01 RX ADMIN — CEPHALEXIN 500 MG: 500 CAPSULE ORAL at 21:35

## 2024-01-01 RX ADMIN — INSULIN ASPART 1 UNITS: 100 INJECTION, SOLUTION INTRAVENOUS; SUBCUTANEOUS at 18:25

## 2024-01-01 RX ADMIN — ENOXAPARIN SODIUM 40 MG: 40 INJECTION SUBCUTANEOUS at 22:08

## 2024-01-01 RX ADMIN — IPRATROPIUM BROMIDE AND ALBUTEROL SULFATE 3 ML: .5; 3 SOLUTION RESPIRATORY (INHALATION) at 09:12

## 2024-01-01 RX ADMIN — DOXYCYCLINE 100 MG: 100 INJECTION, POWDER, LYOPHILIZED, FOR SOLUTION INTRAVENOUS at 17:54

## 2024-01-01 RX ADMIN — AMIODARONE HYDROCHLORIDE 0.5 MG/MIN: 1.8 INJECTION, SOLUTION INTRAVENOUS at 01:43

## 2024-01-01 RX ADMIN — EMPAGLIFLOZIN 10 MG: 10 TABLET, FILM COATED ORAL at 09:35

## 2024-01-01 RX ADMIN — IPRATROPIUM BROMIDE AND ALBUTEROL SULFATE 3 ML: .5; 3 SOLUTION RESPIRATORY (INHALATION) at 11:27

## 2024-01-01 RX ADMIN — ENOXAPARIN SODIUM 40 MG: 40 INJECTION SUBCUTANEOUS at 21:06

## 2024-01-01 RX ADMIN — POTASSIUM CHLORIDE 40 MEQ: 1500 TABLET, EXTENDED RELEASE ORAL at 21:14

## 2024-01-01 RX ADMIN — MICONAZOLE NITRATE: 2 POWDER TOPICAL at 08:30

## 2024-01-01 RX ADMIN — DOXYCYCLINE HYCLATE 100 MG: 100 CAPSULE ORAL at 07:28

## 2024-01-01 RX ADMIN — Medication 1 TABLET: at 12:19

## 2024-01-01 RX ADMIN — CEFTRIAXONE SODIUM 2 G: 2 INJECTION, POWDER, FOR SOLUTION INTRAMUSCULAR; INTRAVENOUS at 17:27

## 2024-01-01 RX ADMIN — FLUTICASONE FUROATE AND VILANTEROL TRIFENATATE 1 PUFF: 200; 25 POWDER RESPIRATORY (INHALATION) at 09:51

## 2024-01-01 RX ADMIN — UMECLIDINIUM 1 PUFF: 62.5 AEROSOL, POWDER ORAL at 08:53

## 2024-01-01 RX ADMIN — ASPIRIN 81 MG CHEWABLE TABLET 81 MG: 81 TABLET CHEWABLE at 10:24

## 2024-01-01 RX ADMIN — THIAMINE HYDROCHLORIDE 200 MG: 100 INJECTION, SOLUTION INTRAMUSCULAR; INTRAVENOUS at 08:32

## 2024-01-01 RX ADMIN — Medication 1 TABLET: at 11:45

## 2024-01-01 RX ADMIN — ASPIRIN 325 MG ORAL TABLET 325 MG: 325 PILL ORAL at 06:04

## 2024-01-01 RX ADMIN — IPRATROPIUM BROMIDE AND ALBUTEROL SULFATE 3 ML: .5; 3 SOLUTION RESPIRATORY (INHALATION) at 08:22

## 2024-01-01 RX ADMIN — ASPIRIN 81 MG CHEWABLE TABLET 81 MG: 81 TABLET CHEWABLE at 08:55

## 2024-01-01 RX ADMIN — IPRATROPIUM BROMIDE AND ALBUTEROL SULFATE 3 ML: .5; 3 SOLUTION RESPIRATORY (INHALATION) at 08:01

## 2024-01-01 RX ADMIN — FUROSEMIDE 20 MG: 10 INJECTION, SOLUTION INTRAMUSCULAR; INTRAVENOUS at 17:54

## 2024-01-01 RX ADMIN — ENOXAPARIN SODIUM 40 MG: 40 INJECTION SUBCUTANEOUS at 08:24

## 2024-01-01 RX ADMIN — IPRATROPIUM BROMIDE AND ALBUTEROL SULFATE 3 ML: .5; 3 SOLUTION RESPIRATORY (INHALATION) at 18:40

## 2024-01-01 RX ADMIN — EMPAGLIFLOZIN 10 MG: 10 TABLET, FILM COATED ORAL at 09:06

## 2024-01-01 RX ADMIN — ASPIRIN 81 MG CHEWABLE TABLET 81 MG: 81 TABLET CHEWABLE at 08:59

## 2024-01-01 RX ADMIN — IPRATROPIUM BROMIDE AND ALBUTEROL SULFATE 3 ML: .5; 3 SOLUTION RESPIRATORY (INHALATION) at 20:22

## 2024-01-01 RX ADMIN — BUMETANIDE 2 MG: 1 TABLET ORAL at 17:54

## 2024-01-01 RX ADMIN — IPRATROPIUM BROMIDE AND ALBUTEROL SULFATE 3 ML: .5; 3 SOLUTION RESPIRATORY (INHALATION) at 12:10

## 2024-01-01 RX ADMIN — AMIODARONE HYDROCHLORIDE 0.5 MG/MIN: 50 INJECTION, SOLUTION INTRAVENOUS at 13:29

## 2024-01-01 RX ADMIN — CEPHALEXIN 500 MG: 500 CAPSULE ORAL at 21:18

## 2024-01-01 RX ADMIN — CHLOROTHIAZIDE SODIUM 500 MG: 500 INJECTION, POWDER, LYOPHILIZED, FOR SOLUTION INTRAVENOUS at 23:04

## 2024-01-01 RX ADMIN — POLYETHYLENE GLYCOL 3350 17 G: 17 POWDER, FOR SOLUTION ORAL at 13:03

## 2024-01-01 RX ADMIN — ASPIRIN 81 MG CHEWABLE TABLET 81 MG: 81 TABLET CHEWABLE at 21:15

## 2024-01-01 RX ADMIN — ENOXAPARIN SODIUM 40 MG: 40 INJECTION SUBCUTANEOUS at 21:08

## 2024-01-01 RX ADMIN — POTASSIUM CHLORIDE 40 MEQ: 1500 TABLET, EXTENDED RELEASE ORAL at 16:54

## 2024-01-01 RX ADMIN — Medication 1 TABLET: at 13:23

## 2024-01-01 RX ADMIN — FUROSEMIDE 20 MG: 10 INJECTION, SOLUTION INTRAMUSCULAR; INTRAVENOUS at 08:14

## 2024-01-01 RX ADMIN — IPRATROPIUM BROMIDE AND ALBUTEROL SULFATE 3 ML: .5; 3 SOLUTION RESPIRATORY (INHALATION) at 07:40

## 2024-01-01 RX ADMIN — IPRATROPIUM BROMIDE AND ALBUTEROL SULFATE 3 ML: .5; 3 SOLUTION RESPIRATORY (INHALATION) at 14:17

## 2024-01-01 RX ADMIN — DOXYCYCLINE HYCLATE 100 MG: 100 CAPSULE ORAL at 20:13

## 2024-01-01 RX ADMIN — ASPIRIN 81 MG CHEWABLE TABLET 81 MG: 81 TABLET CHEWABLE at 08:15

## 2024-01-01 RX ADMIN — ACETAMINOPHEN 650 MG: 325 TABLET, FILM COATED ORAL at 08:15

## 2024-01-01 RX ADMIN — POLYETHYLENE GLYCOL 3350 17 G: 17 POWDER, FOR SOLUTION ORAL at 12:21

## 2024-01-01 RX ADMIN — ASPIRIN 81 MG CHEWABLE TABLET 81 MG: 81 TABLET CHEWABLE at 08:40

## 2024-01-01 RX ADMIN — FUROSEMIDE 10 MG/HR: 10 INJECTION, SOLUTION INTRAMUSCULAR; INTRAVENOUS at 20:39

## 2024-01-01 RX ADMIN — FUROSEMIDE 20 MG: 10 INJECTION, SOLUTION INTRAMUSCULAR; INTRAVENOUS at 16:49

## 2024-01-01 RX ADMIN — ASPIRIN 81 MG CHEWABLE TABLET 81 MG: 81 TABLET CHEWABLE at 08:33

## 2024-01-01 RX ADMIN — POLYETHYLENE GLYCOL 3350 17 G: 17 POWDER, FOR SOLUTION ORAL at 16:35

## 2024-01-01 RX ADMIN — FUROSEMIDE 10 MG/HR: 10 INJECTION, SOLUTION INTRAMUSCULAR; INTRAVENOUS at 17:59

## 2024-01-01 RX ADMIN — ENOXAPARIN SODIUM 40 MG: 40 INJECTION SUBCUTANEOUS at 09:54

## 2024-01-01 RX ADMIN — POTASSIUM CHLORIDE 40 MEQ: 1500 TABLET, EXTENDED RELEASE ORAL at 10:08

## 2024-01-01 RX ADMIN — IPRATROPIUM BROMIDE AND ALBUTEROL SULFATE 3 ML: .5; 3 SOLUTION RESPIRATORY (INHALATION) at 08:31

## 2024-01-01 RX ADMIN — ENOXAPARIN SODIUM 40 MG: 40 INJECTION SUBCUTANEOUS at 08:36

## 2024-01-01 RX ADMIN — ENOXAPARIN SODIUM 40 MG: 40 INJECTION SUBCUTANEOUS at 08:28

## 2024-01-01 RX ADMIN — ENOXAPARIN SODIUM 40 MG: 40 INJECTION SUBCUTANEOUS at 21:47

## 2024-01-01 RX ADMIN — POTASSIUM CHLORIDE 40 MEQ: 1500 TABLET, EXTENDED RELEASE ORAL at 12:07

## 2024-01-01 RX ADMIN — ASPIRIN 81 MG CHEWABLE TABLET 81 MG: 81 TABLET CHEWABLE at 21:17

## 2024-01-01 RX ADMIN — FLUTICASONE FUROATE AND VILANTEROL TRIFENATATE 1 PUFF: 200; 25 POWDER RESPIRATORY (INHALATION) at 10:24

## 2024-01-01 RX ADMIN — ENOXAPARIN SODIUM 40 MG: 40 INJECTION SUBCUTANEOUS at 08:45

## 2024-01-01 RX ADMIN — BUMETANIDE 2 MG: 1 TABLET ORAL at 09:24

## 2024-01-01 RX ADMIN — ENOXAPARIN SODIUM 40 MG: 40 INJECTION SUBCUTANEOUS at 10:13

## 2024-01-01 RX ADMIN — ASPIRIN 81 MG CHEWABLE TABLET 81 MG: 81 TABLET CHEWABLE at 08:36

## 2024-01-01 RX ADMIN — IPRATROPIUM BROMIDE AND ALBUTEROL SULFATE 3 ML: .5; 3 SOLUTION RESPIRATORY (INHALATION) at 15:06

## 2024-01-01 RX ADMIN — ALBUTEROL SULFATE 2.5 MG: 2.5 SOLUTION RESPIRATORY (INHALATION) at 00:24

## 2024-01-01 RX ADMIN — HEPARIN SODIUM 2100 UNITS/HR: 10000 INJECTION, SOLUTION INTRAVENOUS at 08:00

## 2024-01-01 RX ADMIN — POTASSIUM CHLORIDE 40 MEQ: 1500 TABLET, EXTENDED RELEASE ORAL at 08:50

## 2024-01-01 RX ADMIN — IPRATROPIUM BROMIDE AND ALBUTEROL SULFATE 3 ML: .5; 3 SOLUTION RESPIRATORY (INHALATION) at 11:45

## 2024-01-01 RX ADMIN — IPRATROPIUM BROMIDE AND ALBUTEROL SULFATE 3 ML: .5; 3 SOLUTION RESPIRATORY (INHALATION) at 18:52

## 2024-01-01 RX ADMIN — Medication 1 TABLET: at 16:15

## 2024-01-01 RX ADMIN — MIDODRINE HYDROCHLORIDE 10 MG: 10 TABLET ORAL at 08:58

## 2024-01-01 RX ADMIN — IPRATROPIUM BROMIDE AND ALBUTEROL SULFATE 3 ML: .5; 3 SOLUTION RESPIRATORY (INHALATION) at 08:21

## 2024-01-01 RX ADMIN — FLUTICASONE FUROATE AND VILANTEROL TRIFENATATE 1 PUFF: 200; 25 POWDER RESPIRATORY (INHALATION) at 09:05

## 2024-01-01 RX ADMIN — ENOXAPARIN SODIUM 40 MG: 40 INJECTION SUBCUTANEOUS at 20:32

## 2024-01-01 RX ADMIN — MICONAZOLE NITRATE: 20 CREAM TOPICAL at 09:39

## 2024-01-01 RX ADMIN — IPRATROPIUM BROMIDE AND ALBUTEROL SULFATE 3 ML: .5; 3 SOLUTION RESPIRATORY (INHALATION) at 08:43

## 2024-01-01 RX ADMIN — HEPARIN SODIUM 2100 UNITS/HR: 10000 INJECTION, SOLUTION INTRAVENOUS at 17:13

## 2024-01-01 RX ADMIN — MICONAZOLE NITRATE: 20 CREAM TOPICAL at 21:00

## 2024-01-01 RX ADMIN — POTASSIUM CHLORIDE 40 MEQ: 1500 TABLET, EXTENDED RELEASE ORAL at 16:15

## 2024-01-01 RX ADMIN — POLYETHYLENE GLYCOL 3350 17 G: 17 POWDER, FOR SOLUTION ORAL at 14:44

## 2024-01-01 RX ADMIN — ENOXAPARIN SODIUM 40 MG: 40 INJECTION SUBCUTANEOUS at 20:55

## 2024-01-01 RX ADMIN — IPRATROPIUM BROMIDE AND ALBUTEROL SULFATE 3 ML: .5; 3 SOLUTION RESPIRATORY (INHALATION) at 19:04

## 2024-01-01 RX ADMIN — ASPIRIN 325 MG ORAL TABLET 325 MG: 325 PILL ORAL at 09:05

## 2024-01-01 RX ADMIN — HYDROMORPHONE HYDROCHLORIDE 0.2 MG: 0.2 INJECTION, SOLUTION INTRAMUSCULAR; INTRAVENOUS; SUBCUTANEOUS at 10:05

## 2024-01-01 RX ADMIN — IPRATROPIUM BROMIDE AND ALBUTEROL SULFATE 3 ML: .5; 3 SOLUTION RESPIRATORY (INHALATION) at 11:05

## 2024-01-01 RX ADMIN — IPRATROPIUM BROMIDE AND ALBUTEROL SULFATE 3 ML: .5; 3 SOLUTION RESPIRATORY (INHALATION) at 16:59

## 2024-01-01 RX ADMIN — IPRATROPIUM BROMIDE AND ALBUTEROL SULFATE 3 ML: .5; 3 SOLUTION RESPIRATORY (INHALATION) at 07:15

## 2024-01-01 RX ADMIN — IPRATROPIUM BROMIDE AND ALBUTEROL SULFATE 3 ML: .5; 3 SOLUTION RESPIRATORY (INHALATION) at 20:07

## 2024-01-01 RX ADMIN — IPRATROPIUM BROMIDE AND ALBUTEROL SULFATE 3 ML: .5; 3 SOLUTION RESPIRATORY (INHALATION) at 08:16

## 2024-01-01 RX ADMIN — POTASSIUM CHLORIDE 40 MEQ: 1500 TABLET, EXTENDED RELEASE ORAL at 21:13

## 2024-01-01 RX ADMIN — IPRATROPIUM BROMIDE AND ALBUTEROL SULFATE 3 ML: .5; 3 SOLUTION RESPIRATORY (INHALATION) at 17:01

## 2024-01-01 RX ADMIN — DOBUTAMINE HYDROCHLORIDE 2.5 MCG/KG/MIN: 200 INJECTION INTRAVENOUS at 12:24

## 2024-01-01 RX ADMIN — POTASSIUM CHLORIDE 40 MEQ: 1500 TABLET, EXTENDED RELEASE ORAL at 21:01

## 2024-01-01 RX ADMIN — IPRATROPIUM BROMIDE AND ALBUTEROL SULFATE 3 ML: .5; 3 SOLUTION RESPIRATORY (INHALATION) at 21:14

## 2024-01-01 RX ADMIN — ASPIRIN 81 MG CHEWABLE TABLET 81 MG: 81 TABLET CHEWABLE at 09:24

## 2024-01-01 RX ADMIN — THIAMINE HYDROCHLORIDE 200 MG: 100 INJECTION, SOLUTION INTRAMUSCULAR; INTRAVENOUS at 21:04

## 2024-01-01 RX ADMIN — IPRATROPIUM BROMIDE AND ALBUTEROL SULFATE 3 ML: .5; 3 SOLUTION RESPIRATORY (INHALATION) at 11:37

## 2024-01-01 RX ADMIN — UMECLIDINIUM 1 PUFF: 62.5 AEROSOL, POWDER ORAL at 10:23

## 2024-01-01 RX ADMIN — BUMETANIDE 2 MG: 1 TABLET ORAL at 08:45

## 2024-01-01 RX ADMIN — ALBUTEROL SULFATE 2.5 MG: 2.5 SOLUTION RESPIRATORY (INHALATION) at 02:31

## 2024-01-01 RX ADMIN — IPRATROPIUM BROMIDE AND ALBUTEROL SULFATE 3 ML: .5; 3 SOLUTION RESPIRATORY (INHALATION) at 11:17

## 2024-01-01 RX ADMIN — MICONAZOLE NITRATE: 2 POWDER TOPICAL at 21:05

## 2024-01-01 RX ADMIN — IPRATROPIUM BROMIDE AND ALBUTEROL SULFATE 3 ML: .5; 3 SOLUTION RESPIRATORY (INHALATION) at 08:26

## 2024-01-01 RX ADMIN — IPRATROPIUM BROMIDE AND ALBUTEROL SULFATE 3 ML: .5; 3 SOLUTION RESPIRATORY (INHALATION) at 19:21

## 2024-01-01 RX ADMIN — FUROSEMIDE 60 MG: 10 INJECTION, SOLUTION INTRAMUSCULAR; INTRAVENOUS at 17:36

## 2024-01-01 RX ADMIN — INSULIN ASPART 1 UNITS: 100 INJECTION, SOLUTION INTRAVENOUS; SUBCUTANEOUS at 08:31

## 2024-01-01 RX ADMIN — ASPIRIN 81 MG CHEWABLE TABLET 81 MG: 81 TABLET CHEWABLE at 20:01

## 2024-01-01 RX ADMIN — ASPIRIN 81 MG CHEWABLE TABLET 81 MG: 81 TABLET CHEWABLE at 07:45

## 2024-01-01 RX ADMIN — FLUTICASONE FUROATE AND VILANTEROL TRIFENATATE 1 PUFF: 200; 25 POWDER RESPIRATORY (INHALATION) at 08:01

## 2024-01-01 RX ADMIN — Medication 1 TABLET: at 16:35

## 2024-01-01 RX ADMIN — SILDENAFIL 20 MG: 20 TABLET, FILM COATED ORAL at 16:51

## 2024-01-01 RX ADMIN — MICONAZOLE NITRATE: 2 POWDER TOPICAL at 13:03

## 2024-01-01 RX ADMIN — BUMETANIDE 2 MG: 0.25 INJECTION INTRAMUSCULAR; INTRAVENOUS at 09:30

## 2024-01-01 RX ADMIN — METHYLPREDNISOLONE SODIUM SUCCINATE 125 MG: 125 INJECTION, POWDER, FOR SOLUTION INTRAMUSCULAR; INTRAVENOUS at 14:58

## 2024-01-01 RX ADMIN — POLYETHYLENE GLYCOL 3350 17 G: 17 POWDER, FOR SOLUTION ORAL at 07:30

## 2024-01-01 RX ADMIN — OXYCODONE HYDROCHLORIDE 5 MG: 5 TABLET ORAL at 20:34

## 2024-01-01 RX ADMIN — UMECLIDINIUM 1 PUFF: 62.5 AEROSOL, POWDER ORAL at 09:41

## 2024-01-01 RX ADMIN — Medication 1 TABLET: at 17:47

## 2024-01-01 RX ADMIN — CEPHALEXIN 500 MG: 500 CAPSULE ORAL at 09:27

## 2024-01-01 RX ADMIN — GUAIFENESIN 200 MG: 200 SOLUTION ORAL at 08:23

## 2024-01-01 RX ADMIN — POLYETHYLENE GLYCOL 3350 17 G: 17 POWDER, FOR SOLUTION ORAL at 08:49

## 2024-01-01 RX ADMIN — POLYETHYLENE GLYCOL 3350 17 G: 17 POWDER, FOR SOLUTION ORAL at 13:13

## 2024-01-01 RX ADMIN — POLYETHYLENE GLYCOL 3350 17 G: 17 POWDER, FOR SOLUTION ORAL at 16:00

## 2024-01-01 RX ADMIN — CEPHALEXIN 500 MG: 500 CAPSULE ORAL at 13:14

## 2024-01-01 RX ADMIN — ACETAMINOPHEN 650 MG: 325 TABLET, FILM COATED ORAL at 16:00

## 2024-01-01 RX ADMIN — POTASSIUM CHLORIDE 40 MEQ: 1500 TABLET, EXTENDED RELEASE ORAL at 16:35

## 2024-01-01 RX ADMIN — METHYLPREDNISOLONE SODIUM SUCCINATE 40 MG: 40 INJECTION, POWDER, FOR SOLUTION INTRAMUSCULAR; INTRAVENOUS at 08:32

## 2024-01-01 RX ADMIN — IPRATROPIUM BROMIDE AND ALBUTEROL SULFATE 3 ML: .5; 3 SOLUTION RESPIRATORY (INHALATION) at 19:28

## 2024-01-01 RX ADMIN — IPRATROPIUM BROMIDE AND ALBUTEROL SULFATE 3 ML: .5; 3 SOLUTION RESPIRATORY (INHALATION) at 19:23

## 2024-01-01 RX ADMIN — CEFTRIAXONE SODIUM 1 G: 1 INJECTION, POWDER, FOR SOLUTION INTRAMUSCULAR; INTRAVENOUS at 08:54

## 2024-01-01 RX ADMIN — INSULIN ASPART 1 UNITS: 100 INJECTION, SOLUTION INTRAVENOUS; SUBCUTANEOUS at 12:16

## 2024-01-01 RX ADMIN — DOXYCYCLINE 100 MG: 100 INJECTION, POWDER, LYOPHILIZED, FOR SOLUTION INTRAVENOUS at 18:24

## 2024-01-01 RX ADMIN — IPRATROPIUM BROMIDE AND ALBUTEROL SULFATE 3 ML: .5; 3 SOLUTION RESPIRATORY (INHALATION) at 07:59

## 2024-01-01 RX ADMIN — POTASSIUM CHLORIDE 40 MEQ: 1500 TABLET, EXTENDED RELEASE ORAL at 16:39

## 2024-01-01 RX ADMIN — HEPARIN SODIUM 2100 UNITS/HR: 10000 INJECTION, SOLUTION INTRAVENOUS at 17:37

## 2024-01-01 RX ADMIN — POLYETHYLENE GLYCOL 3350 17 G: 17 POWDER, FOR SOLUTION ORAL at 08:53

## 2024-01-01 RX ADMIN — IPRATROPIUM BROMIDE AND ALBUTEROL SULFATE 3 ML: .5; 3 SOLUTION RESPIRATORY (INHALATION) at 20:24

## 2024-01-01 RX ADMIN — ENOXAPARIN SODIUM 40 MG: 40 INJECTION SUBCUTANEOUS at 08:50

## 2024-01-01 RX ADMIN — ASPIRIN 81 MG CHEWABLE TABLET 81 MG: 81 TABLET CHEWABLE at 21:18

## 2024-01-01 RX ADMIN — IPRATROPIUM BROMIDE AND ALBUTEROL SULFATE 3 ML: .5; 3 SOLUTION RESPIRATORY (INHALATION) at 11:34

## 2024-01-01 RX ADMIN — CEPHALEXIN 500 MG: 500 CAPSULE ORAL at 08:45

## 2024-01-01 RX ADMIN — EMPAGLIFLOZIN 10 MG: 10 TABLET, FILM COATED ORAL at 14:16

## 2024-01-01 RX ADMIN — VANCOMYCIN HYDROCHLORIDE 2500 MG: 5 INJECTION, POWDER, LYOPHILIZED, FOR SOLUTION INTRAVENOUS at 10:25

## 2024-01-01 RX ADMIN — IPRATROPIUM BROMIDE AND ALBUTEROL SULFATE 3 ML: .5; 3 SOLUTION RESPIRATORY (INHALATION) at 07:58

## 2024-01-01 RX ADMIN — IPRATROPIUM BROMIDE AND ALBUTEROL SULFATE 3 ML: .5; 3 SOLUTION RESPIRATORY (INHALATION) at 20:48

## 2024-01-01 RX ADMIN — MICONAZOLE NITRATE: 20 CREAM TOPICAL at 20:15

## 2024-01-01 RX ADMIN — ASPIRIN 81 MG CHEWABLE TABLET 81 MG: 81 TABLET CHEWABLE at 20:46

## 2024-01-01 RX ADMIN — MICONAZOLE NITRATE: 20 CREAM TOPICAL at 21:17

## 2024-01-01 RX ADMIN — DOXYCYCLINE HYCLATE 100 MG: 100 CAPSULE ORAL at 20:49

## 2024-01-01 RX ADMIN — IPRATROPIUM BROMIDE AND ALBUTEROL SULFATE 3 ML: .5; 3 SOLUTION RESPIRATORY (INHALATION) at 19:03

## 2024-01-01 RX ADMIN — AMIODARONE HYDROCHLORIDE 1 MG/MIN: 1.8 INJECTION, SOLUTION INTRAVENOUS at 02:53

## 2024-01-01 RX ADMIN — POLYETHYLENE GLYCOL 3350 17 G: 17 POWDER, FOR SOLUTION ORAL at 13:59

## 2024-01-01 RX ADMIN — UMECLIDINIUM 1 PUFF: 62.5 AEROSOL, POWDER ORAL at 09:06

## 2024-01-01 RX ADMIN — FLUTICASONE FUROATE AND VILANTEROL TRIFENATATE 1 PUFF: 200; 25 POWDER RESPIRATORY (INHALATION) at 10:48

## 2024-01-01 RX ADMIN — CEPHALEXIN 500 MG: 500 CAPSULE ORAL at 09:13

## 2024-01-01 RX ADMIN — UMECLIDINIUM 1 PUFF: 62.5 AEROSOL, POWDER ORAL at 08:14

## 2024-01-01 RX ADMIN — IPRATROPIUM BROMIDE AND ALBUTEROL SULFATE 3 ML: .5; 3 SOLUTION RESPIRATORY (INHALATION) at 15:17

## 2024-01-01 RX ADMIN — ENOXAPARIN SODIUM 40 MG: 40 INJECTION SUBCUTANEOUS at 13:01

## 2024-01-01 RX ADMIN — ASPIRIN 81 MG CHEWABLE TABLET 81 MG: 81 TABLET CHEWABLE at 08:45

## 2024-01-01 RX ADMIN — MICONAZOLE NITRATE: 20 CREAM TOPICAL at 09:17

## 2024-01-01 RX ADMIN — IPRATROPIUM BROMIDE AND ALBUTEROL SULFATE 3 ML: .5; 3 SOLUTION RESPIRATORY (INHALATION) at 15:09

## 2024-01-01 RX ADMIN — PREDNISONE 40 MG: 20 TABLET ORAL at 09:03

## 2024-01-01 RX ADMIN — ENOXAPARIN SODIUM 40 MG: 40 INJECTION SUBCUTANEOUS at 09:38

## 2024-01-01 RX ADMIN — IPRATROPIUM BROMIDE AND ALBUTEROL SULFATE 3 ML: .5; 3 SOLUTION RESPIRATORY (INHALATION) at 07:43

## 2024-01-01 RX ADMIN — ENOXAPARIN SODIUM 40 MG: 40 INJECTION SUBCUTANEOUS at 21:15

## 2024-01-01 RX ADMIN — LORAZEPAM 0.5 MG: 0.5 TABLET ORAL at 11:50

## 2024-01-01 RX ADMIN — FLUTICASONE FUROATE AND VILANTEROL TRIFENATATE 1 PUFF: 200; 25 POWDER RESPIRATORY (INHALATION) at 10:23

## 2024-01-01 RX ADMIN — IPRATROPIUM BROMIDE AND ALBUTEROL SULFATE 3 ML: .5; 3 SOLUTION RESPIRATORY (INHALATION) at 10:57

## 2024-01-01 RX ADMIN — Medication 1 TABLET: at 13:16

## 2024-01-01 RX ADMIN — IPRATROPIUM BROMIDE AND ALBUTEROL SULFATE 3 ML: .5; 3 SOLUTION RESPIRATORY (INHALATION) at 19:52

## 2024-01-01 RX ADMIN — THIAMINE HYDROCHLORIDE 200 MG: 100 INJECTION, SOLUTION INTRAMUSCULAR; INTRAVENOUS at 08:58

## 2024-01-01 RX ADMIN — IPRATROPIUM BROMIDE AND ALBUTEROL SULFATE 3 ML: .5; 3 SOLUTION RESPIRATORY (INHALATION) at 11:29

## 2024-01-01 RX ADMIN — Medication 1 TABLET: at 12:11

## 2024-01-01 RX ADMIN — CEPHALEXIN 500 MG: 500 CAPSULE ORAL at 21:21

## 2024-01-01 RX ADMIN — POTASSIUM CHLORIDE 40 MEQ: 1500 TABLET, EXTENDED RELEASE ORAL at 09:35

## 2024-01-01 RX ADMIN — POTASSIUM CHLORIDE 40 MEQ: 1500 TABLET, EXTENDED RELEASE ORAL at 15:59

## 2024-01-01 RX ADMIN — SILDENAFIL 20 MG: 20 TABLET, FILM COATED ORAL at 16:25

## 2024-01-01 RX ADMIN — ASPIRIN 81 MG CHEWABLE TABLET 81 MG: 81 TABLET CHEWABLE at 08:23

## 2024-01-01 RX ADMIN — ACETAMINOPHEN 650 MG: 325 TABLET, FILM COATED ORAL at 08:31

## 2024-01-01 RX ADMIN — ASPIRIN 81 MG CHEWABLE TABLET 81 MG: 81 TABLET CHEWABLE at 20:13

## 2024-01-01 RX ADMIN — AMIODARONE HYDROCHLORIDE 150 MG: 1.5 INJECTION, SOLUTION INTRAVENOUS at 21:09

## 2024-01-01 RX ADMIN — ASPIRIN 81 MG CHEWABLE TABLET 81 MG: 81 TABLET CHEWABLE at 21:19

## 2024-01-01 RX ADMIN — ENOXAPARIN SODIUM 40 MG: 40 INJECTION SUBCUTANEOUS at 21:21

## 2024-01-01 RX ADMIN — METHYLPREDNISOLONE SODIUM SUCCINATE 40 MG: 40 INJECTION, POWDER, FOR SOLUTION INTRAMUSCULAR; INTRAVENOUS at 08:14

## 2024-01-01 RX ADMIN — IOPAMIDOL 83 ML: 755 INJECTION, SOLUTION INTRAVENOUS at 20:30

## 2024-01-01 RX ADMIN — ASPIRIN 81 MG CHEWABLE TABLET 81 MG: 81 TABLET CHEWABLE at 21:27

## 2024-01-01 RX ADMIN — BUMETANIDE 2 MG: 0.25 INJECTION, SOLUTION INTRAMUSCULAR; INTRAVENOUS at 10:00

## 2024-01-01 RX ADMIN — ACETAMINOPHEN 650 MG: 325 TABLET, FILM COATED ORAL at 20:42

## 2024-01-01 RX ADMIN — ENOXAPARIN SODIUM 40 MG: 40 INJECTION SUBCUTANEOUS at 10:01

## 2024-01-01 RX ADMIN — ASPIRIN 81 MG CHEWABLE TABLET 81 MG: 81 TABLET CHEWABLE at 09:39

## 2024-01-01 RX ADMIN — FUROSEMIDE 10 MG/HR: 10 INJECTION, SOLUTION INTRAMUSCULAR; INTRAVENOUS at 17:19

## 2024-01-01 RX ADMIN — FLUTICASONE FUROATE AND VILANTEROL TRIFENATATE 1 PUFF: 200; 25 POWDER RESPIRATORY (INHALATION) at 09:37

## 2024-01-01 RX ADMIN — ASPIRIN 81 MG CHEWABLE TABLET 81 MG: 81 TABLET CHEWABLE at 20:42

## 2024-01-01 RX ADMIN — POTASSIUM CHLORIDE 40 MEQ: 1500 TABLET, EXTENDED RELEASE ORAL at 21:47

## 2024-01-01 RX ADMIN — MIDODRINE HYDROCHLORIDE 10 MG: 10 TABLET ORAL at 22:02

## 2024-01-01 RX ADMIN — CEFTRIAXONE SODIUM 1 G: 1 INJECTION, POWDER, FOR SOLUTION INTRAMUSCULAR; INTRAVENOUS at 08:57

## 2024-01-01 RX ADMIN — BUMETANIDE 2 MG: 1 TABLET ORAL at 08:55

## 2024-01-01 RX ADMIN — EMPAGLIFLOZIN 10 MG: 10 TABLET, FILM COATED ORAL at 10:08

## 2024-01-01 RX ADMIN — FUROSEMIDE 20 MG: 20 TABLET ORAL at 08:23

## 2024-01-01 RX ADMIN — IPRATROPIUM BROMIDE AND ALBUTEROL SULFATE 3 ML: .5; 3 SOLUTION RESPIRATORY (INHALATION) at 11:46

## 2024-01-01 RX ADMIN — MICONAZOLE NITRATE: 2 POWDER TOPICAL at 09:48

## 2024-01-01 RX ADMIN — IPRATROPIUM BROMIDE AND ALBUTEROL SULFATE 3 ML: .5; 3 SOLUTION RESPIRATORY (INHALATION) at 07:09

## 2024-01-01 RX ADMIN — ENOXAPARIN SODIUM 40 MG: 40 INJECTION SUBCUTANEOUS at 20:14

## 2024-01-01 RX ADMIN — FUROSEMIDE 10 MG/HR: 10 INJECTION, SOLUTION INTRAMUSCULAR; INTRAVENOUS at 20:41

## 2024-01-01 RX ADMIN — CEFTRIAXONE SODIUM 2 G: 2 INJECTION, POWDER, FOR SOLUTION INTRAMUSCULAR; INTRAVENOUS at 17:41

## 2024-01-01 RX ADMIN — IPRATROPIUM BROMIDE AND ALBUTEROL SULFATE 3 ML: .5; 3 SOLUTION RESPIRATORY (INHALATION) at 10:59

## 2024-01-01 RX ADMIN — Medication 1 TABLET: at 12:21

## 2024-01-01 RX ADMIN — POTASSIUM CHLORIDE 40 MEQ: 1500 TABLET, EXTENDED RELEASE ORAL at 09:24

## 2024-01-01 RX ADMIN — OXYCODONE HYDROCHLORIDE 5 MG: 5 TABLET ORAL at 08:15

## 2024-01-01 RX ADMIN — AMIODARONE HYDROCHLORIDE 0.5 MG/MIN: 1.8 INJECTION, SOLUTION INTRAVENOUS at 14:02

## 2024-01-01 RX ADMIN — FUROSEMIDE 10 MG/HR: 10 INJECTION, SOLUTION INTRAMUSCULAR; INTRAVENOUS at 19:30

## 2024-01-01 RX ADMIN — MICONAZOLE NITRATE: 2 POWDER TOPICAL at 20:15

## 2024-01-01 RX ADMIN — POTASSIUM CHLORIDE 40 MEQ: 1500 TABLET, EXTENDED RELEASE ORAL at 15:37

## 2024-01-01 RX ADMIN — FLUTICASONE FUROATE AND VILANTEROL TRIFENATATE 1 PUFF: 200; 25 POWDER RESPIRATORY (INHALATION) at 09:00

## 2024-01-01 RX ADMIN — EMPAGLIFLOZIN 10 MG: 10 TABLET, FILM COATED ORAL at 12:41

## 2024-01-01 RX ADMIN — ASPIRIN 81 MG CHEWABLE TABLET 81 MG: 81 TABLET CHEWABLE at 21:06

## 2024-01-01 RX ADMIN — IPRATROPIUM BROMIDE AND ALBUTEROL SULFATE 3 ML: .5; 3 SOLUTION RESPIRATORY (INHALATION) at 07:16

## 2024-01-01 RX ADMIN — ASPIRIN 81 MG CHEWABLE TABLET 81 MG: 81 TABLET CHEWABLE at 21:13

## 2024-01-01 RX ADMIN — MICONAZOLE NITRATE: 2 POWDER TOPICAL at 21:37

## 2024-01-01 RX ADMIN — POTASSIUM CHLORIDE 40 MEQ: 1500 TABLET, EXTENDED RELEASE ORAL at 16:28

## 2024-01-01 RX ADMIN — ENOXAPARIN SODIUM 40 MG: 40 INJECTION SUBCUTANEOUS at 20:59

## 2024-01-01 RX ADMIN — IPRATROPIUM BROMIDE AND ALBUTEROL SULFATE 6 ML: 2.5; .5 SOLUTION RESPIRATORY (INHALATION) at 14:46

## 2024-01-01 RX ADMIN — ASPIRIN 81 MG CHEWABLE TABLET 81 MG: 81 TABLET CHEWABLE at 09:54

## 2024-01-01 RX ADMIN — HYDROMORPHONE HYDROCHLORIDE 0.2 MG: 0.2 INJECTION, SOLUTION INTRAMUSCULAR; INTRAVENOUS; SUBCUTANEOUS at 07:50

## 2024-01-01 RX ADMIN — IPRATROPIUM BROMIDE AND ALBUTEROL SULFATE 3 ML: .5; 3 SOLUTION RESPIRATORY (INHALATION) at 19:32

## 2024-01-01 RX ADMIN — ENOXAPARIN SODIUM 40 MG: 40 INJECTION SUBCUTANEOUS at 08:12

## 2024-01-01 RX ADMIN — CEPHALEXIN 500 MG: 500 CAPSULE ORAL at 17:53

## 2024-01-01 RX ADMIN — AMIODARONE HYDROCHLORIDE 400 MG: 200 TABLET ORAL at 09:52

## 2024-01-01 RX ADMIN — Medication 1 TABLET: at 11:48

## 2024-01-01 RX ADMIN — FLUTICASONE FUROATE AND VILANTEROL TRIFENATATE 1 PUFF: 200; 25 POWDER RESPIRATORY (INHALATION) at 10:41

## 2024-01-01 RX ADMIN — POLYETHYLENE GLYCOL 3350 17 G: 17 POWDER, FOR SOLUTION ORAL at 12:19

## 2024-01-01 RX ADMIN — Medication 1 TABLET: at 12:28

## 2024-01-01 RX ADMIN — HEPARIN SODIUM 18 UNITS/HR: 10000 INJECTION, SOLUTION INTRAVENOUS at 19:26

## 2024-01-01 RX ADMIN — METHYLPREDNISOLONE SODIUM SUCCINATE 40 MG: 40 INJECTION, POWDER, FOR SOLUTION INTRAMUSCULAR; INTRAVENOUS at 08:59

## 2024-01-01 RX ADMIN — CEPHALEXIN 500 MG: 500 CAPSULE ORAL at 08:50

## 2024-01-01 RX ADMIN — DOXYCYCLINE HYCLATE 100 MG: 100 CAPSULE ORAL at 09:42

## 2024-01-01 RX ADMIN — ASPIRIN 81 MG CHEWABLE TABLET 81 MG: 81 TABLET CHEWABLE at 08:12

## 2024-01-01 RX ADMIN — POLYETHYLENE GLYCOL 3350 17 G: 17 POWDER, FOR SOLUTION ORAL at 10:00

## 2024-01-01 RX ADMIN — Medication 1 TABLET: at 08:15

## 2024-01-01 RX ADMIN — UMECLIDINIUM 1 PUFF: 62.5 AEROSOL, POWDER ORAL at 09:43

## 2024-01-01 RX ADMIN — BUMETANIDE 0.5 MG/HR: 0.25 INJECTION, SOLUTION INTRAMUSCULAR; INTRAVENOUS at 18:28

## 2024-01-01 RX ADMIN — POLYETHYLENE GLYCOL 3350 17 G: 17 POWDER, FOR SOLUTION ORAL at 12:14

## 2024-01-01 RX ADMIN — ASPIRIN 81 MG CHEWABLE TABLET 81 MG: 81 TABLET CHEWABLE at 09:35

## 2024-01-01 RX ADMIN — Medication 1 TABLET: at 13:27

## 2024-01-01 RX ADMIN — IPRATROPIUM BROMIDE AND ALBUTEROL SULFATE 3 ML: .5; 3 SOLUTION RESPIRATORY (INHALATION) at 15:39

## 2024-01-01 RX ADMIN — DOBUTAMINE HYDROCHLORIDE 3.5 MCG/KG/MIN: 200 INJECTION INTRAVENOUS at 12:27

## 2024-01-01 RX ADMIN — POTASSIUM CHLORIDE 40 MEQ: 1500 TABLET, EXTENDED RELEASE ORAL at 18:29

## 2024-01-01 RX ADMIN — FLUTICASONE FUROATE AND VILANTEROL TRIFENATATE 1 PUFF: 200; 25 POWDER RESPIRATORY (INHALATION) at 08:32

## 2024-01-01 RX ADMIN — TRAMADOL HYDROCHLORIDE 50 MG: 50 TABLET, COATED ORAL at 06:31

## 2024-01-01 RX ADMIN — IPRATROPIUM BROMIDE AND ALBUTEROL SULFATE 3 ML: .5; 3 SOLUTION RESPIRATORY (INHALATION) at 21:01

## 2024-01-01 RX ADMIN — FUROSEMIDE 20 MG: 10 INJECTION, SOLUTION INTRAMUSCULAR; INTRAVENOUS at 07:00

## 2024-01-01 RX ADMIN — DOXYCYCLINE HYCLATE 100 MG: 100 CAPSULE ORAL at 08:14

## 2024-01-01 RX ADMIN — IPRATROPIUM BROMIDE AND ALBUTEROL SULFATE 3 ML: .5; 3 SOLUTION RESPIRATORY (INHALATION) at 15:19

## 2024-01-01 RX ADMIN — MICONAZOLE NITRATE: 2 POWDER TOPICAL at 09:16

## 2024-01-01 RX ADMIN — Medication 1 TABLET: at 11:23

## 2024-01-01 RX ADMIN — IPRATROPIUM BROMIDE AND ALBUTEROL SULFATE 3 ML: .5; 3 SOLUTION RESPIRATORY (INHALATION) at 19:40

## 2024-01-01 RX ADMIN — IPRATROPIUM BROMIDE AND ALBUTEROL SULFATE 3 ML: .5; 3 SOLUTION RESPIRATORY (INHALATION) at 19:30

## 2024-01-01 RX ADMIN — CEFTRIAXONE 2 G: 2 INJECTION, POWDER, FOR SOLUTION INTRAMUSCULAR; INTRAVENOUS at 11:38

## 2024-01-01 RX ADMIN — CEPHALEXIN 500 MG: 500 CAPSULE ORAL at 12:10

## 2024-01-01 RX ADMIN — CEFTRIAXONE SODIUM 2 G: 2 INJECTION, POWDER, FOR SOLUTION INTRAMUSCULAR; INTRAVENOUS at 16:49

## 2024-01-01 RX ADMIN — SILDENAFIL 20 MG: 20 TABLET, FILM COATED ORAL at 22:02

## 2024-01-01 RX ADMIN — ASPIRIN 81 MG CHEWABLE TABLET 81 MG: 81 TABLET CHEWABLE at 09:59

## 2024-01-01 RX ADMIN — FUROSEMIDE 10 MG/HR: 10 INJECTION, SOLUTION INTRAMUSCULAR; INTRAVENOUS at 06:59

## 2024-01-01 RX ADMIN — ACETAMINOPHEN 650 MG: 325 TABLET, FILM COATED ORAL at 12:49

## 2024-01-01 RX ADMIN — FLUTICASONE FUROATE AND VILANTEROL TRIFENATATE 1 PUFF: 200; 25 POWDER RESPIRATORY (INHALATION) at 09:14

## 2024-01-01 RX ADMIN — BUMETANIDE 2 MG: 1 TABLET ORAL at 10:13

## 2024-01-01 RX ADMIN — HYDROMORPHONE HYDROCHLORIDE 0.2 MG: 0.2 INJECTION, SOLUTION INTRAMUSCULAR; INTRAVENOUS; SUBCUTANEOUS at 12:18

## 2024-01-01 RX ADMIN — FLUTICASONE FUROATE AND VILANTEROL TRIFENATATE 1 PUFF: 200; 25 POWDER RESPIRATORY (INHALATION) at 09:41

## 2024-01-01 RX ADMIN — ASPIRIN 81 MG CHEWABLE TABLET 81 MG: 81 TABLET CHEWABLE at 09:06

## 2024-01-01 RX ADMIN — Medication 1 TABLET: at 13:04

## 2024-01-01 RX ADMIN — ENOXAPARIN SODIUM 40 MG: 40 INJECTION SUBCUTANEOUS at 21:27

## 2024-01-01 RX ADMIN — IPRATROPIUM BROMIDE AND ALBUTEROL SULFATE 3 ML: .5; 3 SOLUTION RESPIRATORY (INHALATION) at 10:43

## 2024-01-01 RX ADMIN — MICONAZOLE NITRATE: 2 POWDER TOPICAL at 08:59

## 2024-01-01 RX ADMIN — BUMETANIDE 2 MG: 1 TABLET ORAL at 08:50

## 2024-01-01 RX ADMIN — EMPAGLIFLOZIN 10 MG: 10 TABLET, FILM COATED ORAL at 08:24

## 2024-01-01 RX ADMIN — MICONAZOLE NITRATE: 2 POWDER TOPICAL at 21:01

## 2024-01-01 RX ADMIN — POLYETHYLENE GLYCOL 3350 17 G: 17 POWDER, FOR SOLUTION ORAL at 13:40

## 2024-01-01 RX ADMIN — ENOXAPARIN SODIUM 40 MG: 40 INJECTION SUBCUTANEOUS at 21:13

## 2024-01-01 RX ADMIN — UMECLIDINIUM 1 PUFF: 62.5 AEROSOL, POWDER ORAL at 10:01

## 2024-01-01 RX ADMIN — HYDROMORPHONE HYDROCHLORIDE 0.2 MG: 0.2 INJECTION, SOLUTION INTRAMUSCULAR; INTRAVENOUS; SUBCUTANEOUS at 17:34

## 2024-01-01 RX ADMIN — IPRATROPIUM BROMIDE AND ALBUTEROL SULFATE 3 ML: .5; 3 SOLUTION RESPIRATORY (INHALATION) at 07:21

## 2024-01-01 RX ADMIN — BUMETANIDE 2 MG: 1 TABLET ORAL at 08:12

## 2024-01-01 RX ADMIN — ASPIRIN 81 MG CHEWABLE TABLET 81 MG: 81 TABLET CHEWABLE at 08:24

## 2024-01-01 RX ADMIN — ASPIRIN 81 MG CHEWABLE TABLET 81 MG: 81 TABLET CHEWABLE at 10:08

## 2024-01-01 RX ADMIN — IPRATROPIUM BROMIDE AND ALBUTEROL SULFATE 3 ML: .5; 3 SOLUTION RESPIRATORY (INHALATION) at 11:06

## 2024-01-01 RX ADMIN — MICONAZOLE NITRATE: 20 CREAM TOPICAL at 21:01

## 2024-01-01 RX ADMIN — METHYLPREDNISOLONE SODIUM SUCCINATE 40 MG: 40 INJECTION, POWDER, FOR SOLUTION INTRAMUSCULAR; INTRAVENOUS at 21:23

## 2024-01-01 RX ADMIN — IPRATROPIUM BROMIDE AND ALBUTEROL SULFATE 3 ML: .5; 3 SOLUTION RESPIRATORY (INHALATION) at 11:12

## 2024-01-01 RX ADMIN — DOBUTAMINE HYDROCHLORIDE 5 MCG/KG/MIN: 200 INJECTION INTRAVENOUS at 11:31

## 2024-01-01 RX ADMIN — MICONAZOLE NITRATE: 2 POWDER TOPICAL at 13:51

## 2024-01-01 RX ADMIN — IPRATROPIUM BROMIDE AND ALBUTEROL SULFATE 3 ML: .5; 3 SOLUTION RESPIRATORY (INHALATION) at 19:37

## 2024-01-01 RX ADMIN — SODIUM CHLORIDE 500 ML: 9 INJECTION, SOLUTION INTRAVENOUS at 17:40

## 2024-01-01 RX ADMIN — CHLOROTHIAZIDE SODIUM 500 MG: 500 INJECTION, POWDER, LYOPHILIZED, FOR SOLUTION INTRAVENOUS at 17:37

## 2024-01-01 RX ADMIN — FLUTICASONE FUROATE AND VILANTEROL TRIFENATATE 1 PUFF: 200; 25 POWDER RESPIRATORY (INHALATION) at 10:02

## 2024-01-01 RX ADMIN — IPRATROPIUM BROMIDE AND ALBUTEROL SULFATE 3 ML: .5; 3 SOLUTION RESPIRATORY (INHALATION) at 16:18

## 2024-01-01 RX ADMIN — UMECLIDINIUM 1 PUFF: 62.5 AEROSOL, POWDER ORAL at 08:59

## 2024-01-01 RX ADMIN — MICONAZOLE NITRATE: 20 POWDER TOPICAL at 21:04

## 2024-01-01 RX ADMIN — FUROSEMIDE 10 MG/HR: 10 INJECTION, SOLUTION INTRAMUSCULAR; INTRAVENOUS at 05:00

## 2024-01-01 RX ADMIN — ENOXAPARIN SODIUM 40 MG: 40 INJECTION SUBCUTANEOUS at 09:59

## 2024-01-01 RX ADMIN — UMECLIDINIUM 1 PUFF: 62.5 AEROSOL, POWDER ORAL at 08:01

## 2024-01-01 RX ADMIN — METHYLPREDNISOLONE SODIUM SUCCINATE 125 MG: 125 INJECTION, POWDER, FOR SOLUTION INTRAMUSCULAR; INTRAVENOUS at 18:10

## 2024-01-01 RX ADMIN — ASPIRIN 81 MG CHEWABLE TABLET 81 MG: 81 TABLET CHEWABLE at 09:03

## 2024-01-01 RX ADMIN — IPRATROPIUM BROMIDE AND ALBUTEROL SULFATE 3 ML: .5; 3 SOLUTION RESPIRATORY (INHALATION) at 07:39

## 2024-01-01 RX ADMIN — IPRATROPIUM BROMIDE AND ALBUTEROL SULFATE 3 ML: .5; 3 SOLUTION RESPIRATORY (INHALATION) at 07:13

## 2024-01-01 RX ADMIN — PREDNISONE 40 MG: 20 TABLET ORAL at 07:45

## 2024-01-01 ASSESSMENT — ACTIVITIES OF DAILY LIVING (ADL)
ADLS_ACUITY_SCORE: 44
ADLS_ACUITY_SCORE: 36
ADLS_ACUITY_SCORE: 36
ADLS_ACUITY_SCORE: 0
ADLS_ACUITY_SCORE: 44
ADLS_ACUITY_SCORE: 39
ADLS_ACUITY_SCORE: 40
ADLS_ACUITY_SCORE: 0
ADLS_ACUITY_SCORE: 48
ADLS_ACUITY_SCORE: 36
ADLS_ACUITY_SCORE: 0
ADLS_ACUITY_SCORE: 42
ADLS_ACUITY_SCORE: 44
ADLS_ACUITY_SCORE: 42
ADLS_ACUITY_SCORE: 43
ADLS_ACUITY_SCORE: 38
ADLS_ACUITY_SCORE: 36
ADLS_ACUITY_SCORE: 44
ADLS_ACUITY_SCORE: 36
ADLS_ACUITY_SCORE: 44
ADLS_ACUITY_SCORE: 49
ADLS_ACUITY_SCORE: 40
ADLS_ACUITY_SCORE: 42
ADLS_ACUITY_SCORE: 0
ADLS_ACUITY_SCORE: 42
DOING_ERRANDS_INDEPENDENTLY_DIFFICULTY: YES
ADLS_ACUITY_SCORE: 46
ADLS_ACUITY_SCORE: 36
ADLS_ACUITY_SCORE: 48
ADLS_ACUITY_SCORE: 49
ADLS_ACUITY_SCORE: 49
ADLS_ACUITY_SCORE: 43
ADLS_ACUITY_SCORE: 42
ADLS_ACUITY_SCORE: 44
ADLS_ACUITY_SCORE: 36
ADLS_ACUITY_SCORE: 36
ADLS_ACUITY_SCORE: 42
ADLS_ACUITY_SCORE: 43
ADLS_ACUITY_SCORE: 43
ADLS_ACUITY_SCORE: 49
ADLS_ACUITY_SCORE: 42
ADLS_ACUITY_SCORE: 40
ADLS_ACUITY_SCORE: 41
ADLS_ACUITY_SCORE: 49
ADLS_ACUITY_SCORE: 0
ADLS_ACUITY_SCORE: 43
ADLS_ACUITY_SCORE: 42
ADLS_ACUITY_SCORE: 37
ADLS_ACUITY_SCORE: 49
ADLS_ACUITY_SCORE: 43
ADLS_ACUITY_SCORE: 48
ADLS_ACUITY_SCORE: 36
ADLS_ACUITY_SCORE: 44
ADLS_ACUITY_SCORE: 36
ADLS_ACUITY_SCORE: 40
ADLS_ACUITY_SCORE: 43
ADLS_ACUITY_SCORE: 48
ADLS_ACUITY_SCORE: 36
ADLS_ACUITY_SCORE: 0
ADLS_ACUITY_SCORE: 36
ADLS_ACUITY_SCORE: 43
ADLS_ACUITY_SCORE: 48
ADLS_ACUITY_SCORE: 36
ADLS_ACUITY_SCORE: 43
ADLS_ACUITY_SCORE: 43
TOILETING_ISSUES: YES
ADLS_ACUITY_SCORE: 48
ADLS_ACUITY_SCORE: 42
ADLS_ACUITY_SCORE: 36
ADLS_ACUITY_SCORE: 43
ADLS_ACUITY_SCORE: 49
ADLS_ACUITY_SCORE: 41
ADLS_ACUITY_SCORE: 36
ADLS_ACUITY_SCORE: 42
ADLS_ACUITY_SCORE: 44
ADLS_ACUITY_SCORE: 0
ADLS_ACUITY_SCORE: 41
ADLS_ACUITY_SCORE: 0
ADLS_ACUITY_SCORE: 36
ADLS_ACUITY_SCORE: 49
ADLS_ACUITY_SCORE: 43
ADLS_ACUITY_SCORE: 36
ADLS_ACUITY_SCORE: 43
ADLS_ACUITY_SCORE: 42
ADLS_ACUITY_SCORE: 40
ADLS_ACUITY_SCORE: 42
ADLS_ACUITY_SCORE: 44
ADLS_ACUITY_SCORE: 48
ADLS_ACUITY_SCORE: 42
ADLS_ACUITY_SCORE: 36
ADLS_ACUITY_SCORE: 43
ADLS_ACUITY_SCORE: 42
ADLS_ACUITY_SCORE: 42
ADLS_ACUITY_SCORE: 44
ADLS_ACUITY_SCORE: 44
ADLS_ACUITY_SCORE: 43
ADLS_ACUITY_SCORE: 36
ADLS_ACUITY_SCORE: 0
ADLS_ACUITY_SCORE: 44
ADLS_ACUITY_SCORE: 44
ADLS_ACUITY_SCORE: 43
ADLS_ACUITY_SCORE: 0
ADLS_ACUITY_SCORE: 43
ADLS_ACUITY_SCORE: 42
ADLS_ACUITY_SCORE: 44
ADLS_ACUITY_SCORE: 48
ADLS_ACUITY_SCORE: 43
ADLS_ACUITY_SCORE: 39
ADLS_ACUITY_SCORE: 43
ADLS_ACUITY_SCORE: 40
ADLS_ACUITY_SCORE: 36
VISION_MANAGEMENT: GLASSES
ADLS_ACUITY_SCORE: 43
ADLS_ACUITY_SCORE: 44
ADLS_ACUITY_SCORE: 43
CHANGE_IN_FUNCTIONAL_STATUS_SINCE_ONSET_OF_CURRENT_ILLNESS/INJURY: YES
ADLS_ACUITY_SCORE: 42
ADLS_ACUITY_SCORE: 44
ADLS_ACUITY_SCORE: 42
ADLS_ACUITY_SCORE: 36
ADLS_ACUITY_SCORE: 43
ADLS_ACUITY_SCORE: 40
ADLS_ACUITY_SCORE: 49
ADLS_ACUITY_SCORE: 41
ADLS_ACUITY_SCORE: 43
ADLS_ACUITY_SCORE: 44
ADLS_ACUITY_SCORE: 42
ADLS_ACUITY_SCORE: 49
ADLS_ACUITY_SCORE: 48
ADLS_ACUITY_SCORE: 43
ADLS_ACUITY_SCORE: 48
ADLS_ACUITY_SCORE: 48
ADLS_ACUITY_SCORE: 44
ADLS_ACUITY_SCORE: 44
ADLS_ACUITY_SCORE: 0
ADLS_ACUITY_SCORE: 36
ADLS_ACUITY_SCORE: 36
ADLS_ACUITY_SCORE: 44
ADLS_ACUITY_SCORE: 36
ADLS_ACUITY_SCORE: 48
ADLS_ACUITY_SCORE: 43
ADLS_ACUITY_SCORE: 42
ADLS_ACUITY_SCORE: 44
ADLS_ACUITY_SCORE: 44
ADLS_ACUITY_SCORE: 42
CHANGE_IN_FUNCTIONAL_STATUS_SINCE_ONSET_OF_CURRENT_ILLNESS/INJURY: YES
ADLS_ACUITY_SCORE: 49
ADLS_ACUITY_SCORE: 36
ADLS_ACUITY_SCORE: 42
ADLS_ACUITY_SCORE: 48
ADLS_ACUITY_SCORE: 0
ADLS_ACUITY_SCORE: 48
ADLS_ACUITY_SCORE: 0
ADLS_ACUITY_SCORE: 42
ADLS_ACUITY_SCORE: 42
ADLS_ACUITY_SCORE: 36
ADLS_ACUITY_SCORE: 43
DIFFICULTY_EATING/SWALLOWING: NO
ADLS_ACUITY_SCORE: 43
ADLS_ACUITY_SCORE: 44
ADLS_ACUITY_SCORE: 42
ADLS_ACUITY_SCORE: 36
ADLS_ACUITY_SCORE: 36
ADLS_ACUITY_SCORE: 43
ADLS_ACUITY_SCORE: 42
ADLS_ACUITY_SCORE: 48
ADLS_ACUITY_SCORE: 42
ADLS_ACUITY_SCORE: 49
ADLS_ACUITY_SCORE: 40
ADLS_ACUITY_SCORE: 44
ADLS_ACUITY_SCORE: 49
ADLS_ACUITY_SCORE: 43
ADLS_ACUITY_SCORE: 0
ADLS_ACUITY_SCORE: 43
ADLS_ACUITY_SCORE: 0
ADLS_ACUITY_SCORE: 40
ADLS_ACUITY_SCORE: 43
ADLS_ACUITY_SCORE: 44
ADLS_ACUITY_SCORE: 42
ADLS_ACUITY_SCORE: 0
ADLS_ACUITY_SCORE: 44
ADLS_ACUITY_SCORE: 42
ADLS_ACUITY_SCORE: 0
ADLS_ACUITY_SCORE: 48
ADLS_ACUITY_SCORE: 48
ADLS_ACUITY_SCORE: 36
ADLS_ACUITY_SCORE: 42
ADLS_ACUITY_SCORE: 43
ADLS_ACUITY_SCORE: 42
ADLS_ACUITY_SCORE: 47
ADLS_ACUITY_SCORE: 0
ADLS_ACUITY_SCORE: 48
ADLS_ACUITY_SCORE: 42
ADLS_ACUITY_SCORE: 43
ADLS_ACUITY_SCORE: 47
ADLS_ACUITY_SCORE: 43
ADLS_ACUITY_SCORE: 42
ADLS_ACUITY_SCORE: 42
ADLS_ACUITY_SCORE: 0
ADLS_ACUITY_SCORE: 37
ADLS_ACUITY_SCORE: 35
ADLS_ACUITY_SCORE: 48
ADLS_ACUITY_SCORE: 49
ADLS_ACUITY_SCORE: 44
ADLS_ACUITY_SCORE: 48
ADLS_ACUITY_SCORE: 43
ADLS_ACUITY_SCORE: 36
ADLS_ACUITY_SCORE: 43
ADLS_ACUITY_SCORE: 42
ADLS_ACUITY_SCORE: 43
ADLS_ACUITY_SCORE: 36
ADLS_ACUITY_SCORE: 44
ADLS_ACUITY_SCORE: 0
ADLS_ACUITY_SCORE: 48
ADLS_ACUITY_SCORE: 42
ADLS_ACUITY_SCORE: 48
ADLS_ACUITY_SCORE: 43
ADLS_ACUITY_SCORE: 42
ADLS_ACUITY_SCORE: 43
ADLS_ACUITY_SCORE: 43
ADLS_ACUITY_SCORE: 44
ADLS_ACUITY_SCORE: 36
ADLS_ACUITY_SCORE: 43
ADLS_ACUITY_SCORE: 48
ADLS_ACUITY_SCORE: 36
ADLS_ACUITY_SCORE: 43
ADLS_ACUITY_SCORE: 43
ADLS_ACUITY_SCORE: 40
ADLS_ACUITY_SCORE: 0
ADLS_ACUITY_SCORE: 42
ADLS_ACUITY_SCORE: 47
ADLS_ACUITY_SCORE: 41
ADLS_ACUITY_SCORE: 36
ADLS_ACUITY_SCORE: 36
ADLS_ACUITY_SCORE: 0
ADLS_ACUITY_SCORE: 41
ADLS_ACUITY_SCORE: 44
ADLS_ACUITY_SCORE: 36
ADLS_ACUITY_SCORE: 36
ADLS_ACUITY_SCORE: 47
ADLS_ACUITY_SCORE: 39
ADLS_ACUITY_SCORE: 44
ADLS_ACUITY_SCORE: 48
ADLS_ACUITY_SCORE: 44
ADLS_ACUITY_SCORE: 0
ADLS_ACUITY_SCORE: 48
ADLS_ACUITY_SCORE: 43
ADLS_ACUITY_SCORE: 0
ADLS_ACUITY_SCORE: 44
ADLS_ACUITY_SCORE: 48
ADLS_ACUITY_SCORE: 44
ADLS_ACUITY_SCORE: 44
ADLS_ACUITY_SCORE: 38
ADLS_ACUITY_SCORE: 36
ADLS_ACUITY_SCORE: 0
ADLS_ACUITY_SCORE: 42
ADLS_ACUITY_SCORE: 38
ADLS_ACUITY_SCORE: 48
ADLS_ACUITY_SCORE: 43
ADLS_ACUITY_SCORE: 0
ADLS_ACUITY_SCORE: 42
ADLS_ACUITY_SCORE: 48
ADLS_ACUITY_SCORE: 48
ADLS_ACUITY_SCORE: 39
ADLS_ACUITY_SCORE: 43
ADLS_ACUITY_SCORE: 36
ADLS_ACUITY_SCORE: 36
ADLS_ACUITY_SCORE: 0
ADLS_ACUITY_SCORE: 36
ADLS_ACUITY_SCORE: 49
ADLS_ACUITY_SCORE: 0
ADLS_ACUITY_SCORE: 42
ADLS_ACUITY_SCORE: 40
ADLS_ACUITY_SCORE: 42
ADLS_ACUITY_SCORE: 36
ADLS_ACUITY_SCORE: 36
ADLS_ACUITY_SCORE: 42
ADLS_ACUITY_SCORE: 42
ADLS_ACUITY_SCORE: 43
ADLS_ACUITY_SCORE: 42
ADLS_ACUITY_SCORE: 47
ADLS_ACUITY_SCORE: 39
ADLS_ACUITY_SCORE: 47
ADLS_ACUITY_SCORE: 38
ADLS_ACUITY_SCORE: 0
ADLS_ACUITY_SCORE: 43
ADLS_ACUITY_SCORE: 36
ADLS_ACUITY_SCORE: 43
ADLS_ACUITY_SCORE: 43
ADLS_ACUITY_SCORE: 44
ADLS_ACUITY_SCORE: 42
ADLS_ACUITY_SCORE: 36
ADLS_ACUITY_SCORE: 44
ADLS_ACUITY_SCORE: 36
TOILETING_ISSUES: NO
ADLS_ACUITY_SCORE: 44
ADLS_ACUITY_SCORE: 43
ADLS_ACUITY_SCORE: 43
ADLS_ACUITY_SCORE: 48
ADLS_ACUITY_SCORE: 40
ADLS_ACUITY_SCORE: 42
ADLS_ACUITY_SCORE: 43
ADLS_ACUITY_SCORE: 44
ADLS_ACUITY_SCORE: 44
ADLS_ACUITY_SCORE: 48
ADLS_ACUITY_SCORE: 42
ADLS_ACUITY_SCORE: 43
ADLS_ACUITY_SCORE: 44
ADLS_ACUITY_SCORE: 48
ADLS_ACUITY_SCORE: 42
ADLS_ACUITY_SCORE: 42
ADLS_ACUITY_SCORE: 0
ADLS_ACUITY_SCORE: 47
ADLS_ACUITY_SCORE: 43
ADLS_ACUITY_SCORE: 42
ADLS_ACUITY_SCORE: 43
ADLS_ACUITY_SCORE: 44
ADLS_ACUITY_SCORE: 43
ADLS_ACUITY_SCORE: 44
ADLS_ACUITY_SCORE: 40
ADLS_ACUITY_SCORE: 39
ADLS_ACUITY_SCORE: 43
ADLS_ACUITY_SCORE: 43
ADLS_ACUITY_SCORE: 40
ADLS_ACUITY_SCORE: 38
ADLS_ACUITY_SCORE: 0
ADLS_ACUITY_SCORE: 48
DRESSING/BATHING_DIFFICULTY: YES
ADLS_ACUITY_SCORE: 41
ADLS_ACUITY_SCORE: 44
ADLS_ACUITY_SCORE: 36
ADLS_ACUITY_SCORE: 43
ADLS_ACUITY_SCORE: 44
ADLS_ACUITY_SCORE: 42
ADLS_ACUITY_SCORE: 36
ADLS_ACUITY_SCORE: 36
ADLS_ACUITY_SCORE: 43
ADLS_ACUITY_SCORE: 43
ADLS_ACUITY_SCORE: 0
ADLS_ACUITY_SCORE: 43
ADLS_ACUITY_SCORE: 43
ADLS_ACUITY_SCORE: 36
ADLS_ACUITY_SCORE: 42
ADLS_ACUITY_SCORE: 49
ADLS_ACUITY_SCORE: 36
ADLS_ACUITY_SCORE: 36
ADLS_ACUITY_SCORE: 43
ADLS_ACUITY_SCORE: 44
ADLS_ACUITY_SCORE: 36
ADLS_ACUITY_SCORE: 49
ADLS_ACUITY_SCORE: 0
ADLS_ACUITY_SCORE: 0
ADLS_ACUITY_SCORE: 43
ADLS_ACUITY_SCORE: 47
ADLS_ACUITY_SCORE: 49
ADLS_ACUITY_SCORE: 43
ADLS_ACUITY_SCORE: 43
ADLS_ACUITY_SCORE: 0
ADLS_ACUITY_SCORE: 42
ADLS_ACUITY_SCORE: 44
ADLS_ACUITY_SCORE: 40
ADLS_ACUITY_SCORE: 48
ADLS_ACUITY_SCORE: 42
ADLS_ACUITY_SCORE: 36
ADLS_ACUITY_SCORE: 39
ADLS_ACUITY_SCORE: 47
ADLS_ACUITY_SCORE: 44
ADLS_ACUITY_SCORE: 44
ADLS_ACUITY_SCORE: 48
ADLS_ACUITY_SCORE: 44
HEARING_DIFFICULTY_OR_DEAF: NO
ADLS_ACUITY_SCORE: 44
ADLS_ACUITY_SCORE: 42
ADLS_ACUITY_SCORE: 44
ADLS_ACUITY_SCORE: 36
ADLS_ACUITY_SCORE: 38
ADLS_ACUITY_SCORE: 42
ADLS_ACUITY_SCORE: 42
ADLS_ACUITY_SCORE: 35
ADLS_ACUITY_SCORE: 38
ADLS_ACUITY_SCORE: 0
ADLS_ACUITY_SCORE: 48
ADLS_ACUITY_SCORE: 42
ADLS_ACUITY_SCORE: 49
TOILETING: 1-->ASSISTANCE (EQUIPMENT/PERSON) NEEDED (NOT DEVELOPMENTALLY APPROPRIATE)
ADLS_ACUITY_SCORE: 0
ADLS_ACUITY_SCORE: 49
ADLS_ACUITY_SCORE: 42
ADLS_ACUITY_SCORE: 41
ADLS_ACUITY_SCORE: 40
ADLS_ACUITY_SCORE: 48
ADLS_ACUITY_SCORE: 40
ADLS_ACUITY_SCORE: 41
ADLS_ACUITY_SCORE: 0
ADLS_ACUITY_SCORE: 43
ADLS_ACUITY_SCORE: 43
ADLS_ACUITY_SCORE: 35
ADLS_ACUITY_SCORE: 0
ADLS_ACUITY_SCORE: 44
ADLS_ACUITY_SCORE: 40
ADLS_ACUITY_SCORE: 49
ADLS_ACUITY_SCORE: 43
ADLS_ACUITY_SCORE: 38
ADLS_ACUITY_SCORE: 36
ADLS_ACUITY_SCORE: 44
ADLS_ACUITY_SCORE: 38
ADLS_ACUITY_SCORE: 0
ADLS_ACUITY_SCORE: 42
ADLS_ACUITY_SCORE: 0
ADLS_ACUITY_SCORE: 36
ADLS_ACUITY_SCORE: 40
ADLS_ACUITY_SCORE: 44
ADLS_ACUITY_SCORE: 36
ADLS_ACUITY_SCORE: 43
ADLS_ACUITY_SCORE: 36
WALKING_OR_CLIMBING_STAIRS_DIFFICULTY: YES
CONCENTRATING,_REMEMBERING_OR_MAKING_DECISIONS_DIFFICULTY: NO
ADLS_ACUITY_SCORE: 40
ADLS_ACUITY_SCORE: 43
ADLS_ACUITY_SCORE: 40
ADLS_ACUITY_SCORE: 49
ADLS_ACUITY_SCORE: 42
ADLS_ACUITY_SCORE: 47
ADLS_ACUITY_SCORE: 42
ADLS_ACUITY_SCORE: 38
ADLS_ACUITY_SCORE: 47
ADLS_ACUITY_SCORE: 36
ADLS_ACUITY_SCORE: 42
ADLS_ACUITY_SCORE: 36
ADLS_ACUITY_SCORE: 43
ADLS_ACUITY_SCORE: 44
ADLS_ACUITY_SCORE: 36
ADLS_ACUITY_SCORE: 36
ADLS_ACUITY_SCORE: 43
ADLS_ACUITY_SCORE: 43
ADLS_ACUITY_SCORE: 44
ADLS_ACUITY_SCORE: 36
ADLS_ACUITY_SCORE: 43
ADLS_ACUITY_SCORE: 48
ADLS_ACUITY_SCORE: 36
ADLS_ACUITY_SCORE: 49
ADLS_ACUITY_SCORE: 0
ADLS_ACUITY_SCORE: 41
ADLS_ACUITY_SCORE: 36
ADLS_ACUITY_SCORE: 43
ADLS_ACUITY_SCORE: 36
ADLS_ACUITY_SCORE: 43
ADLS_ACUITY_SCORE: 36
ADLS_ACUITY_SCORE: 43
ADLS_ACUITY_SCORE: 38
ADLS_ACUITY_SCORE: 42
ADLS_ACUITY_SCORE: 43
ADLS_ACUITY_SCORE: 48
ADLS_ACUITY_SCORE: 44
ADLS_ACUITY_SCORE: 36
ADLS_ACUITY_SCORE: 39
ADLS_ACUITY_SCORE: 48
ADLS_ACUITY_SCORE: 47
ADLS_ACUITY_SCORE: 48
ADLS_ACUITY_SCORE: 48
ADLS_ACUITY_SCORE: 42
ADLS_ACUITY_SCORE: 36
ADLS_ACUITY_SCORE: 41
ADLS_ACUITY_SCORE: 0
ADLS_ACUITY_SCORE: 44
ADLS_ACUITY_SCORE: 43
ADLS_ACUITY_SCORE: 40
ADLS_ACUITY_SCORE: 43
ADLS_ACUITY_SCORE: 44
ADLS_ACUITY_SCORE: 0
ADLS_ACUITY_SCORE: 0
ADLS_ACUITY_SCORE: 44
ADLS_ACUITY_SCORE: 49
ADLS_ACUITY_SCORE: 36
ADLS_ACUITY_SCORE: 44
ADLS_ACUITY_SCORE: 36
ADLS_ACUITY_SCORE: 42
ADLS_ACUITY_SCORE: 43
ADLS_ACUITY_SCORE: 42
DEPENDENT_IADLS:: CLEANING;COOKING;LAUNDRY;SHOPPING;MEDICATION MANAGEMENT
ADLS_ACUITY_SCORE: 47
ADLS_ACUITY_SCORE: 42
ADLS_ACUITY_SCORE: 48
ADLS_ACUITY_SCORE: 40
ADLS_ACUITY_SCORE: 0
ADLS_ACUITY_SCORE: 40
ADLS_ACUITY_SCORE: 44
ADLS_ACUITY_SCORE: 36
ADLS_ACUITY_SCORE: 42
ADLS_ACUITY_SCORE: 43
ADLS_ACUITY_SCORE: 43
DRESSING/BATHING: BATHING DIFFICULTY, ASSISTANCE 1 PERSON
ADLS_ACUITY_SCORE: 42
ADLS_ACUITY_SCORE: 42
ADLS_ACUITY_SCORE: 44
ADLS_ACUITY_SCORE: 47
ADLS_ACUITY_SCORE: 36
ADLS_ACUITY_SCORE: 35
ADLS_ACUITY_SCORE: 49
ADLS_ACUITY_SCORE: 44
ADLS_ACUITY_SCORE: 39
ADLS_ACUITY_SCORE: 42
ADLS_ACUITY_SCORE: 42
ADLS_ACUITY_SCORE: 43
ADLS_ACUITY_SCORE: 40
ADLS_ACUITY_SCORE: 48
ADLS_ACUITY_SCORE: 0
DIFFICULTY_COMMUNICATING: NO
ADLS_ACUITY_SCORE: 42
ADLS_ACUITY_SCORE: 43
ADLS_ACUITY_SCORE: 47
ADLS_ACUITY_SCORE: 48
ADLS_ACUITY_SCORE: 42
ADLS_ACUITY_SCORE: 40
ADLS_ACUITY_SCORE: 42
ADLS_ACUITY_SCORE: 43
ADLS_ACUITY_SCORE: 36
ADLS_ACUITY_SCORE: 47
ADLS_ACUITY_SCORE: 43
ADLS_ACUITY_SCORE: 43
ADLS_ACUITY_SCORE: 42
ADLS_ACUITY_SCORE: 48
ADLS_ACUITY_SCORE: 42
ADLS_ACUITY_SCORE: 43
ADLS_ACUITY_SCORE: 43
ADLS_ACUITY_SCORE: 42
ADLS_ACUITY_SCORE: 42
ADLS_ACUITY_SCORE: 43
ADLS_ACUITY_SCORE: 0
ADLS_ACUITY_SCORE: 43
ADLS_ACUITY_SCORE: 48
ADLS_ACUITY_SCORE: 36
ADLS_ACUITY_SCORE: 44
ADLS_ACUITY_SCORE: 0
ADLS_ACUITY_SCORE: 49
ADLS_ACUITY_SCORE: 43
ADLS_ACUITY_SCORE: 43
ADLS_ACUITY_SCORE: 41
ADLS_ACUITY_SCORE: 36
ADLS_ACUITY_SCORE: 42
ADLS_ACUITY_SCORE: 36
ADLS_ACUITY_SCORE: 36
ADLS_ACUITY_SCORE: 39
WALKING_OR_CLIMBING_STAIRS: AMBULATION DIFFICULTY, DEPENDENT;TRANSFERRING DIFFICULTY, DEPENDENT
ADLS_ACUITY_SCORE: 41
ADLS_ACUITY_SCORE: 40
ADLS_ACUITY_SCORE: 48
ADLS_ACUITY_SCORE: 42
ADLS_ACUITY_SCORE: 43
ADLS_ACUITY_SCORE: 43
ADLS_ACUITY_SCORE: 0
ADLS_ACUITY_SCORE: 43
ADLS_ACUITY_SCORE: 42
ADLS_ACUITY_SCORE: 43
ADLS_ACUITY_SCORE: 37
ADLS_ACUITY_SCORE: 0
ADLS_ACUITY_SCORE: 45
ADLS_ACUITY_SCORE: 49
ADLS_ACUITY_SCORE: 49
ADLS_ACUITY_SCORE: 48
ADLS_ACUITY_SCORE: 43
ADLS_ACUITY_SCORE: 43
ADLS_ACUITY_SCORE: 44
ADLS_ACUITY_SCORE: 49
ADLS_ACUITY_SCORE: 43
ADLS_ACUITY_SCORE: 0
ADLS_ACUITY_SCORE: 43
ADLS_ACUITY_SCORE: 44
ADLS_ACUITY_SCORE: 48
ADLS_ACUITY_SCORE: 48
ADLS_ACUITY_SCORE: 43
ADLS_ACUITY_SCORE: 48
ADLS_ACUITY_SCORE: 43
ADLS_ACUITY_SCORE: 0
ADLS_ACUITY_SCORE: 49
ADLS_ACUITY_SCORE: 36
ADLS_ACUITY_SCORE: 36
ADLS_ACUITY_SCORE: 48
ADLS_ACUITY_SCORE: 48
ADLS_ACUITY_SCORE: 43
ADLS_ACUITY_SCORE: 44
ADLS_ACUITY_SCORE: 49
ADLS_ACUITY_SCORE: 42
ADLS_ACUITY_SCORE: 42
ADLS_ACUITY_SCORE: 43
ADLS_ACUITY_SCORE: 0
ADLS_ACUITY_SCORE: 42
ADLS_ACUITY_SCORE: 36
ADLS_ACUITY_SCORE: 44
ADLS_ACUITY_SCORE: 44
ADLS_ACUITY_SCORE: 49
ADLS_ACUITY_SCORE: 36
ADLS_ACUITY_SCORE: 39
ADLS_ACUITY_SCORE: 43
ADLS_ACUITY_SCORE: 48
ADLS_ACUITY_SCORE: 43
ADLS_ACUITY_SCORE: 47
ADLS_ACUITY_SCORE: 42
ADLS_ACUITY_SCORE: 39
ADLS_ACUITY_SCORE: 44
ADLS_ACUITY_SCORE: 41
ADLS_ACUITY_SCORE: 43
ADLS_ACUITY_SCORE: 47
ADLS_ACUITY_SCORE: 0
ADLS_ACUITY_SCORE: 48
ADLS_ACUITY_SCORE: 0
ADLS_ACUITY_SCORE: 36
ADLS_ACUITY_SCORE: 49
ADLS_ACUITY_SCORE: 43
ADLS_ACUITY_SCORE: 38
ADLS_ACUITY_SCORE: 42
ADLS_ACUITY_SCORE: 43
ADLS_ACUITY_SCORE: 48
ADLS_ACUITY_SCORE: 36
ADLS_ACUITY_SCORE: 44
ADLS_ACUITY_SCORE: 48
ADLS_ACUITY_SCORE: 36
ADLS_ACUITY_SCORE: 49
ADLS_ACUITY_SCORE: 36
ADLS_ACUITY_SCORE: 36
ADLS_ACUITY_SCORE: 42
ADLS_ACUITY_SCORE: 36
ADLS_ACUITY_SCORE: 36
ADLS_ACUITY_SCORE: 42
ADLS_ACUITY_SCORE: 41
ADLS_ACUITY_SCORE: 38
ADLS_ACUITY_SCORE: 44
ADLS_ACUITY_SCORE: 0
ADLS_ACUITY_SCORE: 49
ADLS_ACUITY_SCORE: 48
ADLS_ACUITY_SCORE: 36
ADLS_ACUITY_SCORE: 44
ADLS_ACUITY_SCORE: 42
ADLS_ACUITY_SCORE: 43
ADLS_ACUITY_SCORE: 0
ADLS_ACUITY_SCORE: 36
ADLS_ACUITY_SCORE: 48
ADLS_ACUITY_SCORE: 48
ADLS_ACUITY_SCORE: 39
ADLS_ACUITY_SCORE: 36
ADLS_ACUITY_SCORE: 36
ADLS_ACUITY_SCORE: 43
ADLS_ACUITY_SCORE: 36
ADLS_ACUITY_SCORE: 48
ADLS_ACUITY_SCORE: 35
ADLS_ACUITY_SCORE: 44
ADLS_ACUITY_SCORE: 38
ADLS_ACUITY_SCORE: 42
ADLS_ACUITY_SCORE: 36
ADLS_ACUITY_SCORE: 49
ADLS_ACUITY_SCORE: 36
TOILETING: 1-->ASSISTANCE (EQUIPMENT/PERSON) NEEDED
ADLS_ACUITY_SCORE: 44
ADLS_ACUITY_SCORE: 0
ADLS_ACUITY_SCORE: 43
ADLS_ACUITY_SCORE: 0
ADLS_ACUITY_SCORE: 44
ADLS_ACUITY_SCORE: 36
ADLS_ACUITY_SCORE: 42
ADLS_ACUITY_SCORE: 41
ADLS_ACUITY_SCORE: 44
ADLS_ACUITY_SCORE: 38
ADLS_ACUITY_SCORE: 44
ADLS_ACUITY_SCORE: 36
ADLS_ACUITY_SCORE: 0
ADLS_ACUITY_SCORE: 0
ADLS_ACUITY_SCORE: 40
ADLS_ACUITY_SCORE: 40
ADLS_ACUITY_SCORE: 36
ADLS_ACUITY_SCORE: 0
ADLS_ACUITY_SCORE: 48
ADLS_ACUITY_SCORE: 0
ADLS_ACUITY_SCORE: 43
ADLS_ACUITY_SCORE: 48
ADLS_ACUITY_SCORE: 0
ADLS_ACUITY_SCORE: 44
ADLS_ACUITY_SCORE: 42
ADLS_ACUITY_SCORE: 38
ADLS_ACUITY_SCORE: 36
WEAR_GLASSES_OR_BLIND: YES
ADLS_ACUITY_SCORE: 43
ADLS_ACUITY_SCORE: 44
ADLS_ACUITY_SCORE: 43
ADLS_ACUITY_SCORE: 44
ADLS_ACUITY_SCORE: 43
ADLS_ACUITY_SCORE: 36
ADLS_ACUITY_SCORE: 44
ADLS_ACUITY_SCORE: 40
ADLS_ACUITY_SCORE: 0
ADLS_ACUITY_SCORE: 36
ADLS_ACUITY_SCORE: 36
ADLS_ACUITY_SCORE: 44
ADLS_ACUITY_SCORE: 44
ADLS_ACUITY_SCORE: 36
ADLS_ACUITY_SCORE: 40
ADLS_ACUITY_SCORE: 49
ADLS_ACUITY_SCORE: 42
ADLS_ACUITY_SCORE: 43
ADLS_ACUITY_SCORE: 36
ADLS_ACUITY_SCORE: 48
ADLS_ACUITY_SCORE: 43
ADLS_ACUITY_SCORE: 40
ADLS_ACUITY_SCORE: 44
ADLS_ACUITY_SCORE: 40
ADLS_ACUITY_SCORE: 49
ADLS_ACUITY_SCORE: 0
ADLS_ACUITY_SCORE: 36
ADLS_ACUITY_SCORE: 48
ADLS_ACUITY_SCORE: 43
ADLS_ACUITY_SCORE: 44
ADLS_ACUITY_SCORE: 42
ADLS_ACUITY_SCORE: 49
ADLS_ACUITY_SCORE: 42
ADLS_ACUITY_SCORE: 36
ADLS_ACUITY_SCORE: 38
ADLS_ACUITY_SCORE: 43
ADLS_ACUITY_SCORE: 36
ADLS_ACUITY_SCORE: 42
ADLS_ACUITY_SCORE: 40
ADLS_ACUITY_SCORE: 44
ADLS_ACUITY_SCORE: 36
ADLS_ACUITY_SCORE: 43
ADLS_ACUITY_SCORE: 36
ADLS_ACUITY_SCORE: 42
ADLS_ACUITY_SCORE: 41
ADLS_ACUITY_SCORE: 39
ADLS_ACUITY_SCORE: 42
ADLS_ACUITY_SCORE: 48
ADLS_ACUITY_SCORE: 36
ADLS_ACUITY_SCORE: 0
CONCENTRATING,_REMEMBERING_OR_MAKING_DECISIONS_DIFFICULTY: NO
ADLS_ACUITY_SCORE: 36
ADLS_ACUITY_SCORE: 43
ADLS_ACUITY_SCORE: 36
ADLS_ACUITY_SCORE: 0
ADLS_ACUITY_SCORE: 36
ADLS_ACUITY_SCORE: 42
ADLS_ACUITY_SCORE: 44
ADLS_ACUITY_SCORE: 35
ADLS_ACUITY_SCORE: 41
ADLS_ACUITY_SCORE: 44
ADLS_ACUITY_SCORE: 44
ADLS_ACUITY_SCORE: 48
ADLS_ACUITY_SCORE: 42
ADLS_ACUITY_SCORE: 36
ADLS_ACUITY_SCORE: 44
ADLS_ACUITY_SCORE: 44
ADLS_ACUITY_SCORE: 38
ADLS_ACUITY_SCORE: 43
ADLS_ACUITY_SCORE: 37
ADLS_ACUITY_SCORE: 41
ADLS_ACUITY_SCORE: 36
ADLS_ACUITY_SCORE: 43
ADLS_ACUITY_SCORE: 36
ADLS_ACUITY_SCORE: 0
ADLS_ACUITY_SCORE: 0
ADLS_ACUITY_SCORE: 49
ADLS_ACUITY_SCORE: 36
ADLS_ACUITY_SCORE: 36
ADLS_ACUITY_SCORE: 43
ADLS_ACUITY_SCORE: 42
ADLS_ACUITY_SCORE: 44
ADLS_ACUITY_SCORE: 36
ADLS_ACUITY_SCORE: 48
ADLS_ACUITY_SCORE: 36
ADLS_ACUITY_SCORE: 48
ADLS_ACUITY_SCORE: 48
ADLS_ACUITY_SCORE: 43
ADLS_ACUITY_SCORE: 40
ADLS_ACUITY_SCORE: 43
ADLS_ACUITY_SCORE: 0
ADLS_ACUITY_SCORE: 36
ADLS_ACUITY_SCORE: 41
ADLS_ACUITY_SCORE: 49
ADLS_ACUITY_SCORE: 44
ADLS_ACUITY_SCORE: 40
ADLS_ACUITY_SCORE: 44
ADLS_ACUITY_SCORE: 43
ADLS_ACUITY_SCORE: 49
ADLS_ACUITY_SCORE: 48
ADLS_ACUITY_SCORE: 44
ADLS_ACUITY_SCORE: 0
ADLS_ACUITY_SCORE: 42
ADLS_ACUITY_SCORE: 42
ADLS_ACUITY_SCORE: 43
ADLS_ACUITY_SCORE: 0
ADLS_ACUITY_SCORE: 36
ADLS_ACUITY_SCORE: 39
ADLS_ACUITY_SCORE: 0
ADLS_ACUITY_SCORE: 42
ADLS_ACUITY_SCORE: 36
ADLS_ACUITY_SCORE: 45
FALL_HISTORY_WITHIN_LAST_SIX_MONTHS: NO
ADLS_ACUITY_SCORE: 42
ADLS_ACUITY_SCORE: 36
ADLS_ACUITY_SCORE: 48
TOILETING_ASSISTANCE: TOILETING DIFFICULTY, ASSISTANCE 1 PERSON
ADLS_ACUITY_SCORE: 48
ADLS_ACUITY_SCORE: 42
ADLS_ACUITY_SCORE: 44
ADLS_ACUITY_SCORE: 48
ADLS_ACUITY_SCORE: 43
ADLS_ACUITY_SCORE: 42
ADLS_ACUITY_SCORE: 43
ADLS_ACUITY_SCORE: 42
ADLS_ACUITY_SCORE: 42
ADLS_ACUITY_SCORE: 39
ADLS_ACUITY_SCORE: 42
ADLS_ACUITY_SCORE: 36
ADLS_ACUITY_SCORE: 36
ADLS_ACUITY_SCORE: 39
ADLS_ACUITY_SCORE: 0
ADLS_ACUITY_SCORE: 36
ADLS_ACUITY_SCORE: 48
ADLS_ACUITY_SCORE: 44
ADLS_ACUITY_SCORE: 42
ADLS_ACUITY_SCORE: 42
ADLS_ACUITY_SCORE: 38
ADLS_ACUITY_SCORE: 40
ADLS_ACUITY_SCORE: 44
ADLS_ACUITY_SCORE: 42
ADLS_ACUITY_SCORE: 36
ADLS_ACUITY_SCORE: 0
ADLS_ACUITY_SCORE: 36
ADLS_ACUITY_SCORE: 44
ADLS_ACUITY_SCORE: 42
ADLS_ACUITY_SCORE: 0
ADLS_ACUITY_SCORE: 44
ADLS_ACUITY_SCORE: 48
ADLS_ACUITY_SCORE: 43
ADLS_ACUITY_SCORE: 49
EQUIPMENT_CURRENTLY_USED_AT_HOME: WALKER, STANDARD
ADLS_ACUITY_SCORE: 48
ADLS_ACUITY_SCORE: 0
ADLS_ACUITY_SCORE: 42
ADLS_ACUITY_SCORE: 0
ADLS_ACUITY_SCORE: 40
ADLS_ACUITY_SCORE: 43
ADLS_ACUITY_SCORE: 43
ADLS_ACUITY_SCORE: 44
ADLS_ACUITY_SCORE: 42
ADLS_ACUITY_SCORE: 44
ADLS_ACUITY_SCORE: 36
ADLS_ACUITY_SCORE: 36
ADLS_ACUITY_SCORE: 47
ADLS_ACUITY_SCORE: 43
FALL_HISTORY_WITHIN_LAST_SIX_MONTHS: NO
ADLS_ACUITY_SCORE: 43
ADLS_ACUITY_SCORE: 44
ADLS_ACUITY_SCORE: 44
ADLS_ACUITY_SCORE: 42
ADLS_ACUITY_SCORE: 44
ADLS_ACUITY_SCORE: 48
ADLS_ACUITY_SCORE: 44
ADLS_ACUITY_SCORE: 42
ADLS_ACUITY_SCORE: 0
ADLS_ACUITY_SCORE: 36
ADLS_ACUITY_SCORE: 49
ADLS_ACUITY_SCORE: 36
ADLS_ACUITY_SCORE: 39
ADLS_ACUITY_SCORE: 44
ADLS_ACUITY_SCORE: 0
WEAR_GLASSES_OR_BLIND: YES
ADLS_ACUITY_SCORE: 36
ADLS_ACUITY_SCORE: 44
ADLS_ACUITY_SCORE: 36
ADLS_ACUITY_SCORE: 42
ADLS_ACUITY_SCORE: 39
ADLS_ACUITY_SCORE: 49
ADLS_ACUITY_SCORE: 38
ADLS_ACUITY_SCORE: 44
ADLS_ACUITY_SCORE: 43
ADLS_ACUITY_SCORE: 38
ADLS_ACUITY_SCORE: 42
ADLS_ACUITY_SCORE: 48
ADLS_ACUITY_SCORE: 36
ADLS_ACUITY_SCORE: 42
ADLS_ACUITY_SCORE: 42
ADLS_ACUITY_SCORE: 41
ADLS_ACUITY_SCORE: 0
ADLS_ACUITY_SCORE: 39
ADLS_ACUITY_SCORE: 42
ADLS_ACUITY_SCORE: 36
ADLS_ACUITY_SCORE: 0
ADLS_ACUITY_SCORE: 48
ADLS_ACUITY_SCORE: 40
ADLS_ACUITY_SCORE: 42
ADLS_ACUITY_SCORE: 0
ADLS_ACUITY_SCORE: 44
ADLS_ACUITY_SCORE: 43
ADLS_ACUITY_SCORE: 42
ADLS_ACUITY_SCORE: 43
ADLS_ACUITY_SCORE: 44
ADLS_ACUITY_SCORE: 36
ADLS_ACUITY_SCORE: 44
ADLS_ACUITY_SCORE: 42
ADLS_ACUITY_SCORE: 47
ADLS_ACUITY_SCORE: 43
ADLS_ACUITY_SCORE: 48
ADLS_ACUITY_SCORE: 42
ADLS_ACUITY_SCORE: 43
ADLS_ACUITY_SCORE: 42
ADLS_ACUITY_SCORE: 36
ADLS_ACUITY_SCORE: 36
ADLS_ACUITY_SCORE: 48
ADLS_ACUITY_SCORE: 42
ADLS_ACUITY_SCORE: 47
ADLS_ACUITY_SCORE: 48
ADLS_ACUITY_SCORE: 35
ADLS_ACUITY_SCORE: 36
ADLS_ACUITY_SCORE: 36
ADLS_ACUITY_SCORE: 42
ADLS_ACUITY_SCORE: 36
ADLS_ACUITY_SCORE: 0
ADLS_ACUITY_SCORE: 48
ADLS_ACUITY_SCORE: 43
ADLS_ACUITY_SCORE: 48
ADLS_ACUITY_SCORE: 43
ADLS_ACUITY_SCORE: 44
ADLS_ACUITY_SCORE: 43
ADLS_ACUITY_SCORE: 0
ADLS_ACUITY_SCORE: 48
ADLS_ACUITY_SCORE: 36
ADLS_ACUITY_SCORE: 43
ADLS_ACUITY_SCORE: 36
ADLS_ACUITY_SCORE: 39
ADLS_ACUITY_SCORE: 44
DRESSING/BATHING_DIFFICULTY: OTHER (SEE COMMENTS)
ADLS_ACUITY_SCORE: 0
ADLS_ACUITY_SCORE: 42
ADLS_ACUITY_SCORE: 44
ADLS_ACUITY_SCORE: 36
ADLS_ACUITY_SCORE: 43
ADLS_ACUITY_SCORE: 41
ADLS_ACUITY_SCORE: 36
ADLS_ACUITY_SCORE: 43
ADLS_ACUITY_SCORE: 48
ADLS_ACUITY_SCORE: 42
ADLS_ACUITY_SCORE: 43
ADLS_ACUITY_SCORE: 44
ADLS_ACUITY_SCORE: 48
ADLS_ACUITY_SCORE: 43
ADLS_ACUITY_SCORE: 0
ADLS_ACUITY_SCORE: 43
DOING_ERRANDS_INDEPENDENTLY_DIFFICULTY: OTHER (SEE COMMENTS)
ADLS_ACUITY_SCORE: 36
ADLS_ACUITY_SCORE: 43
ADLS_ACUITY_SCORE: 43
ADLS_ACUITY_SCORE: 48
ADLS_ACUITY_SCORE: 0
ADLS_ACUITY_SCORE: 48
ADLS_ACUITY_SCORE: 36
ADLS_ACUITY_SCORE: 42
ADLS_ACUITY_SCORE: 39
ADLS_ACUITY_SCORE: 43
ADLS_ACUITY_SCORE: 36
ADLS_ACUITY_SCORE: 44
ADLS_ACUITY_SCORE: 0
ADLS_ACUITY_SCORE: 43
ADLS_ACUITY_SCORE: 42
ADLS_ACUITY_SCORE: 0
ADLS_ACUITY_SCORE: 42
ADLS_ACUITY_SCORE: 35
ADLS_ACUITY_SCORE: 42
ADLS_ACUITY_SCORE: 38
ADLS_ACUITY_SCORE: 43
ADLS_ACUITY_SCORE: 44
ADLS_ACUITY_SCORE: 43
ADLS_ACUITY_SCORE: 44
ADLS_ACUITY_SCORE: 48
ADLS_ACUITY_SCORE: 36
ADLS_ACUITY_SCORE: 43
ADLS_ACUITY_SCORE: 0
ADLS_ACUITY_SCORE: 49
ADLS_ACUITY_SCORE: 40
ADLS_ACUITY_SCORE: 42
ADLS_ACUITY_SCORE: 36
ADLS_ACUITY_SCORE: 35

## 2024-01-01 ASSESSMENT — ABNORMAL INVOLUNTARY MOVEMENT SCALE (AIMS)
JAW: NONE, NORMAL
TONGUE: NONE, NORMAL
LOWER_BODY_EXTREMITIES: NONE, NORMAL
UPPER_BODY_EXTREMITIES: NONE, NORMAL
FACIAL_EXPRESSION_MUSCLES: NONE, NORMAL
NECK_SHOULDER_HIPS: NONE, NORMAL
LIPS_PARIETAL: NONE, NORMAL

## 2024-01-01 ASSESSMENT — COLUMBIA-SUICIDE SEVERITY RATING SCALE - C-SSRS
2. HAVE YOU ACTUALLY HAD ANY THOUGHTS OF KILLING YOURSELF IN THE PAST MONTH?: NO
1. IN THE PAST MONTH, HAVE YOU WISHED YOU WERE DEAD OR WISHED YOU COULD GO TO SLEEP AND NOT WAKE UP?: NO
2. HAVE YOU ACTUALLY HAD ANY THOUGHTS OF KILLING YOURSELF IN THE PAST MONTH?: NO
6. HAVE YOU EVER DONE ANYTHING, STARTED TO DO ANYTHING, OR PREPARED TO DO ANYTHING TO END YOUR LIFE?: NO
1. IN THE PAST MONTH, HAVE YOU WISHED YOU WERE DEAD OR WISHED YOU COULD GO TO SLEEP AND NOT WAKE UP?: NO
6. HAVE YOU EVER DONE ANYTHING, STARTED TO DO ANYTHING, OR PREPARED TO DO ANYTHING TO END YOUR LIFE?: NO

## 2024-01-25 NOTE — PROGRESS NOTES
HOME VISIT APPT DATE: 1/25/2024    S: ARRIVED AT PATIENT'S HOME AT 1 PM, LOKESH LEFT ME A MESSAGE YESTERDAY THAT I COULD GO THERE WHENEVER I'D LIKE TODAY. LOKESH HAS A NEW , SO SHE WAS SITTING AND CHATTING WITH HER WHEN I ARRIVED. HER ASTRAL WAS DUE FOR A PM, SO I SWAPPED OUT THE NIV'S AND CHANGED ALL SUPPLIES. I VERIFIED THAT EVERYTHING WAS WORKING GREAT. LOKESH WAS ON 4 L/M OF O2 TODAY AND SHE STATED THAT SHE WAS FEELING OK, BUT THE AIR QUALITY IS BAD.     DX: CRF 2ND TO COPD  VITALS: BS DECREASED RR 18 A/O ALERT AND ORIENTED    SETUP DATE: 4/13/2018  DEVICE TYPE: RESMED ASTRAL  SETTINGS (include mode): PS SV: PS 5-15, EPAP 5, RR 12, , TI .5-1.2  COMFORT SETTINGS: RISE 200, TRIGGER HIGH, CYCLE 50%  INTERFACE: AIRFIT F20 FFM MED/AIRTOUCH F20 FFM MED  CIRCUIT/HUMIDITY: STD TUBING/NO HUMIDITY  ALARMS: HIGH PRESSURE 55, T APNEA DETECTION 60S, DIS OFF, ALARM VOLUME 3  O2 BLEED IN: 4 L/M (PATIENT WEARS HER NC UNDER HER MASK) - PATIENT USUALLY WEARS 2.5L/M BUT SHE INCREASED IT TODAY BECAUSE THE AIR QUALIFY WAS NOT GOOD    VENTILATOR INSPECTION DONE: YES SETTINGS CHECK: YES ALARM CHECK: YES  VENTILATOR SETTINGS VERIFIED: YES  CIRCUIT CHECK: YES CHANGED: YES CIRCUIT SUPPLIES GIVEN: NO  MASK: AIRFIT F20 TYPE: FFM CONDITION: GOOD REPLACED: YES  FILTERS: EXTERNAL CHANGED W/CIRCUIT YES LEFT WITH SUPPLIES YES INLET FILTER CHECKED OR CHANGED YES  OXYGEN EQUIPMENT REVIEWED IF APPLICABLE: YES, GETS O2 FROM [facility] NAME/PHONE/FAX: DR. SANDERS  CAREGIVER NAME/PHONE: [facility]SELF                RAZA ANDI-[facility]  [phone]

## 2024-02-09 NOTE — TELEPHONE ENCOUNTER
"Received transferred call from triage regarding this patient.  Patient state she is coughing up blood.    Started this am.  Has coughed up some bright red blood, some pink and some orange in color.  Mixed with her phlegm.  Unable to explain to me exactly how much blood she has coughed up.  States \"several tissues/paper towels\".  Also with some increased sob. No pain.  Hears \"bubbling\" in her lungs.    Patient is also on warfarin.       Will send to ED for evaluation.  She will go to ED now.  "

## 2024-03-29 NOTE — ED NOTES
Bed: ST02  Expected date:   Expected time:   Means of arrival:   Comments:  [facility] ETA 15 64 F acute onset resp distress, sob, on 15L nonbreather

## 2024-03-29 NOTE — ED TRIAGE NOTES
Pt presents to ed via ems to be evaluated for sob.   Ems report as follows: pt lives in an independent nursing home, and presents to ed with sob. Pt also reports having a cough, and coughing up blood tinged sputum. Pt states she is normally on 3L o2 at home, and when ems found her she was 73%. At that time they put her up to 15 liters, and she was stating in the 90s.   Pt denies any chest pain, and here in ed pt is speaking in full sentences, and able to stand and pivot into cart. Pt has dyspnea on exertion.      Triage Assessment (Adult)       Row Name 03/29/24 1508          Triage Assessment    Airway WDL WDL        Respiratory WDL    Rhythm/Pattern, Respiratory shortness of breath;tachypneic         Cardiac WDL    Cardiac WDL WDL        Peripheral/Neurovascular WDL    Peripheral Neurovascular WDL WDL

## 2024-03-29 NOTE — ED PROVIDER NOTES
History     Chief Complaint:  Shortness of Breath       HPI   Linda Otero is a 64 year old female with history of COPD O2 dependent on 3 L, DVT on Coumadin presenting today with increased redness of breath.  She is typically on 3 L nasal cannula when EMS arrived she was at 73% on 3 L.  She required 15 L with improvement of her oxygenation.  She reports having intermittent cough for the last 6 weeks and has had blood-tinged mucus over the last 6 weeks.  She reports no recent fevers.  She has bilateral lower extremity edema right greater than left.  Has prior history of DVT of the right lower extremity.  She reports no recent fevers.      Independent Historian:   EMS - They report above    Review of External Notes:      Reviewed pulmonology note from December 13, 2023 patient with history of COPD, pulmonary hypertension, emphysema, chronic hypoxic respiratory failure.  Prior 44-year smoke history.    Medications:    acetaminophen (TYLENOL) 500 MG tablet  albuterol (PROVENTIL) (2.5 MG/3ML) 0.083% neb solution  albuterol (VENTOLIN HFA) 108 (90 Base) MCG/ACT inhaler  B Complex-C TABS  bumetanide (BUMEX) 0.5 MG tablet  Cyanocobalamin (B-12) 1000 MCG CAPS  diphenhydrAMINE (BENADRYL) 25 MG capsule  ferrous sulfate (IRON) 325 (65 Fe) MG tablet  fluticasone-vilanterol (BREO ELLIPTA) 200-25 MCG/ACT inhaler  JANTOVEN 5 MG tablet  order for DME  order for DME  order for DME  predniSONE (DELTASONE) 20 MG tablet  tiotropium (SPIRIVA RESPIMAT) 2.5 MCG/ACT inhaler  warfarin ANTICOAGULANT (COUMADIN) 5 MG tablet        Past Medical History:    Past Medical History:   Diagnosis Date    Abnormality of gait due to impairment of balance     Acute and chronic respiratory failure with hypercapnia (H)     Acute deep venous thrombosis (H) 10/20/2015    Anemia     Aneurysm (H24) 2012    Arthritis     B12 deficiency     Cancer (H) 1985    Cellulitis right foot    Chronic diastolic heart failure (H)     Chronic kidney disease      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     COPD (chronic obstructive pulmonary disease) (H)     Coronary artery disease     CRF (chronic renal failure), stage 3 (moderate) (H) 10/21/2015    CVA (cerebral infarction) 12/23    CVA (cerebral vascular accident) (H) 12/23/2012    Dermatitis     DVT (deep venous thrombosis) (H)     Encephalopathy     History of transfusion     Hyperlipidemia     Hypertension     Hypothyroidism     Lymphedema     MGUS (monoclonal gammopathy of unknown significance)     Neuropathy of right lower extremity     Obesity     On home oxygen therapy     PFO (patent foramen ovale)     PFO (patent foramen ovale)     Pneumonia     Primary hypercoagulable state (H24)     Pulmonary emboli (H) 2013    Pulmonary HTN (H)     Respiratory failure (H)     Seizure (H)     Seizures (H)     Skin cancer 2014    Sleep apnea     Stroke (H) 2012    Umbilical hernia     Wound of right ankle        Past Surgical History:    Past Surgical History:   Procedure Laterality Date    ASD REPAIR      ASD REPAIR      BIOPSY SKIN (LOCATION)      CARDIAC CATHETERIZATION  2012    2 stents    COLONOSCOPY N/A 4/9/2018    Procedure: COLONOSCOPY;  Surgeon: Dorene Araujo MD;  Location: [facility] OR;  Service:     CONIZATION  1985    CV CORONARY ANGIOGRAM N/A 4/12/2019    Procedure: Coronary Angiogram;  Surgeon: Brett Montalvo MD;  Location: [facility] Cath Lab;  Service: Cardiology    CV LEFT HEART CATHETERIZATION WITHOUT LEFT VENTRICULOGRAM Left 4/12/2019    Procedure: Left Heart Catheterization Without Left Ventriculogram;  Surgeon: Brett Montalvo MD;  Location: [facility] Cath Lab;  Service: Cardiology    CV RIGHT HEART CATHETERIZATION N/A 4/12/2019    Procedure: Right Heart Catheterization;  Surgeon: Brett Montalvo MD;  Location: [facility] Cath Lab;  Service: Cardiology    ESOPHAGOSCOPY, GASTROSCOPY, DUODENOSCOPY (EGD), COMBINED N/A 4/9/2018    Procedure: ESOPHAGOGASTRODUODENOSCOPY (EGD);   Surgeon: Dorene Araujo MD;  Location: [facility] Main OR;  Service:     IR CAROTID ANGIOGRAM  12/26/2012    IR CAROTID ANGIOGRAM  12/26/2012    IR MISCELLANEOUS PROCEDURE  12/26/2012    IR MISCELLANEOUS PROCEDURE  12/26/2012    IR MISCELLANEOUS PROCEDURE  12/26/2012    OTHER SURGICAL HISTORY      Cardiac stents    PATENT FORAMEN OVALE CLOSURE      REPAIR PATENT FORAMEN OVALE  2013    Helex device    XR SACRO ILIAC JOINT INJECTION LEFT  11/2012    ZZC REMV ART CLOT ILIAC-POP,LEG INCIS Left 7/22/2019    Procedure: REPAIR LEFT FEMORAL ARTERIOVENOUS FISTULA WITH INTRAOPERATIVE ULTRASOUND;  Surgeon: Salinas Torres MD;  Location: [facility] Main OR;  Service: General        Physical Exam   Patient Vitals for the past 24 hrs:   BP Temp Temp src Pulse Resp SpO2 Weight   03/29/24 1615 -- -- -- 104 19 (!) 88 % --   03/29/24 1545 127/68 -- -- 93 25 99 % --   03/29/24 1523 -- -- -- 95 18 92 % --   03/29/24 1514 -- -- -- 97 28 97 % --   03/29/24 1506 -- 98.1  F (36.7  C) Temporal -- -- -- --   03/29/24 1505 -- -- -- -- -- -- (!) 150.3 kg (331 lb 5.6 oz)   03/29/24 1504 -- -- -- -- 24 -- --   03/29/24 1503 122/78 -- -- 98 -- 95 % --   03/29/24 1447 (!) 146/69 -- -- -- -- -- 136.1 kg (300 lb)   03/29/24 1446 -- -- -- 100 22 92 % --   03/29/24 1445 (!) 146/69 -- -- 101 23 98 % --   03/29/24 1444 -- -- -- -- -- 98 % --        Physical Exam  Vitals reviewed.   Constitutional:       General: She is not in acute distress.     Appearance: She is not ill-appearing.   HENT:      Head: Normocephalic and atraumatic.   Eyes:      Extraocular Movements: Extraocular movements intact.   Cardiovascular:      Rate and Rhythm: Normal rate and regular rhythm.   Pulmonary:      Effort: Pulmonary effort is normal. No respiratory distress.      Breath sounds: Wheezing present.   Abdominal:      Palpations: Abdomen is soft.      Tenderness: There is no abdominal tenderness. There is no guarding.   Musculoskeletal:      Cervical back: Normal range  of motion.      Right lower leg: Edema present.      Left lower leg: Edema present.      Comments: Right greater than left lower extremity edema   Skin:     General: Skin is warm and dry.   Neurological:      Mental Status: She is alert and oriented to person, place, and time.      GCS: GCS eye subscore is 4. GCS verbal subscore is 5. GCS motor subscore is 6.   Psychiatric:         Behavior: Behavior normal.           Emergency Department Course     ECG results from 03/29/24   EKG 12-lead, tracing only     Value    Systolic Blood Pressure     Diastolic Blood Pressure     Ventricular Rate 100    Atrial Rate 100    WI Interval 162    QRS Duration 86        QTc 456    P Axis 53    R AXIS 115    T Axis -1    Interpretation ECG      Sinus rhythm  Possible Left atrial enlargement  Right axis deviation  Possible Right ventricular hypertrophy  Nonspecific ST abnormality  Abnormal QRS-T angle, consider primary T wave abnormality  Abnormal ECG  When compared with ECG of 12-APR-2019 09:58,  QRS axis Shifted right  ST now depressed in Anterior leads  T wave inversion now evident in Inferior leads  Confirmed by GENERATED REPORT, COMPUTER ([id number]),  Kathe King ([id number]) on 3/29/2024 4:51:18 PM         Imaging:  XR Chest Port 1 View   Final Result   IMPRESSION: Cardiomegaly. Ill-defined opacity at the right infrahilar   lung could be acute airspace disease. A mass is difficult to exclude.   Bilateral vascular congestion may be a degree of pulmonary edema.   Potential small right pleural fluid. Recommend CT chest for further   assessment.       LIVIER MERAZ MD            SYSTEM ID:  [id number]      CT Chest Pulmonary Embolism w Contrast    (Results Pending)          Laboratory:  Labs Ordered and Resulted from Time of ED Arrival to Time of ED Departure   COMPREHENSIVE METABOLIC PANEL - Abnormal       Result Value    Sodium 143      Potassium 3.9      Carbon Dioxide (CO2) 28      Anion Gap 14      Urea Nitrogen 23.0       Creatinine 1.33 (*)     GFR Estimate 45 (*)     Calcium 9.8      Chloride 101      Glucose 122 (*)     Alkaline Phosphatase 115      AST 19      ALT 14      Protein Total 7.2      Albumin 4.1      Bilirubin Total 1.4 (*)    TROPONIN T, HIGH SENSITIVITY - Abnormal    Troponin T, High Sensitivity 37 (*)    NT PROBNP INPATIENT - Abnormal    N terminal Pro BNP Inpatient 4,421 (*)    INR - Abnormal    INR 1.20 (*)    D DIMER QUANTITATIVE - Abnormal    D-Dimer Quantitative 3.33 (*)    CBC WITH PLATELETS AND DIFFERENTIAL - Abnormal    WBC Count 7.3      RBC Count 5.26 (*)     Hemoglobin 12.3      Hematocrit 42.3      MCV 80      MCH 23.4 (*)     MCHC 29.1 (*)     RDW 21.0 (*)     Platelet Count 186      % Neutrophils 81      % Lymphocytes 6      % Monocytes 8      % Eosinophils 2      % Basophils 1      % Immature Granulocytes 2      NRBCs per 100 WBC 0      Absolute Neutrophils 5.9      Absolute Lymphocytes 0.5 (*)     Absolute Monocytes 0.6      Absolute Eosinophils 0.1      Absolute Basophils 0.1      Absolute Immature Granulocytes 0.1      Absolute NRBCs 0.0     ISTAT GASES LACTATE VENOUS POCT - Abnormal    Lactic Acid POCT 0.9      Bicarbonate Venous POCT 32 (*)     O2 Sat, Venous POCT 40 (*)     pCO2 Venous POCT 65 (*)     pH Venous POCT 7.30 (*)     pO2 Venous POCT 26      Base Excess/Deficit (+/-) POCT 4.0 (*)    MAGNESIUM - Normal    Magnesium 2.2     INFLUENZA A/B, RSV, & SARS-COV2 PCR - Normal    Influenza A PCR Negative      Influenza B PCR Negative      RSV PCR Negative      SARS CoV2 PCR Negative     PROCALCITONIN - Normal    Procalcitonin 0.10     PARTIAL THROMBOPLASTIN TIME   BLOOD CULTURE   BLOOD CULTURE        Procedures       Emergency Department Course & Assessments:    Interventions:  Medications   furosemide (LASIX) injection 20 mg (has no administration in time range)   cefTRIAXone (ROCEPHIN) 2 g vial to attach to  ml bag for ADULTS or NS 50 ml bag for PEDS (2 g Intravenous $New Bag 3/29/24  1727)   doxycycline (VIBRAMYCIN) 100 mg vial to attach to  mL bag (has no administration in time range)   enoxaparin ANTICOAGULANT (LOVENOX) injection 150 mg (has no administration in time range)   ipratropium - albuterol 0.5 mg/2.5 mg/3 mL (DUONEB) neb solution 6 mL (6 mLs Nebulization $Given 3/29/24 1446)   methylPREDNISolone sodium succinate (solu-MEDROL) injection 125 mg (125 mg Intravenous $Given 3/29/24 1458)   ipratropium - albuterol 0.5 mg/2.5 mg/3 mL (DUONEB) neb solution 3 mL (3 mLs Nebulization $Given 3/29/24 1539)            Independent Interpretation (X-rays, CTs, rhythm strip):  Mass noted to the right lung chest x-ray      ED Course as of 24 1750   Fri Mar 29, 2024   1509 Lactic Acid POCT: 0.9   1509 pH Venous POCT(!): 7.30   1509 pCO2 Venous POCT(!): 65   1544 N-Terminal Pro BNP Inpatient(!): 4,421   1557 Assessed patient after third DuoNeb air movement has improved.  Discussed with her the plan for CT scan and admission.  She agrees plan of care.   1705 Case with hospitalist Dr. Castro plan for admission to [facility].  Patient's O2 requirement has been increasing especially with any, minimal movement       Social Determinants of Health affecting care:   None    Disposition:  The patient was admitted to the hospital under the care of Dr. Castro.     Impression & Plan        Medical Decision Makin-year-old female presenting today with increased shortness of breath.  She is chronically on 3 L nasal cannula has had increased shortness of breath over the last day.  Reports having a mopped assist over the last 6 weeks.  She is on Coumadin for history of DVT.  On arrival she was on 15 L nasal cannula she was given 2 DuoNebs as well as Solu-Medrol.  She was weaned down to 3 L which is her baseline.  With any movement she was noted to have significant hypoxia.  Septic workup and cardiac workup initiated.  BNP found be elevated at 4400.  Chest x-ray showed evidence of possible pulmonary  congestion.  Will give dose of Lasix at this time.  Patient states that she did have a mass in her lung previously but states that on repeat imaging it was smaller.  She did not have a biopsy done.  Her D-dimer is found be elevated.  Her INR was subtherapeutic.  I discussed the case with Dr. Castro.  Plan for [facility] [place] [facility] admission.  He recommends starting antibiotics and dose of Lovenox here.  Patient unable to lay flat for CT PE study at this time.  She continues to be well-appearing intermittently will have desaturations but will rebound back to the 90s.  Currently on 6 L nasal cannula.      Diagnosis:    ICD-10-CM    1. Hypoxia  R09.02       2. Acute on chronic congestive heart failure, unspecified heart failure type (H)  I50.9       3. COPD exacerbation (H)  J44.1            Discharge Medications:  New Prescriptions    No medications on file          Selena Benavides DO  3/29/2024   Selena Benavides DO Doan, Tiffani, DO  03/29/24 [id number]

## 2024-03-29 NOTE — H&P
[facility]    History and Physical - Hospitalist Service       Date of Admission:  3/29/2024    Assessment & Plan      Lnida Otero is a 64 year old female with past medical history of chronic obstructive pulmonary disease, chronic respiratory failure on home oxygen with history of hypercapnia, nicotine dependence, chronic diastolic congestive heart failure, deep venous thrombosis and pulmonary embolism on past warfarin anticoagulation, recent hemoptysis, morbid obesity, chronic kidney disease stage III, coronary disease, hyperlipidemia, hypertension, hypothyroidism, monoclonal gammopathy of undetermined significance, seizure disorder, cerebrovascular disease with prior stroke, admitted 3/29/2024 with shortness of breath, hypoxia, and acute on chronic respiratory failure.    Acute on chronic hypoxic respiratory failure  Chronic obstructive pulmonary disease with acute exacerbation  Ill-defined opacity/mass right infrahilar lung on chest x-ray  Chronic respiratory failure on home oxygen, 3 L  History of obstructive sleep apnea  Chronic diastolic congestive heart failure   Elevated BNP  History of coronary artery disease   History of pulmonary hypertension  Elevated troponin  History of nicotine dependence  Hemoptysis, intermittent  History of deep venous thrombosis and pulmonary embolism, on anticoagulation  Chronic anticoagulation, on warfarin; subtherapeutic INR on admission - no longer on this per pharmacy, see ADDENDUM below  -Progressive dyspnea and cough with intermittent hemoptysis 6 weeks prior to admission with markedly increased symptoms days prior; chronic respiratory failure on oxygen with history of COPD and former smoking; chronic anticoagulation for history of thromboembolism  -Admission vitals, 146/69, pulse 102, respiratory rate 23, temperature 98.1  F, hypoxic with O2 saturations 88% 4 L nasal cannula  -Admission labs, WBC 7300, hemoglobin 12.3, platelets 186,000, BNP  4421, troponin 37, procalcitonin 0.10, total bilirubin 1.4, glucose 122, sodium 143, potassium 3.9, creatinine 1.33  -EKG sinus rhythm rate 100 bpm, possible left atrial enlargement with right axis deviation, nonspecific ST changes inferior leads  -Chest x-ray with cardiomegaly, ill-defined opacity right infrahilar lung with possible acute airspace disease, mass difficult to exclude; bilateral vascular congestion with possible degree of pulmonary edema and potential small right pleural fluid -see report  -Hospital admission for increasing cough, intermittent hemoptysis, progressive shortness of breath, and possible acute COPD exacerbation; differential diagnosis of acute on chronic respiratory failure includes acute COPD exacerbation with possible secondary infection (pneumonia); cannot rule out underlying malignancy given abnormal chest x-ray appearance with CT chest pending at time of dictation; former smoker; in addition, history of pulmonary embolism with subtherapeutic INR at admission and presenting symptoms of shortness of breath, hypoxia, chest pain, and intermittent hemoptysis    Admit inpatient, IMC, see hospital orders  Continue prior to admission inhaler/nebulizer regimen  Oxygen therapy, on home oxygen at baseline; monitor saturations, titrate as required; encourage spirometry  Monitor for signs/symptoms of hypercarbia/hypercapnia (dyspnea, fatigue, headache, confusion)  Steroids in ER, continue IV methylprednisolone (Solu-Medrol) q8h  Antibiotics in ER, continue; IV ceftriaxone and doxycycline ordered  CT chest, abnormal CXR with question of mass/infiltrate; consider pulmonary consult pending CT results and clinical course, especially given recent history of hemoptysis and possible need for future bronchoscopy; former smoker  Subtherapeutic INR admission with past history of pulmonary embolism and new concerns of hypoxia, shortness of breath, previous chest pain; dose of enoxaparin (Lovenox) given in  ER on admission, reassess need daily pending CT results and clinical course; AM rounding provider to review  Pharmacy consult for warfarin anticoagulation, INR goal between 2 and 3  HOLD anticoagulation IF recurrent or worsening hemoptysis; monitor closely  Monitor hemoglobin  Single dose IV Lasix given the emergency room given elevated BNP and concerns of possible acute on chronic congestive heart failure; reassess need for diuretics daily  Continue prior to admission bumetanide (Bumex) once reconciled by pharmacy   Transthoracic echocardiogram (TTE)   Physical therapy (PT) consult  Increase activity as tolerated  Nutrition consult to assess nutritional status, current diet, education, long-term weight loss, lifestyle modifications  Regular diet  AM labs as ordered    ADDENDUM:  Per pharmacy after medication reconciliation complete, patient no longer on warfarin anticoagulation and this will not be restarted; disregard reference to chronic anticoagulation above; in addition, unable to obtain CT chest presently to rule out pulmonary embolism; dose of enoxaparin (Lovenox) ordered in ER as above to cover possibility of PE until more definitively excluded; enoxaparin will need to be reordered/readdressed 3/30 pending CT chest results and clinical course with AM rounding provider - BDN @1918 3/29/24    Hyperlipidemia  Diet-controlled, monitor; follow-up with primary clinic provider     Chronic kidney disease, stage III  -Admission serum Cr 1.33, historic baseline 0.87 to 1.08 in 2020 to 2023  Monitor serum Cr, urine output, I/O's, daily weights  Avoid non-steroidal anti-inflammatory drugs (NSAIDs) and nephrotoxic agents  Continue prior to admission diuretic as above  AM basic profile  Recent Labs   Lab 03/29/24  1453   CR 1.33*     Hypothyroidism  Continue levothyroxine once reconciled by pharmacy  Check TSH    Morbid obesity  -Past Body mass index is 53.48 kg/m ., past weight 331 lbs 5.62 oz  Nutrition consult for  education and counseling, long-term weight loss  Lifestyle modifications including diet, weight loss, exercise  Follow-up with primary clinic provider    History of monoclonal gammopathy of undetermined significance - noted, follow-up with primary clinic provider   History of cerebrovascular disease with prior stroke - noted, monitor  History of B12 deficiency - noted, continue prior to admission B12 replacement  History of seizure disorder - noted in medical record; monitor    Skin ulcer, right lateral ankle  Wound nurse (WOC) consultation, daily wound care    Weakness and deconditioning  Physical therapy, occupational therapy consults  Increase activity as tolerated    Disposition  Estimated length of stay 3 to 4 days  Anticipated discharge to home versus transitional care unit  Social work consult    Change in hospitalist provider tomorrow with one of my [facility] hospitalist colleagues assuming care.          Diet: Regular Diet AdultRegular  DVT Prophylaxis: Warfarin prior to admission   Miguel Catheter: Not present  Lines: None     Cardiac Monitoring: None  Code Status:  DNR, DNI code status, confirmed on admission    Clinically Significant Risk Factors Present on Admission               # Drug Induced Coagulation Defect: home medication list includes an anticoagulant medication    # Hypertension: Noted on problem list          # COPD: noted on problem list        Disposition Plan      Expected Discharge Date: 03/31/2024                Charli Castro MD  Hospitalist Service  [facility]  Securely message with [employer] (more info)  Text page via [employer] Paging/Directory     ______________________________________________________________________    Chief Complaint   Shortness of breath, cough (blood-tinged), hypoxia, acute on chronic hypoxic respiratory failure    History is obtained from the patient, ER provider, chart review     History of Present Illness   Linda Otero is  "a 64 year old female with past medical history above presenting with progressive shortness of breath and cough with episodes of hemoptysis.  Former smoker, quit 2012 after nearly 40 years of smoking.  History of COPD and chronic respiratory failure, on home oxygen.  For the past 6 weeks increasing cough and shortness of breath.  On occasion is \"coughing up blood\".  She describes the sputum as \"orange-colored\" but does state it is occasionally blood-tinged with up to 1 teaspoon amounts only on 2 or 3 occasions.  1 episode of chest pain evening prior to admission, brief, resolved.  Denies wheezing.  Unable to walk more than 10 or 20 feet without severe shortness of breath, using a walker.  Increasing edema over the past week or longer.  No nausea or vomiting.  No fever, chills, or sweats.    -Admission vitals, 146/69, pulse 102, respiratory rate 23, temperature 98.1  F, hypoxic with O2 saturations 88% 4 L nasal cannula  -Admission labs, WBC 7300, hemoglobin 12.3, platelets 186,000, BNP 4421, troponin 37, procalcitonin 0.10, total bilirubin 1.4, glucose 122, sodium 143, potassium 3.9, creatinine 1.33  -EKG sinus rhythm rate 100 bpm, possible left atrial enlargement with right axis deviation, nonspecific ST changes inferior leads  -Chest x-ray with cardiomegaly, ill-defined opacity right infrahilar lung with possible acute airspace disease, mass difficult to exclude; bilateral vascular congestion with possible degree of pulmonary edema and potential small right pleural fluid -see report  -Hospital admission for increasing cough, intermittent hemoptysis, progressive shortness of breath, and possible acute COPD exacerbation    -Admit inpatient; new problem this admission, moderate to high risk medical condition, potentially severe; multiple medical problems with potential for worsening; moderate to high complexity patient with additional workup and treatment planned (see hospital orders).    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  left thalamic stroke as well as patchy MCA branch infarcts    CVA (cerebral vascular accident) (H) 12/23/2012    Left thalamic stroke, MCA branch infarcts    Dermatitis     DVT (deep venous thrombosis) (H)     Right leg    Encephalopathy     after stroke, believed related to hypoxia    History of transfusion     Hyperlipidemia     Hypertension     Hypothyroidism     Lymphedema     MGUS (monoclonal gammopathy of unknown significance)     Neuropathy of right lower extremity     Obesity     On home oxygen therapy     PFO (patent foramen ovale)     closed after stroke, see cardiology notes care everywhere    PFO (patent foramen ovale)     Pneumonia     Primary hypercoagulable state (H24)     Pulmonary emboli (H) 2013    Pulmonary HTN (H)     Respiratory failure (H)     after stroke    Seizure (H)     Seizures (H)     at age 31    Skin cancer 2014    Sleep apnea     CPAP    Stroke (H) 2012    Umbilical hernia     Wound of right ankle        Past Surgical History   Past Surgical History:   Procedure Laterality Date    ASD REPAIR      ASD REPAIR      BIOPSY SKIN (LOCATION)      CARDIAC CATHETERIZATION  2012    2 stents    COLONOSCOPY N/A 4/9/2018    Procedure: COLONOSCOPY;  Surgeon: Dorene Araujo MD;  Location: [facility]  Main OR;  Service:     CONIZATION  1985    CV CORONARY ANGIOGRAM N/A 4/12/2019    Procedure: Coronary Angiogram;  Surgeon: Brett Montalvo MD;  Location: [facility] Cath Lab;  Service: Cardiology    CV LEFT HEART CATHETERIZATION WITHOUT LEFT VENTRICULOGRAM Left 4/12/2019    Procedure: Left Heart Catheterization Without Left Ventriculogram;  Surgeon: Brett Montalvo MD;  Location: [facility] Cath Lab;  Service: Cardiology    CV RIGHT HEART CATHETERIZATION N/A 4/12/2019    Procedure: Right Heart Catheterization;  Surgeon: Brett Montalvo MD;  Location: [facility] Cath Lab;  Service: Cardiology    ESOPHAGOSCOPY, GASTROSCOPY, DUODENOSCOPY (EGD), COMBINED N/A 4/9/2018    Procedure: ESOPHAGOGASTRODUODENOSCOPY (EGD);  Surgeon: Dorene Araujo MD;  Location: [facility] OR;  Service:     IR CAROTID ANGIOGRAM  12/26/2012    IR CAROTID ANGIOGRAM  12/26/2012    IR MISCELLANEOUS PROCEDURE  12/26/2012    IR MISCELLANEOUS PROCEDURE  12/26/2012    IR MISCELLANEOUS PROCEDURE  12/26/2012    OTHER SURGICAL HISTORY      Cardiac stents    PATENT FORAMEN OVALE CLOSURE      REPAIR PATENT FORAMEN OVALE  2013    Helex device    XR SACRO ILIAC JOINT INJECTION LEFT  11/2012    ZZC REMV ART CLOT ILIAC-POP,LEG INCIS Left 7/22/2019    Procedure: REPAIR LEFT FEMORAL ARTERIOVENOUS FISTULA WITH INTRAOPERATIVE ULTRASOUND;  Surgeon: Salinas Torres MD;  Location: [facility] OR;  Service: General       Prior to Admission Medications   Prior to Admission Medications   Prescriptions Last Dose Informant Patient Reported? Taking?   B Complex-C TABS   No No   Sig: TAKE ONE TABLET BY MOUTH EVERY DAY   Cyanocobalamin (B-12) 1000 MCG CAPS   Yes No   Sig: Take 1 capsule by mouth daily   JANTOVEN 5 MG tablet   No No   Sig: TAKE TWO TABLETS (10MG) BY MOUTH ON MONDAY, AND TWO AND ONE-HALF TABLETS (12.5MG) BY MOUTH ALL OTHER DAYS OR AS DIRECTED BY INR CLINIC.   acetaminophen (TYLENOL) 500 MG tablet   Yes No   Sig: Take  500-1,000 mg by mouth every 6 hours as needed   albuterol (PROVENTIL) (2.5 MG/3ML) 0.083% neb solution   No No   Sig: Take 1 vial (2.5 mg) by nebulization every 6 hours as needed for shortness of breath or wheezing OFFICE VISIT NEEDED FOR ANY FURTHER REFILLS   albuterol (VENTOLIN HFA) 108 (90 Base) MCG/ACT inhaler   No No   Sig: INHALE 2 PUFFS INTO THE LUNGS EVERY 4-6 HOURS AS NEEDED.   bumetanide (BUMEX) 0.5 MG tablet   No No   Sig: Take 1 tablet (0.5 mg) by mouth daily   diphenhydrAMINE (BENADRYL) 25 MG capsule   No No   Sig: Take 1-2 capsules (25-50 mg) by mouth every 6 hours as needed for itching or allergies   ferrous sulfate (IRON) 325 (65 Fe) MG tablet   No No   Sig: Take 1 tablet (325 mg) by mouth daily (with breakfast)   fluticasone-vilanterol (BREO ELLIPTA) 200-25 MCG/ACT inhaler   No No   Sig: Inhale 1 puff into the lungs daily   order for DME   Yes No   Sig: Patient needs a new mask.   order for DME   No No   Sig: Equipment being ordered: Oxygen-portable oxygen per patient's preference-continuous oxygen at 2 L per NC   order for DME   No No   Sig: Equipment being ordered: cushion mask aguilar for CPAP machine with mask strap   predniSONE (DELTASONE) 20 MG tablet   No No   Sig: Take 2 tabs daily x 5 days   Patient not taking: Reported on 7/13/2022   tiotropium (SPIRIVA RESPIMAT) 2.5 MCG/ACT inhaler   No No   Sig: INHALE TWO PUFFS BY MOUTH EVERY DAY   warfarin ANTICOAGULANT (COUMADIN) 5 MG tablet   Yes No   Sig: Take 10mg (2 tablets) daily or as directed by INR Clinic.      Facility-Administered Medications: None        Review of Systems    Review of systems otherwise negative and per HPI above    Social History   I have reviewed this patient's social history and updated it with pertinent information if needed.  Social History     Tobacco Use    Smoking status: Former     Packs/day: 1.00     Years: 44.00     Additional pack years: 0.00     Total pack years: 44.00     Types: Cigarettes     Quit date:  2012     Years since quittin.2    Smokeless tobacco: Never   Substance Use Topics    Alcohol use: No     Alcohol/week: 0.0 standard drinks of alcohol    Drug use: No     Former smoker, quit  after nearly 40 years of smoking  Denies alcohol use  Currently lives in assisted living, 1 son for support    Family History   I have reviewed this patient's family history and updated it with pertinent information if needed.  Family History   Problem Relation Age of Onset    Angioedema Mother     Pulmonary Embolism Brother     Cerebrovascular Disease Father     Coronary Artery Disease Brother          Allergies   Allergies   Allergen Reactions    Eliquis [Apixaban] Shortness Of Breath, Hives and Swelling     Pt. Reported     Penicillins Anaphylaxis     Per chart review: tolerated Augmentin in 2020.      Clindamycin Itching    Erythromycin      Itching, hives    Peanut Oil Hives    Tetracycline      itching        Physical Exam   Vital Signs: Temp: 98.1  F (36.7  C) Temp src: Temporal BP: 127/68 Pulse: 104   Resp: 19 SpO2: (!) 88 % O2 Device: Nasal cannula Oxygen Delivery: 4 LPM  Weight: 331 lbs 5.62 oz    GENERAL awake and alert, lying in bed, mild to moderate dyspnea, talkative and interactive  EYES pupils equal, round; no conjunctival injection or jaundice  ENT mucous membranes mildly dry without lesions or exudates  LYMPH no cervical, submandibular, or supraclavicular adenopathy  BACK normal  LUNGS no wheezes or crackles; diminished breath sounds both lower lung fields with intermittent coughing during exam  HEART S1,S2 with RRR, no rubs or gallops, no murmurs  ABDOMEN soft, non-tender; no guarding, rebound, or rigidity  MUSCULOSKELETAL no gross joint deformities; extremities with 1+/2+ pedal edema  PULSES dorsalis pedis pulses present, symmetric  SKIN warm and dry; small ulceration lateral aspect right ankle without purulent drainage  NEURO moves upper and lower extremities spontaneously and to command; no  focal weakness appreciated; sensation intact to light touch upper and lower extremities  PSYCHIATRIC awake and alert; answers questions and follows simple commands    Medical Decision Making             Data     I have personally reviewed the following data over the past 24 hrs:    7.3  \   12.3   / 186     143 101 23.0 /  122 (H)   3.9 28 1.33 (H) \     ALT: 14 AST: 19 AP: 115 TBILI: 1.4 (H)   ALB: 4.1 TOT PROTEIN: 7.2 LIPASE: N/A     Trop: 37 (H) BNP: 4,421 (H)     Procal: 0.10 CRP: N/A Lactic Acid: 0.9       INR:  1.20 (H) PTT:  N/A   D-dimer:  3.33 (H) Fibrinogen:  N/A       Imaging results reviewed over the past 24 hrs:   Recent Results (from the past 24 hour(s))   XR Chest Port 1 View    Narrative    CHEST PORTABLE ONE VIEW   3/29/2024 3:19 PM     HISTORY: Shortness of breath.     COMPARISON: None.      Impression    IMPRESSION: Cardiomegaly. Ill-defined opacity at the right infrahilar  lung could be acute airspace disease. A mass is difficult to exclude.  Bilateral vascular congestion may be a degree of pulmonary edema.  Potential small right pleural fluid. Recommend CT chest for further  assessment.     LIVIER MERAZ MD         SYSTEM ID:  [id number]

## 2024-03-29 NOTE — ED NOTES
Patient slouched down in bed and SpO2 dropped into the 70's temporarily. Boosted up in bed and switched to oxymask at 15 LPM

## 2024-03-29 NOTE — ED NOTES
[facility]  ED Nurse Handoff Report    ED Chief complaint: Shortness of Breath      ED Diagnosis:   Final diagnoses:   None       Code Status: Full Code    Allergies:   Allergies   Allergen Reactions    Eliquis [Apixaban] Shortness Of Breath, Hives and Swelling     Pt. Reported     Amoxicillin Swelling    Clindamycin Itching    Erythromycin      Itching, hives    Peanut Oil Hives    Penicillins      Swelling      Tetracycline      itching       Patient Story: Pt presents to ed via ems to be evaluated for sob.   Ems report as follows: pt lives in an independent nursing home, and presents to ed with sob. Pt also reports having a cough, and coughing up blood tinged sputum. Pt states she is normally on 3L o2 at home, and when ems found her she was 73%. At that time they put her up to 15 liters, and she was stating in the 90s.   Pt denies any chest pain, and here in ed pt is speaking in full sentences, and able to stand and pivot into cart. Pt has dyspnea on exertion.   Focused Assessment:  Pt a&ox4. Pt has SOB and tachypnea. Pt needing to sit up at edge of bed to catch breath. Pt O2 sats dropped to 83% on RA. Pt on 3 L with O2 sats 95%. Pt HR in 90s NSR. Denies Chest pain. Pt reports productive cough and wheezing. Pt given nebulizer and solumedrol. Pt has bilateral pitting edema in lower extremities. Pt reports recent weight gain in abdomen r/t fluid/edema.      Treatments and/or interventions provided: labs, imaging, monitoring, nebulizer, medication administration  Patient's response to treatments and/or interventions: tolerated well  Labs Ordered and Resulted from Time of ED Arrival to Time of ED Departure   INR - Abnormal       Result Value    INR 1.20 (*)    D DIMER QUANTITATIVE - Abnormal    D-Dimer Quantitative 3.33 (*)    CBC WITH PLATELETS AND DIFFERENTIAL - Abnormal    WBC Count 7.3      RBC Count 5.26 (*)     Hemoglobin 12.3      Hematocrit 42.3      MCV 80      MCH 23.4 (*)     MCHC 29.1 (*)      RDW 21.0 (*)     Platelet Count 186      % Neutrophils 81      % Lymphocytes 6      % Monocytes 8      % Eosinophils 2      % Basophils 1      % Immature Granulocytes 2      NRBCs per 100 WBC 0      Absolute Neutrophils 5.9      Absolute Lymphocytes 0.5 (*)     Absolute Monocytes 0.6      Absolute Eosinophils 0.1      Absolute Basophils 0.1      Absolute Immature Granulocytes 0.1      Absolute NRBCs 0.0     ISTAT GASES LACTATE VENOUS POCT - Abnormal    Lactic Acid POCT 0.9      Bicarbonate Venous POCT 32 (*)     O2 Sat, Venous POCT 40 (*)     pCO2 Venous POCT 65 (*)     pH Venous POCT 7.30 (*)     pO2 Venous POCT 26      Base Excess/Deficit (+/-) POCT 4.0 (*)    COMPREHENSIVE METABOLIC PANEL   TROPONIN T, HIGH SENSITIVITY   MAGNESIUM   NT PROBNP INPATIENT   INFLUENZA A/B, RSV, & SARS-COV2 PCR   PROCALCITONIN   BLOOD CULTURE   BLOOD CULTURE      XR Chest Port 1 View    (Results Pending)        To be done/followed up on inpatient unit:  monitoring, respiratory management    Does this patient have any cognitive concerns?:  none    Activity level - Baseline/Home:  Walker  Activity Level - Current:   Walker    Patient's Preferred language: English   Needed?: No    Isolation: None and COVID r/o and special precautions  Infection: Not Applicable  COVID r/o and special precautions  Patient tested for COVID 19 prior to admission: NO  Bariatric?: Yes    Vital Signs:   Vitals:    03/29/24 1503 03/29/24 1504 03/29/24 1505 03/29/24 1506   BP: 122/78      Pulse: 98      Resp:  24     Temp:    98.1  F (36.7  C)   TempSrc:    Temporal   SpO2: 95%      Weight:   (!) 150.3 kg (331 lb 5.6 oz)        Cardiac Rhythm:Cardiac Rhythm: Normal sinus rhythm    Was the PSS-3 completed:   Yes  What interventions are required if any?               Family Comments: none    For the majority of the shift this patient's behavior was Green.   Behavioral interventions performed were none.    ED NURSE PHONE NUMBER: *[phone]

## 2024-03-29 NOTE — ED NOTES
[facility]  ED Nurse Handoff Report    ED Chief complaint: Shortness of Breath      ED Diagnosis:   Final diagnoses:   None       Code Status: DNR / DNI    Allergies:   Allergies   Allergen Reactions    Eliquis [Apixaban] Shortness Of Breath, Hives and Swelling     Pt. Reported     Amoxicillin Swelling    Clindamycin Itching    Erythromycin      Itching, hives    Peanut Oil Hives    Penicillins      Swelling      Tetracycline      itching       Patient Story:   64 year old female with history of COPD O2 dependent on 3 L, DVT on Coumadin presenting today with increased shortness of breath.  She is typically on 3 L nasal cannula, when EMS arrived she was at 73% on 3 L.  She required 15 L with improvement of her oxygenation.  She reports having intermittent cough for the last 6 weeks and has had blood-tinged mucus over the last 6 weeks.  She reports no recent fevers.  She has bilateral lower extremity edema right greater than left.     Focused Assessment:    Neuro: Alert, oriented x 4  Respiratory:Clear lung sounds, on room air   Cardiology:  ***   Gastrointestinal: soft, non tender, non distended   Genitourinary/Renal:    Musculoskeletal: moves all extremities   Skin: Intact skin   Lines: ***    Labs Ordered and Resulted from Time of ED Arrival to Time of ED Departure   COMPREHENSIVE METABOLIC PANEL - Abnormal       Result Value    Sodium 143      Potassium 3.9      Carbon Dioxide (CO2) 28      Anion Gap 14      Urea Nitrogen 23.0      Creatinine 1.33 (*)     GFR Estimate 45 (*)     Calcium 9.8      Chloride 101      Glucose 122 (*)     Alkaline Phosphatase 115      AST 19      ALT 14      Protein Total 7.2      Albumin 4.1      Bilirubin Total 1.4 (*)    TROPONIN T, HIGH SENSITIVITY - Abnormal    Troponin T, High Sensitivity 37 (*)    NT PROBNP INPATIENT - Abnormal    N terminal Pro BNP Inpatient 4,421 (*)    INR - Abnormal    INR 1.20 (*)    D DIMER QUANTITATIVE - Abnormal    D-Dimer Quantitative  3.33 (*)    CBC WITH PLATELETS AND DIFFERENTIAL - Abnormal    WBC Count 7.3      RBC Count 5.26 (*)     Hemoglobin 12.3      Hematocrit 42.3      MCV 80      MCH 23.4 (*)     MCHC 29.1 (*)     RDW 21.0 (*)     Platelet Count 186      % Neutrophils 81      % Lymphocytes 6      % Monocytes 8      % Eosinophils 2      % Basophils 1      % Immature Granulocytes 2      NRBCs per 100 WBC 0      Absolute Neutrophils 5.9      Absolute Lymphocytes 0.5 (*)     Absolute Monocytes 0.6      Absolute Eosinophils 0.1      Absolute Basophils 0.1      Absolute Immature Granulocytes 0.1      Absolute NRBCs 0.0     ISTAT GASES LACTATE VENOUS POCT - Abnormal    Lactic Acid POCT 0.9      Bicarbonate Venous POCT 32 (*)     O2 Sat, Venous POCT 40 (*)     pCO2 Venous POCT 65 (*)     pH Venous POCT 7.30 (*)     pO2 Venous POCT 26      Base Excess/Deficit (+/-) POCT 4.0 (*)    MAGNESIUM - Normal    Magnesium 2.2     INFLUENZA A/B, RSV, & SARS-COV2 PCR - Normal    Influenza A PCR Negative      Influenza B PCR Negative      RSV PCR Negative      SARS CoV2 PCR Negative     PROCALCITONIN - Normal    Procalcitonin 0.10     BLOOD CULTURE   BLOOD CULTURE        XR Chest Port 1 View   Final Result   IMPRESSION: Cardiomegaly. Ill-defined opacity at the right infrahilar   lung could be acute airspace disease. A mass is difficult to exclude.   Bilateral vascular congestion may be a degree of pulmonary edema.   Potential small right pleural fluid. Recommend CT chest for further   assessment.       LIVIER MERAZ MD            SYSTEM ID:  [id number]      CT Chest Pulmonary Embolism w Contrast    (Results Pending)         Treatments and/or interventions provided:      Medications   furosemide (LASIX) injection 20 mg (has no administration in time range)   ipratropium - albuterol 0.5 mg/2.5 mg/3 mL (DUONEB) neb solution 6 mL (6 mLs Nebulization $Given 3/29/24 1234)   methylPREDNISolone sodium succinate (solu-MEDROL) injection 125 mg (125 mg Intravenous $Given  "3/29/24 1458)   ipratropium - albuterol 0.5 mg/2.5 mg/3 mL (DUONEB) neb solution 3 mL (3 mLs Nebulization $Given 3/29/24 1539)        Patient's response to treatments and/or interventions:   Resting comfortably    To be done/followed up on inpatient unit:    See any in-patient orders    Does this patient have any cognitive concerns?: {Cognitive concerns_ ED:411338}    Activity level - Baseline/Home:    {ACTIVITY_ ED:384980}    [facility]    Patient's Preferred language: English     Needed?: {NO OR YES:013257}    Isolation: {ISOLATION_ ED:309126::\"None\"}  Infection: {INFECTION OPTIONS:222650}  Patient tested for COVID 19 prior to admission: {Yes/No:871167}    Bariatric?: {NO OR YES:547497}    Vital Signs:   Vitals:    03/29/24 1506 03/29/24 1514 03/29/24 1523 03/29/24 1545   BP:    127/68   Pulse:  97 95 93   Resp:  28 18 25   Temp: 98.1  F (36.7  C)      TempSrc: Temporal      SpO2:  97% 92% 99%   Weight:           Cardiac Rhythm:Cardiac Rhythm: Normal sinus rhythm    Was the PSS-3 completed:   {.:250621}    Family Comments: ***    For the majority of the shift this patient's behavior was {traffic light colors:730844}.   Behavioral interventions performed were     ED NURSE PHONE NUMBER: *[phone]          "

## 2024-03-29 NOTE — LETTER
Transition Communication Hand-off for Care Transitions to Next Level of Care Provider    Name: Linda Otero  : 1960  MRN #: [id number]  Primary Care Provider: Jayy Henson     Primary Clinic: [facility] [address] [address]     Reason for Hospitalization:  Hypoxia [R09.02]  COPD exacerbation (H) [J44.1]  Acute on chronic congestive heart failure, unspecified heart failure type (H) [I50.9]  Admit Date/Time: 3/29/2024  2:37 PM  Discharge Date: ***  Payor Source: Payor: MEDICARE / Plan: MEDICARE / Product Type: Medicare /     Readmission Assessment Measure (CHAPIS) Risk Score/category: ***    Plan of Care Goals/Milestone Events:   Patient Concerns: ***   Patient Goals:   Short-term ***   Long-term ***   Medical Goals   Short-term ***   Long-term ***         Reason for Communication Hand-off Referral: {CCREASONCMCTNHANDOFFREFERRALCTS:56734}    Discharge Plan:       Concern for non-adherence with plan of care:   Y/N ***  Discharge Needs Assessment:  Needs      Flowsheet Row Most Recent Value   Equipment Currently Used at Home shower chair, walker, rolling, grab bar, toilet  [4WW]   # of Referrals Placed by CM Post Acute Facilities            Already enrolled in Tele-monitoring program and name of program:  ***  Follow-up specialty is recommended: {YES / NO:53617}    Follow-up plan:    Future Appointments   Date Time Provider Department Center   2024  2:30 PM Sandra Odonnell, OT [facility]   2024  5:00 PM Madan Aguila, PT [facility]   5/10/2024  3:00 PM Heaven Valenzuela PA-C [facility]       Any outstanding tests or procedures:        Referrals       Future Labs/Procedures    Follow-Up with Cardiology [facility]     Comments:    SHIRLEY Mcgrath will call you to coordinate your care as prescribed by your provider. If you have concerns about scheduling, please call [phone].      Follow-Up with Cardiology [facility]     Comments:    [facility]  will call you to coordinate your care as prescribed by your provider. If you have concerns about scheduling, please call [phone].      Home Care Referral     Comments:    Your provider has ordered home health services. If you have not been contacted within 2 days of your discharge please call the selected Home Care agency listed on your Discharge document.  If a Home Care agency is NOT listed, please call [phone].    Occupational Therapy Adult Consult     Comments:    Evaluate and treat as clinically indicated.    Reason:  deconditioned    Physical Therapy Adult Consult     Comments:    Evaluate and treat as clinically indicated.    Reason:  deconditioned              Key Recommendations:      NARGIS [place]    AVS/Discharge Summary is the source of truth; this is a helpful guide for improved communication of patient story

## 2024-03-30 NOTE — CONSULTS
"CLINICAL NUTRITION SERVICES  -  ASSESSMENT NOTE    Recommendations Ordered by Registered Dietitian (RD):   - Encouraged adequate protein intake for wound and strength maintenance.   - Expedite wound healing supplement daily @ 2pm   - Thera vit M daily    Future/Additional Recommendations:   - Defer \"lifestyle modification\" at this time in setting of feeling acutely ill, pt's focus on eating adequately.   - Encourage balanced meals.    Malnutrition:   % Weight Loss:  None noted  % Intake:  <75% for >/= 1 month (moderate malnutrition)  Subcutaneous Fat Loss:  Orbital region mild depletion and Upper arm region mild depletion  Muscle Loss:  Clavicle bone region mild depletion, Acromion bone region mild depletion, and Scapular bone region mild depletion  Fluid Retention:  Mild-moderate    Malnutrition Diagnosis: Moderate malnutrition  In Context of:  Acute illness or injury  Chronic illness or disease     REASON FOR ASSESSMENT  Linda Otero is a 64 year old female seen by Registered Dietitian for Nutrition Admission Risk Screen Received -   Have you recently lost weight without trying ? - \"yes, unsure\"  Have you been eating poorly due to a decreased appetite ?- \"yes\"   and Provider Order - \"CHF, obesity, lifestyle modifications, assess nutritional status\"    NUTRITION HISTORY  - Information obtained from chart and patient report.   - H/o COPD, DVT on Coumadin, CHF, PE, obesity, CKD3, among others.   - Admitted with shortness of breath.     - Pt lives in an assisted living facility, which she doesn't really care for.   - Appetite has been low, only eating 1 meal/day. She was trying to lose weight previously.   - Pt reports chronic anemia, tries to eat plenty of iron. She also follows with a wound clinic and she is aware protein intakes is important for healing.     Barriers: hernia which takes up bowel space - contributes to early satiety.     CURRENT NUTRITION ORDERS  Diet Order:     Regular     Current " "Intake/Tolerance:  Ate 100% of a breakfast sandwich this morning, about 60% of her lunch (pasta, mashed potatoes, corn).   Reports poor appetite due to pain.     NUTRITION FOCUSED PHYSICAL ASSESSMENT FOR DIAGNOSING MALNUTRITION)  Yes         Observed:    Muscle wasting (refer to documentation in Malnutrition section) and Subcutaneous fat loss (refer to documentation in Malnutrition section)    Obtained from Chart/Interdisciplinary Team:  3/30 - WOCN eval pending, pt notes wound to right foot.     ANTHROPOMETRICS  Height: 5' 6\"  Weight: 340 lbs 8 oz (154.4 kg)   Body mass index is 54.96 kg/m .  Weight Status:  Obesity Grade III BMI >40  IBW: 59 kg   % IBW: 262%  Weight History: wt up from baseline?   Wt Readings from Last 10 Encounters:   03/30/24 (!) 154.4 kg (340 lb 8 oz)   01/24/20 145.1 kg (319 lb 14.4 oz)   11/20/19 145.5 kg (320 lb 11.2 oz)   08/28/19 144 kg (317 lb 6.4 oz)   07/22/19 146.5 kg (323 lb 1 oz)   06/25/19 140.6 kg (310 lb)   04/12/19 145.2 kg (320 lb 1 oz)   04/09/19 143.3 kg (316 lb)   03/19/19 133.4 kg (294 lb)   01/10/19 135.6 kg (299 lb)       LABS  Labs reviewed  BUN 26.5 (H), Cr 1.37(H)    MEDICATIONS  Medications reviewed  Lasix 20 mg BID  Solumedrol   Miralax daily     ASSESSED NUTRITION NEEDS PER APPROVED PRACTICE GUIDELINES:  Dosing Weight 83 kg - adjusted   Estimated Energy Needs: 5899-3967 kcals (20-25 Kcal/Kg)  Justification: obese  Estimated Protein Needs: 100-125 grams protein (1.2-1.5 g pro/Kg)  Justification: preservation of lean body mass and ?reported wound  Estimated Fluid Needs: Per provider     MALNUTRITION:  % Weight Loss:  None noted  % Intake:  <75% for >/= 1 month (moderate malnutrition)  Subcutaneous Fat Loss:  Orbital region mild depletion and Upper arm region mild depletion  Muscle Loss:  Clavicle bone region mild depletion, Acromion bone region mild depletion, and Scapular bone region mild depletion  Fluid Retention:  Mild-moderate    Malnutrition Diagnosis: Moderate " malnutrition  In Context of:  Acute illness or injury  Chronic illness or disease    NUTRITION DIAGNOSIS:  Inadequate oral intake related to poor appetite as evidenced by patient reports intake of just 1 meal/day recently, with mild fat and muscle wasting.     NUTRITION INTERVENTIONS  Recommendations / Nutrition Prescription  Regular diet  Expedite daily for wound  Thera vit M daily     Implementation  Nutrition education: Provided education on nutrition for wounds (pt aware)  Medical Food Supplement: Expedite  Micronutrients: Thera vit M daily     Nutrition Goals  Intake of at least 75% adequate trays TID.     MONITORING AND EVALUATION:  Progress towards goals will be monitored and evaluated per protocol and Practice Guidelines    Kitty Obrien RD, LD  Pager: [phone]  Weekend Pager: [phone]

## 2024-03-30 NOTE — PHARMACY-ADMISSION MEDICATION HISTORY
Pharmacist Admission Medication History    Admission medication history is complete. The information provided in this note is only as accurate as the sources available at the time of the update.    Information Source(s): Patient and Facility (TCU/NH/) medication list/MAR via in-person and MAR from [facility]    Pertinent Information: Lots of med changes from previous list in chart    Allergies reviewed with patient and updates made in EHR: no    Medication History Completed By: Kelly Puckett RPH 3/29/2024 7:12 PM    PTA Med List   Medication Sig Last Dose    acetaminophen (TYLENOL) 325 MG tablet Take 650 mg by mouth every 4 hours as needed for pain or fever  at prn    albuterol (PROVENTIL) (2.5 MG/3ML) 0.083% neb solution Take 1 vial (2.5 mg) by nebulization every 6 hours as needed for shortness of breath or wheezing OFFICE VISIT NEEDED FOR ANY FURTHER REFILLS  at prn    albuterol (VENTOLIN HFA) 108 (90 Base) MCG/ACT inhaler INHALE 2 PUFFS INTO THE LUNGS EVERY 4-6 HOURS AS NEEDED.  at prn    aspirin (ASA) 81 MG chewable tablet Take 81 mg by mouth 2 times daily 3/29/2024 at am    bisacodyl (DULCOLAX) 10 MG suppository Place 10 mg rectally daily as needed for constipation  at prn    bisacodyl (DULCOLAX) 5 MG EC tablet Take 5 mg by mouth daily as needed for constipation  at prn    calcium carbonate (TUMS) 500 MG chewable tablet Take 1 chew tab by mouth 4 times daily as needed for heartburn  at prn    calcium carbonate 500 mg, elemental, (OSCAL) 500 MG tablet Take 1 tablet by mouth at bedtime 3/28/2024 at pm    cyanocobalamin (CYANOCOBALAMIN) 1000 MCG/ML injection Inject 1 mL into the muscle every 30 days 3/29/2024 at am    DIETHYLTOLUAMIDE EX Externally apply topically as needed (bug bit prevention)  at prn    dimethicone-zinc oxide (KAYDEN PROTECT) external cream Apply topically 2 times daily as needed for dry skin or other  at prn    diphenhydrAMINE (BENADRYL) 25 MG capsule Take 1-2 capsules (25-50 mg) by  mouth every 6 hours as needed for itching or allergies  at prn    fluticasone-vilanterol (BREO ELLIPTA) 200-25 MCG/ACT inhaler Inhale 1 puff into the lungs daily 3/29/2024 at am    furosemide (LASIX) 20 MG tablet Take 20 mg by mouth 2 times daily 3/29/2024 at am    guaiFENesin (ROBITUSSIN) 20 mg/mL liquid Take 200 mg by mouth every 4 hours as needed for cough  at prn    loperamide (IMODIUM A-D) 2 MG tablet Take 2 mg by mouth 4 times daily as needed for diarrhea  at prn    magnesium hydroxide (MILK OF MAGNESIA) 400 MG/5ML suspension Take 30 mLs by mouth daily as needed for constipation or heartburn  at prn    menthol-zinc oxide (CALMOSEPTINE) 0.44-20.6 % OINT ointment Apply topically daily as needed for skin protection  at prn    multivitamin, therapeutic (THERA-VIT) TABS tablet Take 1 tablet by mouth at bedtime 3/28/2024 at pm    nystatin (MYCOSTATIN) 473472 UNIT/GM external powder Apply topically 2 times daily as needed for dry skin  at prn    Ostomy Supplies (SKIN PREP WIPES) MISC Apply topically as needed (to intact blister stage 2 deep tissue injury)  at prn    tiotropium (SPIRIVA RESPIMAT) 2.5 MCG/ACT inhaler INHALE TWO PUFFS BY MOUTH EVERY DAY 3/29/2024 at am    Vitamins A & D (VITAMIN A & D) external ointment Apply topically 2 times daily as needed  at prn

## 2024-03-30 NOTE — PLAN OF CARE
Goal Outcome Evaluation:      Plan of Care Reviewed With: patient    Overall Patient Progress: no change    Orientations: Alert and oriented  Vitals/Pain: VSS, on 6L of oxymask, sating low 90s, refused CPAP tonight, got up almost every 2hours overnight to grasp some air  Tele: SR with occasional PVCs  Lines/Drains: R PIV-SL  Skin/Wounds: redness on R breast, bruise on L arm, abrasions R ankle, blanchable redness below buttocks, cracked/dried bilateral feet  GI/: continent B/B, voiding freely, had formed BM  Labs: Abnormal/Trends, Electrolyte Replacement- routine  Ambulation/Assist: A1-RW and GB  Sleep Quality: short intervals  Plan: pulmonologist consult, WOC consult, PT/OT

## 2024-03-30 NOTE — PROGRESS NOTES
[facility]    Medicine Progress Note - Hospitalist Service    Date of Admission:  3/29/2024    Assessment & Plan      Linda Otero is a 64 year old female with past medical history of chronic obstructive pulmonary disease, chronic respiratory failure on home oxygen with history of hypercapnia, nicotine dependence, chronic diastolic congestive heart failure, deep venous thrombosis and pulmonary embolism on past warfarin anticoagulation, recent hemoptysis, morbid obesity, chronic kidney disease stage III, coronary disease, hyperlipidemia, hypertension, hypothyroidism, monoclonal gammopathy of undetermined significance, seizure disorder, cerebrovascular disease with prior stroke, admitted 3/29/2024 with acute on chronic respiratory failure.    Acute on chronic hypoxic respiratory failure  - Speculate multifactorial from HFpEF exacerbation (elevated BNP and weight gain), COPD exacerbation and JODI with noncompliance to CPAP and CAP  - Baseline 3-5 L NC  - CT C PE 3/29/2024 without any PE but did demonstrate pulmonary congestion, R pleural effusion  Plan  - Continue methylprednisolone 40mg q8h IV for now  - Transition from home lasix 20mg BID PO to 20mg BID IV  -  Continue ceftriaxone 2g daily IV. Transition from doxycycline 100mg BID IV to PO  - Await TTE  - Daily weights and strict I&Os  - Monitor on telemetry  - PT/OT consulted  - Flutter valve and incentive spirometer  - Maintain O2 > 88%    SONAL on Chronic kidney disease, stage III  - Speculate congestive nephropathy  - Admission serum Cr 1.33, historic baseline 0.87 to 1.08 in 2020 to 2023  Monitor urine output, I/O's, daily weights  Avoid non-steroidal anti-inflammatory drugs (NSAIDs) and nephrotoxic agents  Lasix 20mg BID IV as above  Monitor with morning BMP    History of deep venous thrombosis and pulmonary embolism  CT C PE 3/29/2024 without any PE  Per pharmacy, no longer on warfarin    Hyperlipidemia  Diet-controlled, monitor;  follow-up with primary clinic provider     Hypothyroidism  Continue levothyroxine once reconciled by pharmacy  Check TSH    Morbid obesity  -Past Body mass index is 53.48 kg/m ., past weight 331 lbs 5.62 oz  Nutrition consult for education and counseling, long-term weight loss  Lifestyle modifications including diet, weight loss, exercise  Follow-up with primary clinic provider    History of nicotine dependence  History of obstructive sleep apnea  History of coronary artery disease   History of monoclonal gammopathy of undetermined significance - noted, follow-up with primary clinic provider   History of cerebrovascular disease with prior stroke - noted, monitor  History of B12 deficiency - noted, continue prior to admission B12 replacement  History of seizure disorder - noted in medical record; monitor    Skin ulcer, right lateral ankle  Wound nurse (WOC) consultation, daily wound care    Weakness and deconditioning  Physical therapy, occupational therapy consults  Increase activity as tolerated    Disposition  Estimated length of stay 3 to 4 days  Anticipated discharge to home versus transitional care unit  Social work consult          Diet: Combination Diet Regular Diet Adult    DVT Prophylaxis: Pneumatic Compression Devices  Miguel Catheter: Not present  Lines: None     Cardiac Monitoring: ACTIVE order. Indication: Acute on chronic respiratory failure, COPD  Code Status: No CPR- Do NOT Intubate      Clinically Significant Risk Factors Present on Admission               # Coagulation Defect: INR = 1.20 (Ref range: 0.85 - 1.15) and/or PTT = 34 Seconds (Ref range: 22 - 38 Seconds), will monitor for bleeding  # Drug Induced Platelet Defect: home medication list includes an antiplatelet medication   # Hypertension: Noted on problem list   # Acute Respiratory Failure: based on blood gas results.  Continue supplemental oxygen as needed         # COPD: noted on problem list        Disposition Plan      Expected Discharge  Date: 03/31/2024      Destination: nursing home              Dennys Harmon MD  Hospitalist Service  [facility]  Securely message with [employer] (more info)  Text page via [employer] Paging/Directory   ______________________________________________________________________    Interval History   No acute events overnight.  On evaluation this afternoon, she is sitting at the chair at bedside.  Currently utilizing 9 L nasal cannula.  At baseline, she utilizes 3-5 L NC.  At rest, she reports her dyspnea feels fine.  Whenever she moves, her dyspnea significantly worsens.  No chest pain, fevers, chills.    Physical Exam   Vital Signs: Temp: 98  F (36.7  C) Temp src: Oral BP: (!) 115/93 Pulse: 101   Resp: 27 SpO2: 97 % O2 Device: Oxymask Oxygen Delivery: 6 LPM  Weight: 340 lbs 8 oz      GENERAL awake and alert, lying in bed, mild to moderate dyspnea, talkative and interactive  EYES pupils equal, round; no conjunctival injection or jaundice  ENT mucous membranes mildly dry without lesions or exudates  LYMPH no cervical, submandibular, or supraclavicular adenopathy  BACK normal  LUNGS no wheezes.  Bibasilar crackles; diminished breath sounds both lower lung fields with intermittent coughing during exam  HEART S1,S2 with RRR, no rubs or gallops, no murmurs.  JVD noted  ABDOMEN soft, non-tender; no guarding, rebound, or rigidity  MUSCULOSKELETAL no gross joint deformities; extremities with 1+/2+ pedal edema  PULSES dorsalis pedis pulses present, symmetric  SKIN warm and dry; small ulceration lateral aspect right ankle without purulent drainage  NEURO moves upper and lower extremities spontaneously and to command; no focal weakness appreciated; sensation intact to light touch upper and lower extremities  PSYCHIATRIC awake and alert; answers questions and follows simple commands    Medical Decision Making       60 MINUTES SPENT BY ME on the date of service doing chart review, history, exam, documentation &  further activities per the note.      Data     I have personally reviewed the following data over the past 24 hrs:    4.5  \   11.6 (L)   / 158     143 101 26.5 (H) /  154 (H)   4.2 27 1.37 (H) \     ALT: 14 AST: 19 AP: 115 TBILI: 1.4 (H)   ALB: 4.1 TOT PROTEIN: 7.2 LIPASE: N/A     Trop: 37 (H) BNP: 4,421 (H)     TSH: 6.28 (H) T4: 1.49 A1C: N/A     Procal: 0.10 CRP: N/A Lactic Acid: 1.0       INR:  1.20 (H) PTT:  34   D-dimer:  3.33 (H) Fibrinogen:  N/A       Imaging results reviewed over the past 24 hrs:   Recent Results (from the past 24 hour(s))   XR Chest Port 1 View    Narrative    CHEST PORTABLE ONE VIEW   3/29/2024 3:19 PM     HISTORY: Shortness of breath.     COMPARISON: None.      Impression    IMPRESSION: Cardiomegaly. Ill-defined opacity at the right infrahilar  lung could be acute airspace disease. A mass is difficult to exclude.  Bilateral vascular congestion may be a degree of pulmonary edema.  Potential small right pleural fluid. Recommend CT chest for further  assessment.     LIVIER MERAZ MD         SYSTEM ID:  [id number]   CT Chest Pulmonary Embolism w Contrast    Narrative    EXAM: CT CHEST PULMONARY EMBOLISM W CONTRAST  LOCATION: [facility]  DATE: 3/29/2024    INDICATION: Elevated d-dimer and shortness of breath.  COMPARISON: CT abdomen/pelvis 09/26/2019.  TECHNIQUE: CT chest pulmonary angiogram during arterial phase injection of IV contrast. Multiplanar reformats and MIP reconstructions were performed. Dose reduction techniques were used.   CONTRAST: 83 mL Isovue-370.    FINDINGS:    ANGIOGRAM CHEST: No acute pulmonary embolism. Arch and pulmonary artery, suggesting underlying pulmonary hypertension. Associated, asymmetric right heart enlargement.    Thoracic aorta is normal in course and caliber. Mild atheromatous disease.    LUNGS AND PLEURA: Trachea and large airways are patent. Mild pulmonary interstitial edema. Additional mosaic attenuation of the pulmonary parenchyma,  suggesting small vessels or small airways disease. Mild dependent atelectasis.     No suspicious pulmonary nodule.    No pneumothorax.     Small right pleural effusion.     MEDIASTINUM/AXILLAE: Mildly enlarged right paratracheal lymph node measures 14 mm in short axis, series 4 image 34. Subcentimeter bilateral hilar lymph nodes, not meeting size criteria for lymphadenopathy.     Thoracic esophagus is unremarkable.      No axillary lymphadenopathy.    Chest wall is unremarkable.    Cardiomegaly, with asymmetric right heart enlargement. Small pericardial effusion. Atrial septal occluder device.    CORONARY ARTERY CALCIFICATION: None.    UPPER ABDOMEN: Diffuse hepatic steatosis. Small to moderate volume ascites.    MUSCULOSKELETAL: No suspicious abnormality.    OTHER: No additionally suspicious abnormality.      Impression    IMPRESSION:  1.  No acute pulmonary embolism.  2.  Mild pulmonary interstitial edema, with associated small right pleural effusion.  3.  Enlarged main pulmonary artery, as evidence of pulmonary hypertension. Associated asymmetric right heart enlargement.  4.  Small to moderate volume ascites.  5.  Hepatic steatosis.

## 2024-03-30 NOTE — PROGRESS NOTES
"   03/30/24 1300   Appointment Info   Signing Clinician's Name / Credentials (PT) Shreya Ryan DPT   Rehab Comments (PT) On 3 L supp O2 at baseline   Living Environment   People in Home facility resident   Current Living Arrangements assisted living   Home Accessibility no concerns   Transportation Anticipated family or friend will provide   Living Environment Comments Pt lives in an residential with no DONELL. REports having a call light and assistance as needed.   Self-Care   Usual Activity Tolerance moderate   Current Activity Tolerance poor   Regular Exercise No   Equipment Currently Used at Home shower chair;walker, rolling;grab bar, toilet  (4WW)   Fall history within last six months no   Activity/Exercise/Self-Care Comment Pt is typically IND in room with 4WW and while on 3 L supp O2 at baseline. Pt has assist for showering and tolieting as needed via call light at her residential per pt report.   General Information   Onset of Illness/Injury or Date of Surgery 03/29/24   Referring Physician Dennys Harmon MD   Patient/Family Therapy Goals Statement (PT) \"To go back home\"   Pertinent History of Current Problem (include personal factors and/or comorbidities that impact the POC) Pt is a 64 year old female with past medical history of chronic obstructive pulmonary disease, chronic respiratory failure on home oxygen with history of hypercapnia, nicotine dependence, chronic diastolic congestive heart failure, deep venous thrombosis and pulmonary embolism on past warfarin anticoagulation, recent hemoptysis, morbid obesity, chronic kidney disease stage III, coronary disease, hyperlipidemia, hypertension, hypothyroidism, monoclonal gammopathy of undetermined significance, seizure disorder, cerebrovascular disease with prior stroke, admitted 3/29/2024 with shortness of breath, hypoxia, and acute on chronic respiratory failure.   Existing Precautions/Restrictions fall;oxygen therapy device and L/min  (6 L supp O2)   Weight-Bearing " Status - LUE full weight-bearing   Weight-Bearing Status - RUE full weight-bearing   Weight-Bearing Status - LLE full weight-bearing   Weight-Bearing Status - RLE full weight-bearing   Cognition   Affect/Mental Status (Cognition) WFL   Orientation Status (Cognition) oriented x 4   Follows Commands (Cognition) WFL   Pain Assessment   Patient Currently in Pain No   Integumentary/Edema   Integumentary/Edema no deficits were identifed   Posture    Posture Forward head position;Protracted shoulders   Range of Motion (ROM)   Range of Motion ROM is WFL   Strength (Manual Muscle Testing)   Strength (Manual Muscle Testing) strength is WF   Bed Mobility   Comment, (Bed Mobility) Pt appraoched sitting up in chair, reports planning to sleep in recliner. Unable to assess at this time   Transfers   Comment, (Transfers) Sit>stand completed w/ CGA   Gait/Stairs (Locomotion)   Comment, (Gait/Stairs) Pt ambulated w/ FWW and CGA   Balance   Balance other (describe)   Balance Comments Pt demonstrated good sitting and static standing balance, required FWW for all dynamic balance at this time   Sensory Examination   Sensory Perception patient reports no sensory changes   Coordination   Coordination no deficits were identified   Muscle Tone   Muscle Tone no deficits were identified   Clinical Impression   Criteria for Skilled Therapeutic Intervention Yes, treatment indicated   PT Diagnosis (PT) Impaired functional mobility   Influenced by the following impairments Impaired activity tolerance, weakness, impaired balance   Functional limitations due to impairments Impaired gait and inability to complete ADL's   Clinical Presentation (PT Evaluation Complexity) stable   Clinical Presentation Rationale Clinical judgment   Clinical Decision Making (Complexity) low complexity   Planned Therapy Interventions (PT) balance training;bed mobility training;gait training;home exercise program;motor coordination training;neuromuscular  re-education;patient/family education;postural re-education;stair training;ROM (range of motion);strengthening;stretching;transfer training;progressive activity/exercise;risk factor education;home program guidelines   Risk & Benefits of therapy have been explained evaluation/treatment results reviewed;risks/benefits reviewed;care plan/treatment goals reviewed;current/potential barriers reviewed;participants voiced agreement with care plan;participants included;patient   PT Total Evaluation Time   PT Eval, Low Complexity Minutes (97679) 10   Physical Therapy Goals   PT Frequency Daily   PT Predicted Duration/Target Date for Goal Attainment 04/06/24   PT Goals Bed Mobility;Transfers;Gait   PT: Bed Mobility Supervision/stand-by assist;Supine to/from sit;Rolling;Bridging   PT: Transfers Modified independent;Sit to/from stand;Bed to/from chair;Assistive device   PT: Gait Modified independent;Rolling walker;50 feet  (4WW)   Interventions   Interventions Quick Adds Gait Training;Therapeutic Activity   Therapeutic Activity   Therapeutic Activities: dynamic activities to improve functional performance Minutes (84225) 30   Symptoms Noted During/After Treatment Shortness of breath;Fatigue;Significant change in vital signs   Treatment Detail/Skilled Intervention Evaluation completed and treatment indicated. Increased time needed for line managment and VS monitoring throughout session. Sit>stand completed w/ CGA, cues given to push up from chair rather than FWW. Pt ambulated w/ CGA and FWW to door, then bathroom, ~15 ft. VS stable on 6 L supp O2 via NC at start of session, pt had desaturation down to 72% following ambulation, multiple cues needed for purse-lip breathing and O2 increased to 10 L supp O2 to regain SpO2 to 92% following a seated rest break on the toliet. Pt required Min Ax1 for pericares, able to compelted toliet transfer w/ CGA. Pt ambulated from toliet to chair while on 8 L supp O2, desaturation occurred to 85%,  recovered to 93% following a 3-4 minute rest. Pt educated on discharge planning, assist at home, and usage of 4WW for all mobility. Pt had questions regarding longer distance mobility, discussed motorized scooter or wheelchair for longer distance mobility. Pt left sitting up in chair with all needs in reach and RN notified.   Gait Training   Treatment Detail/Skilled Intervention Gait initiated, see TA for details   PT Discharge Planning   PT Plan Progress activity tolerance, close monitoring of VS, bring 4WW to room   PT Discharge Recommendation (DC Rec) home with assist;home with home care physical therapy   PT Rationale for DC Rec Pt is moving below baseline mobility, currently limited by impaired activity tolerance at this time. At baseline, pt on 3 L supp O2, pt on 6-10 L supp O2 at this time. Anticipate with further IP PT that pt will be safe to return to [facility] with usage of 4WW, assist as needed, and HCPT in order to further address impaired activity tolerance. Pt would benefit from a motorized scooter or w/c for longer distances as pt is unable to tolerate ambulation outside the room at this time due to desaturation.   PT Brief overview of current status CGA w/ FWW for mobility   Total Session Time   Timed Code Treatment Minutes 30   Total Session Time (sum of timed and untimed services) 40

## 2024-03-30 NOTE — PLAN OF CARE
Orientations: A&O X4  Vitals/Pain: VSS on 5L NC. Pain controlled with PRN Tylenol  Tele: SR  Lines/Drains: PIV X1 SL  Skin/Wounds: Redness under R breast, blanchable redness to sacrum, R ankle ulcer (WOC consult pending).  GI/: Adequate uop. +BS, +flatus, -BM. Tolerating regular diet. Denies nausea.   Labs: Routine  Ambulation/Assist: SBA w/ walker. Worked with PT today.  Plan: ctm

## 2024-03-31 NOTE — PLAN OF CARE
Goal Outcome Evaluation:      Plan of Care Reviewed With: patient       Orientations: Alert and oriented x4  Vitals/Pain: VSS, on O2 at 5L of nasal cannula, oxymask at HS, sating low to mid 90s, refused CPAP tonight; pain is managed with Tylenol and Oxycodone  Tele: SR with occasional PVCs  Lines/Drains: R PIV-SL  Skin/Wounds: redness on R breast, bruise on L arm, abrasions R ankle, blanchable redness below buttocks, cracked/dried bilateral feet  GI/: continent B/B, uses external catheter at HS, minimal UOP  Labs: Abnormal/Trends, Electrolyte Replacement- routine  Ambulation/Assist: A1-RW and GB  Sleep Quality: short intervals  Plan: pulmonologist consult, WOC consult, PT/OT

## 2024-03-31 NOTE — PROGRESS NOTES
[facility]    Medicine Progress Note - Hospitalist Service    Date of Admission:  3/29/2024    Assessment & Plan      Linda Otero is a 64 year old female with past medical history of chronic obstructive pulmonary disease, chronic respiratory failure on home oxygen with history of hypercapnia, nicotine dependence, chronic diastolic congestive heart failure, deep venous thrombosis and pulmonary embolism on past warfarin anticoagulation, recent hemoptysis, morbid obesity, chronic kidney disease stage III, coronary disease, hyperlipidemia, hypertension, hypothyroidism, monoclonal gammopathy of undetermined significance, seizure disorder, cerebrovascular disease with prior stroke, admitted 3/29/2024 with acute on chronic respiratory failure.    Acute on chronic hypoxic respiratory failure  - Speculate multifactorial from HFpEF exacerbation (elevated BNP and weight gain), COPD exacerbation, JODI with noncompliance to CPAP and CAP  - Baseline 3-5 L NC  - Home Lasix 20mg BID PO  - CT C PE 3/29/2024 without any PE but did demonstrate pulmonary congestion, R pleural effusion  Plan  - Finish last dose of methylprednisolone 40mg q8h IV today. Tomorrow, will start prednisone 60mg daily PO (order already placed)  - Continue Lasix 40mg BID IV  - Continue ceftriaxone 2g daily IV + doxycycline 100mg BID PO x5 days (3/29/2024 - 4/2/2024)  - Await TTE  - Daily weights, strict I&Os and 2L fluid restriction  - Monitor on telemetry  - PT/OT consulted  - Flutter valve and incentive spirometer  - Maintain O2 > 88%    SONAL on Chronic kidney disease, stage III  - Speculate congestive nephropathy  - Admission serum Cr 1.33, historic baseline 0.87 to 1.08 in 2020 to 2023  Monitor urine output, I/O's, daily weights  Avoid non-steroidal anti-inflammatory drugs (NSAIDs)  Lasix 40mg BID IV as above  Monitor with morning BMP    History of deep venous thrombosis and pulmonary embolism  CT C PE 3/29/2024 without any  PE  Per pharmacy, no longer on warfarin    Skin ulcer, right lateral ankle  Wound nurse (WOC) consultation, daily wound care    Weakness and deconditioning  Physical therapy, occupational therapy consults  Increase activity as tolerated    Hyperlipidemia - Diet-controlled, monitor; follow-up with primary clinic provider   Hypothyroidism - Med rec does not show any levothyroxine. Will defer to outpt evaluation by PCP  Morbid obesity  History of nicotine dependence  History of obstructive sleep apnea  History of coronary artery disease   History of monoclonal gammopathy of undetermined significance - noted, follow-up with primary clinic provider   History of cerebrovascular disease with prior stroke - noted, monitor  History of B12 deficiency - noted, continue prior to admission B12 replacement  History of seizure disorder - noted in medical record; monitor    Disposition  Estimated length of stay 3 to 4 days  Anticipated discharge to home with home health  Social work consult          Diet: Combination Diet Regular Diet Adult  Snacks/Supplements Adult: Expedite Bottle; Between Meals  Fluid restriction 2000 ML FLUID    DVT Prophylaxis: Pneumatic Compression Devices  Miguel Catheter: Not present  Lines: None     Cardiac Monitoring: ACTIVE order. Indication: Acute decompensated heart failure (48 hours)  Code Status: No CPR- Do NOT Intubate      Clinically Significant Risk Factors               # Coagulation Defect: INR = 1.20 (Ref range: 0.85 - 1.15) and/or PTT = 34 Seconds (Ref range: 22 - 38 Seconds), will monitor for bleeding    # Hypertension: Noted on problem list         # Moderate Malnutrition: based on nutrition assessment, PRESENT ON ADMISSION   # COPD: noted on problem list        Disposition Plan     Expected Discharge Date: 04/01/2024      Destination: nursing home              Dennys Harmon MD  Hospitalist Service  [facility]  Securely message with [employer] (more info)  Text page  via [facility] Paging/Directory   ______________________________________________________________________    Interval History   No acute events overnight.  On evaluation this afternoon, she is sitting at the chair at bedside.  Currently utilizing 5L NC which is an improvement from 9L NC yesterday. At baseline, she utilizes 3-5L NC.  Able to urinate slightly more today compared to before.  No fevers, chills, diarrhea.    Physical Exam   Vital Signs: Temp: 97.8  F (36.6  C) Temp src: Axillary BP: 116/74 Pulse: 87   Resp: 12 SpO2: 91 % O2 Device: Nasal cannula with humidification Oxygen Delivery: 5 LPM  Weight: 344 lbs 6.4 oz      GENERAL awake and alert, lying in bed, mild to moderate dyspnea, talkative and interactive  EYES pupils equal, round; no conjunctival injection or jaundice  ENT mucous membranes mildly dry without lesions or exudates  LYMPH no cervical, submandibular, or supraclavicular adenopathy  BACK normal  LUNGS no wheezes.  Bibasilar crackles; diminished breath sounds both lower lung fields with intermittent coughing during exam  HEART S1,S2 with RRR, no rubs or gallops, no murmurs.  JVD noted  ABDOMEN soft, non-tender; no guarding, rebound, or rigidity  MUSCULOSKELETAL no gross joint deformities; extremities with 1+/2+ pedal edema  PULSES dorsalis pedis pulses present, symmetric  SKIN warm and dry; small ulceration lateral aspect right ankle without purulent drainage  NEURO moves upper and lower extremities spontaneously and to command; no focal weakness appreciated; sensation intact to light touch upper and lower extremities  PSYCHIATRIC awake and alert; answers questions and follows simple commands    Medical Decision Making       60 MINUTES SPENT BY ME on the date of service doing chart review, history, exam, documentation & further activities per the note.      Data     I have personally reviewed the following data over the past 24 hrs:    N/A  \   N/A   / N/A     139 99 35.2 (H) /  150 (H)   4.4 27 1.43  (H) \     Procal: N/A CRP: N/A Lactic Acid: 1.8         Imaging results reviewed over the past 24 hrs:   No results found for this or any previous visit (from the past 24 hour(s)).

## 2024-03-31 NOTE — PLAN OF CARE
Orientations: A&O X4  Vitals/Pain: VSS on 4L NC. Denies SOB at rest, but c/o exertional dypnea with O2 desaturation to 70% when ambulating. LS dim. Fair tolerance to IS at 500. Pain controlled with PRN Tylenol & Oxycodone  Tele: SR  Lines/Drains: PIV X1 SL. Purewick  Skin/Wounds: Redness under R breast, blanchable redness to sacrum, R ankle ulcer (WOC consult pending). Refusing bedside RN to clean wound/apply dressing until WOC RN assess.   GI/: Adequate uop. +BS, +flatus, -BM. Tolerating regular diet & 2L fluid restriction. Denies nausea.   Labs: Routine  Ambulation/Assist: SBA w/ walker.   Plan: ctm

## 2024-04-01 NOTE — DISCHARGE INSTRUCTIONS
WOUND CARES    Right lateral ankle wound: Every other day and PRN for soiled/loose dressing   1. Wrap right lower leg with Vashe-soaked gauze. Allow to soak for about two minutes.   2. Unwrap leg and gently wipe away any debris. Allow skin to dry.   3. Use a tongue depressor to apply Medihoney to wound.   4. Apply a small piece of Adaptic (oil emulsion gauze) over Medihoney.   5. Cover with a 4x4 gauze and secure with Kerlix gauze and Medipore tape.   6. Apply Spandigrip to right lower extremity.      Consider Triad paste if no improvement.

## 2024-04-01 NOTE — CONSULTS
"[facility] Nurse Inpatient Assessment     Consulted for: Wound ulcer right lateral ankle     Summary: patient with POA right ankle, states wound comes and goes, patient dictating own wound cares, initial exam 4/1    Patient History (according to provider note(s):      \"64 year old female admitted on 3/29/2024.  Past medical history of COPD, chronic respiratory failure on home oxygen with history of hypercapnia, nicotine dependence, HFpEF, DVT/PE  on past warfarin anticoagulation, recent hemoptysis, morbid obesity, CKD stage III, CAD, HLD, HTN, hypothyroidism, MGUS, seizure disorder, CVA, admitted 3/29/2024 with acute on chronic respiratory failure. \"    Assessment:      Areas visualized during today's visit: Focused: and Lower extremities     Wound location: right lateral ankle area     Last photo: 4/1  Wound due to: Unknown Etiology patient reports this wound area is chronic and comes and goes, has treated the area \"four times\" before   Wound history/plan of care: found with tubigrip with ABD and coban in use  Wound base: epidermis and dermis, macerated epidermis      Palpation of the wound bed:  textured        Drainage: small     Description of drainage: serous     Measurements (length x width x depth, in cm): cluster of 7  x 5  x  0.1 cm      Tunneling: N/A     Undermining: N/A  Periwound skin: Intact      Color: pale      Temperature: normal   Odor: none  Pain: no grimacing or signs of discomfort, none  Pain interventions prior to dressing change: patient tolerated well  Treatment goal: Heal  and Decrease moisture  STATUS: initial assessment  Supplies ordered: supplies stored on unit, discussed with RN, and discussed with patient        Treatment Plan:       Wound care  Start:  04/01/24 1618,   EVERY OTHER DAY,   Routine      Comments: Location: right lateral ankle  Care: provided every other day by primary RN (applied 4/1 1st)  1. Cleanse every other day with commercial wound " "cleanser and 4x4\" gauze  2. Cover wound with aquacel silver alginate, then ABD pad  3. Secure with kerlix  4. Ok to continue to use Tubigrip size E and F to lower legs, or garments per lymphedema consult if indicated          Orders: Written    RECOMMEND PRIMARY TEAM ORDER: None, at this time  Education provided: plan of care  Discussed plan of care with: Patient and Nurse  [facility] nurse follow-up plan: weekly  Notify WO if wound(s) deteriorate.  Nursing to notify the Provider(s) and re-consult the [facility] Nurse if new skin concern.    DATA:     Current support surface: Standard  Standard gel/foam mattress (IsoFlex, Atmos air, etc)  Containment of urine/stool: Incontinence Protocol, Incontinent pad in bed, and Suction based external urinary catheter   BMI: Body mass index is 55.78 kg/m .   Active diet order: Orders Placed This Encounter      Combination Diet Regular Diet Adult     Output: I/O last 3 completed shifts:  In: 1140 [P.O.:1140]  Out: 1000 [Urine:1000]     Labs:   Recent Labs   Lab 03/30/24  0612 03/29/24  1453   ALBUMIN  --  4.1   HGB 11.6* 12.3   INR  --  1.20*   WBC 4.5 7.3     Pressure injury risk assessment:   Sensory Perception: 4-->no impairment  Moisture: 3-->occasionally moist  Activity: 3-->walks occasionally  Mobility: 3-->slightly limited  Nutrition: 3-->adequate  Friction and Shear: 2-->potential problem  Dorian Score: 18    Luz CWOCN   1st choice: Securely message with [employer] ([facility] [employer])   (2nd option: [facility] Office Phone [phone], messages checked periodically Mon-Fri 8a-4p)        "

## 2024-04-01 NOTE — PLAN OF CARE
Orientations: Alert and oriented x4  Vitals/Pain: VSS, on O2 at 3L of nasal cannula, oxymask at HS, sating low to mid 90s, refused CPAP tonight; SOB on exertion; desating around 0600ish, O2 increased to 5L.  Tele: SR  Lines/Drains: R PIV-SL  Skin/Wounds: redness on R breast, bruise on L arm, skin ulcer R ankle- weepy, ABD applied, blanchable redness below buttocks, cracked/dried bilateral feet, 3+ edema on both feet, elevated as able  GI/: stress incontinent, uses external catheter at HS, pericares and cath care done.  Labs: Abnormal/Trends, Electrolyte Replacement- routine; irritated with the lab this morning, refused lab draw at this time, AM RN notified.  Ambulation/Assist: A1-RW  Sleep Quality: short intervals  Plan: pulmonologist consult, WOC consult, PT/OT

## 2024-04-01 NOTE — PROGRESS NOTES
[facility]    Medicine Progress Note - Hospitalist Service    Date of Admission:  3/29/2024    Assessment & Plan   Linda Otero is a 64 year old female admitted on 3/29/2024.  Past medical history of COPD, chronic respiratory failure on home oxygen with history of hypercapnia, nicotine dependence, HFpEF, DVT/PE  on past warfarin anticoagulation, recent hemoptysis, morbid obesity, CKD stage III, CAD, HLD, HTN, hypothyroidism, MGUS, seizure disorder, CVA, admitted 3/29/2024 with acute on chronic respiratory failure.       Acute on chronic hypoxic respiratory failure  *Speculate multifactorial from HFpEF exacerbation (elevated BNP and weight gain), COPD exacerbation, JODI with noncompliance to CPAP and CAP  *Baseline 3-5 L NC  *Home Lasix 20mg BID PO  *CT C PE 3/29/2024 without any PE but did demonstrate pulmonary congestion, R pleural effusion    - start prednisone 60mg daily PO 04/01/24   - duonebs qid (holding PTA Breo and Incruse)  - Continue Lasix 40mg BID IV - weights stable and I/O's equivalent past 24 hours- hold dose 04/01/24 pending BMP results  - Continue ceftriaxone 2g daily IV + doxycycline 100mg BID PO x5 days (3/29/2024 - 4/2/2024)  - TTE pending 04/01/24   - Daily weights, strict I&Os and 2L fluid restriction  - PT/OT consulted  - Flutter valve and incentive spirometer  - stable on baseline oxygen needs; discontinue IMC and stop tele  - Maintain O2 88-95%     SONAL on Chronic kidney disease, stage III  *Speculate congestive nephropathy  *Admission serum Cr 1.33, historic baseline 0.87 to 1.08 in 2020 to 2023  Monitor urine output, I/O's, daily weights  Avoid non-steroidal anti-inflammatory drugs (NSAIDs)  Lasix 40mg BID IV as above  Daily BMP with diuresis; pending 04/01/24      History of deep venous thrombosis and pulmonary embolism  *CT C PE 3/29/2024 without any PE  *Per pharmacy, no longer on warfarin     Skin ulcer, right lateral ankle  Wound nurse (WOC) consultation,  "daily wound care     Weakness and deconditioning  Therapies recommend home with assist, home therapies     Hyperlipidemia - Diet-controlled, monitor; follow-up with primary clinic provider   Hypothyroidism - Med rec does not show any levothyroxine (TSH slightly elevated, free T4 wnl). Will defer to outpt evaluation by PCP  Morbid obesity  History of nicotine dependence  History of obstructive sleep apnea  History of coronary artery disease   History of monoclonal gammopathy of undetermined significance - noted, follow-up with primary clinic provider   History of cerebrovascular disease with prior stroke - noted, monitor  History of B12 deficiency - noted, continue prior to admission B12 replacement  History of seizure disorder - noted in medical record; monitor            Diet: Combination Diet Regular Diet Adult  Snacks/Supplements Adult: Expedite Bottle; Between Meals  Fluid restriction 2000 ML FLUID    DVT Prophylaxis: Enoxaparin (Lovenox) SQ  Miguel Catheter: Not present  Lines: None     Cardiac Monitoring: ACTIVE order. Indication: Acute decompensated heart failure (48 hours)  Code Status: No CPR- Do NOT Intubate      Clinically Significant Risk Factors                  # Hypertension: Noted on problem list        # Severe Obesity: Estimated body mass index is 55.78 kg/m  as calculated from the following:    Height as of this encounter: 1.676 m (5' 6\").    Weight as of this encounter: 156.8 kg (345 lb 9.6 oz)., PRESENT ON ADMISSION  # Moderate Malnutrition: based on nutrition assessment, PRESENT ON ADMISSION   # COPD: noted on problem list        Disposition Plan     Expected Discharge Date: 04/01/2024      Destination: nursing home              Gerry Pizarro MD  Hospitalist Service  [facility]  Securely message with [email or web address] (more info)  Text page via [employer] Paging/Directory   ______________________________________________________________________    Interval History   Continues to report " "dyspnea with significant orthopnea.  Feels she is improving, is \"getting there\" when asked if close to baseline.  Denies significant wheezing or cough.  Subjective chills.  Complains of poor sleep.  No other complaints.     Physical Exam   Vital Signs: Temp: 97.8  F (36.6  C) Temp src: Oral BP: 127/78 Pulse: 94   Resp: 21 SpO2: 94 % O2 Device: Oxymask Oxygen Delivery: 5 LPM  Weight: 345 lbs 9.6 oz    General Appearance: Obese female in NAD  Respiratory: few basilar crackles, no wheezing or tachypnea   Cardiovascular: RRR, normal s1/s2  GI: normal bowel sounds  Skin: 2+ lower extremity edema   Other: Alert and appropriate, CN grossly intact      Medical Decision Making       35 MINUTES SPENT BY ME on the date of service doing chart review, history, exam, documentation & further activities per the note.      Data         Imaging results reviewed over the past 24 hrs:   No results found for this or any previous visit (from the past 24 hour(s)).  "

## 2024-04-01 NOTE — CONSULTS
Cardiology Consultation      Linda Otero MRN# [id number]   YOB: 1960 Age: 64 year old   Date of Admission: 3/29/2024     Reason for consult: Acute hypoxemic respiratory failure, significant RV dysfunction/severe pulmonary hypertension           Assessment and Plan:     64-year-old female with a past medical history significant for CVA thought to be due to paradoxical embolism status post PFO closure, severe pulmonary hypertension secondary to lower extremity AV fistula which resolved post closure, oxygen dependent COPD, history of DVT for which she was previously on Coumadin who was admitted to [facility] on 3/29/2024 with dyspnea, hypoxia and acute on chronic respiratory failure.    The etiology of her respiratory failure appears to be multifactorial with evidence of right ventricular dysfunction/severe pulm hypertension on her admission echocardiogram as well as a possible COPD exacerbation.  She has been started on intravenous diuretic therapy.  Of note, she was not on warfarin at the time of admission and a subsequent CT was negative for pulmonary embolism.    IMPRESSION:    History of paradoxical embolism status post PFO closure.  Severe pulm hypertension secondary to lower extremity AV fistula which resolved post closure.  Dyspnea/hypoxia/respiratory failure with an echocardiogram on 4/1/2024 demonstrating severe right ventricular dilatation/systolic dysfunction/pulmonary hypertension with evidence of RV pressure/volume overload.  Moderate to severe tricuspid regurgitation which is probably functional in the setting of severe right ventricular dilatation/systolic dysfunction.  History of DVT in the past.  Elevated BMI.  Severe COPD, on home O2.    PLAN    -The etiology of her right ventricular dysfunction/severe pulm hypertension is probably multifactorial given her elevated BMI, obstructive sleep apnea and severe COPD.  Although no acute pulmonary embolism was noted on her  admission CT, chronic thromboembolic pulmonary disease is within the differential diagnosis.    -At this point, however, she is profoundly fluid overloaded and the appropriate treatment is aggressive diuretic therapy to unload the right ventricle.  I have recommended a furosemide infusion and we will track ins and outs aggressively.    -Her tricuspid regurgitation is almost certainly functional in nature and in any event restoration of euvolemia would be the first step in management.  We will schedule right heart catheterization once she is closer to euvolemia.  A ventilation/perfusion scan should also be performed to evaluate for chronic thromboembolic pulmonary disease.    >80 minutes of time spent today pre and post charting, reviewing pertinent investigations personally as well as extensive review of previous records and coordinating plans for further evaluation.                 Chief Complaint:   Shortness of Breath           History of Present Illness:   This patient is a 64 year old female who was previously been followed by Dr. Montalvo at [facility] cardiology.  She was last seen there in November 2019.    She has a history of a CVA secondary to a paradoxical embolus and subsequently underwent PFO closure.  She then developed a lower extremity AV fistula leading to severe pulmonary hypertension which was closed in June 2019.  A subsequent echocardiogram demonstrated mild to moderate pulmonary hypertension.  Her other comorbidities include an elevated BMI as well as obstructive sleep apnea and severe COPD for which she is on home O2.    He was admitted to [facility] on 3/29/2024 with worsening dyspnea and cough over the past 6 weeks prior to admission.  She was noted to be hypoxemic with evidence of fluid overload and was admitted for further evaluation.  A CT of the chest was negative for pulmonary embolism, and an echocardiogram today demonstrated right ventricular dilatation/systolic dysfunction  "with moderate to severe tricuspid regurgitation and severe pulm hypertension.  Cardiology consultation was requested.    She is fatigued today and reports that her shortness of breath has improved somewhat but has not resolved.  She has significant bilateral lower extremity edema and states that she is almost 100 pounds over her goal weight.         Physical Exam:   Vitals were reviewed  Blood pressure 137/77, pulse 101, temperature 97.8  F (36.6  C), temperature source Oral, resp. rate 18, height 1.676 m (5' 6\"), weight (!) 156.8 kg (345 lb 9.6 oz), SpO2 90%, not currently breastfeeding.  Temperatures:  Current - Temp: 97.8  F (36.6  C); Max - Temp  Av.8  F (36.6  C)  Min: 97.6  F (36.4  C)  Max: 98  F (36.7  C)  Respiration range: Resp  Av.1  Min: 15  Max: 22  Pulse range: Pulse  Av.8  Min: 89  Max: 101  Blood pressure range: Systolic (24hrs), Av , Min:105 , Max:143   ; Diastolic (24hrs), Av, Min:52, Max:102    Pulse oximetry range: SpO2  Av.5 %  Min: 85 %  Max: 98 %    Intake/Output Summary (Last 24 hours) at 2024 1539  Last data filed at 2024 1510  Gross per 24 hour   Intake 1140 ml   Output 1200 ml   Net -60 ml     Constitutional:   awake, alert, cooperative, no apparent distress, and appears stated age     Eyes:   Lids and lashes normal, pupils equal, round and reactive to light, extra ocular muscles intact, sclera clear, conjunctiva normal     Neck:   supple, symmetrical, trachea midline, no JVD     Back:   symmetric     Lungs:   Decreased breath sounds at bases       Cardiovascular:   Normal apical impulse, regular rate and rhythm, normal S1 and S2, no S3 or S4, and no murmur noted.      Abdomen:   Distended, nontender     Musculoskeletal:   motor strength is 5 out of 5 all extremities bilaterally     Neurologic:   Grossly nonfocal     Skin:   no bruising or bleeding     Additional findings:     +3 pitting bilateral lower extremity edema                 Past Medical " History:   I have reviewed this patient's past medical history  Past Medical History:   Diagnosis Date    Abnormality of gait due to impairment of balance     Acute and chronic respiratory failure with hypercapnia (H)     Acute deep venous thrombosis (H) 10/20/2015    Anemia     Iron deficiency    Aneurysm (H24) 2012    Arthritis     toes, thumb, elbow    B12 deficiency     Cancer (H) 1985    cervical    Cellulitis right foot    Chronic diastolic heart failure (H)     Chronic kidney disease     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  left thalamic stroke as well as patchy MCA branch infarcts    CVA (cerebral vascular accident) (H) 12/23/2012    Left thalamic stroke, MCA branch infarcts    Dermatitis     DVT (deep venous thrombosis) (H)     Right leg    Encephalopathy     after stroke, believed related to hypoxia    History of transfusion     Hyperlipidemia     Hypertension     Hypothyroidism     Lymphedema     MGUS (monoclonal gammopathy of unknown significance)     Neuropathy of right lower extremity     Obesity     On home oxygen therapy     PFO (patent foramen ovale)     closed after stroke, see cardiology notes care everywhere    PFO (patent foramen ovale)     Pneumonia     Primary hypercoagulable state (H24)     Pulmonary emboli (H) 2013    Pulmonary HTN (H)     Respiratory failure (H)     after stroke    Seizure (H)     Seizures (H)     at age 31    Skin cancer 2014    Sleep apnea     CPAP    Stroke (H) 2012    Umbilical hernia     Wound of right ankle              Past Surgical History:   I have reviewed this patient's past surgical history  Past Surgical History:   Procedure Laterality Date    ASD REPAIR      ASD REPAIR      BIOPSY SKIN (LOCATION)      CARDIAC  CATHETERIZATION      2 stents    COLONOSCOPY N/A 2018    Procedure: COLONOSCOPY;  Surgeon: Dorene Araujo MD;  Location: [facility] OR;  Service:     CONIZATION  1985    CV CORONARY ANGIOGRAM N/A 2019    Procedure: Coronary Angiogram;  Surgeon: Brett Montalvo MD;  Location: [facility] Cath Lab;  Service: Cardiology    CV LEFT HEART CATHETERIZATION WITHOUT LEFT VENTRICULOGRAM Left 2019    Procedure: Left Heart Catheterization Without Left Ventriculogram;  Surgeon: Brett Montalvo MD;  Location: [facility] Cath Lab;  Service: Cardiology    CV RIGHT HEART CATHETERIZATION N/A 2019    Procedure: Right Heart Catheterization;  Surgeon: Brett Montalvo MD;  Location: [facility] Cath Lab;  Service: Cardiology    ESOPHAGOSCOPY, GASTROSCOPY, DUODENOSCOPY (EGD), COMBINED N/A 2018    Procedure: ESOPHAGOGASTRODUODENOSCOPY (EGD);  Surgeon: Dorene Araujo MD;  Location: [facility];  Service:     IR CAROTID ANGIOGRAM  2012    IR CAROTID ANGIOGRAM  2012    IR MISCELLANEOUS PROCEDURE  2012    IR MISCELLANEOUS PROCEDURE  2012    IR MISCELLANEOUS PROCEDURE  2012    OTHER SURGICAL HISTORY      Cardiac stents    PATENT FORAMEN OVALE CLOSURE      REPAIR PATENT FORAMEN OVALE      Helex device    XR SACRO ILIAC JOINT INJECTION LEFT  2012    [facility] REMV ART CLOT ILIAC-POP,LEG INCIS Left 2019    Procedure: REPAIR LEFT FEMORAL ARTERIOVENOUS FISTULA WITH INTRAOPERATIVE ULTRASOUND;  Surgeon: Salinas Torres MD;  Location: [facility];  Service: General               Social History:   I have reviewed this patient's social history  Social History     Tobacco Use    Smoking status: Former     Packs/day: 1.00     Years: 44.00     Additional pack years: 0.00     Total pack years: 44.00     Types: Cigarettes     Quit date: 2012     Years since quittin.2    Smokeless tobacco: Never   Substance Use Topics    Alcohol use: No      Alcohol/week: 0.0 standard drinks of alcohol             Family History:   I have reviewed this patient's family history  Family History   Problem Relation Age of Onset    Angioedema Mother     Pulmonary Embolism Brother     Cerebrovascular Disease Father     Coronary Artery Disease Brother              Allergies:     Allergies   Allergen Reactions    Eliquis [Apixaban] Shortness Of Breath, Hives and Swelling     Pt. Reported     Penicillins Anaphylaxis     Per chart review: tolerated Augmentin in 2020.  Tolerated CTX 2024.       Clindamycin Itching    Erythromycin      Itching, hives    Peanut Oil Hives    Tetracycline      itching             Medications:   I have reviewed this patient's current medications  Medications Prior to Admission   Medication Sig Dispense Refill Last Dose    acetaminophen (TYLENOL) 325 MG tablet Take 650 mg by mouth every 4 hours as needed for pain or fever    at prn    albuterol (PROVENTIL) (2.5 MG/3ML) 0.083% neb solution Take 1 vial (2.5 mg) by nebulization every 6 hours as needed for shortness of breath or wheezing OFFICE VISIT NEEDED FOR ANY FURTHER REFILLS 90 mL 11  at prn    albuterol (VENTOLIN HFA) 108 (90 Base) MCG/ACT inhaler INHALE 2 PUFFS INTO THE LUNGS EVERY 4-6 HOURS AS NEEDED. 18 g 3  at prn    aspirin (ASA) 81 MG chewable tablet Take 81 mg by mouth 2 times daily   3/29/2024 at am    bisacodyl (DULCOLAX) 10 MG suppository Place 10 mg rectally daily as needed for constipation    at prn    bisacodyl (DULCOLAX) 5 MG EC tablet Take 5 mg by mouth daily as needed for constipation    at prn    calcium carbonate (TUMS) 500 MG chewable tablet Take 1 chew tab by mouth 4 times daily as needed for heartburn    at prn    calcium carbonate 500 mg, elemental, (OSCAL) 500 MG tablet Take 1 tablet by mouth at bedtime   3/28/2024 at pm    cyanocobalamin (CYANOCOBALAMIN) 1000 MCG/ML injection Inject 1 mL into the muscle every 30 days   3/29/2024 at am    DIETHYLTOLUAMIDE EX Externally apply  topically as needed (bug bit prevention)    at prn    dimethicone-zinc oxide (KAYDEN PROTECT) external cream Apply topically 2 times daily as needed for dry skin or other    at prn    diphenhydrAMINE (BENADRYL) 25 MG capsule Take 1-2 capsules (25-50 mg) by mouth every 6 hours as needed for itching or allergies 100 capsule 0  at prn    fluticasone-vilanterol (BREO ELLIPTA) 200-25 MCG/ACT inhaler Inhale 1 puff into the lungs daily 30 each 11 3/29/2024 at am    furosemide (LASIX) 20 MG tablet Take 20 mg by mouth 2 times daily   3/29/2024 at am    guaiFENesin (ROBITUSSIN) 20 mg/mL liquid Take 200 mg by mouth every 4 hours as needed for cough    at prn    loperamide (IMODIUM A-D) 2 MG tablet Take 2 mg by mouth 4 times daily as needed for diarrhea    at prn    magnesium hydroxide (MILK OF MAGNESIA) 400 MG/5ML suspension Take 30 mLs by mouth daily as needed for constipation or heartburn    at prn    menthol-zinc oxide (CALMOSEPTINE) 0.44-20.6 % OINT ointment Apply topically daily as needed for skin protection    at prn    multivitamin, therapeutic (THERA-VIT) TABS tablet Take 1 tablet by mouth at bedtime   3/28/2024 at pm    nystatin (MYCOSTATIN) 063178 UNIT/GM external powder Apply topically 2 times daily as needed for dry skin    at prn    Ostomy Supplies (SKIN PREP WIPES) MISC Apply topically as needed (to intact blister stage 2 deep tissue injury)    at prn    tiotropium (SPIRIVA RESPIMAT) 2.5 MCG/ACT inhaler INHALE TWO PUFFS BY MOUTH EVERY DAY 12 g 3 3/29/2024 at am    Vitamins A & D (VITAMIN A & D) external ointment Apply topically 2 times daily as needed    at prn     Current Facility-Administered Medications Ordered in Epic   Medication Dose Route Frequency Last Rate Last Admin    acetaminophen (TYLENOL) tablet 650 mg  650 mg Oral Q4H PRN   650 mg at 03/31/24 0815    Or    acetaminophen (TYLENOL) Suppository 650 mg  650 mg Rectal Q4H PRN        albuterol (PROVENTIL) neb solution 2.5 mg  2.5 mg Nebulization Q6H PRN    2.5 mg at 03/31/24 0231    [Held by provider] aspirin (ASA) chewable tablet 81 mg  81 mg Oral BID   81 mg at 04/01/24 0910    bisacodyl (DULCOLAX) EC tablet 5 mg  5 mg Oral Daily PRN        bisacodyl (DULCOLAX) suppository 10 mg  10 mg Rectal Daily PRN        calcium carbonate (TUMS) chewable tablet 1,000 mg  1,000 mg Oral 4x Daily PRN        cefTRIAXone (ROCEPHIN) 2 g vial to attach to  ml bag for ADULTS or NS 50 ml bag for PEDS  2 g Intravenous Q24H   2 g at 03/31/24 1531    doxycycline hyclate (VIBRAMYCIN) capsule 100 mg  100 mg Oral Q12H FERMIN (08/20)   100 mg at 04/01/24 0910    [Held by provider] enoxaparin ANTICOAGULANT (LOVENOX) injection 40 mg  40 mg Subcutaneous Q24H        [Held by provider] fluticasone-vilanterol (BREO ELLIPTA) 200-25 MCG/ACT inhaler 1 puff  1 puff Inhalation Daily   1 puff at 03/31/24 0816    [Held by provider] furosemide (LASIX) injection 40 mg  40 mg Intravenous BID   40 mg at 03/31/24 1536    guaiFENesin (ROBITUSSIN) 20 mg/mL solution 200 mg  200 mg Oral Q4H PRN   200 mg at 03/30/24 0823    ipratropium - albuterol 0.5 mg/2.5 mg/3 mL (DUONEB) neb solution 3 mL  3 mL Nebulization 4x daily   3 mL at 04/01/24 1417    lidocaine (LMX4) cream   Topical Q1H PRN        lidocaine 1 % 0.1-1 mL  0.1-1 mL Other Q1H PRN        multivitamin w/minerals (THERA-VIT-M) tablet 1 tablet  1 tablet Oral Daily   1 tablet at 03/31/24 0815    naloxone (NARCAN) injection 0.2 mg  0.2 mg Intravenous Q2 Min PRN        Or    naloxone (NARCAN) injection 0.4 mg  0.4 mg Intravenous Q2 Min PRN        Or    naloxone (NARCAN) injection 0.2 mg  0.2 mg Intramuscular Q2 Min PRN        Or    naloxone (NARCAN) injection 0.4 mg  0.4 mg Intramuscular Q2 Min PRN        ondansetron (ZOFRAN ODT) ODT tab 4 mg  4 mg Oral Q6H PRN        Or    ondansetron (ZOFRAN) injection 4 mg  4 mg Intravenous Q6H PRN        oxyCODONE (ROXICODONE) tablet 5 mg  5 mg Oral Q6H PRN   5 mg at 03/31/24 0815    polyethylene glycol (MIRALAX) Packet 17  g  17 g Oral BID PRN        polyethylene glycol (MIRALAX) Packet 17 g  17 g Oral Daily   17 g at 24 0910    predniSONE (DELTASONE) tablet 60 mg  60 mg Oral Daily   60 mg at 24 0910    senna-docusate (SENOKOT-S/PERICOLACE) 8.6-50 MG per tablet 1 tablet  1 tablet Oral BID PRN        Or    senna-docusate (SENOKOT-S/PERICOLACE) 8.6-50 MG per tablet 2 tablet  2 tablet Oral BID PRN        sodium chloride (PF) 0.9% PF flush 3 mL  3 mL Intracatheter Q8H   3 mL at 24 1124    sodium chloride (PF) 0.9% PF flush 3 mL  3 mL Intracatheter q1 min prn        [Held by provider] umeclidinium (INCRUSE ELLIPTA) 62.5 MCG/ACT inhaler 1 puff  1 puff Inhalation Daily   1 puff at 24 0816     No current Epic-ordered outpatient medications on file.             Review of Systems:     The 10 point Review of Systems is negative other than noted in the HPI            Data:   All laboratory data reviewed  Results for orders placed or performed during the hospital encounter of 24 (from the past 24 hour(s))   Echocardiogram Complete   Result Value Ref Range    LVEF  60-65%     Narrative    [id number]  [id number]  [id number]  [id number]^SARAH^TAB^MOE     [facility]  Echocardiography Laboratory  [address] [place] [address]     Name: LOKESH MCKEON  MRN: [id number]  : 1960  Study Date: 2024 07:50 AM  Age: 64 yrs  Gender: Female  Patient Location: [facility]  Reason For Study: Dyspnea  Ordering Physician: TAB SMITH  Referring Physician: Jayy Henson  Performed By: Faraz Lincoln RDCS     BSA: 2.5 m2  Height: 66 in  Weight: 345 lb  HR: 91  BP: 127/78 mmHg  ______________________________________________________________________________  Procedure  Complete Portable Echo Adult.  ______________________________________________________________________________  Interpretation Summary     Patient terminated exam prior to completion.  There is mild concentric left ventricular  hypertrophy.  Left ventricular systolic function is normal.  The visual ejection fraction is 60-65%.  Flattened septum is consistent with RV pressure overload however unable to  estimate PA systolic pressure.  The right ventricle is severely dilated. Moderately decreased right  ventricular systolic function  There is moderate to mod-severe (2-3+) tricuspid regurgitation.  Intact atrial septum. Closure device well seated.  Inferior vena cava not well visualized for estimation of right atrial  pressure.  There is no pericardial effusion.     Compared to study dated 5/23/2017, there is severe right ventricular  enlargement, dysfunction and evidence of pulmonary hypertension.  ______________________________________________________________________________  Left Ventricle  The left ventricle is normal in size. There is mild concentric left  ventricular hypertrophy. Left ventricular systolic function is normal. The  visual ejection fraction is 60-65%. Left ventricular diastolic function is not  assessable. Flattened septum is consistent with RV pressure overload.     Right Ventricle  The right ventricle is severely dilated. Moderately decreased right  ventricular systolic function.     Atria  Normal left atrial size. The right atrium is severely dilated. Intact atrial  septum. Closure device well seated.     Mitral Valve  There is mild mitral annular calcification. There is mild (1+) mitral  regurgitation.     Tricuspid Valve  The tricuspid valve is not well visualized. Right ventricular systolic  pressure could not be approximated due to inadequate tricuspid regurgitation.  There is moderate to mod-severe (2-3+) tricuspid regurgitation.     Aortic Valve  The aortic valve is trileaflet with aortic valve sclerosis.     Pulmonic Valve  The pulmonic valve is not well visualized.     Vessels  The aortic root is normal size. Normal size ascending aorta. Inferior vena  cava not well visualized for estimation of right atrial  "pressure.     Pericardium  There is no pericardial effusion.     ______________________________________________________________________________  MMode/2D Measurements & Calculations  IVSd: 1.2 cm  LVIDd: 4.3 cm  LVIDs: 2.8 cm  LVPWd: 1.3 cm  FS: 36.0 %  LV mass(C)d: 202.8 grams  LV mass(C)dI: 80.4 grams/m2     Ao root diam: 3.2 cm  LA dimension: 5.3 cm  asc Aorta Diam: 3.6 cm  LA/Ao: 1.7  Ao root diam index Ht(cm/m): 1.9  Ao root diam index BSA (cm/m2): 1.3  Asc Ao diam index BSA (cm/m2): 1.4  Asc Ao diam index Ht(cm/m): 2.1  LA Volume (BP): 59.6 ml  LA Volume Index (BP): 23.7 ml/m2  RV Base: 6.9 cm     RWT: 0.61     Doppler Measurements & Calculations  MV E max anastacia: 117.0 cm/sec  MV A max anastacia: 129.0 cm/sec  MV E/A: 0.91  MV dec slope: 810.0 cm/sec2  MV dec time: 0.14 sec  PA acc time: 0.08 sec  E/E' av.1  Lateral E/e': 8.8  Medial E/e': 13.4     ______________________________________________________________________________  Report approved by: Jazzmine Vera 2024 12:43 PM         Basic metabolic panel   Result Value Ref Range    Sodium 140 135 - 145 mmol/L    Potassium 4.6 3.4 - 5.3 mmol/L    Chloride 99 98 - 107 mmol/L    Carbon Dioxide (CO2) 25 22 - 29 mmol/L    Anion Gap 16 (H) 7 - 15 mmol/L    Urea Nitrogen 47.4 (H) 8.0 - 23.0 mg/dL    Creatinine 1.58 (H) 0.51 - 0.95 mg/dL    GFR Estimate 36 (L) >60 mL/min/1.73m2    Calcium 9.8 8.8 - 10.2 mg/dL    Glucose 107 (H) 70 - 99 mg/dL     EKG results: Admission EKG demonstrates sinus tachycardia with evidence of right ventricular hypertrophy.    Clinically Significant Risk Factors                  # Hypertension: Noted on problem list        # Severe Obesity: Estimated body mass index is 55.78 kg/m  as calculated from the following:    Height as of this encounter: 1.676 m (5' 6\").    Weight as of this encounter: 156.8 kg (345 lb 9.6 oz)., PRESENT ON ADMISSION  # Moderate Malnutrition: based on nutrition assessment, PRESENT ON ADMISSION   # COPD: " noted on problem list

## 2024-04-02 NOTE — PROGRESS NOTES
Summary: 04/01/2024; [id number]  Orientations: A&Ox4  Vitals/Pain: VSS on 3-5L NC. Denies pain.   Tele: SR  Lines/Drains: PIV SL  Skin/Wounds: Right ankle wound, blanchable redness arcelia area and buttocks.   GI/: Regular diet. 2L FR. Adequate UOP. No BM.  Labs: Abnormal/Trends, Electrolyte Replacement- Cr 1.58  Ambulation/Assist: Ax1 GB, W. Only one walk during shift, pt refused other walks.   Sleep Quality: Good  Plan: Lasix gtt infusing 10mL/hr   SOB, wheezing, and hemoptysis reported during AM, nothing during this shift; Lovenox and ASA held.   Pain: Pt denies pain  Other: Sleeping in chair.

## 2024-04-02 NOTE — PROGRESS NOTES
Cardiology Progress Note          Assessment and Plan:         64-year-old female with a past medical history significant for CVA thought to be due to paradoxical embolism status post PFO closure, severe pulmonary hypertension secondary to lower extremity AV fistula which resolved post closure, oxygen dependent COPD, history of DVT for which she was previously on Coumadin who was admitted to [facility] on 3/29/2024 with dyspnea, hypoxia and acute on chronic respiratory failure.     The etiology of her respiratory failure appears to be multifactorial with evidence of right ventricular dysfunction/severe pulm hypertension on her admission echocardiogram as well as a possible COPD exacerbation.  She has been started on intravenous diuretic therapy.  Of note, she was not on warfarin at the time of admission and a subsequent CT was negative for pulmonary embolism.     IMPRESSION:     History of paradoxical embolism status post PFO closure.  Severe pulm hypertension secondary to lower extremity AV fistula which resolved post closure.  Dyspnea/hypoxia/respiratory failure with an echocardiogram on 4/1/2024 demonstrating severe right ventricular dilatation/systolic dysfunction/pulmonary hypertension with evidence of RV pressure/volume overload.  Moderate to severe tricuspid regurgitation which is probably functional in the setting of severe right ventricular dilatation/systolic dysfunction.  History of DVT in the past.  Elevated BMI.  Severe COPD, on home O2.     PLAN     -The etiology of her right ventricular dysfunction/severe pulm hypertension is probably multifactorial given her elevated BMI, obstructive sleep apnea and severe COPD.  Although no acute pulmonary embolism was noted on her admission CT, chronic thromboembolic pulmonary disease is within the differential diagnosis.     -She is responding well to the furosemide infusion.     -Her tricuspid regurgitation is almost certainly functional in nature and in  "any event restoration of euvolemia would be the first step in management.  We will schedule right heart catheterization once she is closer to euvolemia.  A ventilation/perfusion scan should also be performed to evaluate for chronic thromboembolic pulmonary disease.        Interval History:     Diuresing well.             Review of Systems:   As per subjective, otherwise 5 systems reviewed and negative.           Physical Exam:   Blood pressure 123/87, pulse 80, temperature 98  F (36.7  C), temperature source Oral, resp. rate 20, height 1.676 m (5' 6\"), weight (!) 154.2 kg (340 lb), SpO2 96%, not currently breastfeeding.      Vital Sign Ranges  Temperature Temp  Av.9  F (36.6  C)  Min: 97.7  F (36.5  C)  Max: 98.7  F (37.1  C)   Blood pressure Systolic (24hrs), Av , Min:108 , Max:137        Diastolic (24hrs), Av, Min:73, Max:90      Pulse Pulse  Av  Min: 80  Max: 101   Respirations Resp  Av.8  Min: 16  Max: 20   Pulse oximetry SpO2  Av.9 %  Min: 90 %  Max: 96 %         Intake/Output Summary (Last 24 hours) at 2024 0908  Last data filed at 2024 1510  Gross per 24 hour   Intake 240 ml   Output 200 ml   Net 40 ml       Constitutional: NAD  Skin: Warm and dry  Neck: No JVD  Lungs: CTA  Cardiovascular: RRR, no m/r/g  Abdomen: Soft, non tender.  Extremities and Back: +3 bilateral lower extremity edema  Neurological: Nonfocal           Medications:     I have reviewed this patient's current medications.              Data:     Labs reviewed.         Tiffanie Huston MD, MChristiD.    "

## 2024-04-02 NOTE — PROGRESS NOTES
Paged to place midline for patient. Per primary nursing staff, reason for midline is that floor nurses unable to establish IV access.  VAT has not been consulted for IV access for this patient today.  Discuss with primary RN and charge RN that best next step for difficult access would have been to page VAT to place PIV, as many factors weighted in decision to place, and feasibility of placing, midline / PICC.  Patient states she had multiple attempts throughout day for PIV access, and despite asking for someone who specializes in PIVs, staff did not page VAT.  Patient with heavily bruised arms, extravasated IV sites.  Inform patient that she can request VAT place her PIVs, and request that staff place visible note on patient's chart that VAT should be called to place patient's PIVs.  This VAT RN able to establish access in one attempt.  Discuss with patient that other than her hands, where patient does not want a PIV, few available locations left, and midline may be something patient would like to consider in the future.  VAT staff will be updated.

## 2024-04-02 NOTE — CONSULTS
Care Management Initial Consult    General Information  Assessment completed with: Linda Chen  Type of CM/SW Visit: Initial Assessment    Primary Care Provider verified and updated as needed: Yes   Readmission within the last 30 days: no previous admission in last 30 days      Reason for Consult: discharge planning    Communication Assessment  Patient's communication style: spoken language (English or Bilingual)    Hearing Difficulty or Deaf: no   Wear Glasses or Blind: yes    Cognitive  Cognitive/Neuro/Behavioral: WDL  Level of Consciousness: alert  Arousal Level: opens eyes spontaneously  Orientation: oriented x 4  Mood/Behavior: cooperative, calm  Best Language: 0 - No aphasia  Speech: logical, spontaneous, clear    Living Environment:   People in home: alone     Current living Arrangements: assisted living  Name of Facility: [facility]   Able to return to prior arrangements: yes       Family/Social Support:  Care provided by: other (see comments) (Some care provided by self some provided by ILS workers for grocery shopping, Laundry)  Provides care for: no one, unable/limited ability to care for self  Marital Status:   Children          Description of Support System: Supportive    Support Assessment: Adequate family and caregiver support    Current Resources:   Patient receiving home care services: Yes  Skilled Home Care Services: Home Health [facility]  Community Resources: [facility], [facility]  Equipment currently used at home: shower chair, walker, rolling, grab bar, toilet (4WW)  Supplies currently used at home: Compression Stockings    Employment/Financial:  Employment Status: retired        Financial Concerns: none   Referral to Financial Worker: No       Does the patient's insurance plan have a 3 day qualifying hospital stay waiver?  No    Lifestyle & Psychosocial Needs:  Social Determinants of Health     Food Insecurity: Not on file   Depression: Not at risk (1/9/2019)    PHQ-2      PHQ-2 Score: 0   Housing Stability: Not on file   Tobacco Use: Medium Risk (12/13/2023)    Patient History     Smoking Tobacco Use: Former     Smokeless Tobacco Use: Never     Passive Exposure: Not on file   Financial Resource Strain: Not on file   Alcohol Use: Not on file   Transportation Needs: Not on file   Physical Activity: Not on file   Interpersonal Safety: Not on file   Stress: Not on file   Social Connections: Not on file     Functional Status:  Prior to admission patient needed assistance:   Dependent ADLs:: Ambulation-walker  Dependent IADLs:: Cleaning, Cooking, Laundry, Shopping, Medication Management  Assesssment of Functional Status: Not at  functional baseline    Additional Information:  Writer completed consult with patient per consult request. Patient is a 64 year old women who lives in an assisted living called [facility]. Patient was admitted 3/29/2024 with shortness of breath, hypoxia, and acute on chronic respiratory failure. She has a history of kidney disease stage III, Coronary disease, seizure disorder, cerebrovascular disease with prior stroke. Writer confirmed information listed on face sheet including address, contacts, insurance and PCP. Patient would like Yashira and Rehana removed from her contacts and her son Cosme added. Patient has been to rehab before in the out patient setting and found it to be successful. She has two ILS ([employer]) worker who help her weekly for about 15 hours a week with grocery shopping, Laundry and medications. Patient is not able to easily shower at home and often can not wash her own hair. Patient does have a [employer] wavier that pays for her assisted living. Patient typically ambulates with a walker. PT and OT consulted. Patient ambulating currently at assistive 1 with a walker.     Colton Costa [facility]  Care Management

## 2024-04-02 NOTE — PLAN OF CARE
Goal Outcome Evaluation:  Orientations: A&Ox4  Vitals/Pain: VSS on 3-5L NC. Denies pain.   Tele: SR  Lines/Drains: PIV SL  Skin/Wounds: Right ankle wound, blanchable redness arcelia area and buttocks.   GI/: Regular diet. 2L FR. Adequate UOP via PW. No BM.  Labs: Abnormal/Trends, Electrolyte Replacement- Cr 1.58  Ambulation/Assist: Ax1 GB, W. Only one walk during shift, pt refused other walks.   Sleep Quality: Good  Plan: Lasix gtt, awaiting placement of PIV by IV team.   SOB, wheezing, and hemoptysis this morning. Provider aware, lovenox and aspirin held.

## 2024-04-02 NOTE — PROGRESS NOTES
[facility]    Medicine Progress Note - Hospitalist Service    Date of Admission:  3/29/2024    Assessment & Plan   Linda Otero is a 64 year old female admitted on 3/29/2024.  Past medical history of COPD, chronic respiratory failure on home oxygen with history of hypercapnia, nicotine dependence, HFpEF, DVT/PE  on past warfarin anticoagulation, recent hemoptysis, morbid obesity, CKD stage III, CAD, HLD, HTN, hypothyroidism, MGUS, seizure disorder, CVA, admitted 3/29/2024 with acute on chronic respiratory failure.       Acute on chronic hypoxic respiratory failure  *Speculate multifactorial from HFpEF exacerbation (elevated BNP and weight gain), COPD exacerbation, JODI with noncompliance to CPAP and CAP  Acute right heart failure with mod-severe TR  *Baseline 3-5 L NC  *Home Lasix 20mg BID PO  *CT C PE 3/29/2024 without any PE but did demonstrate pulmonary congestion, R pleural effusion  *TTE 4/1 showing EF 60-65% without WMA, severe RV dilation with moderately decreased RV function, mod-severe TR and pulm HTN    - continue prednisone 60mg daily PO; decrease to 40 mg daily 4/3  - duonebs qid (holding PTA Breo and Incruse)  - Continue ceftriaxone 2g daily IV + doxycycline 100mg BID PO x5 days (3/29/2024 - 4/2/2024)  - lasix gtt per Cards, recs appreciated - weight down 5 pounds from yesterday, largely incontinent and body habitus makes Purewick challenging so I/O's inaccurate  - lymphedema consult  - Cards planning for eventual right heart cath once adequately diuresed, will also need V/Q scan  - 2L fluid restriction  - Flutter valve and incentive spirometer  - stable on baseline oxygen needs; Maintain O2 88-95%  - midline catheter placement    Hemoptysis, mild  *reports orange sputum production at home, was producing small amounts of rober blood 4/1  *admission CT chest negative for PE, masses  - hold PTA aspirin   - hold lovenox ppx  - no recurrence overnight or this AM, monitor    "  SONAL on Chronic kidney disease, stage III  *Speculate congestive nephropathy  *Admission serum Cr 1.33, historic baseline 0.87 to 1.08 in 2020 to 2023  - Avoid non-steroidal anti-inflammatory drugs (NSAIDs)  - Cr stable at 1.5 on 04/02/24; monitor daily with diuresis     History of deep venous thrombosis and pulmonary embolism  *CT C PE 3/29/2024 without any PE  *Per pharmacy, no longer on warfarin  - holding lovenox due to hemoptysis     Skin ulcer, right lateral ankle  Wound nurse (WOC) following     Weakness and deconditioning  Therapies recommend home with assist, home therapies     Hyperlipidemia - Diet-controlled, monitor; follow-up with primary clinic provider   Hypothyroidism - Med rec does not show any levothyroxine (TSH slightly elevated, free T4 wnl). Will defer to outpt evaluation by PCP  Morbid obesity  History of nicotine dependence  History of obstructive sleep apnea  History of coronary artery disease   History of monoclonal gammopathy of undetermined significance - noted, follow-up with primary clinic provider   History of cerebrovascular disease with prior stroke - noted, monitor  History of B12 deficiency - noted, continue prior to admission B12 replacement  History of seizure disorder - noted in medical record; monitor            Diet: Combination Diet Regular Diet Adult  Snacks/Supplements Adult: Expedite Bottle; Between Meals  Fluid restriction 2000 ML FLUID    DVT Prophylaxis: Enoxaparin (Lovenox) SQ  Miguel Catheter: Not present  Lines: None     Cardiac Monitoring: None  Code Status: No CPR- Do NOT Intubate      Clinically Significant Risk Factors                  # Hypertension: Noted on problem list        # Severe Obesity: Estimated body mass index is 54.88 kg/m  as calculated from the following:    Height as of this encounter: 1.676 m (5' 6\").    Weight as of this encounter: 154.2 kg (340 lb)., PRESENT ON ADMISSION  # Moderate Malnutrition: based on nutrition assessment, PRESENT ON " ADMISSION   # Financial/Environmental Concerns: none  # COPD: noted on problem list        Disposition Plan     Expected Discharge Date: 04/02/2024      Destination: nursing home     Therapies recommend home with assist and home therapy         Gerry Pizarro MD  Hospitalist Service  [facility]  Securely message with [employer] (more info)  Text page via [employer] Paging/Directory   ______________________________________________________________________    Interval History   Reports dyspnea has improved but continues to have orthopnea.  No further hemoptysis overnight or this morning.  No lightheadedness.  Willing to try lymphedema wraps, but wants to be able to take them off if not tolerating.     Physical Exam   Vital Signs: Temp: 98  F (36.7  C) Temp src: Oral BP: 123/87 Pulse: 80   Resp: 20 SpO2: 96 % O2 Device: Nasal cannula Oxygen Delivery: 6 LPM  Weight: 340 lbs 0 oz    General Appearance: Obese female in NAD  Respiratory: lungs CTAB, no wheezes or crackles  Cardiovascular: RRR, normal s1/s2  GI: normal bowel sounds  Skin: 3+ lower extremity edema   Other: Alert and appropriate, CN grossly intact      Medical Decision Making       30 MINUTES SPENT BY ME on the date of service doing chart review, history, exam, documentation & further activities per the note.  MANAGEMENT DISCUSSED with the following over the past 24 hours: bedside RN   Tests ORDERED & REVIEWED in the past 24 hours:  - BMP  Medical complexity over the past 24 hours:  - Prescription DRUG MANAGEMENT performed      Data     I have personally reviewed the following data over the past 24 hrs:    N/A  \   N/A   / N/A     141 100 53.2 (H) /  106 (H)   4.3 26 1.50 (H) \       Imaging results reviewed over the past 24 hrs:   No results found for this or any previous visit (from the past 24 hour(s)).

## 2024-04-02 NOTE — PLAN OF CARE
AOx4. Regular diet with 1500 mL FR. Lasix gtt at 10 mg/hr. Up A1, gb, walker. VSS besides requiring 6L NC. Denies pain. Redness to sacrum, coccyx, and inner thighs. Voiding with incontinence. No BM this shift. Continue lasix gtt, wean O2 needs as able, follow POC. R heart cath to be scheduled with patient more medically stable.

## 2024-04-02 NOTE — PLAN OF CARE
Goal Outcome Evaluation:               VSS, A/O, LS dim.  Lasix gtt, frequent void, most often incontinent.  Pt very frustrated, has not rested well d/t frequent urination.  Up Ax1.  Mild pain in R foot, declines interventions.

## 2024-04-03 NOTE — PROGRESS NOTES
[facility]    Medicine Progress Note - Hospitalist Service    Date of Admission:  3/29/2024    Assessment & Plan   Linda Otero is a 64 year old female admitted on 3/29/2024.  Past medical history of COPD, chronic respiratory failure on home oxygen with history of hypercapnia, nicotine dependence, HFpEF, DVT/PE  on past warfarin anticoagulation, recent hemoptysis, morbid obesity, CKD stage III, CAD, HLD, HTN, hypothyroidism, MGUS, seizure disorder, CVA, admitted 3/29/2024 with acute on chronic respiratory failure.       Acute on chronic hypoxic respiratory failure  *Speculate multifactorial from HFpEF exacerbation (elevated BNP and weight gain), COPD exacerbation, JODI with noncompliance to CPAP and CAP  Acute right heart failure with mod-severe TR  *Baseline 3-5 L NC  *Home Lasix 20mg BID PO  *CT C PE 3/29/2024 without any PE but did demonstrate pulmonary congestion, R pleural effusion  *TTE 4/1 showing EF 60-65% without WMA, severe RV dilation with moderately decreased RV function, mod-severe TR and pulm HTN  *completed 5-day course of ceftriaxone+doxy on 4/2    - decrease prednisone to 40 mg daily 4/4/24 with rapid taper  - decrease nebs to tid per patient request (holding PTA Breo and Incruse)  - lasix gtt per Cards, recs appreciated - weight down 3 pounds in past 24 hours (incontinent and refusing purewick)  - lymphedema consult  - Cards planning for eventual right heart cath once adequately diuresed, will also need V/Q scan  - Flutter valve and incentive spirometer  - stable on baseline oxygen needs; Maintain O2 88-95%    Hemoptysis, mild  *reports orange sputum production at home, was producing small amounts of rober blood 4/1  *admission CT chest negative for PE, masses  - no further hemoptysis, will resume PTA aspirin and if goes well, start lovenox 4/4     SONAL on Chronic kidney disease, stage III  *Speculate congestive nephropathy  *Admission serum Cr 1.33, historic baseline  "0.87 to 1.08 in 2020 to 2023  - Avoid non-steroidal anti-inflammatory drugs (NSAIDs)  - Cr stable 04/03/24; monitor daily with diuresis     History of deep venous thrombosis and pulmonary embolism  *CT C PE 3/29/2024 without any PE  *Per pharmacy, no longer on warfarin  - holding lovenox due to hemoptysis     Skin ulcer, right lateral ankle  Wound nurse (WOC) following     Weakness and deconditioning  Therapies recommend home with assist, home therapies     Hyperlipidemia - Diet-controlled, monitor; follow-up with primary clinic provider   Hypothyroidism - Med rec does not show any levothyroxine (TSH slightly elevated, free T4 wnl). Will defer to outpt evaluation by PCP  Morbid obesity  History of nicotine dependence  History of obstructive sleep apnea  History of coronary artery disease   History of monoclonal gammopathy of undetermined significance - noted, follow-up with primary clinic provider   History of cerebrovascular disease with prior stroke - noted, monitor  History of B12 deficiency - noted, continue prior to admission B12 replacement  History of seizure disorder - noted in medical record; monitor            Diet: Combination Diet Regular Diet Adult  Snacks/Supplements Adult: Expedite Bottle; Between Meals  Fluid restriction 2000 ML FLUID    DVT Prophylaxis: PCD  Miguel Catheter: Not present  Lines: None     Cardiac Monitoring: None  Code Status: No CPR- Do NOT Intubate      Clinically Significant Risk Factors                  # Hypertension: Noted on problem list        # Severe Obesity: Estimated body mass index is 54.44 kg/m  as calculated from the following:    Height as of this encounter: 1.676 m (5' 6\").    Weight as of this encounter: 153 kg (337 lb 4.8 oz).   # Moderate Malnutrition: based on nutrition assessment    # Financial/Environmental Concerns: none  # COPD: noted on problem list        Disposition Plan      Expected Discharge Date: 04/05/2024      Destination: nursing home     Therapies " recommend home with assist and home therapy         Gerry Pizarro MD  Hospitalist Service  [facility]  Securely message with [employer] (more info)  Text page via [employer] Paging/Directory   ______________________________________________________________________    Interval History   No further hemoptysis.  Dyspnea slowly improving.  Would like to decrease nebs to tid, though still feels they are helpful.  No lightheadedness with fluid loss.  Moving bowels.     Physical Exam   Vital Signs: Temp: 97.6  F (36.4  C) Temp src: Oral BP: 119/58 Pulse: 93   Resp: 18 SpO2: 94 % O2 Device: Nasal cannula with humidification Oxygen Delivery: (S) 5 LPM  Weight: 337 lbs 4.8 oz    General Appearance: Obese female in NAD  Respiratory: lungs CTAB, no wheezes or crackles  Cardiovascular: RRR, normal s1/s2 with 2/6 systolic murmur  GI: normal bowel sounds  Skin: 3+ lower extremity edema, slightly improved  Other: Alert and appropriate, CN grossly intact      Medical Decision Making       25 MINUTES SPENT BY ME on the date of service doing chart review, history, exam, documentation & further activities per the note.  MANAGEMENT DISCUSSED with the following over the past 24 hours: bedside nurse   Tests ORDERED in the past 24 hours:   - BMP  Medical complexity over the past 24 hours:  - Prescription DRUG MANAGEMENT performed      Data         Imaging results reviewed over the past 24 hrs:   No results found for this or any previous visit (from the past 24 hour(s)).

## 2024-04-03 NOTE — PROGRESS NOTES
Care Management Follow Up    Length of Stay (days): 5    Expected Discharge Date: 04/05/2024     Concerns to be Addressed: discharge planning     Patient plan of care discussed at interdisciplinary rounds: Yes    Anticipated Discharge Disposition: Home Care     Anticipated Discharge Services:    Anticipated Discharge DME:      Patient/family educated on Medicare website which has current facility and service quality ratings: yes  Education Provided on the Discharge Plan: Yes  Patient/Family in Agreement with the Plan: yes    Referrals Placed by CM/SW: Homecare  Private pay costs discussed: Not applicable    Additional Information:  Phone call from [facility]. Pt is open to them for RN services. Referral sent. They can follow through [facility]. SW following for discharge planning.     NGOC Massey, [facility]  [phone] Desk phone  [phone] Cell/text (Preferred)  [facility]

## 2024-04-03 NOTE — PROGRESS NOTES
Cardiology Progress Note          Assessment and Plan:         64-year-old female with a past medical history significant for CVA thought to be due to paradoxical embolism status post PFO closure, severe pulmonary hypertension secondary to lower extremity AV fistula which resolved post closure, oxygen dependent COPD, history of DVT for which she was previously on Coumadin who was admitted to [facility] on 3/29/2024 with dyspnea, hypoxia and acute on chronic respiratory failure.     The etiology of her respiratory failure appears to be multifactorial with evidence of right ventricular dysfunction/severe pulm hypertension on her admission echocardiogram as well as a possible COPD exacerbation.  She has been started on intravenous diuretic therapy.  Of note, she was not on warfarin at the time of admission and a subsequent CT was negative for pulmonary embolism.     IMPRESSION:     History of paradoxical embolism status post PFO closure.  Severe pulm hypertension secondary to lower extremity AV fistula which resolved post closure.  Dyspnea/hypoxia/respiratory failure with an echocardiogram on 4/1/2024 demonstrating severe right ventricular dilatation/systolic dysfunction/pulmonary hypertension with evidence of RV pressure/volume overload.  Moderate to severe tricuspid regurgitation which is probably functional in the setting of severe right ventricular dilatation/systolic dysfunction.  History of DVT in the past.  Elevated BMI.  Severe COPD, on home O2.     PLAN     -The etiology of her right ventricular dysfunction/severe pulm hypertension is probably multifactorial given her elevated BMI, obstructive sleep apnea and severe COPD.  Although no acute pulmonary embolism was noted on her admission CT, chronic thromboembolic pulmonary disease is within the differential diagnosis.     -She is responding well to the furosemide infusion.  Continue to follow creatinine, may need additional diuresis with  "chlorothiazide.     -Her tricuspid regurgitation is almost certainly functional in nature and in any event restoration of euvolemia would be the first step in management.  We will schedule right heart catheterization once she is closer to euvolemia.  A ventilation/perfusion scan should also be performed to evaluate for chronic thromboembolic pulmonary disease.        Interval History:     Continues to diurese well.  Down 3 pounds since yesterday.             Review of Systems:   As per subjective, otherwise 5 systems reviewed and negative.           Physical Exam:   Blood pressure 115/65, pulse 95, temperature 97.5  F (36.4  C), temperature source Oral, resp. rate 17, height 1.676 m (5' 6\"), weight (!) 153 kg (337 lb 4.8 oz), SpO2 95%, not currently breastfeeding.      Vital Sign Ranges  Temperature Temp  Av.9  F (36.6  C)  Min: 97.7  F (36.5  C)  Max: 98.7  F (37.1  C)   Blood pressure Systolic (24hrs), Av , Min:108 , Max:137        Diastolic (24hrs), Av, Min:73, Max:90      Pulse Pulse  Av  Min: 80  Max: 101   Respirations Resp  Av.8  Min: 16  Max: 20   Pulse oximetry SpO2  Av.9 %  Min: 90 %  Max: 96 %         Intake/Output Summary (Last 24 hours) at 2024 0908  Last data filed at 2024 1510  Gross per 24 hour   Intake 240 ml   Output 200 ml   Net 40 ml       Constitutional: NAD  Skin: Warm and dry  Neck: No JVD  Lungs: CTA  Cardiovascular: RRR, no m/r/g  Abdomen: Soft, non tender.  Extremities and Back: +3 bilateral lower extremity edema  Neurological: Nonfocal           Medications:     I have reviewed this patient's current medications.              Data:     Labs reviewed.         Tiffanie Huston MD, M.D.    "

## 2024-04-03 NOTE — PLAN OF CARE
Orientations: Alert and oriented x4  Vitals/Pain: VSS, on O2 at 6L of nasal cannula, sating mid 90s, refused CPAP; SOB on exertion  Lines/Drains: R PIV-on Lasix gtt at 10mg/hr   Skin/Wounds: redness on R breast, bruises on L and R arms, skin ulcer R ankle- dressing CDI, wears stockings, blanchable redness below buttocks, cracked/dried bilateral feet, 3+ edema on both feet, elevated as able  GI/: stress incontinent, refused external catheter, pericares done.  Labs: Abnormal/Trends, Electrolyte Replacement- none  Ambulation/Assist: A1-RW  Sleep Quality: short intervals  Plan: lymphedema consult per OT; heart cath once euvolemic

## 2024-04-03 NOTE — PLAN OF CARE
Goal Outcome Evaluation:      Plan of Care Reviewed With: patient    Overall Patient Progress: improvingOverall Patient Progress: improving    Outcome Evaluation: progressing    Pt here with multifactorial acute on chronic hypoxic respiratory failure.  Continues on lasix drip.  A&O x4,  CMS intact except lower extremities with lymphedema, DP/PT pulses 1+, wraps applied at 1400 after R ankle wound care. VSS on O2, weaned from 6L to 4L today.. Reg diet, 2gram sodium restrict, 2L fluid restriction.  PO intake recorded; output difficult due to pt refuses purewick, has stress incontinence when standing (also refuses depends--however due to large volume urine on floor RN convinced pt to tuck a towel between her legs when preparing to stand to prevent lymphedema wraps from getting soiled). Takes pills whole one at a time, pudding helps with swallowing bitter tasting meds. Up with SBA x1 and walker. Mild back pain managed with heat packs. Pt scoring green. on the Aggression Stop Light Tool.  Hospitalist, cardiology, WOC, and CM team following.

## 2024-04-03 NOTE — PROGRESS NOTES
04/03/24 1400   Appointment Info   Signing Clinician's Name / Credentials (OT) Vy Garcia, OTR/L   Rehab Comments (OT) Lymph & OT Consult   Quick Adds   Quick Adds Edema   Living Environment   People in Home facility resident   Current Living Arrangements assisted living   Home Accessibility no concerns   Transportation Anticipated family or friend will provide   Living Environment Comments TOM   Self-Care   Equipment Currently Used at Home shower chair;walker, rolling;grab bar, toilet  (4WW)   Fall history within last six months no   Activity/Exercise/Self-Care Comment indp in group home room with 4WW. calls with A for bathing and toileting as needed   Instrumental Activities of Daily Living (IADL)   IADL Comments has A for IADL   General Information   Onset of Illness/Injury or Date of Surgery 04/03/24   Referring Physician Gerry Pizarro   Patient/Family Therapy Goal Statement (OT) to go home  when ready   Additional Occupational Profile Info/Pertinent History of Current Problem Linda Otero is a 64 year old female admitted on 3/29/2024.  Past medical history of COPD, chronic respiratory failure on home oxygen with history of hypercapnia, nicotine dependence, HFpEF, DVT/PE  on past warfarin anticoagulation, recent hemoptysis, morbid obesity, CKD stage III, CAD, HLD, HTN, hypothyroidism, MGUS, seizure disorder, CVA, admitted 3/29/2024 with acute on chronic respiratory failure.   Cognitive Status Examination   Orientation Status orientation to person, place and time   Sensory   Sensory Quick Adds sensation intact   Edema General Information   Onset of Edema 03/29/24   Affected Body Part(s) Left LE;Right LE   Edema Etiology Surgery;Chronic Venous Insfficiency   Edema Precautions Cardiac Edema/CHF   Edema Precaution Comments hypoxia and respiratory distress, COPD. pt on lasix and has been having incontinent issues  which is runnning down her legs. declines purwick or brief   General Comments/Previous Edema  Treatment/Edema Equipment pt has been followed by HH, OP and IP lymph services at different times during her life per her report. reports she typically always has some amount of swelling. she is particular on what type of compression she prefers for B LE   Edema Examination/Assessment   Skin Condition Pitting;Dryness   Scar No   Ulcerations No   Stemmer Sign Positive   Skin Integrity dry, pitting, wound on R ankle area, addressed by RN and WOC prior to lymph visit and eval   Pitting Assessment 2+-3+ LE from toes to knee bilterally   Edema Assessment Comments appropiate for compression and lymph tx   Range of Motion Comprehensive   General Range of Motion no range of motion deficits identified   Strength Comprehensive (MMT)   Comment, General Manual Muscle Testing (MMT) Assessment limited LE by edema. B UE equal WNL   Coordination   Upper Extremity Coordination No deficits were identified   Transfers   Transfers sit-stand transfer   Sit-Stand Transfer   Sit-Stand Henderson (Transfers) contact guard   Assistive Device (Sit-Stand Transfers) walker, front-wheeled   Activities of Daily Living   BADL Assessment/Intervention lower body dressing;toileting;bathing   Bathing Assessment/Intervention   Henderson Level (Bathing) moderate assist (50% patient effort)   Lower Body Dressing Assessment/Training   Henderson Level (Lower Body Dressing) minimum assist (75% patient effort)   Toileting   Henderson Level (Toileting) minimum assist (75% patient effort)   Clinical Impression   Criteria for Skilled Therapeutic Interventions Met (OT) Yes, treatment indicated  (Lymph and OT)   OT Diagnosis decreased function in ADL   Edema: Patient Presentation Stage 2 Lymphedema   OT Problem List-Impairments impacting ADL problems related to  (swelling)   Assessment of Occupational Performance 1-3 Performance Deficits   Identified Performance Deficits mobility an dADL   Planned Therapy Interventions (OT) manual  therapy;ROM;progressive activity/exercise;home program guidelines   Edema: Planned Interventions Manual lymph drainage;Gradient compression bandaging;Fit for compression garment;Edema exercises;Precautions to prevent infection/exacerbation;Education;Manual therapy;ADL training   Clinical Decision Making Complexity (OT) problem focused assessment/low complexity   Risk & Benefits of therapy have been explained evaluation/treatment results reviewed;care plan/treatment goals reviewed;current/potential barriers reviewed;risks/benefits reviewed;participants voiced agreement with care plan;participants included;patient   Clinical Impression Comments decreased function in ADL warrant skilled therapy   OT Total Evaluation Time   OT Eval, Low Complexity Minutes (46596) 20   OT Goals   Therapy Frequency (OT) 5 times/week   OT Predicted Duration/Target Date for Goal Attainment 04/09/24   OT Goals Lower Body Dressing;Transfers;Toilet Transfer/Toileting;Edema   OT: Lower Body Dressing Supervision/stand-by assist;using adaptive equipment   OT: Transfer Modified independent  (walk in [facility])   OT: Toilet Transfer/Toileting Supervision/stand-by assist   OT: Edema education to increase ability to manage edema after discharge from the hospital Patient;Verbalize;Sanborn;Skin care routine;signs/symptoms of intolerance;wear schedule;limb positioning;garrnet/bandage care;discharge recommendations   OT: Management of edema bandages Patient;garment(s);Demonstrate;Supervision/Stand-by assist   OT: Functional edema exercise program to reduce limb volume, increase activity tolerance and improve independence with ADL Patient;Demonstrates;Sanborn;Min assist;HEP;walking program   Interventions   Interventions Quick Adds Manual Therapy;Self-Care/Home Management;Therapeutic Activity   Self-Care/Home Management   Treatment Detail/Skilled Intervention appropiate for OT and lymph. no OT intervention provided this date.see lymph below.  completed sit>stand from chair 3x with CGA and FWW   Therapeutic Activities   Therapeutic Activity Minutes (20645) 24   Treatment Detail/Skilled Intervention pt seen for edema mgmt this date. extended time problem solving with pt regarding ways to manage edema safely as she is heavily incontient and don't want wraps or stockings to get wet/cause skin maceration, etc. problem solving with briefs, purwick, commode use etc. pt reports she will wear brief when she is getting up as to keep lymph stockings dry. cleaned and lotioned legs, checked skin, Rn addressed R ankle wound with kerlix. donned size 3.5 inch Natural E spandagrip to B LE toes to knees (over kerlix on R ankle). pt reports comfrot. ed on signs and symptoms of intolerance, verbalized understanding. ed pt on doffing if becomes saturated. verbalized understanding. ed on conservative mgmt technqiues including elevation,e xercises. pt demo'd ankle pumps and circles with B LE elevated 10 repx1 set. all needs in reach, alarm on   OT Discharge Planning   OT Plan assess spandagrip tolerance and edema improvement? ADL tolerance and safety recs for assist levels at discharge   OT Discharge Recommendation (DC Rec) home with assist;home with home care occupational therapy   OT Rationale for DC Rec anticipate pt would benefit from HH OT/PT/lymph and RN at discharge. currently with LE edema bilterally, limited in ADL ability and mobilty tolerance. pt reports she is able to have any assist needed in her TOM   Total Session Time   Timed Code Treatment Minutes 24   Total Session Time (sum of timed and untimed services) 44

## 2024-04-04 NOTE — PROGRESS NOTES
CHIEF COMPLAINT/HISTORY OF PRESENT ILLNESS:  Naresh Oleary is a 22 year old male who presents for complete physical and has a history of celiac disease.   Serology positive in 2016.  He has not had a biopsy.  He has been on a GFD since then.        Past Medical History:   Diagnosis Date   • Allergic rhinitis    • Celiac disease     diagnosed in 2016 - he did not have the scope       Past Surgical History:   Procedure Laterality Date   • Circumcision surg excision <28 days          ALLERGIES:   Allergen Reactions   • Augmentin [Amoclan] HIVES   • Penicillins HIVES   • Zithromax [Azithromycin] HIVES       Current Outpatient Medications   Medication Sig Dispense Refill   • fluticasone (FLONASE) 50 MCG/ACT nasal spray 1 squirt in each nostril twice daily. 1 Bottle 0   • cetirizine (ZYRTEC) 10 MG tablet Take 1 tablet by mouth daily. 30 tablet 0     No current facility-administered medications for this visit.       Family History   Problem Relation Age of Onset   • Thyroid Mother    • Allergic Rhinitis Father    • Musculoskeletal Sister         severe TMJ   • Cancer Paternal Grandfather         lung, colon       Social History     Tobacco Use   • Smoking status: Never   • Smokeless tobacco: Never   Substance Use Topics   • Alcohol use: No     Alcohol/week: 0.0 standard drinks of alcohol   • Drug use: No       Patient Care Team:  Kelsie Rodrigues MD as PCP - General (Pediatrics)  Harmony Valdez MD (Pediatrics)    REVIEW OF SYSTEMS: GENERAL:  Patient denies fever, chills, tiredness, malaise, weight loss, weight gain.  EYES:  Patient denies blurred vision, double vision, pain.   NEUROLOGIC:  Patient denies tremors, dizzy spells, numbness, tingling.  ENDOCRINE:  Patient denies excessive thirst, heat intolerance, tired/sluggishness.  GASTROINTESTINAL:  Patient denies abdominal pain, nausea/vomiting, indigestion/heartburn, constipation.  CARDIOVASCUALR:  Patient denies chest pain, shortness of breath,  Cardiology Progress Note          Assessment and Plan:         64-year-old female with a past medical history significant for CVA thought to be due to paradoxical embolism status post PFO closure, severe pulmonary hypertension secondary to lower extremity AV fistula which resolved post closure, oxygen dependent COPD, history of DVT for which she was previously on Coumadin who was admitted to [facility] on 3/29/2024 with dyspnea, hypoxia and acute on chronic respiratory failure.     The etiology of her respiratory failure appears to be multifactorial with evidence of right ventricular dysfunction/severe pulm hypertension on her admission echocardiogram as well as a possible COPD exacerbation.  She has been started on intravenous diuretic therapy.  Of note, she was not on warfarin at the time of admission and a subsequent CT was negative for pulmonary embolism.     IMPRESSION:     History of paradoxical embolism status post PFO closure.  Severe pulm hypertension secondary to lower extremity AV fistula which resolved post closure.  Dyspnea/hypoxia/respiratory failure with an echocardiogram on 4/1/2024 demonstrating severe right ventricular dilatation/systolic dysfunction/pulmonary hypertension with evidence of RV pressure/volume overload.  Moderate to severe tricuspid regurgitation which is probably functional in the setting of severe right ventricular dilatation/systolic dysfunction.  History of DVT in the past.  Elevated BMI.  Severe COPD, on home O2.     PLAN     -The etiology of her right ventricular dysfunction/severe pulm hypertension is probably multifactorial given her elevated BMI, obstructive sleep apnea and severe COPD.  Although no acute pulmonary embolism was noted on her admission CT, chronic thromboembolic pulmonary disease is within the differential diagnosis.     -She is responding well to the furosemide infusion.  Down 14 pounds since admission.    -Will consider right heart catheterization  "once she is closer to euvolemia and able to lie flat.    -Her tricuspid regurgitation is almost certainly functional in nature and in any event restoration of euvolemia would be the first step in management.  We will schedule right heart catheterization once she is closer to euvolemia.  A ventilation/perfusion scan should also be performed to evaluate for chronic thromboembolic pulmonary disease.        Interval History:     Continues to diurese well.  Down 6 pounds since yesterday.             Review of Systems:   As per subjective, otherwise 5 systems reviewed and negative.           Physical Exam:   Blood pressure 105/62, pulse 90, temperature 97.9  F (36.6  C), temperature source Oral, resp. rate 20, height 1.676 m (5' 6\"), weight (!) 150.5 kg (331 lb 14.4 oz), SpO2 95%, not currently breastfeeding.      Vital Sign Ranges  Temperature Temp  Av.9  F (36.6  C)  Min: 97.7  F (36.5  C)  Max: 98.7  F (37.1  C)   Blood pressure Systolic (24hrs), Av , Min:108 , Max:137        Diastolic (24hrs), Av, Min:73, Max:90      Pulse Pulse  Av  Min: 80  Max: 101   Respirations Resp  Av.8  Min: 16  Max: 20   Pulse oximetry SpO2  Av.9 %  Min: 90 %  Max: 96 %         Intake/Output Summary (Last 24 hours) at 2024 0908  Last data filed at 2024 1510  Gross per 24 hour   Intake 240 ml   Output 200 ml   Net 40 ml       Constitutional: NAD  Skin: Warm and dry  Neck: No JVD  Lungs: CTA  Cardiovascular: RRR, no m/r/g  Abdomen: Soft, non tender.  Extremities and Back: +3 bilateral lower extremity edema  Neurological: Nonfocal           Medications:     I have reviewed this patient's current medications.              Data:     Labs reviewed.         Tiffanie Huston MD, MTATIANNA.    " palpitations.  SKIN:  Patient denies skin rashes, boils, persistent itching.  MUSCULOSKELETAL:  Patient denies joint pain, neck pain, back pain.  ENT/MOUTH:  Patient denies ear infections, sore throats, sinus problems.  :  Patient denies urine retention, painful urination, urinary frequency, blood in urine.  RESPIRATORY:  Patient denies wheezing, frequent cough, shortness of breath.  PSYCHIATRIC:  Patient denies symptoms of depression, feeling sad or blue and symptoms of anxiety, feeling jittery, panicky     PHYSICAL EXAMINATION:   General:   awake, alert and oriented and in no acute distress  Vitals:   Visit Vitals  /63 (BP Location: RUE - Right upper extremity, Patient Position: Sitting, Cuff Size: Regular)   Pulse 63   Temp 97.8 °F (36.6 °C) (Temporal)   Ht 5' 11\" (1.803 m)   Wt 70.8 kg (156 lb)   SpO2 100%   BMI 21.76 kg/m²     HEENT:   normocephalic, atraumatic, pupils equal, round and reactive to light and accommodation, tympanic membranes clear, nasal mucosa normal and pharynx normal  Lymph nodes: No nodes palpated on the head, neck or supraclavicular  Neck:    no thyromegaly, no JVD and no neck masses  Lungs:    clear to auscultation, good air entry, no rales or wheezes and no rhonchi  Cardiovascular:   no JVD, S1 and S2 normal, no S3 or S4, no clicks, rubs or murmurs and regular rate and rhythm  Abdomen:   soft, non-tender, nondistended, no hepatosplenomegaly, normal active bowel sounds and no masses palpated    IMMUNIZATIONS:   Immunization History   Administered Date(s) Administered   • DPT HIB 2001, 02/07/2002, 04/09/2002, 01/07/2003   • DTaP 11/17/2005   • DTaP HIB 2001, 02/07/2002, 04/09/2002, 01/07/2003   • DTaP(INFANRIX) 11/17/2005   • HPV 9-Valent 07/13/2020, 08/04/2021   • Hep A, Unspecified formulation 10/04/2012   • Hep A, ped/adol, 2 dose 01/08/2007, 10/04/2012   • Hep B, Unspecified Formulation 2001, 2001, 07/09/2002   • Hep B, adolescent or pediatric 2001,  2001, 07/09/2002   • Influenza, Unspecified Formulation 12/16/2003, 10/04/2012   • Influenza, seasonal, injectable, trivalent 12/16/2003, 10/04/2012   • MMR 10/08/2002, 11/17/2005   • Meningococcal Conjugate MCV4P (Menactra) 06/24/2014, 10/20/2017   • Meningococcal Group B OMV 2 Dose(Bexsero) 10/20/2017, 07/13/2020   • Pneumococcal Conjugate 7 Valent 2001, 02/07/2002, 04/09/2002, 05/07/2003   • Polio, INACTIVATED 2001, 02/07/2002, 04/09/2002, 11/17/2005   • Polio, Unspecified Formulation 2001, 02/07/2002, 04/09/2002   • Prevnar 7 (Pneumococcal Conjugate) 2001, 02/07/2002, 04/09/2002, 05/07/2003   • Tdap 10/04/2012   • Varicella 10/08/2002, 12/16/2003       Diagnoses and associated orders for this visit:  1. Preventative health care  -     Chlamydia/Gonorrhea by Nucleic Acid Amplification  2. Celiac disease  -     CBC No Differential  -     Comprehensive Metabolic Panel  -     Tissue Transglutaminase Antibodies IgA  -     Celiac Disease Screen, 2y And Over     Anticipatory guidance given    Follow-up: yearly CPE    Refer for Bx if serology is positive  Discussed possibility of a gluten challenge

## 2024-04-04 NOTE — PLAN OF CARE
Goal Outcome Evaluation:      Plan of Care Reviewed With: patient    Overall Patient Progress: improvingOverall Patient Progress: improving    Outcome Evaluation: 2g sodium intake. 2L FR. small meals. taking daily wound supplement    Kathe Coats RD, LD

## 2024-04-04 NOTE — PLAN OF CARE
Goal Outcome Evaluation:    Orientations: A&Ox4  Vitals/Pain: VSS on 4L NC. Denies pain.   Tele: SR  Lines/Drains: PIV infusing lasix @ 10mg/hr  Skin/Wounds: Right ankle wound, blanchable redness arcelia area and buttocks.   GI/: Regular diet. 2L FR. Adequate UOP, external cath in place. 1 BM this shift.  Labs: Abnormal/Trends,  Cr 1.44  Ambulation/Assist: Ax1 GB, W.   Pain: Pt denies pain  Other: Sleeping in bed

## 2024-04-04 NOTE — PROGRESS NOTES
[facility]    Medicine Progress Note - Hospitalist Service    Date of Admission:  3/29/2024    Assessment & Plan   Linda Otero is a 64 year old female admitted on 3/29/2024.  Past medical history of COPD, chronic respiratory failure on home oxygen with history of hypercapnia, nicotine dependence, HFpEF, DVT/PE  on past warfarin anticoagulation, recent hemoptysis, morbid obesity, CKD stage III, CAD, HLD, HTN, hypothyroidism, MGUS, seizure disorder, CVA, admitted 3/29/2024 with acute on chronic respiratory failure.       Acute on chronic hypoxic respiratory failure  *Speculate multifactorial from HFpEF exacerbation (elevated BNP and weight gain), COPD exacerbation, JODI with noncompliance to CPAP and CAP  Acute right heart failure with mod-severe TR  *Baseline 3-5 L NC  *Home Lasix 20mg BID PO  *CT C PE 3/29/2024 without any PE but did demonstrate pulmonary congestion, R pleural effusion  *TTE 4/1 showing EF 60-65% without WMA, severe RV dilation with moderately decreased RV function, mod-severe TR and pulm HTN  *completed 5-day course of ceftriaxone+doxy on 4/2    - decrease prednisone to 40 mg daily 4/4/24 with rapid taper  - nebs tid per patient request (holding PTA Breo and Incruse)  - lasix gtt per Cards, recs appreciated - weight down 6 pounds in past 24 hours (incontinent and refusing purewick)  - lymphedema consult  - Cards planning for eventual right heart cath once adequately diuresed, will also need V/Q scan  - Flutter valve and incentive spirometer  - stable on baseline oxygen needs; Maintain O2 88-95%    Hemoptysis, mild  *reports orange sputum production at home, was producing small amounts of rober blood 4/1  *admission CT chest negative for PE, masses  - resumed aspirin 4/3 and reports small amount of hemoptysis 4/4 so will hold on resuming lovenox     SONAL on Chronic kidney disease, stage III  *Speculate congestive nephropathy  *Admission serum Cr 1.33, historic baseline  "0.87 to 1.08 in 2020 to 2023  - Avoid non-steroidal anti-inflammatory drugs (NSAIDs)  - Cr stable 04/04/24; monitor daily with diuresis     History of deep venous thrombosis and pulmonary embolism  *CT C PE 3/29/2024 without any PE  *Per pharmacy, no longer on warfarin  - holding lovenox due to hemoptysis     Skin ulcer, right lateral ankle  Wound nurse (WOC) following     Weakness and deconditioning  Therapies recommend home with assist, home therapies     Hyperlipidemia - Diet-controlled, monitor; follow-up with primary clinic provider   Hypothyroidism - Med rec does not show any levothyroxine (TSH slightly elevated, free T4 wnl). Will defer to outpt evaluation by PCP  Morbid obesity  History of nicotine dependence  History of obstructive sleep apnea  History of coronary artery disease   History of monoclonal gammopathy of undetermined significance - noted, follow-up with primary clinic provider   History of cerebrovascular disease with prior stroke - noted, monitor  History of B12 deficiency - noted, continue prior to admission B12 replacement  History of seizure disorder - noted in medical record; monitor            Diet: Snacks/Supplements Adult: Expedite Bottle; Between Meals  Fluid restriction 2000 ML FLUID  Combination Diet 2 gm NA Diet    DVT Prophylaxis: PCD  Miguel Catheter: Not present  Lines: None     Cardiac Monitoring: None  Code Status: No CPR- Do NOT Intubate      Clinically Significant Risk Factors                  # Hypertension: Noted on problem list        # Severe Obesity: Estimated body mass index is 53.57 kg/m  as calculated from the following:    Height as of this encounter: 1.676 m (5' 6\").    Weight as of this encounter: 150.5 kg (331 lb 14.4 oz).   # Moderate Malnutrition: based on nutrition assessment    # Financial/Environmental Concerns: none  # COPD: noted on problem list        Disposition Plan      Expected Discharge Date: 04/08/2024      Destination: nursing home     Therapies " recommend home with assist and home therapy         Gerry Pizarro MD  Hospitalist Service  [facility]  Securely message with [employer] (more info)  Text page via [employer] Paging/Directory   ______________________________________________________________________    Interval History   Small amounts of hemoptysis today.  Dyspnea improving.  No other complaints.     Physical Exam   Vital Signs: Temp: 97.4  F (36.3  C) Temp src: Oral BP: 111/76 Pulse: 98   Resp: 20 SpO2: 90 % O2 Device: Nasal cannula with humidification Oxygen Delivery: 4 LPM  Weight: 331 lbs 14.4 oz    General Appearance: Obese female in NAD  Respiratory: lungs CTAB, no wheezes or crackles  Cardiovascular: RRR, normal s1/s2 with 2/6 systolic murmur  GI: normal bowel sounds  Skin: 3+ lower extremity edema  Other: Alert and appropriate, CN grossly intact      Medical Decision Making       25 MINUTES SPENT BY ME on the date of service doing chart review, history, exam, documentation & further activities per the note.  MANAGEMENT DISCUSSED with the following over the past 24 hours: bedside RN   Tests ORDERED in the past 24 hours:   - BMP      Data     I have personally reviewed the following data over the past 24 hrs:    N/A  \   N/A   / N/A     144 103 59.8 (H) /  120 (H)   3.6 29 1.42 (H) \       Imaging results reviewed over the past 24 hrs:   No results found for this or any previous visit (from the past 24 hour(s)).

## 2024-04-04 NOTE — PLAN OF CARE
Goal Outcome Evaluation:    Orientations: AOx4  Vitals/Pain: VSS, Pt on 4L O2 via nasal cannula, denies pain   Tele: -  Lines/Drains: RUE PIVx1- Lasix gtt infusing per eMAR  Skin/Wounds: Right ankle wound, blanchable redness to arcelia area and buttock  GI/: Bmx1, large urinary incontinence episodes   LS: Diminished   Ambulation/Assist: Ax1 w/ walker   Sleep Quality: Fair

## 2024-04-04 NOTE — PLAN OF CARE
Goal Outcome Evaluation:    Orientations: A&Ox4  Vitals/Pain: VSS on 4L NC. Denies pain.   Tele: SR  Lines/Drains: PIV infusing lasix @ 10mg/hr  Skin/Wounds: Right ankle wound, blanchable redness arcelia area and buttocks.   GI/: Regular diet. 2L FR. Adequate UOP, external cath in place. 1 BM this shift.  Labs: Abnormal/Trends,  Cr 1.44  Ambulation/Assist: Ax1 GB, W.   Pain: Pt denies pain

## 2024-04-04 NOTE — PLAN OF CARE
Goal Outcome Evaluation:    Plan of Care Reviewed With: patient    Overall Patient Progress: no changeOverall Patient Progress: no change    Pt here with SOBm COPD exacerbation, and volume overload. A&O x4. Neuros include generalized weakness. VSS. Low NA diet, thin liquids with 2L restriction. Takes pills whole. Up with A2 + GB + walker. Denies pain. Pt scoring green on the Aggression Stop Light Tool. Discharge plan pending medical readiness.

## 2024-04-04 NOTE — PROGRESS NOTES
"CLINICAL NUTRITION SERVICES - REASSESSMENT NOTE      Recommendations Ordered by Registered Dietitian (RD):   Continue w/ daily Expedite bottle  RD provided encouragement for PO intake and continued wound specific oral nutrition supplement intake     Malnutrition:   % Weight Loss:  None noted (3/30)  % Intake:  <75% for >/= 1 month (moderate malnutrition) (3/30)  Subcutaneous Fat Loss:  Orbital region mild depletion and Upper arm region mild depletion (3/30)  Muscle Loss:  Clavicle bone region mild depletion, Acromion bone region mild depletion, and Scapular bone region mild depletion (3/30)  Fluid Retention:  Mild to moderate BLE edema     Malnutrition Diagnosis: Moderate malnutrition in the context of --  Acute illness or injury  Chronic illness or disease         EVALUATION OF PROGRESS TOWARD GOALS   Diet: 2 gm NA Diet    Fluid restriction 2000 ML FLUID  Supplements: Expedite Bottle; Between Meals (2 pm)      Intake/Tolerance:  Visited w/ pt while she was finishing up w/ PT this afternoon. When asked about her PO intake- pt responded \"what does it matter.\" RD attempted to explain importance of PO intake for healing, strength- such as doing PT. She had an Expedite on her desk- asked pt if she is tolerating it. She reported she has been drinking 1/day. Thinks they're too sweet though.   Pt has been receiving 2 smaller meals/day per health touch. Fair tolerance of diet, % intakes documented recently per nursing flow sheet.      ASSESSED NUTRITION NEEDS:  Dosing Weight: 83 kg - adjusted   Estimated Energy Needs: 1417-0094 kcals (20-25 Kcal/Kg)  Justification: obese  Estimated Protein Needs: 100-125 grams protein (1.2-1.5 g pro/Kg)  Justification: preservation of lean body mass and ?reported wound  Estimated Fluid Needs: Per provider         NEW FINDINGS:  Weight: wt trending down w/ ongoing diuresis   4/2: hospitalist note = largely incontinent and body habitus makes Purewick challenging so I/O's inaccurate " "  Date/Time Weight Weight Method   04/04/24 0600 150.5 kg (331 lb 14.4 oz) Abnormal  Standing scale   04/03/24 0503 153 kg (337 lb 4.8 oz) Abnormal  Standing scale   04/02/24 0600 154.2 kg (340 lb) Abnormal  Standing scale   04/01/24 0335 156.8 kg (345 lb 9.6 oz) Abnormal  Standing scale   03/31/24 0457 156.2 kg (344 lb 6.4 oz) Abnormal  Standing scale   03/30/24 0554 154.4 kg (340 lb 8 oz) Abnormal  Standing scale   03/29/24 1505 150.3 kg (331 lb 5.6 oz) Abnormal  --   03/29/24 1447 136.1 kg (300 lb)      Cardio note today = -She is responding well to the furosemide infusion.  Down 14 pounds since admission.  -Will consider right heart catheterization once she is closer to euvolemia and able to lie flat.    I/O: Net I/O Since Admission: +830 mL [04/04/24 1314].  Stool output = 4x BM yesterday     Labs: reviewed   Component Value Date   BUN 59.8 (H) 04/04/2024   CR 1.42 (H) 04/04/2024     Lab 04/04/24  0627 04/03/24  1431 04/02/24  0556 04/01/24  0847 03/31/24  0615 03/30/24  0612   * 226* 106* 107* 150* 154*      Medications: reviewed-- pt continues w/ diuresis   Medication Dose Route Frequency Provider Last Rate Last Admin    multivitamin w/minerals (THERA-VIT-M) tablet 1 tablet  1 tablet Oral Daily Charli Castro MD   1 tablet at 04/03/24 1219    polyethylene glycol (MIRALAX) Packet 17 g  17 g Oral Daily Charli Castro MD   17 g at 04/03/24 0730    predniSONE (DELTASONE) tablet 40 mg  40 mg Oral Daily Gerry Pizarro MD   40 mg at 04/04/24 0903     Medication Dose Route Frequency Provider Last Rate Last Admin    furosemide (LASIX) 100 mg in sodium chloride 0.9 % 100 mL infusion  10 mg/hr Intravenous Continuous Tiffanie Huston MD 10 mL/hr at 04/04/24 1016 10 mg/hr at 04/04/24 1016     Skin: WOC following, assessed 4/1--   Wound location: right lateral ankle area   Wound due to: Unknown Etiology patient reports this wound area is chronic and comes and goes, has treated the area \"four times\" " before    STATUS: initial assessment     Resp.: currently requiring 4LPM nasal cannula         Previous Goals:   Intake of at least 75% adequate trays TID.    Evaluation: Not met    Previous Nutrition Diagnosis:   Inadequate oral intake related to poor appetite as evidenced by patient reports intake of just 1 meal/day recently, with mild fat and muscle wasting.    Evaluation: Improving        MALNUTRITION  % Weight Loss:  None noted (3/30)  % Intake:  <75% for >/= 1 month (moderate malnutrition) (3/30)  Subcutaneous Fat Loss:  Orbital region mild depletion and Upper arm region mild depletion (3/30)  Muscle Loss:  Clavicle bone region mild depletion, Acromion bone region mild depletion, and Scapular bone region mild depletion (3/30)  Fluid Retention:  Mild to moderate BLE edema     Malnutrition Diagnosis: Moderate malnutrition in the context of --  Acute illness or injury  Chronic illness or disease        CURRENT NUTRITION DIAGNOSIS  Inadequate oral intake related to poor appetite, wound healing as evidenced by patient reports intake of just 1 meal/day prior to admission and 2 meals/day this admission x6 days, with mild fat and muscle wasting.          INTERVENTIONS  Recommendations / Nutrition Prescription  Continue w/ daily Expedite bottle  RD provided encouragement for PO intake and continued wound specific oral nutrition supplement intake      Implementation  Nutrition education for nutrition relationship to health/disease    Goals  Pt to consume >75% of >/= 2 meals/day + 1 Expedite/day        MONITORING AND EVALUATION:  Progress towards goals will be monitored and evaluated per protocol and Practice Guidelines      Kathe Coats RD, LD  Pager: [phone]

## 2024-04-05 NOTE — PLAN OF CARE
Alert and oriented x4. VSS. Denies pain. Up with one and walker. Occasionally incontinent. Tolerating lasix at 10 ml per hour. Plan for right heart cath early next week. Transfer to heart center. Will continue to monitor.

## 2024-04-05 NOTE — PLAN OF CARE
[id number]  A&O X4.VSS on 5L NC.Lung Sounds diminished.Bowel Sounds active.Voiding adequately. Blanchable redness to coccyx, wound to R ankle dressing changed. Up assist 1 gait belt and walker. PRN tylenol given for pain. Pt declined dinner. Lasix drip at 10ml/hr.

## 2024-04-05 NOTE — PLAN OF CARE
Neuro: A&O x4  Tele/cardiac: N/A  Respiration: 4-5 L NC CERRATO  Activity: A1 GBW  Pain: Denies  Drips: lasix 10 mg/hr  Drains/tubes: none  Skin: R ankle wound  GI/: incontinent at times  Aggression color: green  Isolation: none  Plan: diuresing with lasix, NPO at midnight

## 2024-04-05 NOTE — PROGRESS NOTES
Cardiology Progress Note          Assessment and Plan:         64-year-old female with a past medical history significant for CVA thought to be due to paradoxical embolism status post PFO closure, severe pulmonary hypertension secondary to lower extremity AV fistula which resolved post closure, oxygen dependent COPD, history of DVT for which she was previously on Coumadin who was admitted to [facility] on 3/29/2024 with dyspnea, hypoxia and acute on chronic respiratory failure.     The etiology of her respiratory failure appears to be multifactorial with evidence of right ventricular dysfunction/severe pulm hypertension on her admission echocardiogram as well as a possible COPD exacerbation.  She has been started on intravenous diuretic therapy.  Of note, she was not on warfarin at the time of admission and a subsequent CT was negative for pulmonary embolism.     IMPRESSION:     History of paradoxical embolism status post PFO closure.  Severe pulm hypertension secondary to lower extremity AV fistula which resolved post closure.  Dyspnea/hypoxia/respiratory failure with an echocardiogram on 4/1/2024 demonstrating severe right ventricular dilatation/systolic dysfunction/pulmonary hypertension with evidence of RV pressure/volume overload.  Moderate to severe tricuspid regurgitation which is probably functional in the setting of severe right ventricular dilatation/systolic dysfunction.  History of DVT in the past.  Elevated BMI.  Severe COPD, on home O2.     PLAN     -The etiology of her right ventricular dysfunction/severe pulm hypertension is probably multifactorial given her elevated BMI, obstructive sleep apnea and severe COPD.  Although no acute pulmonary embolism was noted on her admission CT, chronic thromboembolic pulmonary disease is within the differential diagnosis.     -She is responding well to the furosemide infusion and Chlorthiazide x 1 yesterday.     -Will consider right heart catheterization  "once she is closer to euvolemia (probably early next week) and able to lie flat.     -Her tricuspid regurgitation is almost certainly functional in nature and in any event restoration of euvolemia would be the first step in management. A ventilation/perfusion scan should also be performed to evaluate for chronic thromboembolic pulmonary disease.        Interval History:     Continues to diurese well.               Review of Systems:   As per subjective, otherwise 5 systems reviewed and negative.           Physical Exam:   Blood pressure 122/70, pulse 100, temperature 97.6  F (36.4  C), temperature source Oral, resp. rate 18, height 1.676 m (5' 6\"), weight 149.5 kg (329 lb 9.6 oz), SpO2 93%, not currently breastfeeding.      Vital Sign Ranges  Temperature Temp  Av.9  F (36.6  C)  Min: 97.7  F (36.5  C)  Max: 98.7  F (37.1  C)   Blood pressure Systolic (24hrs), Av , Min:108 , Max:137        Diastolic (24hrs), Av, Min:73, Max:90      Pulse Pulse  Av  Min: 80  Max: 101   Respirations Resp  Av.8  Min: 16  Max: 20   Pulse oximetry SpO2  Av.9 %  Min: 90 %  Max: 96 %         Intake/Output Summary (Last 24 hours) at 2024 0908  Last data filed at 2024 1510  Gross per 24 hour   Intake 240 ml   Output 200 ml   Net 40 ml       Constitutional: NAD  Skin: Warm and dry  Neck: No JVD  Lungs: CTA  Cardiovascular: RRR, no m/r/g  Abdomen: Soft, non tender.  Extremities and Back: +3 bilateral lower extremity edema  Neurological: Nonfocal           Medications:     I have reviewed this patient's current medications.              Data:     Labs reviewed.         Tiffanie Huston MD, M.D.    "

## 2024-04-05 NOTE — PLAN OF CARE
4/4-5/24 23:00-07:30    Neuro: A&O x4  Tele/cardiac: N/A  Respiration: 5 L NC CERRATO  Activity: A1 GBW  Pain: Denies  Drips: lasix 10 mg/hr  Drains/tubes: none  Skin: R ankle wound dressing changed CDI  GI/: incontinent bladder at times  Aggression color: green  Isolation: none  Plan: diuresing with lasix, NPO at midnight

## 2024-04-05 NOTE — PROGRESS NOTES
[facility]    Medicine Progress Note - Hospitalist Service    Date of Admission:  3/29/2024    Assessment & Plan   Linda Otero is a 64 year old female admitted on 3/29/2024.  Past medical history of COPD, chronic respiratory failure on home oxygen with history of hypercapnia, nicotine dependence, HFpEF, DVT/PE  on past warfarin anticoagulation, recent hemoptysis, morbid obesity, CKD stage III, CAD, HLD, HTN, hypothyroidism, MGUS, seizure disorder, CVA, admitted 3/29/2024 with acute on chronic respiratory failure.       Acute on chronic hypoxic respiratory failure  *Speculate multifactorial from HFpEF exacerbation (elevated BNP and weight gain), COPD exacerbation, JODI with noncompliance to CPAP and CAP  Acute right heart failure with mod-severe TR  *Baseline 3-5 L NC  *Home Lasix 20mg BID PO  *CT C PE 3/29/2024 without any PE but did demonstrate pulmonary congestion, R pleural effusion  *TTE 4/1 showing EF 60-65% without WMA, severe RV dilation with moderately decreased RV function, mod-severe TR and pulm HTN  *completed 5-day course of ceftriaxone+doxy on 4/2    - continue prednisone 40 mg daily; will plan to decrease tomorrow  - nebs tid per patient request (holding PTA Breo and Incruse)  - lasix gtt per Cards, recs appreciated - weight down additional 2 pounds in past 24 hours with stable BMP (incontinent and refusing purewick)  - lymphedema wraps  - Cards planning for eventual right heart cath once adequately diuresed - likely early next week, will also need V/Q scan  - Flutter valve and incentive spirometer  - stable on baseline oxygen needs; Maintain O2 88-95%    Hemoptysis, mild  *reports orange sputum production at home, was producing small amounts of rober blood 4/1  *admission CT chest negative for PE, masses  - resumed aspirin 4/3 and reports small amount of hemoptysis 4/4 so will hold on resuming lovenox     SONAL on Chronic kidney disease, stage III  *Speculate congestive  "nephropathy  *Admission serum Cr 1.33, historic baseline 0.87 to 1.08 in 2020 to 2023  - Avoid non-steroidal anti-inflammatory drugs (NSAIDs)  - Cr stable 04/05/24; monitor daily with diuresis     History of deep venous thrombosis and pulmonary embolism  *CT C PE 3/29/2024 without any PE  *Per pharmacy, no longer on warfarin  - holding lovenox due to hemoptysis  - counseled on importance of frequent ambulation, out of bed activity     Skin ulcer, right lateral ankle  Wound nurse (WOC) following     Weakness and deconditioning  Therapies recommend home with assist, home therapies     Hyperlipidemia - Diet-controlled, monitor; follow-up with primary clinic provider   Hypothyroidism - Med rec does not show any levothyroxine (TSH slightly elevated, free T4 wnl). Will defer to outpt evaluation by PCP  Morbid obesity  History of nicotine dependence  History of obstructive sleep apnea  History of coronary artery disease   History of monoclonal gammopathy of undetermined significance - noted, follow-up with primary clinic provider   History of cerebrovascular disease with prior stroke - noted, monitor  History of B12 deficiency - noted, continue prior to admission B12 replacement  History of seizure disorder - noted in medical record; monitor            Diet: Snacks/Supplements Adult: Expedite Bottle; Between Meals  Fluid restriction 2000 ML FLUID  2 Gram Sodium Diet    DVT Prophylaxis: PCD  Miguel Catheter: Not present  Lines: None     Cardiac Monitoring: None  Code Status: No CPR- Do NOT Intubate      Clinically Significant Risk Factors                  # Hypertension: Noted on problem list        # Severe Obesity: Estimated body mass index is 53.2 kg/m  as calculated from the following:    Height as of this encounter: 1.676 m (5' 6\").    Weight as of this encounter: 149.5 kg (329 lb 9.6 oz).   # Moderate Malnutrition: based on nutrition assessment    # Financial/Environmental Concerns: none  # COPD: noted on problem list "        Disposition Plan      Expected Discharge Date: 04/08/2024      Destination: nursing home     Therapies recommend home with assist and home therapy         Gerry Pizarro MD  Hospitalist Service  [facility]  Securely message with [employer] (more info)  Text page via [employer] Paging/Directory   ______________________________________________________________________    Interval History   Reports poor sleep overnight so very tired this morning.  Offers no other complaints or new symptoms and requests to rest.     Physical Exam   Vital Signs: Temp: 98  F (36.7  C) Temp src: Oral BP: 114/76 Pulse: 87   Resp: 18 SpO2: 94 % O2 Device: Nasal cannula Oxygen Delivery: 5 LPM  Weight: 329 lbs 9.6 oz    General Appearance: Obese female in NAD  Respiratory: posterior lungs with few scattered crackles, otherwise clear, no tachypnea  Cardiovascular:   GI:   Skin: LE edema slowly improving but still quite significant  Other: Sleeping in bed, arouses easily to voice and answers questions appropriately     Medical Decision Making       25 MINUTES SPENT BY ME on the date of service doing chart review, history, exam, documentation & further activities per the note.  MANAGEMENT DISCUSSED with the following over the past 24 hours: bedside nurse   Tests ORDERED in the past 24 hours:   - BMP  - Lactic Acid      Data     I have personally reviewed the following data over the past 24 hrs:    N/A  \   N/A   / N/A     143 99 63.7 (H) /  108 (H)   3.4 32 (H) 1.53 (H) \     Procal: N/A CRP: N/A Lactic Acid: 0.8         Imaging results reviewed over the past 24 hrs:   No results found for this or any previous visit (from the past 24 hour(s)).

## 2024-04-06 NOTE — PROGRESS NOTES
[facility] Cardiology Progress Note      Subjective     64-year-old female who presented with acute on chronic respiratory failure which is multifactorial, including RV dysfunction, pulmonary hypertension, and a possible COPD exacerbation.  She was commenced on diuresis with plans for right heart catheterization to further evaluate her pulmonary hypertension early next week.    No events overnight, pt refusing BiPAP, diuresing well, her weight is down.  Had [employer] for lunch    Objective     VITAL SIGNS  Temp:  [97.9  F (36.6  C)-98  F (36.7  C)] 97.9  F (36.6  C)  Pulse:  [86-92] 86  Resp:  [18] 18  BP: (126-134)/(66-76) 126/67  SpO2:  [95 %-98 %] 98 %  Admission Weight: 136.1 kg (300 lb)  Current Weight: 144.8 kg (319 lb 4.8 oz)    PHYSICAL EXAMINATION  General:  Well-developed, well-nourished. No acute distress. Alert and oriented x 3.   Cardiovascular:  Regular rate and rhythm.  Distant heart sounds  Lungs: On nasal cannula, crackles diffusely  Abdomen:  Soft, nontender, nondistended.  Extremities:  Warm, well perfused.  Edema bilaterally  Neuro: Moving all extremities, no gross deficits noted      DIAGNOSTICS    Most Recent 3 CBC's:  Recent Labs   Lab Test 03/30/24  0612 03/29/24  1453 09/19/22  0946   WBC 4.5 7.3 8.3   HGB 11.6* 12.3 11.4*   MCV 81 80 94    186 214     Most Recent 3 BMP's:  Recent Labs   Lab Test 04/06/24  0618 04/05/24  0616 04/04/24  0627    143 144   POTASSIUM 3.4 3.4 3.6   CHLORIDE 97* 99 103   CO2 33* 32* 29   BUN 62.3* 63.7* 59.8*   CR 1.48* 1.53* 1.42*   ANIONGAP 15 12 12   LUNA 9.7 9.9 9.4   GLC 99 108* 120*     Most Recent 3 Troponin's:No lab results found.  Most Recent 3 BNP's:  Recent Labs   Lab Test 03/29/24  1453   NTBNPI 4,421*     Most Recent Cholesterol Panel:  Recent Labs   Lab Test 11/20/20  1000   CHOL 178      HDL 59   TRIG 97         Assessment & Plan     # Acute on chronic respiratory failure  # Severe  Pulmonary Hypertension  # Right heart dysfunction and failure  # JODI  # Severe COPD  # TR  # Morbid Obesity    Continues to diurese reasonably well, her weight is down significantly from admission.  Ins and outs inaccurate due to incontinence.  Creatinine is stable.  I would continue with diuresis as currently ordered.    She will need further workup in terms of her pulmonary hypertension.  This will include a right heart catheterization, likely early next week and potentially a VQ scan.     Plan:  -Continue diuresis as currently ordered.  -Strict I's and O's as able, daily weights, daily BMP      Henry Johnston MD  4/6/2024      Medical Decision Making       30 MINUTES SPENT BY ME on the date of service doing chart review, history, exam, documentation & further activities per the note.

## 2024-04-06 NOTE — PLAN OF CARE
Neuro: A&O x4  Tele/cardiac: N/A  Respiration: 5 L NC CERRATO  Activity: A1 GBW  Pain: Denies  Drips: lasix 10 mg/hr  Drains/tubes: none  Skin: R ankle wound dressing   GI/: incontinent bladder  Aggression color: green  Isolation: none  Plan: diuresing with lasix,

## 2024-04-06 NOTE — PROGRESS NOTES
[facility]    Medicine Progress Note - Hospitalist Service    Date of Admission:  3/29/2024    Assessment & Plan   Linda Otero is a 64 year old female admitted on 3/29/2024.  Past medical history of COPD, chronic respiratory failure on home oxygen with history of hypercapnia, nicotine dependence, HFpEF, DVT/PE  on past warfarin anticoagulation, recent hemoptysis, morbid obesity, CKD stage III, CAD, HLD, HTN, hypothyroidism, MGUS, seizure disorder, CVA, admitted 3/29/2024 with acute on chronic respiratory failure.       Acute on chronic hypoxic respiratory failure  *Speculate multifactorial from HFpEF exacerbation (elevated BNP and weight gain), COPD exacerbation, JODI with noncompliance to CPAP and CAP  Acute right heart failure with mod-severe TR  *Baseline 3-5 L NC  *Home Lasix 20mg BID PO  *CT C PE 3/29/2024 without any PE but did demonstrate pulmonary congestion, R pleural effusion  *TTE 4/1 showing EF 60-65% without WMA, severe RV dilation with moderately decreased RV function, mod-severe TR and pulm HTN  *completed 5-day course of ceftriaxone+doxy on 4/2    - decrease prednisone to 20 mg daily x2 days 04/06/24, then stop  - nebs tid per patient request (holding PTA Breo and Incruse)  - lasix gtt per Cards, recs appreciated - weight down 10 pounds in past 24 hours with stable BMP (incontinent and refusing purewick)  - lymphedema wraps  - Cards planning for eventual right heart cath once adequately diuresed - likely early next week, will also need V/Q scan  - Flutter valve and incentive spirometer  - stable on baseline oxygen needs; Maintain O2 88-95%    Hemoptysis, mild  *reports orange sputum production at home, was producing small amounts of rober blood 4/1  *admission CT chest negative for PE, masses  - resumed aspirin 4/3 and reports small amount of hemoptysis 4/4 so will hold on resuming lovenox     SONAL on Chronic kidney disease, stage III  *Speculate congestive  "nephropathy  *Admission serum Cr 1.33, historic baseline 0.87 to 1.08 in 2020 to 2023  - Avoid non-steroidal anti-inflammatory drugs (NSAIDs)  - Cr stable 04/06/24 ; monitor daily with diuresis     History of deep venous thrombosis and pulmonary embolism  *CT C PE 3/29/2024 without any PE  *Per pharmacy, no longer on warfarin  - holding lovenox due to hemoptysis  - counseled on importance of frequent ambulation, out of bed activity     Skin ulcer, right lateral ankle  Wound nurse (WOC) following     Weakness and deconditioning  Therapies recommend home with assist, home therapies     Hyperlipidemia - Diet-controlled, monitor; follow-up with primary clinic provider   Hypothyroidism - Med rec does not show any levothyroxine (TSH slightly elevated, free T4 wnl). Will defer to outpt evaluation by PCP  Morbid obesity  History of nicotine dependence  History of obstructive sleep apnea  History of coronary artery disease   History of monoclonal gammopathy of undetermined significance - noted, follow-up with primary clinic provider   History of cerebrovascular disease with prior stroke - noted, monitor  History of B12 deficiency - noted, continue prior to admission B12 replacement  History of seizure disorder - noted in medical record; monitor            Diet: Snacks/Supplements Adult: Expedite Bottle; Between Meals  Fluid restriction 2000 ML FLUID  2 Gram Sodium Diet    DVT Prophylaxis: PCD  Miguel Catheter: Not present  Lines: None     Cardiac Monitoring: None  Code Status: No CPR- Do NOT Intubate      Clinically Significant Risk Factors                  # Hypertension: Noted on problem list        # Severe Obesity: Estimated body mass index is 51.54 kg/m  as calculated from the following:    Height as of this encounter: 1.676 m (5' 6\").    Weight as of this encounter: 144.8 kg (319 lb 4.8 oz).   # Moderate Malnutrition: based on nutrition assessment    # Financial/Environmental Concerns: none  # COPD: noted on problem " list        Disposition Plan     Expected Discharge Date: 04/09/2024      Destination: nursing home     Therapies recommend home with assist and home therapy         Gerry Pizarro MD  Hospitalist Service  [facility]  Securely message with [employer] (more info)  Text page via [employer] Paging/Directory   ______________________________________________________________________    Interval History   Mild wheeze this morning which improved after neb.  Overall dyspnea continues to improve.  No lightheadedness with diuresis.     Physical Exam   Vital Signs: Temp: 97.9  F (36.6  C) Temp src: Oral BP: 126/67 Pulse: 86   Resp: 18 SpO2: 98 % O2 Device: Nasal cannula Oxygen Delivery: 5 LPM  Weight: 319 lbs 4.8 oz    General Appearance: Obese female in NAD, sitting up in chair  Respiratory: mild expiratory wheeze, no crackles or tachhypnea  Cardiovascular: RRR, normal s1/s2 with grade 2/6 systolic murmur  GI:   Skin: LE edema slowly improving  Other: Awake and alert, CN grossly intact     Medical Decision Making       25 MINUTES SPENT BY ME on the date of service doing chart review, history, exam, documentation & further activities per the note.  MANAGEMENT DISCUSSED with the following over the past 24 hours: bedside RN   Tests ORDERED in the past 24 hours:   - BMP      Data     I have personally reviewed the following data over the past 24 hrs:    N/A  \   N/A   / N/A     145 97 (L) 62.3 (H) /  99   3.4 33 (H) 1.48 (H) \       Imaging results reviewed over the past 24 hrs:   No results found for this or any previous visit (from the past 24 hour(s)).

## 2024-04-06 NOTE — PLAN OF CARE
A&O X4.VSS on 5L NC.Lung Sounds diminished.Bowel Sounds active.Voiding adequately, incontinent of bladder. Blanchable redness to coccyx, wound to R ankle. Up assist 1 gait belt and walker. Pt denies pain. Tolerating 2g NA diet, 2000ml FR. Lasix drip at 10ml/hr. Report given to nurse, pt transferred off floor at 1420 with all belonging.

## 2024-04-06 NOTE — PLAN OF CARE
Goal Outcome Evaluation:    [place] [facility] Is alert and oriented. Denies pain. Lasix drip at 10 mg/hr. O2 at 5 L/NC. Monitor remains Sinus rhythm. Continue plan of care.

## 2024-04-07 NOTE — PROGRESS NOTES
A&Ox4, denies pain. Lasix gtt at 10mg/hr with good output. Pt counseled on low sodium diet and FR. Up in chair most of this shift, now using BSC and able to measure UO. Tubigrip stockings in place per pt request. Plan to continue diureses.

## 2024-04-07 NOTE — PLAN OF CARE
Goal Outcome Evaluation    VSS. Monitor remains Sinus rhythm with BBB. Pt. Is alert and oriented. Denies pain. Lasix drip at 10 mg/hr and diuresisng well. O2 at 5 L/NC. Pt. Up to BSC with standby assist. Potassium 3.1. Replacement per order. Recheck 3.7. Pt. Refused right ankle wound cares. Lymphedema stocking in place. Continue plan of care.

## 2024-04-07 NOTE — PLAN OF CARE
Goal Outcome Evaluation:  DATE & TIME:[id number]  Cognitive Concerns/ Orientation:A/O x4  BEHAVIOR & AGGRESSION TOOL COLOR:Green, calm and cooperative  ABNL VS/O2:VSS on 5L nc, baseline  MOBILITY:Ax1 with gb/w  PAIN MANAGMENT:Denies  DIET:2 gram Na, FR 2000mL  BOWEL/BLADDER:Continent- up to the bedside commode  DRAIN/DEVICES:R piv infusing lasix  TELEMETRY RHYTHM:SR w/ BBB  SKIN:  Wound R ankle  TESTS/PROCEDURES:Cr 1.48  D/C DATE:  Pending

## 2024-04-07 NOTE — PLAN OF CARE
Heart Failure Care Map  GOALS TO BE MET BEFORE DISCHARGE:    1. Decrease congestion and/or edema with diuretic therapy to achieve near optimal volume status.     Dyspnea improved: No, further care required to meet this goal. Please explain CERRATO   Edema improved: No, further care required to meet this goal. Please explain Significant BLE edema        Last 24 hour I/O:   Intake/Output Summary (Last 24 hours) at 4/6/2024 2238  Last data filed at 4/6/2024 2236  Gross per 24 hour   Intake 2190 ml   Output 1075 ml   Net 1115 ml           Net I/O and Weights since admission:   03/07 2300 - 04/06 2259  In: 8645 [P.O.:8545]  Out: 8400 [Urine:8400]  Net: 245     Vitals:    03/29/24 1447 03/29/24 1505 03/30/24 0554 03/31/24 0457   Weight: 136.1 kg (300 lb) (!) 150.3 kg (331 lb 5.6 oz) (!) 154.4 kg (340 lb 8 oz) (!) 156.2 kg (344 lb 6.4 oz)    04/01/24 0335 04/02/24 0600 04/03/24 0503 04/04/24 0600   Weight: (!) 156.8 kg (345 lb 9.6 oz) (!) 154.2 kg (340 lb) (!) 153 kg (337 lb 4.8 oz) (!) 150.5 kg (331 lb 14.4 oz)    04/05/24 0318 04/06/24 0608   Weight: 149.5 kg (329 lb 9.6 oz) 144.8 kg (319 lb 4.8 oz)       2.  O2 sats > 90% on room air, or at prior home O2 therapy level.      Able to wean O2 this shift to keep sats above 90%?: No, further care required to meet this goal. Please explain Baseline 2.5L, currently on 5L NC   Does patient use Home O2? Yes-  2.5L NC          Current oxygenation status:   SpO2: 93 %     O2 Device: Nasal cannula, Oxygen Delivery: 5 LPM    3.  Tolerates ambulation and mobility near baseline.     Ambulation: No, further care required to meet this goal. Please explain Up to chair, independent to [facility]   Times patient ambulated with staff this shift: 0    Please review the Heart Failure Care Map for additional HF goal outcomes.    Stormy Nicholson RN  4/6/2024

## 2024-04-07 NOTE — PROGRESS NOTES
[facility]    Medicine Progress Note - Hospitalist Service    Date of Admission:  3/29/2024    Assessment & Plan   Linda Otero is a 64 year old female admitted on 3/29/2024.  Past medical history of COPD, chronic respiratory failure on home oxygen with history of hypercapnia, nicotine dependence, HFpEF, DVT/PE  on past warfarin anticoagulation, recent hemoptysis, morbid obesity, CKD stage III, CAD, HLD, HTN, hypothyroidism, MGUS, seizure disorder, CVA, admitted 3/29/2024 with acute on chronic respiratory failure.       Acute on chronic hypoxic respiratory failure  *Speculate multifactorial from HFpEF exacerbation (elevated BNP and weight gain), COPD exacerbation, JODI with noncompliance to CPAP and CAP  Acute right heart failure with mod-severe TR  [Home Lasix 20mg BID PO]  *Baseline 3-5 L NC  *CT C PE 3/29/2024 without any PE but did demonstrate pulmonary congestion, R pleural effusion  *TTE 4/1 showing EF 60-65% without WMA, severe RV dilation with moderately decreased RV function, mod-severe TR and pulm HTN  *completed 5-day course of ceftriaxone+doxy on 4/2    - 04/07/24 was to be final day of 8-day prednisone taper; reports increased wheezing so will increase duonebs to qid, may need to consider extending prednisone based on symptoms 4/8 (holding PTA Breo and Incruse while on nebs and steroid)  - lasix gtt per Cards, recs appreciated - weight down only 1 pound in past 24 hours with stable BMP (incontinent and refusing purewick)  - lymphedema wraps  - Cards planning for eventual right heart cath once adequately diuresed - likely early next week, will also need V/Q scan  - Flutter valve and incentive spirometer  - stable on baseline oxygen needs; Maintain O2 88-95%    Hemoptysis, mild  *reports orange sputum production at home, was producing small amounts of rober blood 4/1  *admission CT chest negative for PE, masses  *resumed aspirin 4/3 and reports small amount of hemoptysis 4/4    - denies any further hemoptysis so will try resuming lovenox 04/07/24      SONAL on Chronic kidney disease, stage III  *Speculate congestive nephropathy  *Admission serum Cr 1.33, historic baseline 0.87 to 1.08 in 2020 to 2023  - Avoid non-steroidal anti-inflammatory drugs (NSAIDs)  - Cr stable at 1.3 on 04/07/24; monitor daily with diuresis    Hypokalemia  *due to diuresis  - replace per protocol     History of deep venous thrombosis and pulmonary embolism  *CT C PE 3/29/2024 without any PE  *Per pharmacy, no longer on warfarin  - resume lovenox 04/07/24   - has been counseled on importance of frequent ambulation, out of bed activity     Skin ulcer, right lateral ankle  Wound nurse (WOC) following     Weakness and deconditioning  Therapies recommend home with assist, home therapies     Hyperlipidemia - Diet-controlled, monitor; follow-up with primary clinic provider   Hypothyroidism - Med rec does not show any levothyroxine (TSH slightly elevated, free T4 wnl). Will defer to outpt evaluation by PCP  Morbid obesity  History of nicotine dependence  History of obstructive sleep apnea  History of coronary artery disease   History of monoclonal gammopathy of undetermined significance - noted, follow-up with primary clinic provider   History of cerebrovascular disease with prior stroke - noted, monitor  History of B12 deficiency - noted, continue prior to admission B12 replacement  History of seizure disorder - noted in medical record; monitor            Diet: Snacks/Supplements Adult: Expedite Bottle; Between Meals  Fluid restriction 2000 ML FLUID  2 Gram Sodium Diet    DVT Prophylaxis: PCD  Miguel Catheter: Not present  Lines: None     Cardiac Monitoring: None  Code Status: No CPR- Do NOT Intubate      Clinically Significant Risk Factors        # Hypokalemia: Lowest K = 3.1 mmol/L in last 2 days, will replace as needed           # Hypertension: Noted on problem list        # Severe Obesity: Estimated body mass index is  "51.33 kg/m  as calculated from the following:    Height as of this encounter: 1.676 m (5' 6\").    Weight as of this encounter: 144.2 kg (318 lb).   # Moderate Malnutrition: based on nutrition assessment    # Financial/Environmental Concerns: none  # COPD: noted on problem list        Disposition Plan      Expected Discharge Date: 04/09/2024      Destination: nursing home  Discharge Comments: 4/5 Lasix gtt, R Heart Cath pending   Therapies recommend home with assist and home therapy         Gerry Pizarro MD  Hospitalist Service  [facility]  Securely message with [employer] (more info)  Text page via [employer] Paging/Directory   ______________________________________________________________________    Interval History   Edema continuing to improve.  Feels more wheezy today, agrees with increasing neb frequency. Has not had any further hemoptysis.  No other complaints.     Physical Exam   Vital Signs: Temp: 98.2  F (36.8  C) Temp src: Oral BP: 99/64 Pulse: 73   Resp: 18 SpO2: 97 % O2 Device: Nasal cannula Oxygen Delivery: 5 LPM  Weight: 318 lbs 0 oz    General Appearance: Obese female in NAD, lying in bed  Respiratory: expiratory wheeze appears to be more referred from upper airway, no crackles or tachypnea  Cardiovascular: RRR, normal s1/s2 with grade 2/6 systolic murmur  GI:   Skin: 2+ LE edema  Other: Awake and alert, CN grossly intact     Medical Decision Making       25 MINUTES SPENT BY ME on the date of service doing chart review, history, exam, documentation & further activities per the note.  MANAGEMENT DISCUSSED with the following over the past 24 hours: bedside nurse   Tests ORDERED in the past 24 hours:   - BMP  Medical complexity over the past 24 hours:  - Prescription DRUG MANAGEMENT performed      Data     I have personally reviewed the following data over the past 24 hrs:    N/A  \   N/A   / N/A     145 99 58.4 (H) /  103 (H)   3.1 (L) 31 (H) 1.29 (H) \       Imaging results reviewed " over the past 24 hrs:   No results found for this or any previous visit (from the past 24 hour(s)).

## 2024-04-07 NOTE — PROGRESS NOTES
[facility] Cardiology Progress Note      Subjective   64-year-old female who presented with acute on chronic respiratory failure which is multifactorial, including RV dysfunction, pulmonary hypertension, and a possible COPD exacerbation. She was commenced on diuresis with plans for right heart catheterization to further evaluate her pulmonary hypertension early next week.     No major events overnight, net -600 cc yesterday, weight continues to downtrend    Objective     VITAL SIGNS  Temp:  [97.4  F (36.3  C)-98.2  F (36.8  C)] 97.8  F (36.6  C)  Pulse:  [73-94] 94  Resp:  [16-20] 20  BP: ()/(50-92) 110/92  SpO2:  [91 %-97 %] 91 %  Admission Weight: 136.1 kg (300 lb)  Current Weight: 144.2 kg (318 lb)    PHYSICAL EXAMINATION    General:  Well-developed, well-nourished. No acute distress. Alert and oriented x 3.   Cardiovascular:  Regular rate and rhythm.  Distant heart sounds  Lungs: On nasal cannula, crackles diffusely  Abdomen:  Soft, nontender, nondistended.  Extremities:  Warm, well perfused.  Edema bilaterally  Neuro: Moving all extremities, no gross deficits noted    DIAGNOSTICS    Most Recent 3 CBC's:  Recent Labs   Lab Test 03/30/24  0612 03/29/24  1453 09/19/22  0946   WBC 4.5 7.3 8.3   HGB 11.6* 12.3 11.4*   MCV 81 80 94    186 214     Most Recent 3 BMP's:  Recent Labs   Lab Test 04/07/24  0618 04/06/24  0618 04/05/24  0616    145 143   POTASSIUM 3.1* 3.4 3.4   CHLORIDE 99 97* 99   CO2 31* 33* 32*   BUN 58.4* 62.3* 63.7*   CR 1.29* 1.48* 1.53*   ANIONGAP 15 15 12   LUNA 9.1 9.7 9.9   * 99 108*     Most Recent 3 Troponin's:No lab results found.  Most Recent 3 BNP's:  Recent Labs   Lab Test 03/29/24  1453   NTBNPI 4,421*     Most Recent Cholesterol Panel:  Recent Labs   Lab Test 11/20/20  1000   CHOL 178      HDL 59   TRIG 97         Assessment & Plan     # Acute on chronic respiratory failure  # Severe Pulmonary Hypertension  #  Right heart dysfunction and failure  # JODI  # Severe COPD  # TR  # Morbid Obesity    Continues to diurese well, weight is downtrending, remains on around 5 L nasal cannula.  Switch to continuous Lasix infusion from bolus dosing.  Once more euvolemic she will need further workup for her pulmonary hypertension including right heart catheterization and potentially a VQ scan.  I suspect she still has quite a bit of fluid to go.    Plan:  -Continue diuresis as currently ordered.  -Strict I's and O's as able, daily weights, daily BMP      Henry Johnston MD  4/7/2024      Medical Decision Making       30 MINUTES SPENT BY ME on the date of service doing chart review, history, exam, documentation & further activities per the note.

## 2024-04-08 NOTE — PLAN OF CARE
Goal Outcome Evaluation: Lasix drip continues at 10mg/hr. O2 sats 95% on 5L NC. Wound cares and new dressing to right ankle. Potassium replaced 3.2 today. 2000 ml FR. Voiding on commode. Redraw potassium at 1400. Denies pain. A&O x4.

## 2024-04-08 NOTE — PROGRESS NOTES
[facility]  Cardiology Progress Note  Date of Service: 04/08/2024  Date of Admission: 3/29/2024  Primary Cardiologist: [facility], Dr. Montalvo    Summary    Linda Otero is a 64 year old female admitted on 3/29/2024.  Past medical history of COPD, chronic respiratory failure on home oxygen with history of hypercapnia, nicotine dependence, HFpEF, DVT/PE  on past warfarin anticoagulation, recent hemoptysis, morbid obesity, CKD stage III, CAD, HLD, HTN, hypothyroidism, MGUS, seizure disorder, CVA, admitted 3/29/2024 with acute on chronic respiratory failure.     Interval April 8, 2024    Continues on 5L supplemental O2 (baseline needs 3-5L via NC).  Reports no worsening symptoms.  Mild increased WOB with conversation.  States she has been practicing laying flat for upcoming procedures - duration of supine positioning limited by SOB.    Asssessment:    # Acute on chronic respiratory failure  # Severe Pulmonary Hypertension  # Right heart dysfunction and failure  - CT C PE 3/29/2024 without any PE but did demonstrate pulmonary congestion, R pleural effusion.  TTE 4/1 showing EF 60-65% without WMA, severe RV dilation with moderately decreased RV function, mod-severe TR and pulm HTN.  - Weight down ~15# since admit (316# today)  - On IV Furosemide infusion.  # Hx CVA 2/2 paradoxical embolus, s/p PFO closure  # JODI  # Severe COPD  # TR  # Morbid Obesity  _____________________________________________________________    Plan:   Continue IV diuresis.  Start KCl replacement at 40 mEq three times daily.  Once near euvolemic state, anticipate left and right heart catheterization, as well as VQ scan.  Daily BMP, strict I/O, daily standing weight, Na and FR restriction.  Cardiology will follow.  _____________________________________________________________    Plan of care and the majority of MDM was formulated under direction and guidance of Dr. Garcia.    25 Minutes spent in coordination  of care, and discussion with the patient and/or family regarding diagnostic results, prognosis, symptom management, risks and benefits of management options, and development of the plan of care of above.    Thank you for the opportunity to participate in the care of Ms. Linda Otero.  Please feel free to reach out with any additional questions.    JONATAN Diaz, CNP   Nurse Practitioner  [facility]  Pager: [phone]  Phone: [phone]  Text Page (8am - 5pm, M-F)    Physical Exam   Temp: 97.5  F (36.4  C) Temp src: Oral BP: 112/71 Pulse: 98   Resp: 18 SpO2: 95 % O2 Device: Nasal cannula Oxygen Delivery: 5 LPM    Vitals:    04/06/24 0608 04/07/24 0413 04/08/24 0250   Weight: 144.8 kg (319 lb 4.8 oz) 144.2 kg (318 lb) 143.3 kg (316 lb)     I/O last 3 completed shifts:  In: 1540 [P.O.:1380; I.V.:160]  Out: 2797 [Urine:2797]    Constitutional: Appears stated age, well nourished, NAD.  Eyes: Pupils equal, round. Sclerae anicteric.   HEENT: Normocephalic, atraumatic.   Neck: Supple. Carotid pulses full and equal. No JVD appreciated.  Respiratory: Non-labored. Lungs diminished bases  Cardiovascular: RRR, normal S1 and S2. No M/G/R.  Vascular: Peripheral pulses intact, faint and symmetric bilaterally  GI: Soft, rounded, non-tender.  Skin: Warm and dry. Bilateral edema, RLE wrapped.  Musculoskeletal/Extremities: Symmetrical movement.   Neurologic: No gross focal deficits.     Data   Recent Labs   Lab 04/08/24  0722 04/07/24  1245 04/07/24  0618 04/06/24  0618   PLT  --  135*  --   --      --  145 145   POTASSIUM 3.2* 3.7 3.1* 3.4   CHLORIDE 98  --  99 97*   CO2 35*  --  31* 33*   BUN 51.0*  --  58.4* 62.3*   CR 1.22*  --  1.29* 1.48*   ANIONGAP 9  --  15 15   LUNA 9.3  --  9.1 9.7   *  --  103* 99       Recent Labs   Lab 04/07/24  1245   *     Recent Labs   Lab 04/08/24  0722 04/07/24  1245 04/07/24  0618 04/06/24  0618     --  145 145   POTASSIUM 3.2* 3.7 3.1*  3.4   CHLORIDE 98  --  99 97*   CO2 35*  --  31* 33*   ANIONGAP 9  --  15 15   *  --  103* 99   BUN 51.0*  --  58.4* 62.3*   CR 1.22*  --  1.29* 1.48*   GFRESTIMATED 50*  --  46* 39*   LUNA 9.3  --  9.1 9.7        Patient Active Problem List   Diagnosis    Chronic airway obstruction (H)    Morbid obesity (H)    Obstructive sleep apnea    Pulmonary hypertension (H)    Hypothyroidism    Menorrhagia    Hypertension goal BP (blood pressure) < 140/90    Iron deficiency anemia, unspecified iron deficiency    Long-term (current) use of anticoagulants [Z79.01]    Advanced directives, counseling/discussion    Primary hypercoagulable state (H24)    Monoclonal gammopathy    Lymphedema    Vitamin B12 deficiency (non anemic)     Medications   Current Facility-Administered Medications   Medication Dose Route Frequency Provider Last Rate Last Admin    furosemide (LASIX) 100 mg in sodium chloride 0.9 % 100 mL infusion  10 mg/hr Intravenous Continuous Tiffanie Huston MD 10 mL/hr at 04/08/24 0541 10 mg/hr at 04/08/24 0541     Current Facility-Administered Medications   Medication Dose Route Frequency Provider Last Rate Last Admin    aspirin (ASA) chewable tablet 81 mg  81 mg Oral BID Gerry Pizarro MD   81 mg at 04/08/24 0836    enoxaparin ANTICOAGULANT (LOVENOX) injection 40 mg  40 mg Subcutaneous Q12H Angi Mina MD        fluticasone-vilanterol (BREO ELLIPTA) 200-25 MCG/ACT inhaler 1 puff  1 puff Inhalation Daily Angi Mina MD        ipratropium - albuterol 0.5 mg/2.5 mg/3 mL (DUONEB) neb solution 3 mL  3 mL Nebulization 4x daily Gerry Pizarro MD   3 mL at 04/08/24 1220    multivitamin w/minerals (THERA-VIT-M) tablet 1 tablet  1 tablet Oral Daily Charli Castro MD   1 tablet at 04/08/24 1220    polyethylene glycol (MIRALAX) Packet 17 g  17 g Oral Daily Charli Castro MD   17 g at 04/08/24 0836    potassium chloride marcie ER (KLOR-CON M20) CR tablet 40 mEq  40 mEq Oral TID Molly Hampton,  APRN CNP   40 mEq at 24 1000    sodium chloride (PF) 0.9% PF flush 10 mL  10 mL Intracatheter Q8H Gerry Pizarro MD   10 mL at 24 0117       Data   Last 24 hours labs personally reviewed.  Echo:   Recent Results (from the past 4320 hour(s))   Echocardiogram Complete   Result Value    LVEF  60-65%    Narrative    [id number]  [id number]  [id number]  [id number]^SARAH^TAB^MOE     [facility]  Echocardiography Laboratory  [address] [place] [address]     Name: LOKESH MCKEON  MRN: [id number]  : 1960  Study Date: 2024 07:50 AM  Age: 64 yrs  Gender: Female  Patient Location: [facility]  Reason For Study: Dyspnea  Ordering Physician: TAB SMITH  Referring Physician: Jayy Henson  Performed By: Faraz Lincoln RDCS     BSA: 2.5 m2  Height: 66 in  Weight: 345 lb  HR: 91  BP: 127/78 mmHg  ______________________________________________________________________________  Procedure  Complete Portable Echo Adult.  ______________________________________________________________________________  Interpretation Summary     Patient terminated exam prior to completion.  There is mild concentric left ventricular hypertrophy.  Left ventricular systolic function is normal.  The visual ejection fraction is 60-65%.  Flattened septum is consistent with RV pressure overload however unable to  estimate PA systolic pressure.  The right ventricle is severely dilated. Moderately decreased right  ventricular systolic function  There is moderate to mod-severe (2-3+) tricuspid regurgitation.  Intact atrial septum. Closure device well seated.  Inferior vena cava not well visualized for estimation of right atrial  pressure.  There is no pericardial effusion.     Compared to study dated 2017, there is severe right ventricular  enlargement, dysfunction and evidence of pulmonary hypertension.  ______________________________________________________________________________  Left Ventricle  The  left ventricle is normal in size. There is mild concentric left  ventricular hypertrophy. Left ventricular systolic function is normal. The  visual ejection fraction is 60-65%. Left ventricular diastolic function is not  assessable. Flattened septum is consistent with RV pressure overload.     Right Ventricle  The right ventricle is severely dilated. Moderately decreased right  ventricular systolic function.     Atria  Normal left atrial size. The right atrium is severely dilated. Intact atrial  septum. Closure device well seated.     Mitral Valve  There is mild mitral annular calcification. There is mild (1+) mitral  regurgitation.     Tricuspid Valve  The tricuspid valve is not well visualized. Right ventricular systolic  pressure could not be approximated due to inadequate tricuspid regurgitation.  There is moderate to mod-severe (2-3+) tricuspid regurgitation.     Aortic Valve  The aortic valve is trileaflet with aortic valve sclerosis.     Pulmonic Valve  The pulmonic valve is not well visualized.     Vessels  The aortic root is normal size. Normal size ascending aorta. Inferior vena  cava not well visualized for estimation of right atrial pressure.     Pericardium  There is no pericardial effusion.     ______________________________________________________________________________  MMode/2D Measurements & Calculations  IVSd: 1.2 cm  LVIDd: 4.3 cm  LVIDs: 2.8 cm  LVPWd: 1.3 cm  FS: 36.0 %  LV mass(C)d: 202.8 grams  LV mass(C)dI: 80.4 grams/m2     Ao root diam: 3.2 cm  LA dimension: 5.3 cm  asc Aorta Diam: 3.6 cm  LA/Ao: 1.7  Ao root diam index Ht(cm/m): 1.9  Ao root diam index BSA (cm/m2): 1.3  Asc Ao diam index BSA (cm/m2): 1.4  Asc Ao diam index Ht(cm/m): 2.1  LA Volume (BP): 59.6 ml  LA Volume Index (BP): 23.7 ml/m2  RV Base: 6.9 cm     RWT: 0.61     Doppler Measurements & Calculations  MV E max anastacia: 117.0 cm/sec  MV A max anastacia: 129.0 cm/sec  MV E/A: 0.91  MV dec slope: 810.0 cm/sec2  MV dec time: 0.14 sec  PA  acc time: 0.08 sec  E/E' av.1  Lateral E/e': 8.8  Medial E/e': 13.4     ______________________________________________________________________________  Report approved by: Jazzmine Vera 2024 12:43 PM

## 2024-04-08 NOTE — PROGRESS NOTES
Pt A&O x4. calm and pleasant. Is diuresing and on IV lasix 10mg/1hr. Voided 3 times w/ standby assist to beside commode. Reported soreness due to constant sitting on commode, but skin on buttock is WDL. No other pain reported. On 5L nassal cannula. Has lymphedema stockings on. Continue to monitor

## 2024-04-08 NOTE — PLAN OF CARE
Pt alert and oriented x4. Pt denies shortness of breath. Pt denies chest pain. Pt up frequently during the night to void. Pt stand by assist. Pt slept well in between cares. Pt continues on lasix drip. Pt desats to mid 80s during activity and 02 sats mid 90s on 5 liters o2 via nasal cannula.

## 2024-04-08 NOTE — PROGRESS NOTES
[facility]    Hospitalist Progress Note    Interval History   Patient is awake and alert.  Sitting up in chair.  Currently on 5 L nasal cannula.  Had about 2.7 L urine output in the last 24 hours.  Weight down 15 pounds since admission.    -Data reviewed today: I reviewed all new labs and imaging results over the last 24 hours. I personally reviewed the EKG tracing showing right axis deviation with rvh  .    Physical Exam   Temp: 97.6  F (36.4  C) Temp src: Oral BP: 117/65 Pulse: 98   Resp: 18 SpO2: 93 % O2 Device: Nasal cannula Oxygen Delivery: 5 LPM  Vitals:    04/06/24 0608 04/07/24 0413 04/08/24 0250   Weight: 144.8 kg (319 lb 4.8 oz) 144.2 kg (318 lb) 143.3 kg (316 lb)     Vital Signs with Ranges  Temp:  [97.6  F (36.4  C)-98  F (36.7  C)] 97.6  F (36.4  C)  Pulse:  [78-98] 98  Resp:  [16-18] 18  BP: (107-125)/(57-86) 117/65  SpO2:  [90 %-96 %] 93 %  I/O last 3 completed shifts:  In: 1540 [P.O.:1380; I.V.:160]  Out: 2797 [Urine:2797]    Physical Exam  Constitutional:       Appearance: She is obese.   Cardiovascular:      Rate and Rhythm: Normal rate and regular rhythm.      Pulses: Normal pulses.      Heart sounds: Murmur heard.   Pulmonary:      Effort: Pulmonary effort is normal. No respiratory distress.      Breath sounds: Normal breath sounds.   Abdominal:      General: Abdomen is flat. Bowel sounds are normal. There is no distension.      Tenderness: There is no abdominal tenderness. There is no guarding.   Musculoskeletal:      Right lower leg: Edema present.      Left lower leg: Edema present.      Comments: Anasarca extending up the back    Skin:     General: Skin is warm and dry.      Coloration: Skin is pale.   Neurological:      General: No focal deficit present.           Medications   Current Facility-Administered Medications   Medication Dose Route Frequency Provider Last Rate Last Admin    furosemide (LASIX) 100 mg in sodium chloride 0.9 % 100 mL infusion  10 mg/hr  Intravenous Continuous Tiffanie Huston MD 10 mL/hr at 04/08/24 0541 10 mg/hr at 04/08/24 0541     Current Facility-Administered Medications   Medication Dose Route Frequency Provider Last Rate Last Admin    aspirin (ASA) chewable tablet 81 mg  81 mg Oral BID Gerry Pizarro MD   81 mg at 04/08/24 0836    enoxaparin ANTICOAGULANT (LOVENOX) injection 40 mg  40 mg Subcutaneous Q24H Gerry Pizarro MD   40 mg at 04/08/24 0836    ipratropium - albuterol 0.5 mg/2.5 mg/3 mL (DUONEB) neb solution 3 mL  3 mL Nebulization 4x daily Gerry Pizarro MD   3 mL at 04/08/24 0736    multivitamin w/minerals (THERA-VIT-M) tablet 1 tablet  1 tablet Oral Daily Charli Castro MD   1 tablet at 04/06/24 1145    polyethylene glycol (MIRALAX) Packet 17 g  17 g Oral Daily Charli Castro MD   17 g at 04/08/24 0836    sodium chloride (PF) 0.9% PF flush 10 mL  10 mL Intracatheter Q8H Gerry Pizarro MD   10 mL at 04/04/24 0117       Data   Recent Labs   Lab 04/08/24  0722 04/07/24  1245 04/07/24  0618 04/06/24  0618   PLT  --  135*  --   --      --  145 145   POTASSIUM 3.2* 3.7 3.1* 3.4   CHLORIDE 98  --  99 97*   CO2 35*  --  31* 33*   BUN 51.0*  --  58.4* 62.3*   CR 1.22*  --  1.29* 1.48*   ANIONGAP 9  --  15 15   LUNA 9.3  --  9.1 9.7   *  --  103* 99       No results found for this or any previous visit (from the past 24 hour(s)).      Assessment & Plan     Linda Otero is a 64 year old female admitted on 3/29/2024.  Past medical history of COPD, chronic respiratory failure on home oxygen with history of hypercapnia, nicotine dependence, HFpEF, DVT/PE  on past warfarin anticoagulation, recent hemoptysis, morbid obesity, CKD stage III, CAD, HLD, HTN, hypothyroidism, MGUS, seizure disorder, CVA, admitted 3/29/2024 with acute on chronic respiratory failure.        Acute on chronic hypoxic respiratory failure  *Speculate multifactorial from   HFpEF exacerbation (elevated BNP and weight gain),   COPD exacerbation,    JODI with noncompliance to CPAP and   CAP  Acute right heart failure with mod-severe TR    [Home Lasix 20mg BID PO]  *Baseline 3-5 L NC  *CT C PE 3/29/2024 without any PE but did demonstrate pulmonary congestion, R pleural effusion  *TTE 4/1 showing EF 60-65% without WMA, severe RV dilation with moderately decreased RV function, mod-severe TR and pulm HTN  *completed 5-day course of ceftriaxone+doxy on 4/2     - 04/07/24 was to be final day of 8-day prednisone taper; reports increased wheezing so will increase duonebs to qid,    4/8 -restarted Breo.  Wheezing significantly improved.  - lasix gtt per Cards, recs appreciated - weight down only about 15 pounds since admission but only down by 1 pound in the last 3 days.  Had a urine output of 2.4 L in the last 24 hours.  Unclear how accurate the weight measurement is.  - lymphedema wraps  - Cards planning for eventual right and left heart cath as well as VQ scan once adequately diuresed - likely early next week,   -Started on potassium replacement with oral KCl 40 mEq 3 times daily  - Flutter valve and incentive spirometer  - stable on baseline oxygen needs; Maintain O2 88-95%     Hemoptysis, mild  *reports orange sputum production at home, was producing small amounts of rober blood 4/1  *admission CT chest negative for PE, masses  *resumed aspirin 4/3 and reports small amount of hemoptysis 4/4   - denies any further hemoptysis so will try resuming lovenox 04/07/24      SONAL on Chronic kidney disease, stage III  *Speculate congestive nephropathy  *Admission serum Cr 1.33, historic baseline 0.87 to 1.08 in 2020 to 2023  - Avoid non-steroidal anti-inflammatory drugs (NSAIDs)  - Cr improved to 1.22 on 4/8 ; monitor daily with diuresis     Hypokalemia  *due to diuresis  - replace per protocol  -Started on oral KCl 40 mEq 3 times daily     History of deep venous thrombosis and pulmonary embolism  *CT C PE 3/29/2024 without any PE  *Per pharmacy, no longer on warfarin  -  "resumyazmin finex 04/07/24   - has been counseled on importance of frequent ambulation, out of bed activity     Skin ulcer, right lateral ankle  Wound nurse (WOC) following     Weakness and deconditioning  Therapies recommend home with assist, home therapies     Hyperlipidemia - Diet-controlled, monitor; follow-up with primary clinic provider   Hypothyroidism - Med rec does not show any levothyroxine (TSH slightly elevated, free T4 wnl). Will defer to outpt evaluation by PCP  Morbid obesity  History of nicotine dependence  History of obstructive sleep apnea  History of coronary artery disease   History of monoclonal gammopathy of undetermined significance - noted, follow-up with primary clinic provider   History of cerebrovascular disease with prior stroke - noted, monitor  History of B12 deficiency - noted, continue prior to admission B12 replacement  History of seizure disorder - noted in medical record; monitor              Diet: Snacks/Supplements Adult: Expedite Bottle; Between Meals  Fluid restriction 2000 ML FLUID  2 Gram Sodium Diet    DVT Prophylaxis: PCD  Cardiac Monitoring: None  Code Status: No CPR- Do NOT Intubate       Clinically Significant Risk Factors        # Hypokalemia: Lowest K = 3.1 mmol/L in last 2 days, will replace as needed           # Hypertension: Noted on problem list        # Severe Obesity: Estimated body mass index is 51 kg/m  as calculated from the following:    Height as of this encounter: 1.676 m (5' 6\").    Weight as of this encounter: 143.3 kg (316 lb).   # Moderate Malnutrition: based on nutrition assessment    # Financial/Environmental Concerns: none  # COPD: noted on problem list         Greater than 50 minutes spent on documentation review, discussing care with cardiology, examining the patient and reviewing labs.    Angi Mina MD, MD  [phone](p)   "

## 2024-04-09 NOTE — PLAN OF CARE
[id number]:    No events this four hour shift. SaO2 stable on 4-6L.  Desats easily, recovers slowly. Reports this is her baseline.  Bumex infusing. Good urine output. Plan for heart cath when euvolemic. CPAP used while asleep. Up with minimal assist.

## 2024-04-09 NOTE — PROGRESS NOTES
[facility]    Hospitalist Progress Note    Interval History   Patient is awake and alert.  Sitting up in chair.  Currently on 7 L nasal cannula saturating at 94%.  Denies any significant shortness of breath but is endorsing fatigue.  Overall minimal physical activity.    -Data reviewed today: I reviewed all new labs and imaging results over the last 24 hours. I personally reviewed the EKG tracing showing right axis deviation with rvh  .    Physical Exam   Temp: 98.3  F (36.8  C) Temp src: Oral BP: 115/67 Pulse: 87   Resp: 18 SpO2: 94 % O2 Device: Nasal cannula Oxygen Delivery: 7 LPM  Vitals:    04/07/24 0413 04/08/24 0250 04/09/24 0342   Weight: 144.2 kg (318 lb) 143.3 kg (316 lb) 142.9 kg (315 lb)     Vital Signs with Ranges  Temp:  [97.4  F (36.3  C)-98.3  F (36.8  C)] 98.3  F (36.8  C)  Pulse:  [83-87] 87  Resp:  [18] 18  BP: (112-125)/(55-80) 115/67  SpO2:  [81 %-99 %] 94 %  I/O last 3 completed shifts:  In: 1470 [P.O.:1470]  Out: 3425 [Urine:3425]    Physical Exam  Constitutional:       Appearance: She is obese.   Cardiovascular:      Rate and Rhythm: Normal rate and regular rhythm.      Pulses: Normal pulses.      Heart sounds: Murmur heard.   Pulmonary:      Effort: Pulmonary effort is normal. No respiratory distress.      Breath sounds: Normal breath sounds.   Abdominal:      General: Bowel sounds are normal. There is no distension.      Tenderness: There is no abdominal tenderness. There is no guarding.      Comments: Large pannus with mild bruising over the lower abd .   Musculoskeletal:      Right lower leg: Edema present.      Left lower leg: Edema present.      Comments: Anasarca extending up the back    Skin:     General: Skin is warm and dry.      Coloration: Skin is pale.      Comments: Erythema in the skin folds    Neurological:      General: No focal deficit present.           Medications   Current Facility-Administered Medications   Medication Dose Route Frequency  Provider Last Rate Last Admin    furosemide (LASIX) 100 mg in sodium chloride 0.9 % 100 mL infusion  10 mg/hr Intravenous Continuous Tiffanie Huston MD   Paused at 04/09/24 0601     Current Facility-Administered Medications   Medication Dose Route Frequency Provider Last Rate Last Admin    aspirin (ASA) chewable tablet 81 mg  81 mg Oral BID Gerry Pizarro MD   81 mg at 04/09/24 0926    enoxaparin ANTICOAGULANT (LOVENOX) injection 40 mg  40 mg Subcutaneous Q12H Angi Mina MD   40 mg at 04/09/24 0926    fluticasone-vilanterol (BREO ELLIPTA) 200-25 MCG/ACT inhaler 1 puff  1 puff Inhalation Daily Angi Mina MD        ipratropium - albuterol 0.5 mg/2.5 mg/3 mL (DUONEB) neb solution 3 mL  3 mL Nebulization 4x daily Gerry Pizarro MD   3 mL at 04/09/24 0743    multivitamin w/minerals (THERA-VIT-M) tablet 1 tablet  1 tablet Oral Daily Charli Castro MD   1 tablet at 04/08/24 1220    polyethylene glycol (MIRALAX) Packet 17 g  17 g Oral Daily Charli Castro MD   17 g at 04/09/24 0926    potassium chloride marcie ER (KLOR-CON M20) CR tablet 40 mEq  40 mEq Oral TID Molly Hampton APRN CNP   40 mEq at 04/09/24 0927    sodium chloride (PF) 0.9% PF flush 10 mL  10 mL Intracatheter Q8H Gerry Pizarro MD   10 mL at 04/08/24 1629       Data   Recent Labs   Lab 04/09/24  0622 04/08/24  1504 04/08/24  0722 04/07/24  1245 04/07/24  0618   PLT  --   --   --  135*  --      --  142  --  145   POTASSIUM 4.1 3.7 3.2* 3.7 3.1*   CHLORIDE 97*  --  98  --  99   CO2 37*  --  35*  --  31*   BUN 41.7*  --  51.0*  --  58.4*   CR 1.17*  --  1.22*  --  1.29*   ANIONGAP 10  --  9  --  15   LUNA 9.4  --  9.3  --  9.1   GLC 96  --  106*  --  103*       No results found for this or any previous visit (from the past 24 hour(s)).      Assessment & Plan     Linda Otero is a 64 year old female admitted on 3/29/2024.  Past medical history of COPD, chronic respiratory failure on home oxygen with history of  hypercapnia, nicotine dependence, HFpEF, DVT/PE  on past warfarin anticoagulation, recent hemoptysis, morbid obesity, CKD stage III, CAD, HLD, HTN, hypothyroidism, MGUS, seizure disorder, CVA, admitted 3/29/2024 with acute on chronic respiratory failure.        Acute on chronic hypoxic respiratory failure  *Speculate multifactorial from   HFpEF exacerbation (elevated BNP and weight gain),   COPD exacerbation,   JODI with noncompliance to CPAP and   CAP  Acute right heart failure with mod-severe TR    [Home Lasix 20mg BID PO]  *Baseline 3-5 L NC  *CT C PE 3/29/2024 without any PE but did demonstrate pulmonary congestion, R pleural effusion  *TTE 4/1 showing EF 60-65% without WMA, severe RV dilation with moderately decreased RV function, mod-severe TR and pulm HTN  *completed 5-day course of ceftriaxone+doxy on 4/2     - 04/07/24 was to be final day of 8-day prednisone taper; reports increased wheezing so will increase duonebs to qid,    4/8 -restarted Breo.  Wheezing significantly improved.  - lasix gtt per Cards, recs appreciated - weight down about 15 pounds since admission but only down by 1 pound in the last 3 days.  Had a urine output of 3.4 L in the last 24 hours.  Unclear how accurate the weight measurement is.  - lymphedema wraps  - Cards planning for eventual right and left heart cath as well as VQ scan once adequately diuresed - likely early next week,   -Started on potassium replacement with oral KCl 40 mEq 3 times daily  - Flutter valve and incentive spirometer  -Was on 7 L nasal cannula.  Down titrated to 3 L.  Target saturations between 88-92     Hemoptysis, mild  *reports orange sputum production at home, was producing small amounts of rober blood 4/1  *admission CT chest negative for PE, masses  *resumed aspirin 4/3 and reports small amount of hemoptysis 4/4   - denies any further hemoptysis so will try resuming lovenox 04/07/24      SONAL on Chronic kidney disease, stage III  *Speculate congestive  "nephropathy  *Admission serum Cr 1.33, historic baseline 0.87 to 1.08 in 2020 to 2023  - Avoid non-steroidal anti-inflammatory drugs (NSAIDs)  -Creatinine continuing to improve with aggressive diuresis.  Currently at 1.17.     Hypokalemia  *due to diuresis  - replace per protocol  -Started on oral KCl 40 mEq 3 times daily  -Potassium normal.     History of deep venous thrombosis and pulmonary embolism  *CT C PE 3/29/2024 without any PE  *Per pharmacy, no longer on warfarin  - resume lovenox 04/07/24   - has been counseled on importance of frequent ambulation, out of bed activity     Skin ulcer, right lateral ankle  Wound nurse (WOC) following     Weakness and deconditioning  Therapies recommend home with assist, home therapies     Hyperlipidemia - Diet-controlled, monitor; follow-up with primary clinic provider   Hypothyroidism - Med rec does not show any levothyroxine (TSH slightly elevated, free T4 wnl). Will defer to outpt evaluation by PCP  Morbid obesity  History of nicotine dependence  History of obstructive sleep apnea  History of coronary artery disease   History of monoclonal gammopathy of undetermined significance - noted, follow-up with primary clinic provider   History of cerebrovascular disease with prior stroke - noted, monitor  History of B12 deficiency - noted, continue prior to admission B12 replacement  History of seizure disorder - noted in medical record; monitor        Diet: Snacks/Supplements Adult: Expedite Bottle; Between Meals  Fluid restriction 2000 ML FLUID  2 Gram Sodium Diet    DVT Prophylaxis: PCD  Cardiac Monitoring: None  Code Status: No CPR- Do NOT Intubate       Clinically Significant Risk Factors        # Hypokalemia: Lowest K = 3.2 mmol/L in last 2 days, will replace as needed           # Hypertension: Noted on problem list        # Severe Obesity: Estimated body mass index is 50.84 kg/m  as calculated from the following:    Height as of this encounter: 1.676 m (5' 6\").    Weight " as of this encounter: 142.9 kg (315 lb).   # Moderate Malnutrition: based on nutrition assessment      # Financial/Environmental Concerns: none  # COPD: noted on problem list         Greater than 35 minutes spent on documentation review, discussing care with cardiology, examining the patient and reviewing labs.    Angi Mina MD, MD  [phone](p)

## 2024-04-09 NOTE — CONSULTS
"SPIRITUAL HEALTH SERVICES - Consult Note  [facility]  Referral Source/Reason for Visit: Consult for support    Summary and Recommendations -  Linda expressed a deep and abiding \"love for Amrik\" and trusts \"that for those who love God, all things work together for good.\"  Reading the bible and prayer are important spiritual practices. Psalm 91 is a source of comfort and encouragement.  Linda welcomed prayer, which was provided.    Plan: Spiritual Health remains available for support.    Natalia Cooper M.Div.  Staff     [facility] available 24/7 for emergent requests/referrals, either by paging the on-call  or by entering an ASAP/STAT consult in [employer], which will also page the on-call .    Assessment    Saw pt Linda Otero per consult for support and introductory visit.    Patient/Family Understanding of Illness and Goals of Care - Linda shared that she's been very tired and struggling to move, but that she needs to work on gaining strength.    Distress and Loss - Linda shared that she is estranged from her two daughters and their children.    Strengths, Coping, and Resources   Linda named her son and daughter-in-law as her primary sources of support. She is grateful to have a relationship with their two young children.    Meaning, Beliefs, and Spirituality   Linda was raised in the [employer] which she left at age [age] when she was \"born again\" and became a \"[employer] Freak.\"  She expressed a deep and abiding \"love for Amrik\" and trusts \"that for those who love God all things work together for good.\"  Reading the bible and prayer are important spiritual practices. Psalm 91 is a source of comfort and encouragement.  She welcomed prayer, which was provided.    " Pfizer dose 1 and 2

## 2024-04-09 NOTE — PLAN OF CARE
Goal Outcome Evaluation:       1900-0700  Orientation: AOx4  Activity: Ind, up in chair for most part of shift  Diet/BS Checks: 2g NA, 2000 FR  Tele:  NSR  IV Access/Drains: IV infusing Lasix gtt at 10ml/hr, loss IV access at the end of shift. Placed vascular consult   Pain Management: denies  Abnormal VS/Results: VSS on 7L, CERRATO at times  Bowel/Bladder: cont, 1 bm  Skin/Wounds: R ankle wound, dressing CDI. Lymph wraps in place  Consults: Cards, WOC  D/C Disposition: discharge pending

## 2024-04-09 NOTE — PLAN OF CARE
Goal Outcome Evaluation: Pt to use CPAP when napping and at HS per MD orders. Desats easily but try to keep O@ sats 88-92%. CERRATO. Extra dose of Bumex 2mg IVP given and Bumex drip started at 0.25 MG//hr. Had two BM today. Lasix drip discontinued. Micatin powder ordered for skin folds. Right ankle dressing and edema wear CDI. Has Murmur. Denies pain. Voids freq in 200 ml amounts.

## 2024-04-09 NOTE — PROGRESS NOTES
[facility]  Cardiology Progress Note  Date of Service: 04/09/2024  Date of Admission: 3/29/2024  Primary Cardiologist: [facility], Dr. Montalvo    Summary    Linda Otero is a 64 year old female admitted on 3/29/2024.  Past medical history of COPD, chronic respiratory failure on home oxygen with history of hypercapnia, nicotine dependence, HFpEF, DVT/PE  on past warfarin anticoagulation, recent hemoptysis, morbid obesity, CKD stage III, CAD, HLD, HTN, hypothyroidism, MGUS, seizure disorder, CVA, admitted 3/29/2024 with acute on chronic respiratory failure.     Asssessment:    # Acute on chronic respiratory failure  # Severe Pulmonary Hypertension  # Right heart dysfunction and failure  - CT C PE 3/29/2024 without any PE but did demonstrate pulmonary congestion, R pleural effusion.  TTE 4/1 showing EF 60-65% without WMA, severe RV dilation with moderately decreased RV function, mod-severe TR and pulm HTN.  - Diuresing slowly; SOB gradually improving.  - Still unable to lay completely flat for extended duration.  - On IV Furosemide infusion.  # Hx CVA 2/2 paradoxical embolus, s/p PFO closure  # JODI  # Severe COPD  # TR  # Morbid Obesity  _____________________________________________________________    Plan:   Stop IV Furosemide gtt.  Start IV Bumex gtt at 0.25 mg/h + one time IV Bumex 2 mg bolus.  Once near euvolemic state, anticipate left and right heart catheterization, as well as VQ scan.  Daily BMP, strict I/O, daily standing weight, Na and FR restriction.  Cardiology will follow.  _____________________________________________________________    Plan of care and the majority of MDM was formulated under direction and guidance of Dr. Garcia.    25 Minutes spent in coordination of care, and discussion with the patient and/or family regarding diagnostic results, prognosis, symptom management, risks and benefits of management options, and development of the plan of care of  above.    Thank you for the opportunity to participate in the care of Ms. Linda Otero.  Please feel free to reach out with any additional questions.    JONATAN Diaz, CNP   Nurse Practitioner  [facility]  Pager: [phone]  Phone: [phone]  Text Page (8am - 5pm, M-F)    Physical Exam   Temp: 97.6  F (36.4  C) Temp src: Oral BP: 125/77 Pulse: 102   Resp: 18 SpO2: 91 % O2 Device: Nasal cannula Oxygen Delivery: 3 LPM    Vitals:    04/07/24 0413 04/08/24 0250 04/09/24 0342   Weight: 144.2 kg (318 lb) 143.3 kg (316 lb) 142.9 kg (315 lb)     I/O last 3 completed shifts:  In: 1470 [P.O.:1470]  Out: 3425 [Urine:3425]    Constitutional: Appears stated age, well nourished, NAD.  Eyes: Pupils equal, round. Sclerae anicteric.   HEENT: Normocephalic, atraumatic.   Neck: Supple. Carotid pulses full and equal.  Respiratory: Non-labored. Lungs diminished bases  Cardiovascular: RRR, normal S1 and S2. No M/G/R.  Vascular: Peripheral pulses intact, faint and symmetric bilaterally  GI: Soft, rounded, non-tender.  Skin: Warm and dry. Bilateral edema, RLE wrapped.  Musculoskeletal/Extremities: Symmetrical movement.   Neurologic: No gross focal deficits.     Data   Recent Labs   Lab 04/09/24  1114 04/09/24  0622 04/08/24  1504 04/08/24  0722 04/07/24  1245 04/07/24  0618   WBC 10.2  --   --   --   --   --    HGB 11.4*  --   --   --   --   --    MCV 80  --   --   --   --   --    *  --   --   --  135*  --    NA  --  144  --  142  --  145   POTASSIUM  --  4.1 3.7 3.2* 3.7 3.1*   CHLORIDE  --  97*  --  98  --  99   CO2  --  37*  --  35*  --  31*   BUN  --  41.7*  --  51.0*  --  58.4*   CR  --  1.17*  --  1.22*  --  1.29*   ANIONGAP  --  10  --  9  --  15   LUNA  --  9.4  --  9.3  --  9.1   GLC  --  96  --  106*  --  103*       Recent Labs   Lab 04/09/24  1114 04/07/24  1245   WBC 10.2  --    HGB 11.4*  --    HCT 38.4  --    MCV 80  --    * 135*     Recent Labs   Lab 04/09/24  0622  04/08/24  1504 04/08/24  0722 04/07/24  1245 04/07/24  0618     --  142  --  145   POTASSIUM 4.1 3.7 3.2*   < > 3.1*   CHLORIDE 97*  --  98  --  99   CO2 37*  --  35*  --  31*   ANIONGAP 10  --  9  --  15   GLC 96  --  106*  --  103*   BUN 41.7*  --  51.0*  --  58.4*   CR 1.17*  --  1.22*  --  1.29*   GFRESTIMATED 52*  --  50*  --  46*   LUNA 9.4  --  9.3  --  9.1    < > = values in this interval not displayed.        Patient Active Problem List   Diagnosis    Chronic airway obstruction (H)    Morbid obesity (H)    Obstructive sleep apnea    Pulmonary hypertension (H)    Hypothyroidism    Menorrhagia    Hypertension goal BP (blood pressure) < 140/90    Iron deficiency anemia, unspecified iron deficiency    Long-term (current) use of anticoagulants [Z79.01]    Advanced directives, counseling/discussion    Primary hypercoagulable state (H24)    Monoclonal gammopathy    Lymphedema    Vitamin B12 deficiency (non anemic)     Medications   Current Facility-Administered Medications   Medication Dose Route Frequency Provider Last Rate Last Admin    bumetanide (BUMEX) 0.25 mg/mL infusion  0.25 mg/hr Intravenous Continuous Molly Hampton APRN CNP         Current Facility-Administered Medications   Medication Dose Route Frequency Provider Last Rate Last Admin    aspirin (ASA) chewable tablet 81 mg  81 mg Oral BID Gerry Pizarro MD   81 mg at 04/09/24 0926    bumetanide (BUMEX) injection 2 mg  2 mg Intravenous Once Molly Hampton APRN CNP        enoxaparin ANTICOAGULANT (LOVENOX) injection 40 mg  40 mg Subcutaneous Q12H Angi Mina MD   40 mg at 04/09/24 0926    fluticasone-vilanterol (BREO ELLIPTA) 200-25 MCG/ACT inhaler 1 puff  1 puff Inhalation Daily Angi Mina MD   1 puff at 04/09/24 1114    ipratropium - albuterol 0.5 mg/2.5 mg/3 mL (DUONEB) neb solution 3 mL  3 mL Nebulization 4x daily Gerry Pizarro MD   3 mL at 04/09/24 1146    miconazole (MICATIN) 2 % powder   Topical BID Angi Mina  MD GARY        multivitamin w/minerals (THERA-VIT-M) tablet 1 tablet  1 tablet Oral Daily Tab Castro MD   1 tablet at 24 1220    polyethylene glycol (MIRALAX) Packet 17 g  17 g Oral Daily Tab Castro MD   17 g at 24 0926    potassium chloride marcie ER (KLOR-CON M20) CR tablet 40 mEq  40 mEq Oral TID Molly Hampton APRN CNP   40 mEq at 24 0927    sodium chloride (PF) 0.9% PF flush 10 mL  10 mL Intracatheter Q8H Gerry Pizarro MD   10 mL at 24 1629       Data   Last 24 hours labs personally reviewed.  Echo:   Recent Results (from the past 4320 hour(s))   Echocardiogram Complete   Result Value    LVEF  60-65%    Narrative    [id number]  [id number]  [id number]  ^SARAH^TAB^MOE     [facility]  Echocardiography Laboratory  [address] [place] [address]     Name: LOKESH MCKEON  MRN: [id number]  : 1960  Study Date: 2024 07:50 AM  Age: 64 yrs  Gender: Female  Patient Location: [facility]  Reason For Study: Dyspnea  Ordering Physician: TAB CASTRO  Referring Physician: Jayy Henson  Performed By: Faraz Lincoln RDCS     BSA: 2.5 m2  Height: 66 in  Weight: 345 lb  HR: 91  BP: 127/78 mmHg  ______________________________________________________________________________  Procedure  Complete Portable Echo Adult.  ______________________________________________________________________________  Interpretation Summary     Patient terminated exam prior to completion.  There is mild concentric left ventricular hypertrophy.  Left ventricular systolic function is normal.  The visual ejection fraction is 60-65%.  Flattened septum is consistent with RV pressure overload however unable to  estimate PA systolic pressure.  The right ventricle is severely dilated. Moderately decreased right  ventricular systolic function  There is moderate to mod-severe (2-3+) tricuspid regurgitation.  Intact atrial septum. Closure device well  seated.  Inferior vena cava not well visualized for estimation of right atrial  pressure.  There is no pericardial effusion.     Compared to study dated 5/23/2017, there is severe right ventricular  enlargement, dysfunction and evidence of pulmonary hypertension.  ______________________________________________________________________________  Left Ventricle  The left ventricle is normal in size. There is mild concentric left  ventricular hypertrophy. Left ventricular systolic function is normal. The  visual ejection fraction is 60-65%. Left ventricular diastolic function is not  assessable. Flattened septum is consistent with RV pressure overload.     Right Ventricle  The right ventricle is severely dilated. Moderately decreased right  ventricular systolic function.     Atria  Normal left atrial size. The right atrium is severely dilated. Intact atrial  septum. Closure device well seated.     Mitral Valve  There is mild mitral annular calcification. There is mild (1+) mitral  regurgitation.     Tricuspid Valve  The tricuspid valve is not well visualized. Right ventricular systolic  pressure could not be approximated due to inadequate tricuspid regurgitation.  There is moderate to mod-severe (2-3+) tricuspid regurgitation.     Aortic Valve  The aortic valve is trileaflet with aortic valve sclerosis.     Pulmonic Valve  The pulmonic valve is not well visualized.     Vessels  The aortic root is normal size. Normal size ascending aorta. Inferior vena  cava not well visualized for estimation of right atrial pressure.     Pericardium  There is no pericardial effusion.     ______________________________________________________________________________  MMode/2D Measurements & Calculations  IVSd: 1.2 cm  LVIDd: 4.3 cm  LVIDs: 2.8 cm  LVPWd: 1.3 cm  FS: 36.0 %  LV mass(C)d: 202.8 grams  LV mass(C)dI: 80.4 grams/m2     Ao root diam: 3.2 cm  LA dimension: 5.3 cm  asc Aorta Diam: 3.6 cm  LA/Ao: 1.7  Ao root diam index Ht(cm/m):  1.9  Ao root diam index BSA (cm/m2): 1.3  Asc Ao diam index BSA (cm/m2): 1.4  Asc Ao diam index Ht(cm/m): 2.1  LA Volume (BP): 59.6 ml  LA Volume Index (BP): 23.7 ml/m2  RV Base: 6.9 cm     RWT: 0.61     Doppler Measurements & Calculations  MV E max anastacia: 117.0 cm/sec  MV A max anastacia: 129.0 cm/sec  MV E/A: 0.91  MV dec slope: 810.0 cm/sec2  MV dec time: 0.14 sec  PA acc time: 0.08 sec  E/E' av.1  Lateral E/e': 8.8  Medial E/e': 13.4     ______________________________________________________________________________  Report approved by: Jazzmine Vera 2024 12:43 PM

## 2024-04-09 NOTE — PROGRESS NOTES
VSS. Desats with movement and will need up to 9L oxymask until recovered. When pt still can be on 5LNC. A&Ox4. SBA. Denies pain. Continent. Ambulating to commode. No BM this shift. Wound to R ankle, wound cares done previous shift. Tele: SR. CAMRON replaced previous shift, recheck 3.7. midline. Lasix running at 10mg/hr. 2gm Na diet with 2L FR.

## 2024-04-10 NOTE — PROGRESS NOTES
"[facility] Nurse Inpatient Assessment     Consulted for: Wound ulcer right lateral ankle     Summary: Fungal rash to right breast fold,   patient with POA right ankle, states wound comes and goes, patient dictating own wound cares, initial exam 4/1    Patient History (according to provider note(s):      \"64 year old female admitted on 3/29/2024.  Past medical history of COPD, chronic respiratory failure on home oxygen with history of hypercapnia, nicotine dependence, HFpEF, DVT/PE  on past warfarin anticoagulation, recent hemoptysis, morbid obesity, CKD stage III, CAD, HLD, HTN, hypothyroidism, MGUS, seizure disorder, CVA, admitted 3/29/2024 with acute on chronic respiratory failure. \"    Assessment:      Areas visualized during today's visit: Focused:, Lower extremities , and right breast fold    Skin Injury Location: Right breast fold        Last photo: 4/10  Skin injury due to: Fungal rash  and Intertrigo  Skin history and plan of care:  RN noted skin injury and reported it to [facility] nurse. ALEXANDRA on assessment.  Affected area:      Skin assessment: Erythema and Lesions-satellite     Measurements (length x width x depth, in cm) Generalized to  lower breast, breast fold, and upper right abdomen     Color: normal and consistent with surrounding tissue     Temperature  normal      Drainage: none .      Color: none      Odor: none  Pain: denies , none  Pain interventions prior to dressing change: no significant pain present   Treatment goal: Heal , Infection control/prevention, and Decrease moisture  STATUS: initial assessment  Supplies ordered: ordered Usman Antifungal, discussed with RN, and discussed with patient      Wound location: right lateral ankle area     Last photo: 4/10  Wound due to: Unknown Etiology patient reports this wound area is chronic and comes and goes, has treated the area \"four times\" before   Wound history/plan of care: Wound is dressed per POC established by [facility] " "nurse  Wound base: epidermis, dermis, and dry drainage, macerated epidermis      Palpation of the wound bed:  textured        Drainage: small     Description of drainage: serous and yellow     Measurements (length x width x depth, in cm): cluster of 7  x 5  x  0.1 cm      Tunneling: N/A     Undermining: N/A  Periwound skin: Dry/scaly, Edematous, and Erythema- blanchable      Color: pale      Temperature: normal   Odor: none  Pain: moderate and during dressing change, intermittent  Pain interventions prior to dressing change: patient tolerated well, soaking, and slow and gentle cares   Treatment goal: Heal  and Decrease moisture  STATUS: stalled  Supplies ordered: supplies stored on unit, discussed with RN, and discussed with patient        Treatment Plan:       Wound care  Start:  04/01/24 1618,   EVERY OTHER DAY,   Routine      Comments: Location: right lateral ankle  Care: provided every other day by primary RN (applied 4/1 1st)  1. Cleanse every other day with Vashe ([id number]) and 4x4\" gauze  2. Cover wound with aquacel silver alginate, then ABD pad  3. Secure with kerlix  4. Ok to continue to use Tubigrip size E and F to lower legs, or garments per lymphedema consult if indicated        Right breast fold rash: BID   Cleanse skin with warm water. Pat dry.  Apply a thin layer of Usman Antifungal to affected skin.    Orders: Written    RECOMMEND PRIMARY TEAM ORDER: None, at this time  Education provided: plan of care  Discussed plan of care with: Patient and Nurse  WOC nurse follow-up plan: weekly  Notify WOC if wound(s) deteriorate.  Nursing to notify the Provider(s) and re-consult the WOC Nurse if new skin concern.    DATA:     Current support surface: Standard  Standard gel/foam mattress (IsoFlex, Atmos air, etc)  Containment of urine/stool: Incontinence Protocol, Incontinent pad in bed, and Suction based external urinary catheter   BMI: Body mass index is 50.42 kg/m .   Active diet order: Orders Placed This " "Encounter      2 Gram Sodium Diet     Output: I/O last 3 completed shifts:  In: 66 [P.O.:60; I.V.:6]  Out: 2600 [Urine:2600]     Labs:   Recent Labs   Lab 04/09/24  1114   HGB 11.4*   WBC 10.2     Pressure injury risk assessment:   Sensory Perception: 4-->no impairment  Moisture: 3-->occasionally moist  Activity: 3-->walks occasionally  Mobility: 3-->slightly limited  Nutrition: 4-->excellent  Friction and Shear: 2-->potential problem  Dorian Score: 19    Amada Gudino RN, CWOCN  Please contact via [employer] at name or group \"WO nurse\"- M-F 8A-4P  Leave VM @ [phone] for non-urgent needs. Checked occasionally M-F.     "

## 2024-04-10 NOTE — PROGRESS NOTES
Still with CERRATO and significant BLE edema. Bumex drip continues, 2mg IV push as well today with good output. Independent in room, up to BSC. Plan to continue diureses and L&R HC when euvolemic.

## 2024-04-10 NOTE — PROGRESS NOTES
[facility]    Cardiology Progress Note    Primary Cardiologist: Dr. Selvin Saucedo    Date of Admission: 3/29/2024  Service Date: 04/10/24    Summary:  Ms. Linda Otero is a very pleasant 64 year old female with a past medical history of  COPD, chronic respiratory failure on home oxygen with history of hypercapnia, nicotine dependence, HFpEF, DVT/PE  on past warfarin anticoagulation, recent hemoptysis, morbid obesity, CKD stage III, CAD, HLD, HTN, hypothyroidism, MGUS, seizure disorder, CVA  who was admitted on 3/29/2024 for shortness of breath and hypoxia. Cardiology was consulted for acute on chronic respiratory failure.    Interval History   Output of 3600ml overnight and weight loss of 3 lbs with a total weight loss of 19 lbs since admission. Renal function continued to improve with stable electrolytes. Shortness of breath improving. Able for a larger portion of hours of sleep.     Telemetry: Sinus rhythm , rate 90''s    Assessment & Plan   1. Acute on chronic respiratory failure, symptoms improving     2. Severe Pulmonary Hypertension    3. Right heart dysfunction and failure  - CT PE study 3/29/2024 without any PE but did demonstrate pulmonary congestion, R pleural effusion.  TTE 4/1 showing EF 60-65% without WMA, severe RV dilation with moderately decreased RV function, mod-severe TR and pulm HTN.  - Diuresing slowly; SOB gradually improving  - Initiated on IV Furosemide infusion then transitioned to bumex with increased UOP    4. Hx CVA 2/2 paradoxical embolus, s/p PFO closure    5. JODI    6. Severe COPD    7. TR, moderate to severe on 4/4/24 TTE    8. Morbid Obesity, BMI 50      Plan:   1.  Continue Bumex gtt at 0.25 mg/hour  2.  Give IV push bumex 2mg x1 dose  3. Once near euvolemic state, anticipate left and right heart catheterization, as well as VQ scan, will tentatively plan for Friday 4/12/24  4.  Continue supportive heart failure measures: Strict I's/O, daily  standing weight, daily BMP and repletion of lites, Na and FR  5. Cardiology will continue to follow     A total of 20 minutes was spent with patient, on chart review and documentation today.     Thank you for the opportunity to participate in this pleasant patient's care.     JONATAN Miguel, CNP   Nurse Practitioner  [facility]  Pager: [phone]  (8am - 5pm, M-F)    Patient Active Problem List   Diagnosis    Chronic airway obstruction (H)    Morbid obesity (H)    Obstructive sleep apnea    Pulmonary hypertension (H)    Hypothyroidism    Menorrhagia    Hypertension goal BP (blood pressure) < 140/90    Iron deficiency anemia, unspecified iron deficiency    Long-term (current) use of anticoagulants [Z79.01]    Advanced directives, counseling/discussion    Primary hypercoagulable state (H24)    Monoclonal gammopathy    Lymphedema    Vitamin B12 deficiency (non anemic)       Physical Exam   Temp: 97.7  F (36.5  C) Temp src: Oral BP: 92/49 Pulse: 96   Resp: 20 SpO2: 92 % O2 Device: Oxymask Oxygen Delivery: 6 LPM  Vitals:    04/08/24 0250 04/09/24 0342 04/10/24 0701   Weight: 143.3 kg (316 lb) 142.9 kg (315 lb) 141.7 kg (312 lb 6.4 oz)     Vital Signs with Ranges  Temp:  [97.6  F (36.4  C)-98.1  F (36.7  C)] 97.7  F (36.5  C)  Pulse:  [] 96  Resp:  [18-20] 20  BP: ()/(49-77) 92/49  SpO2:  [91 %-99 %] 92 %  I/O last 3 completed shifts:  In: 66 [P.O.:60; I.V.:6]  Out: 2600 [Urine:2600]    Constitutional:  Appears her stated age, well nourished, and in no acute distress.  Eyes: Pupils equal, round. Sclerae anicteric.   HEENT: Normocephalic, atraumatic.   Neck: No JVD appreciated.  Respiratory: Breathing non-labored. Lungs clear to auscultation bilaterally. No crackles, wheezes, rhonchi, or rales. Diminished throughout. Wearing supplemental oxygen  Cardiovascular: Regular rate and rhythm, normal S1 and S2. No murmur, rub, or gallop.  GI: Soft, non-distended, non-tender, bowel sounds  present in all four quadrants.  Skin: Warm, dry. 2+ peripheral edema   Musculoskeletal/Extremities: Moves all extremities well and symmetrically.   Neurologic: No gross focal deficits. Alert, awake, and oriented to person, place and time.  Psychiatric: Affect appropriate. Mentation normal.    Medications   Current Facility-Administered Medications   Medication Dose Route Frequency Provider Last Rate Last Admin    bumetanide (BUMEX) 0.25 mg/mL infusion  0.25 mg/hr Intravenous Continuous Molly Hampton APRN CNP 1 mL/hr at 04/10/24 0000 0.25 mg/hr at 04/10/24 0000     Current Facility-Administered Medications   Medication Dose Route Frequency Provider Last Rate Last Admin    aspirin (ASA) chewable tablet 81 mg  81 mg Oral BID Gerry Pizarro MD   81 mg at 04/10/24 0959    enoxaparin ANTICOAGULANT (LOVENOX) injection 40 mg  40 mg Subcutaneous Q12H Angi Mina MD   40 mg at 04/10/24 0959    fluticasone-vilanterol (BREO ELLIPTA) 200-25 MCG/ACT inhaler 1 puff  1 puff Inhalation Daily Angi Mina MD   1 puff at 04/09/24 1114    ipratropium - albuterol 0.5 mg/2.5 mg/3 mL (DUONEB) neb solution 3 mL  3 mL Nebulization 4x daily Gerry Pizarro MD   3 mL at 04/10/24 0843    miconazole (MICATIN) 2 % powder   Topical BID Angi Mina MD   Given at 04/09/24 2148    multivitamin w/minerals (THERA-VIT-M) tablet 1 tablet  1 tablet Oral Daily Charli Castro MD   1 tablet at 04/09/24 1328    polyethylene glycol (MIRALAX) Packet 17 g  17 g Oral Daily Charli Castro MD   17 g at 04/09/24 0926    potassium chloride marcie ER (KLOR-CON M20) CR tablet 40 mEq  40 mEq Oral TID Molly Hampton APRN CNP   40 mEq at 04/10/24 0959    sodium chloride (PF) 0.9% PF flush 10 mL  10 mL Intracatheter Q8H Gerry Pizarro MD   10 mL at 04/09/24 2150       Data   No results found for this or any previous visit (from the past 24 hour(s)).    Recent Labs   Lab 04/10/24  0639 04/09/24  1114 04/07/24  1245   WBC  --  10.2   --    HGB  --  11.4*  --    HCT  --  38.4  --    MCV  --  80  --    * 137* 135*     Recent Labs   Lab 04/10/24  0639 04/09/24  0622 04/08/24  1504 04/08/24  0722    144  --  142   POTASSIUM 4.2 4.1 3.7 3.2*   CHLORIDE 97* 97*  --  98   CO2 35* 37*  --  35*   ANIONGAP 7 10  --  9   * 96  --  106*   BUN 37.5* 41.7*  --  51.0*   CR 1.15* 1.17*  --  1.22*   GFRESTIMATED 53* 52*  --  50*   LUNA 9.1 9.4  --  9.3        This note was completed in part using Dragon voice recognition software. Although reviewed after completion, some word and grammatical errors may occur.

## 2024-04-10 NOTE — PROGRESS NOTES
Hospitalist Progress Note    Interval History   Patient is awake and alert.  Had 3 L urine output in the last 24 hours.  Continuing to need 5 to 7 L nasal cannula oxygen.  Did not tolerate CPAP overnight very well.  Denies any chest pain, shortness of breath, nausea or vomiting.    -Data reviewed today: I reviewed all new labs and imaging results over the last 24 hours. I personally reviewed the echo image(s) showing severe pulmonary hypertension with right heart failure .    Physical Exam   Temp: 97.8  F (36.6  C) Temp src: Oral BP: 126/71 Pulse: 96   Resp: 20 SpO2: 95 % O2 Device: Nasal cannula Oxygen Delivery: 6 LPM  Vitals:    04/08/24 0250 04/09/24 0342 04/10/24 0701   Weight: 143.3 kg (316 lb) 142.9 kg (315 lb) 141.7 kg (312 lb 6.4 oz)     Vital Signs with Ranges  Temp:  [97.7  F (36.5  C)-98.1  F (36.7  C)] 97.8  F (36.6  C)  Pulse:  [] 96  Resp:  [18-20] 20  BP: ()/(49-71) 126/71  SpO2:  [91 %-99 %] 95 %  I/O last 3 completed shifts:  In: 66 [P.O.:60; I.V.:6]  Out: 2600 [Urine:2600]    Physical Exam  Constitutional:       Appearance: She is obese.   Cardiovascular:      Rate and Rhythm: Normal rate and regular rhythm.      Pulses: Normal pulses.      Heart sounds: Murmur heard.   Pulmonary:      Effort: Pulmonary effort is normal. No respiratory distress.      Breath sounds: Normal breath sounds.   Abdominal:      General: Bowel sounds are normal. There is no distension.      Tenderness: There is no abdominal tenderness. There is no guarding.      Comments: Large pannus   Musculoskeletal:      Right lower leg: Edema present.      Left lower leg: Edema present.   Skin:     General: Skin is warm and dry.      Comments: Diffuse anasarca.  Erythema in the skin folds   Neurological:      General: No focal deficit present.           Medications   Current Facility-Administered Medications   Medication Dose Route Frequency Provider Last Rate Last Admin     bumetanide (BUMEX) 0.25 mg/mL infusion  0.25 mg/hr Intravenous Continuous Molly Hampton APRN CNP 1 mL/hr at 04/10/24 0000 0.25 mg/hr at 04/10/24 0000     Current Facility-Administered Medications   Medication Dose Route Frequency Provider Last Rate Last Admin    aspirin (ASA) chewable tablet 81 mg  81 mg Oral BID Gerry Pizaror MD   81 mg at 04/10/24 0959    bumetanide (BUMEX) injection 2 mg  2 mg Intravenous Once Brenda Garcia MD        enoxaparin ANTICOAGULANT (LOVENOX) injection 40 mg  40 mg Subcutaneous Q12H Angi Mina MD   40 mg at 04/10/24 0959    fluticasone-vilanterol (BREO ELLIPTA) 200-25 MCG/ACT inhaler 1 puff  1 puff Inhalation Daily Angi Mina MD   1 puff at 04/10/24 1000    ipratropium - albuterol 0.5 mg/2.5 mg/3 mL (DUONEB) neb solution 3 mL  3 mL Nebulization 4x daily Gerry Pizarro MD   3 mL at 04/10/24 1117    miconazole (MICATIN) 2 % powder   Topical BID Angi Mina MD   Given at 04/10/24 1007    multivitamin w/minerals (THERA-VIT-M) tablet 1 tablet  1 tablet Oral Daily Charli Castro MD   1 tablet at 04/09/24 1328    polyethylene glycol (MIRALAX) Packet 17 g  17 g Oral Daily Charli Castro MD   17 g at 04/10/24 1000    potassium chloride marcie ER (KLOR-CON M20) CR tablet 40 mEq  40 mEq Oral TID Molly Hampton APRN CNP   40 mEq at 04/10/24 0959    sodium chloride (PF) 0.9% PF flush 10 mL  10 mL Intracatheter Q8H Gerry Pizarro MD   10 mL at 04/09/24 2150       Data   Recent Labs   Lab 04/10/24  0639 04/09/24  1114 04/09/24  0622 04/08/24  1504 04/08/24  0722 04/07/24  1245   WBC  --  10.2  --   --   --   --    HGB  --  11.4*  --   --   --   --    MCV  --  80  --   --   --   --    * 137*  --   --   --  135*     --  144  --  142  --    POTASSIUM 4.2  --  4.1 3.7 3.2* 3.7   CHLORIDE 97*  --  97*  --  98  --    CO2 35*  --  37*  --  35*  --    BUN 37.5*  --  41.7*  --  51.0*  --    CR 1.15*  --  1.17*  --  1.22*  --    ANIONGAP 7  --   10  --  9  --    LUNA 9.1  --  9.4  --  9.3  --    *  --  96  --  106*  --        No results found for this or any previous visit (from the past 24 hour(s)).      Assessment & Plan     Linda Otero is a 64 year old female admitted on 3/29/2024.  Past medical history of COPD, chronic respiratory failure on home oxygen with history of hypercapnia, nicotine dependence, HFpEF, DVT/PE  on past warfarin anticoagulation, recent hemoptysis, morbid obesity, CKD stage III, CAD, HLD, HTN, hypothyroidism, MGUS, seizure disorder, CVA, admitted 3/29/2024 with acute on chronic respiratory failure.        Acute on chronic hypoxic respiratory failure  *Speculate multifactorial from   HFpEF exacerbation (elevated BNP and weight gain),   COPD exacerbation,   JODI with noncompliance to CPAP and   CAP  Acute right heart failure with mod-severe TR     [Home Lasix 20mg BID PO]  *Baseline 3-5 L NC  *CT C PE 3/29/2024 without any PE but did demonstrate pulmonary congestion, R pleural effusion  *TTE 4/1 showing EF 60-65% without WMA, severe RV dilation with moderately decreased RV function, mod-severe TR and pulm HTN  *completed 5-day course of ceftriaxone+doxy on 4/2     - 04/07/24 was to be final day of 8-day prednisone taper; reports increased wheezing so will increase duonebs to qid,    4/8 -restarted Breo.  Wheezing significantly improved.  -Was initially started on Lasix drip.  This was transition to Bumex drip at 0.25 mg/h on 4/9/2024.,  Received a one-time bolus bolus of IV Bumex 2 mg on 4/9/2024 and 4/10/2024  - weight down about 20 pounds since admission .  Had a urine output of 3.4 L in the last 24 hours.  Unclear how accurate the weight measurement is.  - lymphedema wraps  - Cards planning for eventual right and left heart cath as well as VQ scan once adequately diuresed - likely early next week,   -Started on potassium replacement with oral KCl 40 mEq 3 times daily  - Flutter valve and incentive spirometer  -Continuing  to need 5 to 7 L nasal cannula to maintain sats at 88-92 percent     Hemoptysis, mild  *reports orange sputum production at home, was producing small amounts of rober blood 4/1  *admission CT chest negative for PE, masses  *resumed aspirin 4/3 and reports small amount of hemoptysis 4/4   - denies any further hemoptysis so will try resuming lovenox 04/07/24      SONAL on Chronic kidney disease, stage III  *Speculate congestive nephropathy  *Admission serum Cr 1.33, historic baseline 0.87 to 1.08 in 2020 to 2023  - Avoid non-steroidal anti-inflammatory drugs (NSAIDs)  -Creatinine continuing to improve with aggressive diuresis.  Currently at 1.15.     Hypokalemia  *due to diuresis  - replace per protocol  -Started on oral KCl 40 mEq 3 times daily  -Potassium normal.     History of deep venous thrombosis and pulmonary embolism  *CT C PE 3/29/2024 without any PE  *Per pharmacy, no longer on warfarin  - resume lovenox 04/07/24   - has been counseled on importance of frequent ambulation, out of bed activity     Skin ulcer, right lateral ankle  Wound nurse (WOC) following     Weakness and deconditioning  Therapies recommend home with assist, home therapies    Obstructive sleep apnea  -Patient is on CPAP at home but has not been using it since admission since she did not tolerate the mask initially.  Encouraged her to bring her home CPAP machine today.  We tried the hospital mask on 4/9/2024 and she did not tolerate this.    Mild thrombocytopenia-  platelet count has been slowly downtrending over the last 3 days.  -135---137---118  -  continue to monitor for now.     Hyperlipidemia - Diet-controlled, monitor; follow-up with primary clinic provider   Hypothyroidism - Med rec does not show any levothyroxine (TSH slightly elevated, free T4 wnl). Will defer to outpt evaluation by PCP  Morbid obesity  History of nicotine dependence  History of obstructive sleep apnea  History of coronary artery disease   History of monoclonal  "gammopathy of undetermined significance - noted, follow-up with primary clinic provider   History of cerebrovascular disease with prior stroke - noted, monitor  History of B12 deficiency - noted, continue prior to admission B12 replacement  History of seizure disorder - noted in medical record; monitor        Diet: Snacks/Supplements Adult: Expedite Bottle; Between Meals  Fluid restriction 2000 ML FLUID  2 Gram Sodium Diet    DVT Prophylaxis: PCD  Cardiac Monitoring: Present  Code Status: No CPR- Do NOT Intubate         Clinically Significant Risk Factors                # Thrombocytopenia: Lowest platelets = 118 in last 2 days, will monitor for bleeding   # Hypertension: Noted on problem list        # Severe Obesity: Estimated body mass index is 50.42 kg/m  as calculated from the following:    Height as of this encounter: 1.676 m (5' 6\").    Weight as of this encounter: 141.7 kg (312 lb 6.4 oz).   # Moderate Malnutrition: based on nutrition assessment    # Financial/Environmental Concerns: none  # COPD: noted on problem list         Disposition-discharge likely next week once her diuresis is adequate and cardiac workup is complete including left and right heart cath and V/Q testing    Angi Mina MD, MD  [phone](p)    "

## 2024-04-10 NOTE — PLAN OF CARE
Goal Outcome Evaluation: Progressing  3/29/24 admit, Increased SOB  4/10/12,  3 p to 7 p    Orientation: A&O calm and pleasant    Vitals/Tele: VSS on 5 to 6 L, 3 L baseline, Tele ST    IV Access/drains: DAVIE infusing    Diet: 2 gm Na, 200 ML FR    Mobility: Independent in room    GI/: ambulates from chair to the commode and back    Wound/Skin: R ankle wound, dressing change done by AM shift, R breast rash    Consults: Cardiology    Discharge Plan: Pending clinical improvement      See Flow sheets for assessment

## 2024-04-10 NOTE — PLAN OF CARE
Heart Failure Care Map  GOALS TO BE MET BEFORE DISCHARGE:    1. Decrease congestion and/or edema with diuretic therapy to achieve near optimal volume status.     Dyspnea improved: No, further care required to meet this goal. Please explain CERRATO   Edema improved: No, further care required to meet this goal. Please explain Significant BLE, improving slowly        Last 24 hour I/O:   Intake/Output Summary (Last 24 hours) at 4/10/2024 1452  Last data filed at 4/10/2024 1355  Gross per 24 hour   Intake 806 ml   Output 3450 ml   Net -2644 ml           Net I/O and Weights since admission:   03/11 1500 - 04/10 1459  In: 75797 [P.O.:95958; I.V.:166]  Out: 19822 [Urine:19822]  Net: -6911     Vitals:    03/29/24 1447 03/29/24 1505 03/30/24 0554 03/31/24 0457   Weight: 136.1 kg (300 lb) (!) 150.3 kg (331 lb 5.6 oz) (!) 154.4 kg (340 lb 8 oz) (!) 156.2 kg (344 lb 6.4 oz)    04/01/24 0335 04/02/24 0600 04/03/24 0503 04/04/24 0600   Weight: (!) 156.8 kg (345 lb 9.6 oz) (!) 154.2 kg (340 lb) (!) 153 kg (337 lb 4.8 oz) (!) 150.5 kg (331 lb 14.4 oz)    04/05/24 0318 04/06/24 0608 04/07/24 0413 04/08/24 0250   Weight: 149.5 kg (329 lb 9.6 oz) 144.8 kg (319 lb 4.8 oz) 144.2 kg (318 lb) 143.3 kg (316 lb)    04/09/24 0342 04/10/24 0701   Weight: 142.9 kg (315 lb) 141.7 kg (312 lb 6.4 oz)       2.  O2 sats > 90% on room air, or at prior home O2 therapy level.      Able to wean O2 this shift to keep sats above 90%?: No, further care required to meet this goal. Please explain NC 5-6L this shift   Does patient use Home O2? Yes-  2-3L NC at home          Current oxygenation status:   SpO2: 92 %     O2 Device: Nasal cannula, Oxygen Delivery: 5 LPM    3.  Tolerates ambulation and mobility near baseline.     Ambulation: No, further care required to meet this goal. Please explain Independent in room up to BSC   Times patient ambulated with staff this shift: 0    Please review the Heart Failure Care Map for additional HF goal outcomes.    Stormy GRIFFITH  FRANCISCO Nicholson  4/10/2024

## 2024-04-10 NOTE — PLAN OF CARE
Goal Outcome Evaluation:  A&O x4, VSS. Declined CPAP home overnight reported unable to tolerate. Had oxymask on, with sat low 90s overnight. Diuresing well on continuous iv Bumex. Up with SBA to BSC. CERRATO, denies any pain. Plans for hearth cath when euvolemic. Will continue to monitor.

## 2024-04-11 NOTE — PROGRESS NOTES
[facility]    Cardiology Progress Note    Primary Cardiologist: Dr. Selvin Saucedo    Date of Admission: 3/29/2024  Service Date: 04/11/24    Summary:  Ms. Linda Otero is a very pleasant 64 year old female with a past medical history of  COPD, chronic respiratory failure on home oxygen with history of hypercapnia, nicotine dependence, HFpEF, DVT/PE  on past warfarin anticoagulation, recent hemoptysis, morbid obesity, CKD stage III, CAD, HLD, HTN, hypothyroidism, MGUS, seizure disorder, CVA  who was admitted on 3/29/2024 for shortness of breath and hypoxia. Cardiology was consulted for acute on chronic respiratory failure.    Interval History   Output of 3650ml overnight and weight loss of 5 lbs with a total weight loss of 22 lbs since admission. Renal function continues to improve with stable electrolytes.  Reviewed the plan for right and left catheterization tomorrow.  Patient is in agreement with proceeding.     Telemetry: Sinus rhythm, rate 90's    Assessment & Plan   1. Acute on chronic respiratory failure, symptoms improving     2. Severe Pulmonary Hypertension  -2021 Chest CT showing enlarged main pulmonary artery as well as tortuous proximal subsegmental pulmonary arteries on the hilum consistent with chronic pulmonary hypertension    3. Right heart dysfunction and failure  - CT PE study 3/29/2024 without any PE but did demonstrate pulmonary congestion, R pleural effusion.  TTE 4/1 showing EF 60-65% without WMA, severe RV dilation with moderately decreased RV function, mod-severe TR and pulm HTN.  - Diuresing slowly; SOB gradually improving, dry weight remains unclear, notes dating back to 2020 indicate weight was ~317-319#  - Initiated on IV Furosemide infusion then transitioned to bumex with increased UOP  -Risks and benefits of coronary angiogram discussed today including, bleeding, bruising, infection, allergic reaction, kidney damage (including need for dialysis),  stroke, heart attack, vascular damage, emergency open heart surgery, up to and including death.  Patient indicates understanding and is agreeable to proceed.  Denies any history of contrast allergy.  No upcoming surgeries or procedures.  -I explained the risks of the procedure to the patient, including the risks of bleeding, hematoma formation, vascular damage in the femoral vein, arrhthymias, the risk of cardiac perforation and the risk of infection.    4. Hx CVA 2/2 paradoxical embolus, s/p PFO closure    5. JODI    6. Severe COPD, follows with pulmonology    7. TR, moderate to severe on 4/4/24 TTE    8. Morbid Obesity, BMI 50    Plan:   1. Continue Bumex gtt at 0.25 mg/hour  2. Bumex 2mg IV push x1   3.  Plan for left and right heart catheterization tomorrow  4.  N.p.o. at midnight for procedures tomorrow  5.  Consider VQ scan this admission  6. Continue supportive heart failure measures: Strict I's/O, daily standing weight, daily BMP and repletion of lites, Na and FR  7. Cardiology will continue to follow     A total of 30 minutes was spent with patient, on chart review and documentation today.     Thank you for the opportunity to participate in this pleasant patient's care.     JONATAN Miguel, CNP   Nurse Practitioner  [facility]  Pager: [phone]  (8am - 5pm, M-F)    Patient Active Problem List   Diagnosis    Chronic airway obstruction (H)    Morbid obesity (H)    Obstructive sleep apnea    Pulmonary hypertension (H)    Hypothyroidism    Menorrhagia    Hypertension goal BP (blood pressure) < 140/90    Iron deficiency anemia, unspecified iron deficiency    Long-term (current) use of anticoagulants [Z79.01]    Advanced directives, counseling/discussion    Primary hypercoagulable state (H24)    Monoclonal gammopathy    Lymphedema    Vitamin B12 deficiency (non anemic)       Physical Exam   Temp: 98.7  F (37.1  C) Temp src: Oral BP: 119/61 Pulse: 99   Resp: 24 SpO2: 95 % O2 Device:  Oxymask Oxygen Delivery: 6 LPM  Vitals:    04/09/24 0342 04/10/24 0701 04/11/24 0408   Weight: 142.9 kg (315 lb) 141.7 kg (312 lb 6.4 oz) 139.6 kg (307 lb 12.8 oz)     Vital Signs with Ranges  Temp:  [97.8  F (36.6  C)-98.7  F (37.1  C)] 98.7  F (37.1  C)  Pulse:  [] 99  Resp:  [16-24] 24  BP: (107-126)/(33-77) 119/61  SpO2:  [85 %-96 %] 95 %  I/O last 3 completed shifts:  In: 1580 [P.O.:1580]  Out: 3800 [Urine:3800]    Constitutional:  Appears her stated age, well nourished, and in no acute distress.  Eyes: Pupils equal, round. Sclerae anicteric.   HEENT: Normocephalic, atraumatic.   Neck: No JVD appreciated.  Respiratory: Breathing non-labored. Lungs clear to auscultation bilaterally. No crackles, wheezes, rhonchi, or rales. Diminished throughout. Wearing supplemental oxygen  Cardiovascular: Regular rate and rhythm, normal S1 and S2. No murmur, rub, or gallop.  GI: Soft, non-distended, non-tender, bowel sounds present in all four quadrants.  Skin: Warm, dry. 2+ peripheral edema, wearing compression stocking  Musculoskeletal/Extremities: Moves all extremities well and symmetrically.   Neurologic: No gross focal deficits. Alert, awake, and oriented to person, place and time.  Psychiatric: Affect appropriate. Mentation normal.    Medications   Current Facility-Administered Medications   Medication Dose Route Frequency Provider Last Rate Last Admin    bumetanide (BUMEX) 0.25 mg/mL infusion  0.25 mg/hr Intravenous Continuous Molly Hampton APRN CNP 1 mL/hr at 04/10/24 1928 0.25 mg/hr at 04/10/24 1928     Current Facility-Administered Medications   Medication Dose Route Frequency Provider Last Rate Last Admin    aspirin (ASA) chewable tablet 81 mg  81 mg Oral BID Gerry Pizarro MD   81 mg at 04/10/24 2100    enoxaparin ANTICOAGULANT (LOVENOX) injection 40 mg  40 mg Subcutaneous Q12H Angi Mina MD   40 mg at 04/10/24 2100    fluticasone-vilanterol (BREO ELLIPTA) 200-25 MCG/ACT inhaler 1 puff  1 puff  Inhalation Daily Angi Mina MD   1 puff at 04/10/24 1000    ipratropium - albuterol 0.5 mg/2.5 mg/3 mL (DUONEB) neb solution 3 mL  3 mL Nebulization 4x daily Gerry Pizarro MD   3 mL at 04/11/24 0732    miconazole (MICATIN) 2 % powder   Topical BID Angi Mina MD   Given at 04/10/24 2105    miconazole with skin protectant (KAYDEN ANTIFUNGAL) 2 % cream   Topical BID Angi Mina MD   Given at 04/10/24 2103    multivitamin w/minerals (THERA-VIT-M) tablet 1 tablet  1 tablet Oral Daily Charli Castro MD   1 tablet at 04/10/24 1252    polyethylene glycol (MIRALAX) Packet 17 g  17 g Oral Daily Charli Castro MD   17 g at 04/10/24 1000    potassium chloride marcie ER (KLOR-CON M20) CR tablet 40 mEq  40 mEq Oral TID Molly Hampton APRN CNP   40 mEq at 04/10/24 2100    sodium chloride (PF) 0.9% PF flush 10 mL  10 mL Intracatheter Q8H Gerry Pizarro MD   3 mL at 04/10/24 1655       Data   No results found for this or any previous visit (from the past 24 hour(s)).    Recent Labs   Lab 04/10/24  0639 04/09/24  1114 04/07/24  1245   WBC  --  10.2  --    HGB  --  11.4*  --    HCT  --  38.4  --    MCV  --  80  --    * 137* 135*     Recent Labs   Lab 04/11/24  0638 04/10/24  0639 04/09/24  0622    139 144   POTASSIUM 4.2 4.2 4.1   CHLORIDE 97* 97* 97*   CO2 36* 35* 37*   ANIONGAP 8 7 10   * 132* 96   BUN 36.1* 37.5* 41.7*   CR 1.10* 1.15* 1.17*   GFRESTIMATED 56* 53* 52*   LUNA 9.3 9.1 9.4        This note was completed in part using Dragon voice recognition software. Although reviewed after completion, some word and grammatical errors may occur.

## 2024-04-11 NOTE — PLAN OF CARE
"A7Ox4. /61 (BP Location: Right arm)   Pulse 99   Temp 98.7  F (37.1  C) (Oral)   Resp 24   Ht 1.676 m (5' 6\")   Wt 139.6 kg (307 lb 12.8 oz)   SpO2 93%   BMI 49.68 kg/m   Tele SR. Denies pain. Up with SBA to commode. Educated pt multiple times about fall risk precautions and reasoning for bed alarm but pt continues to refuse bed alarm. Diuresing with bumex gtt per orders. Pt required 8-10L Oxymask overnight. Spo2 drops to lows 80's with activity.   "

## 2024-04-11 NOTE — PLAN OF CARE
Goal Outcome Evaluation:      Plan of Care Reviewed With: patient    Overall Patient Progress: no changeOverall Patient Progress: no change    Outcome Evaluation: Pt reports poor appetite, early satiety. Tolerating 2 small meals at best. Wondering what to eat for improved energy - encouraged small/frequent meals including some protein and some carb.

## 2024-04-11 NOTE — PLAN OF CARE
Heart Failure Care Map  GOALS TO BE MET BEFORE DISCHARGE:    1. Decrease congestion and/or edema with diuretic therapy to achieve near optimal volume status.     Dyspnea improved: No, further care required to meet this goal. Please explain continues to have CERRATO  with sats down to 80s.    Edema improved: No, further care required to meet this goal. Please explain edema improved but continue diuresing        Last 24 hour I/O:   Intake/Output Summary (Last 24 hours) at 4/11/2024 1805  Last data filed at 4/11/2024 1708  Gross per 24 hour   Intake 660 ml   Output 2325 ml   Net -1665 ml           Net I/O and Weights since admission:   03/12 2300 - 04/11 2259  In: 45115 [P.O.:66390; I.V.:166]  Out: 41984 [Urine:41206]  Net: -8526     Vitals:    03/29/24 1447 03/29/24 1505 03/30/24 0554 03/31/24 0457   Weight: 136.1 kg (300 lb) (!) 150.3 kg (331 lb 5.6 oz) (!) 154.4 kg (340 lb 8 oz) (!) 156.2 kg (344 lb 6.4 oz)    04/01/24 0335 04/02/24 0600 04/03/24 0503 04/04/24 0600   Weight: (!) 156.8 kg (345 lb 9.6 oz) (!) 154.2 kg (340 lb) (!) 153 kg (337 lb 4.8 oz) (!) 150.5 kg (331 lb 14.4 oz)    04/05/24 0318 04/06/24 0608 04/07/24 0413 04/08/24 0250   Weight: 149.5 kg (329 lb 9.6 oz) 144.8 kg (319 lb 4.8 oz) 144.2 kg (318 lb) 143.3 kg (316 lb)    04/09/24 0342 04/10/24 0701 04/11/24 0408   Weight: 142.9 kg (315 lb) 141.7 kg (312 lb 6.4 oz) 139.6 kg (307 lb 12.8 oz)       2.  O2 sats > 90% on room air, or at prior home O2 therapy level.      Able to wean O2 this shift to keep sats above 90%?: No, further care required to meet this goal. Please explain uses home O2, desats with minimal activity.   Does patient use Home O2? Yes-            Current oxygenation status:   SpO2: 99 %     O2 Device: Oxymizer cannula, Oxygen Delivery: 10 LPM    3.  Tolerates ambulation and mobility near baseline.     Ambulation: No, further care required to meet this goal. Please explain unable to ambulate due to CERRATO   Times patient ambulated with staff  this shift: 0    Please review the Heart Failure Care Map for additional HF goal outcomes.    Georgie Morris, RN  4/11/2024  Goal Outcome Evaluation:

## 2024-04-11 NOTE — PLAN OF CARE
0423-1715  Pt here with acute on chronic hypoxic respiratory failure. A&Ox4. VSS, 5 L oxygen oxymask. Bumex infusing at 1 ml/hr. 2 gm NA  diet, thin liquids, 2000 ml flids restriction. Takes pills whole. Up with SBA. Denies pain. Pt scoring hreen on the Aggression Stop Light Tool. Discharge pending.

## 2024-04-11 NOTE — PROGRESS NOTES
[facility]    Hospitalist Progress Note    Interval History   Patient is very tired this morning and is lying in the bed but is awake and alert.  Denies any significant shortness of breath.  Did not sleep well last night.  She had to wake up multiple times to use the urinal.  No nausea or vomiting.  No fever or chills.    -Data reviewed today: I reviewed all new labs and imaging results over the last 24 hours. I personally reviewed the chest CT image(s) showing pulmonary hypertension with asymmetric right heart enlargement. .    Physical Exam   Temp: 98.7  F (37.1  C) Temp src: Axillary BP: 119/61 Pulse: 95   Resp: 16 SpO2: 95 % O2 Device: Oxymizer cannula Oxygen Delivery: 10 LPM  Vitals:    04/09/24 0342 04/10/24 0701 04/11/24 0408   Weight: 142.9 kg (315 lb) 141.7 kg (312 lb 6.4 oz) 139.6 kg (307 lb 12.8 oz)     Vital Signs with Ranges  Temp:  [98.3  F (36.8  C)-98.7  F (37.1  C)] 98.7  F (37.1  C)  Pulse:  [] 95  Resp:  [16-24] 16  BP: (107-121)/(33-77) 119/61  SpO2:  [85 %-96 %] 95 %  I/O last 3 completed shifts:  In: 1580 [P.O.:1580]  Out: 3800 [Urine:3800]    Physical Exam  Constitutional:       Appearance: She is obese.   Cardiovascular:      Rate and Rhythm: Normal rate. Rhythm irregular.      Pulses: Normal pulses.      Heart sounds: Murmur heard.   Pulmonary:      Effort: Pulmonary effort is normal. No respiratory distress.      Breath sounds: Normal breath sounds.   Abdominal:      General: Bowel sounds are normal. There is no distension.      Tenderness: There is no abdominal tenderness. There is no guarding.      Comments: Large pannus   Musculoskeletal:      Right lower leg: Edema present.      Left lower leg: Edema present.   Skin:     General: Skin is warm and dry.   Neurological:      General: No focal deficit present.           Medications   Current Facility-Administered Medications   Medication Dose Route Frequency Provider Last Rate Last Admin    bumetanide (BUMEX)  0.25 mg/mL infusion  0.25 mg/hr Intravenous Continuous Molly Hampton APRN CNP 1 mL/hr at 04/10/24 1928 0.25 mg/hr at 04/10/24 1928    Patient is NOT allergic to contrast dye   Does not apply DOES NOT GO TO Lizbeth Rosario NP        sodium chloride 0.9 % infusion   Intravenous Continuous Lizbeth Nicholson NP         Current Facility-Administered Medications   Medication Dose Route Frequency Provider Last Rate Last Admin    aspirin (ASA) tablet 325 mg  325 mg Oral Once Lizbeth Nicholson NP        Or    aspirin (ASA) chewable tablet 243 mg  243 mg Oral Once Lizbeth Nicholson NP        aspirin (ASA) chewable tablet 81 mg  81 mg Oral BID Gerry Pizarro MD   81 mg at 04/11/24 1304    bumetanide (BUMEX) injection 2 mg  2 mg Intravenous Once Lizbeth Nicholson NP        enoxaparin ANTICOAGULANT (LOVENOX) injection 40 mg  40 mg Subcutaneous Q12H Angi Mina MD   40 mg at 04/11/24 1301    fluticasone-vilanterol (BREO ELLIPTA) 200-25 MCG/ACT inhaler 1 puff  1 puff Inhalation Daily Angi Mina MD   1 puff at 04/10/24 1000    ipratropium - albuterol 0.5 mg/2.5 mg/3 mL (DUONEB) neb solution 3 mL  3 mL Nebulization 4x daily Gerry Pizarro MD   3 mL at 04/11/24 1105    miconazole (MICATIN) 2 % powder   Topical BID Angi Mina MD   Given at 04/11/24 1303    miconazole with skin protectant (KAYDEN ANTIFUNGAL) 2 % cream   Topical BID Angi Mina MD   Given at 04/11/24 1300    multivitamin w/minerals (THERA-VIT-M) tablet 1 tablet  1 tablet Oral Daily Charli Castro MD   1 tablet at 04/11/24 1304    polyethylene glycol (MIRALAX) Packet 17 g  17 g Oral Daily Charli Castro MD   17 g at 04/10/24 1000    potassium chloride marcie ER (KLOR-CON M20) CR tablet 40 mEq  40 mEq Oral TID Molly Hampton APRN CNP   40 mEq at 04/11/24 1305    sodium chloride (PF) 0.9% PF flush 10 mL  10 mL Intracatheter Q8H Gerry Pizarro MD   3 mL at 04/10/24 1655    sodium chloride (PF) 0.9% PF flush 3 mL  3 mL  Intracatheter Q8H Lizbeth Nicholson, NP   3 mL at 04/11/24 1301       Data   Recent Labs   Lab 04/11/24  0638 04/10/24  0639 04/09/24  1114 04/09/24  0622 04/08/24  0722 04/07/24  1245   WBC  --   --  10.2  --   --   --    HGB  --   --  11.4*  --   --   --    MCV  --   --  80  --   --   --    PLT  --  118* 137*  --   --  135*    139  --  144   < >  --    POTASSIUM 4.2 4.2  --  4.1   < > 3.7   CHLORIDE 97* 97*  --  97*   < >  --    CO2 36* 35*  --  37*   < >  --    BUN 36.1* 37.5*  --  41.7*   < >  --    CR 1.10* 1.15*  --  1.17*   < >  --    ANIONGAP 8 7  --  10   < >  --    LUNA 9.3 9.1  --  9.4   < >  --    * 132*  --  96   < >  --     < > = values in this interval not displayed.       No results found for this or any previous visit (from the past 24 hour(s)).      Assessment & Plan      Linda Otero is a 64 year old female admitted on 3/29/2024.  Past medical history of COPD, chronic respiratory failure on home oxygen with history of hypercapnia, nicotine dependence, HFpEF, DVT/PE  on past warfarin anticoagulation, recent hemoptysis, morbid obesity, CKD stage III, CAD, HLD, HTN, hypothyroidism, MGUS, seizure disorder, CVA, admitted 3/29/2024 with acute on chronic respiratory failure.        Acute on chronic hypoxic respiratory failure  *Speculate multifactorial from   HFpEF exacerbation (elevated BNP and weight gain),   COPD exacerbation,   JODI with noncompliance to CPAP and   CAP  Acute right heart failure with mod-severe TR     [Home Lasix 20mg BID PO]  *Baseline 3-5 L NC  *CT C PE 3/29/2024 without any PE but did demonstrate pulmonary congestion, R pleural effusion  *TTE 4/1 showing EF 60-65% without WMA, severe RV dilation with moderately decreased RV function, mod-severe TR and pulm HTN  *completed 5-day course of ceftriaxone+doxy on 4/2     - 04/07/24 was to be final day of 8-day prednisone taper; reports increased wheezing so will increase duonebs to qid,    4/8 -restarted Breo.  Wheezing  significantly improved.  -Was initially started on Lasix drip.  This was transition to Bumex drip at 0.25 mg/h on 4/9/2024.,  Received a one-time bolus bolus of IV Bumex 2 mg on 4/9, 4/10, 4/11  - weight down about 23 pounds since admission .  Had a urine output of 3.8 L in the last 24 hours.  Unclear how accurate the weight measurement is.  - lymphedema wraps  - Cards planning for eventual right and left heart cath as well as VQ scan on 4/12/2024  - on potassium replacement with oral KCl 40 mEq 3 times daily  - Flutter valve and incentive spirometer  -Continuing to need 5 to 7 L nasal cannula to maintain sats at 88-92 percent     Hemoptysis, mild  *reports orange sputum production at home, was producing small amounts of rober blood 4/1  *admission CT chest negative for PE, masses  *resumed aspirin 4/3 and reports small amount of hemoptysis 4/4   - denies any further hemoptysis so will try resuming lovenox 04/07/24      SONAL on Chronic kidney disease, stage III  *Speculate congestive nephropathy  *Admission serum Cr 1.33, historic baseline 0.87 to 1.08 in 2020 to 2023  - Avoid non-steroidal anti-inflammatory drugs (NSAIDs)  -Creatinine continuing to improve with aggressive diuresis.  Currently at 1.10.     Hypokalemia  *due to diuresis  - replace per protocol  -Started on oral KCl 40 mEq 3 times daily  -Potassium normal.     History of deep venous thrombosis and pulmonary embolism  *CT C PE 3/29/2024 without any PE  *Per pharmacy, no longer on warfarin  - resume lovenox 04/07/24   - has been counseled on importance of frequent ambulation, out of bed activity     Skin ulcer, right lateral ankle  Wound nurse (WOC) following     Weakness and deconditioning  Therapies recommend home with assist, home therapies     Obstructive sleep apnea  -Patient is on CPAP at home but has not been using it since admission since she did not tolerate the mask initially.  Encouraged her to bring her home CPAP machine today.  We tried the  "hospital mask on 4/9/2024 and she did not tolerate this.     Mild thrombocytopenia-  platelet count has been slowly downtrending over the last 3 days.  -135---137---118  -  continue to monitor for now.     Hyperlipidemia - Diet-controlled, monitor; follow-up with primary clinic provider   Hypothyroidism - Med rec does not show any levothyroxine (TSH slightly elevated, free T4 wnl). Will defer to outpt evaluation by PCP  Morbid obesity  History of nicotine dependence  History of obstructive sleep apnea  History of coronary artery disease   History of monoclonal gammopathy of undetermined significance - noted, follow-up with primary clinic provider   History of cerebrovascular disease with prior stroke - noted, monitor  History of B12 deficiency - noted, continue prior to admission B12 replacement  History of seizure disorder - noted in medical record; monitor     clinically Significant Risk Factors               # Thrombocytopenia: Lowest platelets = 118 in last 2 days, will monitor for bleeding   # Hypertension: Noted on problem list        # Severe Obesity: Estimated body mass index is 50.42 kg/m  as calculated from the following:    Height as of this encounter: 1.676 m (5' 6\").    Weight as of this encounter: 141.7 kg (312 lb 6.4 oz).   # Moderate Malnutrition: based on nutrition assessment    # Financial/Environmental Concerns: none  # COPD: noted on problem list      Diet: Snacks/Supplements Adult: Expedite Bottle; Between Meals  Fluid restriction 2000 ML FLUID  2 Gram Sodium Diet    DVT Prophylaxis: PCD  Cardiac Monitoring: Present  Code Status: No CPR- Do NOT Intubate      Medically Ready for Discharge: Anticipated in 5+ Days        Angi Mina MD, MD  [phone](p)    "

## 2024-04-11 NOTE — PROGRESS NOTES
CLINICAL NUTRITION SERVICES - REASSESSMENT NOTE    Future/Additional Recommendations:     Encouraged pt to consume small, frequent meals for improved energy. Each meal/snack should include some protein and carb to help with energy production as well as skin integrity, muscle maintenance.      Malnutrition: 3/30  % Weight Loss:  None noted  % Intake:  <75% for >/= 1 month (moderate malnutrition)  Subcutaneous Fat Loss:  Orbital region mild depletion and Upper arm region mild depletion  Muscle Loss:  Clavicle bone region mild depletion, Acromion bone region mild depletion, and Scapular bone region mild depletion  Fluid Retention:  Mild-moderate     Malnutrition Diagnosis: Moderate malnutrition  In Context of:  Acute illness or injury  Chronic illness or disease     EVALUATION OF PROGRESS TOWARD GOALS   Diet: 2g Na diet  Expedite Bottle between meals  FR 2000 mL   Intake/Tolerance:   - Eating 100% of meals (at least of those documented).   - Ordering 2-3 meals/day.     - Pt seen in room. She reports minimal appetite, but doing the best she can. Notes that on days she tries to eat 2 full meals she feels unwell due to eating too much.   - Likes the omelet and Greek yogurt. Tries to eat a full omelet daily and at least 1 yogurt.   - Also likes fruit.   - Pt asks how she can gain energy - brief ed as below.     - Labs:   BUN 36.1 (H), Cr 1.1 (H)  Recent Labs   Lab 04/11/24  0638 04/10/24  0639 04/09/24  0622 04/08/24  0722 04/07/24  0618 04/06/24  0618   * 132* 96 106* 103* 99     - Stooling:   BM x2 4/10  BM x5 4/9  (After several days w/o BM)  - Weight: 139.6 kg. - diuresing     - Meds:   Bumex gtt  Thera vit M   Miralax     ASSESSED NUTRITION NEEDS:  Dosing Weight: 83 kg - adjusted   Estimated Energy Needs: 3119-8109 kcals (20-25 Kcal/Kg)  Justification: obese  Estimated Protein Needs: 100-125 grams protein (1.2-1.5 g pro/Kg)  Justification: preservation of lean body mass and ?reported wound  Estimated Fluid  Needs: Per provider     NEW FINDINGS:   4/12 - Plan right and left heart cath  4/10 - WOCN  Right breast fold - fungal rash, intertrigo  -- Initial assessment  Right lateral ankle area - chronic, comes and goes.   --- Stalled    Previous Goals:   Pt to consume >75% of >/= 2 meals/day + 1 Expedite/day   Evaluation: Met - flowsheets indicate intake of 100% for at least 2 meals.     Previous Nutrition Diagnosis:   Inadequate oral intake related to poor appetite, wound healing as evidenced by patient reports intake of just 1 meal/day prior to admission and 2 meals/day this admission x6 days, with mild fat and muscle wasting.    Evaluation: No change    CURRENT NUTRITION DIAGNOSIS  Inadequate oral intake related to early satiety, lack of appetite as evidenced by patient reports intake of 2 meals/day at most, with mild fat and muscle losses.     INTERVENTIONS  Recommendations / Nutrition Prescription  Diet per MD - 2g Na diet  Expedite daily for wounds    Encouraged pt to consume small, frequent meals for improved energy. Each meal/snack should include some protein and carb to help with energy production as well as skin integrity, muscle maintenance.     Implementation  Nutrition Education - as above    Goals  Intake of >/= 2 adequate meals/day.     MONITORING AND EVALUATION:  Progress towards goals will be monitored and evaluated per protocol and Practice Guidelines    Kitty Obrien RD, LD  Pager: [phone]  Weekend Pager: [phone]

## 2024-04-12 NOTE — PROGRESS NOTES
Care Management Follow Up    Length of Stay (days): 14    Expected Discharge Date: 04/13/2024     Concerns to be Addressed: discharge planning     Patient plan of care discussed at interdisciplinary rounds: Yes    Anticipated Discharge Disposition: Return to [facility] with Home Care     Anticipated Discharge Services:  home care  Anticipated Discharge DME:      Patient/family educated on Medicare website which has current facility and service quality ratings: yes  Education Provided on the Discharge Plan: Yes  Patient/Family in Agreement with the Plan: yes    Referrals Placed by CM/SW: Homecare  Private pay costs discussed: transportation costs    Additional Information:  Met with patient to discuss discharge planning.  [employer] is oxygen supplier.  Pt has portable tank at apartment but is unsure if son will be able to transport when medically ready, as he recently had a child.  Discussed arranging a wheelchair ride with oxygen when medically ready and the cost (approximately $90 base with $9/mile)  CC to follow up with patient when ready for discharge to discuss transportation.    [facility] is listed as following for home care RN. Pt states she is not aware of this and has not had home care for the past 6 months but recently had been in talks with [facility] regarding wound care for new wound.  CC spoke with Loulou in intake at [facility].  Per Loulou, pt was accepted for skilled nursing 3/28/24 but was not seen due to pt going to hospital before first visits.  Discussed PT and OT needs at discharge, as well.  Will need new home care orders for RN, PT and OT at discharge.  ([facility] has Epic access)  Sticky note left for provider.    Discussed CC calling [facility] [phone] to speak with nursing and review pt current level of assistance and pt in agreement.  Spoke with Yahir in nursing.     Pt has medication assistance and laundry only.    Pharmacy is [facility]  and this was updated in [facility].  Fax for orders:[phone]  No nursing staff at [facility] over weekend, per Yahir, but pt could return if medically ready.    CC to continue to follow for discharge planning.    Noemí Del Rio RN, BS  Care Coordinator  [email or web address]

## 2024-04-12 NOTE — PLAN OF CARE
Neuro: A&O x4  Tele/cardiac:SR  Respiration: 6-10 L oxymizer canular  Activity: SBA, denies assist  Pain: none  Drips: bumex  Drains/tubes: none  Skin: RLE wound, dressing changed  GI/: continent, NPO since midnight  Aggression color: green  Isolation: none  Plan: angiogram today

## 2024-04-12 NOTE — PROGRESS NOTES
[facility]    Cardiology Progress Note    Primary Cardiologist: Dr. Selvin Saucedo    Date of Admission: 3/29/2024  Service Date: 04/12/24    Summary:  Ms. Linda Otero is a very pleasant 64 year old female with a past medical history of  COPD, chronic respiratory failure on home oxygen with history of hypercapnia, nicotine dependence, HFpEF, DVT/PE  on past warfarin anticoagulation, recent hemoptysis, morbid obesity, CKD stage III, CAD, HLD, HTN, hypothyroidism, MGUS, seizure disorder, CVA  who was admitted on 3/29/2024 for shortness of breath and hypoxia. Cardiology was consulted for acute on chronic respiratory failure.    Interval History   Reviewed plan for right and left heart catheterization today. Discussed changing to FULL CODE status for procedure and patient is in agreement. Answered all questions regarding procedure. Hgb dropped today to 9.3. Denies any concerns for bleeding.     Output of 1.6 liters overnight. BMP continues to show improving renal function and stable electrolytes. Weight down 5 lbs to 302#.     Telemetry: Sinus rhythm, rate 90's    Assessment & Plan   1. Acute on chronic respiratory failure, symptoms improving     2. Severe Pulmonary Hypertension  -2021 Chest CT showing enlarged main pulmonary artery as well as tortuous proximal subsegmental pulmonary arteries on the hilum consistent with chronic pulmonary hypertension    3. Right heart dysfunction and failure  - CT PE study 3/29/2024 without any PE but did demonstrate pulmonary congestion, R pleural effusion.  TTE 4/1 showing EF 60-65% without WMA, severe RV dilation with moderately decreased RV function, mod-severe TR and pulm HTN.  - Diuresing slowly; SOB gradually improving, dry weight remains unclear, notes dating back to 2020 indicate weight was ~317-319#  - Initiated on IV Furosemide infusion then transitioned to bumex with increased UOP    4. Hx CVA 2/2 paradoxical embolus, s/p PFO  closure    5. JODI    6. Severe COPD, follows with pulmonology    7. TR, moderate to severe on 4/4/24 TTE    8. Morbid Obesity, BMI 50    Plan:   1. Continue Bumex gtt at 0.25 mg/hour  2. Keep NPO for right and left heart catheterization today  3. Continue to trend Hgb   4. Consider VQ scan this admission  5. Continue supportive heart failure measures: Strict I's/O, daily standing weight, daily BMP and repletion of lytes, Na and FR  6. Cardiology will continue to follow     A total of 30 minutes was spent with patient, on chart review and documentation today.     Thank you for the opportunity to participate in this pleasant patient's care.     JONATAN Miguel, CNP   Nurse Practitioner  [facility]  Pager: [phone]  (8am - 5pm, M-F)    Patient Active Problem List   Diagnosis    Chronic airway obstruction (H)    Morbid obesity (H)    Obstructive sleep apnea    Pulmonary hypertension (H)    Hypothyroidism    Menorrhagia    Hypertension goal BP (blood pressure) < 140/90    Iron deficiency anemia, unspecified iron deficiency    Long-term (current) use of anticoagulants [Z79.01]    Advanced directives, counseling/discussion    Primary hypercoagulable state (H24)    Monoclonal gammopathy    Lymphedema    Vitamin B12 deficiency (non anemic)       Physical Exam   Temp: 97.7  F (36.5  C) Temp src: Oral BP: 113/48 Pulse: 93   Resp: 19 SpO2: 93 % O2 Device: Oxymask Oxygen Delivery: 9 LPM  Vitals:    04/10/24 0701 04/11/24 0408 04/12/24 0600   Weight: 141.7 kg (312 lb 6.4 oz) 139.6 kg (307 lb 12.8 oz) 137.3 kg (302 lb 12.8 oz)     Vital Signs with Ranges  Temp:  [97.3  F (36.3  C)-98.1  F (36.7  C)] 97.7  F (36.5  C)  Pulse:  [] 93  Resp:  [12-20] 19  BP: (102-128)/(48-65) 113/48  SpO2:  [93 %-99 %] 93 %  I/O last 3 completed shifts:  In: 220 [P.O.:220]  Out: 2225 [Urine:2225]    Constitutional:  Appears her stated age, well nourished, and in no acute distress.  Eyes: Pupils equal, round.  Sclerae anicteric.   HEENT: Normocephalic, atraumatic.   Neck: No JVD appreciated.  Respiratory: Breathing non-labored. Lungs clear to auscultation bilaterally. No crackles, wheezes, rhonchi, or rales. Diminished throughout. Wearing supplemental oxygen  Cardiovascular: Regular rate and rhythm, normal S1 and S2. No murmur, rub, or gallop.  GI: Soft, non-distended, non-tender, bowel sounds present in all four quadrants.  Skin: Warm, dry. 2+ peripheral edema, wearing compression stocking  Musculoskeletal/Extremities: Moves all extremities well and symmetrically.   Neurologic: No gross focal deficits. Alert, awake, and oriented to person, place and time.  Psychiatric: Affect appropriate. Mentation normal.    Medications   Current Facility-Administered Medications   Medication Dose Route Frequency Provider Last Rate Last Admin    bumetanide (BUMEX) 0.25 mg/mL infusion  0.25 mg/hr Intravenous Continuous Molly Hampton APRN CNP 1 mL/hr at 04/11/24 1928 0.25 mg/hr at 04/11/24 1928    Patient is NOT allergic to contrast dye   Does not apply DOES NOT GO TO Lizbeth Rosario NP        sodium chloride 0.9 % infusion   Intravenous Continuous Lizbeth Nicholson NP         Current Facility-Administered Medications   Medication Dose Route Frequency Provider Last Rate Last Admin    aspirin (ASA) chewable tablet 81 mg  81 mg Oral BID Gerry Pizarro MD   81 mg at 04/11/24 2059    [Held by provider] enoxaparin ANTICOAGULANT (LOVENOX) injection 40 mg  40 mg Subcutaneous Q12H Angi Mina MD   40 mg at 04/11/24 2059    fluticasone-vilanterol (BREO ELLIPTA) 200-25 MCG/ACT inhaler 1 puff  1 puff Inhalation Daily Angi Mina MD   1 puff at 04/10/24 1000    ipratropium - albuterol 0.5 mg/2.5 mg/3 mL (DUONEB) neb solution 3 mL  3 mL Nebulization 4x daily Gerry Pizarro MD   3 mL at 04/12/24 0734    miconazole (MICATIN) 2 % powder   Topical BID Angi Mina MD   Given at 04/11/24 2100    miconazole with skin  protectant (KAYDEN ANTIFUNGAL) 2 % cream   Topical BID Angi Mina MD   Given at 04/11/24 2100    multivitamin w/minerals (THERA-VIT-M) tablet 1 tablet  1 tablet Oral Daily Charli Castro MD   1 tablet at 04/11/24 1304    polyethylene glycol (MIRALAX) Packet 17 g  17 g Oral Daily Charli Castro MD   17 g at 04/10/24 1000    potassium chloride marcie ER (KLOR-CON M20) CR tablet 40 mEq  40 mEq Oral TID Molly Hampton APRN CNP   40 mEq at 04/11/24 2100    sodium chloride (PF) 0.9% PF flush 10 mL  10 mL Intracatheter Q8H Gerry Pizarro MD   3 mL at 04/10/24 1655    sodium chloride (PF) 0.9% PF flush 3 mL  3 mL Intracatheter Q8H Lizbeth Nicholson NP   3 mL at 04/11/24 1301       Data   No results found for this or any previous visit (from the past 24 hour(s)).    Recent Labs   Lab 04/12/24  0647 04/10/24  0639 04/09/24  1114   WBC 9.2  --  10.2   HGB 9.3*  --  11.4*   HCT 31.6*  --  38.4   MCV 79  --  80   * 118* 137*     Recent Labs   Lab 04/12/24  0647 04/11/24  0638 04/10/24  0639    141 139   POTASSIUM 4.1 4.2 4.2   CHLORIDE 97* 97* 97*   CO2 37* 36* 35*   ANIONGAP 9 8 7   * 124* 132*   BUN 34.4* 36.1* 37.5*   CR 1.11* 1.10* 1.15*   GFRESTIMATED 56* 56* 53*   LUNA 9.2 9.3 9.1        This note was completed in part using Dragon voice recognition software. Although reviewed after completion, some word and grammatical errors may occur.

## 2024-04-12 NOTE — PROGRESS NOTES
[facility]    Hospitalist Progress Note    Interval History   Patient is awake and alert this morning.  No acute events overnight.  Put out about 2.2 L in the last 24 hours.  Continuing to need up to 9 L of oxygen especially during sleep.    -Data reviewed today: I reviewed all new labs and imaging results over the last 24 hours. I personally reviewed the chest CT image(s) showing pulmonary hypertension with asymmetric right heart enlargement. .    Physical Exam   Temp: 98.4  F (36.9  C) Temp src: Oral BP: 108/79 Pulse: 93   Resp: 19 SpO2: 96 % O2 Device: Oxymask Oxygen Delivery: 9 LPM  Vitals:    04/10/24 0701 04/11/24 0408 04/12/24 0600   Weight: 141.7 kg (312 lb 6.4 oz) 139.6 kg (307 lb 12.8 oz) 137.3 kg (302 lb 12.8 oz)     Vital Signs with Ranges  Temp:  [97.3  F (36.3  C)-98.4  F (36.9  C)] 98.4  F (36.9  C)  Pulse:  [] 93  Resp:  [12-20] 19  BP: (102-128)/(48-79) 108/79  SpO2:  [93 %-99 %] 96 %  I/O last 3 completed shifts:  In: 220 [P.O.:220]  Out: 2225 [Urine:2225]    Physical Exam  Constitutional:       Appearance: She is obese.   Cardiovascular:      Rate and Rhythm: Normal rate. Rhythm irregular.      Pulses: Normal pulses.      Heart sounds: Murmur heard.   Pulmonary:      Effort: Pulmonary effort is normal. No respiratory distress.      Breath sounds: Normal breath sounds.   Abdominal:      General: Bowel sounds are normal. There is no distension.      Tenderness: There is no abdominal tenderness. There is no guarding.      Comments: Large pannus   Musculoskeletal:      Right lower leg: Edema present.      Left lower leg: Edema present.   Skin:     General: Skin is warm and dry.   Neurological:      General: No focal deficit present.           Medications   Current Facility-Administered Medications   Medication Dose Route Frequency Provider Last Rate Last Admin    bumetanide (BUMEX) 0.25 mg/mL infusion  0.25 mg/hr Intravenous Continuous Molly Hampton, JONATAN CNP 1  mL/hr at 04/11/24 1928 0.25 mg/hr at 04/11/24 1928    Patient is NOT allergic to contrast dye   Does not apply DOES NOT GO TO Lizbeth Rosario NP        sodium chloride 0.9 % infusion   Intravenous Continuous Lizbeth Nicholson NP 10 mL/hr at 04/12/24 1151 Rate Verify at 04/12/24 1151     Current Facility-Administered Medications   Medication Dose Route Frequency Provider Last Rate Last Admin    aspirin (ASA) chewable tablet 81 mg  81 mg Oral BID Gerry Pizarro MD   81 mg at 04/11/24 2059    [Held by provider] enoxaparin ANTICOAGULANT (LOVENOX) injection 40 mg  40 mg Subcutaneous Q12H Angi Mina MD   40 mg at 04/11/24 2059    fluticasone-vilanterol (BREO ELLIPTA) 200-25 MCG/ACT inhaler 1 puff  1 puff Inhalation Daily Angi Mina MD   1 puff at 04/12/24 0915    ipratropium - albuterol 0.5 mg/2.5 mg/3 mL (DUONEB) neb solution 3 mL  3 mL Nebulization 4x daily Gerry Pizarro MD   3 mL at 04/12/24 1112    miconazole (MICATIN) 2 % powder   Topical BID Angi Mina MD   Given at 04/12/24 0916    miconazole with skin protectant (KAYDEN ANTIFUNGAL) 2 % cream   Topical BID Angi Mina MD   Given at 04/12/24 0916    multivitamin w/minerals (THERA-VIT-M) tablet 1 tablet  1 tablet Oral Daily Charli Castro MD   1 tablet at 04/11/24 1304    polyethylene glycol (MIRALAX) Packet 17 g  17 g Oral Daily Charli Castro MD   17 g at 04/10/24 1000    potassium chloride marcie ER (KLOR-CON M20) CR tablet 40 mEq  40 mEq Oral TID Molly Hampton APRN CNP   40 mEq at 04/12/24 0900    sodium chloride (PF) 0.9% PF flush 10 mL  10 mL Intracatheter Q8H Gerry Pizarro MD   10 mL at 04/12/24 0911    sodium chloride (PF) 0.9% PF flush 3 mL  3 mL Intracatheter Q8H Lizbeth Nicholson, EVELYNE   3 mL at 04/11/24 1301       Data   Recent Labs   Lab 04/12/24  0647 04/11/24  0638 04/10/24  0639 04/09/24  1114   WBC 9.2  --   --  10.2   HGB 9.3*  --   --  11.4*   MCV 79  --   --  80   *  --  118* 137*     141 139  --    POTASSIUM 4.1 4.2 4.2  --    CHLORIDE 97* 97* 97*  --    CO2 37* 36* 35*  --    BUN 34.4* 36.1* 37.5*  --    CR 1.11* 1.10* 1.15*  --    ANIONGAP 9 8 7  --    LUNA 9.2 9.3 9.1  --    * 124* 132*  --        No results found for this or any previous visit (from the past 24 hour(s)).      Assessment & Plan      Linda Otero is a 64 year old female admitted on 3/29/2024.  Past medical history of COPD, chronic respiratory failure on home oxygen with history of hypercapnia, nicotine dependence, HFpEF, DVT/PE  on past warfarin anticoagulation, recent hemoptysis, morbid obesity, CKD stage III, CAD, HLD, HTN, hypothyroidism, MGUS, seizure disorder, CVA, admitted 3/29/2024 with acute on chronic respiratory failure.        Acute on chronic hypoxic respiratory failure  *Speculate multifactorial from   HFpEF exacerbation (elevated BNP and weight gain),   COPD exacerbation,   JODI with noncompliance to CPAP and   CAP  Acute right heart failure with mod-severe TR     [Home Lasix 20mg BID PO]  *Baseline 3-5 L NC  *CT C PE 3/29/2024 without any PE but did demonstrate pulmonary congestion, R pleural effusion  *TTE 4/1 showing EF 60-65% without WMA, severe RV dilation with moderately decreased RV function, mod-severe TR and pulm HTN  *completed 5-day course of ceftriaxone+doxy on 4/2     - 04/07/24 was to be final day of 8-day prednisone taper; reports increased wheezing so will increase duonebs to qid,    4/8 -restarted Breo.  Wheezing significantly improved.  -Was initially started on Lasix drip.  This was transition to Bumex drip at 0.25 mg/h on 4/9/2024.,  Received a one-time bolus bolus of IV Bumex 2 mg on 4/9, 4/10, 4/11.  Recommend continuing Bumex drip on 4/12/2024.  - weight down about 23 pounds since admission .  Had a urine output of 2.2 L in the last 24 hours.  Unclear how accurate the weight measurement is.  - lymphedema wraps  - Cards planning for  right and left heart cath as well as VQ scan on  4/12/2024  - on potassium replacement with oral KCl 40 mEq 3 times daily  - Flutter valve and incentive spirometer  -Continuing to need 5 to 7 L nasal cannula to maintain sats at 88-92 percent     Hemoptysis, mild  *reports orange sputum production at home, was producing small amounts of rboer blood 4/1  *admission CT chest negative for PE, masses  *resumed aspirin 4/3 and reports small amount of hemoptysis 4/4   - denies any further hemoptysis.  Lovenox started on 4/8/2024.  Held on 4/12/2024 for cardiac catheterization.     SONAL on Chronic kidney disease, stage III  *Speculate congestive nephropathy  *Admission serum Cr 1.33, historic baseline 0.87 to 1.08 in 2020 to 2023  - Avoid non-steroidal anti-inflammatory drugs (NSAIDs)  -Creatinine continuing to improve with aggressive diuresis.  Currently at 1.1.     Hypokalemia  *due to diuresis  - replace per protocol  -Started on oral KCl 40 mEq 3 times daily  -Potassium normal.     History of deep venous thrombosis and pulmonary embolism  *CT C PE 3/29/2024 without any PE  *Per pharmacy, no longer on warfarin  - resume lovenox 04/07/24   - has been counseled on importance of frequent ambulation, out of bed activity     Skin ulcer, right lateral ankle  Wound nurse (WOC) following     Weakness and deconditioning  Therapies recommend home with assist, home therapies  Reordered home care with PT OT and RN at the time of discharge     Obstructive sleep apnea  -Patient is on CPAP at home but has not been using it since admission since she did not tolerate the mask initially.  Encouraged her to bring her home CPAP machine today.  We tried the hospital mask on 4/9/2024 and she did not tolerate this.     Mild thrombocytopenia-  platelet count has been slowly downtrending over the last 3 days.  -135---137---118  -  continue to monitor for now.     Hyperlipidemia - Diet-controlled, monitor; follow-up with primary clinic provider   Hypothyroidism - Med rec does not show any  "levothyroxine (TSH slightly elevated, free T4 wnl). Will defer to outpt evaluation by PCP  Morbid obesity  History of nicotine dependence  History of obstructive sleep apnea  History of coronary artery disease   History of monoclonal gammopathy of undetermined significance - noted, follow-up with primary clinic provider   History of cerebrovascular disease with prior stroke - noted, monitor  History of B12 deficiency - noted, continue prior to admission B12 replacement  History of seizure disorder - noted in medical record; monitor     clinically Significant Risk Factors               # Thrombocytopenia: Lowest platelets = 118 in last 2 days, will monitor for bleeding   # Hypertension: Noted on problem list        # Severe Obesity: Estimated body mass index is 50.42 kg/m  as calculated from the following:    Height as of this encounter: 1.676 m (5' 6\").    Weight as of this encounter: 141.7 kg (312 lb 6.4 oz).   # Moderate Malnutrition: based on nutrition assessment    # Financial/Environmental Concerns: none  # COPD: noted on problem list      Diet: Snacks/Supplements Adult: Expedite Bottle; Between Meals  Fluid restriction 2000 ML FLUID  2 Gram Sodium Diet    DVT Prophylaxis: PCD  Cardiac Monitoring: Present  Code Status: No CPR- Do NOT Intubate      Medically Ready for Discharge: Anticipated in 5+ Days        Angi Mina MD, MD  [phone](p)    "

## 2024-04-12 NOTE — PLAN OF CARE
Goal Outcome Evaluation:  Neuro- A&OX4  Most Recent Vitals- Temp: 98.4  F (36.9  C) Temp src: Oral BP: 108/79 Pulse: 93   Resp: 19 SpO2: 91 % O2 Device: Oxymask   Tele/Cardiac- SR  Resp- 6-10L Oxymask  Activity- SBA/IND  Pain- Gen aches  with activity  Drips- Bumex gtt 0.25mg/hr  Drains/Tubes- [id number]  Skin- Bruising, RG ankle wound   GI/- WDL  Aggression Color- Green  COVID status- Negative  Plan- Right and left heart cath today.  [facility]     Clarice Ferreira RN

## 2024-04-13 NOTE — PLAN OF CARE
Goal Outcome Evaluation:  A&O x4, VSS, sat on 8L/oxymizer cannula sat 90s. Tele SR w/ occasional PAC, at times frequent. On continuous iv Bumex gtts at 0.25mg/hr. Up to BSC with SBA, diuresing  well. R groin site CDI, no hematoma/bleeding, CMS intact & pulses present. Back pain relieved with prn iv dilaudid. Plans for discharge pending clinical improvement.

## 2024-04-13 NOTE — PROGRESS NOTES
[facility]    Hospitalist Progress Note    Interval History   Patient is awake and alert this morning.  Underwent left and right heart cath yesterday.  Continuing to lead 7 to 8 L nasal cannula oxygen at night.  Not consistently using her CPAP at night.    -Data reviewed today: I reviewed all new labs and imaging results over the last 24 hours. I personally reviewed the chest CT image(s) showing pulmonary hypertension with asymmetric right heart enlargement. .    Physical Exam   Temp: 97.5  F (36.4  C) Temp src: Oral BP: 118/65 Pulse: 98   Resp: 18 SpO2: 93 % O2 Device: Oxymizer cannula Oxygen Delivery: 8 LPM  Vitals:    04/11/24 0408 04/12/24 0600 04/13/24 0630   Weight: 139.6 kg (307 lb 12.8 oz) 137.3 kg (302 lb 12.8 oz) 136.1 kg (300 lb)     Vital Signs with Ranges  Temp:  [97.5  F (36.4  C)-98.5  F (36.9  C)] 97.5  F (36.4  C)  Pulse:  [] 98  Resp:  [18] 18  BP: ()/(47-75) 118/65  SpO2:  [79 %-100 %] 93 %  I/O last 3 completed shifts:  In: 246 [P.O.:240; I.V.:6]  Out: 1800 [Urine:1800]    Physical Exam  Constitutional:       Appearance: She is obese.   Cardiovascular:      Rate and Rhythm: Normal rate. Rhythm irregular.      Pulses: Normal pulses.      Heart sounds: Murmur heard.   Pulmonary:      Effort: Pulmonary effort is normal. No respiratory distress.      Breath sounds: Normal breath sounds.   Abdominal:      General: Bowel sounds are normal. There is no distension.      Tenderness: There is no abdominal tenderness. There is no guarding.      Comments: Large pannus   Musculoskeletal:      Right lower leg: Edema present.      Left lower leg: Edema present.   Skin:     General: Skin is warm and dry.   Neurological:      General: No focal deficit present.           Medications   Current Facility-Administered Medications   Medication Dose Route Frequency Provider Last Rate Last Admin    bumetanide (BUMEX) 0.25 mg/mL infusion  0.25 mg/hr Intravenous Continuous Berta  JONATAN Barnes CNP 1 mL/hr at 04/13/24 1051 0.25 mg/hr at 04/13/24 1051     Current Facility-Administered Medications   Medication Dose Route Frequency Provider Last Rate Last Admin    aspirin (ASA) chewable tablet 81 mg  81 mg Oral BID Gerry Pizarro MD   81 mg at 04/13/24 1037    empagliflozin (JARDIANCE) tablet 10 mg  10 mg Oral Daily Kyle Echavarria MD   10 mg at 04/13/24 1241    enoxaparin ANTICOAGULANT (LOVENOX) injection 40 mg  40 mg Subcutaneous Q12H Angi Mina MD   40 mg at 04/11/24 2059    fluticasone-vilanterol (BREO ELLIPTA) 200-25 MCG/ACT inhaler 1 puff  1 puff Inhalation Daily Angi Mina MD   1 puff at 04/13/24 1041    ipratropium - albuterol 0.5 mg/2.5 mg/3 mL (DUONEB) neb solution 3 mL  3 mL Nebulization 4x daily Gerry Pizarro MD   3 mL at 04/13/24 1220    miconazole (MICATIN) 2 % powder   Topical BID Angi Mina MD   Given at 04/13/24 1042    miconazole with skin protectant (KAYDEN ANTIFUNGAL) 2 % cream   Topical BID Angi Mina MD   Given at 04/13/24 1042    multivitamin w/minerals (THERA-VIT-M) tablet 1 tablet  1 tablet Oral Daily Charli Castro MD   1 tablet at 04/13/24 1241    polyethylene glycol (MIRALAX) Packet 17 g  17 g Oral Daily Charli Castro MD   17 g at 04/10/24 1000    potassium chloride marcie ER (KLOR-CON M20) CR tablet 40 mEq  40 mEq Oral TID Molly Hampton APRN CNP   40 mEq at 04/13/24 1037    sodium chloride (PF) 0.9% PF flush 10 mL  10 mL Intracatheter Q8H Gerry Pizarro MD   10 mL at 04/12/24 1726       Data   Recent Labs   Lab 04/13/24  0632 04/13/24  0055 04/12/24  1603 04/12/24  0647 04/11/24  0638 04/10/24  0639 04/09/24  1114   WBC  --   --   --  9.2  --   --  10.2   HGB  --  9.3*  --  9.3*  --   --  11.4*   MCV  --   --   --  79  --   --  80   *  --   --  105*  --  118* 137*     --   --  143 141 139  --    POTASSIUM 4.5  --   --  4.1 4.2 4.2  --    CHLORIDE 100  --   --  97* 97* 97*  --    CO2 35*  --   --   37* 36* 35*  --    BUN 40.1*  --   --  34.4* 36.1* 37.5*  --    CR 1.24*  --   --  1.11* 1.10* 1.15*  --    ANIONGAP 10  --   --  9 8 7  --    LUNA 9.4  --   --  9.2 9.3 9.1  --    *  --  103* 118* 124* 132*  --        Recent Results (from the past 24 hour(s))   Cardiac Catheterization    Narrative      IVUS was performed on the lesion.    Ultrasound (IVUS) was performed.    Right sided filling pressures are severely elevated.    Left sided filling pressures are severely elevated.    Severely elevated pulmonary artery hypertension.    Normal cardiac output level.    Hemodynamic data has been modified in Epic per physician review.    1.  Angiographically normal coronary arteries  2.  Severely elevated right-sided filling pressures (RA 16 mmHg)  3.  Severely elevated left-sided filling pressures (PCWP 28 mmHg)  4.  Severe mixed precapillary and postcapillary pulmonary hypertension   (mean PA 59 mmHg, PCWP 28 mmHg, PVR 4.69WU)  5.  Normal cardiac output/cardiac index (Elsa CO/CI 7.25/3.04)           Assessment & Plan      Linda Otero is a 64 year old female admitted on 3/29/2024.  Past medical history of COPD, chronic respiratory failure on home oxygen with history of hypercapnia, nicotine dependence, HFpEF, DVT/PE  on past warfarin anticoagulation, recent hemoptysis, morbid obesity, CKD stage III, CAD, HLD, HTN, hypothyroidism, MGUS, seizure disorder, CVA, admitted 3/29/2024 with acute on chronic respiratory failure.        Acute on chronic hypoxic respiratory failure  *Speculate multifactorial from   HFpEF exacerbation (elevated BNP and weight gain),   COPD exacerbation,   JODI with noncompliance to CPAP and   CAP  Acute right heart failure with mod-severe TR     [Home Lasix 20mg BID PO]  *Baseline 3-5 L NC  *CT C PE 3/29/2024 without any PE but did demonstrate pulmonary congestion, R pleural effusion  *TTE 4/1 showing EF 60-65% without WMA, severe RV dilation with moderately decreased RV function,  mod-severe TR and pulm HTN  *completed 5-day course of ceftriaxone+doxy on 4/2     - 04/07/24 was to be final day of 8-day prednisone taper; reports increased wheezing so will increase duonebs to qid,    4/8 -restarted Breo.  Wheezing significantly improved.  -Was initially started on Lasix drip.  This was transition to Bumex drip at 0.25 mg/h on 4/9/2024.,  Received a one-time bolus bolus of IV Bumex 2 mg on 4/9, 4/10, 4/11.  Recommend continuing Bumex drip on 4/12/2024.  - weight down about 23 pounds since admission .  Had a urine output of 2.2 L in the last 24 hours.  Unclear how accurate the weight measurement is.  - lymphedema wraps  - on potassium replacement with oral KCl 40 mEq 3 times daily  - Flutter valve and incentive spirometer  -Continuing to need 5 to 7 L nasal cannula to maintain sats at 88-92 percent  -Underwent left and right heart cath on 4/12/2024.  Showed normal coronary arteries.  Severely elevated right-sided pressures, severely elevated left-sided filling pressures.  Mixed precapillary and postcapillary pulmonary hypertension.  Normal cardiac index.  -Cardiology recommending continuing Bumex drip for 1 more day and reevaluating renal function tomorrow.  Did have an increase in creatinine to 1.2 from 1.1.  Received a 2 mg IV Bumex bolus on 4/13/2024.     Hemoptysis, mild  *reports orange sputum production at home, was producing small amounts of rober blood 4/1  *admission CT chest negative for PE, masses  *resumed aspirin 4/3 and reports small amount of hemoptysis 4/4   - denies any further hemoptysis.  Lovenox started on 4/8/2024.  Held on 4/12/2024 for cardiac catheterization.     SONAL on Chronic kidney disease, stage III  *Speculate congestive nephropathy  *Admission serum Cr 1.33, historic baseline 0.87 to 1.08 in 2020 to 2023  - Avoid non-steroidal anti-inflammatory drugs (NSAIDs)  -Creatinine continuing to improve with aggressive diuresis.  Creatinine has been improving until 4/12/2024.   Bumped up to 1.2 on 4/13/2024.  Might have to transition to oral diuretics tomorrow depending on the creatinine.  She still appears clinically very fluid overloaded.  -Had 1.8 L urine output in the last 24 hours.     Hypokalemia  *due to diuresis  - replace per protocol  -Started on oral KCl 40 mEq 3 times daily  -Potassium normal.     History of deep venous thrombosis and pulmonary embolism  *CT C PE 3/29/2024 without any PE  *Per pharmacy, no longer on warfarin  - resume lovenox 04/07/24   - has been counseled on importance of frequent ambulation, out of bed activity  -Cardiology does recommend a VQ scan to evaluate for chronic thromboembolic pulmonary hypertension.  Would be beneficial to do this as outpatient after adequate diuresis.     Skin ulcer, right lateral ankle  Wound nurse (WOC) following     Weakness and deconditioning  Therapies recommend home with assist, home therapies  Reordered home care with PT OT and RN at the time of discharge     Obstructive sleep apnea  -Patient is on CPAP at home but has not been using it since admission since she did not tolerate the mask initially.  Encouraged her to bring her home CPAP machine today.  We tried the hospital mask on 4/9/2024 and she did not tolerate this.  -Patient continues to be noncompliant with her CPAP use in the hospital despite having her home machine here.     Mild thrombocytopenia-  platelet count has been slowly downtrending over the last 3 days.  -135---137---118  -  continue to monitor for now.     Hyperlipidemia - Diet-controlled, monitor; follow-up with primary clinic provider   Hypothyroidism - Med rec does not show any levothyroxine (TSH slightly elevated, free T4 wnl). Will defer to outpt evaluation by PCP  Morbid obesity  History of nicotine dependence  History of obstructive sleep apnea  History of coronary artery disease   History of monoclonal gammopathy of undetermined significance - noted, follow-up with primary clinic provider  "  History of cerebrovascular disease with prior stroke - noted, monitor  History of B12 deficiency - noted, continue prior to admission B12 replacement  History of seizure disorder - noted in medical record; monitor     clinically Significant Risk Factors               # Thrombocytopenia: Lowest platelets = 118 in last 2 days, will monitor for bleeding   # Hypertension: Noted on problem list        # Severe Obesity: Estimated body mass index is 50.42 kg/m  as calculated from the following:    Height as of this encounter: 1.676 m (5' 6\").    Weight as of this encounter: 141.7 kg (312 lb 6.4 oz).   # Moderate Malnutrition: based on nutrition assessment    # Financial/Environmental Concerns: none  # COPD: noted on problem list      Diet: Snacks/Supplements Adult: Expedite Bottle; Between Meals  Fluid restriction 2000 ML FLUID  2 Gram Sodium Diet    DVT Prophylaxis: PCD  Cardiac Monitoring: Present  Code Status: No CPR- Do NOT Intubate      Medically Ready for Discharge: Anticipated in 5+ Days    Greater than 35 minutes spent on documentation, examining the patient.    Angi Mina MD, MD  [phone](p)    "

## 2024-04-13 NOTE — PROVIDER NOTIFICATION
Dr. Mina was paged for IV pain medication as pt is rating pain 8/10 during post angio bedrest with V-line in place. Dr. Mina ordered IV Dilaudid 0.2 mg every 4 hrs PRN for severe pain.

## 2024-04-13 NOTE — PLAN OF CARE
Goal Outcome Evaluation:      Plan of Care Reviewed With: patient        Heart Failure Care Map  GOALS TO BE MET BEFORE DISCHARGE:    1. Decrease congestion and/or edema with diuretic therapy to achieve near optimal volume status.     Dyspnea improved: No, further care required to meet this goal. Please explain - Still on O2, CERRATO   Edema improved: No, further care required to meet this goal. Please explain LE edema, lymph wraps In place        Last 24 hour I/O:   Intake/Output Summary (Last 24 hours) at 4/13/2024 1725  Last data filed at 4/13/2024 1651  Gross per 24 hour   Intake 666 ml   Output 2450 ml   Net -1784 ml           Net I/O and Weights since admission:   03/14 2300 - 04/13 2259  In: 53163 [P.O.:79148; I.V.:172]  Out: 46444 [Urine:16687]  Net: -41405     Vitals:    03/29/24 1447 03/29/24 1505 03/30/24 0554 03/31/24 0457   Weight: 136.1 kg (300 lb) (!) 150.3 kg (331 lb 5.6 oz) (!) 154.4 kg (340 lb 8 oz) (!) 156.2 kg (344 lb 6.4 oz)    04/01/24 0335 04/02/24 0600 04/03/24 0503 04/04/24 0600   Weight: (!) 156.8 kg (345 lb 9.6 oz) (!) 154.2 kg (340 lb) (!) 153 kg (337 lb 4.8 oz) (!) 150.5 kg (331 lb 14.4 oz)    04/05/24 0318 04/06/24 0608 04/07/24 0413 04/08/24 0250   Weight: 149.5 kg (329 lb 9.6 oz) 144.8 kg (319 lb 4.8 oz) 144.2 kg (318 lb) 143.3 kg (316 lb)    04/09/24 0342 04/10/24 0701 04/11/24 0408 04/12/24 0600   Weight: 142.9 kg (315 lb) 141.7 kg (312 lb 6.4 oz) 139.6 kg (307 lb 12.8 oz) 137.3 kg (302 lb 12.8 oz)    04/13/24 0630   Weight: 136.1 kg (300 lb)       2.  O2 sats > 90% on room air, or at prior home O2 therapy level.      Able to wean O2 this shift to keep sats above 90%?: No, further care required to meet this goal. Please explain 8 L oximyzer, pt mouth breathes, doesn't tolerate mask well   Does patient use Home O2? Yes-  Home O2 3L          Current oxygenation status:   SpO2: 92 %     O2 Device: Oxymizer cannula, Oxygen Delivery: 8 LPM    3.  Tolerates ambulation and mobility near  baseline.     Ambulation: No, further care required to meet this goal. Please explain Ambulates to chair and commode multiple times today, unable to walk ivey.   Times patient ambulated with staff this shift: 2   Pt is A&Ox4, VSS And on tele SR, on 8l oxymizer canula, R groin site C/D/I and good CMS and pulses, Miconazole powder and cream applied to panus and groins, pt is up with SBA assist to bedside commode, had small BM. Bumex 0.25 mg/ 1 ml/hr infusing, Plan to continue to diurese.  Pt agreed to try CPAP tonight and  purwick to try and get better sleep.        Plan of Care Reviewed With: patient, family          Please review the Heart Failure Care Map for additional HF goal outcomes.    Loulou Flores, FRANCISCO  4/13/2024

## 2024-04-13 NOTE — PROGRESS NOTES
[facility]    Cardiology Consultation - Progress Note     Date of Admission:  3/29/2024  Date of Service: 4/13/2024    Reason for Consult   Reason for consult: acute on chronic respiratory failure    History of Present Illness   Linda Otero is a 64 year old female who was admitted on 3/29/2024 for acute on chronic respiratory failure. Medical comorbidities include COPD, chronic respiratory failure on home oxygen with history of hypercapnia, nicotine dependence, HFpEF, DVT/PE  on past warfarin anticoagulation, recent hemoptysis, morbid obesity, CKD stage III, CAD, HLD, HTN, hypothyroidism, MGUS, seizure disorder, CVA.    Interval history  She underwent right and left heart catheterization (on 4/12) which showed normal coronary arteries, severely elevated right-sided pressures (RA 16 mmHg), severely elevated left-sided filling pressures (PCWP 28 mmHg), mixed precapillary and postcapillary pulmonary hypertension (mean PA 59 mmHg, PCWP 28 mmHg, PVR 4.69WU), and normal cardiac output/cardiac index (Elsa CO/CI 7.25/3.04)    Assessment & Plan   1. Acute on chronic respiratory failure, symptoms improving      2. Severe Pulmonary Hypertension, mixed group 2 and 3  -2021 Chest CT showing enlarged main pulmonary artery as well as tortuous proximal subsegmental pulmonary arteries on the hilum consistent with chronic pulmonary hypertension  -RHC 4/12/2024 showed mixed precapillary and postcapillary pulmonary hypertension (mean PA 59 mmHg, PCWP 28 mmHg, PVR 4.69WU)     3. Right heart dysfunction and failure  - CT PE study 3/29/2024 without any PE but did demonstrate pulmonary congestion, R pleural effusion.  TTE 4/1 showing EF 60-65% without WMA, severe RV dilation with moderately decreased RV function, mod-severe TR and pulm HTN.  - Diuresing slowly; SOB gradually improving, dry weight remains unclear, notes dating back to 2020 indicate weight was ~317-319#  - Initiated on IV Furosemide infusion  then transitioned to bumex with increased UOP  - RHC on 4/12/2024 showed severely elevated right-sided pressures (RA 16 mmHg), severely elevated left-sided filling pressures (PCWP 28 mmHg)     4. Hx CVA 2/2 paradoxical embolus, s/p PFO closure     5. JODI     6. Severe COPD, follows with pulmonology    7. Tricuspid regurgitation, moderate to severe on 4/4/24 TTE     8. Morbid Obesity, BMI 50    Plan:  Continue Bumex gtt at 0.25 mg/hour, potassium chloride 40 mEq TID  Creatinine bumped slightly this morning, recheck tomorrow, if further rise will reconsider diuretic approach  Consider V/Q scan this admission to look for CTEPH as part of PH picture  Strict I's/O, daily standing weight, daily BMP and repletion of lytes  Continue aspirin 81 mg daily  Adding Jardiance 10 mg daily for HFpEF    Kyle Echavarria MD    Primary Care Physician   Jayy Henson    Patient Active Problem List   Diagnosis    Chronic airway obstruction (H)    Morbid obesity (H)    Obstructive sleep apnea    Pulmonary hypertension (H)    Hypothyroidism    Menorrhagia    Hypertension goal BP (blood pressure) < 140/90    Iron deficiency anemia, unspecified iron deficiency    Long-term (current) use of anticoagulants [Z79.01]    Advanced directives, counseling/discussion    Primary hypercoagulable state (H24)    Monoclonal gammopathy    Lymphedema    Vitamin B12 deficiency (non anemic)       Past Medical History   I have reviewed this patient's medical history and updated it with pertinent information if needed.   Past Medical History:   Diagnosis Date    Abnormality of gait due to impairment of balance     Acute and chronic respiratory failure with hypercapnia (H)     Acute deep venous thrombosis (H) 10/20/2015    Anemia     Iron deficiency    Aneurysm (H24) 2012    Arthritis     toes, thumb, elbow    B12 deficiency     Cancer (H) 1985    cervical    Cellulitis right foot    Chronic diastolic heart failure (H)     Chronic kidney disease      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  left thalamic stroke as well as patchy MCA branch infarcts    CVA (cerebral vascular accident) (H) 12/23/2012    Left thalamic stroke, MCA branch infarcts    Dermatitis     DVT (deep venous thrombosis) (H)     Right leg    Encephalopathy     after stroke, believed related to hypoxia    History of transfusion     Hyperlipidemia     Hypertension     Hypothyroidism     Lymphedema     MGUS (monoclonal gammopathy of unknown significance)     Neuropathy of right lower extremity     Obesity     On home oxygen therapy     PFO (patent foramen ovale)     closed after stroke, see cardiology notes care everywhere    PFO (patent foramen ovale)     Pneumonia     Primary hypercoagulable state (H24)     Pulmonary emboli (H) 2013    Pulmonary HTN (H)     Respiratory failure (H)     after stroke    Seizure (H)     Seizures (H)     at age 31    Skin cancer 2014    Sleep apnea     CPAP    Stroke (H) 2012    Umbilical hernia     Wound of right ankle        Past Surgical History   I have reviewed this patient's surgical history and updated it with pertinent information if needed.  Past Surgical History:   Procedure Laterality Date    ASD REPAIR      ASD REPAIR      BIOPSY SKIN (LOCATION)      CARDIAC CATHETERIZATION  2012    2 stents    COLONOSCOPY N/A 4/9/2018    Procedure: COLONOSCOPY;  Surgeon: Dorene Aruajo MD;  Location: [facility] Main OR;  Service:     CONIZATION  1985    CV CORONARY ANGIOGRAM N/A 4/12/2019    Procedure: Coronary Angiogram;  Surgeon: Brett Montalvo MD;  Location: [facility] Cath Lab;  Service: Cardiology    CV LEFT HEART CATHETERIZATION WITHOUT LEFT VENTRICULOGRAM Left 4/12/2019    Procedure: Left Heart Catheterization Without  Left Ventriculogram;  Surgeon: Brett Montalvo MD;  Location: [facility] Cath Lab;  Service: Cardiology    CV RIGHT HEART CATHETERIZATION N/A 4/12/2019    Procedure: Right Heart Catheterization;  Surgeon: Brett Montalvo MD;  Location: [facility] Cath Lab;  Service: Cardiology    ESOPHAGOSCOPY, GASTROSCOPY, DUODENOSCOPY (EGD), COMBINED N/A 4/9/2018    Procedure: ESOPHAGOGASTRODUODENOSCOPY (EGD);  Surgeon: Dorene Araujo MD;  Location: [facility];  Service:     IR CAROTID ANGIOGRAM  12/26/2012    IR CAROTID ANGIOGRAM  12/26/2012    IR MISCELLANEOUS PROCEDURE  12/26/2012    IR MISCELLANEOUS PROCEDURE  12/26/2012    IR MISCELLANEOUS PROCEDURE  12/26/2012    OTHER SURGICAL HISTORY      Cardiac stents    PATENT FORAMEN OVALE CLOSURE      REPAIR PATENT FORAMEN OVALE  2013    Helex device    XR SACRO ILIAC JOINT INJECTION LEFT  11/2012    ZZC REMV ART CLOT ILIAC-POP,LEG INCIS Left 7/22/2019    Procedure: REPAIR LEFT FEMORAL ARTERIOVENOUS FISTULA WITH INTRAOPERATIVE ULTRASOUND;  Surgeon: Salinas Torres MD;  Location: [facility];  Service: General       Prior to Admission Medications   Prior to Admission Medications   Prescriptions Last Dose Informant Patient Reported? Taking?   DIETHYLTOLUAMIDE EX  at prn  Yes Yes   Sig: Externally apply topically as needed (bug bit prevention)   Ostomy Supplies (SKIN PREP WIPES) MISC  at prn  Yes Yes   Sig: Apply topically as needed (to intact blister stage 2 deep tissue injury)   Vitamins A & D (VITAMIN A & D) external ointment  at prn  Yes Yes   Sig: Apply topically 2 times daily as needed   acetaminophen (TYLENOL) 325 MG tablet  at prn  Yes Yes   Sig: Take 650 mg by mouth every 4 hours as needed for pain or fever   albuterol (PROVENTIL) (2.5 MG/3ML) 0.083% neb solution  at prn  No Yes   Sig: Take 1 vial (2.5 mg) by nebulization every 6 hours as needed for shortness of breath or wheezing OFFICE VISIT NEEDED FOR ANY FURTHER REFILLS   albuterol  (VENTOLIN HFA) 108 (90 Base) MCG/ACT inhaler  at prn  No Yes   Sig: INHALE 2 PUFFS INTO THE LUNGS EVERY 4-6 HOURS AS NEEDED.   aspirin (ASA) 81 MG chewable tablet 3/29/2024 at am  Yes Yes   Sig: Take 81 mg by mouth 2 times daily   bisacodyl (DULCOLAX) 10 MG suppository  at prn  Yes Yes   Sig: Place 10 mg rectally daily as needed for constipation   bisacodyl (DULCOLAX) 5 MG EC tablet  at prn  Yes Yes   Sig: Take 5 mg by mouth daily as needed for constipation   calcium carbonate (TUMS) 500 MG chewable tablet  at prn  Yes Yes   Sig: Take 1 chew tab by mouth 4 times daily as needed for heartburn   calcium carbonate 500 mg, elemental, (OSCAL) 500 MG tablet 3/28/2024 at pm  Yes Yes   Sig: Take 1 tablet by mouth at bedtime   cyanocobalamin (CYANOCOBALAMIN) 1000 MCG/ML injection 3/29/2024 at am  Yes Yes   Sig: Inject 1 mL into the muscle every 30 days   dimethicone-zinc oxide (KAYDEN PROTECT) external cream  at prn  Yes Yes   Sig: Apply topically 2 times daily as needed for dry skin or other   diphenhydrAMINE (BENADRYL) 25 MG capsule  at prn  No Yes   Sig: Take 1-2 capsules (25-50 mg) by mouth every 6 hours as needed for itching or allergies   fluticasone-vilanterol (BREO ELLIPTA) 200-25 MCG/ACT inhaler 3/29/2024 at am  No Yes   Sig: Inhale 1 puff into the lungs daily   furosemide (LASIX) 20 MG tablet 3/29/2024 at am  Yes Yes   Sig: Take 20 mg by mouth 2 times daily   guaiFENesin (ROBITUSSIN) 20 mg/mL liquid  at prn  Yes Yes   Sig: Take 200 mg by mouth every 4 hours as needed for cough   loperamide (IMODIUM A-D) 2 MG tablet  at prn  Yes Yes   Sig: Take 2 mg by mouth 4 times daily as needed for diarrhea   magnesium hydroxide (MILK OF MAGNESIA) 400 MG/5ML suspension  at prn  Yes Yes   Sig: Take 30 mLs by mouth daily as needed for constipation or heartburn   menthol-zinc oxide (CALMOSEPTINE) 0.44-20.6 % OINT ointment  at prn  Yes Yes   Sig: Apply topically daily as needed for skin protection   multivitamin, therapeutic  (THERA-VIT) TABS tablet 3/28/2024 at pm  Yes Yes   Sig: Take 1 tablet by mouth at bedtime   nystatin (MYCOSTATIN) 165136 UNIT/GM external powder  at prn  Yes Yes   Sig: Apply topically 2 times daily as needed for dry skin   tiotropium (SPIRIVA RESPIMAT) 2.5 MCG/ACT inhaler 3/29/2024 at am  No Yes   Sig: INHALE TWO PUFFS BY MOUTH EVERY DAY      Facility-Administered Medications: None     Current Facility-Administered Medications   Medication Dose Route Frequency Provider Last Rate Last Admin    acetaminophen (TYLENOL) tablet 650 mg  650 mg Oral Q4H PRN Charli Castro MD   650 mg at 04/12/24 1600    Or    acetaminophen (TYLENOL) Suppository 650 mg  650 mg Rectal Q4H PRN Charli Castro MD        albuterol (PROVENTIL) neb solution 2.5 mg  2.5 mg Nebulization Q6H PRN Charli Castro MD   2.5 mg at 03/31/24 0231    aspirin (ASA) chewable tablet 81 mg  81 mg Oral BID Gerry Pizarro MD   81 mg at 04/12/24 2117    bisacodyl (DULCOLAX) EC tablet 5 mg  5 mg Oral Daily PRN Charli Castro MD        bisacodyl (DULCOLAX) suppository 10 mg  10 mg Rectal Daily PRN Charli Castro MD        bumetanide (BUMEX) 0.25 mg/mL infusion  0.25 mg/hr Intravenous Continuous Molly Hampton APRN CNP 1 mL/hr at 04/13/24 0000 0.25 mg/hr at 04/13/24 0000    bumetanide (BUMEX) injection 2 mg  2 mg Intravenous Once Lizbeth Nicholson NP        calcium carbonate (TUMS) chewable tablet 1,000 mg  1,000 mg Oral 4x Daily PRN Charli Castro MD        [Held by provider] enoxaparin ANTICOAGULANT (LOVENOX) injection 40 mg  40 mg Subcutaneous Q12H Angi Mina MD   40 mg at 04/11/24 2059    fluticasone-vilanterol (BREO ELLIPTA) 200-25 MCG/ACT inhaler 1 puff  1 puff Inhalation Daily Angi Mina MD   1 puff at 04/12/24 0915    guaiFENesin (ROBITUSSIN) 20 mg/mL solution 200 mg  200 mg Oral Q4H PRN Charli Castro MD   200 mg at 03/30/24 0823    HOLD:  Metformin and metformin containing medications if patient  received IV contrast with acute kidney injury or severe chronic kidney disease (stage IV or stage V; i.e., eGFR less than 30)   Does not apply HOLD Darian Antonio MD        HYDROmorphone (DILAUDID) injection 0.2 mg  0.2 mg Intravenous Q4H PRN Angi Mina MD   0.2 mg at 04/13/24 0009    ipratropium - albuterol 0.5 mg/2.5 mg/3 mL (DUONEB) neb solution 3 mL  3 mL Nebulization 4x daily Gerry Pizarro MD   3 mL at 04/13/24 0716    lidocaine (LMX4) cream   Topical Q1H PRN Gerry Pizarro MD        lidocaine 1 % 0.1-1 mL  0.1-1 mL Other Q1H PRN Gerry Pizarro MD        miconazole (MICATIN) 2 % powder   Topical BID Angi Mina MD   Given at 04/12/24 2251    miconazole with skin protectant (KAYDEN ANTIFUNGAL) 2 % cream   Topical BID Angi Mina MD   Given at 04/12/24 2252    multivitamin w/minerals (THERA-VIT-M) tablet 1 tablet  1 tablet Oral Daily Charli Castro MD   1 tablet at 04/11/24 1304    naloxone (NARCAN) injection 0.2 mg  0.2 mg Intravenous Q2 Min PRN Dennys Harmon MD        Or    naloxone (NARCAN) injection 0.4 mg  0.4 mg Intravenous Q2 Min PRN Dennys Harmon MD        Or    naloxone (NARCAN) injection 0.2 mg  0.2 mg Intramuscular Q2 Min PRN Dennys Harmon MD        Or    naloxone (NARCAN) injection 0.4 mg  0.4 mg Intramuscular Q2 Min PRN Dennys Harmon MD        ondansetron (ZOFRAN ODT) ODT tab 4 mg  4 mg Oral Q6H PRN Charli Castro MD        Or    ondansetron (ZOFRAN) injection 4 mg  4 mg Intravenous Q6H PRN Charli Castro MD        oxyCODONE (ROXICODONE) tablet 5 mg  5 mg Oral Q6H PRN Dennys Harmon MD   5 mg at 03/31/24 0815    polyethylene glycol (MIRALAX) Packet 17 g  17 g Oral BID PRN Charli Castro MD        polyethylene glycol (MIRALAX) Packet 17 g  17 g Oral Daily Charli Castro MD   17 g at 04/10/24 1000    potassium chloride marcie ER (KLOR-CON M20) CR tablet 40 mEq  40 mEq Oral TID Molly Hampton APRN CNP   40 mEq at 04/12/24 2114     senna-docusate (SENOKOT-S/PERICOLACE) 8.6-50 MG per tablet 1 tablet  1 tablet Oral BID PRN Charli Castro MD        Or    senna-docusate (SENOKOT-S/PERICOLACE) 8.6-50 MG per tablet 2 tablet  2 tablet Oral BID PRN Charli Castro MD        sodium chloride (PF) 0.9% PF flush 10 mL  10 mL Intracatheter Q8H Gerry Pizarro MD   10 mL at 04/12/24 1726    sodium chloride (PF) 0.9% PF flush 10-20 mL  10-20 mL Intracatheter q1 min prn Gerry Pizarro MD        sodium chloride (PF) 0.9% PF flush 3 mL  3 mL Intracatheter q1 min prn Charli Castro MD         Current Facility-Administered Medications   Medication Dose Route Frequency Provider Last Rate Last Admin    acetaminophen (TYLENOL) tablet 650 mg  650 mg Oral Q4H PRN Charli Castro MD   650 mg at 04/12/24 1600    Or    acetaminophen (TYLENOL) Suppository 650 mg  650 mg Rectal Q4H PRN Charli Castro MD        albuterol (PROVENTIL) neb solution 2.5 mg  2.5 mg Nebulization Q6H PRN Charli Castro MD   2.5 mg at 03/31/24 0231    aspirin (ASA) chewable tablet 81 mg  81 mg Oral BID Gerry Pizarro MD   81 mg at 04/12/24 2117    bisacodyl (DULCOLAX) EC tablet 5 mg  5 mg Oral Daily PRN Charli Castro MD        bisacodyl (DULCOLAX) suppository 10 mg  10 mg Rectal Daily PRN Charli Castro MD        bumetanide (BUMEX) 0.25 mg/mL infusion  0.25 mg/hr Intravenous Continuous Molly Hampton APRN CNP 1 mL/hr at 04/13/24 0000 0.25 mg/hr at 04/13/24 0000    bumetanide (BUMEX) injection 2 mg  2 mg Intravenous Once Lizbeth Nicholson NP        calcium carbonate (TUMS) chewable tablet 1,000 mg  1,000 mg Oral 4x Daily PRN Charli Castro MD        [Held by provider] enoxaparin ANTICOAGULANT (LOVENOX) injection 40 mg  40 mg Subcutaneous Q12H Angi Mina MD   40 mg at 04/11/24 2059    fluticasone-vilanterol (BREO ELLIPTA) 200-25 MCG/ACT inhaler 1 puff  1 puff Inhalation Daily Angi Mina MD   1 puff at 04/12/24 4788     guaiFENesin (ROBITUSSIN) 20 mg/mL solution 200 mg  200 mg Oral Q4H PRN Charli Castro MD   200 mg at 03/30/24 0823    HOLD:  Metformin and metformin containing medications if patient received IV contrast with acute kidney injury or severe chronic kidney disease (stage IV or stage V; i.e., eGFR less than 30)   Does not apply HOLD Darian Antonio MD        HYDROmorphone (DILAUDID) injection 0.2 mg  0.2 mg Intravenous Q4H PRN Angi Mina MD   0.2 mg at 04/13/24 0009    ipratropium - albuterol 0.5 mg/2.5 mg/3 mL (DUONEB) neb solution 3 mL  3 mL Nebulization 4x daily Gerry Pizarro MD   3 mL at 04/13/24 0716    lidocaine (LMX4) cream   Topical Q1H PRN Gerry Pizarro MD        lidocaine 1 % 0.1-1 mL  0.1-1 mL Other Q1H PRN Gerry Pizarro MD        miconazole (MICATIN) 2 % powder   Topical BID Angi Mina MD   Given at 04/12/24 2251    miconazole with skin protectant (KAYDEN ANTIFUNGAL) 2 % cream   Topical BID Angi Mina MD   Given at 04/12/24 2252    multivitamin w/minerals (THERA-VIT-M) tablet 1 tablet  1 tablet Oral Daily Charli Castro MD   1 tablet at 04/11/24 1304    naloxone (NARCAN) injection 0.2 mg  0.2 mg Intravenous Q2 Min PRN Dennys Harmon MD        Or    naloxone (NARCAN) injection 0.4 mg  0.4 mg Intravenous Q2 Min PRN Dennys Harmon MD        Or    naloxone (NARCAN) injection 0.2 mg  0.2 mg Intramuscular Q2 Min PRN Dennys Harmon MD        Or    naloxone (NARCAN) injection 0.4 mg  0.4 mg Intramuscular Q2 Min PRN Dennys Harmon MD        ondansetron (ZOFRAN ODT) ODT tab 4 mg  4 mg Oral Q6H PRN Charli Castro MD        Or    ondansetron (ZOFRAN) injection 4 mg  4 mg Intravenous Q6H PRN Charli Castro MD        oxyCODONE (ROXICODONE) tablet 5 mg  5 mg Oral Q6H PRN Dennys Harmon MD   5 mg at 03/31/24 0815    polyethylene glycol (MIRALAX) Packet 17 g  17 g Oral BID PRN Charli Castro MD        polyethylene glycol (MIRALAX) Packet 17 g  17 g Oral Daily  Charli Castro MD   17 g at 04/10/24 1000    potassium chloride marcie ER (KLOR-CON M20) CR tablet 40 mEq  40 mEq Oral TID Molly Hampton APRN CNP   40 mEq at 04/12/24 2117    senna-docusate (SENOKOT-S/PERICOLACE) 8.6-50 MG per tablet 1 tablet  1 tablet Oral BID PRN Charli Castro MD        Or    senna-docusate (SENOKOT-S/PERICOLACE) 8.6-50 MG per tablet 2 tablet  2 tablet Oral BID PRN Charli Castro MD        sodium chloride (PF) 0.9% PF flush 10 mL  10 mL Intracatheter Q8H Gerry Pizarro MD   10 mL at 04/12/24 1726    sodium chloride (PF) 0.9% PF flush 10-20 mL  10-20 mL Intracatheter q1 min prn Gerry Pizarro MD        sodium chloride (PF) 0.9% PF flush 3 mL  3 mL Intracatheter q1 min prn Charli Castro MD         Allergies   Allergies   Allergen Reactions    Eliquis [Apixaban] Shortness Of Breath, Hives and Swelling     Pt. Reported     Penicillins Anaphylaxis     Per chart review: tolerated Augmentin in 2020.  Tolerated CTX 2024.       Clindamycin Itching    Erythromycin      Itching, hives    Peanut Oil Hives    Tetracycline      itching       Social History    reports that she quit smoking about 11 years ago. Her smoking use included cigarettes. She started smoking about 55 years ago. She has a 44 pack-year smoking history. She has never used smokeless tobacco. She reports that she does not drink alcohol and does not use drugs.    Family History   Family History   Problem Relation Age of Onset    Angioedema Mother     Pulmonary Embolism Brother     Cerebrovascular Disease Father     Coronary Artery Disease Brother        Review of Systems   The comprehensive Review of Systems is negative other than noted in the HPI or here.     Physical Exam   Vital Signs with Ranges  Temp:  [97.5  F (36.4  C)-98.5  F (36.9  C)] 97.5  F (36.4  C)  Pulse:  [] 98  Resp:  [18-19] 18  BP: ()/(47-79) 118/65  SpO2:  [79 %-100 %] 99 %  Wt Readings from Last 4 Encounters:   04/13/24 136.1 kg  "(300 lb)   01/24/20 145.1 kg (319 lb 14.4 oz)   11/20/19 145.5 kg (320 lb 11.2 oz)   08/28/19 144 kg (317 lb 6.4 oz)     I/O last 3 completed shifts:  In: 246 [P.O.:240; I.V.:6]  Out: 1800 [Urine:1800]      Vitals: /65 (BP Location: Right arm)   Pulse 98   Temp 97.5  F (36.4  C) (Oral)   Resp 18   Ht 1.676 m (5' 6\")   Wt 136.1 kg (300 lb)   SpO2 99%   BMI 48.42 kg/m      General: Alert, oriented, in no acute distress  HEENT: PERRLA, mucous membranes moist  Neck: Normal JVP  CV: Regular rate and rhythm without murmur  Respiratory: Scattered wheezes bilaterally  GI: Normoactive bowel sounds; soft, non-tender abdomen  Neuro: No focal deficits appreciated  Extremities: 2+ peripheral edema bilaterally    Recent Labs   Lab 04/13/24  0632 04/13/24  0055 04/12/24  1603 04/12/24  0647 04/11/24  0638 04/10/24  0639 04/09/24  1114   WBC  --   --   --  9.2  --   --  10.2   HGB  --  9.3*  --  9.3*  --   --  11.4*   MCV  --   --   --  79  --   --  80   *  --   --  105*  --  118* 137*     --   --  143 141 139  --    POTASSIUM 4.5  --   --  4.1 4.2 4.2  --    CHLORIDE 100  --   --  97* 97* 97*  --    CO2 35*  --   --  37* 36* 35*  --    BUN 40.1*  --   --  34.4* 36.1* 37.5*  --    CR 1.24*  --   --  1.11* 1.10* 1.15*  --    GFRESTIMATED 49*  --   --  56* 56* 53*  --    ANIONGAP 10  --   --  9 8 7  --    LUNA 9.4  --   --  9.2 9.3 9.1  --    *  --  103* 118* 124* 132*  --      Recent Labs   Lab Test 11/20/20  1000 03/27/19  1100   CHOL 178 181   HDL 59 61    102   TRIG 97 90     Recent Labs   Lab 04/13/24  0632 04/13/24  0055 04/12/24  0647 04/10/24  0639 04/09/24  1114   WBC  --   --  9.2  --  10.2   HGB  --  9.3* 9.3*  --  11.4*   HCT  --   --  31.6*  --  38.4   MCV  --   --  79  --  80   *  --  105* 118* 137*     Recent Labs   Lab 04/12/24  1431 04/12/24  1430   PH 7.45 7.43   PO2 48* 30*   PCO2 51* 57*   HCO3 35* 38*     No results for input(s): \"NTBNPI\", \"NTBNP\" in the last 168 " "hours.  No results for input(s): \"DD\" in the last 168 hours.  No results for input(s): \"SED\", \"CRP\" in the last 168 hours.  Recent Labs   Lab 04/13/24  0632 04/12/24  0647 04/10/24  0639   * 105* 118*     No results for input(s): \"TSH\" in the last 168 hours.  No results for input(s): \"COLOR\", \"APPEARANCE\", \"URINEGLC\", \"URINEBILI\", \"URINEKETONE\", \"SG\", \"UBLD\", \"URINEPH\", \"PROTEIN\", \"UROBILINOGEN\", \"NITRITE\", \"LEUKEST\", \"RBCU\", \"WBCU\" in the last 168 hours.    Imaging:  Recent Results (from the past 48 hour(s))   Cardiac Catheterization    Narrative      IVUS was performed on the lesion.    Ultrasound (IVUS) was performed.    Right sided filling pressures are severely elevated.    Left sided filling pressures are severely elevated.    Severely elevated pulmonary artery hypertension.    Normal cardiac output level.    Hemodynamic data has been modified in Epic per physician review.    1.  Angiographically normal coronary arteries  2.  Severely elevated right-sided filling pressures (RA 16 mmHg)  3.  Severely elevated left-sided filling pressures (PCWP 28 mmHg)  4.  Severe mixed precapillary and postcapillary pulmonary hypertension   (mean PA 59 mmHg, PCWP 28 mmHg, PVR 4.69WU)  5.  Normal cardiac output/cardiac index (Elsa CO/CI 7.25/3.04)           Medical Decision Making       35 MINUTES SPENT BY ME on the date of service doing chart review, history, exam, documentation & further activities per the note.           "

## 2024-04-13 NOTE — PLAN OF CARE
Goal Outcome Evaluation: Pt is A&Ox4, IV Dilaudid 0.2 mg given for severe back pain during the bedrest, VSS And on tele SR, on 10-12 l oxymizer canula, R groin site C/D/I and good CMS and pulses, Miconazole powder and cream applied to panus and groins, pt is up with 1 assist to bedside commode, had small BM. Bumex 0.25 mg/ 1 ml/hr infusing, purewick in place. Plan to continue to diurese.        Plan of Care Reviewed With: patient, family    Overall Patient Progress: no changeOverall Patient Progress: no change

## 2024-04-14 NOTE — PLAN OF CARE
Goal Outcome Evaluation:  Neuro- A&OX4  Most Recent Vitals- Temp: 98.3  F (36.8  C) Temp src: Oral BP: 113/69 Pulse: 92   Resp: 18 SpO2: 93 % O2 Device: Oxymizer cannula   Tele/Cardiac- SR   Resp-8L NSC  Activity- independent at bedside   Pain- Generalized aches   Drips- Bumex gtt 0.5mg/hr  Drains/Tubes- [id number]  Skin- Right ankle wound   GI/- BSC  Aggression Color- Green  COVID status- Negative  Plan- Bumex gtt, C-rehab.  [facility]     Clarice Ferreira RN

## 2024-04-14 NOTE — PROGRESS NOTES
[facility]    Cardiology Consultation - Progress Note     Date of Admission:  3/29/2024  Date of Service: 4/14/2024    Reason for Consult   Reason for consult: acute on chronic respiratory failure    History of Present Illness   Linda Otero is a 64 year old female who was admitted on 3/29/2024 for acute on chronic respiratory failure. Medical comorbidities include COPD, chronic respiratory failure on home oxygen with history of hypercapnia, nicotine dependence, HFpEF, DVT/PE  on past warfarin anticoagulation, recent hemoptysis, morbid obesity, CKD stage III, CAD, HLD, HTN, hypothyroidism, MGUS, seizure disorder, CVA.    She underwent right and left heart catheterization (on 4/12) which showed normal coronary arteries, severely elevated right-sided pressures (RA 16 mmHg), severely elevated left-sided filling pressures (PCWP 28 mmHg), mixed precapillary and postcapillary pulmonary hypertension (mean PA 59 mmHg, PCWP 28 mmHg, PVR 4.69WU), and normal cardiac output/cardiac index (Elsa CO/CI 7.25/3.04).    Interval history  Creatinine stable, making slow progress on diuresis, unclear dry weight    Assessment & Plan   1. Acute on chronic respiratory failure, symptoms improving      2. Severe Pulmonary Hypertension, mixed group 2 and 3  -2021 Chest CT showing enlarged main pulmonary artery as well as tortuous proximal subsegmental pulmonary arteries on the hilum consistent with chronic pulmonary hypertension  -RHC 4/12/2024 showed mixed precapillary and postcapillary pulmonary hypertension (mean PA 59 mmHg, PCWP 28 mmHg, PVR 4.69WU)     3. Right heart dysfunction and failure  - CT PE study 3/29/2024 without any PE but did demonstrate pulmonary congestion, R pleural effusion.  TTE 4/1 showing EF 60-65% without WMA, severe RV dilation with moderately decreased RV function, mod-severe TR and pulm HTN.  - Diuresing slowly; SOB gradually improving, dry weight remains unclear, notes dating back to  2020 indicate weight was ~317-319#  - Initiated on IV Furosemide infusion then transitioned to bumex with increased UOP  - RHC on 4/12/2024 showed severely elevated right-sided pressures (RA 16 mmHg), severely elevated left-sided filling pressures (PCWP 28 mmHg)     4. Hx CVA 2/2 paradoxical embolus, s/p PFO closure     5. JODI     6. Severe COPD, follows with pulmonology    7. Tricuspid regurgitation, moderate to severe on 4/4/24 TTE     8. Morbid Obesity, BMI 50    Plan:  Increase Bumex gtt to 0.5 mg/hour, will give 2 mg bolus, continue potassium chloride 40 mEq TID  Creatinine stable today  Consider V/Q scan this admission to look for CTEPH as part of PH picture  Strict I's/O, daily standing weight, daily BMP and repletion of lytes  Continue aspirin 81 mg daily  Jardiance 10 mg daily added 4/13 for HFpEF  Patient is interested in information on a motorized wheelchair    Kyle Echavarria MD    Primary Care Physician   Jayy Henson    Patient Active Problem List   Diagnosis    Chronic airway obstruction (H)    Morbid obesity (H)    Obstructive sleep apnea    Pulmonary hypertension (H)    Hypothyroidism    Menorrhagia    Hypertension goal BP (blood pressure) < 140/90    Iron deficiency anemia, unspecified iron deficiency    Long-term (current) use of anticoagulants [Z79.01]    Advanced directives, counseling/discussion    Primary hypercoagulable state (H24)    Monoclonal gammopathy    Lymphedema    Vitamin B12 deficiency (non anemic)       Past Medical History   I have reviewed this patient's medical history and updated it with pertinent information if needed.   Past Medical History:   Diagnosis Date    Abnormality of gait due to impairment of balance     Acute and chronic respiratory failure with hypercapnia (H)     Acute deep venous thrombosis (H) 10/20/2015    Anemia     Iron deficiency    Aneurysm (H24) 2012    Arthritis     toes, thumb, elbow    B12 deficiency     Cancer (H) 1985    cervical     Cellulitis right foot    Chronic diastolic heart failure (H)     Chronic kidney disease     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  left thalamic stroke as well as patchy MCA branch infarcts    CVA (cerebral vascular accident) (H) 12/23/2012    Left thalamic stroke, MCA branch infarcts    Dermatitis     DVT (deep venous thrombosis) (H)     Right leg    Encephalopathy     after stroke, believed related to hypoxia    History of transfusion     Hyperlipidemia     Hypertension     Hypothyroidism     Lymphedema     MGUS (monoclonal gammopathy of unknown significance)     Neuropathy of right lower extremity     Obesity     On home oxygen therapy     PFO (patent foramen ovale)     closed after stroke, see cardiology notes care everywhere    PFO (patent foramen ovale)     Pneumonia     Primary hypercoagulable state (H24)     Pulmonary emboli (H) 2013    Pulmonary HTN (H)     Respiratory failure (H)     after stroke    Seizure (H)     Seizures (H)     at age 31    Skin cancer 2014    Sleep apnea     CPAP    Stroke (H) 2012    Umbilical hernia     Wound of right ankle        Past Surgical History   I have reviewed this patient's surgical history and updated it with pertinent information if needed.  Past Surgical History:   Procedure Laterality Date    ASD REPAIR      ASD REPAIR      BIOPSY SKIN (LOCATION)      CARDIAC CATHETERIZATION  2012    2 stents    COLONOSCOPY N/A 4/9/2018    Procedure: COLONOSCOPY;  Surgeon: Dorene Araujo MD;  Location: [facility] OR;  Service:     CONIZATION  1985    CV CORONARY ANGIOGRAM N/A 4/12/2019    Procedure: Coronary Angiogram;  Surgeon: Brett Montalvo MD;  Location: [facility] Cath Lab;  Service: Cardiology    CV LEFT HEART CATHETERIZATION  WITHOUT LEFT VENTRICULOGRAM Left 4/12/2019    Procedure: Left Heart Catheterization Without Left Ventriculogram;  Surgeon: Brett Montalvo MD;  Location: [facility] Cath Lab;  Service: Cardiology    CV RIGHT HEART CATHETERIZATION N/A 4/12/2019    Procedure: Right Heart Catheterization;  Surgeon: Brett Montalvo MD;  Location: [facility] Cath Lab;  Service: Cardiology    ESOPHAGOSCOPY, GASTROSCOPY, DUODENOSCOPY (EGD), COMBINED N/A 4/9/2018    Procedure: ESOPHAGOGASTRODUODENOSCOPY (EGD);  Surgeon: Dorene Araujo MD;  Location: [facility] OR;  Service:     IR CAROTID ANGIOGRAM  12/26/2012    IR CAROTID ANGIOGRAM  12/26/2012    IR MISCELLANEOUS PROCEDURE  12/26/2012    IR MISCELLANEOUS PROCEDURE  12/26/2012    IR MISCELLANEOUS PROCEDURE  12/26/2012    OTHER SURGICAL HISTORY      Cardiac stents    PATENT FORAMEN OVALE CLOSURE      REPAIR PATENT FORAMEN OVALE  2013    Helex device    XR SACRO ILIAC JOINT INJECTION LEFT  11/2012    ZZC REMV ART CLOT ILIAC-POP,LEG INCIS Left 7/22/2019    Procedure: REPAIR LEFT FEMORAL ARTERIOVENOUS FISTULA WITH INTRAOPERATIVE ULTRASOUND;  Surgeon: Salinas Torres MD;  Location: [facility];  Service: General       Prior to Admission Medications   Prior to Admission Medications   Prescriptions Last Dose Informant Patient Reported? Taking?   DIETHYLTOLUAMIDE EX  at prn  Yes Yes   Sig: Externally apply topically as needed (bug bit prevention)   Ostomy Supplies (SKIN PREP WIPES) MISC  at prn  Yes Yes   Sig: Apply topically as needed (to intact blister stage 2 deep tissue injury)   Vitamins A & D (VITAMIN A & D) external ointment  at prn  Yes Yes   Sig: Apply topically 2 times daily as needed   acetaminophen (TYLENOL) 325 MG tablet  at prn  Yes Yes   Sig: Take 650 mg by mouth every 4 hours as needed for pain or fever   albuterol (PROVENTIL) (2.5 MG/3ML) 0.083% neb solution  at prn  No Yes   Sig: Take 1 vial (2.5 mg) by nebulization every 6 hours as needed for  shortness of breath or wheezing OFFICE VISIT NEEDED FOR ANY FURTHER REFILLS   albuterol (VENTOLIN HFA) 108 (90 Base) MCG/ACT inhaler  at prn  No Yes   Sig: INHALE 2 PUFFS INTO THE LUNGS EVERY 4-6 HOURS AS NEEDED.   aspirin (ASA) 81 MG chewable tablet 3/29/2024 at am  Yes Yes   Sig: Take 81 mg by mouth 2 times daily   bisacodyl (DULCOLAX) 10 MG suppository  at prn  Yes Yes   Sig: Place 10 mg rectally daily as needed for constipation   bisacodyl (DULCOLAX) 5 MG EC tablet  at prn  Yes Yes   Sig: Take 5 mg by mouth daily as needed for constipation   calcium carbonate (TUMS) 500 MG chewable tablet  at prn  Yes Yes   Sig: Take 1 chew tab by mouth 4 times daily as needed for heartburn   calcium carbonate 500 mg, elemental, (OSCAL) 500 MG tablet 3/28/2024 at pm  Yes Yes   Sig: Take 1 tablet by mouth at bedtime   cyanocobalamin (CYANOCOBALAMIN) 1000 MCG/ML injection 3/29/2024 at am  Yes Yes   Sig: Inject 1 mL into the muscle every 30 days   dimethicone-zinc oxide (KAYDEN PROTECT) external cream  at prn  Yes Yes   Sig: Apply topically 2 times daily as needed for dry skin or other   diphenhydrAMINE (BENADRYL) 25 MG capsule  at prn  No Yes   Sig: Take 1-2 capsules (25-50 mg) by mouth every 6 hours as needed for itching or allergies   fluticasone-vilanterol (BREO ELLIPTA) 200-25 MCG/ACT inhaler 3/29/2024 at am  No Yes   Sig: Inhale 1 puff into the lungs daily   furosemide (LASIX) 20 MG tablet 3/29/2024 at am  Yes Yes   Sig: Take 20 mg by mouth 2 times daily   guaiFENesin (ROBITUSSIN) 20 mg/mL liquid  at prn  Yes Yes   Sig: Take 200 mg by mouth every 4 hours as needed for cough   loperamide (IMODIUM A-D) 2 MG tablet  at prn  Yes Yes   Sig: Take 2 mg by mouth 4 times daily as needed for diarrhea   magnesium hydroxide (MILK OF MAGNESIA) 400 MG/5ML suspension  at prn  Yes Yes   Sig: Take 30 mLs by mouth daily as needed for constipation or heartburn   menthol-zinc oxide (CALMOSEPTINE) 0.44-20.6 % OINT ointment  at prn  Yes Yes   Sig:  Apply topically daily as needed for skin protection   multivitamin, therapeutic (THERA-VIT) TABS tablet 3/28/2024 at pm  Yes Yes   Sig: Take 1 tablet by mouth at bedtime   nystatin (MYCOSTATIN) 007177 UNIT/GM external powder  at prn  Yes Yes   Sig: Apply topically 2 times daily as needed for dry skin   tiotropium (SPIRIVA RESPIMAT) 2.5 MCG/ACT inhaler 3/29/2024 at am  No Yes   Sig: INHALE TWO PUFFS BY MOUTH EVERY DAY      Facility-Administered Medications: None     Current Facility-Administered Medications   Medication Dose Route Frequency Provider Last Rate Last Admin    acetaminophen (TYLENOL) tablet 650 mg  650 mg Oral Q4H PRN Charli Castro MD   650 mg at 04/12/24 1600    Or    acetaminophen (TYLENOL) Suppository 650 mg  650 mg Rectal Q4H PRN Charli Castro MD        albuterol (PROVENTIL) neb solution 2.5 mg  2.5 mg Nebulization Q6H PRN Charli Castro MD   2.5 mg at 03/31/24 0231    aspirin (ASA) chewable tablet 81 mg  81 mg Oral BID Gerry Pizarro MD   81 mg at 04/13/24 2033    bisacodyl (DULCOLAX) EC tablet 5 mg  5 mg Oral Daily PRN Charli Castro MD        bisacodyl (DULCOLAX) suppository 10 mg  10 mg Rectal Daily PRN Charli Castro MD        bumetanide (BUMEX) 0.25 mg/mL infusion  0.25 mg/hr Intravenous Continuous Molly Hampton APRN CNP 1 mL/hr at 04/14/24 0000 0.25 mg/hr at 04/14/24 0000    calcium carbonate (TUMS) chewable tablet 1,000 mg  1,000 mg Oral 4x Daily PRN Charli Castro MD        empagliflozin (JARDIANCE) tablet 10 mg  10 mg Oral Daily Kyle Echavarria MD   10 mg at 04/13/24 1241    enoxaparin ANTICOAGULANT (LOVENOX) injection 40 mg  40 mg Subcutaneous Q12H Angi Mina MD   40 mg at 04/13/24 2032    fluticasone-vilanterol (BREO ELLIPTA) 200-25 MCG/ACT inhaler 1 puff  1 puff Inhalation Daily Angi Mina MD   1 puff at 04/13/24 1041    guaiFENesin (ROBITUSSIN) 20 mg/mL solution 200 mg  200 mg Oral Q4H PRN Charli Castro MD    200 mg at 03/30/24 0823    HOLD:  Metformin and metformin containing medications if patient received IV contrast with acute kidney injury or severe chronic kidney disease (stage IV or stage V; i.e., eGFR less than 30)   Does not apply HOLD Darian Antonio MD        HYDROmorphone (DILAUDID) injection 0.2 mg  0.2 mg Intravenous Q4H PRN Angi Mina MD   0.2 mg at 04/13/24 0009    ipratropium - albuterol 0.5 mg/2.5 mg/3 mL (DUONEB) neb solution 3 mL  3 mL Nebulization 4x daily Gerry Pizarro MD   3 mL at 04/14/24 0802    lidocaine (LMX4) cream   Topical Q1H PRN Gerry Pizarro MD        lidocaine 1 % 0.1-1 mL  0.1-1 mL Other Q1H PRN Gerry Pizarro MD        miconazole (MICATIN) 2 % powder   Topical BID Angi Mina MD   Given at 04/13/24 1042    miconazole with skin protectant (KAYDEN ANTIFUNGAL) 2 % cream   Topical BID Angi Mina MD   Given at 04/13/24 1042    multivitamin w/minerals (THERA-VIT-M) tablet 1 tablet  1 tablet Oral Daily Charli Castro MD   1 tablet at 04/13/24 1241    naloxone (NARCAN) injection 0.2 mg  0.2 mg Intravenous Q2 Min PRN Dennys Harmon MD        Or    naloxone (NARCAN) injection 0.4 mg  0.4 mg Intravenous Q2 Min PRN Dennys Harmon MD        Or    naloxone (NARCAN) injection 0.2 mg  0.2 mg Intramuscular Q2 Min PRN Dennys Harmon MD        Or    naloxone (NARCAN) injection 0.4 mg  0.4 mg Intramuscular Q2 Min PRN Dennys Harmon MD        ondansetron (ZOFRAN ODT) ODT tab 4 mg  4 mg Oral Q6H PRN Charli Castro MD        Or    ondansetron (ZOFRAN) injection 4 mg  4 mg Intravenous Q6H PRN Charli Castro MD        oxyCODONE (ROXICODONE) tablet 5 mg  5 mg Oral Q6H PRN Dennys Harmon MD   5 mg at 03/31/24 0815    polyethylene glycol (MIRALAX) Packet 17 g  17 g Oral BID PRN Charli Castro MD        polyethylene glycol (MIRALAX) Packet 17 g  17 g Oral Daily Charli Castro MD   17 g at 04/10/24 1000    potassium chloride marcie ER (KLOR-CON M20) CR  tablet 40 mEq  40 mEq Oral TID Molly Hampton APRN CNP   40 mEq at 04/13/24 2032    senna-docusate (SENOKOT-S/PERICOLACE) 8.6-50 MG per tablet 1 tablet  1 tablet Oral BID PRN Charli Castro MD        Or    senna-docusate (SENOKOT-S/PERICOLACE) 8.6-50 MG per tablet 2 tablet  2 tablet Oral BID PRN Charli Castro MD        sodium chloride (PF) 0.9% PF flush 10 mL  10 mL Intracatheter Q8H Gerry Pizarro MD   3 mL at 04/13/24 1539    sodium chloride (PF) 0.9% PF flush 10-20 mL  10-20 mL Intracatheter q1 min prn Gerry Pizarro MD        sodium chloride (PF) 0.9% PF flush 3 mL  3 mL Intracatheter q1 min prn Charli Castro MD         Current Facility-Administered Medications   Medication Dose Route Frequency Provider Last Rate Last Admin    acetaminophen (TYLENOL) tablet 650 mg  650 mg Oral Q4H PRN Charli Castro MD   650 mg at 04/12/24 1600    Or    acetaminophen (TYLENOL) Suppository 650 mg  650 mg Rectal Q4H PRN Charli Castro MD        albuterol (PROVENTIL) neb solution 2.5 mg  2.5 mg Nebulization Q6H PRN Charli Castro MD   2.5 mg at 03/31/24 0231    aspirin (ASA) chewable tablet 81 mg  81 mg Oral BID Gerry Pizarro MD   81 mg at 04/13/24 2033    bisacodyl (DULCOLAX) EC tablet 5 mg  5 mg Oral Daily PRN Charli Castro MD        bisacodyl (DULCOLAX) suppository 10 mg  10 mg Rectal Daily PRN Charli Castro MD        bumetanide (BUMEX) 0.25 mg/mL infusion  0.25 mg/hr Intravenous Continuous Molly Hampton APRN CNP 1 mL/hr at 04/14/24 0000 0.25 mg/hr at 04/14/24 0000    calcium carbonate (TUMS) chewable tablet 1,000 mg  1,000 mg Oral 4x Daily PRN Charli Castro MD        empagliflozin (JARDIANCE) tablet 10 mg  10 mg Oral Daily Kyle Echavarria MD   10 mg at 04/13/24 1241    enoxaparin ANTICOAGULANT (LOVENOX) injection 40 mg  40 mg Subcutaneous Q12H Angi Mina MD   40 mg at 04/13/24 2032    fluticasone-vilanterol (BREO ELLIPTA) 200-25 MCG/ACT  inhaler 1 puff  1 puff Inhalation Daily Angi Mina MD   1 puff at 04/13/24 1041    guaiFENesin (ROBITUSSIN) 20 mg/mL solution 200 mg  200 mg Oral Q4H PRN Charli Castro MD   200 mg at 03/30/24 0823    HOLD:  Metformin and metformin containing medications if patient received IV contrast with acute kidney injury or severe chronic kidney disease (stage IV or stage V; i.e., eGFR less than 30)   Does not apply HOLD Darian Antonio MD        HYDROmorphone (DILAUDID) injection 0.2 mg  0.2 mg Intravenous Q4H PRN Angi Mina MD   0.2 mg at 04/13/24 0009    ipratropium - albuterol 0.5 mg/2.5 mg/3 mL (DUONEB) neb solution 3 mL  3 mL Nebulization 4x daily Gerry Pizarro MD   3 mL at 04/14/24 0802    lidocaine (LMX4) cream   Topical Q1H PRN Gerry Pizarro MD        lidocaine 1 % 0.1-1 mL  0.1-1 mL Other Q1H PRN Gerry Pizarro MD        miconazole (MICATIN) 2 % powder   Topical BID Angi Mina MD   Given at 04/13/24 1042    miconazole with skin protectant (KAYDEN ANTIFUNGAL) 2 % cream   Topical BID Angi Mina MD   Given at 04/13/24 1042    multivitamin w/minerals (THERA-VIT-M) tablet 1 tablet  1 tablet Oral Daily Charli Castro MD   1 tablet at 04/13/24 1241    naloxone (NARCAN) injection 0.2 mg  0.2 mg Intravenous Q2 Min PRN Dennys Harmon MD        Or    naloxone (NARCAN) injection 0.4 mg  0.4 mg Intravenous Q2 Min PRN Dennys Harmon MD        Or    naloxone (NARCAN) injection 0.2 mg  0.2 mg Intramuscular Q2 Min PRN Dennys Hramon MD        Or    naloxone (NARCAN) injection 0.4 mg  0.4 mg Intramuscular Q2 Min PRN Dennys Harmon MD        ondansetron (ZOFRAN ODT) ODT tab 4 mg  4 mg Oral Q6H PRN Charli Castro MD        Or    ondansetron (ZOFRAN) injection 4 mg  4 mg Intravenous Q6H PRN Charli Castro MD        oxyCODONE (ROXICODONE) tablet 5 mg  5 mg Oral Q6H PRN Dennys Harmon MD   5 mg at 03/31/24 0815    polyethylene glycol (MIRALAX) Packet 17 g  17 g Oral BID  PRN Charli Castro MD        polyethylene glycol (MIRALAX) Packet 17 g  17 g Oral Daily Charli Castro MD   17 g at 04/10/24 1000    potassium chloride marcie ER (KLOR-CON M20) CR tablet 40 mEq  40 mEq Oral TID HualorieMolly APRN CNP   40 mEq at 04/13/24 2032    senna-docusate (SENOKOT-S/PERICOLACE) 8.6-50 MG per tablet 1 tablet  1 tablet Oral BID PRN Charli Castro MD        Or    senna-docusate (SENOKOT-S/PERICOLACE) 8.6-50 MG per tablet 2 tablet  2 tablet Oral BID PRN Charli Castro MD        sodium chloride (PF) 0.9% PF flush 10 mL  10 mL Intracatheter Q8H Gerry Pizarro MD   3 mL at 04/13/24 1539    sodium chloride (PF) 0.9% PF flush 10-20 mL  10-20 mL Intracatheter q1 min prn Gerry Pizarro MD        sodium chloride (PF) 0.9% PF flush 3 mL  3 mL Intracatheter q1 min prn Charli Castro MD         Allergies   Allergies   Allergen Reactions    Eliquis [Apixaban] Shortness Of Breath, Hives and Swelling     Pt. Reported     Penicillins Anaphylaxis     Per chart review: tolerated Augmentin in 2020.  Tolerated CTX 2024.       Clindamycin Itching    Erythromycin      Itching, hives    Peanut Oil Hives    Tetracycline      itching       Social History    reports that she quit smoking about 11 years ago. Her smoking use included cigarettes. She started smoking about 55 years ago. She has a 44 pack-year smoking history. She has never used smokeless tobacco. She reports that she does not drink alcohol and does not use drugs.    Family History   Family History   Problem Relation Age of Onset    Angioedema Mother     Pulmonary Embolism Brother     Cerebrovascular Disease Father     Coronary Artery Disease Brother        Review of Systems   The comprehensive Review of Systems is negative other than noted in the HPI or here.     Physical Exam   Vital Signs with Ranges  Temp:  [97.5  F (36.4  C)-98.7  F (37.1  C)] 98  F (36.7  C)  Pulse:  [] 93  Resp:  [18-22] 20  BP: (106-118)/(65-71)  "107/66  SpO2:  [88 %-96 %] 95 %  Wt Readings from Last 4 Encounters:   04/14/24 135.8 kg (299 lb 6.4 oz)   01/24/20 145.1 kg (319 lb 14.4 oz)   11/20/19 145.5 kg (320 lb 11.2 oz)   08/28/19 144 kg (317 lb 6.4 oz)     I/O last 3 completed shifts:  In: 757 [P.O.:750; I.V.:7]  Out: 2200 [Urine:2200]      Vitals: /66   Pulse 93   Temp 98  F (36.7  C) (Oral)   Resp 20   Ht 1.676 m (5' 6\")   Wt 135.8 kg (299 lb 6.4 oz)   SpO2 95%   BMI 48.32 kg/m      General: Alert, oriented, in no acute distress  HEENT: PERRLA, mucous membranes moist  Neck: Normal JVP  CV: Regular rate and rhythm without murmur  Respiratory: Scattered wheezes bilaterally  GI: Normoactive bowel sounds; soft, non-tender abdomen  Neuro: No focal deficits appreciated  Extremities: 2+ peripheral edema bilaterally    Recent Labs   Lab 04/14/24  0726 04/13/24  0632 04/13/24  0055 04/12/24  1603 04/12/24  0647 04/11/24  0638 04/10/24  0639 04/09/24  1114   WBC  --   --   --   --  9.2  --   --  10.2   HGB  --   --  9.3*  --  9.3*  --   --  11.4*   MCV  --   --   --   --  79  --   --  80   PLT  --  105*  --   --  105*  --  118* 137*    145  --   --  143   < > 139  --    POTASSIUM 4.0 4.5  --   --  4.1   < > 4.2  --    CHLORIDE 97* 100  --   --  97*   < > 97*  --    CO2 37* 35*  --   --  37*   < > 35*  --    BUN 38.2* 40.1*  --   --  34.4*   < > 37.5*  --    CR 1.22* 1.24*  --   --  1.11*   < > 1.15*  --    GFRESTIMATED 50* 49*  --   --  56*   < > 53*  --    ANIONGAP 7 10  --   --  9   < > 7  --    LUNA 9.3 9.4  --   --  9.2   < > 9.1  --    * 111*  --  103* 118*   < > 132*  --     < > = values in this interval not displayed.     Recent Labs   Lab Test 11/20/20  1000 03/27/19  1100   CHOL 178 181   HDL 59 61    102   TRIG 97 90     Recent Labs   Lab 04/13/24  0632 04/13/24  0055 04/12/24  0647 04/10/24  0639 04/09/24  1114   WBC  --   --  9.2  --  10.2   HGB  --  9.3* 9.3*  --  11.4*   HCT  --   --  31.6*  --  38.4   MCV  --   --  " "79  --  80   *  --  105* 118* 137*     Recent Labs   Lab 04/12/24  1431 04/12/24  1430   PH 7.45 7.43   PO2 48* 30*   PCO2 51* 57*   HCO3 35* 38*     No results for input(s): \"NTBNPI\", \"NTBNP\" in the last 168 hours.  No results for input(s): \"DD\" in the last 168 hours.  No results for input(s): \"SED\", \"CRP\" in the last 168 hours.  Recent Labs   Lab 04/13/24  0632 04/12/24  0647 04/10/24  0639   * 105* 118*     No results for input(s): \"TSH\" in the last 168 hours.  No results for input(s): \"COLOR\", \"APPEARANCE\", \"URINEGLC\", \"URINEBILI\", \"URINEKETONE\", \"SG\", \"UBLD\", \"URINEPH\", \"PROTEIN\", \"UROBILINOGEN\", \"NITRITE\", \"LEUKEST\", \"RBCU\", \"WBCU\" in the last 168 hours.    Imaging:  Recent Results (from the past 48 hour(s))   Cardiac Catheterization    Narrative      IVUS was performed on the lesion.    Ultrasound (IVUS) was performed.    Right sided filling pressures are severely elevated.    Left sided filling pressures are severely elevated.    Severely elevated pulmonary artery hypertension.    Normal cardiac output level.    Hemodynamic data has been modified in Epic per physician review.    1.  Angiographically normal coronary arteries  2.  Severely elevated right-sided filling pressures (RA 16 mmHg)  3.  Severely elevated left-sided filling pressures (PCWP 28 mmHg)  4.  Severe mixed precapillary and postcapillary pulmonary hypertension   (mean PA 59 mmHg, PCWP 28 mmHg, PVR 4.69WU)  5.  Normal cardiac output/cardiac index (Elsa CO/CI 7.25/3.04)           Medical Decision Making       35 MINUTES SPENT BY ME on the date of service doing chart review, history, exam, documentation & further activities per the note.           "

## 2024-04-14 NOTE — PLAN OF CARE
Goal Outcome Evaluation:  A&O x4, VSS on 8L/oxymizer cannula sat 90s. Tele SR. On continuous iv Bumex at 0.25 mg/hr. Agreeable this am for VS check & nursing assessment. Up to BSC indep/SBA. Denies pain. Plans for discharge pending clinical improvement.

## 2024-04-14 NOTE — PLAN OF CARE
VS:  Stable  Assist by: IND; Refusing safety interventions as wants to assume independence. Tried explaining importance of safety/fall precautions but refusing all. Angry and agitated that bed alarm was put on and triggered at start of shift.     Cardiac: RRR; Tachy  Neuro: A&Ox4  CMS: REFUSED ASSESSSMENT   Respi: diminished to auscultation; on 8L-Oxymizer    : Continent; voiding freely; (+) Urinary frequency on Bumex; REFUSED Purewick as discussed earlier in the day with teams.  GI: Abdo Obese and rounded; non- tender; BS audible; Bruised from Clexane injections    Strip/Tele: STach    Pressure areas/Skin:    - PA's  - REFUSED ASSESSSMENT    - BL LE's with Wraps    - L / R Groin access -> REFUSED ASSESSSMENT           LDA's:     PIVC to L  arm  ongoing Bumex gtt      Plans:    > lymphedema wraps   > Avoid non-steroidal anti-inflammatory drugs      > Cardio:   - Continue Bumex gtt at 0.25 mg/hour, potassium chloride 40 mEq TID   - recheck creat tomorrow    - Consider V/Q scan this admission    - Strict I's/O, daily standing weight, daily BMP and repletion of lytes             Problem: Adult Inpatient Plan of Care  Goal: Absence of Hospital-Acquired Illness or Injury  Intervention: Identify and Manage Fall Risk  Recent Flowsheet Documentation  Taken 4/13/2024 2030 by Maya Sanderson RN  Safety Promotion/Fall Prevention: (REFUSED BED ALARM)   safety round/check completed   room organization consistent   nonskid shoes/slippers when out of bed   other (see comments)  Intervention: Prevent Skin Injury  Recent Flowsheet Documentation  Taken 4/13/2024 2030 by Maya Sanderson, RN  Body Position: position changed independently     Problem: Gas Exchange Impaired  Goal: Optimal Gas Exchange  Intervention: Optimize Oxygenation and Ventilation  Recent Flowsheet Documentation  Taken 4/13/2024 2030 by Maya Sanderson RN  Head of Bed (HOB) Positioning: HOB at 15 degrees     Problem: Fall  Injury Risk  Goal: Absence of Fall and Fall-Related Injury  Intervention: Promote Injury-Free Environment  Recent Flowsheet Documentation  Taken 4/13/2024 2030 by Maya Sanderson RN  Safety Promotion/Fall Prevention: (REFUSED BED ALARM)   safety round/check completed   room organization consistent   nonskid shoes/slippers when out of bed   other (see comments)     Problem: Heart Failure  Goal: Optimal Coping  Intervention: Support Psychosocial Response  Recent Flowsheet Documentation  Taken 4/13/2024 2030 by Maya Sanderson RN  Supportive Measures:   active listening utilized   decision-making supported   verbalization of feelings encouraged  Goal: Optimal Cardiac Output  Intervention: Optimize Cardiac Output  Recent Flowsheet Documentation  Taken 4/13/2024 2030 by Maya Sanderson RN  Environmental Support:   calm environment promoted   environmental consistency promoted   personal routine supported   distractions minimized  Goal: Optimal Functional Ability  Intervention: Optimize Functional Ability  Recent Flowsheet Documentation  Taken 4/13/2024 2030 by Maya Sanderson RN  Activity Management: sitting, edge of bed  Goal: Effective Oxygenation and Ventilation  Intervention: Promote Airway Secretion Clearance  Recent Flowsheet Documentation  Taken 4/13/2024 2030 by Maya Sanderosn RN  Activity Management: sitting, edge of bed  Intervention: Optimize Oxygenation and Ventilation  Recent Flowsheet Documentation  Taken 4/13/2024 2030 by Maya Sanderson RN  Head of Bed (HOB) Positioning: HOB at 15 degrees

## 2024-04-14 NOTE — PLAN OF CARE
Neuro: A&O x4  Tele/cardiac: SR  Respiration: 8L oxymizer canula  Activity: independent to bedside comode  Pain: denies  Drips: bumex @ 0.5 ml/hr  Drains/tubes: none  Skin: R ankle wound, dressing completed, BLE edema, scattered bruising  GI/: continent adequate UOP  Aggression color: green  Isolation: none  Plan: diuresing with bumex drip

## 2024-04-14 NOTE — PROGRESS NOTES
"Pt declined VS checks and nursing assessment stating I'm fine, just let me sleep\" A&O x4, discussed the continuous iv Bumex infusion with the pt and emphasized the importance of monitoring the VS. Pt reluctantly agreed to call this writer next time she is awake for VS checks and assessment. Denies experiencing SOB or any pain. Will continue to monitor.   "

## 2024-04-14 NOTE — PROGRESS NOTES
Department of Anesthesiology  Postprocedure Note    Patient: Lonnie Graham  MRN: [id number]  YOB: 1941  Date of evaluation: 4/12/2021  Time:  1:36 PM     Procedure Summary     Date: 04/12/21 Room / Location: [address] [place] [address]    Anesthesia Start: 1315 Anesthesia Stop: 6642    Procedures:       EGD BIOPSY      EGD CONTROL HEMORRHAGE Diagnosis: (enemia)    Surgeons: Wilma Hickey MD Responsible Provider: Lela Ibrahim MD    Anesthesia Type: MAC ASA Status: 4          Anesthesia Type: MAC    Caio Phase I: Caio Score: 10    Caio Phase II:      Last vitals: Reviewed and per EMR flowsheets.        Anesthesia Post Evaluation    Patient location during evaluation: bedside  Patient participation: complete - patient participated  Level of consciousness: awake and alert  Pain score: 0  Airway patency: patent  Nausea & Vomiting: no vomiting and no nausea  Complications: no  Cardiovascular status: hemodynamically stable  Respiratory status: room air and spontaneous ventilation  Hydration status: stable [facility]    Hospitalist Progress Note    Interval History   Patient is awake and alert this morning.  Continues to have ongoing diuresis.  Generalized fatigue.  Continuing to need up to 7 L oxygen.  Minimal activity.    -Data reviewed today: I reviewed all new labs and imaging results over the last 24 hours. I personally reviewed the chest CT image(s) showing pulmonary hypertension with asymmetric right heart enlargement. .    Physical Exam   Temp: 98  F (36.7  C) Temp src: Oral BP: 107/66 Pulse: 93   Resp: 20 SpO2: 95 % O2 Device: Oxymizer cannula Oxygen Delivery: 8 LPM  Vitals:    04/13/24 0630 04/14/24 0300 04/14/24 0734   Weight: 136.1 kg (300 lb) 134.7 kg (296 lb 14.4 oz) 135.8 kg (299 lb 6.4 oz)     Vital Signs with Ranges  Temp:  [98  F (36.7  C)-98.7  F (37.1  C)] 98  F (36.7  C)  Pulse:  [] 93  Resp:  [18-22] 20  BP: (106-107)/(66-71) 107/66  SpO2:  [88 %-96 %] 95 %  I/O last 3 completed shifts:  In: 757 [P.O.:750; I.V.:7]  Out: 2200 [Urine:2200]    Physical Exam  Constitutional:       Appearance: She is obese.   Cardiovascular:      Rate and Rhythm: Normal rate. Rhythm irregular.      Pulses: Normal pulses.      Heart sounds: Murmur heard.   Pulmonary:      Effort: Pulmonary effort is normal. No respiratory distress.      Breath sounds: Normal breath sounds.   Abdominal:      General: Bowel sounds are normal. There is no distension.      Tenderness: There is no abdominal tenderness. There is no guarding.      Comments: Large pannus   Musculoskeletal:      Right lower leg: Edema present.      Left lower leg: Edema present.   Skin:     General: Skin is warm and dry.   Neurological:      General: No focal deficit present.           Medications   Current Facility-Administered Medications   Medication Dose Route Frequency Provider Last Rate Last Admin    bumetanide (BUMEX) 0.25 mg/mL infusion  0.5 mg/hr Intravenous Continuous Kyle Echavarria MD 2 mL/hr at 04/14/24  0956 0.5 mg/hr at 04/14/24 0956     Current Facility-Administered Medications   Medication Dose Route Frequency Provider Last Rate Last Admin    aspirin (ASA) chewable tablet 81 mg  81 mg Oral BID Gerry Pizarro MD   81 mg at 04/14/24 0939    empagliflozin (JARDIANCE) tablet 10 mg  10 mg Oral Daily Kyle Echavarria MD   10 mg at 04/14/24 0939    enoxaparin ANTICOAGULANT (LOVENOX) injection 40 mg  40 mg Subcutaneous Q12H Angi Mina MD   40 mg at 04/14/24 0938    fluticasone-vilanterol (BREO ELLIPTA) 200-25 MCG/ACT inhaler 1 puff  1 puff Inhalation Daily Angi Mina MD   1 puff at 04/14/24 0951    ipratropium - albuterol 0.5 mg/2.5 mg/3 mL (DUONEB) neb solution 3 mL  3 mL Nebulization 4x daily Gerry Pizarro MD   3 mL at 04/14/24 0802    miconazole (MICATIN) 2 % powder   Topical BID Angi Mina MD   Given at 04/14/24 0948    miconazole with skin protectant (KAYDEN ANTIFUNGAL) 2 % cream   Topical BID Angi Mina MD   Given at 04/14/24 0948    multivitamin w/minerals (THERA-VIT-M) tablet 1 tablet  1 tablet Oral Daily Charli Castro MD   1 tablet at 04/13/24 1241    polyethylene glycol (MIRALAX) Packet 17 g  17 g Oral Daily Charli Castro MD   17 g at 04/10/24 1000    potassium chloride marcie ER (KLOR-CON M20) CR tablet 40 mEq  40 mEq Oral TID Molly Hampton APRN CNP   40 mEq at 04/14/24 0939    sodium chloride (PF) 0.9% PF flush 10 mL  10 mL Intracatheter Q8H Gerry Pizarro MD   10 mL at 04/14/24 0949       Data   Recent Labs   Lab 04/14/24  0726 04/13/24  0632 04/13/24  0055 04/12/24  1603 04/12/24  0647 04/11/24  0638 04/10/24  0639 04/09/24  1114   WBC  --   --   --   --  9.2  --   --  10.2   HGB  --   --  9.3*  --  9.3*  --   --  11.4*   MCV  --   --   --   --  79  --   --  80   PLT  --  105*  --   --  105*  --  118* 137*    145  --   --  143   < > 139  --    POTASSIUM 4.0 4.5  --   --  4.1   < > 4.2  --    CHLORIDE 97* 100  --   --  97*   < > 97*  --    CO2  37* 35*  --   --  37*   < > 35*  --    BUN 38.2* 40.1*  --   --  34.4*   < > 37.5*  --    CR 1.22* 1.24*  --   --  1.11*   < > 1.15*  --    ANIONGAP 7 10  --   --  9   < > 7  --    LUNA 9.3 9.4  --   --  9.2   < > 9.1  --    * 111*  --  103* 118*   < > 132*  --     < > = values in this interval not displayed.       No results found for this or any previous visit (from the past 24 hour(s)).        Assessment & Plan      Linda Otero is a 64 year old female admitted on 3/29/2024.  Past medical history of COPD, chronic respiratory failure on home oxygen with history of hypercapnia, nicotine dependence, HFpEF, DVT/PE  on past warfarin anticoagulation, recent hemoptysis, morbid obesity, CKD stage III, CAD, HLD, HTN, hypothyroidism, MGUS, seizure disorder, CVA, admitted 3/29/2024 with acute on chronic respiratory failure.        Acute on chronic hypoxic respiratory failure  *Speculate multifactorial from   HFpEF exacerbation (elevated BNP and weight gain),   COPD exacerbation,   JODI with noncompliance to CPAP and   CAP  Acute right heart failure with mod-severe TR     [Home Lasix 20mg BID PO]  *Baseline 3-5 L NC  *CT C PE 3/29/2024 without any PE but did demonstrate pulmonary congestion, R pleural effusion  *TTE 4/1 showing EF 60-65% without WMA, severe RV dilation with moderately decreased RV function, mod-severe TR and pulm HTN  *completed 5-day course of ceftriaxone+doxy on 4/2     - 04/07/24 completed 8-day prednisone taper.;   4/8 -restarted Breo.  Wheezing significantly improved.  -Was initially started on Lasix drip.  This was transition to Bumex drip at 0.25 mg/h on 4/9/2024.,  Received a one-time bolus bolus of IV Bumex 2 mg on 4/9, 4/10, 4/11, 4/13 and 4/14/2024.  Increase Bumex drip to 0.5 mg an hour on 4/14/2024   - weight down about 31 pounds since admission .  Had a urine output of 2.2 L in the last 24 hours.  Unclear how accurate the weight measurement is.  - lymphedema wraps  - on potassium  replacement with oral KCl 40 mEq 3 times daily  - Flutter valve and incentive spirometer  -Continuing to need 5 to 7 L nasal cannula to maintain sats at 88-92 percent  -Underwent left and right heart cath on 4/12/2024.  Showed normal coronary arteries.  Severely elevated right-sided pressures, severely elevated left-sided filling pressures.  Mixed precapillary and postcapillary pulmonary hypertension.  Normal cardiac index.  -Cardiology recommending continuing Bumex drip for 1 more day and reevaluating renal function tomorrow.    -Started on Jardiance 10 mg daily on 4/13/2024 for HFpEF  -Creatinine stable at 1.2     Hemoptysis, mild  *reports orange sputum production at home, was producing small amounts of rober blood 4/1  *admission CT chest negative for PE, masses  *resumed aspirin 4/3 and reports small amount of hemoptysis 4/4   - denies any further hemoptysis.  Lovenox started on 4/8/2024.  Held on 4/12/2024 for cardiac catheterization.     SONAL on Chronic kidney disease, stage III  *Speculate congestive nephropathy  *Admission serum Cr 1.33, historic baseline 0.87 to 1.08 in 2020 to 2023  - Avoid non-steroidal anti-inflammatory drugs (NSAIDs)  -Creatinine continuing to improve with aggressive diuresis.  Creatinine has been improving until 4/12/2024.  Currently stable at 1.2.  -Had 2,2 L urine output in the last 24 hours.     Hypokalemia  *due to diuresis  - replace per protocol  -Started on oral KCl 40 mEq 3 times daily  -Potassium normal.     History of deep venous thrombosis and pulmonary embolism  *CT C PE 3/29/2024 without any PE  *Per pharmacy, no longer on warfarin  - resume lovenox 04/07/24   - has been counseled on importance of frequent ambulation, out of bed activity  -Cardiology does recommend a VQ scan to evaluate for chronic thromboembolic pulmonary hypertension.  Will defer the timing of this to cardiology     Skin ulcer, right lateral ankle  Wound nurse (WOC) following     Weakness and  "deconditioning  Therapies recommend home with assist, home therapies  Reorder home care with PT OT and RN at the time of discharge     Obstructive sleep apnea  -Patient is on CPAP at home but has not been using it since admission since she did not tolerate the mask initially.  Encouraged her to bring her home CPAP machine today.  We tried the hospital mask on 4/9/2024 and she did not tolerate this.  -Patient continues to be noncompliant with her CPAP use in the hospital despite having her home machine here.     Mild thrombocytopenia-  platelet count has been slowly downtrending over the last 3 days.  -135---137---118  -  continue to monitor for now.     Hyperlipidemia - Diet-controlled, monitor; follow-up with primary clinic provider   Hypothyroidism - Med rec does not show any levothyroxine (TSH slightly elevated, free T4 wnl). Will defer to outpt evaluation by PCP  Morbid obesity  History of nicotine dependence  History of obstructive sleep apnea  History of coronary artery disease   History of monoclonal gammopathy of undetermined significance - noted, follow-up with primary clinic provider   History of cerebrovascular disease with prior stroke - noted, monitor  History of B12 deficiency - noted, continue prior to admission B12 replacement  History of seizure disorder - noted in medical record; monitor     clinically Significant Risk Factors               # Thrombocytopenia: Lowest platelets = 118 in last 2 days, will monitor for bleeding   # Hypertension: Noted on problem list        # Severe Obesity: Estimated body mass index is 50.42 kg/m  as calculated from the following:    Height as of this encounter: 1.676 m (5' 6\").    Weight as of this encounter: 141.7 kg (312 lb 6.4 oz).   # Moderate Malnutrition: based on nutrition assessment    # Financial/Environmental Concerns: none  # COPD: noted on problem list      Diet: Snacks/Supplements Adult: Expedite Bottle; Between Meals  Fluid restriction 2000 ML FLUID  2 " Gram Sodium Diet    DVT Prophylaxis: PCD  Cardiac Monitoring: Present  Code Status: No CPR- Do NOT Intubate      Medically Ready for Discharge: Anticipated in 2-4 Days   once patient is close to being euvolemic and optimized from a cardiac standpoint    Greater than 35 minutes spent on documentation, examination of the patient and reviewing cardiology recommendations.    Angi Mina MD, MD  [phone](p)

## 2024-04-15 NOTE — PLAN OF CARE
Goal Outcome Evaluation:  Heart Failure Care Map  GOALS TO BE MET BEFORE DISCHARGE:    1. Decrease congestion and/or edema with diuretic therapy to achieve near optimal volume status.     Dyspnea improved: No, further care required to meet this goal. Please explain   Still CERRATO, encouraged rest between activities and pulmonary hygiene.    Edema improved: No, further care required to meet this goal. Please explain   Encouraged elevation and lymphedema wraps in place.          Last 24 hour I/O:   Intake/Output Summary (Last 24 hours) at 4/15/2024 0651  Last data filed at 4/15/2024 0450  Gross per 24 hour   Intake 1090 ml   Output 4550 ml   Net -3460 ml           Net I/O and Weights since admission:   03/16 0700 - 04/15 0659  In: 82635 [P.O.:42316; I.V.:179]  Out: 42795 [Urine:18717]  Net: -38064     Vitals:    03/29/24 1447 03/29/24 1505 03/30/24 0554 03/31/24 0457   Weight: 136.1 kg (300 lb) (!) 150.3 kg (331 lb 5.6 oz) (!) 154.4 kg (340 lb 8 oz) (!) 156.2 kg (344 lb 6.4 oz)    04/01/24 0335 04/02/24 0600 04/03/24 0503 04/04/24 0600   Weight: (!) 156.8 kg (345 lb 9.6 oz) (!) 154.2 kg (340 lb) (!) 153 kg (337 lb 4.8 oz) (!) 150.5 kg (331 lb 14.4 oz)    04/05/24 0318 04/06/24 0608 04/07/24 0413 04/08/24 0250   Weight: 149.5 kg (329 lb 9.6 oz) 144.8 kg (319 lb 4.8 oz) 144.2 kg (318 lb) 143.3 kg (316 lb)    04/09/24 0342 04/10/24 0701 04/11/24 0408 04/12/24 0600   Weight: 142.9 kg (315 lb) 141.7 kg (312 lb 6.4 oz) 139.6 kg (307 lb 12.8 oz) 137.3 kg (302 lb 12.8 oz)    04/13/24 0630 04/14/24 0300 04/14/24 0734 04/15/24 0447   Weight: 136.1 kg (300 lb) 134.7 kg (296 lb 14.4 oz) 135.8 kg (299 lb 6.4 oz) 133.4 kg (294 lb)       2.  O2 sats > 90% on room air, or at prior home O2 therapy level.      Able to wean O2 this shift to keep sats above 90%?: No, further care required to meet this goal. Please explain    Desat to low 80s on attempt to wean.    Does patient use Home O2? Yes         Current oxygenation status:   SpO2: 92  %     O2 Device: Oxymizer cannula, Oxygen Delivery: 9 LPM    3.  Tolerates ambulation and mobility near baseline.     Ambulation: No, further care required to meet this goal. Please explain  d/t SOB. Tolerated few steps to BSC.    Times patient ambulated with staff this shift: 0    Please review the Heart Failure Care Map for additional HF goal outcomes.    Diuresing good UOP with   continuous iv Bumex at 0.50 mg. Tele  ST/SR. Up indep to BSC. Offered assistance, pt refused. Declined VS checks/assessment at times, agreeable as the shift progresses. Will continue to monitor.       Satish Nieves, FRANCISCO  4/15/2024

## 2024-04-15 NOTE — PROGRESS NOTES
[facility]    Medicine Progress Note - Hospitalist Service    Date of Admission:  3/29/2024    Assessment & Plan   Linda Otero is a 64 year old female admitted on 3/29/2024.  Past medical history of COPD, chronic respiratory failure on home oxygen with history of hypercapnia, nicotine dependence, HFpEF, DVT/PE  on past warfarin anticoagulation, recent hemoptysis, morbid obesity, CKD stage III, CAD, HLD, HTN, hypothyroidism, MGUS, seizure disorder, CVA, admitted 3/29/2024 with acute on chronic respiratory failure.        Acute on chronic hypoxic respiratory failure  *Speculate multifactorial from   HFpEF exacerbation (elevated BNP and weight gain),   COPD exacerbation,   JODI with noncompliance to CPAP and   CAP  Acute right heart failure with mod-severe TR  Severe Pulmonary Hypertension     [Home Lasix 20mg BID PO]  *Baseline 3-5 L NC  *CT C PE 3/29/2024 without any PE but did demonstrate pulmonary congestion, R pleural effusion  *TTE 4/1 showing EF 60-65% without WMA, severe RV dilation with moderately decreased RV function, mod-severe TR and pulm HTN    - Was initially started on Lasix drip.  This was transition to Bumex drip at 0.25 mg/h on 4/9/2024.,  Received a one-time bolus bolus of IV Bumex 2 mg on 4/9, 4/10, 4/11, 4/13 and 4/14/2024.  Increase Bumex drip to 0.5 mg an hour on 4/14/2024   - Cardiology following  - s/p left and right heart cath on 4/12/2024.  Showed normal coronary arteries.  Severely elevated right-sided pressures, severely elevated left-sided filling pressures.  Mixed precapillary and postcapillary pulmonary hypertension.  Normal cardiac index.  - weight down about 37 pounds since admission . Neg balance -17L since admission  - lymphedema wraps  - on potassium replacement with oral KCl 40 mEq 3 times daily  - cr stable 1.15--1/1--1.24--1.22  - Flutter valve and incentive spirometer  - today on 9L Oxymixer, try to wean down as able, O2 sat goal at 88-92  percent  - Started on Jardiance 10 mg daily on 4/13/2024 for HFpEF    COPD exacerbation   [PTA on Breo, Spiriva respimat, Alb inh and neb prn]  - completed 5-day course of ceftriaxone+doxy on 4/2  - 04/07/24 completed 8-day prednisone taper.;  - 4/8 -restarted Breo.  Wheezing significantly improved.  - currently om Breo 1 puff q daily and Duonebs QID     Hemoptysis, mild  *reports orange sputum production at home, was producing small amounts of rober blood 4/1  *admission CT chest negative for PE, masses  *resumed aspirin 4/3 and reports small amount of hemoptysis 4/4   - denies any further hemoptysis.  Lovenox started on 4/8/2024.  Held on 4/12/2024 for cardiac catheterization. Now resumed.     SONAL on Chronic kidney disease, stage III, improved  *Speculate congestive nephropathy  *Admission serum Cr 1.33, historic baseline 0.87 to 1.08 in 2020 to 2023  - Avoid non-steroidal anti-inflammatory drugs (NSAIDs)  - Creatinine continuing to improve with aggressive diuresis.  Creatinine has been improving until 4/12/2024.  Currently stable at 1.2.     Hypokalemia  *due to diuresis  - replace per protocol  - Started on oral KCl 40 mEq 3 times daily  - K 3.9 4/15     History of deep venous thrombosis and pulmonary embolism  *CT C PE 3/29/2024 without any PE  *Per pharmacy, no longer on warfarin  - resume lovenox 04/07/24   - has been counseled on importance of frequent ambulation, out of bed activity  - Cardiology does recommend a VQ scan to evaluate for chronic thromboembolic pulmonary hypertension.  Will defer the timing of this to cardiology     Skin ulcer, right lateral ankle  Wound nurse (WOC) following     Weakness and deconditioning  Therapies recommend home with assist, home therapies  Reorder home care with PT OT and RN at the time of discharge     Obstructive sleep apnea  - Patient is on CPAP at home but has not been using it since admission since she did not tolerate the mask initially.  Encouraged her to bring  "her home CPAP machine today.  We tried the hospital mask on 4/9/2024 and she did not tolerate this.  -Patient continues to be noncompliant with her CPAP use in the hospital despite having her home machine here.     Mild thrombocytopenia-  platelet count has been slowly downtrending over the last 3 days.  -135---137---118--105  -  continue to monitor for now.    Moderate Malnutrition  - nutritionist consult      Hyperlipidemia - Diet-controlled, monitor; follow-up with primary clinic provider   Hypothyroidism - Med rec does not show any levothyroxine (TSH slightly elevated, free T4 wnl). Will defer to outpt evaluation by PCP  Morbid obesity  History of nicotine dependence  History of obstructive sleep apnea  History of coronary artery disease   History of monoclonal gammopathy of undetermined significance - noted, follow-up with primary clinic provider   History of cerebrovascular disease with prior stroke - noted, monitor  History of B12 deficiency - noted, continue prior to admission B12 replacement  History of seizure disorder - noted in medical record; monitor          Diet: Snacks/Supplements Adult: Expedite Bottle; Between Meals  Fluid restriction 2000 ML FLUID  Combination Diet Regular Diet    DVT Prophylaxis: Lovenox  Miguel Catheter: Not present  Lines: PRESENT             Cardiac Monitoring: ACTIVE order. Indication: Post- PCI/Angiogram (24 hours)  Code Status: Full Code      Clinically Significant Risk Factors                  # Hypertension: Noted on problem list        # Severe Obesity: Estimated body mass index is 47.45 kg/m  as calculated from the following:    Height as of this encounter: 1.676 m (5' 6\").    Weight as of this encounter: 133.4 kg (294 lb).   # Moderate Malnutrition: based on nutrition assessment    # Financial/Environmental Concerns: none  # COPD: noted on problem list        Disposition Plan     Medically Ready for Discharge: Anticipated in 2-4 Days         Tri Zhao, " "MD  Hospitalist Service  [facility]  Securely message with Patsy (more info)  Text page via [employer] Paging/Directory   ______________________________________________________________________    Interval History   Patient seen and examined, chart reviewed  SHe is up in the chair, states her breathing \"was rough in the morning\", now better  No chest pain  No N/V, no abd pain  Diuresing well  Appetite is fine  Discussed with RN    Physical Exam   Vital Signs: Temp: 98.8  F (37.1  C) Temp src: Oral BP: 115/70 Pulse: 102   Resp: 24 SpO2: 92 % O2 Device: Oxymizer cannula Oxygen Delivery: 9 LPM  Weight: 294 lbs 0 oz    General Appearance: Awake, alert, NAD  Respiratory: bilateral air entry, no wheezing, no rales, no crakles  Cardiovascular: S1S2, irregular  GI: abd- soft, obese, nonT  Skin: multiple ecchymosis upper extremities  Extrem: 2+pitting edema RLE, 1+ pitting edema LLE; dressing around rt ankle    Medical Decision Making       52 MINUTES SPENT BY ME on the date of service doing chart review, history, exam, documentation & further activities per the note.      Data     I have personally reviewed the following data over the past 24 hrs:    N/A  \   N/A   / N/A     142 97 (L) 36.5 (H) /  125 (H)   3.9 33 (H) 1.22 (H) \       Imaging results reviewed over the past 24 hrs:   No results found for this or any previous visit (from the past 24 hour(s)).  "

## 2024-04-15 NOTE — PROGRESS NOTES
Patient consult to determine patency of PIV.  Dressing saturated.  Defect in extension set appears to be cause of majority of leakage, changed.  New dressing applied.  Flushes easily without discomfort or other signs extravasation.  Primary nursing to page VAT again if leaking recurs  / worsens.

## 2024-04-15 NOTE — PROGRESS NOTES
[facility] Cardiology Progress Note  Date of Service: 04/15/2024  Staff Cardiologist: Dr. Kendrick     Assessment & Plan   Linda Otero is a 64 year old female who was admitted on 3/29/2024 for acute on chronic respiratory failure. Medical comorbidities include COPD, chronic respiratory failure on home oxygen with history of hypercapnia, nicotine dependence, HFpEF, DVT/PE  on past warfarin anticoagulation, recent hemoptysis, morbid obesity, CKD stage III, CAD, HLD, HTN, hypothyroidism, MGUS, seizure disorder, CVA.     She underwent right and left heart catheterization (on 4/12) which showed normal coronary arteries, severely elevated right-sided pressures (RA 16 mmHg), severely elevated left-sided filling pressures (PCWP 28 mmHg), mixed precapillary and postcapillary pulmonary hypertension (mean PA 59 mmHg, PCWP 28 mmHg, PVR 4.69WU), and normal cardiac output/cardiac index (Elsa CO/CI 7.25/3.04).    Interval History:   Decent response to increase in bumex infusion, net negative 2.8L yesterday. Down 5 lbs today (EDW unknown). Renal function stable with ongoing diuresis.     Assessment:   Acute on chronic respiratory failure on chronic supplemental 2 to 3 L of O2  Likely multifactorial in the setting of multiple cardiac and pulmonary comorbidities  Remains on 8 to 10 L of O2, baseline 3 to 5L NC    Severe pulmonary hypertension, mixed WHO group 2 and 3  Chest CT (2021) showing enlarged main pulmonary artery as well as tortuous proximal subsegmental pulmonary arteries on the hilum consistent with chronic pulmonary hypertension  RHC 4/12/2024 showed mixed precapillary and postcapillary pulmonary hypertension (mean PA 59 mmHg, PCWP 28 mmHg, PVR 4.69WU)    Right-sided heart heart failure  CT PE study 3/29/2024 without any PE but did demonstrate pulmonary congestion, R pleural effusion.    TTE 4/1 showing EF 60-65% without WMA, severe RV dilation with moderately decreased RV function,  mod-severe TR and pulm HTN.  Diuresing slowly; SOB gradually improving, dry weight remains unclear, notes dating back to 2020 indicate weight was ~317-319#  Initiated on IV Furosemide infusion then transitioned to bumex with increased urine output.   RHC on 4/12/2024 showed severely elevated right-sided pressures (RA 16 mmHg), severely elevated left-sided filling pressures (PCWP 28 mmHg)    Hx of CVA s/p PFO closure   JODI with noncompliance with CPAP  Severe COPD with exacerbation  Moderate to severe tricuspid regurgitation  Morbid obesity with a BMI 50  SONAL on CKD, stage III, improved with diuresis [baseline cr 0.8-1.1)  Hx of DVT and PE      Plan:  Continue bumex infusion at 0.5 mg/hr.   Continue aspirin 81 mg daily and Jardiance 10 mg daily.   Consider VQ scan for CTEPH evaluation.   Daily BMP while diuresing.   Strict I&O's, daily standing weights, replace lytes per protocol.  Will continue to follow.     Amada Price, CNP  Pager:  [phone]  (7am - 5pm, M-F)      Physical Exam   Temp: 98.4  F (36.9  C) Temp src: Oral BP: 111/66 Pulse: 101   Resp: 19 SpO2: 90 % O2 Device: Oxymizer cannula Oxygen Delivery: 9 LPM  Vitals:    04/14/24 0300 04/14/24 0734 04/15/24 0447   Weight: 134.7 kg (296 lb 14.4 oz) 135.8 kg (299 lb 6.4 oz) 133.4 kg (294 lb)       Constitutional:   NAD   Skin:   Warm and dry   Head:   Nontraumatic   Neck:   no JVD   Lungs:   normal   Cardiovascular:   regular rate and rhythm   Abdomen:   Benign   Extremities and Back:   Bilateral 2+ KT, R>L   Neurological:   Grossly nonfocal       Medications   Current Facility-Administered Medications   Medication Dose Route Frequency Provider Last Rate Last Admin    bumetanide (BUMEX) 0.25 mg/mL infusion  0.5 mg/hr Intravenous Continuous Kyle Echavarria MD 2 mL/hr at 04/14/24 1925 0.5 mg/hr at 04/14/24 1925     Current Facility-Administered Medications   Medication Dose Route Frequency Provider Last Rate Last Admin    aspirin (ASA)  chewable tablet 81 mg  81 mg Oral BID Gerry Pizarro MD   81 mg at 04/15/24 0935    empagliflozin (JARDIANCE) tablet 10 mg  10 mg Oral Daily Kyle Echavarria MD   10 mg at 04/15/24 0935    enoxaparin ANTICOAGULANT (LOVENOX) injection 40 mg  40 mg Subcutaneous Q12H Angi Mina MD   40 mg at 04/15/24 0936    fluticasone-vilanterol (BREO ELLIPTA) 200-25 MCG/ACT inhaler 1 puff  1 puff Inhalation Daily Angi Mina MD   1 puff at 04/15/24 0940    ipratropium - albuterol 0.5 mg/2.5 mg/3 mL (DUONEB) neb solution 3 mL  3 mL Nebulization 4x daily Gerry Pizarro MD   3 mL at 04/15/24 1159    miconazole (MICATIN) 2 % powder   Topical BID Angi Mina MD   Given at 04/15/24 0939    miconazole with skin protectant (KAYDEN ANTIFUNGAL) 2 % cream   Topical BID Angi Mina MD   Given at 04/15/24 0939    multivitamin w/minerals (THERA-VIT-M) tablet 1 tablet  1 tablet Oral Daily Charli Castro MD   1 tablet at 04/14/24 1308    polyethylene glycol (MIRALAX) Packet 17 g  17 g Oral Daily Charli Castro MD   17 g at 04/15/24 0935    potassium chloride marcie ER (KLOR-CON M20) CR tablet 40 mEq  40 mEq Oral TID Molly Hampton APRN CNP   40 mEq at 04/15/24 0935    sodium chloride (PF) 0.9% PF flush 10 mL  10 mL Intracatheter Q8H Gerry Pizarro MD   10 mL at 04/15/24 0939       Data     Most Recent 3 CBC's:  Recent Labs   Lab Test 04/13/24  0632 04/13/24  0055 04/12/24  0647 04/10/24  0639 04/09/24  1114 04/07/24  1245 03/30/24  0612   WBC  --   --  9.2  --  10.2  --  4.5   HGB  --  9.3* 9.3*  --  11.4*  --  11.6*   MCV  --   --  79  --  80  --  81   *  --  105* 118* 137*   < > 158    < > = values in this interval not displayed.     Most Recent 3 BMP's:  Recent Labs   Lab Test 04/15/24  0627 04/14/24  0726 04/13/24  0632    141 145   POTASSIUM 3.9 4.0 4.5   CHLORIDE 97* 97* 100   CO2 33* 37* 35*   BUN 36.5* 38.2* 40.1*   CR 1.22* 1.22* 1.24*   ANIONGAP 12 7 10   LUNA 9.1 9.3 9.4  "  * 117* 111*     Most Recent 3 Troponin's:No lab results found.  Most Recent 3 BNP's:  Recent Labs   Lab Test 03/29/24  1453   NTBNPI 4,421*     Most Recent Hemoglobin A1c:  Recent Labs   Lab Test 09/19/22  0946   A1C 5.5         Medical Decision Making       30 MINUTES SPENT BY ME on the date of service doing chart review, history, exam, documentation & further activities per the note.        Clinically Significant Risk Factors                  # Hypertension: Noted on problem list        # Severe Obesity: Estimated body mass index is 47.45 kg/m  as calculated from the following:    Height as of this encounter: 1.676 m (5' 6\").    Weight as of this encounter: 133.4 kg (294 lb).   # Moderate Malnutrition: based on nutrition assessment    # Financial/Environmental Concerns: none  # COPD: noted on problem list       Diastolic acute      Fluid overload, unspecified    Not present on admission    Not present on admission    Acute kidney failure, unspecified    Not present on admission        Pulmonary Heart Disease (Pulmonary hypertension or Cor pulmonale): Pulmonary Hypertension, unspecified    Not present on admission        "

## 2024-04-15 NOTE — PLAN OF CARE
Goal Outcome Evaluation:      Plan of Care Reviewed With: patient           Heart Failure Care Map  GOALS TO BE MET BEFORE DISCHARGE:    1. Decrease congestion and/or edema with diuretic therapy to achieve near optimal volume status.     Dyspnea improved: No, further care required to meet this goal. Please explain CERRATO using 7-9L O2   Edema improved: No, further care required to meet this goal. Please explain Still has LE edema        Last 24 hour I/O:   Intake/Output Summary (Last 24 hours) at 4/15/2024 1551  Last data filed at 4/15/2024 1542  Gross per 24 hour   Intake 1150 ml   Output 4250 ml   Net -3100 ml           Net I/O and Weights since admission:   03/16 2300 - 04/15 2259  In: 42615 [P.O.:52241; I.V.:179]  Out: 76410 [Urine:28768]  Net: -22307     Vitals:    03/29/24 1447 03/29/24 1505 03/30/24 0554 03/31/24 0457   Weight: 136.1 kg (300 lb) (!) 150.3 kg (331 lb 5.6 oz) (!) 154.4 kg (340 lb 8 oz) (!) 156.2 kg (344 lb 6.4 oz)    04/01/24 0335 04/02/24 0600 04/03/24 0503 04/04/24 0600   Weight: (!) 156.8 kg (345 lb 9.6 oz) (!) 154.2 kg (340 lb) (!) 153 kg (337 lb 4.8 oz) (!) 150.5 kg (331 lb 14.4 oz)    04/05/24 0318 04/06/24 0608 04/07/24 0413 04/08/24 0250   Weight: 149.5 kg (329 lb 9.6 oz) 144.8 kg (319 lb 4.8 oz) 144.2 kg (318 lb) 143.3 kg (316 lb)    04/09/24 0342 04/10/24 0701 04/11/24 0408 04/12/24 0600   Weight: 142.9 kg (315 lb) 141.7 kg (312 lb 6.4 oz) 139.6 kg (307 lb 12.8 oz) 137.3 kg (302 lb 12.8 oz)    04/13/24 0630 04/14/24 0300 04/14/24 0734 04/15/24 0447   Weight: 136.1 kg (300 lb) 134.7 kg (296 lb 14.4 oz) 135.8 kg (299 lb 6.4 oz) 133.4 kg (294 lb)       2.  O2 sats > 90% on room air, or at prior home O2 therapy level.      Able to wean O2 this shift to keep sats above 90%?: No, further care required to meet this goal. Please explain 8-9 L   Does patient use Home O2? Yes-  3-5 L          Current oxygenation status:   SpO2: 90 %     O2 Device: Oxymizer cannula, Oxygen Delivery: 9 LPM    3.   Tolerates ambulation and mobility near baseline.     Ambulation: No, further care required to meet this goal. Please explain Still   Times patient ambulated with staff this shift: 1    Pt is A&Ox4, VSS And on tele SR, on 7-8l oxymizer canula, R groin site C/D/I and good CMS and pulses, Miconazole powder and cream applied to panus and groins, pt is up with indep/SBA assist to bedside commode, had small BM. Bumex 0.50 mg/ 1 ml/hr infusing, Plan to continue to diurese.            Please review the Heart Failure Care Map for additional HF goal outcomes.    Loulou Flores, RN  4/15/2024

## 2024-04-16 NOTE — PLAN OF CARE
Orientations: A/O x4  Vitals/Pain: VSS on 9 L O2 via oxymizer. Upper back -decline prn meds  Tele: SR/ST  Lines/Drains: PIV with Bumex @ 0.5 ml/hr   Skin/Wounds: Scattered bruising. Wound to R ankle covered with dressing that is CDI  GI/: Great UOP. Pt has been getting up to BSC  Labs: Abnormal/Trends, Electrolyte Replacement- Awaiting am labs  Ambulation/Assist: Up ad henna  Sleep Quality: Poor per pt - bed not as comfortable  Plan: Continue to diurese and WOC per plan    Goal Outcome Evaluation:  Plan of Care Reviewed With: patient  Overall Patient Progress: improvingOverall Patient Progress: improving

## 2024-04-16 NOTE — PROGRESS NOTES
[facility] Cardiology Progress Note  Date of Service: 04/16/2024  Staff Cardiologist: Dr. Kendrick     Assessment & Plan   Linda Otero is a 64 year old female who was admitted on 3/29/2024 for acute on chronic respiratory failure. Medical comorbidities include COPD, chronic respiratory failure on home oxygen with history of hypercapnia, nicotine dependence, HFpEF, DVT/PE  on past warfarin anticoagulation, recent hemoptysis, morbid obesity, CKD stage III, CAD, HLD, HTN, hypothyroidism, MGUS, seizure disorder, and CVA.     She underwent right and left heart catheterization (on 4/12) which showed normal coronary arteries, severely elevated right-sided pressures (RA 16 mmHg), severely elevated left-sided filling pressures (PCWP 28 mmHg), mixed precapillary and postcapillary pulmonary hypertension (mean PA 59 mmHg, PCWP 28 mmHg, PVR 4.69WU), and normal cardiac output/cardiac index (Elsa CO/CI 7.25/3.04).    Interval History:   Continues to have decent urine output on bumex infusion. Net negative 3L yesterday though weight unchanged (?). Cr 1.29 (1.22). Remains on 8-9L of NC. Otherwise hemodynamically stable.     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    TTE 4/1 showing EF 60-65% without WMA, severe RV dilation with moderately  decreased RV function, mod-severe TR and pulm HTN.  Diuresing slowly; SOB gradually improving, dry weight remains unclear, notes dating back to 2020 indicate weight was ~317-319#  Initiated on IV Furosemide infusion then transitioned to bumex with increased urine output.   RHC on 4/12/2024 showed severely elevated right-sided pressures (RA 16 mmHg), severely elevated left-sided filling pressures (PCWP 28 mmHg)    Hx of CVA s/p PFO closure   JODI with noncompliance with CPAP  Severe COPD with exacerbation  Moderate to severe tricuspid regurgitation  Morbid obesity with a BMI 50  SONAL on CKD, stage III, improved with diuresis [baseline cr 0.8-1.1)  Hx of DVT and PE      Plan:  Continue bumex infusion at 0.5 mg/hr.   Continue aspirin 81 mg daily and Jardiance 10 mg daily.   Consider VQ scan for CTEPH evaluation.   Daily BMP while diuresing.   Strict I&O's, daily standing weights, replace lytes per protocol.  2G Na restriction (patient is eating stir huber in room).   Consider repeat RHC once more euvolemic.   Will continue to follow.     Amada Price, CNP  Pager:  [phone]  (7am - 5pm, M-F)      Physical Exam   Temp: 97.8  F (36.6  C) Temp src: Oral BP: 106/72 Pulse: 97   Resp: 18 SpO2: 97 % O2 Device: Oxymizer cannula Oxygen Delivery: 9 LPM  Vitals:    04/14/24 0734 04/15/24 0447 04/16/24 0602   Weight: 135.8 kg (299 lb 6.4 oz) 133.4 kg (294 lb) 133.4 kg (294 lb 1.6 oz)       Constitutional:   NAD   Skin:   Warm and dry   Head:   Nontraumatic   Neck:   no JVD   Lungs:   normal   Cardiovascular:   regular rate and rhythm   Abdomen:   Benign   Extremities and Back:   Bilateral 2+ KT, R>L   Neurological:   Grossly nonfocal       Medications   Current Facility-Administered Medications   Medication Dose Route Frequency Provider Last Rate Last Admin    bumetanide (BUMEX) 0.25 mg/mL infusion  0.5 mg/hr Intravenous Continuous Kyle Echavarria MD 2 mL/hr at 04/15/24 1828 0.5 mg/hr at 04/15/24 1828     Current  Facility-Administered Medications   Medication Dose Route Frequency Provider Last Rate Last Admin    aspirin (ASA) chewable tablet 81 mg  81 mg Oral BID Gerry Pizarro MD   81 mg at 04/16/24 1008    empagliflozin (JARDIANCE) tablet 10 mg  10 mg Oral Daily Kyle Echavarria MD   10 mg at 04/16/24 1008    enoxaparin ANTICOAGULANT (LOVENOX) injection 40 mg  40 mg Subcutaneous Q12H Angi Mina MD   40 mg at 04/16/24 1008    fluticasone-vilanterol (BREO ELLIPTA) 200-25 MCG/ACT inhaler 1 puff  1 puff Inhalation Daily Angi Mina MD   1 puff at 04/16/24 1023    ipratropium - albuterol 0.5 mg/2.5 mg/3 mL (DUONEB) neb solution 3 mL  3 mL Nebulization 4x daily Gerry Pizarro MD   3 mL at 04/16/24 1145    miconazole (MICATIN) 2 % powder   Topical BID Angi Mina MD   Given at 04/15/24 2101    miconazole with skin protectant (KAYDEN ANTIFUNGAL) 2 % cream   Topical BID Angi Mina MD   Given at 04/15/24 2101    multivitamin w/minerals (THERA-VIT-M) tablet 1 tablet  1 tablet Oral Daily Charli Castro MD   1 tablet at 04/15/24 1615    polyethylene glycol (MIRALAX) Packet 17 g  17 g Oral Daily Charli Castro MD   17 g at 04/16/24 1007    potassium chloride marcie ER (KLOR-CON M20) CR tablet 40 mEq  40 mEq Oral TID Molly Hampton APRN CNP   40 mEq at 04/16/24 1008    sodium chloride (PF) 0.9% PF flush 10 mL  10 mL Intracatheter Q8H Gerry Pizarro MD   10 mL at 04/15/24 1623       Data     Most Recent 3 CBC's:  Recent Labs   Lab Test 04/16/24  0641 04/13/24  0632 04/13/24  0055 04/12/24  0647 04/10/24  0639 04/09/24  1114   WBC 4.9  --   --  9.2  --  10.2   HGB 8.6*  --  9.3* 9.3*  --  11.4*   MCV 82  --   --  79  --  80   PLT 98* 105*  --  105*   < > 137*    < > = values in this interval not displayed.     Most Recent 3 BMP's:  Recent Labs   Lab Test 04/16/24  0641 04/15/24  0627 04/14/24  0726    142 141   POTASSIUM 3.7 3.9 4.0   CHLORIDE 96* 97* 97*   CO2 29 33* 37*   BUN  "35.8* 36.5* 38.2*   CR 1.29* 1.22* 1.22*   ANIONGAP 13 12 7   LUNA 9.0 9.1 9.3   * 125* 117*     Most Recent 3 Troponin's:No lab results found.  Most Recent 3 BNP's:  Recent Labs   Lab Test 03/29/24  1453   NTBNPI 4,421*     Most Recent Hemoglobin A1c:  Recent Labs   Lab Test 09/19/22  0946   A1C 5.5         Medical Decision Making       30 MINUTES SPENT BY ME on the date of service doing chart review, history, exam, documentation & further activities per the note.        Clinically Significant Risk Factors                # Thrombocytopenia: Lowest platelets = 98 in last 2 days, will monitor for bleeding   # Hypertension: Noted on problem list        # Severe Obesity: Estimated body mass index is 47.47 kg/m  as calculated from the following:    Height as of this encounter: 1.676 m (5' 6\").    Weight as of this encounter: 133.4 kg (294 lb 1.6 oz).   # Moderate Malnutrition: based on nutrition assessment      # Financial/Environmental Concerns: none  # COPD: noted on problem list       Diastolic acute      Fluid overload, unspecified    Not present on admission    Not present on admission    Acute kidney failure, unspecified    Not present on admission        Pulmonary Heart Disease (Pulmonary hypertension or Cor pulmonale): Pulmonary Hypertension, unspecified    Not present on admission        "

## 2024-04-16 NOTE — PROGRESS NOTES
[facility]    Medicine Progress Note - Hospitalist Service    Date of Admission:  3/29/2024    Assessment & Plan   Linda Otero is a 64 year old female admitted on 3/29/2024.  Past medical history of COPD, chronic respiratory failure on home oxygen with history of hypercapnia, nicotine dependence, HFpEF, DVT/PE  on past warfarin anticoagulation, recent hemoptysis, morbid obesity, CKD stage III, CAD, HLD, HTN, hypothyroidism, MGUS, seizure disorder, CVA, admitted 3/29/2024 with acute on chronic respiratory failure.     Acute on chronic hypoxic respiratory failure, multifactorial  HFpEF exacerbation (elevated BNP and weight gain),   COPD exacerbation  JODI with noncompliance to CPAP and  Acute right heart failure with mod-severe TR  Severe Pulmonary Hypertension  [Home Lasix 20mg BID PO]  *Baseline 3-5 L NC  *CXR 3/29- Cardiomegaly. Ill-defined opacity at the right infrahilar lung could be acute airspace disease. A mass is difficult to exclude. Bilateral vascular congestion may be a degree of pulmonary edema. Potential small right pleural fluid.  *CT C PE 3/29/2024 without any PE but did demonstrate pulmonary congestion, R pleural effusion; also enlarged main pulmonary artery, as evidence of pulmonary hypertension  *TTE 4/1 showing EF 60-65% without WMA, severe RV dilation with moderately decreased RV function, mod-severe TR and pulm HTN    - Was initially started on Lasix drip.  This was transition to Bumex drip at 0.25 mg/h on 4/9/2024.,  Received a one-time bolus bolus of IV Bumex 2 mg on 4/9, 4/10, 4/11, 4/13 and 4/14/2024.  Increase Bumex drip to 0.5 mg an hour on 4/14/2024   - Cardiology following  - s/p left and right heart cath on 4/12/2024.  Showed normal coronary arteries.  Severely elevated right-sided pressures, severely elevated left-sided filling pressures.  Mixed precapillary and postcapillary pulmonary hypertension.  Normal cardiac index.  - weight down about 37 pounds  since admission . Neg balance -20L since admission  - lymphedema wraps  - on potassium replacement with oral KCl 40 mEq 3 times daily  - cr stable 1.15--1/1--1.24--1.22--1.29  - Flutter valve and incentive spirometer  - 4/16- still on 9L Oxymixer, try to wean down as able, O2 sat goal at 88-92 percent  - Started on Jardiance 10 mg daily on 4/13/2024 for HFpEF    COPD exacerbation   CAP  Respiratory acidosis  [PTA on Breo, Spiriva respimat, Alb inh and neb prn]  - completed 5-day course of ceftriaxone+doxy on 4/2  - 04/07/24 completed 8-day prednisone taper.;  - 4/8 -restarted Breo.  Wheezing significantly improved.  - currently om Breo 1 puff q daily and Duonebs QID     Hemoptysis, mild, resolved  *reports orange sputum production at home, was producing small amounts of rober blood 4/1  *admission CT chest negative for PE, masses  *resumed aspirin 4/3 and reports small amount of hemoptysis 4/4   - denies any further hemoptysis.  Lovenox started on 4/8/2024.  Held on 4/12/2024 for cardiac catheterization. Now resumed.     SONAL on Chronic kidney disease, stage III, improved  *Speculate congestive nephropathy  *Admission serum Cr 1.33, historic baseline 0.87 to 1.08 in 2020 to 2023  - Avoid non-steroidal anti-inflammatory drugs (NSAIDs)  - Creatinine continuing to improve with aggressive diuresis.  Creatinine has been improving until 4/12/2024.    - cr stable 1.15--1/1--1.24--1.22--1.29  - BMP in am     Hypokalemia  *due to diuresis  - replace per protocol  - Started on oral KCl 40 mEq 3 times daily  - K 3.7 on 4/16     History of deep venous thrombosis and pulmonary embolism  *CT C PE 3/29/2024 without any PE  *Per pharmacy, no longer on warfarin  - resume lovenox 04/07/24   - has been counseled on importance of frequent ambulation, out of bed activity  - Cardiology does recommend a VQ scan to evaluate for chronic thromboembolic pulmonary hypertension.  Will defer the timing of this to cardiology     Skin ulcer, right  lateral ankle  Wound nurse (WOC) following     Weakness and deconditioning  Therapies recommend home with assist, home therapies  Reorder home care with PT OT and RN at the time of discharge     Obstructive sleep apnea  - Patient is on CPAP at home but has not been using it since admission since she did not tolerate the mask initially.  Encouraged her to bring her home CPAP machine today.  We tried the hospital mask on 4/9/2024 and she did not tolerate this.  -Patient continues to be noncompliant with her CPAP use in the hospital despite having her home machine here.     Mild thrombocytopenia-  platelet count has been slowly downtrending over the last 3 days.  -135---137---118--105  -  continue to monitor for now.    Moderate Malnutrition  - nutritionist consult      Hyperlipidemia - Diet-controlled, monitor; follow-up with primary clinic provider   Hypothyroidism - Med rec does not show any levothyroxine (TSH slightly elevated, free T4 wnl). Will defer to outpt evaluation by PCP  Morbid obesity  History of nicotine dependence  History of obstructive sleep apnea  History of coronary artery disease   History of monoclonal gammopathy of undetermined significance - noted, follow-up with primary clinic provider   History of cerebrovascular disease with prior stroke - noted, monitor  History of B12 deficiency - noted, continue prior to admission B12 replacement  History of seizure disorder - noted in medical record; monitor          Diet: Snacks/Supplements Adult: Expedite Bottle; Between Meals  Fluid restriction 2000 ML FLUID  Combination Diet Regular Diet    DVT Prophylaxis: Lovenox  Miguel Catheter: Not present  Lines: None       Cardiac Monitoring: ACTIVE order. Indication: Post- PCI/Angiogram (24 hours)  Code Status: Full Code      Clinically Significant Risk Factors                  # Hypertension: Noted on problem list        # Severe Obesity: Estimated body mass index is 47.47 kg/m  as calculated from the  "following:    Height as of this encounter: 1.676 m (5' 6\").    Weight as of this encounter: 133.4 kg (294 lb 1.6 oz).   # Moderate Malnutrition: based on nutrition assessment      # Financial/Environmental Concerns: none  # COPD: noted on problem list        Disposition Plan     Medically Ready for Discharge: Anticipated in 2-4 Days         Tri Zhao MD  Hospitalist Service  [facility]  Securely message with jigl (more info)  Text page via SpeechTrans Paging/Directory   ______________________________________________________________________    Interval History   Patient seen and examined  She is up in the chair, eating stir-huber in am  Said she did not sleep well last night but said she is able to lay flat  SOB improved  No chest pain  No N/V, no abd pain  Diuresing well  Discussed with RN    Physical Exam   Vital Signs: Temp: 97.6  F (36.4  C) Temp src: Oral BP: 101/59 Pulse: 97   Resp: 17 SpO2: (!) 89 % O2 Device: Oxymizer cannula Oxygen Delivery: 9 LPM  Weight: 294 lbs 1.6 oz    General Appearance: Awake, alert, NAD  Respiratory: bilateral air entry, no wheezing, no rales, no crakles  Cardiovascular: S1S2, irregular, systolic murmur 2/6 precordial area  GI: abd- soft, obese, nonT  Skin: multiple ecchymosis upper extremities  Extrem: 2+pitting edema RLE, 1+ pitting edema LLE; dressing around rt ankle    Medical Decision Making       50 MINUTES SPENT BY ME on the date of service doing chart review, history, exam, documentation & further activities per the note.      Data     I have personally reviewed the following data over the past 24 hrs:    N/A  \   N/A   / N/A     138 96 (L) 35.8 (H) /  118 (H)   3.7 29 1.29 (H) \       Imaging results reviewed over the past 24 hrs:   No results found for this or any previous visit (from the past 24 hour(s)).  "

## 2024-04-16 NOTE — PROGRESS NOTES
[id number]  Pt here with hypoxia. A&O X4. VSS. Pt is on 9L oxymizer canula. Tele NSR. Regular diet. Pt is on a fluid restriction- 2000mL, doing well on it. Takes pills one at a time with ice water. Up independent in the room- Pt refusing alarms. Upper back pain managed with repositioning. Pt does have a R ankle wound, dressing to be changed every other day- next change due 4/16. BLE edema and scattered bruising noted. Plan to continue to the Bumes drip @ 0.5mL/hr. Pt having frequent urination using bedside commode. Discharge pending work up.

## 2024-04-17 NOTE — PROVIDER NOTIFICATION
"MD Notification    Notified Person: Hospitalist    Notified Person Name: Dr. Zhao    Notification Date/Time: 4/17 1:50 pm    Notification Interaction: [employer] messaging    Purpose of Notification: I [facility] nurse concerned about redness to patient's right leg. Possibly cellulitis. Please advise.     Orders Received: No reply    Amada Gudino RN, CWOCN  Please contact via CouchOne at name or group \"[facility] nurse\"- M-F 8A-4P  Leave VM @ *[phone] for non-urgent needs. Checked occasionally M-F.   "

## 2024-04-17 NOTE — CONSULTS
"SPIRITUAL HEALTH SERVICES  SPIRITUAL ASSESSMENT Consult Note  [facility]     REFERRAL SOURCE: Follow up visit for support    Linda was resting in bed upon my arrival. She shared that \"there's been a lot going on today\" and she's feeling tired and hoping to get some rest. She requested \"lots of prayers.\" Offered brief prayer for rest and healing.    PLAN: Spiritual Health remains available for support.    Natalia Cooper  Associate      SHS available 24/7 for emergent requests/referrals, either by paging the on-call  or by entering an ASAP/STAT consult in Epic (this will also page the on-call ).     "

## 2024-04-17 NOTE — PLAN OF CARE
A&O x 4. VSS, remains on 8-10L oximyzer NC. Tele: SR. Bumex gtt @ 0.5mL/hr. Independent in room to commode. Good UOP. Denies pain. Slept between cares. WOC changed dressing to L) ankle wound. Will continue w/plan of care.

## 2024-04-17 NOTE — PROGRESS NOTES
"[facility] Nurse Inpatient Assessment     Consulted for: Wound ulcer right lateral ankle     Summary: Right lateral ankle wound is chronic, multifactorial etiology   Fungal rash to right breast fold-resolved    Patient History (according to provider note(s):      \"64 year old female admitted on 3/29/2024.  Past medical history of COPD, chronic respiratory failure on home oxygen with history of hypercapnia, nicotine dependence, HFpEF, DVT/PE  on past warfarin anticoagulation, recent hemoptysis, morbid obesity, CKD stage III, CAD, HLD, HTN, hypothyroidism, MGUS, seizure disorder, CVA, admitted 3/29/2024 with acute on chronic respiratory failure. \"    Assessment:      Areas visualized during today's visit: Focused:, Lower extremities , and right breast fold    Skin Injury Location: Right breast fold- Resolved per patient report        Last photo: 4/10  Skin injury due to: Fungal rash  and Intertrigo  Skin history and plan of care:  RN noted skin injury and reported it to [employer] nurse. ALEXANDRA on assessment.  Affected area:      Skin assessment: Erythema and Lesions-satellite     Measurements (length x width x depth, in cm) Generalized to  lower breast, breast fold, and upper right abdomen     Color: normal and consistent with surrounding tissue     Temperature  normal      Drainage: none .      Color: none      Odor: none  Pain: denies , none  Pain interventions prior to dressing change: no significant pain present   Treatment goal: Heal , Infection control/prevention, and Decrease moisture  STATUS: initial assessment  Supplies ordered: ordered Usman Antifungal, discussed with RN, and discussed with patient      Wound location: right lateral ankle area     Last photo: 4/17  Wound due to: Unknown Etiology patient reports this wound area is chronic and comes and goes, has treated the area \"four times\" before   Wound history/plan of care: Wound dressed per POC. Aquacel removal was traumatic due to " minimal drainage and adherence to wound bed even after soaking. Per bedside RN, patient has been refusing wound care for several days.  Wound base: slough, dry drainage, and pink-red non-granular tissue , thickened epidermis     Palpation of the wound bed:  textured        Drainage: small     Description of drainage: serosanguinous, green, and yellow     Measurements (length x width x depth, in cm): cluster of 7  x 5  x  0.1 cm      Tunneling: N/A     Undermining: N/A  Periwound skin: Dry/scaly, Edematous, and Erythema- blanchable      Color: pale      Temperature: normal   Odor: none  Pain: moderate and during dressing change, intermittent  Pain interventions prior to dressing change: patient tolerated well, soaking, and slow and gentle cares   Treatment goal: Drainage control, Infection control/prevention, Increase granulation, and Protection  STATUS: deteriorating  Supplies ordered: at bedside, supplies stored on unit, discussed with RN, and discussed with patient        Treatment Plan:     Right lateral ankle wound: Every other day and PRN for soiled/loose dressing  Wrap right lower leg with Vashe-soaked gauze. Allow to soak for about two minutes.  Unwrap leg and gently wipe away any debris. Allow skin to dry.  Use a tongue depressor to apply Medihoney (901136) to wound.  Apply a small piece of Adaptic (oil emulsion gauze) over Medihoney.  Cover with a 4x4 gauze and secure with Kerlix gauze and Medipore tape.  Apply Spandigrip to right lower extremity.    Right breast fold rash: BID   Cleanse skin with warm water. Pat dry.  Apply a thin layer of Usman Antifungal to affected skin.    Orders: Updated    RECOMMEND PRIMARY TEAM ORDER:  Sent hospitalist Patsy message about redness and warmth in patient's right leg  Education provided: plan of care  Discussed plan of care with: Patient and Nurse  WOC nurse follow-up plan: weekly  Notify WOC if wound(s) deteriorate.  Nursing to notify the Provider(s) and re-consult the  "[facility] Nurse if new skin concern.    DATA:     Current support surface: Standard  Standard gel/foam mattress (IsoFlex, Atmos air, etc)  Containment of urine/stool: Incontinence Protocol, Incontinent pad in bed, and Suction based external urinary catheter   BMI: Body mass index is 46.57 kg/m .   Active diet order: Orders Placed This Encounter      Combination Diet Regular Diet; 2 gm NA Diet     Output: I/O last 3 completed shifts:  In: 720 [P.O.:720]  Out: 3600 [Urine:3600]     Labs:   Recent Labs   Lab 04/16/24  0641   HGB 8.6*   WBC 4.9     Pressure injury risk assessment:   Sensory Perception: 3-->slightly limited  Moisture: 4-->rarely moist  Activity: 3-->walks occasionally  Mobility: 3-->slightly limited  Nutrition: 3-->adequate  Friction and Shear: 3-->no apparent problem  Dorian Score: 19    Amada Gudino RN, CWOCN  Please contact via ORVIBO at name or group \"[facility] nurse\"- M-F 8A-4P  Leave  @ *[phone] for non-urgent needs. Checked occasionally M-F.     "

## 2024-04-17 NOTE — PLAN OF CARE
Orientations: A/O x4  Vitals/Pain: VSS on 7 L O2 via oxymizer. Upper back intermittent pain -decline prn meds  Tele: SR/ST  Lines/Drains: PIV with Bumex @ 0.5 ml/hr   Skin/Wounds: Scattered bruising. Wound to R ankle covered with dressing that is CDI. Pt requested for dressing change to be done today instead of last evening  GI/: Great UOP. Pt has been getting up to BSC  Labs: Abnormal/Trends, Electrolyte Replacement- Awaiting am labs  Ambulation/Assist: Up ad henna  Sleep Quality: Fair  Plan: Continue to diurese, Titrate O2 to baseline as able and WOC per plan    Goal Outcome Evaluation:   Plan of Care Reviewed With: patient  Overall Patient Progress: no changeOverall Patient Progress: no change

## 2024-04-17 NOTE — PROGRESS NOTES
[facility] Cardiology Progress Note  Date of Service: 04/17/2024  Staff Cardiologist: Dr. Kendrick     Assessment & Plan   Linda Otero is a 64 year old female who was admitted on 3/29/2024 for acute on chronic respiratory failure. Medical comorbidities include COPD, chronic respiratory failure on home oxygen with history of hypercapnia, nicotine dependence, HFpEF, DVT/PE  on past warfarin anticoagulation, recent hemoptysis, morbid obesity, CKD stage III, CAD, HLD, HTN, hypothyroidism, MGUS, seizure disorder, and CVA.     She underwent right and left heart catheterization (on 4/12) which showed normal coronary arteries, severely elevated right-sided pressures (RA 16 mmHg), severely elevated left-sided filling pressures (PCWP 28 mmHg), mixed precapillary and postcapillary pulmonary hypertension (mean PA 59 mmHg, PCWP 28 mmHg, PVR 4.69WU), and normal cardiac output/cardiac index (Elsa CO/CI 7.25/3.04).    Interval History:   Net negative 2.4L, down 6 lbs. Cr stable at 1.3 today. Remains on 8-9L of NC.     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    TTE 4/1 showing EF 60-65% without WMA, severe RV dilation with moderately decreased RV function, mod-severe TR and pulm HTN.  Diuresing slowly; SOB gradually improving, dry weight  remains unclear, notes dating back to 2020 indicate weight was ~317-319#  Initiated on IV Furosemide infusion then transitioned to bumex with increased urine output.   RHC on 4/12/2024 showed severely elevated right-sided pressures (RA 16 mmHg), severely elevated left-sided filling pressures (PCWP 28 mmHg)    Hx of CVA s/p PFO closure   JODI with noncompliance with CPAP  Severe COPD with exacerbation  Moderate to severe tricuspid regurgitation  Morbid obesity with a BMI 50  SONAL on CKD, stage III, improved with diuresis [baseline cr 0.8-1.1)  Hx of DVT and PE      Plan:  Continue bumex infusion at 0.5 mg/hr, possible transition to oral diuretics in 1-2 days.   Continue aspirin 81 mg daily and Jardiance 10 mg daily.   Consider VQ scan for CTEPH evaluation.   Daily BMP while diuresing.   Strict I&O's, daily standing weights, replace lytes per protocol.  2G Na restriction (patient is eating stir huber in room).   Consider repeat RHC once more euvolemic.   Will continue to follow.     Amada Price, CNP  Pager:  [phone]  (7am - 5pm, M-F)      Physical Exam   Temp: 98.3  F (36.8  C) Temp src: Oral BP: 108/56 Pulse: 96   Resp: 18 SpO2: (!) 89 % O2 Device: Oxymizer cannula Oxygen Delivery: 9 LPM  Vitals:    04/15/24 0447 04/16/24 0602 04/17/24 0900   Weight: 133.4 kg (294 lb) 133.4 kg (294 lb 1.6 oz) 130.9 kg (288 lb 8 oz)       Constitutional:   NAD   Skin:   Warm and dry   Head:   Nontraumatic   Neck:   no JVD   Lungs:   normal   Cardiovascular:   regular rate and rhythm   Abdomen:   Benign   Extremities and Back:   Bilateral 2+ KT, R>L   Neurological:   Grossly nonfocal       Medications   Current Facility-Administered Medications   Medication Dose Route Frequency Provider Last Rate Last Admin    bumetanide (BUMEX) 0.25 mg/mL infusion  0.5 mg/hr Intravenous Continuous Kyle Echavarria MD 2 mL/hr at 04/16/24 1650 0.5 mg/hr at 04/16/24 1650     Current Facility-Administered Medications   Medication Dose  Route Frequency Provider Last Rate Last Admin    aspirin (ASA) chewable tablet 81 mg  81 mg Oral BID Gerry Pizarro MD   81 mg at 04/16/24 2055    empagliflozin (JARDIANCE) tablet 10 mg  10 mg Oral Daily Kyle Echavarria MD   10 mg at 04/16/24 1008    enoxaparin ANTICOAGULANT (LOVENOX) injection 40 mg  40 mg Subcutaneous Q12H Angi Mina MD   40 mg at 04/16/24 2055    fluticasone-vilanterol (BREO ELLIPTA) 200-25 MCG/ACT inhaler 1 puff  1 puff Inhalation Daily Angi Mina MD   1 puff at 04/16/24 1023    ipratropium - albuterol 0.5 mg/2.5 mg/3 mL (DUONEB) neb solution 3 mL  3 mL Nebulization 4x daily Gerry Pizarro MD   3 mL at 04/17/24 1127    miconazole (MICATIN) 2 % powder   Topical BID Angi Mina MD   Given at 04/16/24 2055    miconazole with skin protectant (KAYDEN ANTIFUNGAL) 2 % cream   Topical BID Angi Mina MD   Given at 04/16/24 2055    multivitamin w/minerals (THERA-VIT-M) tablet 1 tablet  1 tablet Oral Daily Charli Castro MD   1 tablet at 04/16/24 1351    polyethylene glycol (MIRALAX) Packet 17 g  17 g Oral Daily Charli Castro MD   17 g at 04/16/24 1007    potassium chloride marcie ER (KLOR-CON M20) CR tablet 40 mEq  40 mEq Oral TID Molly Hampton APRN CNP   40 mEq at 04/16/24 2055    sodium chloride (PF) 0.9% PF flush 10 mL  10 mL Intracatheter Q8H Gerry Pizarro MD   10 mL at 04/15/24 1623       Data     Most Recent 3 CBC's:  Recent Labs   Lab Test 04/17/24  0800 04/16/24  0641 04/13/24  0632 04/13/24  0055 04/12/24  0647 04/10/24  0639 04/09/24  1114   WBC  --  4.9  --   --  9.2  --  10.2   HGB  --  8.6*  --  9.3* 9.3*  --  11.4*   MCV  --  82  --   --  79  --  80   * 98* 105*  --  105*   < > 137*    < > = values in this interval not displayed.     Most Recent 3 BMP's:  Recent Labs   Lab Test 04/17/24  0800 04/16/24  0641 04/15/24  0627    138 142   POTASSIUM 3.5 3.7 3.9   CHLORIDE 96* 96* 97*   CO2 33* 29 33*   BUN 35.4* 35.8* 36.5*  "  CR 1.31* 1.29* 1.22*   ANIONGAP 12 13 12   LUNA 9.0 9.0 9.1   * 118* 125*     Most Recent 3 Troponin's:No lab results found.  Most Recent 3 BNP's:  Recent Labs   Lab Test 03/29/24  1453   NTBNPI 4,421*     Most Recent Hemoglobin A1c:  Recent Labs   Lab Test 09/19/22  0946   A1C 5.5         Medical Decision Making       30 MINUTES SPENT BY ME on the date of service doing chart review, history, exam, documentation & further activities per the note.        Clinically Significant Risk Factors                # Thrombocytopenia: Lowest platelets = 98 in last 2 days, will monitor for bleeding   # Hypertension: Noted on problem list        # Severe Obesity: Estimated body mass index is 46.57 kg/m  as calculated from the following:    Height as of this encounter: 1.676 m (5' 6\").    Weight as of this encounter: 130.9 kg (288 lb 8 oz).   # Moderate Malnutrition: based on nutrition assessment      # Financial/Environmental Concerns: none  # COPD: noted on problem list       Diastolic acute      Fluid overload, unspecified    Not present on admission    Not present on admission    Acute kidney failure, unspecified    Not present on admission        Pulmonary Heart Disease (Pulmonary hypertension or Cor pulmonale): Pulmonary Hypertension, unspecified    Not present on admission        "

## 2024-04-17 NOTE — PROGRESS NOTES
[facility]    Medicine Progress Note - Hospitalist Service    Date of Admission:  3/29/2024    Assessment & Plan   Linda Otero is a 64 year old female admitted on 3/29/2024.  Past medical history of COPD, chronic respiratory failure on home oxygen with history of hypercapnia, nicotine dependence, HFpEF, DVT/PE  on past warfarin anticoagulation, recent hemoptysis, morbid obesity, CKD stage III, CAD, HLD, HTN, hypothyroidism, MGUS, seizure disorder, CVA, admitted 3/29/2024 with acute on chronic respiratory failure.     Acute on chronic hypoxic respiratory failure, multifactorial  HFpEF exacerbation (elevated BNP and weight gain),   COPD exacerbation  JODI with noncompliance to CPAP and  Acute right heart failure with mod-severe TR  Severe Pulmonary Hypertension  [Home Lasix 20mg BID PO]  *Baseline 3-5 L NC  *CXR 3/29- Cardiomegaly. Ill-defined opacity at the right infrahilar lung could be acute airspace disease. A mass is difficult to exclude. Bilateral vascular congestion may be a degree of pulmonary edema. Potential small right pleural fluid.  *CT C PE 3/29/2024 without any PE but did demonstrate pulmonary congestion, R pleural effusion; also enlarged main pulmonary artery, as evidence of pulmonary hypertension  *TTE 4/1 showing EF 60-65% without WMA, severe RV dilation with moderately decreased RV function, mod-severe TR and pulm HTN    - Was initially started on Lasix drip.  This was transition to Bumex drip at 0.25 mg/h on 4/9/2024.,  Received a one-time bolus bolus of IV Bumex 2 mg on 4/9, 4/10, 4/11, 4/13 and 4/14/2024.  Increase Bumex drip to 0.5 mg an hour on 4/14/2024   - Cardiology following  - s/p left and right heart cath on 4/12/2024.  Showed normal coronary arteries.  Severely elevated right-sided pressures, severely elevated left-sided filling pressures.  Mixed precapillary and postcapillary pulmonary hypertension.  Normal cardiac index.  - weight down 6 lbs since  yesterday, about 50 lbs since admission . Neg balance -22L since admission  - lymphedema wraps  - 2gm Na diet  - increase fluid restriction to 1800cc/day  - on potassium replacement with oral KCl 40 mEq 3 times daily  - cr slowly trending up  1.15--1/1--1.24--1.22--1.29--1.31  - Flutter valve and incentive spirometer  - still on Bumex drip- further diuresis as per Cardiology  - 4/16 afternoon- was down to 7L Oxymixer, try to wean down as able, O2 sat goal at 88-92%  - Started on Jardiance 10 mg daily on 4/13/2024 for HFpEF    COPD exacerbation   CAP  Respiratory acidosis  [PTA on Breo, Spiriva respimat, Alb inh and neb prn]  - completed 5-day course of ceftriaxone+doxy on 4/2  - 04/07/24 completed 8-day prednisone taper.;  - 4/8 -restarted Breo.  Wheezing significantly improved.  - currently om Breo 1 puff q daily and Duonebs QID     Hemoptysis, mild, resolved  *reports orange sputum production at home, was producing small amounts of rober blood 4/1  *admission CT chest negative for PE, masses  *resumed aspirin 4/3 and reports small amount of hemoptysis 4/4   - denies any further hemoptysis.  Lovenox started on 4/8/2024.  Held on 4/12/2024 for cardiac catheterization. Now resumed.     SONAL on Chronic kidney disease, stage III, improved  *Speculate congestive nephropathy  *Admission serum Cr 1.33, historic baseline 0.87 to 1.08 in 2020 to 2023  - Avoid non-steroidal anti-inflammatory drugs (NSAIDs)  - Creatinine has been improving until 4/12/2024 but now trending up 1.15--1/1--1.24--1.22--1.29--1.31  - BMP in am     Hypokalemia  *due to diuresis  - replace per protocol  - Started on oral KCl 40 mEq 3 times daily  - K 3.7 on 4/16     History of deep venous thrombosis and pulmonary embolism  *CT C PE 3/29/2024 without any PE  *Per pharmacy, no longer on warfarin  - resume prophylactic lovenox 04/07/24   - has been counseled on importance of frequent ambulation, out of bed activity  - Cardiology does recommend a VQ scan  to evaluate for chronic thromboembolic pulmonary hypertension.  Will defer the timing of this to cardiology     Skin ulcer, right lateral ankle  Wound nurse (WOC) following     Weakness and deconditioning  Therapies recommend home with assist, home therapies  Reorder home care with PT, OT and RN at the time of discharge     Obstructive sleep apnea  - Patient is on CPAP at home but has not been using it since admission since she did not tolerate the mask initially.  Encouraged her to bring her home CPAP machine We tried the hospital mask on 4/9/2024 and she did not tolerate this.  - Patient continues to be noncompliant with her CPAP use in the hospital despite having her home machine here.     Mild thrombocytopenia-  platelet count has been slowly downtrending over the last 3 days.  - 135---137---118--105--112  - continue to monitor for now.    Moderate Malnutrition  - nutritionist consult      Hyperlipidemia - Diet-controlled, monitor; follow-up with primary clinic provider   Hypothyroidism - Med rec does not show any levothyroxine (TSH slightly elevated, free T4 wnl). Will defer to outpt evaluation by PCP  Morbid obesity  History of nicotine dependence  History of obstructive sleep apnea  History of coronary artery disease   History of monoclonal gammopathy of undetermined significance - noted, follow-up with primary clinic provider   History of cerebrovascular disease with prior stroke - noted, monitor  History of B12 deficiency - noted, continue prior to admission B12 replacement  History of seizure disorder - noted in medical record; monitor          Diet: Snacks/Supplements Adult: Expedite Bottle; Between Meals  Fluid restriction 2000 ML FLUID  Combination Diet Regular Diet; 2 gm NA Diet    DVT Prophylaxis: Enoxaparin (Lovenox) SQ  Miguel Catheter: Not present  Lines: None     Cardiac Monitoring: ACTIVE order. Indication: Post- PCI/Angiogram (24 hours)  Code Status: Full Code      Clinically Significant Risk  "Factors                # Thrombocytopenia: Lowest platelets = 98 in last 2 days, will monitor for bleeding   # Hypertension: Noted on problem list        # Severe Obesity: Estimated body mass index is 47.47 kg/m  as calculated from the following:    Height as of this encounter: 1.676 m (5' 6\").    Weight as of this encounter: 133.4 kg (294 lb 1.6 oz).   # Moderate Malnutrition: based on nutrition assessment    # Financial/Environmental Concerns: none  # COPD: noted on problem list        Disposition Plan     Medically Ready for Discharge: Anticipated in 2-4 Days           Tri Zhao MD  Hospitalist Service  [facility]  Securely message with [employer] (more info)  Text page via [employer] Paging/Directory   ______________________________________________________________________    Interval History   Was without O2 in am for a short period of time and desat; now put back on Oxymizer  Denies chest pain  No N/V, no abd pain  Discussed with RN    Physical Exam   Vital Signs: Temp: 98.3  F (36.8  C) Temp src: Oral BP: 108/56 Pulse: 96   Resp: 18 SpO2: (!) 89 % O2 Device: Nasal cannula Oxygen Delivery: 7 LPM  Weight: 294 lbs 1.6 oz    General Appearance:  Awake, alert, NAD  Respiratory: bilateral air entry, no wheezing, no rales, no crakles  Cardiovascular: S1S2, irregular  GI: abd- soft, obese, nonT  Skin: multiple ecchymosis upper extremities  Extrem: 2+pitting edema RLE, 1+ pitting edema LLE; dressing around rt ankle    Medical Decision Making       45 MINUTES SPENT BY ME on the date of service doing chart review, history, exam, documentation & further activities per the note.      Data     I have personally reviewed the following data over the past 24 hrs:    N/A  \   N/A   / 112 (L)     141 96 (L) 35.4 (H) /  122 (H)   3.5 33 (H) 1.31 (H) \       Imaging results reviewed over the past 24 hrs:   No results found for this or any previous visit (from the past 24 hour(s)).  "

## 2024-04-18 NOTE — PLAN OF CARE
Patient is alert and oriented x4. VSS on 9 L oxygen via oxymizer. Tele is SR with BBB. Denies pain. Independent in the room. Bumex gtt infusing at 2 mL/hr (0.5 mg/hr). Voiding adequately. 1 BM this shift. Continue with plan of care.

## 2024-04-18 NOTE — PLAN OF CARE
Heart Failure Care Map  GOALS TO BE MET BEFORE DISCHARGE:    1. Decrease congestion and/or edema with diuretic therapy to achieve near optimal volume status.     Dyspnea improved: No, further care required to meet this goal. Please explain Still with CERRATO   Edema improved: No, further care required to meet this goal. Please explain Still with mild edema to BLE, much improved        Last 24 hour I/O:   Intake/Output Summary (Last 24 hours) at 4/18/2024 1854  Last data filed at 4/18/2024 1719  Gross per 24 hour   Intake 1330 ml   Output 2375 ml   Net -1045 ml           Net I/O and Weights since admission:   03/19 2300 - 04/18 2259  In: 55954 [P.O.:03619; I.V.:279]  Out: 16272 [Urine:75142]  Net: -73149     Vitals:    03/29/24 1447 03/29/24 1505 03/30/24 0554 03/31/24 0457   Weight: 136.1 kg (300 lb) (!) 150.3 kg (331 lb 5.6 oz) (!) 154.4 kg (340 lb 8 oz) (!) 156.2 kg (344 lb 6.4 oz)    04/01/24 0335 04/02/24 0600 04/03/24 0503 04/04/24 0600   Weight: (!) 156.8 kg (345 lb 9.6 oz) (!) 154.2 kg (340 lb) (!) 153 kg (337 lb 4.8 oz) (!) 150.5 kg (331 lb 14.4 oz)    04/05/24 0318 04/06/24 0608 04/07/24 0413 04/08/24 0250   Weight: 149.5 kg (329 lb 9.6 oz) 144.8 kg (319 lb 4.8 oz) 144.2 kg (318 lb) 143.3 kg (316 lb)    04/09/24 0342 04/10/24 0701 04/11/24 0408 04/12/24 0600   Weight: 142.9 kg (315 lb) 141.7 kg (312 lb 6.4 oz) 139.6 kg (307 lb 12.8 oz) 137.3 kg (302 lb 12.8 oz)    04/13/24 0630 04/14/24 0300 04/14/24 0734 04/15/24 0447   Weight: 136.1 kg (300 lb) 134.7 kg (296 lb 14.4 oz) 135.8 kg (299 lb 6.4 oz) 133.4 kg (294 lb)    04/16/24 0602 04/17/24 0900 04/18/24 0835   Weight: 133.4 kg (294 lb 1.6 oz) 130.9 kg (288 lb 8 oz) 130 kg (286 lb 11.2 oz)       2.  O2 sats > 90% on room air, or at prior home O2 therapy level.      Able to wean O2 this shift to keep sats above 90%?: No, further care required to meet this goal. Please explain O2 via NC at 8L today. Continue to titrate to home dose.   Does patient use Home O2?  Yes-  3-5L Baseline           Current oxygenation status:   SpO2: 95 %     O2 Device: Oxymizer cannula, Oxygen Delivery: 8 LPM    3.  Tolerates ambulation and mobility near baseline.     Ambulation: No, further care required to meet this goal. Please explain Pt independent in room but refusing to ambulate in hallway   Times patient ambulated with staff this shift: 0    Please review the Heart Failure Care Map for additional HF goal outcomes.    Stormy Nicholson RN  4/18/2024         A&Ox4. Continued bumex drip with good U/O. Rocephin started for possible cellulitis. Plan for RHC tomorrow.

## 2024-04-18 NOTE — PROGRESS NOTES
[facility] Cardiology Progress Note  Date of Service: 04/18/2024  Staff Cardiologist: Dr. Kendrick     Assessment & Plan   Linda Otero is a 64 year old female who was admitted on 3/29/2024 for acute on chronic respiratory failure. Medical comorbidities include COPD, chronic respiratory failure on home oxygen with history of hypercapnia, nicotine dependence, HFpEF, DVT/PE  on past warfarin anticoagulation, recent hemoptysis, morbid obesity, CKD stage III, CAD, HLD, HTN, hypothyroidism, MGUS, seizure disorder, and CVA.     She underwent right and left heart catheterization (on 4/12) which showed normal coronary arteries, severely elevated right-sided pressures (RA 16 mmHg), severely elevated left-sided filling pressures (PCWP 28 mmHg), mixed precapillary and postcapillary pulmonary hypertension (mean PA 59 mmHg, PCWP 28 mmHg, PVR 4.69WU), and normal cardiac output/cardiac index (Elsa CO/CI 7.25/3.04).    Interval History:   Continues to have robust urine output on bumex infusion, renal function improved today. Down another 2 lbs (50lbs this admission).     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    TTE 4/1 showing EF 60-65% without WMA, severe RV dilation with moderately decreased RV function, mod-severe TR and pulm  HTN.  Diuresing well; SOB gradually improving, EDW unknown, notes dating back to 2020 indicate weight was ~317-319#  Initiated on IV Furosemide infusion then transitioned to bumex with increased urine output.   RHC on 4/12/2024 showed severely elevated right-sided pressures (RA 16 mmHg), severely elevated left-sided filling pressures (PCWP 28 mmHg)    Hx of CVA s/p PFO closure   JODI with noncompliance with CPAP  Severe COPD with exacerbation  Moderate to severe tricuspid regurgitation  Morbid obesity with a BMI 50  SONAL on CKD, stage III, improved with diuresis [baseline cr 0.8-1.1)  Hx of DVT and PE      Plan:  Continue bumex infusion at 0.5 mg/hr.   Continue aspirin 81 mg daily and Jardiance 10 mg daily.   Consider VQ scan for CTEPH evaluation.   Daily BMP while diuresing.   Strict I&O's, daily standing weights, replace lytes per protocol.  2G Na restriction (patient is eating stir huber in room).   Plan for RHC tomorrow for volume assessment.     Amada Price, CNP  Pager:  [phone]  (7am - 5pm, M-F)      Physical Exam   Temp: 98.7  F (37.1  C) Temp src: Oral BP: 116/55 Pulse: 90   Resp: 18 SpO2: 93 % O2 Device: Oxymizer cannula Oxygen Delivery: 9 LPM  Vitals:    04/17/24 0900 04/18/24 0835   Weight: 130.9 kg (288 lb 8 oz) 130 kg (286 lb 11.2 oz)       Constitutional:   NAD   Skin:   Warm and dry   Head:   Nontraumatic   Neck:   no JVD   Lungs:   normal   Cardiovascular:   regular rate and rhythm   Abdomen:   Benign   Extremities and Back:   Bilateral 2+ KT, R>L   Neurological:   Grossly nonfocal       Medications   Current Facility-Administered Medications   Medication Dose Route Frequency Provider Last Rate Last Admin    bumetanide (BUMEX) 0.25 mg/mL infusion  0.5 mg/hr Intravenous Continuous Kyle Echavarria MD 2 mL/hr at 04/17/24 1710 0.5 mg/hr at 04/17/24 1710     Current Facility-Administered Medications   Medication Dose Route Frequency Provider Last Rate Last Admin    aspirin (ASA)  chewable tablet 81 mg  81 mg Oral BID Gerry Pizarro MD   81 mg at 04/18/24 0853    cefTRIAXone (ROCEPHIN) 1 g vial to attach to  mL bag for ADULTS or NS 50 mL bag for PEDS  1 g Intravenous Q24H Tri Zhao MD   1 g at 04/18/24 0854    empagliflozin (JARDIANCE) tablet 10 mg  10 mg Oral Daily Kyle Echavarria MD   10 mg at 04/18/24 0853    enoxaparin ANTICOAGULANT (LOVENOX) injection 40 mg  40 mg Subcutaneous Q12H Amada Price CNP   40 mg at 04/18/24 0853    fluticasone-vilanterol (BREO ELLIPTA) 200-25 MCG/ACT inhaler 1 puff  1 puff Inhalation Daily Angi Mina MD   1 puff at 04/18/24 0914    ipratropium - albuterol 0.5 mg/2.5 mg/3 mL (DUONEB) neb solution 3 mL  3 mL Nebulization 4x daily Gerry Pizarro MD   3 mL at 04/17/24 1937    miconazole (MICATIN) 2 % powder   Topical BID Angi Mina MD   Given at 04/18/24 0859    miconazole with skin protectant (KAYDEN ANTIFUNGAL) 2 % cream   Topical BID Angi Mina MD   Given at 04/18/24 0859    multivitamin w/minerals (THERA-VIT-M) tablet 1 tablet  1 tablet Oral Daily Charli Castro MD   1 tablet at 04/17/24 1228    polyethylene glycol (MIRALAX) Packet 17 g  17 g Oral Daily Charli Castro MD   17 g at 04/18/24 0853    potassium chloride marcie ER (KLOR-CON M20) CR tablet 40 mEq  40 mEq Oral TID Molly Hampton APRN CNP   40 mEq at 04/18/24 0853    sodium chloride (PF) 0.9% PF flush 10 mL  10 mL Intracatheter Q8H Gerry Pizarro MD   3 mL at 04/18/24 0859       Data     Most Recent 3 CBC's:  Recent Labs   Lab Test 04/17/24  0800 04/16/24  0641 04/13/24  0632 04/13/24  0055 04/12/24  0647 04/10/24  0639 04/09/24  1114   WBC  --  4.9  --   --  9.2  --  10.2   HGB  --  8.6*  --  9.3* 9.3*  --  11.4*   MCV  --  82  --   --  79  --  80   * 98* 105*  --  105*   < > 137*    < > = values in this interval not displayed.     Most Recent 3 BMP's:  Recent Labs   Lab Test 04/18/24  0608 04/17/24  0800 04/16/24  0641  "   141 138   POTASSIUM 3.8 3.5 3.7   CHLORIDE 97* 96* 96*   CO2 35* 33* 29   BUN 36.5* 35.4* 35.8*   CR 1.18* 1.31* 1.29*   ANIONGAP 9 12 13   LUNA 9.0 9.0 9.0   * 122* 118*     Most Recent 3 Troponin's:No lab results found.  Most Recent 3 BNP's:  Recent Labs   Lab Test 03/29/24  1453   NTBNPI 4,421*     Most Recent Hemoglobin A1c:  Recent Labs   Lab Test 09/19/22  0946   A1C 5.5         Medical Decision Making       30 MINUTES SPENT BY ME on the date of service doing chart review, history, exam, documentation & further activities per the note.        Clinically Significant Risk Factors                # Thrombocytopenia: Lowest platelets = 112 in last 2 days, will monitor for bleeding   # Hypertension: Noted on problem list        # Severe Obesity: Estimated body mass index is 46.27 kg/m  as calculated from the following:    Height as of this encounter: 1.676 m (5' 6\").    Weight as of this encounter: 130 kg (286 lb 11.2 oz).   # Moderate Malnutrition: based on nutrition assessment      # Financial/Environmental Concerns: none  # COPD: noted on problem list       Diastolic acute      Fluid overload, unspecified    Not present on admission    Not present on admission    Acute kidney failure, unspecified    Not present on admission        Pulmonary Heart Disease (Pulmonary hypertension or Cor pulmonale): Pulmonary Hypertension, unspecified    Not present on admission        "

## 2024-04-18 NOTE — PLAN OF CARE
Goal Outcome Evaluation:      Plan of Care Reviewed With: patient    Overall Patient Progress: no changeOverall Patient Progress: no change    Outcome Evaluation: Continues with low appetite. Drinks the wound healing supplement most days. Provided encouragement.

## 2024-04-18 NOTE — PROGRESS NOTES
[facility]    Medicine Progress Note - Hospitalist Service    Date of Admission:  3/29/2024    Assessment & Plan   Linda Otero is a 64 year old female admitted on 3/29/2024.  Past medical history of COPD, chronic respiratory failure on home oxygen with history of hypercapnia, nicotine dependence, HFpEF, DVT/PE  on past warfarin anticoagulation, recent hemoptysis, morbid obesity, CKD stage III, CAD, HLD, HTN, hypothyroidism, MGUS, seizure disorder, CVA, admitted 3/29/2024 with acute on chronic respiratory failure.     Acute on chronic hypoxic respiratory failure, multifactorial  HFpEF exacerbation (elevated BNP and weight gain),   COPD exacerbation  JODI with noncompliance to CPAP and  Acute right heart failure with mod-severe TR  Severe Pulmonary Hypertension  [Home Lasix 20mg BID PO]  *Baseline 3-5 L NC  *CXR 3/29- Cardiomegaly. Ill-defined opacity at the right infrahilar lung could be acute airspace disease. A mass is difficult to exclude. Bilateral vascular congestion may be a degree of pulmonary edema. Potential small right pleural fluid.  *CT C PE 3/29/2024 without any PE but did demonstrate pulmonary congestion, R pleural effusion; also enlarged main pulmonary artery, as evidence of pulmonary hypertension  *TTE 4/1 showing EF 60-65% without WMA, severe RV dilation with moderately decreased RV function, mod-severe TR and pulm HTN    - Was initially started on Lasix drip.  This was transition to Bumex drip at 0.25 mg/h on 4/9/2024.,  Received a one-time bolus bolus of IV Bumex 2 mg on 4/9, 4/10, 4/11, 4/13 and 4/14/2024.  Increase Bumex drip to 0.5 mg an hour on 4/14/2024   - Cardiology following  - s/p left and right heart cath on 4/12/2024.  Showed normal coronary arteries.  Severely elevated right-sided pressures, severely elevated left-sided filling pressures.  Mixed precapillary and postcapillary pulmonary hypertension.  Normal cardiac index.  - weight down 2 lbs since  yesterday, about 50 lbs since admission . Neg balance -23L since admission  - lymphedema wraps  - 2gm Na diet  - fluid restriction 1800cc/day  - on potassium replacement with oral KCl 40 mEq 3 times daily  - cr slowly trending up  1.15--1/1--1.24--1.22--1.29--1.31--1.18  - Flutter valve and incentive spirometer  - still on Bumex drip  - 4/17-still on 8-9L Oxymixer, try to wean down as able, O2 sat goal at 88-92%  - Started on Jardiance 10 mg daily on 4/13/2024 for HFpEF  - Plan for RHC on 4/19, as per Cardiology    COPD exacerbation   CAP  Respiratory acidosis  [PTA on Breo, Spiriva respimat, Alb inh and neb prn]  - completed 5-day course of ceftriaxone+doxy on 4/2  - 04/07/24 completed 8-day prednisone taper.;  - 4/8 -restarted Breo.  Wheezing significantly improved.  - currently om Breo 1 puff q daily and Duonebs QID     Hemoptysis, mild, resolved  *reports orange sputum production at home, was producing small amounts of rober blood 4/1  *admission CT chest negative for PE, masses  *resumed aspirin 4/3 and reports small amount of hemoptysis 4/4   - denies any further hemoptysis.  Lovenox started on 4/8/2024.  Held on 4/12/2024 for cardiac catheterization. Now resumed.     SONAL on Chronic kidney disease, stage III, improved  *Speculate congestive nephropathy  *Admission serum Cr 1.33, historic baseline 0.87 to 1.08 in 2020 to 2023  - Avoid non-steroidal anti-inflammatory drugs (NSAIDs)  - Creatinine has been improving until 4/12/2024 but now trending up 1.15--1/1--1.24--1.22--1.29--1.31--1.18  - BMP in am     Hypokalemia  *due to diuresis  - replace per protocol  - Started on oral KCl 40 mEq 3 times daily  - K 3.8 on 4/18     History of deep venous thrombosis and pulmonary embolism  *CT C PE 3/29/2024 without any PE  *Per pharmacy, no longer on warfarin  - resume prophylactic lovenox 04/07/24 40 mg BID  - has been counseled on importance of frequent ambulation, out of bed activity  - Cardiology does recommend a VQ  scan to evaluate for chronic thromboembolic pulmonary hypertension.  Will defer the timing of this to cardiology     Skin ulcer, right lateral ankle  Concern for cellulitis?  - Wound nurse (WOC) following  - See pictures from 4/17, there was concern for developing cellulitis  - Started ceftriaxone IV daily (allergic to penicillin, endomysial, tetracycline)  - Monitor     Weakness and deconditioning  - PT/ OT-recommend home with home therapies  - Reorder home care with PT, OT and RN at the time of discharge     Obstructive sleep apnea  - Patient is on CPAP at home but has not been using it since admission since she did not tolerate the mask initially.  Encouraged her to bring her home CPAP machine We tried the hospital mask on 4/9/2024 and she did not tolerate this.  - Patient continues to be noncompliant with her CPAP use in the hospital despite having her home machine here.     Mild thrombocytopenia-  platelet count has been slowly downtrending over the last 3 days.  - 135---137---118--105--112  - continue to monitor for now.    Moderate Malnutrition  - nutritionist consult      Hyperlipidemia - Diet-controlled, monitor; follow-up with primary clinic provider   Hypothyroidism - Med rec does not show any levothyroxine (TSH slightly elevated, free T4 wnl). Will defer to outpt evaluation by PCP  Morbid obesity  History of nicotine dependence  History of obstructive sleep apnea  History of coronary artery disease   History of monoclonal gammopathy of undetermined significance - noted, follow-up with primary clinic provider   History of cerebrovascular disease with prior stroke - noted, monitor  History of B12 deficiency - noted, continue prior to admission B12 replacement  History of seizure disorder - noted in medical record; monitor          Diet: Snacks/Supplements Adult: Expedite Bottle; Between Meals  Combination Diet Regular Diet; 2 gm NA Diet  Fluid restriction 1800 ML FLUID    DVT Prophylaxis: Enoxaparin  "(Lovenox) SQ  Miguel Catheter: Not present  Lines: None     Cardiac Monitoring: ACTIVE order. Indication: Post- PCI/Angiogram (24 hours)  Code Status: Full Code      Clinically Significant Risk Factors                # Thrombocytopenia: Lowest platelets = 112 in last 2 days, will monitor for bleeding   # Hypertension: Noted on problem list        # Severe Obesity: Estimated body mass index is 46.57 kg/m  as calculated from the following:    Height as of this encounter: 1.676 m (5' 6\").    Weight as of this encounter: 130.9 kg (288 lb 8 oz).   # Moderate Malnutrition: based on nutrition assessment    # Financial/Environmental Concerns: none  # COPD: noted on problem list        Disposition Plan     Medically Ready for Discharge: Anticipated in 2-4 Days             Tri Zhao MD  Hospitalist Service  [facility]  Securely message with [employer] (more info)  Text page via [employer] Paging/Directory   ______________________________________________________________________    Interval History   No events overnight, remains on 8-9 L oxygen via Oxymizer  Denies chest pain, respiratory status stable  No nausea, no vomiting, no abdominal pain  Discussed with RN    Physical Exam   Vital Signs: Temp: 97.6  F (36.4  C) Temp src: Oral BP: 100/47 Pulse: 89   Resp: 18 SpO2: 95 % O2 Device: Oxymizer cannula Oxygen Delivery: 9 LPM  Weight: 288 lbs 8 oz    General Appearance:  Awake, alert, NAD  Respiratory: bilateral air entry, no wheezing, no rales, no crakles  Cardiovascular: S1S2, irregular  GI: abd- soft, obese, nonT  Skin: multiple ecchymosis upper extremities  Extrem: 2+pitting edema RLE, 1+ pitting edema LLE; dressing around rt ankle       Medical Decision Making       45 MINUTES SPENT BY ME on the date of service doing chart review, history, exam, documentation & further activities per the note.      Data     I have personally reviewed the following data over the past 24 hrs:    N/A  \   N/A   / N/A "     141 97 (L) 36.5 (H) /  116 (H)   3.8 35 (H) 1.18 (H) \       Imaging results reviewed over the past 24 hrs:   No results found for this or any previous visit (from the past 24 hour(s)).

## 2024-04-18 NOTE — PROGRESS NOTES
"CLINICAL NUTRITION SERVICES - REASSESSMENT NOTE    Recommendations Ordered by Registered Dietitian (RD):   - Continue current interventions. Assisted pt in ordering a meal for dinner.    Malnutrition: 3/30  % Weight Loss:  None noted  % Intake:  <75% for >/= 1 month (moderate malnutrition)  Subcutaneous Fat Loss:  Orbital region mild depletion and Upper arm region mild depletion  Muscle Loss:  Clavicle bone region mild depletion, Acromion bone region mild depletion, and Scapular bone region mild depletion  Fluid Retention:  Mild-moderate     Malnutrition Diagnosis: Moderate malnutrition  In Context of:  Acute illness or injury  Chronic illness or disease     EVALUATION OF PROGRESS TOWARD GOALS   Diet: 2g Na diet  Expedite daily @ 2pm   Intake/Tolerance:   - No change to appetite since last assessment. Pt reports fair appetite, is \"doing the best she can\". She still likes the omelet in the morning, and yogurt with fruit for a later meal.   - Often she only orders 1-2 meals/day.   - She really likes the stir huber, but notes it's not the same without the stir huber sauce. She is interested in trying it again w/o the sauce, but using lemon juice to flavor instead.   - She drinks the expedite most of the time, unless its too late into the evening.     - Labs:   BUN 36.5 (H)  Recent Labs   Lab 24  0608 24  0800 24  0641 04/15/24  0627 24  0726 24  0632   * 122* 118* 125* 117* 111*     - Stoolin/18 - BM x3   - BM x1   - BM x2  - Weight: continues to trend down with diuresis   Date/Time Weight   24 0835 130 kg (286 lb 11.2 oz)   24 0900 130.9 kg (288 lb 8 oz)   24 0602 133.4 kg (294 lb 1.6 oz)   04/15/24 0447 133.4 kg (294 lb)   24 0734 135.8 kg (299 lb 6.4 oz)   24 0300 134.7 kg (296 lb 14.4 oz)   24 0630 136.1 kg (300 lb)   24 0600 137.3 kg (302 lb 12.8 oz)   24 0408 139.6 kg (307 lb 12.8 oz)   04/10/24 0701 141.7 kg (312 lb " 6.4 oz)   04/09/24 0342 142.9 kg (315 lb)   04/08/24 0250 143.3 kg (316 lb)   04/07/24 0413 144.2 kg (318 lb)   04/06/24 0608 144.8 kg (319 lb 4.8 oz)   04/05/24 0318 149.5 kg (329 lb 9.6 oz)   04/04/24 0600 150.5 kg (331 lb 14.4 oz) Abnormal    04/03/24 0503 153 kg (337 lb 4.8 oz) Abnormal    04/02/24 0600 154.2 kg (340 lb) Abnormal    04/01/24 0335 156.8 kg (345 lb 9.6 oz) Abnormal    03/31/24 0457 156.2 kg (344 lb 6.4 oz) Abnormal    03/30/24 0554 154.4 kg (340 lb 8 oz) Abnormal    03/29/24 1505 150.3 kg (331 lb 5.6 oz) Abnormal        - Meds:   Jardiance  Miralax  KCl  NaCl  Bumex gtt    ASSESSED NUTRITION NEEDS:  Dosing Weight: 83 kg - adjusted   Estimated Energy Needs: 2456-1216 kcals (20-25 Kcal/Kg)  Justification: obese  Estimated Protein Needs: 100-125 grams protein (1.2-1.5 g pro/Kg)  Justification: preservation of lean body mass and reported wound    NEW FINDINGS:   4/17 - WOCN  - Right breast fold: fungal rash, intertrigo - resolved per patient  - Right lateral ankle area: chronic, unknown etiology - deteriorating     4/19 - ?Right heart cath planned     Previous Goals:   Intake of >/= 2 adequate meals/day.   Evaluation: Not met consistently     Previous Nutrition Diagnosis:   Inadequate oral intake related to early satiety, lack of appetite as evidenced by patient reports intake of 2 meals/day at most, with mild fat and muscle losses.   Evaluation: No change    CURRENT NUTRITION DIAGNOSIS  Inadequate oral intake related to early satiety, lack of appetite as evidenced by patient reports intake of 2 meals/day at most, with mild fat and muscle losses.     INTERVENTIONS  Recommendations / Nutrition Prescription  Continue 2g Na diet  Expedite daily  Protein-dense options as able such as omelet, yogurt    Implementation  No change - provided encouragement. Assisted pt in ordering dinner (stir huber w/ lemon, no sauce, greek yogurt, fruit, coffee).     Goals  Intake of >/= 2 adequate meals/day.     MONITORING  AND EVALUATION:  Progress towards goals will be monitored and evaluated per protocol and Practice Guidelines    Kitty Obrien RD, LD

## 2024-04-18 NOTE — PLAN OF CARE
Heart Failure Care Map  GOALS TO BE MET BEFORE DISCHARGE:    1. Decrease congestion and/or edema with diuretic therapy to achieve near optimal volume status.     Dyspnea improved: No, further care required to meet this goal. Please explain still endorses CERRATO    Edema improved: No, further care required to meet this goal. Please explain +2 BLE        Last 24 hour I/O:   Intake/Output Summary (Last 24 hours) at 4/17/2024 2243  Last data filed at 4/17/2024 0600  Gross per 24 hour   Intake 240 ml   Output 900 ml   Net -660 ml           Net I/O and Weights since admission:   03/18 2300 - 04/17 2259  In: 24109 [P.O.:44355; I.V.:179]  Out: 20022 [Urine:40714]  Net: -88265     Vitals:    03/29/24 1447 03/29/24 1505 03/30/24 0554 03/31/24 0457   Weight: 136.1 kg (300 lb) (!) 150.3 kg (331 lb 5.6 oz) (!) 154.4 kg (340 lb 8 oz) (!) 156.2 kg (344 lb 6.4 oz)    04/01/24 0335 04/02/24 0600 04/03/24 0503 04/04/24 0600   Weight: (!) 156.8 kg (345 lb 9.6 oz) (!) 154.2 kg (340 lb) (!) 153 kg (337 lb 4.8 oz) (!) 150.5 kg (331 lb 14.4 oz)    04/05/24 0318 04/06/24 0608 04/07/24 0413 04/08/24 0250   Weight: 149.5 kg (329 lb 9.6 oz) 144.8 kg (319 lb 4.8 oz) 144.2 kg (318 lb) 143.3 kg (316 lb)    04/09/24 0342 04/10/24 0701 04/11/24 0408 04/12/24 0600   Weight: 142.9 kg (315 lb) 141.7 kg (312 lb 6.4 oz) 139.6 kg (307 lb 12.8 oz) 137.3 kg (302 lb 12.8 oz)    04/13/24 0630 04/14/24 0300 04/14/24 0734 04/15/24 0447   Weight: 136.1 kg (300 lb) 134.7 kg (296 lb 14.4 oz) 135.8 kg (299 lb 6.4 oz) 133.4 kg (294 lb)    04/16/24 0602 04/17/24 0900   Weight: 133.4 kg (294 lb 1.6 oz) 130.9 kg (288 lb 8 oz)       2.  O2 sats > 90% on room air, or at prior home O2 therapy level.      Able to wean O2 this shift to keep sats above 90%?: No, further care required to meet this goal. Please explain requiring 8-10L   Does patient use Home O2? Yes-  baseline 3-5L          Current oxygenation status:   SpO2: 96 %     O2 Device: Oxymizer cannula, Oxygen  Delivery: 9 LPM    3.  Tolerates ambulation and mobility near baseline.     Ambulation: Yes, satisfactory for discharge.   Times patient ambulated with staff this shift: 1    Please review the Heart Failure Care Map for additional HF goal outcomes.    Neuro- A&Ox4  Most Recent Vitals- Temp: 97.7  F (36.5  C) Temp src: Oral BP: 104/62 Pulse: 72   Resp: 18 SpO2: 96 % O2 Device: Oxymizer cannula   Tele/Cardiac- SR, denies CP  Resp- Continues on 8-10L via oxymizer, LS clear, endorses CERRATO  Activity- Independent in room  Pain- denies  Drips- Bumex  Drains/Tubes- PIV  Skin- rash under R breast, R ankle ulcer-woc following, +2 BLE edema  GI/- WDL  Aggression Color- Green  Plan- Continue diuresing and wean O2 as able  Misc- NA    Jeanette Marrufo, RN

## 2024-04-19 NOTE — PROGRESS NOTES
[facility] Cardiology Progress Note  Date of Service: 04/19/2024  Staff Cardiologist: Dr. Kendrick     Assessment & Plan   Linda Otero is a 64 year old female who was admitted on 3/29/2024 for acute on chronic respiratory failure. Medical comorbidities include COPD, chronic respiratory failure on home oxygen with history of hypercapnia, nicotine dependence, HFpEF, DVT/PE  on past warfarin anticoagulation, recent hemoptysis, morbid obesity, CKD stage III, CAD, HLD, HTN, hypothyroidism, MGUS, seizure disorder, and CVA.     She underwent right and left heart catheterization (on 4/12) which showed normal coronary arteries, severely elevated right-sided pressures (RA 16 mmHg), severely elevated left-sided filling pressures (PCWP 28 mmHg), mixed precapillary and postcapillary pulmonary hypertension (mean PA 59 mmHg, PCWP 28 mmHg, PVR 4.69WU), and normal cardiac output/cardiac index (Elsa CO/CI 7.25/3.04).    Interval History:   Continues to diurese well on bumex infusion, renal function stable. Remains on 8-9L oxymask.     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    TTE 4/1 showing EF 60-65% without WMA, severe RV dilation with moderately decreased RV function, mod-severe TR and pulm HTN.  Diuresing well; SOB gradually improving, EDW unknown.   Initiated on IV Furosemide infusion then  transitioned to bumex with increased urine output.   RHC on 4/12/2024 showed severely elevated right and left-sided filling pressures.     Hx of CVA s/p PFO closure   JODI with noncompliance with CPAP  Severe COPD with exacerbation  Moderate to severe tricuspid regurgitation  Morbid obesity with a BMI 50  SONAL on CKD, stage III, improved with diuresis [baseline cr 0.8-1.1)  Hx of DVT and PE      Plan:  Continue bumex infusion at 0.5 mg/hr.   Continue aspirin 81 mg daily and Jardiance 10 mg daily.   Daily BMP while diuresing.   Strict I&O's, daily standing weights, replace lytes per protocol.  Plan for repeat right heart catheterization today for volume assessment, risks/benefits discussed, consent signed.   Patient is ONLY willing to proceed with IJ access (which is favorable).     Amada Price CNP  Pager:  [phone]  (7am - 5pm, M-F)      Physical Exam   Temp: 98.1  F (36.7  C) Temp src: Oral BP: 110/72 Pulse: 110   Resp: 20 SpO2: 96 % O2 Device: Oxymizer cannula Oxygen Delivery: 8 LPM  Vitals:    04/17/24 0900 04/18/24 0835   Weight: 130.9 kg (288 lb 8 oz) 130 kg (286 lb 11.2 oz)       Constitutional:   NAD   Skin:   Warm and dry   Head:   Nontraumatic   Neck:   no JVD   Lungs:   normal   Cardiovascular:   regular rate and rhythm   Abdomen:   Benign   Extremities and Back:   Bilateral 2+ KT, R>L   Neurological:   Grossly nonfocal       Medications   Current Facility-Administered Medications   Medication Dose Route Frequency Provider Last Rate Last Admin    bumetanide (BUMEX) 0.25 mg/mL infusion  0.5 mg/hr Intravenous Continuous Kyle Echavarria MD 2 mL/hr at 04/18/24 1914 0.5 mg/hr at 04/18/24 1914    Patient is NOT allergic to contrast dye   Does not apply DOES NOT GO TO Amada Conway CNP         Current Facility-Administered Medications   Medication Dose Route Frequency Provider Last Rate Last Admin    [START ON 4/20/2024] aspirin (ASA) chewable tablet 81 mg  81 mg Oral BID Faustino  MD Dennys        cefTRIAXone (ROCEPHIN) 1 g vial to attach to  mL bag for ADULTS or NS 50 mL bag for PEDS  1 g Intravenous Q24H Tri Zhao  mL/hr at 04/19/24 0857 1 g at 04/19/24 0857    empagliflozin (JARDIANCE) tablet 10 mg  10 mg Oral Daily Kyle Echavarria MD   10 mg at 04/19/24 0906    [Held by provider] enoxaparin ANTICOAGULANT (LOVENOX) injection 40 mg  40 mg Subcutaneous Q12H Amada Price CNP   40 mg at 04/18/24 2033    fluticasone-vilanterol (BREO ELLIPTA) 200-25 MCG/ACT inhaler 1 puff  1 puff Inhalation Daily Angi Mina MD   1 puff at 04/19/24 0905    ipratropium - albuterol 0.5 mg/2.5 mg/3 mL (DUONEB) neb solution 3 mL  3 mL Nebulization 4x daily Gerry Pizarro MD   3 mL at 04/19/24 0758    miconazole (MICATIN) 2 % powder   Topical BID Angi Mina MD   Given at 04/18/24 0859    miconazole with skin protectant (KAYDEN ANTIFUNGAL) 2 % cream   Topical BID Angi Mina MD   Given at 04/19/24 0917    multivitamin w/minerals (THERA-VIT-M) tablet 1 tablet  1 tablet Oral Daily Charli Castro MD   1 tablet at 04/18/24 1308    polyethylene glycol (MIRALAX) Packet 17 g  17 g Oral Daily Charli Castro MD   17 g at 04/18/24 0853    potassium chloride marcie ER (KLOR-CON M20) CR tablet 40 mEq  40 mEq Oral TID Molly Hampton APRN CNP   40 mEq at 04/19/24 0905    sodium chloride (PF) 0.9% PF flush 10 mL  10 mL Intracatheter Q8H Gerry Pizarro MD   10 mL at 04/19/24 0912    sodium chloride (PF) 0.9% PF flush 3 mL  3 mL Intracatheter Q8H Amada Price CNP           Data     Most Recent 3 CBC's:  Recent Labs   Lab Test 04/19/24  0651 04/17/24  0800 04/16/24  0641 04/13/24  0632 04/13/24  0055 04/12/24  0647 04/10/24  0639 04/09/24  1114   WBC  --   --  4.9  --   --  9.2  --  10.2   HGB  --   --  8.6*  --  9.3* 9.3*  --  11.4*   MCV  --   --  82  --   --  79  --  80   * 112* 98*   < >  --  105*   < > 137*    < > = values in this  "interval not displayed.     Most Recent 3 BMP's:  Recent Labs   Lab Test 04/19/24  0651 04/18/24  0608 04/17/24  0800    141 141   POTASSIUM 3.7 3.8 3.5   CHLORIDE 97* 97* 96*   CO2 37* 35* 33*   BUN 34.1* 36.5* 35.4*   CR 1.21* 1.18* 1.31*   ANIONGAP 9 9 12   LUNA 9.2 9.0 9.0   * 116* 122*     Most Recent 3 Troponin's:No lab results found.  Most Recent 3 BNP's:  Recent Labs   Lab Test 03/29/24  1453   NTBNPI 4,421*     Most Recent Hemoglobin A1c:  Recent Labs   Lab Test 09/19/22  0946   A1C 5.5         Medical Decision Making       30 MINUTES SPENT BY ME on the date of service doing chart review, history, exam, documentation & further activities per the note.        Clinically Significant Risk Factors                  # Hypertension: Noted on problem list        # Severe Obesity: Estimated body mass index is 46.27 kg/m  as calculated from the following:    Height as of this encounter: 1.676 m (5' 6\").    Weight as of this encounter: 130 kg (286 lb 11.2 oz).   # Moderate Malnutrition: based on nutrition assessment      # Financial/Environmental Concerns: none  # COPD: noted on problem list       Diastolic acute      Fluid overload, unspecified    Not present on admission    Not present on admission    Acute kidney failure, unspecified    Not present on admission        Pulmonary Heart Disease (Pulmonary hypertension or Cor pulmonale): Pulmonary Hypertension, unspecified    Not present on admission        "

## 2024-04-19 NOTE — PROGRESS NOTES
Heart Failure Care Map  GOALS TO BE MET BEFORE DISCHARGE:    1. Decrease congestion and/or edema with diuretic therapy to achieve near optimal volume status.     Dyspnea improved: No, further care required to meet this goal. Please explain Pt still SOB on exertion   Edema improved: Edema is improving         Last 24 hour I/O:   Intake/Output Summary (Last 24 hours) at 4/19/2024 1845  Last data filed at 4/19/2024 1800  Gross per 24 hour   Intake 850 ml   Output 2750 ml   Net -1900 ml           Net I/O and Weights since admission:   03/20 2300 - 04/19 2259  In: 20254 [P.O.:19775; I.V.:379]  Out: 43732 [Urine:03030]  Net: -79146     Vitals:    03/29/24 1447 03/29/24 1505 03/30/24 0554 03/31/24 0457   Weight: 136.1 kg (300 lb) (!) 150.3 kg (331 lb 5.6 oz) (!) 154.4 kg (340 lb 8 oz) (!) 156.2 kg (344 lb 6.4 oz)    04/01/24 0335 04/02/24 0600 04/03/24 0503 04/04/24 0600   Weight: (!) 156.8 kg (345 lb 9.6 oz) (!) 154.2 kg (340 lb) (!) 153 kg (337 lb 4.8 oz) (!) 150.5 kg (331 lb 14.4 oz)    04/05/24 0318 04/06/24 0608 04/07/24 0413 04/08/24 0250   Weight: 149.5 kg (329 lb 9.6 oz) 144.8 kg (319 lb 4.8 oz) 144.2 kg (318 lb) 143.3 kg (316 lb)    04/09/24 0342 04/10/24 0701 04/11/24 0408 04/12/24 0600   Weight: 142.9 kg (315 lb) 141.7 kg (312 lb 6.4 oz) 139.6 kg (307 lb 12.8 oz) 137.3 kg (302 lb 12.8 oz)    04/13/24 0630 04/14/24 0300 04/14/24 0734 04/15/24 0447   Weight: 136.1 kg (300 lb) 134.7 kg (296 lb 14.4 oz) 135.8 kg (299 lb 6.4 oz) 133.4 kg (294 lb)    04/16/24 0602 04/17/24 0900 04/18/24 0835   Weight: 133.4 kg (294 lb 1.6 oz) 130.9 kg (288 lb 8 oz) 130 kg (286 lb 11.2 oz)       2.  O2 sats > 90% on room air, or at prior home O2 therapy level.      Able to wean O2 this shift to keep sats above 90%?: No, further care required to meet this goal. Please explain still requiring 8L O2   Does patient use Home O2? Yes-  3-5 L           Current oxygenation status:   SpO2: 92 %     O2 Device: Oxymask, Oxygen Delivery: 8  LPM    3.  Tolerates ambulation and mobility near baseline.     Ambulation: Yes, satisfactory for discharge.   Times patient ambulated with staff this shift: 3    Please review the Heart Failure Care Map for additional HF goal outcomes.    Nehal Marie RN  4/19/2024

## 2024-04-19 NOTE — PROGRESS NOTES
[facility]    Medicine Progress Note - Hospitalist Service    Date of Admission:  3/29/2024    Assessment & Plan   Linda Otero is a 64 year old female admitted on 3/29/2024.  Past medical history of COPD, chronic respiratory failure on home oxygen with history of hypercapnia, nicotine dependence, HFpEF, DVT/PE  on past warfarin anticoagulation, recent hemoptysis, morbid obesity, CKD stage III, CAD, HLD, HTN, hypothyroidism, MGUS, seizure disorder, CVA, admitted 3/29/2024 with acute on chronic respiratory failure.     Acute on chronic hypoxic respiratory failure, multifactorial  HFpEF exacerbation   COPD exacerbation  JODI with noncompliance to CPAP   Acute right heart failure with mod-severe TR  Severe Pulmonary Hypertension  [Home Lasix 20mg BID PO]  *Baseline 5 L NC  *CXR 3/29- Cardiomegaly. Ill-defined opacity at the right infrahilar lung could be acute airspace disease. A mass is difficult to exclude. Bilateral vascular congestion may be a degree of pulmonary edema. Potential small right pleural fluid.  *CT C PE 3/29/2024 without any PE but did demonstrate pulmonary congestion, R pleural effusion; also enlarged main pulmonary artery, as evidence of pulmonary hypertension  *TTE 4/1 showing EF 60-65% without WMA, severe RV dilation with moderately decreased RV function, mod-severe TR and pulm HTN    - Was initially started on Lasix drip.  This was transition to Bumex drip at 0.25 mg/h on 4/9/2024.,  Received a one-time bolus bolus of IV Bumex 2 mg on 4/9, 4/10, 4/11, 4/13 and 4/14/2024.  Increase Bumex drip to 0.5 mg an hour on 4/14/2024   - Cardiology following  - s/p left and right heart cath on 4/12/2024.  Showed normal coronary arteries.  Severely elevated right-sided pressures, severely elevated left-sided filling pressures.  Mixed precapillary and postcapillary pulmonary hypertension.  Normal cardiac index.  - weight down 2 lbs since yesterday, about 50 lbs since admission .  Neg balance -23L since admission  - lymphedema wraps  - 2gm Na diet  - fluid restriction 1800cc/day  - on potassium replacement with oral KCl 40 mEq 3 times daily  - cr overall stable 1.15--1/1--1.24--1.22--1.29--1.31--1.18--1.21  - Flutter valve and incentive spirometer  - still on Bumex drip  - 4/19-still on 8L Oxymixer, try to wean down as able, O2 sat goal at 88-92% (baseline on O2 5 liters NC)  - Started on Jardiance 10 mg daily on 4/13/2024 for HFpEF  - Plan for RHC on 4/19, as per Cardiology    COPD exacerbation   CAP  Respiratory acidosis  [PTA on Breo, Spiriva respimat, Alb inh and neb prn]  - completed 5-day course of ceftriaxone+doxy on 4/2  - 04/07/24 completed 8-day prednisone taper.;  - 4/8 -restarted Breo.  Wheezing significantly improved.  - currently om Breo 1 puff q daily and Duonebs QID     Hemoptysis, mild, resolved  *reports orange sputum production at home, was producing small amounts of rober blood 4/1  *admission CT chest negative for PE, masses  *resumed aspirin 4/3 and reports small amount of hemoptysis 4/4   - denies any further hemoptysis.  Lovenox started on 4/8/2024.  Held for cardiac catheterization, will resume afer RHC     SONAL on Chronic kidney disease, stage III, improved  *Speculate congestive nephropathy  *Admission serum Cr 1.33, historic baseline 0.87 to 1.08 in 2020 to 2023  - Avoid non-steroidal anti-inflammatory drugs (NSAIDs)  - Creatinine has been improving until 4/12/2024 but now trending up 1.15--1/1--1.24--1.22--1.29--1.31--1.18  - BMP in am     Hypokalemia  *due to diuresis  - replace per protocol  - Started on oral KCl 40 mEq 3 times daily  - K 3.8 on 4/18     History of deep venous thrombosis and pulmonary embolism  *CT C PE 3/29/2024 without any PE  *Per pharmacy, no longer on warfarin  - on prophylactic lovenox 40 mg BID  - has been counseled on importance of frequent ambulation, out of bed activity  - Cardiology does recommend a VQ scan to evaluate for chronic  thromboembolic pulmonary hypertension.  Will defer the timing of this to cardiology     Skin ulcer, right lateral ankle  Concern for cellulitis?  - Wound nurse (WOC) following  - See pictures from 4/17, there was concern for developing cellulitis  - Started ceftriaxone IV daily (allergic to penicillin, endomysial, tetracycline) on 4/18, plan to switch to Keflex  - Monitor     Weakness and deconditioning  - PT/ OT-recommend home with home therapies  - Reorder home care with PT, OT and RN at the time of discharge     Obstructive sleep apnea  - Patient is on CPAP at home but has not been using it since admission since she did not tolerate the mask initially.  Encouraged her to bring her home CPAP machine We tried the hospital mask on 4/9/2024 and she did not tolerate this.  - Patient continues to be noncompliant with her CPAP use in the hospital despite having her home machine here.     Mild thrombocytopenia-  platelet count has been slowly downtrending over the last 3 days, now improving.  - 135---137---118--105--112--135  - continue to monitor for now.    Moderate Malnutrition  - nutritionist consult      Hyperlipidemia - Diet-controlled, monitor; follow-up with primary clinic provider   Hypothyroidism - Med rec does not show any levothyroxine (TSH slightly elevated, free T4 wnl). Will defer to outpt evaluation by PCP  Morbid obesity  History of nicotine dependence  History of obstructive sleep apnea  History of coronary artery disease   History of monoclonal gammopathy of undetermined significance - noted, follow-up with primary clinic provider   History of cerebrovascular disease with prior stroke - noted, monitor  History of B12 deficiency - noted, continue prior to admission B12 replacement  History of seizure disorder - noted in medical record; monitor          Diet: Snacks/Supplements Adult: Expedite Bottle; Between Meals  Fluid restriction 1800 ML FLUID  NPO for Medical/Clinical Reasons Except for: Meds    DVT  "Prophylaxis: Enoxaparin (Lovenox) SQ  Miguel Catheter: Not present  Lines: None     Cardiac Monitoring: ACTIVE order. Indication: Post- PCI/Angiogram (24 hours)  Code Status: Full Code      Clinically Significant Risk Factors                  # Hypertension: Noted on problem list        # Severe Obesity: Estimated body mass index is 46.27 kg/m  as calculated from the following:    Height as of this encounter: 1.676 m (5' 6\").    Weight as of this encounter: 130 kg (286 lb 11.2 oz).   # Moderate Malnutrition: based on nutrition assessment      # Financial/Environmental Concerns: none  # COPD: noted on problem list        Disposition Plan     Medically Ready for Discharge: Anticipated in 2-4 Days             Tri Zhao MD  Hospitalist Service  [facility]  Securely message with [email or web address] (more info)  Text page via [employer] Paging/Directory   ______________________________________________________________________    Interval History   Slept well, SOB improved, no chest pain  Still on 8 oxygen via Oxymizer  NPO for RHC later on today  No nausea, no vomiting, no abdominal pain  Discussed with RN    Physical Exam   Vital Signs: Temp: 98.1  F (36.7  C) Temp src: Oral BP: 110/72 Pulse: 110   Resp: 20 SpO2: 96 % O2 Device: Oxymizer cannula Oxygen Delivery: 8 LPM  Weight: 286 lbs 11.2 oz    General Appearance:  Awake, alert, NAD  Respiratory: bilateral air entry, no wheezing, no rales, no crakles  Cardiovascular: S1S2, irregular  GI: abd- soft, obese, nonT  Skin: multiple ecchymosis upper extremities  Extrem: 2+pitting edema RLE, 1+ pitting edema LLE; dressing around rt ankle       Medical Decision Making       45 MINUTES SPENT BY ME on the date of service doing chart review, history, exam, documentation & further activities per the note.      Data     I have personally reviewed the following data over the past 24 hrs:    N/A  \   N/A   / 135 (L)     143 97 (L) 34.1 (H) /  104 (H)   3.7 37 (H) 1.21 " (H) \       Imaging results reviewed over the past 24 hrs:   No results found for this or any previous visit (from the past 24 hour(s)).

## 2024-04-19 NOTE — PLAN OF CARE
Pleasant lady. Pt aox4, ind in room, 8L oxymizer cannula and full code. Tele SR. Pt denies CP and SOB. Bumex gtt 2 ml/hr. Wound on Rt ankle followed by woc and continue rocephin for possible cellulitis. Pt refused skin assessment.   Plan: NPO at [facility] for RHC.

## 2024-04-20 NOTE — PROGRESS NOTES
Heart Failure Care Map  GOALS TO BE MET BEFORE DISCHARGE:    1. Decrease congestion and/or edema with diuretic therapy to achieve near optimal volume status.     Dyspnea improved: No, further care required to meet this goal. Please explain   Patient dyspneic on exertion    Edema improved: No, further care required to meet this goal. Please explain Edema improving        Last 24 hour I/O:   Intake/Output Summary (Last 24 hours) at 4/20/2024 1858  Last data filed at 4/20/2024 1700  Gross per 24 hour   Intake 1533 ml   Output 1500 ml   Net 33 ml           Net I/O and Weights since admission:   03/21 2300 - 04/20 2259  In: 62206 [P.O.:91577; I.V.:382]  Out: 75647 [Urine:23197]  Net: -56672     Vitals:    03/29/24 1447 03/29/24 1505 03/30/24 0554 03/31/24 0457   Weight: 136.1 kg (300 lb) (!) 150.3 kg (331 lb 5.6 oz) (!) 154.4 kg (340 lb 8 oz) (!) 156.2 kg (344 lb 6.4 oz)    04/01/24 0335 04/02/24 0600 04/03/24 0503 04/04/24 0600   Weight: (!) 156.8 kg (345 lb 9.6 oz) (!) 154.2 kg (340 lb) (!) 153 kg (337 lb 4.8 oz) (!) 150.5 kg (331 lb 14.4 oz)    04/05/24 0318 04/06/24 0608 04/07/24 0413 04/08/24 0250   Weight: 149.5 kg (329 lb 9.6 oz) 144.8 kg (319 lb 4.8 oz) 144.2 kg (318 lb) 143.3 kg (316 lb)    04/09/24 0342 04/10/24 0701 04/11/24 0408 04/12/24 0600   Weight: 142.9 kg (315 lb) 141.7 kg (312 lb 6.4 oz) 139.6 kg (307 lb 12.8 oz) 137.3 kg (302 lb 12.8 oz)    04/13/24 0630 04/14/24 0300 04/14/24 0734 04/15/24 0447   Weight: 136.1 kg (300 lb) 134.7 kg (296 lb 14.4 oz) 135.8 kg (299 lb 6.4 oz) 133.4 kg (294 lb)    04/16/24 0602 04/17/24 0900 04/18/24 0835 04/20/24 0633   Weight: 133.4 kg (294 lb 1.6 oz) 130.9 kg (288 lb 8 oz) 130 kg (286 lb 11.2 oz) 127.4 kg (280 lb 14.4 oz)       2.  O2 sats > 90% on room air, or at prior home O2 therapy level.      Able to wean O2 this shift to keep sats above 90%?: No, further care required to meet this goal. Please explain Patient still requiring up to 8L O2 at rest and 11 L during  activity    Does patient use Home O2? Yes-            Current oxygenation status:   SpO2: 95 %     O2 Device: Oxymizer cannula, Oxygen Delivery: 8 LPM    3.  Tolerates ambulation and mobility near baseline.     Ambulation: Yes, satisfactory for discharge.   Times patient ambulated with staff this shift: 4    Please review the Heart Failure Care Map for additional HF goal outcomes.    Roosevelt Olivier RN  4/20/2024

## 2024-04-20 NOTE — PLAN OF CARE
Goal Outcome Evaluation:  Heart Failure Care Map  GOALS TO BE MET BEFORE DISCHARGE:    1. Decrease congestion and/or edema with diuretic therapy to achieve near optimal volume status.     Dyspnea improved: No, further care required to meet this goal. Please explain . Still dyspneic with activities, denies SOB at rest      Edema improved: Less edema noted BLE, continue to encourage elevation & lymph wraps      Last 24 hour I/O:   Intake/Output Summary (Last 24 hours) at 4/20/2024 0702  Last data filed at 4/20/2024 0632  Gross per 24 hour   Intake 1480 ml   Output 2400 ml   Net -920 ml           Net I/O and Weights since admission:   03/21 1500 - 04/20 1459  In: 83317 [P.O.:62771; I.V.:379]  Out: 26765 [Urine:78920]  Net: -56727     Vitals:    03/29/24 1447 03/29/24 1505 03/30/24 0554 03/31/24 0457   Weight: 136.1 kg (300 lb) (!) 150.3 kg (331 lb 5.6 oz) (!) 154.4 kg (340 lb 8 oz) (!) 156.2 kg (344 lb 6.4 oz)    04/01/24 0335 04/02/24 0600 04/03/24 0503 04/04/24 0600   Weight: (!) 156.8 kg (345 lb 9.6 oz) (!) 154.2 kg (340 lb) (!) 153 kg (337 lb 4.8 oz) (!) 150.5 kg (331 lb 14.4 oz)    04/05/24 0318 04/06/24 0608 04/07/24 0413 04/08/24 0250   Weight: 149.5 kg (329 lb 9.6 oz) 144.8 kg (319 lb 4.8 oz) 144.2 kg (318 lb) 143.3 kg (316 lb)    04/09/24 0342 04/10/24 0701 04/11/24 0408 04/12/24 0600   Weight: 142.9 kg (315 lb) 141.7 kg (312 lb 6.4 oz) 139.6 kg (307 lb 12.8 oz) 137.3 kg (302 lb 12.8 oz)    04/13/24 0630 04/14/24 0300 04/14/24 0734 04/15/24 0447   Weight: 136.1 kg (300 lb) 134.7 kg (296 lb 14.4 oz) 135.8 kg (299 lb 6.4 oz) 133.4 kg (294 lb)    04/16/24 0602 04/17/24 0900 04/18/24 0835 04/20/24 0633   Weight: 133.4 kg (294 lb 1.6 oz) 130.9 kg (288 lb 8 oz) 130 kg (286 lb 11.2 oz) 127.4 kg (280 lb 14.4 oz)       2.  O2 sats > 90% on room air, or at prior home O2 therapy level.      Able to wean O2 this shift to keep sats above 90%?: No, further care required to meet this goal. Please explain   Desat on attempt  to wean    Does patient use Home O2? Yes-            Current oxygenation status:   SpO2: 95 %     O2 Device: Oxymizer cannula, Oxygen Delivery: 8 LPM    3.  Tolerates ambulation and mobility near baseline.     Ambulation: No, further care required to meet this goal. Please explain   Continue to encouraged ambulation with rest between activities.    Times patient ambulated with staff this shift: 1    Please review the Heart Failure Care Map for additional HF goal outcomes.    Diuresing adequate UOP. Tele SR. [place] [facility] [place]. Plans for discharge pending progress towards goals. Will continue to monitor.       Satish Nieves RN  4/20/2024

## 2024-04-20 NOTE — PROGRESS NOTES
[facility]    Medicine Progress Note - Hospitalist Service    Date of Admission:  3/29/2024    Assessment & Plan   Linda Otero is a 64 year old female admitted on 3/29/2024.  Past medical history of COPD, chronic respiratory failure on home oxygen with history of hypercapnia, nicotine dependence, HFpEF, DVT/PE  on past warfarin anticoagulation, recent hemoptysis, morbid obesity, CKD stage III, CAD, HLD, HTN, hypothyroidism, MGUS, seizure disorder, CVA, admitted 3/29/2024 with acute on chronic respiratory failure.     Acute on chronic hypoxic respiratory failure, multifactorial  HFpEF exacerbation   COPD exacerbation  JODI with noncompliance to CPAP   Acute right heart failure with mod-severe TR  Severe Pulmonary Hypertension  [Home Lasix 20mg BID PO]  *Baseline 5 L NC  *CXR 3/29- Cardiomegaly. Ill-defined opacity at the right infrahilar lung could be acute airspace disease. A mass is difficult to exclude. Bilateral vascular congestion may be a degree of pulmonary edema. Potential small right pleural fluid.  *CT C PE 3/29/2024 without any PE but did demonstrate pulmonary congestion, R pleural effusion; also enlarged main pulmonary artery, as evidence of pulmonary hypertension  *TTE 4/1 showing EF 60-65% without WMA, severe RV dilation with moderately decreased RV function, mod-severe TR and pulm HTN  - Was initially started on Lasix drip.  This was transition to Bumex drip at 0.25 mg/h on 4/9/2024.,  Received a one-time bolus bolus of IV Bumex 2 mg on 4/9, 4/10, 4/11, 4/13 and 4/14/2024.  Increase Bumex drip to 0.5 mg an hour on 4/14/2024   - Cardiology following  - s/p left and right heart cath on 4/12/2024.  Showed normal coronary arteries.  Severely elevated right-sided pressures, severely elevated left-sided filling pressures.  Mixed precapillary and postcapillary pulmonary hypertension.  Normal cardiac index.  - weight down 2 lbs since yesterday, about 50 lbs since admission . Neg  "balance -23L since admission  - lymphedema wraps  - 2gm Na diet  - fluid restriction 1800cc/day  - on potassium replacement with oral KCl 40 mEq 3 times daily  - cr overall stable 1.15--1/1--1.24--1.22--1.29--1.31--1.18--1.21  - Flutter valve and incentive spirometer  - 4/19-still on 8L Oxymixer, try to wean down as able, O2 sat goal at 88-92% (baseline on O2 5 liters NC)  - Started on Jardiance 10 mg daily on 4/13/2024 for HFpEF  - Per [facility] on 4/19: \"I reviewed the patient's right heart catheterization. Her filling pressures have improved significantly. Her weight is now normal and her mean RA is mildly elevated. Her pulmonary hypertension has improved but still severe. Will transition her to oral diuretics. Will arrange follow-up in the pulm hypertension clinic. Will sign off.\"  - 4/20: Transitioned on PO Bumex on 4/19, will monitor response today. If tolerating oral diuretics and oral antibiotics (as below), likely close to discharge especially if home O2 can get down to baseline 5L NC     COPD exacerbation   CAP  Respiratory acidosis  [PTA on Breo, Spiriva respimat, Alb inh and neb prn]  - completed 5-day course of ceftriaxone+doxy on 4/2  - 04/07/24 completed 8-day prednisone taper.;  - 4/8 -restarted Breo.  Wheezing significantly improved.  - currently om Breo 1 puff q daily and Duonebs QID     Hemoptysis, mild, resolved  *reports orange sputum production at home, was producing small amounts of rober blood 4/1  *admission CT chest negative for PE, masses  *resumed aspirin 4/3 and reports small amount of hemoptysis 4/4   - denies any further hemoptysis.  Lovenox started on 4/8/2024.  Held for cardiac catheterization, will resume afer [facility]     SONAL on Chronic kidney disease, stage III, improved  *Speculate congestive nephropathy  *Admission serum Cr 1.33, historic baseline 0.87 to 1.08 in 2020 to 2023  - Avoid non-steroidal anti-inflammatory drugs (NSAIDs)  - Creatinine stabilized around 1.2-1.3  - BMP in am   "   Hypokalemia  *due to diuresis  - replace per protocol  - Started on oral KCl 40 mEq 3 times daily  - K 3.8 on 4/18     History of deep venous thrombosis and pulmonary embolism  *CT C PE 3/29/2024 without any PE  *Per pharmacy, no longer on warfarin  - on prophylactic lovenox 40 mg BID  - has been counseled on importance of frequent ambulation, out of bed activity  - Cardiology does recommend a VQ scan to evaluate for chronic thromboembolic pulmonary hypertension.  Will defer the timing of this to cardiology     Skin ulcer, right lateral ankle  Concern for cellulitis?  - Wound nurse (WOC) following  - See pictures from 4/17, there was concern for developing cellulitis  - Started ceftriaxone IV daily (allergic to penicillin, endomysial, tetracycline) on 4/18  - Switched to Keflex PO today (4/20), plan for 7 day course     Weakness and deconditioning  - PT/ OT-recommend home with home therapies  - Reorder home care with PT, OT and RN at the time of discharge     Obstructive sleep apnea  - Patient is on CPAP at home but has not been using it since admission since she did not tolerate the mask initially.  Encouraged her to bring her home CPAP machine We tried the hospital mask on 4/9/2024 and she did not tolerate this.  - Patient continues to be noncompliant with her CPAP use in the hospital despite having her home machine here.     Mild thrombocytopenia-  platelet count has been slowly downtrending over the last 3 days, now improving.  - 135---137---118--105--112--135  - continue to monitor for now.    Moderate Malnutrition  - nutritionist consult      Hyperlipidemia - Diet-controlled, monitor; follow-up with primary clinic provider   Hypothyroidism - Med rec does not show any levothyroxine (TSH slightly elevated, free T4 wnl). Will defer to outpt evaluation by PCP  Morbid obesity  History of nicotine dependence  History of obstructive sleep apnea  History of coronary artery disease   History of monoclonal gammopathy  "of undetermined significance - noted, follow-up with primary clinic provider   History of cerebrovascular disease with prior stroke - noted, monitor  History of B12 deficiency - noted, continue prior to admission B12 replacement  History of seizure disorder - noted in medical record; monitor          Diet: Snacks/Supplements Adult: Expedite Bottle; Between Meals  Fluid restriction 1800 ML FLUID  Combination Diet Regular Diet; 2 gm NA Diet  Fluid restriction 1800 ML FLUID    DVT Prophylaxis: Enoxaparin (Lovenox) SQ  Miguel Catheter: Not present  Lines: None     Cardiac Monitoring: ACTIVE order. Indication: Post- PCI/Angiogram (24 hours)  Code Status: Full Code      Clinically Significant Risk Factors                  # Hypertension: Noted on problem list        # Severe Obesity: Estimated body mass index is 46.27 kg/m  as calculated from the following:    Height as of this encounter: 1.676 m (5' 6\").    Weight as of this encounter: 130 kg (286 lb 11.2 oz).   # Moderate Malnutrition: based on nutrition assessment      # Financial/Environmental Concerns: none  # COPD: noted on problem list        Disposition Plan     Medically Ready for Discharge: Anticipated in 2-4 Days             Mandeep Castañeda MD  Hospitalist Service  [facility]  Securely message with [employer] (more info)  Text page via [employer] Paging/Directory   ______________________________________________________________________    Interval History   - I assumed care this AM  - Yesterday patient got RHC, cardiology noted \"I reviewed the patient's right heart catheterization. Her filling pressures have improved significantly. Her weight is now normal and her mean RA is mildly elevated. Her pulmonary hypertension has improved but still severe. Will transition her to oral diuretics. Will arrange follow-up in the pul hypertension clinic. Will sign off.\"  - This AM patient resting comfortably in bed. No acute concerns. Denies worsening SOB or " CP.    Physical Exam   Vital Signs: Temp: 98  F (36.7  C) Temp src: Oral BP: 102/65 Pulse: 101   Resp: 20 SpO2: 99 % O2 Device: Oxymizer cannula Oxygen Delivery: 8 LPM  Weight: 286 lbs 11.2 oz    General Appearance:  Awake, alert, NAD  Respiratory: CTAB, no w/r/r.  Cardiovascular: S1S2, irregular  GI: abd- soft, obese, nonT  Skin: multiple ecchymosis upper extremities  Extrem: 2+pitting edema RLE, 1+ pitting edema LLE; dressing around rt ankle       Medical Decision Making         Data     I have personally reviewed the following data over the past 24 hrs:    N/A  \   N/A   / 135 (L)     143 97 (L) 34.1 (H) /  104 (H)   3.7 37 (H) 1.21 (H) \     Procal: N/A CRP: N/A Lactic Acid: 0.5         Imaging results reviewed over the past 24 hrs:   Recent Results (from the past 24 hour(s))   Cardiac Catheterization    Narrative      Right sided filling pressures are mildly elevated.    Left sided filling pressures are normal.    Severely elevated pulmonary artery hypertension.    Normal cardiac output level.    Mildly elevated right side fillng pressure and normal left side filling   pressure (with marked respiratory variability due to obese body habitus).  RA mean 11mmHg  PCWP mean 12mmHg  Severe pulmonary hypertension  PA 76/28, mean 50mmHg

## 2024-04-21 NOTE — PLAN OF CARE
Goal Outcome Evaluation:  Heart Failure Care Map  GOALS TO BE MET BEFORE DISCHARGE:    1. Decrease congestion and/or edema with diuretic therapy to achieve near optimal volume status.     Dyspnea improved: No, further care required to meet this goal. Please explain   Still CERRATO, encouraged pulmonary hygiene   Edema improved: Yes, continue to encourage BLE elevation & lymphedema wraps.         Last 24 hour I/O:   Intake/Output Summary (Last 24 hours) at 4/21/2024 0729  Last data filed at 4/21/2024 0600  Gross per 24 hour   Intake 1113 ml   Output 1650 ml   Net -537 ml           Net I/O and Weights since admission:   03/22 1500 - 04/21 1459  In: 25244 [P.O.:11490; I.V.:382]  Out: 35970 [Urine:13521]  Net: -36149     Vitals:    03/29/24 1447 03/29/24 1505 03/30/24 0554 03/31/24 0457   Weight: 136.1 kg (300 lb) (!) 150.3 kg (331 lb 5.6 oz) (!) 154.4 kg (340 lb 8 oz) (!) 156.2 kg (344 lb 6.4 oz)    04/01/24 0335 04/02/24 0600 04/03/24 0503 04/04/24 0600   Weight: (!) 156.8 kg (345 lb 9.6 oz) (!) 154.2 kg (340 lb) (!) 153 kg (337 lb 4.8 oz) (!) 150.5 kg (331 lb 14.4 oz)    04/05/24 0318 04/06/24 0608 04/07/24 0413 04/08/24 0250   Weight: 149.5 kg (329 lb 9.6 oz) 144.8 kg (319 lb 4.8 oz) 144.2 kg (318 lb) 143.3 kg (316 lb)    04/09/24 0342 04/10/24 0701 04/11/24 0408 04/12/24 0600   Weight: 142.9 kg (315 lb) 141.7 kg (312 lb 6.4 oz) 139.6 kg (307 lb 12.8 oz) 137.3 kg (302 lb 12.8 oz)    04/13/24 0630 04/14/24 0300 04/14/24 0734 04/15/24 0447   Weight: 136.1 kg (300 lb) 134.7 kg (296 lb 14.4 oz) 135.8 kg (299 lb 6.4 oz) 133.4 kg (294 lb)    04/16/24 0602 04/17/24 0900 04/18/24 0835 04/20/24 0633   Weight: 133.4 kg (294 lb 1.6 oz) 130.9 kg (288 lb 8 oz) 130 kg (286 lb 11.2 oz) 127.4 kg (280 lb 14.4 oz)    04/21/24 0628   Weight: 127.8 kg (281 lb 11.2 oz)       2.  O2 sats > 90% on room air, or at prior home O2 therapy level.      Able to wean O2 this shift to keep sats above 90%?: NO       Current oxygenation status:   SpO2:  93 %     O2 Device: Oxymizer cannula, Oxygen Delivery: 8 LPM    3.  Tolerates ambulation and mobility near baseline.     Ambulation: No, further care required to meet this goal. Please explain    Declined, took few steps to BSC   Times patient ambulated with staff this shift: 0    Please review the Heart Failure Care Map for additional HF goal outcomes.    Satish Nieves RN  4/21/2024

## 2024-04-21 NOTE — PROGRESS NOTES
[facility]    Medicine Progress Note - Hospitalist Service    Date of Admission:  3/29/2024    Assessment & Plan   Linda Otero is a 64 year old female admitted on 3/29/2024.  Past medical history of COPD, chronic respiratory failure on home oxygen with history of hypercapnia, nicotine dependence, HFpEF, DVT/PE  on past warfarin anticoagulation, recent hemoptysis, morbid obesity, CKD stage III, CAD, HLD, HTN, hypothyroidism, MGUS, seizure disorder, CVA, admitted 3/29/2024 with acute on chronic respiratory failure.     Acute on chronic hypoxic respiratory failure, multifactorial  HFpEF exacerbation   COPD exacerbation  JODI with noncompliance to CPAP   Acute right heart failure with mod-severe TR  Severe Pulmonary Hypertension  [Home Lasix 20mg BID PO]  *Baseline 5 L NC  *CXR 3/29- Cardiomegaly. Ill-defined opacity at the right infrahilar lung could be acute airspace disease. A mass is difficult to exclude. Bilateral vascular congestion may be a degree of pulmonary edema. Potential small right pleural fluid.  *CT C PE 3/29/2024 without any PE but did demonstrate pulmonary congestion, R pleural effusion; also enlarged main pulmonary artery, as evidence of pulmonary hypertension  *TTE 4/1 showing EF 60-65% without WMA, severe RV dilation with moderately decreased RV function, mod-severe TR and pulm HTN  - Was initially started on Lasix drip.  This was transition to Bumex drip at 0.25 mg/h on 4/9/2024.,  Received a one-time bolus bolus of IV Bumex 2 mg on 4/9, 4/10, 4/11, 4/13 and 4/14/2024.  Increase Bumex drip to 0.5 mg an hour on 4/14/2024   - Cardiology following  - s/p left and right heart cath on 4/12/2024.  Showed normal coronary arteries.  Severely elevated right-sided pressures, severely elevated left-sided filling pressures.  Mixed precapillary and postcapillary pulmonary hypertension.  Normal cardiac index.  - weight down 2 lbs since yesterday, about 50 lbs since admission . Neg  "balance -23L since admission  - lymphedema wraps  - 2gm Na diet  - fluid restriction 1800cc/day  - on potassium replacement with oral KCl 40 mEq 3 times daily  - cr overall stable 1.15--1/1--1.24--1.22--1.29--1.31--1.18--1.21  - Flutter valve and incentive spirometer  - 4/19-still on 8L Oxymixer, try to wean down as able, O2 sat goal at 88-92% (baseline on O2 5 liters NC)  - Started on Jardiance 10 mg daily on 4/13/2024 for HFpEF  - Per [facility] on 4/19: \"I reviewed the patient's right heart catheterization. Her filling pressures have improved significantly. Her weight is now normal and her mean RA is mildly elevated. Her pulmonary hypertension has improved but still severe. Will transition her to oral diuretics. Will arrange follow-up in the pul hypertension clinic. Will sign off.\"  -Continue to wean off O2    COPD exacerbation   CAP  Respiratory acidosis  [PTA on Breo, Spiriva respimat, Alb inh and neb prn]  - completed 5-day course of ceftriaxone+doxy on 4/2  - 04/07/24 completed 8-day prednisone taper.;  - 4/8 -restarted Breo.  Wheezing significantly improved.  - currently om Breo 1 puff q daily and Duonebs QID     Hemoptysis, mild, resolved  *reports orange sputum production at home, was producing small amounts of rober blood 4/1  *admission CT chest negative for PE, masses  *resumed aspirin 4/3 and reports small amount of hemoptysis 4/4   - denies any further hemoptysis.  Lovenox started on 4/8/2024.  Held for cardiac catheterization, will resume afer [facility]     SONAL on Chronic kidney disease, stage III, improved  *Speculate congestive nephropathy  *Admission serum Cr 1.33, historic baseline 0.87 to 1.08 in 2020 to 2023  - Avoid non-steroidal anti-inflammatory drugs (NSAIDs)  - Creatinine stabilized around 1.2-1.3  - BMP in am     Hypokalemia  *due to diuresis  - replace per protocol  - Started on oral KCl 40 mEq 3 times daily  - K 3.8 on 4/18     History of deep venous thrombosis and pulmonary embolism  *CT C PE " 3/29/2024 without any PE  *Per pharmacy, no longer on warfarin  - on prophylactic lovenox 40 mg BID  - has been counseled on importance of frequent ambulation, out of bed activity  - Cardiology does recommend a VQ scan to evaluate for chronic thromboembolic pulmonary hypertension.  Will defer the timing of this to cardiology     Skin ulcer, right lateral ankle  Concern for cellulitis?  - Wound nurse (WOC) following  - See pictures from 4/17, there was concern for developing cellulitis  - Started ceftriaxone IV daily (allergic to penicillin, endomysial, tetracycline) on 4/18  - Switched to Keflex PO today (4/20), plan for 7 day course     Weakness and deconditioning  - PT/ OT-recommend home with home therapies  - Reorder home care with PT, OT and RN at the time of discharge     Obstructive sleep apnea  - Patient is on CPAP at home but has not been using it since admission since she did not tolerate the mask initially.  Encouraged her to bring her home CPAP machine We tried the hospital mask on 4/9/2024 and she did not tolerate this.  - Patient continues to be noncompliant with her CPAP use in the hospital despite having her home machine here.     Mild thrombocytopenia-  platelet count has been slowly downtrending over the last 3 days, now improving.  - 135---137---118--105--112--135  - continue to monitor for now.    Moderate Malnutrition  - nutritionist consult      Hyperlipidemia - Diet-controlled, monitor; follow-up with primary clinic provider   Hypothyroidism - Med rec does not show any levothyroxine (TSH slightly elevated, free T4 wnl). Will defer to outpt evaluation by PCP  Morbid obesity  History of nicotine dependence  History of obstructive sleep apnea  History of coronary artery disease   History of monoclonal gammopathy of undetermined significance - noted, follow-up with primary clinic provider   History of cerebrovascular disease with prior stroke - noted, monitor  History of B12 deficiency - noted,  "continue prior to admission B12 replacement  History of seizure disorder - noted in medical record; monitor          Diet: Snacks/Supplements Adult: Expedite Bottle; Between Meals  Fluid restriction 1800 ML FLUID  Combination Diet Regular Diet; 2 gm NA Diet  Fluid restriction 1800 ML FLUID    DVT Prophylaxis: Enoxaparin (Lovenox) SQ  Miguel Catheter: Not present  Lines: None     Cardiac Monitoring: ACTIVE order.    Code Status: Full Code      Clinically Significant Risk Factors                  # Hypertension: Noted on problem list        # Severe Obesity: Estimated body mass index is 45.47 kg/m  as calculated from the following:    Height as of this encounter: 1.676 m (5' 6\").    Weight as of this encounter: 127.8 kg (281 lb 11.2 oz).   # Moderate Malnutrition: based on nutrition assessment      # Financial/Environmental Concerns: none  # COPD: noted on problem list        Disposition Plan     Medically Ready for Discharge: Anticipated in 2-4 Days             Sesar Lerma MD  Hospitalist Service  [facility]  Securely message with [employer] (more info)  Text page via [employer] Paging/Directory   ______________________________________________________________________    Interval History     No acute events overnight  Patient resting comfortably in bed. No acute concerns. Denies worsening SOB or CP.  Working on weaning off O2  Remains on 8L    Physical Exam   Vital Signs: Temp: 98  F (36.7  C) Temp src: Oral BP: 101/59 Pulse: 95   Resp: 20 SpO2: (!) 82 % O2 Device: Oxymizer cannula Oxygen Delivery: 8 LPM  Weight: 281 lbs 11.2 oz    General Appearance:  Awake, alert, NAD  Respiratory: CTAB, no w/r/r.  Cardiovascular: S1S2, irregular  GI: abd- soft, obese, nonT  Skin: multiple ecchymosis upper extremities  Extrem: 2+pitting edema RLE, 1+ pitting edema LLE; dressing around rt ankle    Medical Decision Making       -------------------------- BILLING ON TIME " ------------------------------------------------------------------------------------------------------------  35 MINUTES SPENT BY ME on the date of service doing chart review, history, exam, documentation & further activities per the note.        Data     I have personally reviewed the following data over the past 24 hrs:    N/A  \   N/A   / N/A     140 98 32.9 (H) /  111 (H)   4.9 36 (H) 1.19 (H) \       Imaging results reviewed over the past 24 hrs:   No results found for this or any previous visit (from the past 24 hour(s)).

## 2024-04-21 NOTE — PLAN OF CARE
Neuro: AX4  CV/Rhythm: NSR with 7 beats of psvt at 0953  Resp/02: Dyspneic on exertion/ 8 L/MIN via oximyzer at rest. Rquires 10 to 11L/MIN O2 via oximyzer with activity.   GI/Diet: 1800 fluid restriction  : continent   Skin/Incisions/Sites: right lateral wound and multiple scattered bruising  Pulses/CMS: WNL  Edema: BLE edema  Activity/Falls Risk: Independent in room, refuses bed/chair alarm  Lines/Drains/IVs: Left hand PIV  VS/Pain: Denies pain   Other: Transferred to 6th floor

## 2024-04-21 NOTE — PLAN OF CARE
Goal Outcome Evaluation:      Plan of Care Reviewed With: patient      Heart Failure Care Map  GOALS TO BE MET BEFORE DISCHARGE:    1. Decrease congestion and/or edema with diuretic therapy to achieve near optimal volume status.     Dyspnea improved: Still has CERRATO, on 8 L O2   Edema improved: No, further care required to meet this goal. Please explain LE edema, but improved        Last 24 hour I/O:   Intake/Output Summary (Last 24 hours) at 4/21/2024 1650  Last data filed at 4/21/2024 0900  Gross per 24 hour   Intake 660 ml   Output 800 ml   Net -140 ml           Net I/O and Weights since admission:   03/22 2300 - 04/21 2259  In: 88050 [P.O.:33434; I.V.:382]  Out: 00278 [Urine:32092]  Net: -07306     Vitals:    03/29/24 1447 03/29/24 1505 03/30/24 0554 03/31/24 0457   Weight: 136.1 kg (300 lb) (!) 150.3 kg (331 lb 5.6 oz) (!) 154.4 kg (340 lb 8 oz) (!) 156.2 kg (344 lb 6.4 oz)    04/01/24 0335 04/02/24 0600 04/03/24 0503 04/04/24 0600   Weight: (!) 156.8 kg (345 lb 9.6 oz) (!) 154.2 kg (340 lb) (!) 153 kg (337 lb 4.8 oz) (!) 150.5 kg (331 lb 14.4 oz)    04/05/24 0318 04/06/24 0608 04/07/24 0413 04/08/24 0250   Weight: 149.5 kg (329 lb 9.6 oz) 144.8 kg (319 lb 4.8 oz) 144.2 kg (318 lb) 143.3 kg (316 lb)    04/09/24 0342 04/10/24 0701 04/11/24 0408 04/12/24 0600   Weight: 142.9 kg (315 lb) 141.7 kg (312 lb 6.4 oz) 139.6 kg (307 lb 12.8 oz) 137.3 kg (302 lb 12.8 oz)    04/13/24 0630 04/14/24 0300 04/14/24 0734 04/15/24 0447   Weight: 136.1 kg (300 lb) 134.7 kg (296 lb 14.4 oz) 135.8 kg (299 lb 6.4 oz) 133.4 kg (294 lb)    04/16/24 0602 04/17/24 0900 04/18/24 0835 04/20/24 0633   Weight: 133.4 kg (294 lb 1.6 oz) 130.9 kg (288 lb 8 oz) 130 kg (286 lb 11.2 oz) 127.4 kg (280 lb 14.4 oz)    04/21/24 0628   Weight: 127.8 kg (281 lb 11.2 oz)       2.  O2 sats > 90% on room air, or at prior home O2 therapy level.      Able to wean O2 this shift to keep sats above 90%?: No, further care required to meet this goal. Please  explain on 8L Oxymizer, continue to try and wean- CERRATO   Does patient use Home O2? Yes-  3-5L          Current oxygenation status:   SpO2: 93 %     O2 Device: Oxymizer cannula, Oxygen Delivery: 8 LPM    3.  Tolerates ambulation and mobility near baseline.     Ambulation: No, further care required to meet this goal. Please explain Pt ambulates to /cammode and in room, needs encouragement to go in the ivey   Times patient ambulated with staff this shift: 2    Please review the Heart Failure Care Map for additional HF goal outcomes.    Arrived from [facility] -A[facility]Ox[facility]. Diuresing adequate UOP. Tele SR. Up indep to [facility], Refused safety alarms. On PO Bumex. 1800 ml fluid restriction. R ankle dressing C, D, I. Dressing change due tomorrow. Plans for discharge pending progress towards goals. Will continue to monitor.              Loulou Flores, RN  4/21/2024

## 2024-04-22 NOTE — PROGRESS NOTES
[facility]    Medicine Progress Note - Hospitalist Service    Date of Admission:  3/29/2024  Date of Service: 04/22/2024    Assessment & Plan   Linda Otero is a 64 year old female admitted on 3/29/2024.  Past medical history of COPD, chronic respiratory failure on home oxygen with history of hypercapnia, nicotine dependence, HFpEF, DVT/PE  on past warfarin anticoagulation, recent hemoptysis, morbid obesity, CKD stage III, CAD, HLD, HTN, hypothyroidism, MGUS, seizure disorder, CVA, admitted 3/29/2024 with acute on chronic respiratory failure.     Acute on chronic hypoxic respiratory failure, multifactorial  HFpEF exacerbation   COPD exacerbation  JODI with noncompliance to CPAP   Acute right heart failure with mod-severe TR  Severe Pulmonary Hypertension  [Home Lasix 20mg BID PO]  *Baseline 5 L NC  *CXR 3/29- Cardiomegaly. Ill-defined opacity at the right infrahilar lung could be acute airspace disease. A mass is difficult to exclude. Bilateral vascular congestion may be a degree of pulmonary edema. Potential small right pleural fluid.  *CT C PE 3/29/2024 without any PE but did demonstrate pulmonary congestion, R pleural effusion; also enlarged main pulmonary artery, as evidence of pulmonary hypertension  *TTE 4/1 showing EF 60-65% without WMA, severe RV dilation with moderately decreased RV function, mod-severe TR and pulm HTN  - Was initially started on Lasix drip.  This was transition to Bumex drip at 0.25 mg/h on 4/9/2024.,  Received a one-time bolus bolus of IV Bumex 2 mg on 4/9, 4/10, 4/11, 4/13 and 4/14/2024.  Increase Bumex drip to 0.5 mg an hour on 4/14/2024   - Cardiology following  - s/p left and right heart cath on 4/12/2024.  Showed normal coronary arteries.  Severely elevated right-sided pressures, severely elevated left-sided filling pressures.  Mixed precapillary and postcapillary pulmonary hypertension.  Normal cardiac index.  - weight down 2 lbs since yesterday, about  "50 lbs since admission . Neg balance -23L since admission  - lymphedema wraps  - 2gm Na diet  - fluid restriction 1800cc/day  - on potassium replacement with oral KCl 40 mEq 3 times daily  - cr overall stable 1.15--1/1--1.24--1.22--1.29--1.31--1.18--1.21  - Flutter valve and incentive spirometer  - 4/19-still on 8L Oxymixer, try to wean down as able, O2 sat goal at 88-92% (baseline on O2 5 liters NC)  - Started on Jardiance 10 mg daily on 4/13/2024 for HFpEF  - Per [facility] on 4/19: \"I reviewed the patient's right heart catheterization. Her filling pressures have improved significantly. Her weight is now normal and her mean RA is mildly elevated. Her pulmonary hypertension has improved but still severe. Will transition her to oral diuretics. Will arrange follow-up in the pulm hypertension clinic. Will sign off.\"  -Continue to wean off O2  -Recheck CXR in AM    COPD exacerbation   CAP  Respiratory acidosis  [PTA on Breo, Spiriva respimat, Alb inh and neb prn]  - completed 5-day course of ceftriaxone+doxy on 4/2  - 04/07/24 completed 8-day prednisone taper.;  - 4/8 -restarted Breo.  Wheezing significantly improved.  - currently om Breo 1 puff q daily and Duonebs QID     Hemoptysis, mild, resolved  *reports orange sputum production at home, was producing small amounts of rober blood 4/1  *admission CT chest negative for PE, masses  *resumed aspirin 4/3 and reports small amount of hemoptysis 4/4   - denies any further hemoptysis.  Lovenox started on 4/8/2024.  Held for cardiac catheterization, will resume afer RH     SONAL on Chronic kidney disease, stage III, improved  *Speculate congestive nephropathy  *Admission serum Cr 1.33, historic baseline 0.87 to 1.08 in 2020 to 2023  - Avoid non-steroidal anti-inflammatory drugs (NSAIDs)  - Creatinine stabilized around 1.2-1.3  - BMP in am     Hypokalemia  *due to diuresis  - replace per protocol  - Started on oral KCl 40 mEq 3 times daily  - K 3.8 on 4/18     History of deep venous " thrombosis and pulmonary embolism  *CT C PE 3/29/2024 without any PE  *Per pharmacy, no longer on warfarin  - on prophylactic lovenox 40 mg BID  - has been counseled on importance of frequent ambulation, out of bed activity  - Cardiology does recommend a VQ scan to evaluate for chronic thromboembolic pulmonary hypertension.  Will defer the timing of this to cardiology     Skin ulcer, right lateral ankle  Concern for cellulitis?  - Wound nurse (WOC) following  - See pictures from 4/17, there was concern for developing cellulitis  - Started ceftriaxone IV daily (allergic to penicillin, endomysial, tetracycline) on 4/18  - Switched to Keflex PO today (4/20), plan for 7 day course     Weakness and deconditioning  - PT/ OT-recommend home with home therapies  - Reorder home care with PT, OT and RN at the time of discharge     Obstructive sleep apnea  - Patient is on CPAP at home but has not been using it since admission since she did not tolerate the mask initially.  Encouraged her to bring her home CPAP machine We tried the hospital mask on 4/9/2024 and she did not tolerate this.  - Patient continues to be noncompliant with her CPAP use in the hospital despite having her home machine here.     Mild thrombocytopenia-  platelet count has been slowly downtrending over the last 3 days, now improving.  - 135---137---118--105--112--135  - continue to monitor for now.    Moderate Malnutrition  - nutritionist consult      Hyperlipidemia - Diet-controlled, monitor; follow-up with primary clinic provider   Hypothyroidism - Med rec does not show any levothyroxine (TSH slightly elevated, free T4 wnl). Will defer to outpt evaluation by PCP  Morbid obesity  History of nicotine dependence  History of obstructive sleep apnea  History of coronary artery disease   History of monoclonal gammopathy of undetermined significance - noted, follow-up with primary clinic provider   History of cerebrovascular disease with prior stroke - noted,  "monitor  History of B12 deficiency - noted, continue prior to admission B12 replacement  History of seizure disorder - noted in medical record; monitor          Diet: Snacks/Supplements Adult: Expedite Bottle; Between Meals  Fluid restriction 1800 ML FLUID  Combination Diet Regular Diet; 2 gm NA Diet    DVT Prophylaxis: Enoxaparin (Lovenox) SQ  Miguel Catheter: Not present  Lines: None     Cardiac Monitoring: None  Code Status: Full Code      Clinically Significant Risk Factors                  # Hypertension: Noted on problem list        # Severe Obesity: Estimated body mass index is 45.47 kg/m  as calculated from the following:    Height as of this encounter: 1.676 m (5' 6\").    Weight as of this encounter: 127.8 kg (281 lb 11.2 oz).   # Moderate Malnutrition: based on nutrition assessment      # Financial/Environmental Concerns: none  # COPD: noted on problem list        Disposition Plan     Medically Ready for Discharge: Anticipated in 2-4 Days             Sesar Lerma MD  Hospitalist Service  [facility]  Securely message with [employer] (more info)  Text page via [employer] Paging/Directory   ______________________________________________________________________    Interval History     No acute events overnight  Patient resting comfortably in bed. No acute concerns. Denies worsening SOB or CP.  Working on weaning off O2  Remains on 8L oximyzer   No fevers or chills  No nausea / vomiting    Physical Exam   Vital Signs: Temp: 97.6  F (36.4  C) Temp src: Oral BP: 109/56 Pulse: 91   Resp: 18 SpO2: 98 % O2 Device: Oxymizer cannula Oxygen Delivery: 8 LPM  Weight: 281 lbs 11.2 oz    General Appearance:  Awake, alert, NAD  Respiratory: CTAB, no w/r/r.  Cardiovascular: S1S2, irregular  GI: abd- soft, obese, nonT  Skin: multiple ecchymosis upper extremities  Extrem: 2+pitting edema RLE, 1+ pitting edema LLE; dressing around rt ankle    Medical Decision Making       -------------------------- BILLING ON " TIME ------------------------------------------------------------------------------------------------------------  40 MINUTES SPENT BY ME on the date of service doing chart review, history, exam, documentation & further activities per the note.        Data     I have personally reviewed the following data over the past 24 hrs:    N/A  \   N/A   / 204     138 97 (L) 29.9 (H) /  111 (H)   5.0 31 (H) 1.24 (H) \       Imaging results reviewed over the past 24 hrs:   No results found for this or any previous visit (from the past 24 hour(s)).

## 2024-04-22 NOTE — PLAN OF CARE
Summary: COPD/ HF exacerbation  DATE & TIME: 4/21/24 1900-4/22/24 0730 Cognitive Concerns/ Orientation : A&O x4   BEHAVIOR & AGGRESSION TOOL COLOR: Green  CIWA SCORE: NA   ABNL VS/O2: VSS on 8L O2 via oxymizer cannula (baseline O2 2-5 L NC)  MOBILITY: Ind  PAIN MANAGMENT: R ankle/foot pain, declined interventions.  DIET: 2 g NA, fluid restriction 1800 mL  BOWEL/BLADDER: Continent, up to bedside commode  ABNL LAB/BG: none new  DRAIN/DEVICES: PIV saline locked  TELEMETRY RHYTHM: Declined tele  SKIN: Wound R lateral ankle, dressing CDI.  Scattered bruising. BLE jovon with edema 2+, compression stockings on.  TESTS/PROCEDURES: None  D/C DATE: Discharge pending  OTHER IMPORTANT INFO:   Continues on PO Bumex.

## 2024-04-22 NOTE — PLAN OF CARE
Summary: COPD/ HF exacerbation  DATE & TIME: 4/22/24 2655-4752 Cognitive Concerns/ Orientation : A&O x4   BEHAVIOR & AGGRESSION TOOL COLOR: Green  CIWA SCORE: NA   ABNL VS/O2: VSS on 8L O2 via oxymizer cannula (baseline O2 2-5 L NC). Unable to wean off, desats to low 80s  MOBILITY: Ind  PAIN MANAGMENT: R ankle/foot pain, and generalized soreness from activity yesterday. declined interventions.  DIET: 2 g NA, fluid restriction 1800 mL  BOWEL/BLADDER: Continent, up to bedside commode. Bmx  2  ABNL LAB/BG: none new  DRAIN/DEVICES: PIV saline locked  TELEMETRY RHYTHM: tele discontinued  SKIN: Wound R lateral ankle, dressing CDI.  Scattered bruising. BLE jovon with edema 2+, compression stockings on.  TESTS/PROCEDURES: None  D/C DATE: Discharge pending  OTHER IMPORTANT INFO: N/A

## 2024-04-23 NOTE — PLAN OF CARE
Goal Outcome Evaluation:    Summary: COPD/ HF exacerbation  DATE & TIME: 4/22/24 0956-6525 Cognitive Concerns/ Orientation : A&O x4   BEHAVIOR & AGGRESSION TOOL COLOR: Green  CIWA SCORE: NA   ABNL VS/O2: VSS on 8L O2 via oxymizer cannula (baseline O2 2-5 L NC)  MOBILITY: Ind  PAIN MANAGMENT: denies  DIET: 2 g NA, fluid restriction 1800 mL  BOWEL/BLADDER: Continent, up to bedside commode  ABNL LAB/BG: none new  DRAIN/DEVICES: PIV saline locked  SKIN: Wound R lateral ankle, wound care due 4/23. Patient refused assessment of area. Scattered bruising, rash under R breast. BLE jovon with edema 2+, compression stockings on.  TESTS/PROCEDURES: None  D/C DATE: Discharge pending ability to wean O2  OTHER IMPORTANT INFO: N/A

## 2024-04-23 NOTE — CONSULTS
"[facility] Pulmonary Consult Note    Date of Service: 04/23/24    Assessment and Recommendations:  64F COPD, chronic hypoxemic hypercapnic respiratory failure (3L), HFpEF, obesity, CKD, CAD, HTN, HLD, hypothyroidism admitted 3/29 w/ acute on chronic hypoxemic hypercapnic respiratory failure. I believe her continued O2 needs are multifactorial; COPD, cardiac disease, and obesity hypoventilation syndrome.     - wean O2 as able, may have to go home on higher than baseline  - continue ICS-LABA (Breo) daily  - add LAMA (Incruse) daily (ordered)  - should discharge on NDS-BLJV-LOTL per formulary   - change duonebs to BID  - no indication for steroids  - diuresis per primary/cardiology  - OOB to chair, IS, encourage ambulation   - pulmonary rehab on discharge recommended    Pulmonary will follow peripherally.     Chief Complaint   Patient presents with    Shortness of Breath       Summary:  64F COPD, chronic hypoxemic hypercapnic respiratory failure (3L), HFpEF, obesity, CKD, CAD, HTN, HLD, hypothyroidism admitted 3/29 w/ acute on chronic hypoxemic hypercapnic respiratory failure. Pt has had prolonged hospitalization. She has been aggressively diuresed, wt is down ~27kg since admission. She completed 5d empiric ABx for possible CAP. She completed prednisone taper. She is currently being treated w/ ICS-LABA (Breo) and duonebs. She is being diuresed with bumetanide. She is currently on 8L oxymask. Pt reports intermittent SOB. She states her O2 will drop but she does not necessarily notice. Intermittent wheezing. Has a cough w/ white sputum. Has orthopnea and PND. She states nebs help. During my interview, pt O2 progressively turned down. Now at 5L and SpO2 ~92%. When asked to take deep breaths or talking, SpO2 increased to 96-98%.     10 point review of systems negative, aside from that mentioned above    /66   Pulse 93   Temp 98.1  F (36.7  C) (Oral)   Resp 20   Ht 1.676 m (5' 6\")   Wt 128.1 kg (282 " lb 4.8 oz)   SpO2 92%   BMI 45.56 kg/m    Gen: NAD  HEENT: anicteric, OP clear, Mallampati IV  Card: RRR  Pulm: distant BS  Abd: soft, NTND  MSK: 1+ b/l LE edema, no acute joint abnormalities  Skin: no obvious rash  Psych: normal affect  Neuro: answering questions appropriately     Labs: personally reviewed    Imaging: personally reviewed    Past Medical History:   Diagnosis Date    Abnormality of gait due to impairment of balance     Acute and chronic respiratory failure with hypercapnia (H)     Acute deep venous thrombosis (H) 10/20/2015    Anemia     Iron deficiency    Aneurysm (H24) 2012    Arthritis     toes, thumb, elbow    B12 deficiency     Cancer (H) 1985    cervical    Cellulitis right foot    Chronic diastolic heart failure (H)     Chronic kidney disease     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  left thalamic stroke as well as patchy MCA branch infarcts    CVA (cerebral vascular accident) (H) 12/23/2012    Left thalamic stroke, MCA branch infarcts    Dermatitis     DVT (deep venous thrombosis) (H)     Right leg    Encephalopathy     after stroke, believed related to hypoxia    History of transfusion     Hyperlipidemia     Hypertension     Hypothyroidism     Lymphedema     MGUS (monoclonal gammopathy of unknown significance)     Neuropathy of right lower extremity     Obesity     On home oxygen therapy     PFO (patent foramen ovale)     closed after stroke, see cardiology notes care everywhere    PFO (patent foramen ovale)     Pneumonia     Primary hypercoagulable state (H24)     Pulmonary emboli (H) 2013    Pulmonary HTN (H)     Respiratory failure (H)     after stroke    Seizure (H)     Seizures (H)     at age 31    Skin cancer 2014    Sleep apnea     CPAP    Stroke  (H) 2012    Umbilical hernia     Wound of right ankle        Past Surgical History:   Procedure Laterality Date    ASD REPAIR      ASD REPAIR      BIOPSY SKIN (LOCATION)      CARDIAC CATHETERIZATION  2012    2 stents    COLONOSCOPY N/A 4/9/2018    Procedure: COLONOSCOPY;  Surgeon: Dorene Araujo MD;  Location: [facility] Main OR;  Service:     CONIZATION  1985    CV CORONARY ANGIOGRAM N/A 4/12/2019    Procedure: Coronary Angiogram;  Surgeon: Brett Montalvo MD;  Location: [facility] Cath Lab;  Service: Cardiology    CV CORONARY ANGIOGRAM N/A 4/12/2024    Procedure: Coronary Angiogram;  Surgeon: Enoc Crocker MD;  Location: [facility] CARDIAC CATH LAB    CV INTRAVASULAR ULTRASOUND N/A 4/12/2024    Procedure: Intravascular Ultrasound;  Surgeon: Enoc Crocker MD;  Location: [facility] CARDIAC CATH LAB    CV LEFT HEART CATHETERIZATION WITHOUT LEFT VENTRICULOGRAM Left 4/12/2019    Procedure: Left Heart Catheterization Without Left Ventriculogram;  Surgeon: Brett Montalvo MD;  Location: [facility] Cath Lab;  Service: Cardiology    CV RIGHT HEART CATH MEASUREMENTS RECORDED N/A 4/12/2024    Procedure: Right Heart Catheterization;  Surgeon: Enoc Crocker MD;  Location: [facility] CARDIAC CATH LAB    CV RIGHT HEART CATH MEASUREMENTS RECORDED N/A 4/19/2024    Procedure: Right Heart Cath;  Surgeon: Sparkle Suresh MD;  Location: [facility] CARDIAC CATH LAB    CV RIGHT HEART CATHETERIZATION N/A 4/12/2019    Procedure: Right Heart Catheterization;  Surgeon: Brett Montalvo MD;  Location: [facility] Cath Lab;  Service: Cardiology    ESOPHAGOSCOPY, GASTROSCOPY, DUODENOSCOPY (EGD), COMBINED N/A 4/9/2018    Procedure: ESOPHAGOGASTRODUODENOSCOPY (EGD);  Surgeon: Dorene Araujo MD;  Location: [facility] OR;  Service:     IR CAROTID ANGIOGRAM  12/26/2012    IR CAROTID ANGIOGRAM  12/26/2012    IR MISCELLANEOUS PROCEDURE  12/26/2012    IR MISCELLANEOUS PROCEDURE  12/26/2012    IR  MISCELLANEOUS PROCEDURE  2012    OTHER SURGICAL HISTORY      Cardiac stents    PATENT FORAMEN OVALE CLOSURE      REPAIR PATENT FORAMEN OVALE  2013    Helex device    XR SACRO ILIAC JOINT INJECTION LEFT  2012    ZZC REMV ART CLOT ILIAC-POP,LEG INCIS Left 2019    Procedure: REPAIR LEFT FEMORAL ARTERIOVENOUS FISTULA WITH INTRAOPERATIVE ULTRASOUND;  Surgeon: Salinas Torres MD;  Location: [facility];  Service: General       Family History   Problem Relation Age of Onset    Angioedema Mother     Pulmonary Embolism Brother     Cerebrovascular Disease Father     Coronary Artery Disease Brother        Social History     Socioeconomic History    Marital status:      Spouse name: Not on file    Number of children: Not on file    Years of education: Not on file    Highest education level: Not on file   Occupational History    Not on file   Tobacco Use    Smoking status: Former     Current packs/day: 0.00     Average packs/day: 1 pack/day for 44.0 years (44.0 ttl pk-yrs)     Types: Cigarettes     Start date: 1968     Quit date: 2012     Years since quittin.3    Smokeless tobacco: Never   Substance and Sexual Activity    Alcohol use: No     Alcohol/week: 0.0 standard drinks of alcohol    Drug use: No    Sexual activity: Not Currently   Other Topics Concern    Parent/sibling w/ CABG, MI or angioplasty before 65F 55M? Not Asked   Social History Narrative    Not on file     Social Determinants of Health     Financial Resource Strain: Not on file   Food Insecurity: Not on file   Transportation Needs: Not on file   Physical Activity: Not on file   Stress: Not on file   Social Connections: Not on file   Interpersonal Safety: Not on file   Housing Stability: Not on file       Andrew Strickland MD  Pulmonary and Critical Care Medicine  [facility]

## 2024-04-23 NOTE — PLAN OF CARE
Summary: COPD/ HF exacerbation  DATE & TIME: 4/23/24 AM shift  Cognitive Concerns/ Orientation : A&O x4   BEHAVIOR & AGGRESSION TOOL COLOR: Green  CIWA SCORE: NA                                                                                                                   ABNL VS/O2: VSS on 8L O2 via oxymizer cannula (baseline O2 2-5 L NC). Attempted to wean down, pt desats to 86%-88% if oxygen down to 7L. Pulmonology consulted. Continues on PO Bumex,  on scheduled nebs  MOBILITY: Ind  PAIN MANAGMENT:c/o generalized body aches, declined interventions  DIET: 2 g NA, fluid restriction 1800 mL. Prefers to have ice chips. Eating small amounts.   BOWEL/BLADDER: Continent, up to bedside commode. No BM this shift  ABNL LAB/BG: K 5.4, K is on hold. Cr 1.15  DRAIN/DEVICES: PIV saline locked  SKIN: Wound R lateral ankle, wound cares done by WOC RN. Scattered bruising. BLE jovon with edema 2+.   TESTS/PROCEDURES: None  D/C DATE: Discharge pending ability to wean O2  OTHER IMPORTANT INFO: refuses to use CPAP

## 2024-04-23 NOTE — PLAN OF CARE
Goal Outcome Evaluation:         Summary: COPD/ HF exacerbation  DATE & TIME: 4/22/24-4/23/24 7927-0652  Cognitive Concerns/ Orientation : A&O x4   BEHAVIOR & AGGRESSION TOOL COLOR: Green  CIWA SCORE: NA   ABNL VS/O2: VSS on 8L O2 via oxymizer cannula (baseline O2 2-5 L NC)  MOBILITY: Ind  PAIN MANAGMENT: denies  DIET: 2 g NA, fluid restriction 1800 mL  BOWEL/BLADDER: Continent, up to bedside commode  ABNL LAB/BG: none new  DRAIN/DEVICES: PIV saline locked  SKIN: Wound R lateral ankle, wound care due today. Redness on foot. Patient refused assessment of area. Scattered bruising. BLE jovon with edema 2+, compression stockings on. Midline removal site on neck WDL.   TESTS/PROCEDURES: None  D/C DATE: Discharge pending ability to wean O2  OTHER IMPORTANT INFO: refused CPAP overnight due to frequency and urgency                  Lola

## 2024-04-23 NOTE — PROGRESS NOTES
"[facility] Nurse Inpatient Assessment     Consulted for: Wound ulcer right lateral ankle     Summary: Right lateral ankle wound is chronic, multifactorial etiology   Fungal rash to right breast fold-resolved    Patient History (according to provider note(s):      \"64 year old female admitted on 3/29/2024.  Past medical history of COPD, chronic respiratory failure on home oxygen with history of hypercapnia, nicotine dependence, HFpEF, DVT/PE  on past warfarin anticoagulation, recent hemoptysis, morbid obesity, CKD stage III, CAD, HLD, HTN, hypothyroidism, MGUS, seizure disorder, CVA, admitted 3/29/2024 with acute on chronic respiratory failure. \"    Assessment:      Areas visualized during today's visit: Focused: and Right lateral ankle      Wound location: right lateral ankle area      Last photo: 4/23  Wound due to: Unknown Etiology patient reports this wound area is chronic and comes and goes, has treated the area \"four times\" before. Patient reports she has a wound care nurse at her facitlity who sees her and helps perform dressing changes. She does not follow up with a wound care MD, recommended for her to be seen as outpatient to monitor wound.    Wound history/plan of care: Wound dressed per POC. Aquacel removal was traumatic due to minimal drainage and adherence to wound bed even after soaking. Per bedside RN, patient has been refusing wound care for several days.  4/23: Patient reported the dressing had not been changed in several days, dressing was dated 4/21 and orders for dressing changes are every other day. She is requesting boxes of extra supplies and I discussed that I cannot provide boxes of supplies at this time but I can provide enough supplies for a week upon discharge as well as the product numbers for her to use to order them. Wound is stable, crusts are adherent and unable to be cleansed away at this time. Open area near malleolus has smooth, red moist wound base, " no odor.     Wound base: slough, dry drainage, and pink-red non-granular tissue , thickened epidermis     Palpation of the wound bed:  textured        Drainage: small     Description of drainage: serosanguinous and yellow     Measurements (length x width x depth, in cm): cluster of 7  x 5  x  0.1 cm , open wound: 2 x 2 x 0.1cm     Tunneling: N/A     Undermining: N/A  Periwound skin: Dry/scaly, Edematous, and Erythema- blanchable      Color: pale      Temperature: normal   Odor: none  Pain: mild and during dressing change, tender  Pain interventions prior to dressing change: patient tolerated well, soaking, and slow and gentle cares   Treatment goal: Drainage control, Infection control/prevention, Increase granulation, and Protection  STATUS: stable  Supplies ordered: at bedside, supplies stored on unit, discussed with RN, and discussed with patient        Treatment Plan:     Right lateral ankle wound: Every other day and PRN for soiled/loose dressing  Wrap right lower leg with Vashe-soaked gauze. Allow to soak for about two minutes.  Unwrap leg and gently wipe away any debris. Allow skin to dry.  Use a tongue depressor to apply Medihoney (232100) to wound.  Apply a small piece of Adaptic (oil emulsion gauze) over Medihoney.  Cover with a 4x4 gauze and secure with Kerlix gauze and Medipore tape.  Apply Spandigrip to right lower extremity.    Right breast fold rash: BID   Cleanse skin with warm water. Pat dry.  Apply a thin layer of Usman Antifungal to affected skin.    Orders: Reviewed    RECOMMEND PRIMARY TEAM ORDER: none at this time, rec Outpatient Wound Care physician follow up  Education provided: plan of care, wound progress, Infection prevention , Moisture management, and Hygiene  Discussed plan of care with: Patient and Nurse  WOC nurse follow-up plan: weekly  Notify WOC if wound(s) deteriorate.  Nursing to notify the Provider(s) and re-consult the WOC Nurse if new skin concern.    DATA:     Current support  "surface: Standard  Standard gel/foam mattress (IsoFlex, Atmos air, etc)  Containment of urine/stool: Incontinence Protocol, Incontinent pad in bed, and Suction based external urinary catheter   BMI: Body mass index is 45.47 kg/m .   Active diet order: Orders Placed This Encounter      Combination Diet Regular Diet; 2 gm NA Diet     Output: I/O last 3 completed shifts:  In: 1200 [P.O.:1200]  Out: -      Labs:   No lab results found in last 7 days.    Invalid input(s): \"GLUCOMBO\"    Pressure injury risk assessment:   Sensory Perception: 4-->no impairment  Moisture: 4-->rarely moist  Activity: 3-->walks occasionally  Mobility: 3-->slightly limited  Nutrition: 4-->excellent  Friction and Shear: 2-->potential problem  Dorian Score: 20    Angelica Morales, RN, BSN, CWON   Pager no longer is use, please contact through [employer] group: [facility] Nurse Johnna Resendezt. Office Number: [phone]    "

## 2024-04-23 NOTE — PROGRESS NOTES
[facility]    Medicine Progress Note - Hospitalist Service    Date of Admission:  3/29/2024  Date of Service: 04/23/2024    Assessment & Plan   Linda Otero is a 64 year old female admitted on 3/29/2024.  Past medical history of COPD, chronic respiratory failure on home oxygen with history of hypercapnia, nicotine dependence, HFpEF, DVT/PE  on past warfarin anticoagulation, recent hemoptysis, morbid obesity, CKD stage III, CAD, HLD, HTN, hypothyroidism, MGUS, seizure disorder, CVA, admitted 3/29/2024 with acute on chronic respiratory failure.     Acute on chronic hypoxic respiratory failure, multifactorial  HFpEF exacerbation   COPD exacerbation  JODI with noncompliance to CPAP   Acute right heart failure with mod-severe TR  Severe Pulmonary Hypertension  [Home Lasix 20mg BID PO]  *Baseline 5 L NC  *CXR 3/29- Cardiomegaly. Ill-defined opacity at the right infrahilar lung could be acute airspace disease. A mass is difficult to exclude. Bilateral vascular congestion may be a degree of pulmonary edema. Potential small right pleural fluid.  *CT C PE 3/29/2024 without any PE but did demonstrate pulmonary congestion, R pleural effusion; also enlarged main pulmonary artery, as evidence of pulmonary hypertension  *TTE 4/1 showing EF 60-65% without WMA, severe RV dilation with moderately decreased RV function, mod-severe TR and pulm HTN  - Was initially started on Lasix drip.  This was transition to Bumex drip at 0.25 mg/h on 4/9/2024.,  Received a one-time bolus bolus of IV Bumex 2 mg on 4/9, 4/10, 4/11, 4/13 and 4/14/2024.  Increase Bumex drip to 0.5 mg an hour on 4/14/2024   - Cardiology following  - s/p left and right heart cath on 4/12/2024.  Showed normal coronary arteries.  Severely elevated right-sided pressures, severely elevated left-sided filling pressures.  Mixed precapillary and postcapillary pulmonary hypertension.  Normal cardiac index.  - weight down 2 lbs since yesterday, about  "50 lbs since admission . Neg balance -23L since admission  - lymphedema wraps  - 2gm Na diet  - fluid restriction 1800cc/day  - on potassium replacement with oral KCl 40 mEq 3 times daily  - cr overall stable 1.15--1/1--1.24--1.22--1.29--1.31--1.18--1.21  - Flutter valve and incentive spirometer  - 4/19-still on 8L Oxymixer, try to wean down as able, O2 sat goal at 88-92% (baseline on O2 5 liters NC)  - Started on Jardiance 10 mg daily on 4/13/2024 for HFpEF  - Per [facility] on 4/19: \"I reviewed the patient's right heart catheterization. Her filling pressures have improved significantly. Her weight is now normal and her mean RA is mildly elevated. Her pulmonary hypertension has improved but still severe. Will transition her to oral diuretics. Will arrange follow-up in the pul hypertension clinic. Will sign off.\"  -Continue to wean off O2  -Pulmonology consulted    COPD exacerbation   CAP  Respiratory acidosis  [PTA on Breo, Spiriva respimat, Alb inh and neb prn]  - completed 5-day course of ceftriaxone+doxy on 4/2  - 04/07/24 completed 8-day prednisone taper.;  - 4/8 -restarted Breo.  Wheezing significantly improved.  - currently om Breo 1 puff q daily and Duonebs QID     Hemoptysis, mild, resolved  *reports orange sputum production at home, was producing small amounts of rober blood 4/1  *admission CT chest negative for PE, masses  *resumed aspirin 4/3 and reports small amount of hemoptysis 4/4   - denies any further hemoptysis.  Lovenox started on 4/8/2024.  Held for cardiac catheterization, will resume afer [facility]     SONAL on Chronic kidney disease, stage III, improved  *Speculate congestive nephropathy  *Admission serum Cr 1.33, historic baseline 0.87 to 1.08 in 2020 to 2023  - Avoid non-steroidal anti-inflammatory drugs (NSAIDs)  - Creatinine stabilized around 1.2-1.3  - BMP in am     Hypokalemia  *due to diuresis  - replace per protocol  - Started on oral KCl 40 mEq 3 times daily  - K 3.8 on 4/18     History of deep " venous thrombosis and pulmonary embolism  *CT C PE 3/29/2024 without any PE  *Per pharmacy, no longer on warfarin  - on prophylactic lovenox 40 mg BID  - has been counseled on importance of frequent ambulation, out of bed activity  - Cardiology does recommend a VQ scan to evaluate for chronic thromboembolic pulmonary hypertension.  Will defer the timing of this to cardiology     Skin ulcer, right lateral ankle  Concern for cellulitis?  - Wound nurse (WOC) following  - See pictures from 4/17, there was concern for developing cellulitis  - Started ceftriaxone IV daily (allergic to penicillin, endomysial, tetracycline) on 4/18  - Switched to Keflex PO today (4/20), plan for 7 day course     Weakness and deconditioning  - PT/ OT-recommend home with home therapies  - Reorder home care with PT, OT and RN at the time of discharge     Obstructive sleep apnea  - Patient is on CPAP at home but has not been using it since admission since she did not tolerate the mask initially.  Encouraged her to bring her home CPAP machine We tried the hospital mask on 4/9/2024 and she did not tolerate this.  - Patient continues to be noncompliant with her CPAP use in the hospital despite having her home machine here.     Mild thrombocytopenia-  platelet count has been slowly downtrending over the last 3 days, now improving.  - 135---137---118--105--112--135  - continue to monitor for now.    Moderate Malnutrition  - nutritionist consult      Hyperlipidemia - Diet-controlled, monitor; follow-up with primary clinic provider   Hypothyroidism - Med rec does not show any levothyroxine (TSH slightly elevated, free T4 wnl). Will defer to outpt evaluation by PCP  Morbid obesity  History of nicotine dependence  History of obstructive sleep apnea  History of coronary artery disease   History of monoclonal gammopathy of undetermined significance - noted, follow-up with primary clinic provider   History of cerebrovascular disease with prior stroke -  "noted, monitor  History of B12 deficiency - noted, continue prior to admission B12 replacement  History of seizure disorder - noted in medical record; monitor          Diet: Snacks/Supplements Adult: Expedite Bottle; Between Meals  Fluid restriction 1800 ML FLUID  Combination Diet Regular Diet; 2 gm NA Diet    DVT Prophylaxis: Enoxaparin (Lovenox) SQ  Miguel Catheter: Not present  Lines: None     Cardiac Monitoring: None  Code Status: Full Code      Clinically Significant Risk Factors        # Hyperkalemia: Highest K = 5.4 mmol/L in last 2 days, will monitor as appropriate           # Hypertension: Noted on problem list        # Severe Obesity: Estimated body mass index is 45.56 kg/m  as calculated from the following:    Height as of this encounter: 1.676 m (5' 6\").    Weight as of this encounter: 128.1 kg (282 lb 4.8 oz).   # Moderate Malnutrition: based on nutrition assessment      # Financial/Environmental Concerns: none  # COPD: noted on problem list        Disposition Plan     Medically Ready for Discharge: Anticipated in 2-4 Days             Sesar Lerma MD  Hospitalist Service  [facility]  Securely message with [employer] (more info)  Text page via [employer] Paging/Directory   ______________________________________________________________________    Interval History     No acute events overnight  Patient resting comfortably in bed. No acute concerns. Denies worsening SOB or CP.  Working on weaning off O2  Remains on 8L oximyzer, unable to wean lower  No fevers or chills  No nausea / vomiting    Physical Exam   Vital Signs: Temp: 98.1  F (36.7  C) Temp src: Oral BP: 107/66 Pulse: 93   Resp: 20 SpO2: 92 % O2 Device: Oxymizer cannula Oxygen Delivery: 8 LPM  Weight: 282 lbs 4.8 oz    General Appearance:  Awake, alert, NAD  Respiratory: diminished breath sounds but overall CTABL, no w/r/r.  Cardiovascular: S1S2, irregular  GI: abd- soft, obese, nonT  Skin: multiple ecchymosis upper " extremities  Extrem: 2+pitting edema RLE, 1+ pitting edema LLE; dressing around rt ankle    Medical Decision Making       -------------------------- BILLING ON TIME ------------------------------------------------------------------------------------------------------------  35 MINUTES SPENT BY ME on the date of service doing chart review, history, exam, documentation & further activities per the note.        Data     I have personally reviewed the following data over the past 24 hrs:    N/A  \   N/A   / N/A     136 98 27.2 (H) /  104 (H)   5.4 (H) 29 1.15 (H) \       Imaging results reviewed over the past 24 hrs:   No results found for this or any previous visit (from the past 24 hour(s)).

## 2024-04-24 NOTE — PLAN OF CARE
Goal Outcome Evaluation:    Summary: COPD/ HF exacerbation  DATE & TIME: 4/23/24 8761-1956  Cognitive Concerns/ Orientation : A&O x4   BEHAVIOR & AGGRESSION TOOL COLOR: Green   ABNL VS/O2: VSS on 7L O2 via oxymizer cannula (baseline O2 5 L NC)  MOBILITY: Ind  PAIN MANAGMENT: denies  DIET: 2 g NA, fluid restriction 1800 mL  BOWEL/BLADDER: Continent, up to bedside commode, BM x1 this shift  ABNL LAB/BG: none new  DRAIN/DEVICES: PIV saline locked  SKIN: Wound R lateral ankle, wound care completed today by [facility] nurse.  Scattered bruising. BLE jovon with edema 2+, compression stockings on. Midline removal site on neck WDL.   TESTS/PROCEDURES: None  D/C DATE: Discharge pending ability to wean O2  OTHER IMPORTANT INFO: refusing to use CPAP overnight

## 2024-04-24 NOTE — PLAN OF CARE
Goal Outcome Evaluation:     Plan of Care Reviewed With: patient     Overall Patient Progress: improvingOverall Patient Progress: improving     Summary: COPD/HF exacerbation  DATE & TIME: 4/24/2024, [id number]  Cognitive Concerns/ Orientation : A&O x4, calm & cooperative  BEHAVIOR & AGGRESSION TOOL COLOR: Green             ABNL VS/O2: VSS on 8L O2 via oxymizer cannula (baseline O2 5 L NC)  MOBILITY: Independent  PAIN MANAGMENT: denies  DIET: 2 g NA, fluid restriction 1800 mL  BOWEL/BLADDER: Continent, up to bedside commode, no BM during this shift   ABNL LAB/BG: CO2 30, BUN 30.2, creat 1.24, GFR 49, glucose 120   DRAIN/DEVICES: PIV saline locked  SKIN: Wound R lateral ankle, followed by WOC nurse.  Wound cares every other day, last done on 4/23 per chart review.  Scattered bruising. BLE jovon with edema 2+, compression stockings on, educated on skin checks with comp stockings.  Midline removal site on neck WDL.   TESTS/PROCEDURES: None  D/C DATE: Discharge pending ability to wean O2  OTHER IMPORTANT INFO: CPAP adjustments made by respiratory RN, educated pt on using CPAP tonight.  Chest XR complete, refer to report.

## 2024-04-24 NOTE — PROGRESS NOTES
[facility]    Medicine Progress Note - Hospitalist Service    Date of Admission:  3/29/2024  Date of Service: 04/24/2024    Assessment & Plan   Linda Otero is a 64 year old female admitted on 3/29/2024.  Past medical history of COPD, chronic respiratory failure on home oxygen with history of hypercapnia, nicotine dependence, HFpEF, DVT/PE  on past warfarin anticoagulation, recent hemoptysis, morbid obesity, CKD stage III, CAD, HLD, HTN, hypothyroidism, MGUS, seizure disorder, CVA, admitted 3/29/2024 with acute on chronic respiratory failure.     Acute on chronic hypoxic respiratory failure, multifactorial  HFpEF exacerbation   COPD exacerbation  JODI with noncompliance to CPAP   Acute right heart failure with mod-severe TR  Severe Pulmonary Hypertension  [Home Lasix 20mg BID PO]  *Baseline 5 L NC  *CXR 3/29- Cardiomegaly. Ill-defined opacity at the right infrahilar lung could be acute airspace disease. A mass is difficult to exclude. Bilateral vascular congestion may be a degree of pulmonary edema. Potential small right pleural fluid.  *CT C PE 3/29/2024 without any PE but did demonstrate pulmonary congestion, R pleural effusion; also enlarged main pulmonary artery, as evidence of pulmonary hypertension  *TTE 4/1 showing EF 60-65% without WMA, severe RV dilation with moderately decreased RV function, mod-severe TR and pulm HTN  - Was initially started on Lasix drip.  This was transition to Bumex drip at 0.25 mg/h on 4/9/2024.,  Received a one-time bolus bolus of IV Bumex 2 mg on 4/9, 4/10, 4/11, 4/13 and 4/14/2024.  Increase Bumex drip to 0.5 mg an hour on 4/14/2024   - Cardiology following  - s/p left and right heart cath on 4/12/2024.  Showed normal coronary arteries.  Severely elevated right-sided pressures, severely elevated left-sided filling pressures.  Mixed precapillary and postcapillary pulmonary hypertension.  Normal cardiac index.  - weight down 2 lbs since yesterday, about  "50 lbs since admission . Neg balance -23L since admission  - lymphedema wraps  - 2gm Na diet  - fluid restriction 1800cc/day  - on potassium replacement with oral KCl 40 mEq 3 times daily  - cr overall stable 1.15--1/1--1.24--1.22--1.29--1.31--1.18--1.21  - Flutter valve and incentive spirometer  - 4/19-still on 8L Oxymixer, try to wean down as able, O2 sat goal at 88-92% (baseline on O2 5 liters NC)  - Started on Jardiance 10 mg daily on 4/13/2024 for HFpEF  - Per [facility] on 4/19: \"I reviewed the patient's right heart catheterization. Her filling pressures have improved significantly. Her weight is now normal and her mean RA is mildly elevated. Her pulmonary hypertension has improved but still severe. Will transition her to oral diuretics. Will arrange follow-up in the pulm hypertension clinic. Will sign off.\"  -Continue to wean off O2  -Pulmonology consulted: input appreciated. Started on Incruse; may need to be on higher oxygen requirements.     COPD exacerbation   CAP  Respiratory acidosis  [PTA on Breo, Spiriva respimat, Alb inh and neb prn]  - completed 5-day course of ceftriaxone+doxy on 4/2  - 04/07/24 completed 8-day prednisone taper.;  - 4/8 -restarted Breo.  Wheezing significantly improved.  - currently om Breo 1 puff q daily and Duonebs QID     Hemoptysis, mild, resolved  *reports orange sputum production at home, was producing small amounts of rober blood 4/1  *admission CT chest negative for PE, masses  *resumed aspirin 4/3 and reports small amount of hemoptysis 4/4   - denies any further hemoptysis.  Lovenox started on 4/8/2024.  Held for cardiac catheterization, will resume afer [facility]     SONAL on Chronic kidney disease, stage III, improved  *Speculate congestive nephropathy  *Admission serum Cr 1.33, historic baseline 0.87 to 1.08 in 2020 to 2023  - Avoid non-steroidal anti-inflammatory drugs (NSAIDs)  - Creatinine stabilized around 1.2-1.3  - BMP in am     Hypokalemia  *due to diuresis  - replace per " protocol  - Started on oral KCl 40 mEq 3 times daily  - K 3.8 on 4/18     History of deep venous thrombosis and pulmonary embolism  *CT C PE 3/29/2024 without any PE  *Per pharmacy, no longer on warfarin  - on prophylactic lovenox 40 mg BID  - has been counseled on importance of frequent ambulation, out of bed activity  - Cardiology does recommend a VQ scan to evaluate for chronic thromboembolic pulmonary hypertension.  Will defer the timing of this to cardiology     Skin ulcer, right lateral ankle  Concern for cellulitis?  - Wound nurse (WOC) following  - See pictures from 4/17, there was concern for developing cellulitis  - Started ceftriaxone IV daily (allergic to penicillin, endomysial, tetracycline) on 4/18  - Switched to Keflex PO today (4/20), plan for 7 day course     Weakness and deconditioning  - PT/ OT-recommend home with home therapies  - Reorder home care with PT, OT and RN at the time of discharge     Obstructive sleep apnea  - Patient is on CPAP at home but has not been using it since admission since she did not tolerate the mask initially.  Encouraged her to bring her home CPAP machine We tried the hospital mask on 4/9/2024 and she did not tolerate this.  - Patient continues to be noncompliant with her CPAP use in the hospital despite having her home machine here.     Mild thrombocytopenia-  platelet count has been slowly downtrending over the last 3 days, now improving.  - 135---137---118--105--112--135  - continue to monitor for now.    Moderate Malnutrition  - nutritionist consult      Hyperlipidemia - Diet-controlled, monitor; follow-up with primary clinic provider   Hypothyroidism - Med rec does not show any levothyroxine (TSH slightly elevated, free T4 wnl). Will defer to outpt evaluation by PCP  Morbid obesity  History of nicotine dependence  History of obstructive sleep apnea  History of coronary artery disease   History of monoclonal gammopathy of undetermined significance - noted, follow-up  "with primary clinic provider   History of cerebrovascular disease with prior stroke - noted, monitor  History of B12 deficiency - noted, continue prior to admission B12 replacement  History of seizure disorder - noted in medical record; monitor          Diet: Snacks/Supplements Adult: Expedite Bottle; Between Meals  Fluid restriction 1800 ML FLUID  Combination Diet Regular Diet; 2 gm NA Diet    DVT Prophylaxis: Enoxaparin (Lovenox) SQ  Miguel Catheter: Not present  Lines: None     Cardiac Monitoring: None  Code Status: Full Code      Clinically Significant Risk Factors        # Hyperkalemia: Highest K = 5.4 mmol/L in last 2 days, will monitor as appropriate           # Hypertension: Noted on problem list        # Severe Obesity: Estimated body mass index is 45.56 kg/m  as calculated from the following:    Height as of this encounter: 1.676 m (5' 6\").    Weight as of this encounter: 128.1 kg (282 lb 4.8 oz).   # Moderate Malnutrition: based on nutrition assessment      # Financial/Environmental Concerns: none  # COPD: noted on problem list        Disposition Plan     Medically Ready for Discharge: Anticipated in 2-4 Days             Venita Slaughter MD  Hospitalist Service  [facility]  Securely message with [employer] (more info)  Text page via [facility] Paging/Directory   ______________________________________________________________________    Interval History   No acute events    Physical Exam   Vital Signs: Temp: 97.8  F (36.6  C) Temp src: Oral BP: 107/48 Pulse: 99   Resp: 18 SpO2: 92 % O2 Device: Oxymizer cannula Oxygen Delivery: 8 LPM  Weight: 282 lbs 4.8 oz    General Appearance:  Awake, alert, NAD  Respiratory: diminished breath sounds but overall CTABL, no w/r/r.  Cardiovascular: S1S2, irregular  GI: abd- soft, obese, nonT  Skin: multiple ecchymosis upper extremities  Extrem: 2+pitting edema RLE, 1+ pitting edema LLE; dressing around rt ankle    Medical Decision Making "       -------------------------- BILLING ON TIME ------------------------------------------------------------------------------------------------------------  35 MINUTES SPENT BY ME on the date of service doing chart review, history, exam, documentation & further activities per the note.        Data     I have personally reviewed the following data over the past 24 hrs:    N/A  \   N/A   / N/A     137 98 30.2 (H) /  120 (H)   4.8 30 (H) 1.24 (H) \       Imaging results reviewed over the past 24 hrs:   Recent Results (from the past 24 hour(s))   XR Chest Port 1 View    Narrative    CHEST ONE VIEW April 24, 2024 10:44 AM     HISTORY: Continued oxygen needs.    COMPARISON: CT chest 3/29/2024. Chest radiograph 3/29/2024.      Impression    IMPRESSION: Similar cardiomegaly. Improved right pleural effusion.  Persistent pulmonary vascular congestion and mild interstitial  pulmonary edema. No new focal consolidation or pneumothorax.

## 2024-04-24 NOTE — PLAN OF CARE
Goal Outcome Evaluation:      Plan of Care Reviewed With: patient    Overall Patient Progress: improvingOverall Patient Progress: improving       Summary: COPD/HF exacerbation  DATE & TIME: 4/23-24/2024; [id number]  Cognitive Concerns/ Orientation : A&O x4   BEHAVIOR & AGGRESSION TOOL COLOR: Green   ABNL VS/O2: VSS on 8L O2 via oxymizer cannula (baseline O2 5 L NC)  MOBILITY: Ind  PAIN MANAGMENT: denies  DIET: 2 g NA, fluid restriction 1800 mL  BOWEL/BLADDER: Continent, up to bedside commode, no BM during this shift   ABNL LAB/BG: K+ 5.4 on 5/23 am   DRAIN/DEVICES: PIV saline locked  SKIN: Wound R lateral ankle, followed by WOC nurse.  Scattered bruising. BLE jovon with edema 2+, compression stockings on. Midline removal site on neck WDL.   TESTS/PROCEDURES: None  D/C DATE: Discharge pending ability to wean O2  OTHER IMPORTANT INFO: refusing to use CPAP overnight

## 2024-04-25 NOTE — PROGRESS NOTES
"CLINICAL NUTRITION SERVICES - REASSESSMENT NOTE    Recommendations Ordered by Registered Dietitian (RD):   Continue current diet  Continue MVI/M  Continue Expedite   Malnutrition:  % Weight Loss:  difficult to assess w/ fluid shifts (diuresing)  % Intake:  <75% for >/= 1 month (moderate malnutrition) - improving  Subcutaneous Fat Loss:  Orbital region mild depletion and Upper arm region mild depletion 3/30  Muscle Loss:  Clavicle bone region mild depletion, Acromion bone region mild depletion, and Scapular bone region mild depletion 3/30  Fluid Retention:  2+ Mild     Malnutrition Diagnosis: Moderate malnutrition  In Context of:  Acute illness or injury  Chronic illness or disease     EVALUATION OF PROGRESS TOWARD GOALS   Diet:  2gm Na Diet; 1800 mL fluid restriction  Supplements: Expedite bottle @ 2pm    Intake/Tolerance:    - 100% intakes documented per flowsheets  - Ordering BID meals per health touch  - Spoke with patient at bedside. Per patient, \"I'm doing the best I can\". She is still having some early satiety. She is consuming ~1-2 meals per day and sometimes drinking Expedite bottle. She said her son brought in a hamburger and grilled corn on the cob the other day which she really enjoyed as it was from outside the hospital. Writer encouraged protein intakes and encouraged finishing 100% of Expedite bottle to promote wound healing. Provided support and encouragement with intakes moving forward.    ASSESSED NUTRITION NEEDS:  Dosing Weight: 83 kg - adjusted   Estimated Energy Needs: 7344-4658 kcals (20-25 Kcal/Kg)  Justification: obese  Estimated Protein Needs: 100-125 grams protein (1.2-1.5 g pro/Kg)  Justification: preservation of lean body mass and reported wound    NEW FINDINGS:   WOC following (4/23) ~  Wound location: right lateral ankle area   Wound due to: Unknown Etiology patient reports this wound area is chronic and comes and goes, has treated the area \"four times\" before.   STATUS: stable     Meds: " bumex, jardiance, MVI/M    Labs: BUN 31.3 (H), Cr 1.19 (H), GFR 51 (L), BGM < 120    Weight: trending down, however pt has been diuresing so suspect majority is fluid loss  04/23/24 1000 128.1 kg (282 lb 4.8 oz) --   04/21/24 0628 127.8 kg (281 lb 11.2 oz) Standing scale   04/20/24 0633 127.4 kg (280 lb 14.4 oz) Standing scale   04/18/24 0835 130 kg (286 lb 11.2 oz) Standing scale   04/17/24 0900 130.9 kg (288 lb 8 oz) Standing scale   04/16/24 0602 133.4 kg (294 lb 1.6 oz) Standing scale   04/15/24 0447 133.4 kg (294 lb) Standing scale   04/14/24 0734 135.8 kg (299 lb 6.4 oz) Standing scale   04/14/24 0300 134.7 kg (296 lb 14.4 oz) Standing scale   04/13/24 0630 136.1 kg (300 lb) Standing scale     Dispo: discharge pending ability to wean O2    Previous Goals:   Intake of >/= 2 adequate meals/day.   Evaluation: Met    Previous Nutrition Diagnosis:   Inadequate oral intake related to early satiety, lack of appetite as evidenced by patient reports intake of 2 meals/day at most, with mild fat and muscle losses.   Evaluation: No change    CURRENT NUTRITION DIAGNOSIS  Inadequate oral intake related to early satiety, lack of appetite as evidenced by patient reports intake of 2 meals/day at most, with mild fat and muscle losses.     INTERVENTIONS  Recommendations / Nutrition Prescription  See above    Implementation  Medical Food Supplement - continue  Multivitamin/Mineral - continue    Goals  Patient to consume >/= 2 adequate meals/day + 1 bottle Expedite/day    MONITORING AND EVALUATION:  Progress towards goals will be monitored and evaluated per protocol and Practice Guidelines    Brittney Youngblood RD, LD  Clinical Dietitian - [facility]

## 2024-04-25 NOTE — PLAN OF CARE
Problem: Adult Inpatient Plan of Care  Goal: Plan of Care Review  Outcome: Progressing   Goal Outcome Evaluation:    Patient continues w/ fair appetite. Encouraged protein intake and 100% consumption of Expedite for wound healing. See note for more details.    Brittney Youngblood RD, LD  Clinical Dietitian - [facility]

## 2024-04-25 NOTE — PLAN OF CARE
Goal Outcome Evaluation:  Summary: COPD/HF exacerbation  DATE & TIME: 4/24/2024; [id number]  Cognitive Concerns/ Orientation : A&O x4, pleasant.    BEHAVIOR & AGGRESSION TOOL COLOR: Green   ABNL VS/O2: VSS on 8L O2 via oxymizer cannula (baseline O2 5 L NC). Tried weaning to 6L, unsuccessful. Patient dropped to 85% O2.   MOBILITY: Ind. Pivots self to bedside commode.   PAIN MANAGMENT: denies  DIET: 2 g NA, fluid restriction 1800 mL  BOWEL/BLADDER: Continent, up to bedside commode, 1x BM.  ABNL LAB/BG: No new labs this shift.   DRAIN/DEVICES: PIV saline locked  SKIN: Wound R lateral ankle, followed by WOC nurse. Wound cares every other day - due tomorrow 04/24. Scattered bruising. BLE jovon with edema 2+, compression stockings on. Midline removal site on neck.  TESTS/PROCEDURES: None  D/C DATE: Discharge pending ability to wean O2  OTHER IMPORTANT INFO: CPAP to be set up by resp for tonight.

## 2024-04-25 NOTE — PLAN OF CARE
Goal Outcome Evaluation:     Plan of Care Reviewed With: patient     Overall Patient Progress: improvingOverall Patient Progress: improving     Summary: COPD/HF exacerbation  DATE & TIME: 4/25/2024, [id number]  Cognitive Concerns/ Orientation : A&O x4, calm & cooperative  BEHAVIOR & AGGRESSION TOOL COLOR: Green             ABNL VS/O2: VSS on 8L O2 via oxymizer cannula (baseline O2 5 L NC), 6 L O2 with CPAP  MOBILITY: Independent.  Up in chair with good tolerance  PAIN MANAGMENT: denies  DIET: 2 g NA, fluid restriction 1800 mL  BOWEL/BLADDER: Continent B/B, up to bedside commode, 1 BM on previous shift  ABNL LAB/BG: Cl 97, CO2 30, BUN 31.3, creat 1.19, GFR 51, glucose 112   DRAIN/DEVICES: PIV saline locked  SKIN: Skin wound R lateral ankle. Wound cares completed per order set, next wound cares to be completed on 4/27.  Scattered bruising. BLE jovon with edema 2+, compression stockings on.  Midline removal site on neck WDL.   TESTS/PROCEDURES: None  D/C DATE: Discharge pending ability to wean O2. Pt baseline at 5L O2.   OTHER IMPORTANT INFO: CPAP adjustments made by respiratory RN, educated pt on using CPAP tonight.

## 2024-04-25 NOTE — PROGRESS NOTES
Summary: COPD / HF exacerbation  Orientation: AxO x4.  Vitals/Tele: VSS on 8L oxymizer/CPAP overnight. Attempt to wean not tolerated.   IV Access/drains: PIV saline locked.  Diet: 2g Na. 1800ml fluid restriction   Mobility: Impendent in room.   GI/: Continent. Pivots ind to commode   Wound/Skin: R ankle wound. WOC orders placed. Dressing change due today.  Pain: Denies  Discharge Plan: pending weaning of O2. Patient baseline 5L O2.  Other: +2 edema on BLE. Compression stockings in place.  See Flow sheets for assessment

## 2024-04-25 NOTE — PROGRESS NOTES
[facility]    Medicine Progress Note - Hospitalist Service    Date of Admission:  3/29/2024  Date of Service: 04/25/2024    Assessment & Plan   Linda Otero is a 64 year old female admitted on 3/29/2024.  Past medical history of COPD, chronic respiratory failure on home oxygen with history of hypercapnia, nicotine dependence, HFpEF, DVT/PE  on past warfarin anticoagulation, recent hemoptysis, morbid obesity, CKD stage III, CAD, HLD, HTN, hypothyroidism, MGUS, seizure disorder, CVA, admitted 3/29/2024 with acute on chronic respiratory failure.     Acute on chronic hypoxic respiratory failure, multifactorial  HFpEF exacerbation   COPD exacerbation  JODI with noncompliance to CPAP   Acute right heart failure with mod-severe TR  Severe Pulmonary Hypertension  [Home Lasix 20mg BID PO]  *Baseline 5 L NC  *CXR 3/29- Cardiomegaly. Ill-defined opacity at the right infrahilar lung could be acute airspace disease. A mass is difficult to exclude. Bilateral vascular congestion may be a degree of pulmonary edema. Potential small right pleural fluid.  *CT C PE 3/29/2024 without any PE but did demonstrate pulmonary congestion, R pleural effusion; also enlarged main pulmonary artery, as evidence of pulmonary hypertension  *TTE 4/1 showing EF 60-65% without WMA, severe RV dilation with moderately decreased RV function, mod-severe TR and pulm HTN  - Was initially started on Lasix drip.  Then transitioned to bumex gtt, now on oral bumex 2mg daily.  - Cardiology consult: input appreciated. Now signed off...  - s/p left and right heart cath on 4/12/2024.  Showed normal coronary arteries.  Severely elevated right-sided pressures, severely elevated left-sided filling pressures.  Mixed precapillary and postcapillary pulmonary hypertension.  Normal cardiac index.  - Neg balance -23L since admission  - lymphedema wraps  - 2gm Na diet  - fluid restriction 1800cc/day  - on potassium replacement with oral KCl 40  mEq 3 times daily  - cr overall stable 1.15--1/1--1.24--1.22--1.29--1.31--1.18--1.21  - Flutter valve and incentive spirometer  - still on 6- 8L Oxymixer, try to wean down as able, O2 sat goal at 88-92% (baseline on O2 5 liters NC)  - Started on Jardiance 10 mg daily on 4/13/2024 for HFpEF  - s/p RHC on 4/19:  Her filling pressures have improved significantly. Her weight is now normal and her mean RA is mildly elevated. Her pulmonary hypertension has improved but still severe. .. Patient to follow-up in the pulm hypertension clinic.   -Continue to wean off O2  -Pulmonology consulted: input appreciated. Started on Incruse; may need to be on higher oxygen requirements even at discharge    COPD exacerbation   CAP  Respiratory acidosis  [PTA on Breo, Spiriva respimat, Alb inh and neb prn]  - completed 5-day course of ceftriaxone+doxy on 4/2  - 04/07/24 completed 8-day prednisone taper.;  - 4/8 -restarted Breo.  Wheezing significantly improved.  - currently om Breo 1 puff q daily and Duonebs QID     Hemoptysis, mild, resolved  *reports orange sputum production at home, was producing small amounts of rober blood 4/1  *admission CT chest negative for PE, masses  *resumed aspirin 4/3 and reports small amount of hemoptysis 4/4   - denies any further hemoptysis.  Lovenox started on 4/8/2024.  Held for cardiac catheterization, now resumed. No further reports of bleed.     SONAL on Chronic kidney disease, stage III, improved  *Speculate congestive nephropathy  *Admission serum Cr 1.33, historic baseline 0.87 to 1.08 in 2020 to 2023  - Avoid non-steroidal anti-inflammatory drugs (NSAIDs)  - Creatinine stabilized around 1.2-1.3       Hypokalemia, resolved  *due to diuresis  - replace per protocol  - Started on oral KCl 40 mEq 3 times daily, now discontinued as IV diureses discontinued.   - continue to monitor     History of deep venous thrombosis and pulmonary embolism  *CT C PE 3/29/2024 without any PE  *Per pharmacy, no longer on  warfarin  - on prophylactic lovenox 40 mg BID  - has been counseled on importance of frequent ambulation, out of bed activity  - Cardiology does recommend a VQ scan to evaluate for chronic thromboembolic pulmonary hypertension.  Will defer the timing of this to cardiology     Skin ulcer, right lateral ankle  Concern for cellulitis?  - Wound nurse (WOC) following  - See pictures from 4/17, there was concern for developing cellulitis  - Started ceftriaxone IV daily (allergic to penicillin, endomysial, tetracycline) on 4/18  - Switched to Keflex PO today (4/20), plan for 7 day course     Weakness and deconditioning  - PT/ OT-recommend home with home therapies  - Reorder home care with PT, OT and RN at the time of discharge     Obstructive sleep apnea  - Patient is on CPAP at home but has not been using it since admission since she did not tolerate the mask initially.  Encouraged her to bring her home CPAP machine We tried the hospital mask on 4/9/2024 and she did not tolerate this.  - Patient continues to be noncompliant with her CPAP use in the hospital despite having her home machine here.     Mild thrombocytopenia-  platelet count has been slowly downtrending over the last 3 days, now improving.  - 135---137---118--105--112--135  - continue to monitor for now.    Moderate Malnutrition  - nutritionist consult      Hyperlipidemia - Diet-controlled, monitor; follow-up with primary clinic provider   Hypothyroidism - Med rec does not show any levothyroxine (TSH slightly elevated, free T4 wnl). Will defer to outpt evaluation by PCP  Morbid obesity  History of nicotine dependence  History of obstructive sleep apnea  History of coronary artery disease   History of monoclonal gammopathy of undetermined significance - noted, follow-up with primary clinic provider   History of cerebrovascular disease with prior stroke - noted, monitor  History of B12 deficiency - noted, continue prior to admission B12 replacement  History of  "seizure disorder - noted in medical record; monitor          Diet: Snacks/Supplements Adult: Expedite Bottle; Between Meals  Fluid restriction 1800 ML FLUID  Combination Diet Regular Diet; 2 gm NA Diet    DVT Prophylaxis: Enoxaparin (Lovenox) SQ  Miguel Catheter: Not present  Lines: None     Cardiac Monitoring: None  Code Status: Full Code      Clinically Significant Risk Factors                  # Hypertension: Noted on problem list        # Severe Obesity: Estimated body mass index is 45.56 kg/m  as calculated from the following:    Height as of this encounter: 1.676 m (5' 6\").    Weight as of this encounter: 128.1 kg (282 lb 4.8 oz).   # Moderate Malnutrition: based on nutrition assessment      # Financial/Environmental Concerns: none  # COPD: noted on problem list        Disposition Plan     Medically Ready for Discharge: Anticipated in 2-4 Days : likely in 2 days             Venita Slaughter MD  Hospitalist Service  [facility]  Securely message with [employer] (more info)  Text page via [employer] Paging/Directory   ______________________________________________________________________    Interval History   No acute events  Patient notes she is motivated to improve clinically, has no complaints at this time.    Physical Exam   Vital Signs: Temp: 98  F (36.7  C) Temp src: Oral BP: 137/84 Pulse: 105   Resp: 18 SpO2: 90 % O2 Device: Oxymizer cannula Oxygen Delivery: 8 LPM  Weight: 282 lbs 4.8 oz    General Appearance:  Awake, alert, NAD  Respiratory: diminished breath sounds but overall CTABL, no w/r/r.  Cardiovascular: S1S2, irregular  GI: abd- soft, obese, nonT  Skin: multiple ecchymosis upper extremities  Extrem: 2+pitting edema RLE, 1+ pitting edema LLE; dressing around rt ankle    Medical Decision Making       -------------------------- BILLING ON TIME ------------------------------------------------------------------------------------------------------------  35 MINUTES SPENT BY ME on the date of " service doing chart review, history, exam, documentation & further activities per the note.        Data     I have personally reviewed the following data over the past 24 hrs:    N/A  \   N/A   / 217     137 97 (L) 31.3 (H) /  112 (H)   4.1 30 (H) 1.19 (H) \       Imaging results reviewed over the past 24 hrs:   No results found for this or any previous visit (from the past 24 hour(s)).

## 2024-04-26 NOTE — PLAN OF CARE
Goal Outcome Evaluation:    Summary: COPD/HF exacerbation    DATE & TIME: 4/26/24 3048-6745  Cognitive Concerns/ Orientation: A&O x4, pleasant.    BEHAVIOR & AGGRESSION TOOL COLOR: Green   ABNL VS/O2: VSS on 6L O2 via oxymizer cannula (baseline O2 5 L NC). CPAP at night  MOBILITY: Independent in room  PAIN MANAGMENT: Denies  DIET: 2g NA, 1800 mL FR  BOWEL/BLADDER: Continent, up to bedside commode   ABNL LAB/BG: Creat 1.21 (trending up); BUN 31.8; GFR 50  DRAIN/DEVICES: L PIV SL  SKIN: Wound R lateral ankle, followed by WOC nurse. Wound cares every other day - due 4/27. Scattered bruising. BLE jovon with edema 2+, compression stockings on. Midline removal site on neck healing  TESTS/PROCEDURES: Chest XR and NM lung ventilation and perfusion scan completed 4/26  D/C DATE: pending weaning of O2 and TCU placement  OTHER IMPORTANT INFO: N/A

## 2024-04-26 NOTE — PLAN OF CARE
Goal Outcome Evaluation:      Plan of Care Reviewed With: patient    Overall Patient Progress: no change    DATE & TIME: 4/25/2024 6187-3641  Cognitive Concerns/ Orientation : A&O x4, pleasant.    BEHAVIOR & AGGRESSION TOOL COLOR: Green   ABNL VS/O2: VSS on 8L O2 via oxymizer cannula (baseline O2 5 L NC). Unable to wean overnight  MOBILITY: Ind. Pivots self to bedside commode.   PAIN MANAGMENT: Denies  DIET: 2g NA, 1800 mL FR  BOWEL/BLADDER: Continent, up to bedside commode   ABNL LAB/BG: Creat 1.19  DRAIN/DEVICES: L PIV SL  SKIN: Wound R lateral ankle, followed by WOC nurse. Wound cares every other day - due 4/27. Scattered bruising. BLE jovon with edema 2+, compression stockings on. Midline removal site on neck healing  TESTS/PROCEDURES: None  D/C DATE: Discharge pending ability to wean O2 vs home with increased O2 allowance  OTHER IMPORTANT INFO:

## 2024-04-26 NOTE — PROGRESS NOTES
[facility]    Medicine Progress Note - Hospitalist Service    Date of Admission:  3/29/2024    Assessment & Plan   Linda Otero is a 64 year old female admitted on 3/29/2024.  Past medical history of COPD, chronic respiratory failure on home oxygen with history of hypercapnia, nicotine dependence, HFpEF, DVT/PE  on past warfarin anticoagulation, recent hemoptysis, morbid obesity, CKD stage III, CAD, HLD, HTN, hypothyroidism, MGUS, seizure disorder, CVA, admitted 3/29/2024 with acute on chronic respiratory failure.     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 Ill-defined opacity at the right infrahilar lung could be acute airspace disease. A mass is difficult to exclude. Bilateral vascular congestion may be a degree of pulmonary edema. Potential small right pleural fluid.  *CT C PE 3/29/2024 without any PE but did demonstrate pulmonary congestion, R pleural effusion; also enlarged main pulmonary artery, as evidence of pulmonary hypertension  *TTE 4/1 showing EF 60-65% without WMA, severe RV dilation with moderately decreased RV function, mod-severe TR and pulm HTN  - Was initially started on Lasix drip.  Then transitioned to bumex gtt, now on oral bumex 2mg daily.  - Cardiology consult: input appreciated. Now signed off...  - s/p left and right heart cath on 4/12/2024.  Showed normal coronary arteries.  Severely elevated right-sided pressures, severely elevated left-sided filling pressures.  Mixed precapillary and postcapillary pulmonary hypertension (mPA 59 mmHg, PCWP 28 mmHg) .  Normal cardiac index.    - s/p RHC on 4/19:  Her filling pressures have improved significantly. Her weight is now normal  and her mean RA is mildly elevated. Her pulmonary hypertension has improved but still severe. .. Patient to follow-up in the pul hypertension clinic.     -Initially was on IV furosemide infusion with transition to Bumex with neg balance -23L since admission  - lymphedema wraps  - 2gm Na diet  - fluid restriction 1800cc/day  - on potassium replacement with oral KCl 40 mEq 3 times daily      - Flutter valve and incentive spirometer  - still on 6- 8L Oxymixer, try to wean down as able, O2 sat goal at 88-92% (baseline on O2 5 liters NC)  - Started on Jardiance 10 mg daily on 4/13/2024 for HFpEF    COPD exacerbation   CAP  Respiratory acidosis  [PTA on Breo, Spiriva respimat, Alb inh and neb prn]  - completed 5-day course of ceftriaxone+doxy on 4/2  - 04/07/24 completed 8-day prednisone taper.;  - 4/8 -restarted Breo.  Wheezing significantly improved.  - currently om Breo 1 puff q daily and Vik QID   -Pulmonology consulted: input appreciated. Started on Incruse; may need to be on higher oxygen requirements even at discharge  Pulmonary review of 4/23.  Continue ICS-LABA.  Added Incruse daily, should discharge on [id number] per formulary   Likely CPAP/BiPAP may help wean O2 further but patient is not able to tolerate.  Will get VQ scan to further evaluate pulmonary hypertension.    Hemoptysis, mild, resolved  *reports orange sputum production at home, was producing small amounts of rober blood 4/1  *admission CT chest negative for PE, masses  *resumed aspirin 4/3 and reports small amount of hemoptysis 4/4   - denies any further hemoptysis.  Lovenox started on 4/8/2024.  Held for cardiac catheterization, now resumed. No further reports of bleed.     SONAL on Chronic kidney disease, stage III, improved  *Speculate congestive nephropathy  *Admission serum Cr 1.33, historic baseline 0.87 to 1.08 in 2020 to 2023  - Avoid non-steroidal anti-inflammatory drugs (NSAIDs)  - Creatinine stabilized around 1.2-1.3       Hypokalemia, resolved  *due to diuresis  - replace per protocol  - Started on oral KCl 40 mEq 3 times daily, now discontinued as IV diureses discontinued.   - continue to monitor     History of deep venous thrombosis and pulmonary embolism  *CT C PE 3/29/2024 without any PE  *Per pharmacy, no longer on warfarin  - on prophylactic lovenox 40 mg BID    Skin ulcer, right lateral ankle  Concern for cellulitis?  - Wound nurse (WOC) following  - See pictures from 4/17, there was concern for developing cellulitis  - Started ceftriaxone IV daily (allergic to penicillin, endomysial, tetracycline) on 4/18  - Switched to Keflex PO today (4/20), plan for 7 day course     Weakness and deconditioning  - PT/ OT-recommend home with home therapies  - Reorder home care with PT, OT and RN at the time of discharge     Obstructive sleep apnea  - Patient is on CPAP at home but has not been using it since admission since she did not tolerate the mask initially.  Encouraged her to bring her home CPAP machine We tried the hospital mask on 4/9/2024 and she did not tolerate this.  - Patient continues to be noncompliant with her CPAP use in the hospital despite having her home machine here.     Mild thrombocytopenia-  platelet count has been slowly downtrending over the last 3 days, now improving.  - 135---137---118--105--112--135  - continue to monitor for now.     Moderate Malnutrition  - nutritionist consult      Hyperlipidemia - Diet-controlled, monitor; follow-up with primary clinic provider   Hypothyroidism - Med rec does not show any levothyroxine (TSH slightly elevated, free T4 wnl). Will defer to outpt evaluation by PCP  Morbid obesity  History of nicotine dependence  History of obstructive sleep apnea  History of coronary artery disease   History of monoclonal gammopathy of undetermined significance - noted, follow-up with primary clinic provider   History of cerebrovascular disease with prior stroke - noted, monitor  History of  "B12 deficiency - noted, continue prior to admission B12 replacement  History of seizure disorder - noted in medical record; monitor                Diet: Snacks/Supplements Adult: Expedite Bottle; Between Meals  Fluid restriction 1800 ML FLUID  Combination Diet Regular Diet; 2 gm NA Diet    DVT Prophylaxis: Enoxaparin (Lovenox) SQ  Miguel Catheter: Not present  Lines: None     Cardiac Monitoring: None  Code Status: Full Code      Clinically Significant Risk Factors                  # Hypertension: Noted on problem list        # Severe Obesity: Estimated body mass index is 45.56 kg/m  as calculated from the following:    Height as of this encounter: 1.676 m (5' 6\").    Weight as of this encounter: 128.1 kg (282 lb 4.8 oz).   # Moderate Malnutrition: based on nutrition assessment    # Financial/Environmental Concerns: none  # COPD: noted on problem list        Disposition Plan     Medically Ready for Discharge: Anticipated in 2-4 Days       Celso Guillen MD  Hospitalist Service  [facility]  Securely message with [employer] (more info)  Text page via [employer] Paging/Directory   The above note was dictated using voice recognition software. Although reviewed after completion, some word and grammatical error may remain . Please contact the author for any clarifications.  ______________________________________________________________________    Interval History   My first interaction with the patient.  History reviewed  Feels okay but still requiring 8 L of O2  Physical Exam   /74 (BP Location: Right arm)   Pulse 94   Temp 97.9  F (36.6  C) (Oral)   Resp 20   Ht 1.676 m (5' 6\")   Wt 128.1 kg (282 lb 4.8 oz)   SpO2 98%   BMI 45.56 kg/m    Gen- pleasant  Neck- supple  CVS- I+II+ no m/r/g  RS- CTAB, decreased at bases  Abdo- soft, no tenderness . No g/r/r      Medical Decision Making       51 MINUTES SPENT BY ME on the date of service doing chart review, history, exam, documentation & further " activities per the note.      Data   ------------------------- PAST 24 HR DATA REVIEWED -----------------------------------------------    I have personally reviewed the following data over the past 24 hrs:    N/A  \   N/A   / N/A     139 98 31.8 (H) /  109 (H)   3.7 31 (H) 1.21 (H) \       Imaging results reviewed over the past 24 hrs:   No results found for this or any previous visit (from the past 24 hour(s)).  BMP  Recent Labs   Lab 04/26/24  0821 04/25/24  0649 04/24/24  1709 04/24/24  0808 04/23/24  0916    137  --  137 136   POTASSIUM 3.7 4.1  --  4.8 5.4*   CHLORIDE 98 97*  --  98 98   LUNA 9.1 9.2  --  9.2 9.6   CO2 31* 30*  --  30* 29   BUN 31.8* 31.3*  --  30.2* 27.2*   CR 1.21* 1.19*  --  1.24* 1.15*   * 112* 104* 120* 104*     CBC  Recent Labs   Lab 04/25/24  0649 04/22/24  1048    204       Venous Blood Gas  Recent Labs   Lab 04/19/24  1556   PHV 7.43   PCO2V 59*   PO2V 38   HCO3V 39*   MICHELL 13.0*      no skilled PT needs

## 2024-04-27 NOTE — PLAN OF CARE
Goal Outcome Evaluation:         Summary: COPD/HF exacerbation    DATE & TIME: 4/26/24 6554-0135  Cognitive Concerns/ Orientation: A&O x4, pleasant.    BEHAVIOR & AGGRESSION TOOL COLOR: Green   ABNL VS/O2: VSS on 6L O2 via oxymizer cannula (baseline O2 5 L NC). CPAP at night- refusing continuous pulse ox monitoring- educated patient- agreed to allow spot checking.   MOBILITY: Independent in room  PAIN MANAGMENT: Denies  DIET: 2g NA, 1800 mL FR  BOWEL/BLADDER: Continent, up to bedside commode   ABNL LAB/BG: Creat 1.21 (trending up); BUN 31.8; GFR 50  DRAIN/DEVICES: L PIV SL  SKIN: Wound R lateral ankle, followed by WOC nurse. Wound cares every other day - due 4/27. Scattered bruising. BLE jovon with edema 2+, compression stockings on. Midline removal site on neck healing  TESTS/PROCEDURES: Chest XR and NM lung ventilation and perfusion scan completed 4/26  D/C DATE: pending weaning of O2 and TCU placement  OTHER IMPORTANT INFO: N/A

## 2024-04-27 NOTE — PLAN OF CARE
Goal Outcome Evaluation:      Plan of Care Reviewed With: patient    Overall Patient Progress: no changeOverall Patient Progress: no change     Summary: COPD/HF exacerbation    DATE & TIME: 4/26/24 1930-2330  Cognitive Concerns/ Orientation: A&O x4, pleasant.    BEHAVIOR & AGGRESSION TOOL COLOR: Green   ABNL VS/O2: VSS on 6L O2 via oxymizer cannula (baseline O2 5 L NC). CPAP at night- refusing continuous pulse ox monitoring- educated patient- agreed to allow spot checking.   MOBILITY: Independent in room  PAIN MANAGMENT: Denies  DIET: 2g NA, 1800 mL FR  BOWEL/BLADDER: Continent, up to bedside commode   ABNL LAB/BG: Creat 1.21 (trending up); BUN 31.8; GFR 50  DRAIN/DEVICES: L PIV SL  SKIN: Wound R lateral ankle, followed by WOC nurse. Wound cares every other day - due 4/27. Scattered bruising. BLE jovon with edema 2+, compression stockings on. Midline removal site on neck healing  TESTS/PROCEDURES: Chest XR and NM lung ventilation and perfusion scan completed 4/26  D/C DATE: pending weaning of O2 and TCU placement  OTHER IMPORTANT INFO: N/A

## 2024-04-27 NOTE — PROGRESS NOTES
[facility]    Medicine Progress Note - Hospitalist Service    Date of Admission:  3/29/2024    Assessment & Plan   Linda Otero is a 64 year old female admitted on 3/29/2024.  Past medical history of COPD, chronic respiratory failure on home oxygen with history of hypercapnia, nicotine dependence, HFpEF, DVT/PE  on past warfarin anticoagulation, recent hemoptysis, morbid obesity, CKD stage III, CAD, HLD, HTN, hypothyroidism, MGUS, seizure disorder, CVA, admitted 3/29/2024 with acute on chronic respiratory failure.     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 Ill-defined opacity at the right infrahilar lung could be acute airspace disease. A mass is difficult to exclude. Bilateral vascular congestion may be a degree of pulmonary edema. Potential small right pleural fluid.  *CT C PE 3/29/2024 without any PE but did demonstrate pulmonary congestion, R pleural effusion; also enlarged main pulmonary artery, as evidence of pulmonary hypertension  *TTE 4/1 showing EF 60-65% without WMA, severe RV dilation with moderately decreased RV function, mod-severe TR and pulm HTN  - Was initially started on Lasix drip.  Then transitioned to bumex gtt, now on oral bumex 2mg daily.  - Cardiology consult: input appreciated. Now signed off...  - s/p left and right heart cath on 4/12/2024.  Showed normal coronary arteries.  Severely elevated right-sided pressures, severely elevated left-sided filling pressures.  Mixed precapillary and postcapillary pulmonary hypertension (mPA 59 mmHg, PCWP 28 mmHg) .  Normal cardiac index.    - s/p RHC on 4/19:  Her filling pressures have improved significantly. Her weight is now normal  and her mean RA is mildly elevated. Her pulmonary hypertension has improved but still severe. .. Patient to follow-up in the pul hypertension clinic.     -Initially was on IV furosemide infusion with transition to Bumex with neg balance -> 23L since admission  - lymphedema wraps  - 2gm Na diet  - fluid restriction 1800cc/day    - Flutter valve and incentive spirometer  - still on 6L Oxymixer, try to wean down as able, O2 sat goal at 88-92% (baseline on O2 5 liters NC)  - Started on Jardiance 10 mg daily on 4/13/2024 for HFpEF    COPD exacerbation   CAP  Respiratory acidosis  [PTA on Breo, Spiriva respimat, Alb inh and neb prn]  - completed 5-day course of ceftriaxone+doxy on 4/2  - 04/07/24 completed 8-day prednisone taper.;  - 4/8 -restarted Breo.  Wheezing significantly improved.  - currently om Breo 1 puff q daily and Duonebs QID   -Pulmonology consulted: input appreciated. Started on Incruse; may need to be on higher oxygen requirements even at discharge  Pulmonary review of 4/23.  Continue ICS-LABA.  Added Incruse daily, should discharge on [id number] per formulary   Likely CPAP/BiPAP may help wean O2 further   VQ scan done 04/26 and reviewed    Hemoptysis, mild, resolved  *reports orange sputum production at home, was producing small amounts of rober blood 4/1  *admission CT chest negative for PE, masses  *resumed aspirin 4/3 and reports small amount of hemoptysis 4/4   - denies any further hemoptysis.  Lovenox started on 4/8/2024.  Held for cardiac catheterization, now resumed. No further reports of bleed.     SONAL on Chronic kidney disease, stage III, improved  *Speculate congestive nephropathy  *Admission serum Cr 1.33, historic baseline 0.87 to 1.08 in 2020 to 2023  - Avoid non-steroidal anti-inflammatory drugs (NSAIDs)  - Creatinine stabilized around 1.2-1.3      Hypokalemia, resolved  *due to diuresis  - replace per protocol  - Started on oral KCl 40 mEq 3 times daily, now discontinued as IV diureses  discontinued.   - continue to monitor     History of deep venous thrombosis and pulmonary embolism  *CT C PE 3/29/2024 without any PE  *Per pharmacy, no longer on warfarin  - on prophylactic lovenox 40 mg BID    Skin ulcer, right lateral ankle  Concern for cellulitis?  - Wound nurse (WOC) following  - See pictures from 4/17, there was concern for developing cellulitis  - Started ceftriaxone IV daily (allergic to penicillin, endomysial, tetracycline) on 4/18  -  Keflex PO 4/20 - 4/26     Weakness and deconditioning  - PT/ OT-recommend home with home therapies  - Reorder home care with PT, OT and RN at the time of discharge     Obstructive sleep apnea  - Patient is on CPAP at home but has not been using it since admission since she did not tolerate the mask initially.  Encouraged her to bring her home CPAP machine We tried the hospital mask on 4/9/2024 and she did not tolerate this.  - Patient continues to be noncompliant with her CPAP use in the hospital despite having her home machine here.     Mild thrombocytopenia-resolved  -Monitor intermittently     Moderate Malnutrition  - nutritionist consult      Hyperlipidemia - Diet-controlled, monitor; follow-up with primary clinic provider   Hypothyroidism - Med rec does not show any levothyroxine (TSH slightly elevated, free T4 wnl). Will defer to outpt evaluation by PCP  Morbid obesity  History of nicotine dependence  History of obstructive sleep apnea  History of coronary artery disease   History of monoclonal gammopathy of undetermined significance - noted, follow-up with primary clinic provider   History of cerebrovascular disease with prior stroke - noted, monitor  History of B12 deficiency - noted, continue prior to admission B12 replacement  History of seizure disorder - noted in medical record; monitor           Diet: Snacks/Supplements Adult: Expedite Bottle; Between Meals  Fluid restriction 1800 ML FLUID  Combination Diet Regular Diet; 2 gm NA Diet    DVT  "Prophylaxis: Enoxaparin (Lovenox) SQ  Miguel Catheter: Not present  Lines: None     Cardiac Monitoring: None  Code Status: Full Code      Clinically Significant Risk Factors                  # Hypertension: Noted on problem list        # Severe Obesity: Estimated body mass index is 45.77 kg/m  as calculated from the following:    Height as of this encounter: 1.676 m (5' 6\").    Weight as of this encounter: 128.6 kg (283 lb 9.6 oz).   # Moderate Malnutrition: based on nutrition assessment      # Financial/Environmental Concerns: none  # COPD: noted on problem list        Disposition Plan     Medically Ready for Discharge: [facility] social work consult placed for TCU placement       Celso Guillen MD  Hospitalist Service  [facility]  Securely message with [employer] info)  Text page via [employer] Paging/Directory   The above note was dictated using voice recognition software. Although reviewed after completion, some word and grammatical error may remain . Please contact the author for any clarifications.  ______________________________________________________________________    Interval History     Doing okay.  Denies any new complaints.  Tolerating her CPAP    Physical Exam   /54 (BP Location: Right arm)   Pulse 94   Temp 97.5  F (36.4  C) (Oral)   Resp 20   Ht 1.676 m (5' 6\")   Wt 128.6 kg (283 lb 9.6 oz)   SpO2 95%   BMI 45.77 kg/m    Gen- pleasant  Neck- supple  CVS- I+II+ no m/r/g  RS- CTAB, decreased at bases  Abdo- soft, no tenderness . No g/r/r      Medical Decision Making       51 MINUTES SPENT BY ME on the date of service doing chart review, history, exam, documentation & further activities per the note.      Data   ------------------------- PAST 24 HR DATA REVIEWED -----------------------------------------------        Imaging results reviewed over the past 24 hrs:   Recent Results (from the past 24 hour(s))   XR Chest 2 Views    Narrative    CHEST TWO VIEWS 4/26/2024 " 1:41 PM     HISTORY: Shortness of breath.    COMPARISON: April 24, 2024       Impression    IMPRESSION: Minimal right effusion. Minimal associated probable  compressive atelectasis. Left lung grossly clear. No definite left  effusion. The cardiac silhouette is prominent but similar to previous.  Pulmonary vasculature is unremarkable.    ROMINA DELGADO MD         SYSTEM ID:  [id number]   NM Lung Scan Ventilation and Perfusion    Narrative    EXAM: NM LUNG SCAN VENTILATION AND PERFUSION  LOCATION: [facility]  DATE: 4/26/2024    INDICATION: Evaluate for chronic PE; Pulmonary embolism; Female sex; Not pregnant; No prior imaging in the last 24 hours; Pulmonary Embolism Rule Out Criteria (PERC) score > 0; Revised Christine Score (RGS) not >= 11; No D dimer result available; D dimer   not ordered. Pulmonary hypertension.  COMPARISON: Chest x-ray 04/26/2024.  TECHNIQUE: 55.7 mCi Tc-99m DTPA inhaled. 6.5 mCi Tc-99m MAA, IV. Standard planar imaging during perfusion and ventilation portions of exam.    FINDINGS: Normal pulmonary perfusion. Heterogeneous pulmonary ventilation. No mismatched segmental perfusion defect.      Impression    IMPRESSION:    1.  No evidence of acute pulmonary embolism or chronic pulmonary thromboembolic disease.  2.  Heterogeneous pulmonary ventilation which can be seen with COPD.     BMP  Recent Labs   Lab 04/26/24  0821 04/25/24  0649 04/24/24  1709 04/24/24  0808 04/23/24  0916    137  --  137 136   POTASSIUM 3.7 4.1  --  4.8 5.4*   CHLORIDE 98 97*  --  98 98   LUNA 9.1 9.2  --  9.2 9.6   CO2 31* 30*  --  30* 29   BUN 31.8* 31.3*  --  30.2* 27.2*   CR 1.21* 1.19*  --  1.24* 1.15*   * 112* 104* 120* 104*     CBC  Recent Labs   Lab 04/25/24  0649 04/22/24  1048    204       Venous Blood Gas  No lab results found in last 7 days.

## 2024-04-27 NOTE — PLAN OF CARE
Goal Outcome Evaluation:    Summary: COPD/HF exacerbation    DATE & TIME: 4/27/24 6222-2980  Cognitive Concerns/ Orientation: A&O x4, pleasant.    BEHAVIOR & AGGRESSION TOOL COLOR: Green   ABNL VS/O2: VSS on 6L O2 via oxymizer cannula (baseline O2 5 L NC). CPAP at night- refusing continuous pulse ox monitoring- educated patient- agreed to allow spot checking.   MOBILITY: Independent in room  PAIN MANAGMENT: Denies  DIET: 2g NA, 1800 mL FR  BOWEL/BLADDER: Continent, up to BSC  ABNL LAB/BG: Creat 1.21 (trending up); BUN 31.8; GFR 50  DRAIN/DEVICES: L PIV SL  SKIN: Wound R lateral ankle, followed by WOC nurse. Wound cares every other day - completed today. Next dressing due 4/29. Scattered bruising. BLE jovon with edema 2+, compression stockings on. Midline removal site on neck healing  TESTS/PROCEDURES: none new  D/C DATE: pending  OTHER IMPORTANT INFO: N/A

## 2024-04-28 NOTE — PLAN OF CARE
Goal Outcome Evaluation:      Plan of Care Reviewed With: patient    Overall Patient Progress: no changeOverall Patient Progress: no change     Summary: COPD/HF exacerbation    DATE & TIME: 4/27-04/28 4080-1035  Cognitive Concerns/ Orientation: A&O x4, pleasant.    BEHAVIOR & AGGRESSION TOOL COLOR: Green   ABNL VS/O2: VSS on 6L O2 via oxymizer cannula (baseline O2 5 L NC). CPAP at night- refusing continuous pulse ox monitoring- educated patient- agreed to allow spot checking.   MOBILITY: Independent in room  PAIN MANAGMENT: Denies  DIET: 2g NA, 1800 mL FR  BOWEL/BLADDER: Continent, up to BSC  ABNL LAB/BG: Creat 1.24 (trending up); BUN 31.8-trending up; GFR 49  DRAIN/DEVICES: L PIV SL  SKIN: Wound R lateral ankle, followed by WOC nurse. Wound cares every other day. Next dressing due 4/29. Scattered bruising. BLE jovon with edema 2+, compression stockings on. Midline removal site on neck healing  TESTS/PROCEDURES: CR 1.24  D/C DATE: Medically ready for discharge, SW consult placed for TCU placement.   OTHER IMPORTANT INFO: Sepsis protocol fired- lactic 0.8, VSS.

## 2024-04-28 NOTE — PLAN OF CARE
Goal Outcome Evaluation:    Summary: COPD/HF exacerbation    DATE & TIME: 4/28/24 6236-1831  Cognitive Concerns/ Orientation: A&O x4, pleasant.    BEHAVIOR & AGGRESSION TOOL COLOR: Green   ABNL VS/O2: VSS on 6L O2 via oxymizer cannula while at rest (baseline O2 5 L NC). With ambulation requiring 8-10L (see note below). CPAP at night- refusing continuous pulse ox monitoring, educated patient- agreed to allow spot checking.   MOBILITY: Independent in room, SBA in halls with walker  PAIN MANAGMENT: C/O mild right foot pain, denied need for PRN's  DIET: 2g NA, 1800 mL FR  BOWEL/BLADDER: Continent, up to BSC. BM x1 this shift  ABNL LAB/BG: Creat 1.17; BUN 32.9 (trending up); GFR 52  DRAIN/DEVICES: L PIV SL  SKIN: Wound R lateral ankle, followed by WOC nurse. Wound cares every other day. Next dressing due 4/29. Scattered bruising. BLE jovon with edema 2+, compression stockings on. Midline removal site on neck healing  TESTS/PROCEDURES: none new  D/C DATE: pending TCU vs returning to assisted living with O2 sats  OTHER IMPORTANT INFO: N/A      Ambulated in halls this afternoon. Patients sats dropped dropped down to low 80s on 6L. When bumped to 8L she sat in the mid 80s. Once at 10L she sat at 88-90, see flowsheet. Patient required lots of breaks and took awhile to regain her breath once back to the room. MD was notified on increased oxygen needs with ambulation.

## 2024-04-28 NOTE — PROGRESS NOTES
[facility]    Medicine Progress Note - Hospitalist Service    Date of Admission:  3/29/2024    Assessment & Plan   Linda Otero is a 64 year old female admitted on 3/29/2024.  Past medical history of COPD, chronic respiratory failure on home oxygen with history of hypercapnia, nicotine dependence, HFpEF, DVT/PE  on past warfarin anticoagulation, recent hemoptysis, morbid obesity, CKD stage III, CAD, HLD, HTN, hypothyroidism, MGUS, seizure disorder, CVA, admitted 3/29/2024 with acute on chronic respiratory failure.     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 Ill-defined opacity at the right infrahilar lung could be acute airspace disease. A mass is difficult to exclude. Bilateral vascular congestion may be a degree of pulmonary edema. Potential small right pleural fluid.  *CT C PE 3/29/2024 without any PE but did demonstrate pulmonary congestion, R pleural effusion; also enlarged main pulmonary artery, as evidence of pulmonary hypertension  *TTE 4/1 showing EF 60-65% without WMA, severe RV dilation with moderately decreased RV function, mod-severe TR and pulm HTN  - Was initially started on Lasix drip.  Then transitioned to bumex gtt, now on oral bumex 2mg daily.  - Cardiology consult: input appreciated. Now signed off...  - s/p left and right heart cath on 4/12/2024.  Showed normal coronary arteries.  Severely elevated right-sided pressures, severely elevated left-sided filling pressures.  Mixed precapillary and postcapillary pulmonary hypertension (mPA 59 mmHg, PCWP 28 mmHg) .  Normal cardiac index.    - s/p RHC on 4/19:  Her filling pressures have improved significantly. Her weight is now normal  and her mean RA is mildly elevated. Her pulmonary hypertension has improved but still severe. .. Patient to follow-up in the pul hypertension clinic.     -Initially was on IV furosemide infusion with transition to Bumex with neg balance -> 23L since admission  - lymphedema wraps  - 2gm Na diet  - fluid restriction 1800cc/day    - Flutter valve and incentive spirometer  - still on 6L Oxymixer, try to wean down as able, O2 sat goal at 88-92% (baseline on O2 5 liters NC)  - Started on Jardiance 10 mg daily on 4/13/2024 for HFpEF    COPD exacerbation   CAP  Respiratory acidosis  [PTA on Breo, Spiriva respimat, Alb inh and neb prn]  - completed 5-day course of ceftriaxone+doxy on 4/2  - 04/07/24 completed 8-day prednisone taper.;  - 4/8 -restarted Breo.  Wheezing significantly improved.  - currently om Breo 1 puff q daily and Duonebs QID   -Pulmonology consulted: input appreciated. Started on Incruse; may need to be on higher oxygen requirements even at discharge  Pulmonary review of 4/23.  Continue ICS-LABA.  Added Incruse daily, should discharge on [id number] per formulary   Likely CPAP/BiPAP may help wean O2 further   VQ scan done 04/26 and reviewed: No chronic PE    Hemoptysis, mild, resolved  *reports orange sputum production at home, was producing small amounts of rober blood 4/1  *admission CT chest negative for PE, masses  *resumed aspirin 4/3 and reports small amount of hemoptysis 4/4   - denies any further hemoptysis.  Lovenox started on 4/8/2024.  Held for cardiac catheterization, now resumed. No further reports of bleed.     SONAL on Chronic kidney disease, stage III, improved  *Speculate congestive nephropathy  *Admission serum Cr 1.33, historic baseline 0.87 to 1.08 in 2020 to 2023  - Avoid non-steroidal anti-inflammatory drugs (NSAIDs)  - Creatinine stabilized around 1.2-1.3      Hypokalemia, resolved  *due to diuresis  - replace per protocol  - Started on oral KCl 40 mEq 3 times daily, now discontinued  as IV diureses discontinued.   - continue to monitor     History of deep venous thrombosis and pulmonary embolism  *CT C PE 3/29/2024 without any PE  *Per pharmacy, no longer on warfarin  - on prophylactic lovenox 40 mg BID    Skin ulcer, right lateral ankle  Concern for cellulitis?  - Wound nurse (WOC) following  - See pictures from 4/17, there was concern for developing cellulitis  - Started ceftriaxone IV daily (allergic to penicillin, endomysial, tetracycline) on 4/18  -  Keflex PO 4/20 - 4/26     Weakness and deconditioning  - PT/ OT-recommend home with home therapies  - Reorder home care with PT, OT and RN at the time of discharge     Obstructive sleep apnea  - Patient is on CPAP at home but has not been using it since admission since she did not tolerate the mask initially.  Encouraged her to bring her home CPAP machine We tried the hospital mask on 4/9/2024 and she did not tolerate this.  - Patient continues to be noncompliant with her CPAP use in the hospital despite having her home machine here.     Mild thrombocytopenia-resolved  -Monitor intermittently     Moderate Malnutrition  - nutritionist consult      Hyperlipidemia - Diet-controlled, monitor; follow-up with primary clinic provider   Hypothyroidism - Med rec does not show any levothyroxine (TSH slightly elevated, free T4 wnl). Will defer to outpt evaluation by PCP  Morbid obesity  History of nicotine dependence  History of obstructive sleep apnea  History of coronary artery disease   History of monoclonal gammopathy of undetermined significance - noted, follow-up with primary clinic provider   History of cerebrovascular disease with prior stroke - noted, monitor  History of B12 deficiency - noted, continue prior to admission B12 replacement  History of seizure disorder - noted in medical record; monitor           Diet: Snacks/Supplements Adult: Expedite Bottle; Between Meals  Fluid restriction 1800 ML FLUID  Combination Diet Regular Diet; 2 gm NA  "Diet    DVT Prophylaxis: Enoxaparin (Lovenox) SQ  Miguel Catheter: Not present  Lines: None     Cardiac Monitoring: None  Code Status: Full Code      Clinically Significant Risk Factors                  # Hypertension: Noted on problem list        # Severe Obesity: Estimated body mass index is 45.44 kg/m  as calculated from the following:    Height as of this encounter: 1.676 m (5' 6\").    Weight as of this encounter: 127.7 kg (281 lb 8 oz).   # Moderate Malnutrition: based on nutrition assessment      # Financial/Environmental Concerns: none  # COPD: noted on problem list        Disposition Plan     Medically Ready for Discharge: [facility] Now discussed with , care coordinator. Reviewed PT findings .  Await TCU versus further evaluation for breathing by bedside RN (if stable on 6 L, may consider discharge to her assisted living)         Celso Guillen MD  Hospitalist Service  [facility]  Securely message with [employer] (more info)  Text page via [employer] Paging/Directory   The above note was dictated using voice recognition software. Although reviewed after completion, some word and grammatical error may remain . Please contact the author for any clarifications.  ______________________________________________________________________    Interval History     Doing okay.  Denies any new complaints.  Tolerating her CPAP  Breathing stable on 6 L but worse on exertion  Physical Exam   /70 (BP Location: Left arm, Patient Position: Sitting, Cuff Size: Adult Regular)   Pulse 93   Temp 97.9  F (36.6  C) (Oral)   Resp 20   Ht 1.676 m (5' 6\")   Wt 127.7 kg (281 lb 8 oz)   SpO2 93%   BMI 45.44 kg/m    Gen- pleasant  Neck- supple  CVS- I+II+ no m/r/g  RS- CTAB, decreased at bases  Abdo- soft, no tenderness . No g/r/r      Medical Decision Making       51 MINUTES SPENT BY ME on the date of service doing chart review, history, exam, documentation & further activities per the note.  " Discussion with patient, bedside RN, , care coordinator    Data   ------------------------- PAST 24 HR DATA REVIEWED -----------------------------------------------    I have personally reviewed the following data over the past 24 hrs:    5.6  \   8.6 (L)   / 237     141 98 32.9 (H) /  106 (H)   3.4 30 (H) 1.17 (H) \     Procal: N/A CRP: N/A Lactic Acid: 0.8         Imaging results reviewed over the past 24 hrs:   No results found for this or any previous visit (from the past 24 hour(s)).    BMP  Recent Labs   Lab 04/28/24  0643 04/27/24  2323 04/26/24  0821 04/25/24  0649    138 139 137   POTASSIUM 3.4 3.4 3.7 4.1   CHLORIDE 98 97* 98 97*   LUNA 9.3 9.1 9.1 9.2   CO2 30* 33* 31* 30*   BUN 32.9* 34.0* 31.8* 31.3*   CR 1.17* 1.24* 1.21* 1.19*   * 120* 109* 112*     CBC  Recent Labs   Lab 04/28/24  0643 04/27/24  2323 04/25/24  0649 04/22/24  1048   WBC  --  5.6  --   --    RBC  --  3.32*  --   --    HGB  --  8.6*  --   --    HCT  --  29.4*  --   --    MCV  --  89  --   --    MCH  --  25.9*  --   --    MCHC  --  29.3*  --   --    RDW  --  29.2*  --   --     214 217 204       Venous Blood Gas  Recent Labs   Lab 04/27/24 2323   PHV 7.41   PCO2V 58*   PO2V 47   HCO3V 37*   MICHELL 10.3*   O2PER 5

## 2024-04-29 NOTE — PLAN OF CARE
Goal Outcome Evaluation:      Plan of Care Reviewed With: patient    Overall Patient Progress: no changeOverall Patient Progress: no change     Summary: COPD/HF exacerbation    DATE & TIME: 4/28-04/29 1316-5283  Cognitive Concerns/ Orientation: A&O x4, pleasant.    BEHAVIOR & AGGRESSION TOOL COLOR: Green   ABNL VS/O2: VSS on 6L O2 via oxymizer cannula while at rest (baseline O2 5 L NC). With ambulation requiring 8-10L CPAP at night- refusing continuous pulse ox monitoring, educated patient- agreed to allow spot checking.   MOBILITY: Independent in room, SBA in halls with walker  PAIN MANAGMENT: C/O mild right foot pain, denied need for PRN's  DIET: 2g NA, 1800 mL FR- not meeting.   BOWEL/BLADDER: Continent, up to BSC.   ABNL LAB/BG: Creat 1.17; BUN 32.9 (trending up); GFR 52  DRAIN/DEVICES: L PIV SL  SKIN: Wound R lateral ankle, followed by WOC nurse. Wound cares every other day. Next dressing due 4/29. Scattered bruising. BLE jovon with edema 2+, compression stockings on. Midline removal site on neck healed- small bruise remains   TESTS/PROCEDURES: none new  D/C DATE: pending TCU vs returning home to assisted living with 6L as new baseline of O2  OTHER IMPORTANT INFO: N/A

## 2024-04-29 NOTE — PLAN OF CARE
Goal Outcome Evaluation:      Plan of Care Reviewed With: patient     Overall Patient Progress: no change      Summary: COPD/HF exacerbation     DATE & TIME: 4/29 0700-1930  Cognitive Concerns/ Orientation: A&O x4, pleasant.    BEHAVIOR & AGGRESSION TOOL COLOR: Green             ABNL VS/O2: VSS on 6L O2 via oxymizer cannula while at rest (baseline O2 5 L NC). With ambulation requiring 8-10L CPAP at night- refusing continuous pulse ox monitoring, educated patient- agreed to allow spot checking.   MOBILITY: Independent in room, SBA in halls with walker  PAIN MANAGMENT: Denies this shift  DIET: 2g NA, 1800 mL FR  BOWEL/BLADDER: Continent, up to BSC. Last BM this am.    ABNL LAB/BG:   DRAIN/DEVICES: L PIV SL  SKIN: BLE jovon edema +2, R ankle wound- wound care done this shift  TESTS/PROCEDURES: none new  D/C DATE: placement for TCU  OTHER IMPORTANT INFO: N/A

## 2024-04-29 NOTE — PROGRESS NOTES
[facility]    Medicine Progress Note - Hospitalist Service    Date of Admission:  3/29/2024    Assessment & Plan   Linda Otero is a 64 year old female admitted on 3/29/2024.  Past medical history of COPD, chronic respiratory failure on home oxygen with history of hypercapnia, nicotine dependence, HFpEF, DVT/PE  on past warfarin anticoagulation, recent hemoptysis, morbid obesity, CKD stage III, CAD, HLD, HTN, hypothyroidism, MGUS, seizure disorder, CVA, admitted 3/29/2024 with acute on chronic respiratory failure.     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 Ill-defined opacity at the right infrahilar lung could be acute airspace disease. A mass is difficult to exclude. Bilateral vascular congestion may be a degree of pulmonary edema. Potential small right pleural fluid.  *CT C PE 3/29/2024 without any PE but did demonstrate pulmonary congestion, R pleural effusion; also enlarged main pulmonary artery, as evidence of pulmonary hypertension  *TTE 4/1 showing EF 60-65% without WMA, severe RV dilation with moderately decreased RV function, mod-severe TR and pulm HTN  *Was initially started on Lasix drip.  Then transitioned to bumex gtt, now on oral bumex 2mg daily.  *Cardiology consult: input appreciated. Now signed off...  *s/p left and right heart cath on 4/12/2024.  Showed normal coronary arteries.  Severely elevated right-sided pressures, severely elevated left-sided filling pressures.  Mixed precapillary and postcapillary pulmonary hypertension (mPA 59 mmHg, PCWP 28 mmHg) .  Normal cardiac index.  *s/p RHC on 4/19:  Her filling pressures have improved significantly. Her weight is now normal and her  mean RA is mildly elevated. Her pulmonary hypertension has improved but still severe. .. Patient to follow-up in the pul hypertension clinic.      - continue with Bumex 2mg BID  - lymphedema wraps  - 2gm Na diet  - fluid restriction 1800cc/day  - Flutter valve and incentive spirometer  - still on 6L Oxymeter at rest and needing 8-10L with activity, try to wean down as able, O2 sat goal at 88-92% (baseline on O2 5 liters NC)  - Started on Jardiance 10 mg daily on 4/13/2024 for HFpEF  - Ambulate QID with assist, upto chair for all meals     COPD exacerbation   CAP  Respiratory acidosis  [PTA on Breo, Spiriva respimat, Alb inh and neb prn]  - completed 5-day course of ceftriaxone+doxy on 4/2  - 04/07/24 completed 8-day prednisone taper.;  - 4/8 -restarted Breo.  Wheezing significantly improved.  - currently om Breo 1 puff q daily and Duonebs QID   -Pulmonology consulted: input appreciated. Started on Incruse; may need to be on higher oxygen requirements even at discharge  - Pulmonary review of 4/23.  Continue ICS-LABA.  Added Incruse daily, should discharge on ABK-FRRE-MRBR per formulary   Likely CPAP/BiPAP may help wean O2 further   VQ scan done 04/26 and reviewed: No chronic PE     Hemoptysis, mild, resolved  *reports orange sputum production at home, was producing small amounts of rober blood 4/1  *admission CT chest negative for PE, masses  *resumed aspirin 4/3 and reports small amount of hemoptysis 4/4   - denies any further hemoptysis.  Lovenox started on 4/8/2024.  Held for cardiac catheterization, now resumed. No further reports of bleed.     SONAL on Chronic kidney disease, stage III, improved  *Speculate congestive nephropathy  *Admission serum Cr 1.33, historic baseline 0.87 to 1.08 in 2020 to 2023  - Avoid non-steroidal anti-inflammatory drugs (NSAIDs)  - Creatinine stabilized around 1.2-1.3      Hypokalemia, resolved  *due to diuresis. Was on KCl 40mEq TID whole on IV diuresis which has since been  discontinued.   - replace per protocol  - continue to monitor     History of deep venous thrombosis and pulmonary embolism  *CT C PE 3/29/2024 without any PE  *Per pharmacy, no longer on warfarin  - on prophylactic lovenox 40 mg BID     Skin ulcer, right lateral ankle  Concern for cellulitis?  - Wound nurse (WOC) following  - See pictures from 4/17, there was concern for developing cellulitis  - Started ceftriaxone IV daily (allergic to penicillin, endomysial, tetracycline) on 4/18  -  Keflex PO 4/20 - 4/26     Weakness and deconditioning  - PT/ OT-recommend home with home therapies  - Reorder home care with PT, OT and RN at the time of discharge     Obstructive sleep apnea  - Patient is on CPAP at home but has not been using it since admission since she did not tolerate the mask initially.  Encouraged her to bring her home CPAP machine We tried the hospital mask on 4/9/2024 and she did not tolerate this.  - Patient continues to be noncompliant with her CPAP use in the hospital despite having her home machine here.     Mild thrombocytopenia-resolved  -Monitor intermittently     Moderate Malnutrition, based on nutrition assessment  In the context of acute illness, chronic illness or disease  - nutritionist following     Hyperlipidemia - Diet-controlled, monitor; follow-up with primary clinic provider   Hypothyroidism - Med rec does not show any levothyroxine (TSH slightly elevated, free T4 wnl). Will defer to outpt evaluation by PCP  Morbid obesity  History of nicotine dependence  History of obstructive sleep apnea  History of coronary artery disease   History of monoclonal gammopathy of undetermined significance - noted, follow-up with primary clinic provider   History of cerebrovascular disease with prior stroke - noted, monitor  History of B12 deficiency - noted, continue prior to admission B12 replacement  History of seizure disorder - noted in medical record; monitor          Diet: Snacks/Supplements Adult:  "Expedite Bottle; Between Meals  Fluid restriction 1800 ML FLUID  Combination Diet Regular Diet; 2 gm NA Diet    DVT Prophylaxis: Enoxaparin (Lovenox) SQ  Miguel Catheter: Not present  Lines: None     Cardiac Monitoring: None  Code Status: Full Code      Clinically Significant Risk Factors                  # Hypertension: Noted on problem list        # Severe Obesity: Estimated body mass index is 45.44 kg/m  as calculated from the following:    Height as of this encounter: 1.676 m (5' 6\").    Weight as of this encounter: 127.7 kg (281 lb 8 oz).   # Moderate Malnutrition: based on nutrition assessment    # Financial/Environmental Concerns: none  # COPD: noted on problem list        Disposition Plan     Medically Ready for Discharge: Anticipated in 2-4 Days pending improving oxygen requirement especially with ambulation             Janette Hudson MD  Hospitalist Service  [facility]  Securely message with [employer] (more info)  Text page via [employer] Paging/Directory   ______________________________________________________________________    Interval History   Patient feels breathing is incrementally getting better everyday. She continues to report dyspnea with minimal exertion. She is on 6L oximyzer and O2 needed to be increased to 8-10L with activity.   She is afebrile. Denies n/v.   She feels TCU would be appropriate for her for discharge rather than back to her TOM.     Physical Exam   Vital Signs: Temp: 97.9  F (36.6  C) Temp src: Axillary BP: 106/65 Pulse: 93   Resp: 20 SpO2: 96 % O2 Device: BiPAP/CPAP Oxygen Delivery: 6 LPM  Weight: 281 lbs 8 oz    General Appearance: Alert, awake and no apparent distress  Respiratory: diminished air movement bilaterally, no wheezing  Cardiovascular: regular rate and rhythm  GI: soft and non-tender  Skin: warm and dry      Medical Decision Making       45 MINUTES SPENT BY ME on the date of service doing chart review, history, exam, documentation & further " activities per the note.  MANAGEMENT DISCUSSED with the following over the past 24 hours: patient, RN, care management team   NOTE(S)/MEDICAL RECORDS REVIEWED over the past 24 hours: nursing notes, previously pulm and cardiology notes  Tests ORDERED & REVIEWED in the past 24 hours:  - BMP  - CBC      Data         Imaging results reviewed over the past 24 hrs:   No results found for this or any previous visit (from the past 24 hour(s)).

## 2024-04-29 NOTE — PROGRESS NOTES
Care Management Follow Up    Length of Stay (days): 31    Expected Discharge Date: 05/01/2024     Concerns to be Addressed: discharge planning     Patient plan of care discussed at interdisciplinary rounds: Yes    Anticipated Discharge Disposition:  TCU     Anticipated Discharge Services:    Anticipated Discharge DME:      Patient/family educated on Medicare website which has current facility and service quality ratings: yes  Education Provided on the Discharge Plan: Yes  Patient/Family in Agreement with the Plan: yes    Referrals Placed by CM/SW: Homecare  Private pay costs discussed: Not applicable    Additional Information:  It was thought patient would discharge back to [facility] however she has required higher liter flow of oxygen and has told staff she would like to transfer to TCU before returning to [facility].  Writer met with patient and introduced role. As of today estimated discharge date is 5/124.  Patient confirms she would like to transfer to a TCU to regain strength so that when she does return to her apartment she can manage her day to day needs.  This will be her first stay in a TCU.   Gave patient [facility] handout and a medicare list of TCU's.  Explained she will have full insurance coverage between her Medicare and [place].  Patient is interested in [facility] due to it's location in [place].  Referral made.  Also asked her to look at the TCU list and make some additional choices if [facility] is not available.    TAYLOR WashingtonSW

## 2024-04-30 NOTE — PLAN OF CARE
Goal Outcome Evaluation:             Summary: COPD/HF exacerbation    DATE & TIME:4/30 7628-9028  Cognitive Concerns/ Orientation: A&O x4, pleasant.    BEHAVIOR & AGGRESSION TOOL COLOR: Green   ABNL VS/O2: VSS on 6L O2 via oxymizer cannula while at rest (baseline O2 5 L NC). With ambulation requiring 10-12L CPAP at night- refusing continuous pulse ox monitoring, educated patient- agreed to allow spot checking.   MOBILITY: Independent in room, SBA in halls with walker  PAIN MANAGMENT: Denies  DIET: 2g NA, 1800 mL FR-   BOWEL/BLADDER: Continent, up to BSC.   ABNL LAB/BG: Creat 1.12  DRAIN/DEVICES: L PIV SL  SKIN: Wound R lateral ankle, followed by [facility] nurse- wound care performed today. Scattered bruising. BLE jovon with edema 2+, compression stockings on.   TESTS/PROCEDURES: None  D/C DATE: Pending TCU placement. SW following  OTHER IMPORTANT INFO: Requested uninterrupted sleep; cares clustered    - Ambulated the ivey this afternoon- started on 6L via oxymizer- after 100 feet, pts sats dropped into the low 80's (83-84)- allowed pt to rest but saturations did not improve. Oxygen increased to 10- sats went up to 88. Oxygen increased to 12 and sats finally went up to 91-93. Suturation maintained during the rest of the walk on 12 liters. After returning to room and allowing a minute to rest, oxygen turned back down to 6 and saturations have maintained at 91-93.

## 2024-04-30 NOTE — PROGRESS NOTES
"[facility] Nurse Inpatient Assessment     Consulted for: Wound ulcer right lateral ankle     Summary: Right lateral ankle wound is chronic, multifactorial etiology   Fungal rash to right breast fold-resolved    Patient History (according to provider note(s):      \"64 year old female admitted on 3/29/2024.  Past medical history of COPD, chronic respiratory failure on home oxygen with history of hypercapnia, nicotine dependence, HFpEF, DVT/PE  on past warfarin anticoagulation, recent hemoptysis, morbid obesity, CKD stage III, CAD, HLD, HTN, hypothyroidism, MGUS, seizure disorder, CVA, admitted 3/29/2024 with acute on chronic respiratory failure. \"    Assessment:      Areas visualized during today's visit: Focused: and Right lateral ankle      Wound location: right lateral ankle area      Last photo: 4/30  Wound due to: Unknown Etiology patient reports this wound area is chronic and comes and goes, has treated the area \"four times\" before. Patient reports she has a wound care nurse at her facitlity who sees her and helps perform dressing changes. She does not follow up with a wound care MD, recommended for her to be seen as outpatient to monitor wound.    Wound history/plan of care: Wound dressed per POC. Dressing removal was atraumatic. Patient reports she feels wound looks better with current treatment. Provided education about removing non-viable tissue to promote wound healing. Writer unable to remove non-viable tissue from wound due to discomfort. Patient is not amenable to trialing alternative wound treatments at this time.  Wound base: slough and dry drainage, thickened epidermis; adherent dark brown-green non-viable tissue     Palpation of the wound bed:  textured        Drainage: small     Description of drainage: serosanguinous and yellow     Measurements (length x width x depth, in cm): total area 6 x 10 x 0.1 cm     Tunneling: N/A     Undermining: N/A  Periwound skin: " Dry/scaly, Edematous, and Erythema- blanchable      Color: pale and pink      Temperature: normal   Odor: none  Pain: mild and during dressing change, tender  Pain interventions prior to dressing change: patient tolerated well, soaking, and slow and gentle cares   Treatment goal: Drainage control, Infection control/prevention, Increase granulation, and Protection  STATUS: deteriorating  Supplies ordered: at bedside, supplies stored on unit, discussed with RN, and discussed with patient      Treatment Plan:     Right lateral ankle wound: Every other day and PRN for soiled/loose dressing  Wrap right lower leg with Vashe-soaked gauze. Allow to soak for about two minutes.  Unwrap leg and gently wipe away any debris. Allow skin to dry.  Use a tongue depressor to apply Medihoney ([id number]) to wound.  Apply a small piece of Adaptic (oil emulsion gauze) over Medihoney.  Cover with a 4x4 gauze and secure with Kerlix gauze and Medipore tape.  Apply Spandigrip to right lower extremity.    Right breast fold rash: BID   Cleanse skin with warm water. Pat dry.  Apply a thin layer of Usman Antifungal to affected skin.    Orders: Reviewed    RECOMMEND PRIMARY TEAM ORDER: none at this time, rec Outpatient Wound Care physician follow up  Education provided: plan of care, wound progress, Infection prevention , Moisture management, and Hygiene  Discussed plan of care with: Patient and Nurse  WOC nurse follow-up plan: weekly  Notify WOC if wound(s) deteriorate.  Nursing to notify the Provider(s) and re-consult the WOC Nurse if new skin concern.    DATA:     Current support surface: Standard  Standard gel/foam mattress (IsoFlex, Atmos air, etc)  Containment of urine/stool: Continent of bladder and Continent of bowel  BMI: Body mass index is 45.44 kg/m .   Active diet order: Orders Placed This Encounter      Combination Diet Regular Diet; 2 gm NA Diet     Output: No intake/output data recorded.     Labs:   Recent Labs   Lab 04/27/24  8292  "  HGB 8.6*   WBC 5.6       Pressure injury risk assessment:   Sensory Perception: 4-->no impairment  Moisture: 4-->rarely moist  Activity: 3-->walks occasionally  Mobility: 3-->slightly limited  Nutrition: 3-->adequate  Friction and Shear: 2-->potential problem  Dorian Score: 19    Amada Gudino RN, CWOCN  Please contact via SkuRun at name or group \"[facility] nurse\"- M-F 8A-4P  Leave VM @ [phone] for non-urgent needs. Checked occasionally M-F.     "

## 2024-04-30 NOTE — PLAN OF CARE
Goal Outcome Evaluation:      Plan of Care Reviewed With: patient    Overall Patient Progress: no change    DATE & TIME: 4/29/24 2853-8119  Cognitive Concerns/ Orientation: A&O x4, pleasant.    BEHAVIOR & AGGRESSION TOOL COLOR: Green   ABNL VS/O2: VSS on 6L O2 via oxymizer cannula while at rest (baseline O2 5 L NC). With ambulation requiring 8-10L CPAP at night- refusing continuous pulse ox monitoring, educated patient- agreed to allow spot checking.   MOBILITY: Independent in room, SBA in halls with walker  PAIN MANAGMENT: Denies  DIET: 2g NA, 1800 mL FR-   BOWEL/BLADDER: Continent, up to BSC.   ABNL LAB/BG: Creat 1.12  DRAIN/DEVICES: L PIV SL  SKIN: Wound R lateral ankle, followed by WOC nurse, next dressing chx 5/1. Scattered bruising. BLE jovon with edema 2+, compression stockings on.   TESTS/PROCEDURES: None  D/C DATE: Pending TCU placement. SW following  OTHER IMPORTANT INFO: Requested uninterrupted sleep; cares clustered

## 2024-04-30 NOTE — PROGRESS NOTES
SUMMARY NOTE:  Orientation/Cognitive: AOx4  Mobility Level/Assist Equipment: Independent in Room  Pain Management: Managed with Tylenol x1  Tele/VS/O2: VSS on 6L; CPAP on.   Diet: 2 g Na Diet, 1800 ml FR  Bowel/Bladder: Continent of B/B  Skin Concerns: R ankle wound. Wound care done on 4/29. BLE jovon edema +2. Scattered Bruising.   Drains/Devices: PIV SL  OT/PT tomorrow  Vsq4, consider less frequent vitals.   Discharge to TCU

## 2024-04-30 NOTE — PROGRESS NOTES
[facility]    Medicine Progress Note - Hospitalist Service    Date of Admission:  3/29/2024    Assessment & Plan   Linda Otero is a 64 year old female admitted on 3/29/2024.  Past medical history of COPD, chronic respiratory failure on home oxygen with history of hypercapnia, nicotine dependence, HFpEF, DVT/PE  on past warfarin anticoagulation, recent hemoptysis, morbid obesity, CKD stage III, CAD, HLD, HTN, hypothyroidism, MGUS, seizure disorder, CVA, admitted 3/29/2024 with acute on chronic respiratory failure.     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 Ill-defined opacity at the right infrahilar lung could be acute airspace disease. A mass is difficult to exclude. Bilateral vascular congestion may be a degree of pulmonary edema. Potential small right pleural fluid.  *CT C PE 3/29/2024 without any PE but did demonstrate pulmonary congestion, R pleural effusion; also enlarged main pulmonary artery, as evidence of pulmonary hypertension  *TTE 4/1 showing EF 60-65% without WMA, severe RV dilation with moderately decreased RV function, mod-severe TR and pulm HTN  *Was initially started on Lasix drip.  Then transitioned to bumex gtt, now on oral bumex 2mg daily.  *Cardiology consult: input appreciated. Now signed off...  *s/p left and right heart cath on 4/12/2024.  Showed normal coronary arteries.  Severely elevated right-sided pressures, severely elevated left-sided filling pressures.  Mixed precapillary and postcapillary pulmonary hypertension (mPA 59 mmHg, PCWP 28 mmHg) .  Normal cardiac index.  *s/p RHC on 4/19:  Her filling pressures have improved significantly. Her weight is now normal and her  mean RA is mildly elevated. Her pulmonary hypertension has improved but still severe. .. Patient to follow-up in the pul hypertension clinic.      - continue with Bumex 2mg BID  - lymphedema wraps  - 2gm Na diet  - fluid restriction 1800cc/day  - Flutter valve and incentive spirometer  - on 6L Oxymeter at rest and discussed with RN, to assess O2 needs with activity today, O2 sat goal at 88-92% (baseline on O2 5 liters NC)  - Started on Jardiance 10 mg daily on 4/13/2024 for HFpEF  - Ambulate QID with assist, upto chair for all meals       COPD exacerbation   CAP  Respiratory acidosis  [PTA on Breo, Spiriva respimat, Alb inh and neb prn]  - completed 5-day course of ceftriaxone+doxy on 4/2  - 04/07/24 completed 8-day prednisone taper.;  - 4/8 -restarted Breo.  Wheezing significantly improved.  - currently om Breo 1 puff q daily and Duonebs QID   -Pulmonology consulted: input appreciated. Started on Incruse; may need to be on higher oxygen requirements even at discharge  - Pulmonary review of 4/23.  Continue ICS-LABA.  Added Incruse daily, should discharge on [id number] per formulary   - VQ scan on 4/26 without evidence of acute PE or chronic thromboembolic disease     Hemoptysis, mild, resolved  *reports orange sputum production at home, was producing small amounts of rober blood 4/1  *admission CT chest negative for PE, masses  *resumed aspirin 4/3 and reports small amount of hemoptysis 4/4   - denies any further hemoptysis.  Lovenox started on 4/8/2024.  Held for cardiac catheterization, now resumed. No further reports of bleed.     SONAL on Chronic kidney disease, stage III, improved  *Speculate congestive nephropathy  *Admission serum Cr 1.33, historic baseline 0.87 to 1.08 in 2020 to 2023  - Avoid non-steroidal anti-inflammatory drugs (NSAIDs)  - Creatinine stabilized around 1.2-1.3      Hypokalemia, resolved  *due to diuresis. Was on KCl 40mEq TID while on IV diuresis which has since been discontinued.   -  replace per protocol  - continue to monitor periodically     History of deep venous thrombosis and pulmonary embolism  *CT C PE 3/29/2024 without any PE  *Per pharmacy, no longer on warfarin  - on prophylactic lovenox 40 mg BID     Skin ulcer, right lateral ankle  Concern for cellulitis?  - Wound nurse (WOC) following  - See pictures from 4/17, there was concern for developing cellulitis  - Started ceftriaxone IV daily (allergic to penicillin, endomysial, tetracycline) on 4/18, to PO Keflex  4/20 - 4/26     Weakness and deconditioning  - PT/ OT-recommend home with home therapies  - Reorder home care with PT, OT and RN at the time of discharge     Obstructive sleep apnea  - Patient is on CPAP at home but has not been using it since admission since she did not tolerate the mask initially.  Encouraged her to bring her home CPAP machine We tried the hospital mask on 4/9/2024 and she did not tolerate this.  - Patient continues to be noncompliant with her CPAP use in the hospital despite having her home machine here.     Mild thrombocytopenia-resolved  -Monitor intermittently     Moderate Malnutrition, based on nutrition assessment  In the context of acute illness, chronic illness or disease  - nutritionist following     Hyperlipidemia - Diet-controlled, monitor; follow-up with primary clinic provider   Hypothyroidism - Med rec does not show any levothyroxine (TSH slightly elevated, free T4 wnl). Will defer to outpt evaluation by PCP  Morbid obesity  History of nicotine dependence  History of obstructive sleep apnea  History of coronary artery disease   History of monoclonal gammopathy of undetermined significance - noted, follow-up with primary clinic provider   History of cerebrovascular disease with prior stroke - noted, monitor  History of B12 deficiency - noted, continue prior to admission B12 replacement  History of seizure disorder - noted in medical record; monitor          Diet: Snacks/Supplements Adult: Expedite  "Bottle; Between Meals  Fluid restriction 1800 ML FLUID  Combination Diet Regular Diet; 2 gm NA Diet    DVT Prophylaxis: Enoxaparin (Lovenox) SQ  Miguel Catheter: Not present  Lines: None     Cardiac Monitoring: None  Code Status: Full Code      Clinically Significant Risk Factors                  # Hypertension: Noted on problem list        # Severe Obesity: Estimated body mass index is 45.44 kg/m  as calculated from the following:    Height as of this encounter: 1.676 m (5' 6\").    Weight as of this encounter: 127.7 kg (281 lb 8 oz).   # Moderate Malnutrition: based on nutrition assessment      # Financial/Environmental Concerns: none  # COPD: noted on problem list        Disposition Plan     Medically Ready for Discharge: Anticipated in 2-4 Days pending improving oxygen requirement especially with ambulation             Janette Hudson MD  Hospitalist Service  [facility]  Securely message with [employer] (more info)  Text page via [employer] Paging/Directory   ______________________________________________________________________    Interval History   Patient states she did not sleep much after 3am due to having therapies yesterday and aches. She would like to rest a bit more before she gets up. Overall, she feels her breathing feels a bit better than yesterday. Eating good. Denies nausea or vomiting. Using her cpap at bedtime.     Physical Exam   Vital Signs: Temp: 98.3  F (36.8  C) Temp src: Oral BP: 101/71 Pulse: 89   Resp: 18 SpO2: 96 % O2 Device: BiPAP/CPAP Oxygen Delivery: 6 LPM  Weight: 281 lbs 8 oz    General Appearance: Alert, awake and no apparent distress  Respiratory: diminished air movement bilaterally, no wheezing  Cardiovascular: regular rate and rhythm  GI: soft and non-tender  Skin: warm and dry      Medical Decision Making       40 MINUTES SPENT BY ME on the date of service doing chart review, history, exam, documentation & further activities per the note.  MANAGEMENT " DISCUSSED with the following over the past 24 hours: patient, RN, care management team   NOTE(S)/MEDICAL RECORDS REVIEWED over the past 24 hours: nursing notes,social work note, therapy note  Tests ORDERED & REVIEWED in the past 24 hours:  - BMP      Data     I have personally reviewed the following data over the past 24 hrs:    N/A  \   N/A   / N/A     138 96 (L) 30.7 (H) /  114 (H)   3.4 32 (H) 1.12 (H) \       Imaging results reviewed over the past 24 hrs:   No results found for this or any previous visit (from the past 24 hour(s)).

## 2024-05-01 NOTE — PLAN OF CARE
Goal Outcome Evaluation:  Summary: COPD/HF exacerbation    DATE & TIME: 05/01/24 6489-4467  Cognitive Concerns/ Orientation: A&O x4, pleasant.    BEHAVIOR & AGGRESSION TOOL COLOR: Green   ABNL VS/O2: VSS on 8L O2 via oxymizer cannula while at rest (baseline O2 5 L NC). With ambulation requiring 10-12L CPAP at night- refused continuous pulse oximeter monitoring. Spot check.   MOBILITY: Independent in room, SBA in halls with walker  PAIN MANAGMENT: Denies  DIET: 2g NA, 1800 mL FR  BOWEL/BLADDER: Continent, up to BSC.   ABNL LAB/BG: K+ 3.2 to be replaced and rechecked.   DRAIN/DEVICES: L PIV SL  SKIN: Wound R lateral ankle, followed by [facility] nurse- wound care performed yesterday, due tomorrow. Scattered bruising. BLE jovon with edema 2+, compression stockings on.   TESTS/PROCEDURES: None  D/C DATE: Pending TCU placement. Referrals sent. SW following  OTHER IMPORTANT INFO: None

## 2024-05-01 NOTE — ED PROVIDER NOTES
MD Notification    Notified Person: MD    Notified Person Name: Janette Hudson     Notification Date/Time: 05/01/24 1226    Notification Interaction:  lisa    Purpose of Notification: K+ is 3.2, do we want to replace?    Orders Received:

## 2024-05-01 NOTE — PLAN OF CARE
Goal Outcome Evaluation:       Pt baseline is 5 liters NC @ home, but pt is currently on 6 liters when sitting, but when walking, pt requires 10-12 liters of oxygen.

## 2024-05-01 NOTE — PROGRESS NOTES
Summary: COPD/HF exacerbation    DATE & TIME:4/30 1900-0730  Cognitive Concerns/ Orientation: A&O x4, pleasant.    BEHAVIOR & AGGRESSION TOOL COLOR: Green   ABNL VS/O2: VSS on 6L O2 via oxymizer cannula while at rest (baseline O2 5 L NC). With ambulation requiring 10-12L , CPAP at night- pt refused continuous pulse oximeter monitoring.   MOBILITY: Independent in room, SBA in halls with walker  PAIN MANAGMENT: Denies  DIET: 2g NA, 1800 mL fluid restriction  BOWEL/BLADDER: Continent, uses bed side commode  ABNL LAB/BG: None  DRAIN/DEVICES: L PIV SL  SKIN: Wound R lateral ankle, WOC following.  Scattered bruising, BLE jovon with edema plus 2 edema.   TESTS/PROCEDURES: None  D/C DATE: Pending TCU placement. SW following

## 2024-05-01 NOTE — PROGRESS NOTES
Care Management Follow Up    Length of Stay (days): 33    Expected Discharge Date: 05/02/2024     Concerns to be Addressed: discharge planning     Patient plan of care discussed at interdisciplinary rounds:     Anticipated Discharge Disposition: Home Care     Anticipated Discharge Services:    Anticipated Discharge DME:      Patient/family educated on Medicare website which has current facility and service quality ratings: yes  Education Provided on the Discharge Plan: Yes  Patient/Family in Agreement with the Plan: yes    Referrals Placed by CM/SW:   Private pay costs discussed:     Additional Information:  [facility] declined patient  because she requires greater then 5 liters of oxygen.  Updated patient [facility] is not available. She is asking for a referral to [facility].  Referral sent today.     TAYLOR WashingtonSW

## 2024-05-01 NOTE — PROGRESS NOTES
[facility]    Medicine Progress Note - Hospitalist Service    Date of Admission:  3/29/2024    Assessment & Plan   Linda Otero is a 64 year old female admitted on 3/29/2024.  Past medical history of COPD, chronic respiratory failure on home oxygen with history of hypercapnia, nicotine dependence, HFpEF, DVT/PE  on past warfarin anticoagulation, recent hemoptysis, morbid obesity, CKD stage III, CAD, HLD, HTN, hypothyroidism, MGUS, seizure disorder, CVA, admitted 3/29/2024 with acute on chronic respiratory failure.     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 Ill-defined opacity at the right infrahilar lung could be acute airspace disease. A mass is difficult to exclude. Bilateral vascular congestion may be a degree of pulmonary edema. Potential small right pleural fluid.  *CT C PE 3/29/2024 without any PE but did demonstrate pulmonary congestion, R pleural effusion; also enlarged main pulmonary artery, as evidence of pulmonary hypertension  *TTE 4/1 showing EF 60-65% without WMA, severe RV dilation with moderately decreased RV function, mod-severe TR and pulm HTN  *Was initially started on Lasix drip.  Then transitioned to bumex gtt, now on oral bumex 2mg daily.  *Cardiology consult: input appreciated. Now signed off...  *s/p left and right heart cath on 4/12/2024.  Showed normal coronary arteries.  Severely elevated right-sided pressures, severely elevated left-sided filling pressures.  Mixed precapillary and postcapillary pulmonary hypertension (mPA 59 mmHg, PCWP 28 mmHg) .  Normal cardiac index.  *s/p RHC on 4/19:  Her filling pressures have improved significantly. Her weight is now normal and her  mean RA is mildly elevated. Her pulmonary hypertension has improved but still severe. .. Patient to follow-up in the pul hypertension clinic.      - continue with Bumex 2mg BID  - lymphedema wraps  - 2gm Na diet  - fluid restriction 1800cc/day  - Flutter valve and incentive spirometer  - on 6L Oxymeter at rest, needing increased oxygen anywhere between 8-12L in the last 2-3 days,  O2 sat goal at 88-92% (baseline on O2 5 liters NC)  - Started on Jardiance 10 mg daily on 4/13/2024 for HFpEF  - Ambulate QID with assist, upto chair for all meals  - patient subjectively feels slowly improving, but continues to require higher oxygen with activity which may be a barrier to TCU discharge  - Care management assisting with discharge planning    COPD exacerbation   CAP  Respiratory acidosis  [PTA on Breo, Spiriva respimat, Alb inh and neb prn]  - completed 5-day course of ceftriaxone+doxy on 4/2  - 04/07/24 completed 8-day prednisone taper.;  - 4/8 -restarted Breo.  Wheezing significantly improved.  - currently om Breo 1 puff q daily and Duonebs QID   -Pulmonology consulted: input appreciated. Started on Incruse; may need to be on higher oxygen requirements even at discharge  - Pulmonary review of 4/23.  Continue ICS-LABA.  Added Incruse daily, should discharge on [id number] per formulary   - VQ scan on 4/26 without evidence of acute PE or chronic thromboembolic disease     Hemoptysis, mild, resolved  *reports orange sputum production at home, was producing small amounts of rober blood 4/1  *admission CT chest negative for PE, masses  *resumed aspirin 4/3 and reports small amount of hemoptysis 4/4   - denies any further hemoptysis.  Lovenox started on 4/8/2024.  Held for cardiac catheterization, now resumed. No further reports of bleed.     SONAL on Chronic kidney disease, stage III, improved  *Speculate congestive nephropathy  *Admission serum Cr 1.33, historic baseline 0.87 to 1.08 in 2020 to 2023  - Avoid non-steroidal  anti-inflammatory drugs (NSAIDs)  - Creatinine stabilized around 1.2-1.3  - monitor BMP periodically, last done on 5/1      Hypokalemia  *due to diuresis. Was on KCl 40mEq TID while on IV diuresis which has since been discontinued.   - replace per protocol  - continue to monitor periodically     History of deep venous thrombosis and pulmonary embolism  *CT C PE 3/29/2024 without any PE  *Per pharmacy, no longer on warfarin  - on prophylactic lovenox 40 mg BID     Skin ulcer, right lateral ankle  Concern for cellulitis?  - Wound nurse (WOC) following  - See pictures from 4/17, there was concern for developing cellulitis  - Started ceftriaxone IV daily (allergic to penicillin, endomysial, tetracycline) on 4/18, to PO Keflex  4/20 - 4/26     Weakness and deconditioning  - will need TCU at discharge       Obstructive sleep apnea  - Patient is on CPAP at home but has not been using it since admission since she did not tolerate the mask initially.  Encouraged her to bring her home CPAP machine We tried the hospital mask on 4/9/2024 and she did not tolerate this.  - Patient continues to be noncompliant with her CPAP use in the hospital despite having her home machine here.     Mild thrombocytopenia-resolved  -Monitor intermittently     Moderate Malnutrition, based on nutrition assessment  In the context of acute illness, chronic illness or disease  - nutritionist following     Hyperlipidemia - Diet-controlled, monitor; follow-up with primary clinic provider   Hypothyroidism - Med rec does not show any levothyroxine (TSH slightly elevated, free T4 wnl). Will defer to outpt evaluation by PCP  Morbid obesity  History of nicotine dependence  History of obstructive sleep apnea  History of coronary artery disease   History of monoclonal gammopathy of undetermined significance - noted, follow-up with primary clinic provider   History of cerebrovascular disease with prior stroke - noted, monitor  History of B12 deficiency - noted,  continue prior to admission B12 replacement  History of seizure disorder - noted in medical record; monitor          Diet: Snacks/Supplements Adult: Expedite Bottle; Between Meals  Fluid restriction 1800 ML FLUID  Combination Diet Regular Diet; 2 gm NA Diet    DVT Prophylaxis: Enoxaparin (Lovenox) SQ  Miguel Catheter: Not present  Lines: None     Cardiac Monitoring: None  Code Status: Full Code      Clinically Significant Risk Factors        # Hypokalemia: Lowest K = 3.2 mmol/L in last 2 days, will replace as needed           # Hypertension: Noted on problem list         # Moderate Malnutrition: based on nutrition assessment      # Financial/Environmental Concerns: none  # COPD: noted on problem list        Disposition Plan     Medically Ready for Discharge: Anticipated in 2-4 Days pending improving oxygen requirement especially with ambulation and/or TCU that can accept patient with higher oxygen need with activity             Janette Hudson MD  Hospitalist Service  [facility]  Securely message with [employer] (more info)  Text page via [employer] Paging/Directory   ______________________________________________________________________    Interval History   Patient is resting this afternoon at time of visit. She reports that overall her breathing is improving. She states that she is exercising in her room. Her oxygen tubing is long and states she does short walks in her room as much as she can, and that really exhaust her. She continues to require increased oxygen with activity,yesterday documented as 10-12L with exertion. Subjectively patient feels she is getting better.   Patient otherwise does not have other complaints    Physical Exam   Vital Signs: Temp: 97.4  F (36.3  C) Temp src: Axillary BP: 101/53 Pulse: 85   Resp: 20 SpO2: 91 % O2 Device: Nasal cannula Oxygen Delivery: 5 LPM  Weight: 283 lbs 4.8 oz    General Appearance: Alert, awake and no apparent distress  Respiratory: diminished  air movement bilaterally, no wheezing  Cardiovascular: regular rate and rhythm  GI: soft and non-tender  Skin: warm and dry      Medical Decision Making       35 MINUTES SPENT BY ME on the date of service doing chart review, history, exam, documentation & further activities per the note.  MANAGEMENT DISCUSSED with the following over the past 24 hours: patient, RN, care management team   NOTE(S)/MEDICAL RECORDS REVIEWED over the past 24 hours: nursing notes,social work note  Tests ORDERED & REVIEWED in the past 24 hours:  - BMP      Data     I have personally reviewed the following data over the past 24 hrs:    N/A  \   N/A   / 230     140 96 (L) 27.9 (H) /  113 (H)   3.2 (L) 33 (H) 1.11 (H) \       Imaging results reviewed over the past 24 hrs:   No results found for this or any previous visit (from the past 24 hour(s)).

## 2024-05-02 NOTE — PLAN OF CARE
Goal Outcome Evaluation:  Summary: COPD/HF exacerbation    DATE & TIME: 05/02/24 4028-5081  Cognitive Concerns/ Orientation: A&O x4, pleasant.    BEHAVIOR & AGGRESSION TOOL COLOR: Green   ABNL VS/O2: VSS on 6L O2 via oxymizer cannula while at rest. With ambulation requiring 8L. Refusing continuous pulse oximeter monitoring. Spot check.   MOBILITY: Independent in room, SBA in halls with walker  PAIN MANAGMENT: Denies  DIET: 2g NA, 1800 mL FR  BOWEL/BLADDER: Continent, up to BSC.   ABNL LAB/BG: K+ 3.6 - recheck in the am  DRAIN/DEVICES: L PIV SL  SKIN: Scattered bruising. BLE jovon with edema 2+, compression stockings on. R ankle wound - wound cares completed per plan of care. Wound cares are every other day.   TESTS/PROCEDURES: None  D/C DATE: Pending TCU placement. Referrals sent. SW following  OTHER IMPORTANT INFO: Patient doing therapies in room independently.

## 2024-05-02 NOTE — PROGRESS NOTES
[facility]    Medicine Progress Note - Hospitalist Service    Date of Admission:  3/29/2024    Assessment & Plan   Linda Otero is a 64 year old female admitted on 3/29/2024.  Past medical history of COPD, chronic respiratory failure on home oxygen with history of hypercapnia, nicotine dependence, HFpEF, DVT/PE  on past warfarin anticoagulation, recent hemoptysis, morbid obesity, CKD stage III, CAD, HLD, HTN, hypothyroidism, MGUS, seizure disorder, CVA, admitted 3/29/2024 with acute on chronic respiratory failure.    Hospital course is prolonged due higher than baseline oxygen requirement especially with exertion which is barrier to TCU discharge at this time. On 5/2, exertional oxygen need down to 8L, (upto 12 L prior days).       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 Ill-defined opacity at the right infrahilar lung could be acute airspace disease. A mass is difficult to exclude. Bilateral vascular congestion may be a degree of pulmonary edema. Potential small right pleural fluid.  *CT C PE 3/29/2024 without any PE but did demonstrate pulmonary congestion, R pleural effusion; also enlarged main pulmonary artery, as evidence of pulmonary hypertension  *TTE 4/1 showing EF 60-65% without WMA, severe RV dilation with moderately decreased RV function, mod-severe TR and pulm HTN  *Was initially started on Lasix drip.  Then transitioned to bumex gtt, now on oral bumex 2mg daily.  *Cardiology consult: input appreciated. Now signed off...  *s/p left and right heart cath on 4/12/2024.  Showed normal coronary arteries.  Severely elevated right-sided pressures, severely elevated left-sided filling pressures.   Mixed precapillary and postcapillary pulmonary hypertension (mPA 59 mmHg, PCWP 28 mmHg) .  Normal cardiac index.  *s/p RHC on 4/19:  Her filling pressures have improved significantly. Her weight is now normal and her mean RA is mildly elevated. Her pulmonary hypertension has improved but still severe. .. Patient to follow-up in the pul hypertension clinic.      - continue with Bumex 2mg BID  - Started on Jardiance 10 mg daily on 4/13/2024 for HFpEF  - lymphedema wraps  - 2gm Na diet  - fluid restriction 1800cc/day  - Flutter valve and incentive spirometer  - on 6L Oxymeter at rest, this may be her new baseline. O2 with exertion 8L in the last 24 hrs  on 5/2, has needed oxygen up to 12L in the last 2-3 days,  O2 sat goal at 88-92% (baseline on O2 5 liters NC)  - Ambulate QID with assist, upto chair for all meals  - patient subjectively feels slowly improving, encourage to continue activity in her room  - Care management assisting with discharge planning    COPD exacerbation   CAP  Respiratory acidosis  [PTA on Breo, Spiriva respimat, Alb inh and neb prn]  - completed 5-day course of ceftriaxone+doxy on 4/2  - 04/07/24 completed 8-day prednisone taper.;  - 4/8 -restarted Breo.  Wheezing significantly improved.  - currently om Breo 1 puff q daily and Duonebs QID   -Pulmonology consulted: input appreciated. Started on Incruse; may need to be on higher oxygen requirements even at discharge  - Pulmonary review of 4/23.  Continue ICS-LABA.  Added Incruse daily, should discharge on [id number] per formulary   - VQ scan on 4/26 without evidence of acute PE or chronic thromboembolic disease     Hemoptysis, mild, resolved  *reports orange sputum production at home, was producing small amounts of rober blood 4/1  *admission CT chest negative for PE, masses  *resumed aspirin 4/3 and reports small amount of hemoptysis 4/4   - denies any further hemoptysis.  Lovenox started on 4/8/2024.  Held for cardiac catheterization, now  resumed. No further reports of bleed.     SONAL on Chronic kidney disease, stage III, improved  *Speculate congestive nephropathy  *Admission serum Cr 1.33, historic baseline 0.87 to 1.08 in 2020 to 2023  - Avoid non-steroidal anti-inflammatory drugs (NSAIDs)  - Creatinine stabilized around 1.2-1.3  - monitor BMP periodically, last done on 5/1      Hypokalemia  *due to diuresis. Was on KCl 40mEq TID while on IV diuresis which has since been discontinued.   - replace per protocol  - continue to monitor periodically. 3.6 on 5/2/2024      History of deep venous thrombosis and pulmonary embolism  *CT C PE 3/29/2024 without any PE  *Per pharmacy, no longer on warfarin  - on prophylactic lovenox 40 mg BID while in hospital     Skin ulcer, right lateral ankle  Concern for cellulitis?  - Wound nurse (WOC) following  - See pictures from 4/17, there was concern for developing cellulitis  - Started ceftriaxone IV daily (allergic to penicillin, endomysial, tetracycline) on 4/18, to PO Keflex  4/20 - 4/26     Weakness and deconditioning  - will need TCU at discharge    Obstructive sleep apnea  - continue with PTA CPAP at bedtime. Currently compliant with cpap     Mild thrombocytopenia-resolved  -Monitor intermittently     Moderate Malnutrition, based on nutrition assessment  In the context of acute illness, chronic illness or disease  - nutritionist following     Hyperlipidemia - Diet-controlled, monitor; follow-up with primary clinic provider   Hypothyroidism - Med rec does not show any levothyroxine (TSH slightly elevated, free T4 wnl). Will defer to outpt evaluation by PCP  Morbid obesity  History of nicotine dependence  History of obstructive sleep apnea  History of coronary artery disease   History of monoclonal gammopathy of undetermined significance - noted, follow-up with primary clinic provider   History of cerebrovascular disease with prior stroke - noted, monitor  History of B12 deficiency - noted, continue prior to  admission B12 replacement  History of seizure disorder - noted in medical record; monitor          Diet: Snacks/Supplements Adult: Expedite Bottle; Between Meals  Fluid restriction 1800 ML FLUID  Combination Diet Regular Diet; 2 gm NA Diet    DVT Prophylaxis: Enoxaparin (Lovenox) SQ  Miguel Catheter: Not present  Lines: None     Cardiac Monitoring: None  Code Status: Full Code      Clinically Significant Risk Factors        # Hypokalemia: Lowest K = 3.2 mmol/L in last 2 days, will replace as needed           # Hypertension: Noted on problem list         # Moderate Malnutrition: based on nutrition assessment      # Financial/Environmental Concerns: none  # COPD: noted on problem list        Disposition Plan     Medically Ready for Discharge: Anticipated in 2-4 Days pending improving oxygen requirement especially with ambulation and/or TCU that can accept patient with higher oxygen need with activity             Janette Hudson MD  Hospitalist Service  [facility]  Securely message with [employer] (more info)  Text page via [employer] Paging/Directory   ______________________________________________________________________    Interval History   Patient reports she feels better everyday.   She is doing activities in her room and reports that is improving.   Per discussion with nursing staff at rest she has been needing 5-6L oximyzer, and last evening O2 need was 8L with activity which is an improvement from prior days.       Physical Exam   Vital Signs: Temp: 97.4  F (36.3  C) Temp src: Oral BP: (!) 148/96 Pulse: 95   Resp: 20 SpO2: 92 % O2 Device: Oxymizer cannula Oxygen Delivery: 6 LPM  Weight: 283 lbs 5 oz    General Appearance: Alert, awake and no apparent distress  Respiratory: diminished air movement bilaterally, no wheezing  Cardiovascular: regular rate and rhythm  GI: soft and non-tender  Skin: warm and dry      Medical Decision Making       36 MINUTES SPENT BY ME on the date of service  doing chart review, history, exam, documentation & further activities per the note.  MANAGEMENT DISCUSSED with the following over the past 24 hours: patient, RN, care management team   NOTE(S)/MEDICAL RECORDS REVIEWED over the past 24 hours: nursing notes,therapy note  Tests ORDERED & REVIEWED in the past 24 hours:  - Potassium       Data     I have personally reviewed the following data over the past 24 hrs:    N/A  \   N/A   / N/A     N/A N/A N/A /  N/A   3.6 N/A N/A \       Imaging results reviewed over the past 24 hrs:   No results found for this or any previous visit (from the past 24 hour(s)).

## 2024-05-02 NOTE — PROGRESS NOTES
CLINICAL NUTRITION SERVICES - REASSESSMENT NOTE    Malnutrition:   % Weight Loss:  difficult to assess w/ fluid shifts (diuresing)  % Intake:  <75% for >/= 1 month (moderate malnutrition) - improving  Subcutaneous Fat Loss:  Orbital region mild depletion and Upper arm region mild depletion 3/30  Muscle Loss:  Clavicle bone region mild depletion, Acromion bone region mild depletion, and Scapular bone region mild depletion 3/30  Fluid Retention:  2+ Mild     Malnutrition Diagnosis: Moderate malnutrition  In Context of:  Acute illness or injury  Chronic illness or disease     EVALUATION OF PROGRESS TOWARD GOALS   Diet: 2g Na diet   FR 1800 mL   Expedite Bottle     Intake/Tolerance:   - 100% intakes recorded. Per review of meals pt has been eating meals BID, each tray typically contains at least 1 good protein source. Likes omelets, yogurt, stir huber, macaroni and cheese.     - Labs: reviewed  - Stooling: BM x1 today. None yesterday.   - Weight: 128.5 kg. Diuresing     - Meds:   Bumex 2 mg daily   Thera vit M    ASSESSED NUTRITION NEEDS:  Dosing Weight: 83 kg - adjusted   Estimated Energy Needs: 3894-3036 kcals (20-25 Kcal/Kg)  Justification: obese  Estimated Protein Needs: 100-125 grams protein (1.2-1.5 g pro/Kg)  Justification: preservation of lean body mass and reported wound    NEW FINDINGS:   - awaiting TCU acceptance.     - 4/30 WOCN:   Right lateral ankle area. STATUS: deteriorating.     Previous Goals:   Patient to consume >/= 2 adequate meals/day + 1 bottle Expedite/day   Evaluation: Met    Previous Nutrition Diagnosis:   Inadequate oral intake related to early satiety, lack of appetite as evidenced by patient reports intake of 2 meals/day at most, with mild fat and muscle losses.   Evaluation: No change    CURRENT NUTRITION DIAGNOSIS  Inadequate oral intake related to early satiety, lack of appetite as evidenced by patient reports intake of 2 meals/day at most, with mild fat and muscle losses.      INTERVENTIONS  Recommendations / Nutrition Prescription  No changes     Implementation  None new today    Goals  Patient to consume >/= 2 adequate meals/day + 1 bottle Expedite/day     MONITORING AND EVALUATION:  Progress towards goals will be monitored and evaluated per protocol and Practice Guidelines    Kitty Obrien RD, LD  Pager: [phone]  Weekend Pager: [phone]

## 2024-05-02 NOTE — PLAN OF CARE
Goal Outcome Evaluation:    Summary: COPD/HF exacerbation    DATE & TIME: 05/01/24 2873-4911  Cognitive Concerns/ Orientation: A&O x4, pleasant.    BEHAVIOR & AGGRESSION TOOL COLOR: Green   ABNL VS/O2: VSS on 5-6L O2 via oxymizer cannula while at rest (baseline O2 5 L NC). With ambulation requiring 8L. Uses 8L at night with CPAP. Refusing continuous pulse oximeter monitoring. Spot check.   MOBILITY: Independent in room, SBA in halls with walker  PAIN MANAGMENT: Denies  DIET: 2g NA, 1800 mL FR, good appetite  BOWEL/BLADDER: Continent, up to BSC.   ABNL LAB/BG: K+ 3.2 to be replaced orally, recheck 3.8  DRAIN/DEVICES: L PIV SL  SKIN: Wound R lateral ankle, followed by [facility] nurse- wound care performed yesterday, due 5/2. Scattered bruising. BLE jovon with edema 2+, compression stockings on.   TESTS/PROCEDURES: None  D/C DATE: Pending TCU placement. Referrals sent. SW following  OTHER IMPORTANT INFO: None

## 2024-05-02 NOTE — PLAN OF CARE
Goal Outcome Evaluation:  Summary: COPD/HF exacerbation    DATE & TIME: 05/01/24 9782-0418  Cognitive Concerns/ Orientation: A&O x4, pleasant.    BEHAVIOR & AGGRESSION TOOL COLOR: Green   ABNL VS/O2: VSS on 5-6L O2 via oxymizer cannula while at rest (baseline O2 5 L NC). With ambulation requiring 8L. Used 7L at night with CPAP. Refusing continuous pulse oximeter monitoring. Spot check.   MOBILITY: Independent in room, SBA in halls with walker  PAIN MANAGMENT: Denies  DIET: 2g NA, 1800 mL FR  BOWEL/BLADDER: Continent, up to BSC. BM x1 this shift.   ABNL LAB/BG: K+ 3.8 recheck in the am  DRAIN/DEVICES: L PIV SL  SKIN: Wound R lateral ankle, followed by WOC nurse- wound care performed yesterday. Scattered bruising. BLE jovon with edema 2+, compression stockings on.   TESTS/PROCEDURES: None  D/C DATE: Pending TCU placement. Referrals sent. SW following  OTHER IMPORTANT INFO: None

## 2024-05-03 NOTE — PROGRESS NOTES
[facility]    Hospitalist Progress Note    Assessment & Plan   Date of Admission:  3/29/2024        Assessment & Plan  Linda Otero is a 64 year old female admitted on 3/29/2024.  Past medical history of COPD, chronic respiratory failure on home oxygen with history of hypercapnia, nicotine dependence, HFpEF, DVT/PE  on past warfarin anticoagulation, recent hemoptysis, morbid obesity, CKD stage III, CAD, HLD, HTN, hypothyroidism, MGUS, seizure disorder, CVA, admitted 3/29/2024 with acute on chronic respiratory failure.     Hospital course is prolonged due higher than baseline oxygen requirement especially with exertion which is barrier to TCU discharge at this time. On 5/2, exertional oxygen need down to 8L, (upto 12 L prior days).        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 Ill-defined opacity at the right infrahilar lung could be acute airspace disease. A mass is difficult to exclude. Bilateral vascular congestion may be a degree of pulmonary edema. Potential small right pleural fluid.  *CT C PE 3/29/2024 without any PE but did demonstrate pulmonary congestion, R pleural effusion; also enlarged main pulmonary artery, as evidence of pulmonary hypertension  *TTE 4/1 showing EF 60-65% without WMA, severe RV dilation with moderately decreased RV function, mod-severe TR and pulm HTN  *Was initially started on Lasix drip.  Then transitioned to bumex gtt, now on oral bumex 2mg daily.  *Cardiology consult: input appreciated. Now signed off...  *s/p left and right heart cath on 4/12/2024.  Showed normal coronary arteries.  Severely elevated right-sided pressures, severely elevated left-sided filling  pressures.  Mixed precapillary and postcapillary pulmonary hypertension (mPA 59 mmHg, PCWP 28 mmHg) .  Normal cardiac index.  *s/p RHC on 4/19:  Her filling pressures have improved significantly. Her weight is now normal and her mean RA is mildly elevated. Her pulmonary hypertension has improved but still severe. .. Patient to follow-up in the pul hypertension clinic.     -3/6 TR murmur LLSB  -feeling steadily better. Oxygen needs at this time to keep sats ~91% 5-6L rest and 8L with walking        Plan-  -check today oxygen needs to for 89-90% goal rest and walking -talked to rn  -aggressive IS, mobilize   -waiting on placement  - continue with Bumex 2mg BID  - Started on Jardiance 10 mg daily on 4/13/2024 for HFpEF  - lymphedema wraps  - 2gm Na diet  - fluid restriction 1800cc/day  - Flutter valve and incentive spirometer  - on 6L Oxymeter at rest, this may be her new baseline. O2 with exertion 8L in the last 24 hrs  on 5/2, has needed oxygen up to 12L in the last 2-3 days,  O2 sat goal at 88-92% (baseline on O2 5 liters NC)  - Ambulate QID with assist, upto chair for all meals  - patient subjectively feels slowly improving, encourage to continue activity in her room  -Patient to follow-up in the pul hypertension clinic.   - Care management assisting with discharge planning     COPD exacerbation   CAP  Respiratory acidosis  [PTA on Breo, Spiriva respimat, Alb inh and neb prn]  - completed 5-day course of ceftriaxone+doxy on 4/2  - 04/07/24 completed 8-day prednisone taper.;  - 4/8 -restarted Breo.  Wheezing significantly improved.  - currently om Breo 1 puff q daily and Duonebs QID   -Pulmonology consulted: input appreciated. Started on Incruse; may need to be on higher oxygen requirements even at discharge  - Pulmonary review of 4/23.  Continue ICS-LABA.  Added Incruse daily, should discharge on [id number] per formulary   - VQ scan on 4/26 without evidence of acute PE or chronic thromboembolic  disease  -Patient to follow-up in the pul hypertension clinic.      Hemoptysis, mild, resolved  *reports orange sputum production at home, was producing small amounts of rober blood 4/1  *admission CT chest negative for PE, masses  *resumed aspirin 4/3 and reports small amount of hemoptysis 4/4   - denies any further hemoptysis.  Lovenox started on 4/8/2024.  Held for cardiac catheterization, now resumed. No further reports of bleed.     SONAL on Chronic kidney disease, stage III, improving   *Speculate congestive nephropathy  *Admission serum Cr 1.33, historic baseline 0.87 to 1.08 in 2020 to 2023  - Avoid non-steroidal anti-inflammatory drugs (NSAIDs)  - Creatinine stabilized around 1.2-1.3  -Cr 1.1 on 5/3  - monitor BMP periodically,       Hypokalemia-resolved   *due to diuresis. Was on KCl 40mEq TID while on IV diuresis which has since been discontinued.   - replace per protocol  - continue to monitor periodically. 3.6 on 5/2/2024      History of deep venous thrombosis and pulmonary embolism  *CT C PE 3/29/2024 without any PE  *Per pharmacy, no longer on warfarin  - on prophylactic lovenox 40 mg BID while in hospital     Skin ulcer, right lateral ankle  Concern for cellulitis?  - Wound nurse (WOC) following  - See pictures from 4/17, there was concern for developing cellulitis  - Started ceftriaxone IV daily (allergic to penicillin, endomysial, tetracycline) on 4/18, to PO Keflex  4/20 - 4/26     Weakness and deconditioning  - will need TCU at discharge     Obstructive sleep apnea  - continue with PTA CPAP at bedtime. Currently compliant with cpap     Mild thrombocytopenia-resolved  -Monitor intermittently     Moderate Malnutrition, based on nutrition assessment  In the context of acute illness, chronic illness or disease  - nutritionist following     Hyperlipidemia - Diet-controlled, monitor; follow-up with primary clinic provider   Hypothyroidism - Med rec does not show any levothyroxine (TSH slightly elevated,  free T4 wnl). Will defer to outpt evaluation by PCP  Morbid obesity  History of nicotine dependence  History of obstructive sleep apnea  History of coronary artery disease   History of monoclonal gammopathy of undetermined significance - noted, follow-up with primary clinic provider   History of cerebrovascular disease with prior stroke - noted, monitor  History of B12 deficiency - noted, continue prior to admission B12 replacement  History of seizure disorder - noted in medical record; monitor           Diet: Snacks/Supplements Adult: Expedite Bottle; Between Meals  Fluid restriction 1800 ML FLUID  Combination Diet Regular Diet; 2 gm NA Diet    DVT Prophylaxis: Enoxaparin (Lovenox) SQ  Miguel Catheter: Not present  Lines: None     Cardiac Monitoring: None  Code Status: Full Code          Clinically Significant Risk Factors[]Expand by Default         # Hypokalemia: Lowest K = 3.2 mmol/L in last 2 days, will replace as needed           # Hypertension: Noted on problem list         # Moderate Malnutrition: based on nutrition assessment       # Financial/Environmental Concerns: none  # COPD: noted on problem list               Disposition Plan     Medically Ready for Discharge: Anticipated in 1-2 Days pending improving oxygen requirement especially with ambulation and/or TCU that can accept patient with higher oxygen need with activity  -still waiting on TCU placement   -determine oxygen needs for goal oxygen sat ~89%    5/3, Discharge orders have been started: still need oxygen orders, have care coordinator make [facility] pulmonary HTN clinic follow up visit for patient before discharge to tcu         moderate complex decision making, >35 min spent on pt care, care coord with pt, rn, exam, chart review, charting,     Chico Akers MD, MD  Text Page  (7am to 6pm)  Interval History   Stable night  No acute issues.   Feels breathing has improved over the last 2 weeks  No new issues.     -Data reviewed today: I  reviewed all new labs and imaging results over the last 24 hours. I personally reviewed labs last 24hours     Physical Exam   Temp: 98  F (36.7  C) Temp src: Oral BP: 117/69 Pulse: 86   Resp: 20 SpO2: 90 % O2 Device: Oxymizer cannula Oxygen Delivery: 6 LPM  Vitals:    04/30/24 2258 05/02/24 0300 05/03/24 0856   Weight: 128.5 kg (283 lb 4.8 oz) 128.5 kg (283 lb 5 oz) 129.1 kg (284 lb 9.6 oz)     Vital Signs with Ranges  Temp:  [97.4  F (36.3  C)-98  F (36.7  C)] 98  F (36.7  C)  Pulse:  [86-95] 86  Resp:  [20] 20  BP: (117-148)/(69-96) 117/69  SpO2:  [90 %-92 %] 90 %  I/O last 3 completed shifts:  In: 1080 [P.O.:1080]  Out: -     Constitutional: Up on side of bed, nad  Neck: nl jvp  Respiratory: cTAB  Cardiovascular: RRR no r/g. 3/6 holosystolic murmur LLSB and apex (TR murmur)  GI: soft nt nd  Skin/Integumen: bilateral lymphedema, R leg chronically larger than left       Medications   Current Facility-Administered Medications   Medication Dose Route Frequency Provider Last Rate Last Admin     Current Facility-Administered Medications   Medication Dose Route Frequency Provider Last Rate Last Admin    aspirin (ASA) chewable tablet 81 mg  81 mg Oral BID Sesar Lerma MD   81 mg at 05/03/24 0906    bumetanide (BUMEX) tablet 2 mg  2 mg Oral Daily Seasr Lerma MD   2 mg at 05/03/24 0906    empagliflozin (JARDIANCE) tablet 10 mg  10 mg Oral Daily Sesar Lerma MD   10 mg at 05/03/24 0906    enoxaparin ANTICOAGULANT (LOVENOX) injection 40 mg  40 mg Subcutaneous Q12H Sesar Lerma MD   40 mg at 05/03/24 0906    fluticasone-vilanterol (BREO ELLIPTA) 200-25 MCG/ACT inhaler 1 puff  1 puff Inhalation Daily Sesar Lerma MD   1 puff at 05/03/24 0906    ipratropium - albuterol 0.5 mg/2.5 mg/3 mL (DUONEB) neb solution 3 mL  3 mL Nebulization BID Andrew Strickland MD   3 mL at 05/03/24 0828    multivitamin w/minerals (THERA-VIT-M) tablet 1 tablet  1 tablet Oral Daily Sesar Lerma MD   1 tablet at 05/02/24 1359    polyethylene  glycol (MIRALAX) Packet 17 g  17 g Oral Daily Sesar Lerma MD   17 g at 05/02/24 1359    umeclidinium (INCRUSE ELLIPTA) 62.5 MCG/ACT inhaler 1 puff  1 puff Inhalation Daily Andrew Strickland MD   1 puff at 05/03/24 0906       Data   Recent Labs   Lab 05/03/24  0829 05/02/24  0806 05/01/24  1940 05/01/24  1054 04/29/24  0913 04/28/24  0643 04/27/24  2323   WBC  --   --   --   --   --   --  5.6   HGB  --   --   --   --   --   --  8.6*   MCV  --   --   --   --   --   --  89   PLT  --   --   --  230  --  237 214     --   --  140 138 141 138   POTASSIUM 3.6 3.6 3.8 3.2* 3.4 3.4 3.4   CHLORIDE 98  --   --  96* 96* 98 97*   CO2 32*  --   --  33* 32* 30* 33*   BUN 32.3*  --   --  27.9* 30.7* 32.9* 34.0*   CR 1.11*  --   --  1.11* 1.12* 1.17* 1.24*   ANIONGAP 10  --   --  11 10 13 8   LUNA 9.4  --   --  9.5 9.5 9.3 9.1   *  --   --  113* 114* 106* 120*       Imaging:   No results found for this or any previous visit (from the past 24 hour(s)).

## 2024-05-03 NOTE — PLAN OF CARE
Summary: COPD/HF exacerbation    DATE & TIME: 05/02/24-5/03/24 [id number]  Cognitive Concerns/ Orientation: A&O x4, pleasant, refused assessment and vitals when sleeping.  BEHAVIOR & AGGRESSION TOOL COLOR: Green   ABNL VS/O2: VSS on 6L O2 via oxymizer cannula while at rest.  MOBILITY: Independent in room, SBA in halls with walker  PAIN MANAGMENT: Denies  DIET: 2g NA, 1800 mL FR  BOWEL/BLADDER: Continent, up to BSC.   ABNL LAB/BG: K+ 3.6 - recheck in the am  DRAIN/DEVICES: L PIV SL  SKIN: Scattered bruising. BLE jovon with edema 2+, compression stockings on. R ankle wound - wound cares completed per plan of care during day shift, clean dry and intact this shift. Wound cares are every other day.   TESTS/PROCEDURES: None  D/C DATE: Pending TCU placement, patient expressing frustration and hopelessness in terms of whether she will discharge to TCU (her preference) or her assisted living. Referrals sent. SW following  OTHER IMPORTANT INFO: None   Winlevi Counseling:  I discussed with the patient the risks of topical clascoterone including but not limited to erythema, scaling, itching, and stinging. Patient voiced their understanding.

## 2024-05-03 NOTE — PLAN OF CARE
Summary: COPD/HF exacerbation    DATE & TIME: 05/02/24 [id number]  Cognitive Concerns/ Orientation: A&O x4, pleasant, a little anxious  BEHAVIOR & AGGRESSION TOOL COLOR: Green   ABNL VS/O2: VSS on 6L O2 via oxymizer cannula while at rest. With ambulation requiring 8L. Refusing continuous pulse oximeter monitoring. Spot check. CPAP on for overnight  MOBILITY: Independent in room, SBA in halls with walker  PAIN MANAGMENT: Denies  DIET: 2g NA, 1800 mL FR  BOWEL/BLADDER: Continent, up to BSC.   ABNL LAB/BG: K+ 3.6 - recheck in the am  DRAIN/DEVICES: L PIV SL  SKIN: Scattered bruising. BLE jovon with edema 2+, compression stockings on. R ankle wound - wound cares completed per plan of care during day shift, clean dry and intact this shift. Wound cares are every other day.   TESTS/PROCEDURES: None  D/C DATE: Pending TCU placement, patient expressing frustration and hopelessness in terms of whether she will discharge to TCU (her preference) or her assisted living. Referrals sent. SW following  OTHER IMPORTANT INFO: None

## 2024-05-04 NOTE — PLAN OF CARE
Goal Outcome Evaluation:                  DATE & TIME: 5/03/24 [id number]  Cognitive Concerns/ Orientation: A&Ox4, calm, cooperative   BEHAVIOR & AGGRESSION TOOL COLOR: Green   ABNL VS/O2: VSS on 6L O2 via oxymizer cannula while at rest, requires 7-8L when ambulating in hallway. Uses home CPAP when napping and at bedtime   MOBILITY: Independent in room, using BSC, SBA in halls with walker  PAIN MANAGMENT: Denies  DIET: 2g NA, 1800 mL FR, good appetite.   BOWEL/BLADDER: Continent, no BM  ABNL LAB/BG: K+ 3.6 - recheck in am  DRAIN/DEVICES: L PIV SL  SKIN: Scattered bruising. BLE jovon with edema 2+, compression stockings on. R ankle wound - wound cares completed per plan of care 4/3 - dressing D/I  TESTS/PROCEDURES: None  D/C DATE: Pending TCU placement,  referrals sent. SW following  OTHER IMPORTANT INFO: Pt alert/pleasant, slept well, denies pain, LS-diminished, CERRATO, CPAP on overnight, continues on scheduled Nebs.

## 2024-05-04 NOTE — PLAN OF CARE
Summary: COPD/HF exacerbation    DATE & TIME: 5/03/24 Evenings   Cognitive Concerns/ Orientation: A&Ox4, calm, cooperative, and pleasant. Very independent in room. Calls if needed.   BEHAVIOR & AGGRESSION TOOL COLOR: Green   ABNL VS/O2: VSS on 6L O2 via oxymizer cannula while at rest, requires 7-8L when ambulating in hallway. Uses home CPap when napping and at bedtime.   MOBILITY: Independent in room, using BSC, SBA in halls with walker. Moving freq.   PAIN MANAGMENT: Denies  DIET: 2g NA, 1800 mL FR, good appetite.   BOWEL/BLADDER: Continent, using BSC.   ABNL LAB/BG: K+ 3.6 - recheck in the am  DRAIN/DEVICES: L PIV SL  SKIN: Scattered bruising. BLE jovon with edema 2+, compression stockings on. R ankle wound - wound cares completed per plan of care yesterday, clean dry and intact this shift. Wound cares are every other day, due tomorrow  TESTS/PROCEDURES: None  D/C DATE: Pending TCU placement, patient expressing some frustration on waiting to hear back about her assisted living or if a TCU will accept her. Referrals sent. SW following  OTHER IMPORTANT INFO: Room organized for safety.

## 2024-05-04 NOTE — PLAN OF CARE
Goal Outcome Evaluation:  Shift Summary [id number]    Admitting Diagnosis: Hypoxia [R09.02]  COPD exacerbation (H) [J44.1]  Acute on chronic congestive heart failure, unspecified heart failure type (H) [I50.9]   Vitals.stable   Pain Denied /10. Taking PRN. Last dose   A&Ox4  Voiding.Cont B&B  Mobility. SBA   Tele.NA  CMS.Scattered bruises and lower extremities edema, wound on left ankle    Lung Sounds diminished breath sound on 6 Lpm   via oximyzer in room  during ambulation pt is on  8 lpm.CPAP during bedtime.      GI.   Dressing. Left ankle wound dressing intact .     Orders Placed This Encounter      Combination Diet Regular Diet; 2 gm NA Diet      Diet   Plan . Pending TCU

## 2024-05-04 NOTE — PROGRESS NOTES
[facility]    Medicine Progress Note - Hospitalist Service    Date of Admission:  3/29/2024    Assessment & Plan   Linda Otero is a 64 year old female who was admitted on 3/29/2024.  She has a past medical history of COPD, chronic respiratory failure on home oxygen with history of hypercapnia, nicotine dependence, HFpEF, DVT/PE on past warfarin anticoagulation, recent hemoptysis, morbid obesity, CKD stage III, CAD, HLD, HTN, hypothyroidism, MGUS, seizure disorder, CVA, admitted 3/29/2024 with acute on chronic respiratory failure.     Hospital course is prolonged due higher than baseline oxygen requirement especially with exertion which is barrier to TCU discharge at this time. As of 5/2, exertional oxygen need down to 8L (had been up to 12 L prior days).     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 Ill-defined opacity at the right infrahilar lung could be acute airspace disease. A mass is difficult to exclude. Bilateral vascular congestion may be a degree of pulmonary edema. Potential small right pleural fluid.  *CT C PE 3/29/2024 without any PE but did demonstrate pulmonary congestion, R pleural effusion; also enlarged main pulmonary artery, as evidence of pulmonary hypertension.  *TTE 4/1 showing EF 60-65% without WMA, severe RV dilation with moderately decreased RV function, mod-severe TR and pulm HTN.  *Was initially started on Lasix drip, then transitioned to bumex gtt, then to oral bumex 2mg daily starting on 4/19/24.  *Cardiology consulted, appreciate their assistance. Signed off 4/19/24.  *S/p left and right heart cath on 4/12/2024.  Showed normal coronary  arteries.  Severely elevated right-sided pressures, severely elevated left-sided filling pressures.  Mixed precapillary and postcapillary pulmonary hypertension (mPA 59 mmHg, PCWP 28 mmHg) .  Normal cardiac index.  *S/p RHC on 4/19:  Her filling pressures have improved significantly. Her weight is now normal and her mean RA is mildly elevated. Her pulmonary hypertension has improved but still severe.  - Continue inpatient care pending improvement in supplemental oxygen needs.  - Aggressive IS, mobilize.  - Waiting on placement.  - Continue with Bumex 2mg BID.  - Started on Jardiance 10 mg daily on 4/13/2024 for HFpEF.  - Lymphedema wraps.  - 2gm Na diet.  - Fluid restriction 1800cc/day.  - Flutter valve and incentive spirometer.  - 6L Oxymeter at rest, this may be her new baseline. O2 with exertion 8L in the last 24 hours on 5/2, has needed oxygen up to 12L in the prior 2-3 days, O2 sat goal at 88-92% (baseline on O2 at 5 liters NC).  - Ambulate QID with assist, upto chair for all meals.  - Patient subjectively feels slowly improving, encourage to continue activity in her room  - Patient to follow-up in the pul hypertension clinic.   - Pulmonary rehab on discharge.  - Care management assisting with discharge planning.     Acute COPD exacerbation   CAP  Respiratory acidosis  [PTA on Breo, Spiriva respimat, Alb inh and neb prn]  - Completed 5-day course of ceftriaxone+doxy on 4/2/24.  - Completed 8-day steroid taper on 4/7/24.  - Restarted PTA Breo on 4/8/24.  Wheezing significantly improved.  - Pulmonary consulted 4/23/24, appreciate their assistance.  Continue ICS-LABA (Breo).  Added Incruse daily, should discharge on [id number] per formulary. DuoNebs BID. May need to be on higher oxygen requirements even at discharge.  - VQ scan on 4/26 without evidence of acute PE or chronic thromboembolic disease  - Patient to follow-up in the pul hypertension clinic.      Hemoptysis, mild, resolved  *Reported orange sputum  production at home, was producing small amounts of rober blood 4/1/24.  *Admission CT chest negative for PE, masses.  - Resumed aspirin 4/3 and reported small amount of hemoptysis 4/4, but no further hemoptysis after that.  Prophylactic lovenox started on 4/8/2024.  Held for cardiac catheterization, then resumed.     Acute kidney injury, improved  Chronic kidney disease, stage IIIa   *Suspect congestive nephropathy. Admission serum creatinine was 1.33, historic baseline 0.87 to 1.08 in 2020 to 2023.  - Avoid non-steroidal anti-inflammatory drugs (NSAIDs).  - Creatinine stabilized around 1.1.  - Monitor BMP intermittently.      Hypokalemia-resolved   *Due to diuresis. Was on KCl 40mEq TID while on IV diuresis which has since been discontinued.   - Replaced and rechecked per RN managed protocol.     History of deep venous thrombosis and pulmonary embolism  *CT chest on 3/29/2024 was negative for pulmonary embolism.  *Per pharmacy med rec, she is no longer on anticoagulation with warfarin.  - Prophylactic lovenox 40 mg BID while in hospital.     Skin ulcer, right lateral ankle  Concern for cellulitis?  - Wound nurse (WOC) consulted, appreciate their assistance.  - See pictures from 4/17, there was concern for developing cellulitis. Completed 7 day course of antibiotics (ceftriaxone --> keflex) on 4/26/2024.     Weakness and deconditioning  - Will need TCU at discharge.  - Care transitions following.     Obstructive sleep apnea  - Continue CPAP per home settings.     Mild thrombocytopenia, resolved  Platelets normal initially, trended down to 98 on 4/16/2024, then trend up and within normal limits since 4/22/2024.  - Monitor intermittently.     Moderate Malnutrition  In the context of acute illness, chronic illness or disease.  - Nutrition consulted.     Hyperlipidemia  Diet-controlled.  - Follow-up with PCP.    Hypothyroidism  Med rec did not show any thyroid supplement.  - TSH 6.28 and free T4 1.49 on 3/29/24.  -  Follow up with PCP to recheck labs after she recovers from her acute illness.    Morbid obesity  Body mass index is 46.03 kg/m .  - Follow up with PCP regarding long term management.    History of cerebrovascular disease with prior stroke  - Continue PTA aspirin.  - She is not on a statin.    History of nicotine dependence  - Former smoker, quit in 2012.    History of monoclonal gammopathy of undetermined significance  - Noted, follow-up with PCP.    History of B12 deficiency  - Noted, continue PTA B12 replacement.    History of seizure disorder  - Noted in medical record, does not appear to be on any anti-epileptic medication.          Diet: Snacks/Supplements Adult: Expedite Bottle; Between Meals  Fluid restriction 1800 ML FLUID  Combination Diet Regular Diet; 2 gm NA Diet  Diet    DVT Prophylaxis: Enoxaparin (Lovenox) SQ  Miguel Catheter: Not present  Lines: None     Cardiac Monitoring: None  Code Status: Full Code      Clinically Significant Risk Factors                  # Hypertension: Noted on problem list         # Moderate Malnutrition: based on nutrition assessment    # Financial/Environmental Concerns: none  # COPD: noted on problem list        Disposition Plan     Medically Ready for Discharge: Ready Now             Ady Ruiz MD  Hospitalist Service  [facility]  Securely message with [employer] (more info)  Text page via [employer] Paging/Directory   ______________________________________________________________________    Interval History   Linda Otero was seen this morning.  She feels pretty good.  No new complaints/concerns.  Gets short of breath with activity, feels like she is able to do a little more and recovers a little quicker.  Denies fevers, chest pain, nausea, abdominal pain.    Physical Exam   Vital Signs: Temp: 98.3  F (36.8  C) Temp src: Axillary BP: 98/49 Pulse: 85   Resp: 20 SpO2: 100 % O2 Device: Oxymizer cannula Oxygen Delivery: 6 LPM  Weight: 285 lbs 3.2  oz    Constitutional: awake, alert, cooperative, no apparent distress, sitting up in a chair  Respiratory: no increased work of breathing, clear to auscultation bilaterally, no crackles or wheezing  Cardiovascular: regular rate and rhythm, normal S1 and S2, systolic murmur noted  GI: normal bowel sounds, soft, obese, non-distended, non-tender  Skin: warm, dry  Musculoskeletal: bilateral lower extremity lymphedema (right greater than left)  Neurologic: awake, alert, oriented, answers questions appropriately, moves all extremities    Medical Decision Making       40 MINUTES SPENT BY ME on the date of service doing chart review, history, exam, documentation & further activities per the note.      Data     I have personally reviewed the following data over the past 24 hrs:    N/A  \   N/A   / 207     N/A N/A N/A /  N/A   3.6 N/A N/A \

## 2024-05-05 NOTE — PROGRESS NOTES
[facility]    Medicine Progress Note - Hospitalist Service    Date of Admission:  3/29/2024    Assessment & Plan   Linda Otero is a 64 year old female who was admitted on 3/29/2024.  She has a past medical history of COPD, chronic respiratory failure on home oxygen with history of hypercapnia, nicotine dependence, HFpEF, DVT/PE on past warfarin anticoagulation, recent hemoptysis, morbid obesity, CKD stage III, CAD, HLD, HTN, hypothyroidism, MGUS, seizure disorder, CVA, admitted 3/29/2024 with acute on chronic respiratory failure.     Hospital course is prolonged due higher than baseline oxygen requirement especially with exertion which is barrier to TCU discharge at this time. As of 5/2, exertional oxygen need down to 8L (had been up to 12 L prior days).     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 Ill-defined opacity at the right infrahilar lung could be acute airspace disease. A mass is difficult to exclude. Bilateral vascular congestion may be a degree of pulmonary edema. Potential small right pleural fluid.  *CT C PE 3/29/2024 without any PE but did demonstrate pulmonary congestion, R pleural effusion; also enlarged main pulmonary artery, as evidence of pulmonary hypertension.  *TTE 4/1 showing EF 60-65% without WMA, severe RV dilation with moderately decreased RV function, mod-severe TR and pulm HTN.  *Was initially started on Lasix drip, then transitioned to bumex gtt, then to oral bumex 2mg daily starting on 4/19/24.  *Cardiology consulted, appreciate their assistance. Signed off 4/19/24.  *S/p left and right heart cath on 4/12/2024.  Showed normal coronary  arteries.  Severely elevated right-sided pressures, severely elevated left-sided filling pressures.  Mixed precapillary and postcapillary pulmonary hypertension (mPA 59 mmHg, PCWP 28 mmHg) .  Normal cardiac index.  *S/p RHC on 4/19:  Her filling pressures have improved significantly. Her weight is now normal and her mean RA is mildly elevated. Her pulmonary hypertension has improved but still severe.  - Continue inpatient care pending improvement in supplemental oxygen needs.  - Aggressive IS, mobilize.  - Waiting on placement.  - Continue with Bumex 2mg BID.  - Started on Jardiance 10 mg daily on 4/13/2024 for HFpEF.  - Lymphedema wraps.  - 2gm Na diet.  - Fluid restriction 1800cc/day.  - Flutter valve and incentive spirometer.  - 6L Oxymeter at rest, this may be her new baseline (PTA baseline was 5 liters NC). O2 with exertion 8L in the last few days, O2 sat goal at 88-92%.  - Ambulate QID with assist, upto chair for all meals.  - Patient subjectively feels slowly improving, encourage to continue activity in her room  - Patient to follow-up in the pul hypertension clinic.   - Pulmonary rehab on discharge.  - Care management assisting with discharge planning.     Acute COPD exacerbation   CAP  Respiratory acidosis  [PTA on Breo, Spiriva respimat, Alb inh and neb prn]  - Completed 5-day course of ceftriaxone+doxy on 4/2/24.  - Completed 8-day steroid taper on 4/7/24.  - Restarted PTA Breo on 4/8/24.  Wheezing significantly improved.  - Pulmonary consulted 4/23/24, appreciate their assistance.  Continue ICS-LABA (Breo).  Added Incruse daily, should discharge on FQO-HPOB-ABNH per formulary. DuoNebs BID. May need to be on higher oxygen requirements even at discharge.  - VQ scan on 4/26 without evidence of acute PE or chronic thromboembolic disease  - Patient to follow-up in the pul hypertension clinic after discharge.      Hemoptysis, mild, resolved  *Reported orange sputum production at home, was producing small  amounts of rober blood 4/1/24.  *Admission CT chest negative for PE, masses.  - Resumed aspirin 4/3 and reported small amount of hemoptysis 4/4, but no further hemoptysis after that.  Prophylactic lovenox started on 4/8/2024.  Held for cardiac catheterization, then resumed.     Acute kidney injury, improved  Chronic kidney disease, stage IIIa   *Suspect congestive nephropathy. Admission serum creatinine was 1.33, historic baseline 0.87 to 1.08 in 2020 to 2023.  - Avoid non-steroidal anti-inflammatory drugs (NSAIDs).  - Creatinine stabilized around 1.1.  - Monitor BMP intermittently.      Hypokalemia-resolved   *Due to diuresis. Was on KCl 40mEq TID while on IV diuresis which has since been discontinued.   - Replaced and rechecked per RN managed protocol.     History of deep venous thrombosis and pulmonary embolism  *CT chest on 3/29/2024 was negative for pulmonary embolism.  *Per pharmacy med rec, she is no longer on anticoagulation with warfarin.  - Prophylactic lovenox 40 mg BID while in hospital.     Skin ulcer, right lateral ankle  Concern for cellulitis?  - Wound nurse (WOC) consulted, appreciate their assistance.  - See pictures from 4/17, there was concern for developing cellulitis. Completed 7 day course of antibiotics (ceftriaxone --> keflex) on 4/26/2024.     Weakness and deconditioning  - Will need TCU at discharge.  - Care transitions following.  - Supplemental oxygen requirements currently limiting discharge planning.     Obstructive sleep apnea  - Continue CPAP per home settings.     Mild thrombocytopenia, resolved  Platelets normal initially, trended down to 98 on 4/16/2024, then trended up and within normal limits since 4/22/2024.  - Monitor intermittently.     Moderate Malnutrition  In the context of acute illness, chronic illness or disease.  - Nutrition consulted.     Hyperlipidemia  Diet-controlled.  - Follow-up with PCP.    Hypothyroidism  Med rec did not show any thyroid supplement.  - TSH 6.28  and free T4 1.49 on 3/29/24.  - Follow up with PCP to recheck labs after she recovers from her acute illness.    Morbid obesity  Body mass index is 46.03 kg/m .  - Follow up with PCP regarding long term management.    History of cerebrovascular disease with prior stroke  - Continue PTA aspirin.  - She is not on a statin.    History of nicotine dependence  - Former smoker, quit in 2012.    History of monoclonal gammopathy of undetermined significance  - Noted, follow-up with PCP.    History of B12 deficiency  - Noted, continue PTA B12 replacement.    History of seizure disorder  - Noted in medical record, does not appear to be on any anti-epileptic medication.          Diet: Snacks/Supplements Adult: Expedite Bottle; Between Meals  Fluid restriction 1800 ML FLUID  Combination Diet Regular Diet; 2 gm NA Diet  Diet    DVT Prophylaxis: Enoxaparin (Lovenox) SQ  Miguel Catheter: Not present  Lines: None     Cardiac Monitoring: None  Code Status: Full Code      Clinically Significant Risk Factors                  # Hypertension: Noted on problem list         # Moderate Malnutrition: based on nutrition assessment    # Financial/Environmental Concerns: none  # COPD: noted on problem list        Disposition Plan     Medically Ready for Discharge: Ready Now             Ady Ruiz MD  Hospitalist Service  [facility]  Securely message with [employer] (more info)  Text page via [employer] Paging/Directory   ______________________________________________________________________    Interval History   Linda Otero was seen this morning. She feels OK. No new complaints/concerns. Still on 6 lpm of supplemental oxygen at rest and 8 lpm with activity.    Physical Exam   Vital Signs: Temp: 97.3  F (36.3  C) Temp src: Axillary BP: 100/53 Pulse: 83   Resp: 18 SpO2: 97 % O2 Device: Nasal cannula Oxygen Delivery: 6 LPM  Weight: 284 lbs 3.2 oz    Constitutional: awake, alert, cooperative, no apparent distress,  sitting up in a chair  Respiratory: no increased work of breathing, clear to auscultation bilaterally, no crackles or wheezing  Cardiovascular: regular rate and rhythm, normal S1 and S2, systolic murmur noted  GI: normal bowel sounds, soft, obese, non-distended, non-tender  Skin: warm, dry  Musculoskeletal: bilateral lower extremity lymphedema (right greater than left)  Neurologic: awake, alert, oriented, answers questions appropriately, moves all extremities    Medical Decision Making       30 MINUTES SPENT BY ME on the date of service doing chart review, history, exam, documentation & further activities per the note.      Data   NOTE: Data reviewed over the past 24 hrs contributes toward MDM complexity

## 2024-05-05 NOTE — PLAN OF CARE
Goal Outcome Evaluation:       Summary: COPD/HF exacerbation    DATE & TIME: 5/4-5/24; [id number]  Cognitive Concerns/ Orientation: A&Ox4, calm, cooperative   BEHAVIOR & AGGRESSION TOOL COLOR: Green   ABNL VS/O2: soft BP 95/63. on 6L O2 via oxymizer cannula while at rest, requires 7-8L when ambulating in hallway. Baseline 5L O2 use. Uses home CPAP when napping and at bedtime   MOBILITY: Independent in room, using BSC, SBA in halls with walker  PAIN MANAGMENT: Denies  DIET: 2g NA, 1800 mL FR, good appetite.   BOWEL/BLADDER: Continent. No BM during this shift   ABNL LAB/BG: Cr 1.11, Urea 32.3, CO2 32.  DRAIN/DEVICES: L PIV SL  SKIN: Scattered bruising. BLE jovon with edema +1-2+ RLE>LLE, compression stockings on. R ankle wound dressing CDI. Rash under breast fold   TESTS/PROCEDURES: None  D/C DATE: Pending TCU placement,  referrals sent. SW following  OTHER IMPORTANT INFO:  LS-diminished. Encouraged use of IS. CERRATO. On scheduled Nebs.     Group Topic: BH Therapeutic Activity    Date: 2/7/2021  Start Time: 1500  End Time: 1600  Facilitators: Enedina Engel LCSW    Focus: Pt was invited to engage in a game fo [employer]: a coping skill activity involving socializing and teamwork. Pt did not attend.  Number in attendance: 17

## 2024-05-05 NOTE — PLAN OF CARE
Goal Outcome Evaluation:                    Summary: COPD/HF exacerbation    DATE & TIME: 5/4/24, pm shift  Cognitive Concerns/ Orientation: A&Ox4, calm, cooperative   BEHAVIOR & AGGRESSION TOOL COLOR: Green   ABNL VS/O2: soft BP 95/63. on 6L O2 via oxymizer cannula while at rest, requires 7-8L when ambulating in hallway. Baseline 5L O2 use. Uses home CPAP when napping and at bedtime   MOBILITY: Independent in room, using BSC, SBA in halls with walker  PAIN MANAGMENT: Denies  DIET: 2g NA, 1800 mL FR, good appetite.   BOWEL/BLADDER: Continent. Had bm.  ABNL LAB/BG: Cr 1.11, Urea 32.3, CO2 32.  DRAIN/DEVICES: L PIV SL  SKIN: Scattered bruising. BLE jovon with edema +1-2+ RLE>LLE, compression stockings on. R ankle wound dressing CDI.  TESTS/PROCEDURES: None  D/C DATE: Pending TCU placement,  referrals sent. SW following  OTHER IMPORTANT INFO:  LS-diminished. Encouraged use of IS. CERRATO. On scheduled Nebs.

## 2024-05-05 NOTE — PLAN OF CARE
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   History of coronary artery disease  History of DVT and PE       DATE & TIME: 24-    Cognitive Concerns/ Orientation: a/o x4 calm/pleasant, denies general weakness, short of breath abdominal discomfort   BEHAVIOR & AGGRESSION TOOL COLOR:  fall precaution due to general weakness    CIWA SCORE: no    ABNL VS/O2: vss on 6 liter of oxygen   MOBILITY: SBA, G,B/W bedside commode   PAIN MANAGMENT: denies pain and general discomfort    DIET: low fat   BOWEL/BLADDER: continent with good output   ABNL LAB/B.11 creatinine   DRAIN/DEVICES: PIV SL   TELEMETRY RHYTHM: none   SKIN: wnl  TESTS/PROCEDURES: none   D/C DATE: pending

## 2024-05-06 NOTE — PLAN OF CARE
Goal Outcome Evaluation:    Summary: COPD/HF exacerbation  DATE & TIME: 5/6/24 [id number]  Cognitive Concerns/ Orientation: A&Ox4, calm, cooperative   BEHAVIOR & AGGRESSION TOOL COLOR: Green   ABNL VS/O2: VSS on 7L O2 via oxymizer cannula while at rest, requires 8L when ambulating in hallway. Baseline 5L O2 use. Uses home CPAP when napping and at bedtime   MOBILITY: up ab henna, SBA in halls with GB/walker  PAIN MANAGMENT: Denies  DIET: 2g NA, 1800 mL FR, fair appetite this shift.   BOWEL/BLADDER: Continent. Up to bathroom or BSC  ABNL LAB/BG: n/a  DRAIN/DEVICES: L PIV SL  SKIN: Scattered bruising. BLE jovon with edema +1-2+ RLE>LLE, compression stockings on. R ankle wound-CDI  TESTS/PROCEDURES: None  D/C DATE: Pending TCU placement vs home with assist,  referrals sent. SW following  OTHER IMPORTANT INFO:  LS-diminished. Encouraged use of IS. CERRATO. On scheduled Nebs. WOC/PT following

## 2024-05-06 NOTE — PROGRESS NOTES
Care Management Follow Up    Length of Stay (days): 38    Expected Discharge Date: 05/07/2024     Concerns to be Addressed: discharge planning     Patient plan of care discussed at interdisciplinary rounds: Yes    Anticipated Discharge Disposition: Transitional Care     Anticipated Discharge Services:    Anticipated Discharge DME:      Patient/family educated on Medicare website which has current facility and service quality ratings: yes  Education Provided on the Discharge Plan: Yes  Patient/Family in Agreement with the Plan: yes    Referrals Placed by CM/SW: Post Acute Facilities  Private pay costs discussed: Not applicable    Additional Information:  Both PT and OT notes recommend home with home therapies however patient tells writer she is more comfortable with a TCU placement at this time.  Original referrals were made to [facility] and [facility] as patient preferred these locations. Neither can offer patient admission at this time due to her current oxygen needs. [facility] limit is 5 liters per minute and [facility] is 6 liters per minute  Writer has placed referrals to [facility] and [facility]CU asking their protocol for oxygen use. Additional referrals will be made tomorrow morning.  Review[facility] with Dr Joseph Agudelo, [facility]

## 2024-05-06 NOTE — PLAN OF CARE
Goal Outcome Evaluation:         Summary: COPD/HF exacerbation  DATE & TIME: 5/5/24-5/6/24 [id number]  Cognitive Concerns/ Orientation: A&Ox4, calm, cooperative   BEHAVIOR & AGGRESSION TOOL COLOR: Green   ABNL VS/O2: VSS on 7L O2 via oxymizer cannula while at rest, requires 8L when ambulating in hallway. Baseline 5L O2 use. Uses home CPAP when napping and at bedtime   MOBILITY: Independent in room, using BSC, SBA in halls with walker  PAIN MANAGMENT: Denies  DIET: 2g NA, 1800 mL FR, good appetite.   BOWEL/BLADDER: Continent. No BM during this shift   ABNL LAB/BG: Cr 1.11, Urea 32.3, CO2 32.  DRAIN/DEVICES: L PIV SL  SKIN: Scattered bruising. BLE jovon with edema +1-2+ RLE>LLE, compression stockings on. Change R ankle wound dressing. Next change due Tuesday 5/7 Rash under breast fold   TESTS/PROCEDURES: None  D/C DATE: Pending TCU placement,  referrals sent. SW following  OTHER IMPORTANT INFO:  LS-diminished. Encouraged use of IS. CERRATO. On scheduled Nebs.

## 2024-05-06 NOTE — PLAN OF CARE
Occupational Therapy Discharge Summary    Reason for therapy discharge:    All goals and outcomes met, no further needs identified.    Progress towards therapy goal(s). See goals on Care Plan in [facility] electronic health record for goal details.  Goals met    Therapy recommendation(s):    Continued therapy is recommended.  Rationale/Recommendations:  all goals met for OT, PT to continue to address endurance. Rec home OT for continue recommendations for endurance and ADL/IADL recommendations.

## 2024-05-06 NOTE — PROGRESS NOTES
[facility]    Medicine Progress Note - Hospitalist Service    Date of Admission:  3/29/2024    Assessment & Plan   Lnida Otero is a 64 year old female who was admitted on 3/29/2024.  She has a past medical history of COPD, chronic respiratory failure on home oxygen with history of hypercapnia, nicotine dependence, HFpEF, DVT/PE on past warfarin anticoagulation, recent hemoptysis, morbid obesity, CKD stage III, CAD, HLD, HTN, hypothyroidism, MGUS, seizure disorder, CVA, admitted 3/29/2024 with acute on chronic respiratory failure.     Hospital course is prolonged due higher than baseline oxygen requirement especially with exertion which is barrier to TCU discharge at this time. As of 5/2, exertional oxygen need down to 8L (had been up to 12 L prior days).     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 Ill-defined opacity at the right infrahilar lung could be acute airspace disease. A mass is difficult to exclude. Bilateral vascular congestion may be a degree of pulmonary edema. Potential small right pleural fluid.  *CT C PE 3/29/2024 without any PE but did demonstrate pulmonary congestion, R pleural effusion; also enlarged main pulmonary artery, as evidence of pulmonary hypertension.  *TTE 4/1 showing EF 60-65% without WMA, severe RV dilation with moderately decreased RV function, mod-severe TR and pulm HTN.  *Was initially started on Lasix drip, then transitioned to bumex gtt, then to oral bumex 2mg daily starting on 4/19/24.  *Cardiology consulted, appreciate their assistance. Signed off 4/19/24.  *S/p left and right heart cath on 4/12/2024.  Showed normal coronary  arteries.  Severely elevated right-sided pressures, severely elevated left-sided filling pressures.  Mixed precapillary and postcapillary pulmonary hypertension (mPA 59 mmHg, PCWP 28 mmHg) .  Normal cardiac index.  *S/p RHC on 4/19:  Her filling pressures have improved significantly. Her weight is now normal and her mean RA is mildly elevated. Her pulmonary hypertension has improved but still severe.  - Continue inpatient care pending improvement in supplemental oxygen needs.  - Aggressive IS, mobilize.  - Waiting on placement.  - Continue with Bumex 2mg BID.  - Started on Jardiance 10 mg daily on 4/13/2024 for HFpEF.  - Lymphedema wraps.  - 2gm Na diet.  - Fluid restriction 1800cc/day.  - Flutter valve and incentive spirometer.  - Currently on 6 lpmof supplemental oxygen by oximyzer at rest, this may be her new baseline (PTA baseline was 5 lpm by nasal cannula). O2 with exertion at 8 lpm the last few days, O2 sat goal at 88-92%.  - Ambulate QID with assist, upto chair for all meals.  - Patient subjectively feels slowly improving, encourage to continue activity in her room.  - Patient to follow-up in the pulm hypertension clinic.   - Pulmonary rehab on discharge.  - Care management assisting with discharge planning.     Acute COPD exacerbation   CAP  Respiratory acidosis  [PTA on Breo, Spiriva respimat, Alb inh and neb prn]  - Completed 5-day course of ceftriaxone+doxy on 4/2/24.  - Completed 8-day steroid taper on 4/7/24.  - Restarted PTA Breo on 4/8/24.  Wheezing significantly improved.  - Pulmonary consulted 4/23/24, appreciate their assistance.  Continue ICS-LABA (Breo).  Added Incruse daily, should discharge on [id number] per formulary. DuoNebs BID. May need to be on higher oxygen requirements even at discharge.  - VQ scan on 4/26 without evidence of acute PE or chronic thromboembolic disease.  - Patient to follow-up in the pulmonary hypertension clinic after discharge.      Hemoptysis, mild,  resolved  *Reported orange sputum production at home, was producing small amounts of rober blood 4/1/24.  *Admission CT chest negative for PE, masses.  - Resumed aspirin 4/3 and reported small amount of hemoptysis 4/4, but no further hemoptysis after that.  Prophylactic lovenox started on 4/8/2024.  Held for cardiac catheterization, then resumed.     Acute kidney injury, improved  Chronic kidney disease, stage IIIa   *Suspect congestive nephropathy. Admission serum creatinine was 1.33, historic baseline 0.87 to 1.08 in 2020 to 2023.  - Avoid non-steroidal anti-inflammatory drugs (NSAIDs).  - Creatinine stabilized around 1.1.  - Monitor BMP intermittently.      Hypokalemia - resolved   *Due to diuresis. Was on KCl 40mEq TID while on IV diuresis which has since been discontinued.   - Replaced and rechecked per RN managed protocol.     History of deep venous thrombosis and pulmonary embolism  *CT chest on 3/29/2024 was negative for pulmonary embolism.  *Per pharmacy med rec, she is no longer on anticoagulation with warfarin.  - Prophylactic lovenox 40 mg BID while in hospital.     Skin ulcer, right lateral ankle  Concern for cellulitis?  - Wound nurse (WOC) consulted, appreciate their assistance.  - See pictures from 4/17, there was concern for developing cellulitis. Completed 7 day course of antibiotics (ceftriaxone --> keflex) on 4/26/2024.     Weakness and deconditioning  - Will need TCU at discharge.  - Care transitions following.  - Supplemental oxygen requirements currently limiting discharge planning.     Obstructive sleep apnea  - Continue CPAP per home settings.     Mild thrombocytopenia, resolved  Platelets normal initially, trended down to 98 on 4/16/2024, then trended up and within normal limits since 4/22/2024.  - Monitor intermittently.     Moderate Malnutrition  In the context of acute illness, chronic illness or disease.  - Nutrition consulted.     Hyperlipidemia  Diet-controlled.  - Follow-up with  PCP.    Hypothyroidism  Med rec did not show any thyroid supplement.  - TSH 6.28 and free T4 1.49 on 3/29/24.  - Follow up with PCP to recheck labs after she recovers from her acute illness.    Morbid obesity  Body mass index is 46.03 kg/m .  - Follow up with PCP regarding long term management.    History of cerebrovascular disease with prior stroke  - Continue PTA aspirin.  - She is not on a statin.    History of nicotine dependence  - Former smoker, quit in 2012.    History of monoclonal gammopathy of undetermined significance  - Noted, follow-up with PCP.    History of B12 deficiency  - Noted, continue PTA B12 replacement.    History of seizure disorder  - Noted in medical record, does not appear to be on any anti-epileptic medication.          Diet: Snacks/Supplements Adult: Expedite Bottle; Between Meals  Fluid restriction 1800 ML FLUID  Combination Diet Regular Diet; 2 gm NA Diet  Diet    DVT Prophylaxis: Enoxaparin (Lovenox) SQ  Miguel Catheter: Not present  Lines: None     Cardiac Monitoring: None  Code Status: Full Code      Clinically Significant Risk Factors                  # Hypertension: Noted on problem list         # Moderate Malnutrition: based on nutrition assessment    # Financial/Environmental Concerns: none  # COPD: noted on problem list        Disposition Plan     Medically Ready for Discharge: Ready Now pending facility that can meet O2 needs             Ady Ruiz MD  Hospitalist Service  [facility]  Securely message with [employer] (more info)  Text page via [employer] Paging/Directory   ______________________________________________________________________    Interval History   Lindaaureliano Otero was seen today. She feels about the same today, no new complaints/concerns. She would like a different walker so that it is easier for her to transport her oxygen tanks.    Physical Exam   Vital Signs: Temp: 97.8  F (36.6  C) Temp src: Oral BP: 126/73 Pulse: 90   Resp: 16  SpO2: 94 % O2 Device: BiPAP/CPAP Oxygen Delivery: 6 LPM  Weight: 284 lbs 14.4 oz    Constitutional: awake, alert, cooperative, no apparent distress, sitting up in a chair  Respiratory: no increased work of breathing, clear to auscultation bilaterally, no crackles or wheezing  Cardiovascular: regular rate and rhythm, normal S1 and S2, systolic murmur noted  GI: normal bowel sounds, soft, obese, non-distended, non-tender  Skin: warm, dry  Musculoskeletal: bilateral lower extremity lymphedema (right greater than left)  Neurologic: awake, alert, oriented, answers questions appropriately, moves all extremities    Medical Decision Making       40 MINUTES SPENT BY ME on the date of service doing chart review, history, exam, documentation & further activities per the note.      Data   NOTE: Data reviewed over the past 24 hrs contributes toward MDM complexity

## 2024-05-07 NOTE — PROGRESS NOTES
[facility] GERIATRICS    PRIMARY CARE PROVIDER AND CLINIC:  MALLORY Escamilla PHYSICIAN [facility] [address] [facility] [address]  Chief Complaint   Patient presents with    [facility] Medical Record Number:  [id number]  Place of Service where encounter took place:  [facility] (TCU)(SNF) [[id number]]    Linda Otero  is a 64 year old  (1960), admitted to the above facility from  [facility]. Hospital stay 3/29/24 through 5/7/24..   HPI:    Linda Otero is a 64 year old female who was admitted on 3/29/2024.  She has a past medical history of COPD, chronic respiratory failure on home oxygen with history of hypercapnia, nicotine dependence, HFpEF, DVT/PE on past warfarin anticoagulation, recent hemoptysis, morbid obesity, CKD stage III, CAD, HLD, HTN, hypothyroidism, MGUS, seizure disorder, CVA, admitted 3/29/2024 with acute on chronic respiratory failure.     The primary encounter diagnosis was Physical deconditioning. Diagnoses of Generalized muscle weakness, Acute on chronic respiratory failure with hypoxia (H), Acute on chronic heart failure with preserved ejection fraction (H), COPD exacerbation (H), JODI (obstructive sleep apnea), Severe pulmonary hypertension (H), Coronary artery disease involving native coronary artery of native heart with angina pectoris (H24), Personal history of DVT (deep vein thrombosis), History of pulmonary embolism, Community acquired pneumonia, unspecified laterality, Respiratory acidosis, Hemoptysis, SONAL (acute kidney injury) (H24), Stage 3 chronic kidney disease, unspecified whether stage 3a or 3b CKD (H), Hypokalemia, Ulcer of ankle, unspecified laterality, limited to breakdown of skin (H), Dermatitis, Thrombocytopenia (H24), Moderate malnutrition (H24), Morbid obesity (H), Mixed hyperlipidemia, Hypothyroidism, unspecified type, History of CVA (cerebrovascular accident), MGUS (monoclonal  gammopathy of unknown significance), Vitamin B12 deficiency (non anemic), Hx of seizure disorder, and Slow transit constipation were also pertinent to this visit.    Met with patient who was found sitting upright at edge of bed. She denies any chest pain, palpitations, CERRATO, lightheadedness, dizziness. She endorses ongoing shortness of breath but reports this has improved since hospitalization. Continues to have productive cough at times that is slightly discolored of greenish/yellow/red in color. Small amounts were noted during visit today. Denies any abdominal discomfort. Denies N&V. Denies B&B concerns. Denies dysuria or frequency. Denies loose or constipation. Hernia present to abdomen. Non-tender and reducible. Appetite good. Sleeping well. Denies any complaints of pain. Nursing denies any acute concerns.     BP Readings from Last 3 Encounters:   05/08/24 134/82   05/07/24 130/81   12/13/23 132/86     Wt Readings from Last 5 Encounters:   05/08/24 128.8 kg (284 lb)   05/07/24 129.1 kg (284 lb 11.2 oz)   01/24/20 145.1 kg (319 lb 14.4 oz)   11/20/19 145.5 kg (320 lb 11.2 oz)   08/28/19 144 kg (317 lb 6.4 oz)     CODE STATUS/ADVANCE DIRECTIVES DISCUSSION:  Full Code  CPR/Full code   ALLERGIES:   Allergies   Allergen Reactions    Eliquis [Apixaban] Shortness Of Breath, Hives and Swelling     Pt. Reported     Penicillins Anaphylaxis     Per chart review: tolerated Augmentin in 2020.  Tolerated CTX 2024.       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  left thalamic stroke as well as patchy MCA branch infarcts    CVA (cerebral vascular accident) (H) 12/23/2012    Left thalamic stroke, MCA branch infarcts    Dermatitis     DVT (deep venous thrombosis) (H)     Right leg    Encephalopathy     after stroke, believed related to hypoxia    History of transfusion     Hyperlipidemia     Hypertension     Hypothyroidism     Lymphedema     MGUS (monoclonal gammopathy of unknown significance)     Neuropathy of right lower extremity     Obesity     On home oxygen therapy     PFO (patent foramen ovale)     closed after stroke, see cardiology notes care everywhere    PFO (patent foramen ovale)     Pneumonia     Primary hypercoagulable state (H24)     Pulmonary emboli (H) 2013    Pulmonary HTN (H)     Respiratory failure (H)     after stroke    Seizure (H)     Seizures (H)     at age 31    Skin cancer 2014    Sleep apnea     CPAP    Stroke (H) 2012    Umbilical hernia     Wound of right ankle       PAST SURGICAL HISTORY:   has a past surgical history that includes Repair patent foramen ovale (2013); IR Carotid Angiogram (12/26/2012); IR Miscellaneous Procedure (12/26/2012); IR Carotid Angiogram (12/26/2012); IR Miscellaneous Procedure (12/26/2012); IR Miscellaneous Procedure (12/26/2012); Biopsy skin (location); [place] (1985); Colonoscopy (N/A, 4/9/2018); Esophagoscopy, gastroscopy, duodenoscopy (EGD), combined (N/A, 4/9/2018); Xr Sacro Iliac Joint Injection Left (11/2012); Cardiac catheterization (2012); Cv Coronary Angiogram (N/A, 4/12/2019); Cv Right Heart Catheterization (N/A, 4/12/2019); Cv Left Heart Catheterization Without Left Ventriculogram  (Left, 4/12/2019); Asd Repair; Asd Repair; Patent Foramen Ovale Closure; other surgical history; REMV ART CLOT ILIAC-POP,LEG INCIS (Left, 7/22/2019); Coronary Angiogram (N/A, 4/12/2024); Right Heart Catheterization (N/A, 4/12/2024); Intravascular Ultrasound (N/A, 4/12/2024); and Right Heart Catheterization (N/A, 4/19/2024).  FAMILY HISTORY: family history includes Angioedema in her mother; Cerebrovascular Disease in her father; Coronary Artery Disease in her brother; Pulmonary Embolism in her brother.  SOCIAL HISTORY:   reports that she quit smoking about 11 years ago. Her smoking use included cigarettes. She started smoking about 55 years ago. She has a 44 pack-year smoking history. She has never used smokeless tobacco. She reports that she does not drink alcohol and does not use drugs.  Patient's living condition: lives alone    Post Discharge Medication Reconciliation Status:   MED REC REQUIRED  Post Medication Reconciliation Status:  Discharge medications reconciled and changed, see notes/orders       Current Outpatient Medications   Medication Sig Dispense Refill    acetaminophen (TYLENOL) 325 MG tablet Take 650 mg by mouth every 4 hours as needed for pain or fever      albuterol (PROVENTIL) (2.5 MG/3ML) 0.083% neb solution Take 1 vial (2.5 mg) by nebulization every 6 hours as needed for shortness of breath or wheezing OFFICE VISIT NEEDED FOR ANY FURTHER REFILLS 90 mL 11    albuterol (VENTOLIN HFA) 108 (90 Base) MCG/ACT inhaler INHALE 2 PUFFS INTO THE LUNGS EVERY 4-6 HOURS AS NEEDED. 18 g 3    aspirin (ASA) 81 MG chewable tablet Take 81 mg by mouth 2 times daily      bisacodyl (DULCOLAX) 10 MG suppository Place 10 mg rectally daily as needed for constipation      bumetanide (BUMEX) 2 MG tablet Take 1 tablet (2 mg) by mouth daily      calcium carbonate (TUMS) 500 MG chewable tablet Take 1 chew tab by mouth 4 times daily as needed for heartburn      calcium carbonate 500 mg, elemental, (OSCAL) 500 MG tablet Take  1 tablet by mouth at bedtime      cyanocobalamin (CYANOCOBALAMIN) 1000 MCG/ML injection Inject 1 mL into the muscle every 30 days      DIETHYLTOLUAMIDE EX Externally apply topically as needed (bug bit prevention)      dimethicone-zinc oxide (KAYDEN PROTECT) external cream Apply topically 2 times daily as needed for dry skin or other      diphenhydrAMINE (BENADRYL) 25 MG capsule Take 1-2 capsules (25-50 mg) by mouth every 6 hours as needed for itching or allergies 100 capsule 0    empagliflozin (JARDIANCE) 10 MG TABS tablet Take 1 tablet (10 mg) by mouth daily 90 tablet 1    fluticasone-vilanterol (BREO ELLIPTA) 200-25 MCG/ACT inhaler Inhale 1 puff into the lungs daily 30 each 11    guaiFENesin (ROBITUSSIN) 20 mg/mL liquid Take 200 mg by mouth every 4 hours as needed for cough      ipratropium - albuterol 0.5 mg/2.5 mg/3 mL (DUONEB) 0.5-2.5 (3) MG/3ML neb solution Take 1 vial (3 mLs) by nebulization 2 times daily      loperamide (IMODIUM A-D) 2 MG tablet Take 2 mg by mouth 4 times daily as needed for diarrhea      magnesium hydroxide (MILK OF MAGNESIA) 400 MG/5ML suspension Take 30 mLs by mouth daily as needed for constipation or heartburn      menthol-zinc oxide (CALMOSEPTINE) 0.44-20.6 % OINT ointment Apply topically daily as needed for skin protection      multivitamin, therapeutic (THERA-VIT) TABS tablet Take 1 tablet by mouth at bedtime      nystatin (MYCOSTATIN) 808570 UNIT/GM external powder Apply topically 2 times daily as needed for dry skin      Ostomy Supplies (SKIN PREP WIPES) MISC Apply topically as needed (to intact blister stage 2 deep tissue injury)      polyethylene glycol (MIRALAX) 17 GM/Dose powder Take 17 g by mouth daily      senna-docusate (SENOKOT-S/PERICOLACE) 8.6-50 MG tablet Take 2 tablets by mouth 2 times daily as needed for constipation      tiotropium (SPIRIVA RESPIMAT) 2.5 MCG/ACT inhaler INHALE TWO PUFFS BY MOUTH EVERY DAY 12 g 3    Vitamins A & D (VITAMIN A & D) external ointment Apply  "topically 2 times daily as needed       No current facility-administered medications for this visit.       ROS:  10 point ROS of systems including Constitutional, Eyes, Respiratory, Cardiovascular, Gastroenterology, Genitourinary, Integumentary, Musculoskeletal, Psychiatric were all negative except for pertinent positives noted in my HPI.    Vitals:  /82   Pulse 97   Temp 97.9  F (36.6  C)   Resp 19   Ht 1.676 m (5' 6\")   Wt 128.8 kg (284 lb)   SpO2 94%   BMI 45.84 kg/m    Exam:  GENERAL APPEARANCE:  Alert, in no distress, oriented, morbidly obese, cooperative  ENT:  Mouth and posterior oropharynx normal, moist mucous membranes, normal hearing acuity  EYES:  EOM, conjunctivae, lids, pupils and irises normal  NECK:  No adenopathy,masses or thyromegaly  RESP:  respiratory effort and palpation of chest normal, diminished breath sounds bibasilar Right> Left, cough productive with phlegm production  CV:  Palpation and auscultation of heart done , regular rate and rhythm, no murmur, rub, or gallop, peripheral edema 1+ in BLE  ABDOMEN:  normal bowel sounds, soft, nontender, no hepatosplenomegaly or other masses, no guarding or rebound  M/S:   Ambulates with walker  SKIN:  Inspection of skin and subcutaneous tissue baseline, Palpation of skin and subcutaneous tissue baseline, did not assess wound to RLE today as dressing is C/D/I  NEURO:   Cranial nerves 2-12 are normal tested and grossly at patient's baseline, no purposeful movement in upper and lower extremities  PSYCH:  oriented X 3, normal insight, judgement and memory, affect and mood normal    Lab/Diagnostic data:  Most Recent 3 CBC's:  Recent Labs   Lab Test 05/07/24  1024 05/04/24  0921 05/01/24  1054 04/28/24  0643 04/27/24  2323 04/17/24  0800 04/16/24  0641   WBC 8.6  --   --   --  5.6  --  4.9   HGB 8.6*  --   --   --  8.6*  --  8.6*   MCV 89  --   --   --  89  --  82    207 230   < > 214   < > 98*    < > = values in this interval not " displayed.     Most Recent 3 BMP's:  Recent Labs   Lab Test 05/07/24  1024 05/04/24  0921 05/03/24  0829 05/01/24  1940 05/01/24  1054     --  140  --  140   POTASSIUM 3.3* 3.6 3.6   < > 3.2*   CHLORIDE 98  --  98  --  96*   CO2 32*  --  32*  --  33*   BUN 33.7*  --  32.3*  --  27.9*   CR 1.13*  --  1.11*  --  1.11*   ANIONGAP 10  --  10  --  11   LUNA 9.4  --  9.4  --  9.5   *  --  100*  --  113*    < > = values in this interval not displayed.     Most Recent 2 LFT's:  Recent Labs   Lab Test 03/29/24  1453 07/10/23  0944   AST 19 19   ALT 14 15   ALKPHOS 115 84   BILITOTAL 1.4* 0.4     Most Recent 3 BNP's:  Recent Labs   Lab Test 03/29/24  1453   NTBNPI 4,421*     Most Recent D-dimer:  Recent Labs   Lab Test 03/29/24  1453   DD 3.33*     Most Recent Cholesterol Panel:  Recent Labs   Lab Test 11/20/20  1000   CHOL 178      HDL 59   TRIG 97     Most Recent TSH and T4:  Recent Labs   Lab Test 03/29/24  1453   TSH 6.28*   T4 1.49     Most Recent Hemoglobin A1c:  Recent Labs   Lab Test 09/19/22  0946   A1C 5.5     Most Recent Urinalysis:  Recent Labs   Lab Test 06/27/19  0552   COLOR Yellow   APPEARANCE Clear   URINEGLC Negative   URINEBILI Negative   URINEKETONE Negative   SG 1.020   UBLD Small*   URINEPH 6.0   PROTEIN Trace*   UROBILINOGEN <2.0 E.U./dL   NITRITE Negative   LEUKEST Trace*   RBCU 0-2   WBCU 0-5     Most Recent ABG:  Recent Labs   Lab Test 04/12/24  1431   PH 7.45   PO2 48*   PCO2 51*   HCO3 35*   SHILPA 10.0*     Most Recent Anemia Panel:  Recent Labs   Lab Test 05/07/24  1024 03/29/24  1453 09/19/22  0946 04/06/18  1642 04/06/18  0916 04/06/18  0732 05/19/17  1516   WBC 8.6   < > 8.3   < >  --  8.6 9.2   HGB 8.6*   < > 11.4*   < >  --  5.8* 13.4   HCT 29.9*   < > 39.6   < >  --  24.7* 43.2   MCV 89   < > 94   < >  --  78* 92      < > 214   < >  --  272 208   IRON  --   --   --   --   --  14* 48   IRONSAT  --   --   --   --   --   --  15   FEB  --   --   --   --   --   --  330    KWESI  --   --   --   --  18  --  44   B12  --   --  511   < >  --   --  759    < > = values in this interval not displayed.     Magnesium   Date Value Ref Range Status   05/07/2024 2.2 1.7 - 2.3 mg/dL Final       ASSESSMENT/PLAN:    (R53.81) Physical deconditioning  (primary encounter diagnosis)  (M62.81) Generalized muscle weakness  Comment: Acute on chronic. S/T below  Plan:   -Continue Physical therapy and Occupational therapy as directed  -SW to remain involved for safe discharge planning needs    (J96.21) Acute on chronic respiratory failure with hypoxia (H)  (I50.33) Acute on chronic heart failure with preserved ejection fraction (H)  (J44.1) COPD exacerbation (H)  (G47.33) JODI (obstructive sleep apnea)  (I27.20) Severe pulmonary hypertension (H)  (I25.119) Coronary artery disease involving native coronary artery of native heart with angina pectoris (H24)  (J18.9) Community acquired pneumonia, unspecified laterality  (E87.29) Respiratory acidosis  Comment: Chronic. Recent exacerbations. Baseline 5 L NC PTA. CXR 3/29- Cardiomegaly. Ill-defined opacity at the right infrahilar lung could be acute airspace disease. A mass is difficult to exclude. Bilateral vascular congestion may be a degree of pulmonary edema. Potential small right pleural fluid. CT C PE 3/29/2024 without any PE but did demonstrate pulmonary congestion, R pleural effusion; also enlarged main pulmonary artery, as evidence of pulmonary hypertension. TTE 4/1 showing EF 60-65% without WMA, severe RV dilation with moderately decreased RV function, mod-severe TR and pulm HTN. Was initially started on Lasix drip, then transitioned to bumex gtt, then to oral bumex 2mg daily starting on 4/19/24. Cardiology consulted, appreciate their assistance. Signed off 4/19/24. S/p left and right heart cath on 4/12/2024.  Showed normal coronary arteries.  Severely elevated right-sided pressures, severely elevated left-sided filling pressures.  Mixed precapillary and  postcapillary pulmonary hypertension (mPA 59 mmHg, PCWP 28 mmHg) .  Normal cardiac index. S/p RHC on 4/19:  Her filling pressures have improved significantly. Her weight is now normal and her mean RA is mildly elevated. Her pulmonary hypertension has improved but still severe.  Currently on 6 lpm of supplemental oxygen by oximyzer at rest, this may be her new baseline (PTA baseline was 5 lpm by nasal cannula). O2 with exertion at 8-10 lpm the last few days, O2 sat goal at 88-92%. Completed 5-day course of ceftriaxone+doxy on 4/2/24. Completed 8-day steroid taper on 4/7/24. Restarted PTA Breo on 4/8/24.  Wheezing significantly improved. Pulmonary consulted 4/23/24, appreciate their assistance.  VQ scan on 4/26 without evidence of acute PE or chronic thromboembolic disease. Patient to follow-up in the pulmonary hypertension clinic after discharge.   Plan:  -Continue with Bumex 2mg daily. Add HOLD parameters.   -Continue duoneb BID  -Continue albuterol nebulization every 6 hours PRN  -Discontinue ventolin inhaler and continue nebulizations for now  -Continue robitussin PRN  -Change benadryl to 25mg every 6 hours PRN  -Oxygen by Nasal Cannula 6 LPM at rest and 8-10 LPM w/activity LPM to keep oxygen Sats 88 % or greater.  -Continue ICS-LABA Breo ellipta daily.    -Continue spiriva daily   -Started on Jardiance 10 mg daily on 4/13/2024 for HFpEF.  -Lymphedema wraps with lymphedema therapy on site  -2gm Na diet.  -Fluid restriction 1800cc/day.  -Follow up with pulmonology as directed. Call [phone] to schedule. Followed with EMIL Mcmahon at [facility]  -Follow up with cardiology as directed. Scheduled for 5/10/24  -CPAP per home settings  -Daily weights as directed. Hospital weight~284#. Admit weight~not completed yet  -BMP and CBC due 5/10/24    (Z86.718) Personal history of DVT (deep vein thrombosis)  (Z86.711) History of pulmonary embolism  Comment: Acute on chronic. *CT chest on 3/29/2024 was negative  for pulmonary embolism. Per pharmacy med rec, she is no longer on anticoagulation with warfarin. Prophylactic lovenox 40 mg BID while in hospital.  Plan:   -Continue asa BID as directed  -Monitor for worsening s/symptoms of concerns  -BMP and CBC due 5/10/24    (R04.2) Hemoptysis  Comment: Acute/mild. Reported orange sputum production at home, was producing small amounts of rober blood 4/1/24. Hospital Admission CT chest negative for PE, masses. Resumed aspirin 4/3 and reported small amount of hemoptysis 4/4, but no further hemoptysis after that.  Prophylactic lovenox started on 4/8/2024.  Held for cardiac catheterization, then resumed.  Plan:   -Monitor for worsening s/symptoms of concerns.   -BMP and CBC due 5/10/24    (N17.9) SONAL (acute kidney injury) (H24)  (N18.30) Stage 3 chronic kidney disease, unspecified whether stage 3a or 3b CKD (H)  Comment: Acute on chronic. Suspect congestive nephropathy. Admission serum creatinine was 1.33, historic baseline 0.87 to 1.08 in 2020 to 2023. Avoid non-steroidal anti-inflammatory drugs (NSAIDs). Creatinine stabilized around 1.1.  Plan:   -Renally dose medications  -Avoid nephrotoxins  -BMP and CBC due 5/10/24  -Monitor urinary status    (E87.6) Hypokalemia  Comment: Acute. Noted per chart review. Due to diuresis. Was on KCl 40mEq TID while on IV diuresis which has since been discontinued.   Plan:   -Monitor for worsening s/symptoms of concerns  -BMP and CBC due 5/10/24    (L97.301) Ulcer of ankle, unspecified laterality, limited to breakdown of skin (H)  Comment: Acute on chronic. WOCn reviewed in hospital. See pictures from 4/17, there was concern for developing cellulitis. Completed 7 day course of antibiotics (ceftriaxone --> keflex) on 4/26/2024.   Plan:   -Monitor for worsening s/symptoms of concerns  -BMP and CBC due 5/10/24  -Continue wound care as directed:  -Change Right lateral ankle wound: 3x weekly and PRN for soiled/loose dressing to assist with lymphedema  therapy needs  Wrap right lower leg with Vashe-soaked gauze. Allow to soak for about two minutes.  Unwrap leg and gently wipe away any debris. Allow skin to dry.  Use a tongue depressor to apply Medihoney (950241) to wound.  Apply a small piece of Adaptic (oil emulsion gauze) over Medihoney.  Cover with a 4x4 gauze and secure with Kerlix gauze and Medipore tape.  Apply Spandigrip to right lower extremity.     (L30.9) Dermatitis  Comment: Acute on chronic. S/T morbid size  Plan:   -Monitor for worsening s/symptoms of concerns  -Continue current wound care as directed:  *Right breast fold rash: BID   Cleanse skin with warm water. Pat dry.  Apply nystatin powder    (D69.6) Thrombocytopenia (H24)  Comment: Acute on chronic. Platelets normal initially, trended down to 98 on 4/16/2024, then trended up and within normal limits since 4/22/2024.  Plan:   -Monitor for worsening s/symptoms of concerns  -BMP and CBC due 5/10/24    (E44.0) Moderate malnutrition (H24)  Comment: Acute on chronic. In context with recent hospitalization  Plan:   -Dietician to remain involved  -Monitor weight trends  -BMP and CBC due 5/10/24    (E66.01) Morbid obesity (H)  (E78.2) Mixed hyperlipidemia  Comment: Chronic. Diet controlled. Not on statin  Plan:   -Encourage ongoing weight loss  -Monitor for worsening s/symptoms of concerns  -Monitor weight trends  -BMP and CBC due 5/10/24    (E03.9) Hypothyroidism, unspecified type  Comment: Acute on chronic. Med rec did not show any thyroid supplement. TSH 6.28 and free T4 1.49 on 3/29/24.  Plan:   -Monitor for worsening s/symptoms of concerns  -Continue without medication at this time  -BMP and CBC due 5/10/24  -Trend thyroid levels in 4-6 weeks. Due in 3-4 weeks    (Z86.73) History of CVA (cerebrovascular accident)  Comment: Chronic. Per chart review. Former smoker, quit in 2012.   Plan:   -Monitor for worsening s/symptoms of concerns  -Continue aspirin 81mg BID as directed  -Poor compliance with  statin  -Follow up with cardiology as directed  -BMP and CBC due 5/10/24    (D47.2) MGUS (monoclonal gammopathy of unknown significance)  Comment: Chronic. Noted per chart review  Plan:   -Monitor for worsening s/symptoms of concerns  -Follow up with PCP post TCU  -BMP and CBC due 5/10/24    (E53.8) Vitamin B12 deficiency (non anemic)  Comment: Chronic. Noted per chart review  Plan:   -Monitor for worsening s/symptoms of concerns  -Continue vitamin B12 supplementation as directed of 1mL every month as directed- Last given today 5/8  -BMP and CBC due 5/10/24    (Z86.69) Hx of seizure disorder  Comment: Noted in medical record, does not appear to be on ant anti-epileptic medications  Plan:   -Monitor for seizure concerns  -Monitor for worsening s/symptoms of concerns  -Neurology PRN  -BMP and CBC due 5/10/24    (K59.01) Constipation  Comment: Acute on chronic.   Plan:  -Monitor BM patterns  -Continue miralax daily  -Continue senna S 2 tabs BID PRN.  -Bisacodyl 10mg rectal daily PRN  -TUMS PRN  -PRN imodium for diarrhea concerns  -MOM daily PRN    49 minutes spent on the date of the encounter doing chart review, history and exam, PMH, documentation and further activities as noted above. Collaboration with nursing staff on site, clinical managers, and therapies were completed on site today.       Electronically signed by:  Dr. Pamella Horner DNP, APRN, FNP-C, WCS-C, EDS-C

## 2024-05-07 NOTE — PLAN OF CARE
Physical Therapy Discharge Summary    Reason for therapy discharge:    Discharged to transitional care facility.    Progress towards therapy goal(s). See goals on Care Plan in [facility] electronic health record for goal details.  Goals partially met.  Barriers to achieving goals:   discharge from facility.    Therapy recommendation(s):    Continued therapy is recommended.  Rationale/Recommendations: Pt moving fairly well, however pt's activity tolerance is significantly impacted by her resp. status. requires frequent rest breaks. Pt hoping for TCU, may benefit from TCU to increase her overall activity tolerance prior to d/c home. If d/c home, recommend home PT services and may benefit from increased assistance for household tasks (cooking, bathing) due to low activity tolerance.  PT Brief overview of current status: SBA w/ 4WW, limited by SPO2    Recommendation above provided by last treating therapist.

## 2024-05-07 NOTE — PLAN OF CARE
Discharge    Patient discharged to TCU via stretcher with [facility] transportation  Care plan note  Summary: COPD/HF exacerbation  DATE & TIME: 5/7/24 AM shift  Cognitive Concerns/ Orientation: A&Ox4, calm, cooperative   BEHAVIOR & AGGRESSION TOOL COLOR: Green             ABNL VS/O2: VSS on 7L O2 via oxymizer cannula while at rest, requires 8L-10L when ambulating in hallway. Baseline 5L O2 use. Uses home CPAP when napping and at bedtime. Bed perfusion, fingers cold.   MOBILITY: up ab henna, SBA in halls with GB/walker  PAIN MANAGMENT: Denies  DIET: 2g NA, 1800 mL FR, fair appetite this shift.   BOWEL/BLADDER: Continent. Up to bathroom or BSC  ABNL LAB/BG:K 3.3, replaced  DRAIN/DEVICES: L PIV SL-removed at discharge  SKIN: Scattered bruising. BLE jovon with edema +1-2+ RLE>LLE, compression stockings on. R ankle wound-CDI-dressing changed by WOC RN  TESTS/PROCEDURES: None  D/C DATE: discharge to TCU today  OTHER IMPORTANT INFO:  LS-diminished. Encouraged use of IS. CERRATO. On scheduled Nebs. WOC/PT following              Revision History      Listed belongings gathered and given to patient (including from security/pharmacy). Yes  Care Plan and Patient education resolved: Yes  Prescriptions if needed, hard copies sent with patient  NA  Medication Bin checked and emptied on discharge Yes  SW/care coordinator/charge RN aware of discharge: Yes

## 2024-05-07 NOTE — PLAN OF CARE
"Summary: COPD/HF exacerbation  NOTE: Minimal assessments during this shift - pt. \"I just want to sleep\"  Cognitive Concerns/ Orientation: A&Ox4, calm, cooperative   BEHAVIOR & AGGRESSION TOOL COLOR: Green   ABNL VS/O2: VSS on 7L O2 via oxymizer cannula while at rest, requires 8L when ambulating in hallway. Baseline 5L O2 use. Uses home CPAP when napping and at bedtime   MOBILITY: up ab henna, SBA in halls with GB/walker; not OOB this shift  PAIN MANAGMENT: Denies  DIET: 2g NA, 1800 mL FR, no intake this shift  BOWEL/BLADDER: Continent. Up to bathroom or BSC  ABNL LAB/BG: n/a  DRAIN/DEVICES: L PIV SL  SKIN: Scattered bruising. BLE jovon with edema +1-2+ RLE>LLE, compression stockings on. R ankle wound-CDI  TESTS/PROCEDURES: None  D/C DATE: Pending TCU placement,  referrals sent. SW following   WOC/PT following    "

## 2024-05-07 NOTE — PROGRESS NOTES
Care Management Follow Up    Length of Stay (days): 39    Expected Discharge Date: 05/08/2024     Concerns to be Addressed: discharge planning     Patient plan of care discussed at interdisciplinary rounds: Yes    Anticipated Discharge Disposition: Transitional Care,     Anticipated Discharge Services:    Anticipated Discharge DME:      Patient/family educated on Medicare website which has current facility and service quality ratings: yes  Education Provided on the Discharge Plan: Yes  Patient/Family in Agreement with the Plan: yes    Referrals Placed by CM/SW: Post Acute Facilities  Private pay costs discussed: Not applicable    Additional Information:  PT and OT recommend patient functioning well enough to go home with home therapy. Patient prefers a TCU admission in order to build more endurance and hopefully decrease her oxygen needs.   Multiple referrals made today. [facility] and [facility] in their admission office states they can accept patient's who use up to 10 liters of oxygen.  Discussed case with Dr Ruiz.  Will wait to hear from [facility] this afternoon.    TAYLOR WashingtonSW

## 2024-05-07 NOTE — DISCHARGE SUMMARY
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   Coronary artery disease  Acute COPD exacerbation   CAP  Respiratory acidosis  Weakness and deconditioning  Hemoptysis, mild, resolved  Acute kidney injury, improved  Chronic kidney disease, stage IIIa   Hypokalemia - resolved   History of deep venous thrombosis and pulmonary embolism  Skin ulcer, right lateral ankle  Suspected cellulitis  Obstructive sleep apnea  Mild thrombocytopenia, resolved  Moderate Malnutrition  Hyperlipidemia  Hypothyroidism  Morbid obesity  History of cerebrovascular disease with prior stroke  History of nicotine dependence  History of monoclonal gammopathy of undetermined significance  History of B12 deficiency  History of seizure disorder    Clinically Significant Risk Factors     # Moderate Malnutrition: based on nutrition assessment      Follow-ups Needed After Discharge   Follow-up Appointments     Follow Up and recommended labs and tests      1. Follow up with TCU provider.  2. Follow up with [facility] on discharge.  3. Follow up with [facility]- Pulmonary   Hypertension Clinic in several weeks. Please schedule for patient.  4. Follow up with PCP 1-2 weeks after discharge from TCU.  5. BMP and CBC with platelet count in 3-4 days.            Discharge Disposition   Discharged to short-term care facility  Condition at discharge: Stable    Hospital Course   Linda Otero is a 64 year old female who was admitted on 3/29/2024.  She has a past medical history of  COPD, chronic respiratory failure on home oxygen with history of hypercapnia, nicotine dependence, HFpEF, DVT/PE on past warfarin anticoagulation, recent hemoptysis, morbid obesity, CKD stage III, CAD, HLD, HTN, hypothyroidism, MGUS, seizure disorder, CVA, admitted 3/29/2024 with acute on chronic respiratory failure.     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 Ill-defined opacity at the right infrahilar lung could be acute airspace disease. A mass is difficult to exclude. Bilateral vascular congestion may be a degree of pulmonary edema. Potential small right pleural fluid.  *CT C PE 3/29/2024 without any PE but did demonstrate pulmonary congestion, R pleural effusion; also enlarged main pulmonary artery, as evidence of pulmonary hypertension.  *TTE 4/1 showing EF 60-65% without WMA, severe RV dilation with moderately decreased RV function, mod-severe TR and pulm HTN.  *Was initially started on Lasix drip, then transitioned to bumex gtt, then to oral bumex 2mg daily starting on 4/19/24.  *Cardiology consulted, appreciate their assistance. Signed off 4/19/24.  *S/p left and right heart cath on 4/12/2024.  Showed normal coronary arteries.  Severely elevated right-sided pressures, severely elevated left-sided filling pressures.  Mixed precapillary and postcapillary pulmonary hypertension (mPA 59 mmHg, PCWP 28 mmHg) .  Normal cardiac index.  *S/p RHC on 4/19:  Her filling pressures have improved significantly. Her weight is now normal and her mean RA is mildly elevated. Her pulmonary hypertension has improved but still severe.  - Hospital course prolonged due higher than baseline oxygen requirements  especially with exertion which was a barrier to TCU placement.  - Currently on 6 lpm of supplemental oxygen by oximyzer at rest, this may be her new baseline (PTA baseline was 5 lpm by nasal cannula). O2 with exertion at 8-10 lpm the last few days, O2 sat goal at 88-92%.  - Aggressive IS, flutter valve, mobilize.  - Ambulate QID with assist, up to chair for all meals.  - Patient subjectively feels she is slowly improving, encouraged to continue activity in her room.  - Diuresed as noted above, plan discharge on Bumex 2mg BID. Monitor daily weights at TCU.  - Started on Jardiance 10 mg daily on 4/13/2024 for HFpEF.  - Lymphedema wraps.  - 2gm Na diet.  - Fluid restriction 1800cc/day.  - Patient to follow-up in the pulmonary hypertension clinic.   - Pulmonary rehab on discharge.  - Care transitions assisted with discharge planning.  - Discharge to TCU today with follow up as noted below.     Acute COPD exacerbation   CAP  Respiratory acidosis  [PTA on Breo, Spiriva respimat, Alb inh and neb prn]  - Completed 5-day course of ceftriaxone+doxy on 4/2/24.  - Completed 8-day steroid taper on 4/7/24.  - Restarted PTA Breo on 4/8/24.  Wheezing significantly improved.  - Pulmonary consulted 4/23/24, appreciate their assistance.  Continue ICS-LABA (Breo).  Added Incruse daily, should discharge on [id number] per formulary. DuoNebs BID. May need to be on higher oxygen requirements even at discharge.  - VQ scan on 4/26 without evidence of acute PE or chronic thromboembolic disease.  - Patient should follow-up in the pulmonary hypertension clinic after discharge.     Weakness and deconditioning  - PT/OT consulted, recommended TCU.  - Care transitions followed and assisted with discharge planning.  - Arrangements have been made for transfer to a TCU that can meet her supplemental oxygen requirements.     Hemoptysis, mild, resolved  *Reported orange sputum production at home, was producing small amounts of rober blood  4/1/24.  *Admission CT chest negative for PE, masses.  - Resumed aspirin 4/3 and reported small amount of hemoptysis 4/4, but no further hemoptysis after that.  Prophylactic lovenox started on 4/8/2024.  Held for cardiac catheterization, then resumed.     Acute kidney injury, improved  Chronic kidney disease, stage IIIa   *Suspect congestive nephropathy. Admission serum creatinine was 1.33, historic baseline 0.87 to 1.08 in 2020 to 2023.  - Avoid non-steroidal anti-inflammatory drugs (NSAIDs).  - Creatinine stabilized around 1.1. Creatinine was 1.13 on day of discharge.  - Recheck at outpatient follow up.      Hypokalemia - resolved   *Due to diuresis. Was on KCl 40mEq TID while on IV diuresis which has since been discontinued.   - Resolved with replacement.     History of deep venous thrombosis and pulmonary embolism  *CT chest on 3/29/2024 was negative for pulmonary embolism.  *Per pharmacy med rec, she is no longer on anticoagulation with warfarin.  - Prophylactic lovenox 40 mg BID while in hospital.     Skin ulcer, right lateral ankle  Concern for cellulitis?  - Wound nurse (WOC) consulted, appreciate their assistance.  - See pictures from 4/17, there was concern for developing cellulitis. Completed 7 day course of antibiotics (ceftriaxone --> keflex) on 4/26/2024.  - Continue wound cares as directed by wound care nurse.    Obstructive sleep apnea  - Continue CPAP per home settings.     Mild thrombocytopenia, resolved  Platelets normal initially, trended down to 98 on 4/16/2024, then trended up and within normal limits since 4/22/2024.  - Recheck at outpatient follow-up.    Moderate Malnutrition  In the context of acute illness, chronic illness or disease.  - Nutrition consulted.     Hyperlipidemia  Diet-controlled.  - Follow-up with PCP.    Hypothyroidism  Med rec did not show any thyroid supplement.  - TSH 6.28 and free T4 1.49 on 3/29/24.  - Follow up with PCP to recheck labs after she recovers from her acute  illness.    Morbid obesity  Body mass index is 46.03 kg/m .  - Follow up with PCP regarding long term management.    History of cerebrovascular disease with prior stroke  - Continue PTA aspirin.  - She is not on a statin.    History of nicotine dependence  - Former smoker, quit in 2012.    History of monoclonal gammopathy of undetermined significance  - Noted, follow-up with PCP after discharge from TCU.    History of B12 deficiency  - Noted, continue PTA B12 replacement.    History of seizure disorder  - Noted in medical record, does not appear to be on any anti-epileptic medication.    Consultations This Hospital Stay   PHARMACY TO DOSE WARFARIN  WOUND OSTOMY CONTINENCE NURSE  IP CONSULT  PHYSICAL THERAPY ADULT IP CONSULT  CARE MANAGEMENT / SOCIAL WORK IP CONSULT  NUTRITION SERVICES ADULT IP CONSULT  CARDIOLOGY IP CONSULT  VASCULAR ACCESS ADULT IP CONSULT  LYMPHEDEMA THERAPY IP CONSULT  OCCUPATIONAL THERAPY ADULT IP CONSULT  VASCULAR ACCESS ADULT IP CONSULT  VASCULAR ACCESS ADULT IP CONSULT  VASCULAR ACCESS ADULT IP CONSULT  SPIRITUAL HEALTH SERVICES IP CONSULT  SOCIAL WORK IP CONSULT  VASCULAR ACCESS ADULT IP CONSULT  SPIRITUAL HEALTH SERVICES IP CONSULT  PULMONARY IP CONSULT  SOCIAL WORK IP CONSULT  PHYSICAL THERAPY ADULT IP CONSULT  PHYSICAL THERAPY ADULT IP CONSULT  OCCUPATIONAL THERAPY ADULT IP CONSULT    Code Status   Full Code    Time Spent on this Encounter   I, Ady Ruiz MD, personally saw the patient today and spent greater than 30 minutes discharging this patient.       Ady Ruiz MD  [place] [address] [facility] [address] [address]  [facility] [place] [address]  Phone: [phone]  ______________________________________________________________________    Physical Exam   Vital Signs: Temp: 97.8  F (36.6  C) Temp src: Oral BP: 130/81 Pulse: 93   Resp: 16 SpO2: 91 % O2 Device: Nasal cannula Oxygen Delivery: 7 LPM  Weight: 284 lbs 11.2 oz  Constitutional: awake, alert,  cooperative, no apparent distress, sitting up in a chair  Respiratory: no increased work of breathing, clear to auscultation bilaterally, no crackles or wheezing  Cardiovascular: regular rate and rhythm, normal S1 and S2, no murmur noted  GI: normal bowel sounds, soft, non-distended, non-tender  Skin: warm, dry  Musculoskeletal: bilateral lower extremity lymphedema, right greater than left  Neurologic: awake, alert, answers questions appropriately, moves all extremities        Primary Care Physician   Jayy Henson    Discharge Orders      Follow-Up with Cardiology [facility]      Follow-Up with Cardiology [facility]      When to call - Contact the Heart Clinic    You may experience symptoms that require follow-up before your scheduled appointment. Contact the Heart Clinic if you develop: Fever over 100.4o Fahrenheit, that lasts more than one day; Redness, heat, or pus at the puncture site; Change in color or temperature in your groin or leg.     When to call - Reasons to Call 911    If your groin starts to bleed or begins to swell suddenly after leaving the hospital, lie flat and apply firm pressure just above the puncture site for 15 minutes.  If bleeding continues, call 9-1-1.     Comfort and Pain Management - Pain after Surgery    Pain after surgery is normal and expected.  Your leg may be sore or stiff for a few days, and your pain will improve with time. You may take Tylenol or a pain medicine recommended by your Cardiologist.     Comfort and Pain Management - Bruising after Surgery    Bruising around the groin area is normal.  It may take 2-3 weeks for this to go away.  It is normal for the bruised area to turn green and/or yellow as it is healing.  A small lump may also be present and may last 2-3 months.     General info for SNF    Length of Stay Estimate: Short Term Care: Estimated # of Days <30  Condition at Discharge: Improving  Level of care:skilled   Rehabilitation Potential: Good  Admission H&P remains valid and  up-to-date: Yes  Recent Chemotherapy: N/A  Use Nursing Home Standing Orders: Yes     Mantoux instructions    Give two-step Mantoux (PPD) Per Facility Policy Yes     Intake and output    Every shift     Daily weights    Call Provider for weight gain of more than 2 pounds per day or 5 pounds per week.     Wound care (specify)    Site:   Right lateral ankle wound: Every other day and PRN for soiled/loose dressing  1. Wrap right lower leg with Vashe-soaked gauze. Allow to soak for about two minutes.  2. Unwrap leg and gently wipe away any debris. Allow skin to dry.  3. Use a tongue depressor to apply Medihoney ([id number]) to wound.  4. Apply a small piece of Adaptic (oil emulsion gauze) over Medihoney.  5. Cover with a 4x4 gauze and secure with Kerlix gauze and Medipore tape.  6. Apply Spandigrip to right lower extremity.     Activity - Up with nursing assistance     CPAP - Continue Home CPAP    Continue Home CPAP per home equipment settings.     Follow Up and recommended labs and tests    1. Follow up with TCU provider.  2. Follow up with [facility] on discharge.  3. Follow up with [facility]- Pulmonary Hypertension Clinic in several weeks. Please schedule for patient.  4. Follow up with PCP 1-2 weeks after discharge from TCU.  5. BMP and CBC with platelet count in 3-4 days.     Reason for your hospital stay    Acute on chronic hypoxic respiratory failure, multifactorial: HFpEF exacerbation, COPD exacerbation, JODI, acute right heart failure with mod-severe TR, severe pulmonary hypertension, mixed WHO group 2 and 3     Activity - Up with assistive device     Full Code     Physical Therapy Adult Consult    Evaluate and treat as clinically indicated.    Reason:  deconditioned     Occupational Therapy Adult Consult    Evaluate and treat as clinically indicated.    Reason:  deconditioned     Oxygen (SNF/TCU) Discharge    Currently on 6 lpm at rest and 8-10 lpm with activity as  of 5/7/2024.     Fall precautions     Diet    Follow this diet upon discharge: Orders Placed This Encounter      Snacks/Supplements Adult: Expedite Bottle; Between Meals      Fluid restriction 1800 ML FLUID      Combination Diet Regular Diet; 2 gm NA Diet     Significant Results and Procedures   Most Recent 3 CBC's:  Recent Labs   Lab Test 05/07/24  1024 05/04/24  0921 05/01/24  1054 04/28/24  0643 04/27/24  2323 04/17/24  0800 04/16/24  0641   WBC 8.6  --   --   --  5.6  --  4.9   HGB 8.6*  --   --   --  8.6*  --  8.6*   MCV 89  --   --   --  89  --  82    207 230   < > 214   < > 98*    < > = values in this interval not displayed.     Most Recent 3 BMP's:  Recent Labs   Lab Test 05/07/24  1024 05/04/24  0921 05/03/24  0829 05/01/24  1940 05/01/24  1054     --  140  --  140   POTASSIUM 3.3* 3.6 3.6   < > 3.2*   CHLORIDE 98  --  98  --  96*   CO2 32*  --  32*  --  33*   BUN 33.7*  --  32.3*  --  27.9*   CR 1.13*  --  1.11*  --  1.11*   ANIONGAP 10  --  10  --  11   LUNA 9.4  --  9.4  --  9.5   *  --  100*  --  113*    < > = values in this interval not displayed.     Results for orders placed or performed during the hospital encounter of 03/29/24   XR Chest Port 1 View    Narrative    CHEST PORTABLE ONE VIEW   3/29/2024 3:19 PM     HISTORY: Shortness of breath.     COMPARISON: None.      Impression    IMPRESSION: Cardiomegaly. Ill-defined opacity at the right infrahilar  lung could be acute airspace disease. A mass is difficult to exclude.  Bilateral vascular congestion may be a degree of pulmonary edema.  Potential small right pleural fluid. Recommend CT chest for further  assessment.     LIVIER MERAZ MD         SYSTEM ID:  [id number]   CT Chest Pulmonary Embolism w Contrast    Narrative    EXAM: CT CHEST PULMONARY EMBOLISM W CONTRAST  LOCATION: [facility]  DATE: 3/29/2024    INDICATION: Elevated d-dimer and shortness of breath.  COMPARISON: CT abdomen/pelvis  09/26/2019.  TECHNIQUE: CT chest pulmonary angiogram during arterial phase injection of IV contrast. Multiplanar reformats and MIP reconstructions were performed. Dose reduction techniques were used.   CONTRAST: 83 mL Isovue-370.    FINDINGS:    ANGIOGRAM CHEST: No acute pulmonary embolism. Arch and pulmonary artery, suggesting underlying pulmonary hypertension. Associated, asymmetric right heart enlargement.    Thoracic aorta is normal in course and caliber. Mild atheromatous disease.    LUNGS AND PLEURA: Trachea and large airways are patent. Mild pulmonary interstitial edema. Additional mosaic attenuation of the pulmonary parenchyma, suggesting small vessels or small airways disease. Mild dependent atelectasis.     No suspicious pulmonary nodule.    No pneumothorax.     Small right pleural effusion.     MEDIASTINUM/AXILLAE: Mildly enlarged right paratracheal lymph node measures 14 mm in short axis, series 4 image 34. Subcentimeter bilateral hilar lymph nodes, not meeting size criteria for lymphadenopathy.     Thoracic esophagus is unremarkable.      No axillary lymphadenopathy.    Chest wall is unremarkable.    Cardiomegaly, with asymmetric right heart enlargement. Small pericardial effusion. Atrial septal occluder device.    CORONARY ARTERY CALCIFICATION: None.    UPPER ABDOMEN: Diffuse hepatic steatosis. Small to moderate volume ascites.    MUSCULOSKELETAL: No suspicious abnormality.    OTHER: No additionally suspicious abnormality.      Impression    IMPRESSION:  1.  No acute pulmonary embolism.  2.  Mild pulmonary interstitial edema, with associated small right pleural effusion.  3.  Enlarged main pulmonary artery, as evidence of pulmonary hypertension. Associated asymmetric right heart enlargement.  4.  Small to moderate volume ascites.  5.  Hepatic steatosis.       XR Chest Port 1 View    Narrative    CHEST ONE VIEW April 24, 2024 10:44 AM     HISTORY: Continued oxygen needs.    COMPARISON: CT chest  3/29/2024. Chest radiograph 3/29/2024.      Impression    IMPRESSION: Similar cardiomegaly. Improved right pleural effusion.  Persistent pulmonary vascular congestion and mild interstitial  pulmonary edema. No new focal consolidation or pneumothorax.    SAVANAH ROMEO MD         SYSTEM ID:  [id number]   NM Lung Scan Ventilation and Perfusion    Narrative    EXAM: NM LUNG SCAN VENTILATION AND PERFUSION  LOCATION: [facility]  DATE: 4/26/2024    INDICATION: Evaluate for chronic PE; Pulmonary embolism; Female sex; Not pregnant; No prior imaging in the last 24 hours; Pulmonary Embolism Rule Out Criteria (PERC) score > 0; Revised Burnet Score (RGS) not >= 11; No D dimer result available; D dimer   not ordered. Pulmonary hypertension.  COMPARISON: Chest x-ray 04/26/2024.  TECHNIQUE: 55.7 mCi Tc-99m DTPA inhaled. 6.5 mCi Tc-99m MAA, IV. Standard planar imaging during perfusion and ventilation portions of exam.    FINDINGS: Normal pulmonary perfusion. Heterogeneous pulmonary ventilation. No mismatched segmental perfusion defect.      Impression    IMPRESSION:    1.  No evidence of acute pulmonary embolism or chronic pulmonary thromboembolic disease.  2.  Heterogeneous pulmonary ventilation which can be seen with COPD.   XR Chest 2 Views    Narrative    CHEST TWO VIEWS 4/26/2024 1:41 PM     HISTORY: Shortness of breath.    COMPARISON: April 24, 2024       Impression    IMPRESSION: Minimal right effusion. Minimal associated probable  compressive atelectasis. Left lung grossly clear. No definite left  effusion. The cardiac silhouette is prominent but similar to previous.  Pulmonary vasculature is unremarkable.    ROMINA DELGADO MD         SYSTEM ID:  [id number]   Echocardiogram Complete     Value    LVEF  60-65%    Narrative    [id number]  [id number]  [id number]  [id number]^SARAH^TAB^MOE     [facility]  Echocardiography Laboratory  [address] [place] [address]     Name:  LOKESH MCKEON  MRN: [id number]  : 1960  Study Date: 2024 07:50 AM  Age: 64 yrs  Gender: Female  Patient Location: [facility]  Reason For Study: Dyspnea  Ordering Physician: TAB SMITH  Referring Physician: Jayy Henson  Performed By: Faraz Lincoln VIOLETA     BSA: 2.5 m2  Height: 66 in  Weight: 345 lb  HR: 91  BP: 127/78 mmHg  ______________________________________________________________________________  Procedure  Complete Portable Echo Adult.  ______________________________________________________________________________  Interpretation Summary     Patient terminated exam prior to completion.  There is mild concentric left ventricular hypertrophy.  Left ventricular systolic function is normal.  The visual ejection fraction is 60-65%.  Flattened septum is consistent with RV pressure overload however unable to  estimate PA systolic pressure.  The right ventricle is severely dilated. Moderately decreased right  ventricular systolic function  There is moderate to mod-severe (2-3+) tricuspid regurgitation.  Intact atrial septum. Closure device well seated.  Inferior vena cava not well visualized for estimation of right atrial  pressure.  There is no pericardial effusion.     Compared to study dated 2017, there is severe right ventricular  enlargement, dysfunction and evidence of pulmonary hypertension.  ______________________________________________________________________________  Left Ventricle  The left ventricle is normal in size. There is mild concentric left  ventricular hypertrophy. Left ventricular systolic function is normal. The  visual ejection fraction is 60-65%. Left ventricular diastolic function is not  assessable. Flattened septum is consistent with RV pressure overload.     Right Ventricle  The right ventricle is severely dilated. Moderately decreased right  ventricular systolic function.     Atria  Normal left atrial size. The right atrium is severely dilated. Intact  atrial  septum. Closure device well seated.     Mitral Valve  There is mild mitral annular calcification. There is mild (1+) mitral  regurgitation.     Tricuspid Valve  The tricuspid valve is not well visualized. Right ventricular systolic  pressure could not be approximated due to inadequate tricuspid regurgitation.  There is moderate to mod-severe (2-3+) tricuspid regurgitation.     Aortic Valve  The aortic valve is trileaflet with aortic valve sclerosis.     Pulmonic Valve  The pulmonic valve is not well visualized.     Vessels  The aortic root is normal size. Normal size ascending aorta. Inferior vena  cava not well visualized for estimation of right atrial pressure.     Pericardium  There is no pericardial effusion.     ______________________________________________________________________________  MMode/2D Measurements & Calculations  IVSd: 1.2 cm  LVIDd: 4.3 cm  LVIDs: 2.8 cm  LVPWd: 1.3 cm  FS: 36.0 %  LV mass(C)d: 202.8 grams  LV mass(C)dI: 80.4 grams/m2     Ao root diam: 3.2 cm  LA dimension: 5.3 cm  asc Aorta Diam: 3.6 cm  LA/Ao: 1.7  Ao root diam index Ht(cm/m): 1.9  Ao root diam index BSA (cm/m2): 1.3  Asc Ao diam index BSA (cm/m2): 1.4  Asc Ao diam index Ht(cm/m): 2.1  LA Volume (BP): 59.6 ml  LA Volume Index (BP): 23.7 ml/m2  RV Base: 6.9 cm     RWT: 0.61     Doppler Measurements & Calculations  MV E max anastacia: 117.0 cm/sec  MV A max anastacia: 129.0 cm/sec  MV E/A: 0.91  MV dec slope: 810.0 cm/sec2  MV dec time: 0.14 sec  PA acc time: 0.08 sec  E/E' av.1  Lateral E/e': 8.8  Medial E/e': 13.4     ______________________________________________________________________________  Report approved by: Jazzmine Vera 2024 12:43 PM         Cardiac Catheterization    Narrative      IVUS was performed on the lesion.    Ultrasound (IVUS) was performed.    Right sided filling pressures are severely elevated.    Left sided filling pressures are severely elevated.    Severely elevated pulmonary artery  hypertension.    Normal cardiac output level.    Hemodynamic data has been modified in Epic per physician review.    1.  Angiographically normal coronary arteries  2.  Severely elevated right-sided filling pressures (RA 16 mmHg)  3.  Severely elevated left-sided filling pressures (PCWP 28 mmHg)  4.  Severe mixed precapillary and postcapillary pulmonary hypertension   (mean PA 59 mmHg, PCWP 28 mmHg, PVR 4.69WU)  5.  Normal cardiac output/cardiac index (Elsa CO/CI 7.25/3.04)     Cardiac Catheterization    Narrative      Right sided filling pressures are mildly elevated.    Left sided filling pressures are normal.    Severely elevated pulmonary artery hypertension.    Normal cardiac output level.    Mildly elevated right side fillng pressure and normal left side filling   pressure (with marked respiratory variability due to obese body habitus).  RA mean 11mmHg  PCWP mean 12mmHg  Severe pulmonary hypertension  PA 76/28, mean 50mmHg       Discharge Medications   Current Discharge Medication List        START taking these medications    Details   bumetanide (BUMEX) 2 MG tablet Take 1 tablet (2 mg) by mouth daily    Associated Diagnoses: Acute on chronic respiratory failure with hypoxia (H)      empagliflozin (JARDIANCE) 10 MG TABS tablet Take 1 tablet (10 mg) by mouth daily  Qty: 90 tablet, Refills: 1    Associated Diagnoses: Acute on chronic congestive heart failure, unspecified heart failure type (H)      ipratropium - albuterol 0.5 mg/2.5 mg/3 mL (DUONEB) 0.5-2.5 (3) MG/3ML neb solution Take 1 vial (3 mLs) by nebulization 2 times daily    Associated Diagnoses: Acute on chronic respiratory failure with hypoxia (H)      polyethylene glycol (MIRALAX) 17 GM/Dose powder Take 17 g by mouth daily    Associated Diagnoses: Constipation, unspecified constipation type      senna-docusate (SENOKOT-S/PERICOLACE) 8.6-50 MG tablet Take 2 tablets by mouth 2 times daily as needed for constipation    Associated Diagnoses: Constipation,  unspecified constipation type           CONTINUE these medications which have NOT CHANGED    Details   acetaminophen (TYLENOL) 325 MG tablet Take 650 mg by mouth every 4 hours as needed for pain or fever      albuterol (PROVENTIL) (2.5 MG/3ML) 0.083% neb solution Take 1 vial (2.5 mg) by nebulization every 6 hours as needed for shortness of breath or wheezing OFFICE VISIT NEEDED FOR ANY FURTHER REFILLS  Qty: 90 mL, Refills: 11    Associated Diagnoses: COPD, severe (H)      albuterol (VENTOLIN HFA) 108 (90 Base) MCG/ACT inhaler INHALE 2 PUFFS INTO THE LUNGS EVERY 4-6 HOURS AS NEEDED.  Qty: 18 g, Refills: 3    Comments: Pharmacy may dispense brand covered by insurance (Proair, or proventil or ventolin or generic albuterol inhaler)  Associated Diagnoses: Chronic bronchitis, unspecified chronic bronchitis type (H)      aspirin (ASA) 81 MG chewable tablet Take 81 mg by mouth 2 times daily      bisacodyl (DULCOLAX) 10 MG suppository Place 10 mg rectally daily as needed for constipation      calcium carbonate (TUMS) 500 MG chewable tablet Take 1 chew tab by mouth 4 times daily as needed for heartburn      calcium carbonate 500 mg, elemental, (OSCAL) 500 MG tablet Take 1 tablet by mouth at bedtime      cyanocobalamin (CYANOCOBALAMIN) 1000 MCG/ML injection Inject 1 mL into the muscle every 30 days      DIETHYLTOLUAMIDE EX Externally apply topically as needed (bug bit prevention)      dimethicone-zinc oxide (KAYDEN PROTECT) external cream Apply topically 2 times daily as needed for dry skin or other      diphenhydrAMINE (BENADRYL) 25 MG capsule Take 1-2 capsules (25-50 mg) by mouth every 6 hours as needed for itching or allergies  Qty: 100 capsule, Refills: 0    Associated Diagnoses: Chronic bronchitis, unspecified chronic bronchitis type (H)      fluticasone-vilanterol (BREO ELLIPTA) 200-25 MCG/ACT inhaler Inhale 1 puff into the lungs daily  Qty: 30 each, Refills: 11    Associated Diagnoses: COPD, severe (H)      guaiFENesin  (ROBITUSSIN) 20 mg/mL liquid Take 200 mg by mouth every 4 hours as needed for cough      loperamide (IMODIUM A-D) 2 MG tablet Take 2 mg by mouth 4 times daily as needed for diarrhea      magnesium hydroxide (MILK OF MAGNESIA) 400 MG/5ML suspension Take 30 mLs by mouth daily as needed for constipation or heartburn      menthol-zinc oxide (CALMOSEPTINE) 0.44-20.6 % OINT ointment Apply topically daily as needed for skin protection      multivitamin, therapeutic (THERA-VIT) TABS tablet Take 1 tablet by mouth at bedtime      nystatin (MYCOSTATIN) 505287 UNIT/GM external powder Apply topically 2 times daily as needed for dry skin      Ostomy Supplies (SKIN PREP WIPES) MISC Apply topically as needed (to intact blister stage 2 deep tissue injury)      tiotropium (SPIRIVA RESPIMAT) 2.5 MCG/ACT inhaler INHALE TWO PUFFS BY MOUTH EVERY DAY  Qty: 12 g, Refills: 3    Associated Diagnoses: COPD, severe (H)      Vitamins A & D (VITAMIN A & D) external ointment Apply topically 2 times daily as needed           STOP taking these medications       bisacodyl (DULCOLAX) 5 MG EC tablet Comments:   Reason for Stopping:         furosemide (LASIX) 20 MG tablet Comments:   Reason for Stopping:             Allergies   Allergies   Allergen Reactions    Eliquis [Apixaban] Shortness Of Breath, Hives and Swelling     Pt. Reported     Penicillins Anaphylaxis     Per chart review: tolerated Augmentin in 2020.  Tolerated CTX 2024.       Erythromycin Hives and Itching    Peanut Oil Hives    Clindamycin Itching    Tetracycline Itching

## 2024-05-07 NOTE — PROGRESS NOTES
[facility]    Medicine Progress Note - Hospitalist Service    Date of Admission:  3/29/2024    Assessment & Plan   Linda Otero is a 64 year old female who was admitted on 3/29/2024.  She has a past medical history of COPD, chronic respiratory failure on home oxygen with history of hypercapnia, nicotine dependence, HFpEF, DVT/PE on past warfarin anticoagulation, recent hemoptysis, morbid obesity, CKD stage III, CAD, HLD, HTN, hypothyroidism, MGUS, seizure disorder, CVA, admitted 3/29/2024 with acute on chronic respiratory failure.     Hospital course is prolonged due higher than baseline oxygen requirement especially with exertion which is barrier to TCU discharge at this time.     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 Ill-defined opacity at the right infrahilar lung could be acute airspace disease. A mass is difficult to exclude. Bilateral vascular congestion may be a degree of pulmonary edema. Potential small right pleural fluid.  *CT C PE 3/29/2024 without any PE but did demonstrate pulmonary congestion, R pleural effusion; also enlarged main pulmonary artery, as evidence of pulmonary hypertension.  *TTE 4/1 showing EF 60-65% without WMA, severe RV dilation with moderately decreased RV function, mod-severe TR and pulm HTN.  *Was initially started on Lasix drip, then transitioned to bumex gtt, then to oral bumex 2mg daily starting on 4/19/24.  *Cardiology consulted, appreciate their assistance. Signed off 4/19/24.  *S/p left and right heart cath on 4/12/2024.  Showed normal coronary arteries.  Severely elevated right-sided pressures, severely elevated left-sided  filling pressures.  Mixed precapillary and postcapillary pulmonary hypertension (mPA 59 mmHg, PCWP 28 mmHg) .  Normal cardiac index.  *S/p RHC on 4/19:  Her filling pressures have improved significantly. Her weight is now normal and her mean RA is mildly elevated. Her pulmonary hypertension has improved but still severe.  - Continue inpatient care pending improvement in supplemental oxygen needs.  - Aggressive IS, mobilize.  - Waiting on placement.  - Continue with Bumex 2mg BID.  - Started on Jardiance 10 mg daily on 4/13/2024 for HFpEF.  - Lymphedema wraps.  - 2gm Na diet.  - Fluid restriction 1800cc/day.  - Flutter valve and incentive spirometer.  - Currently on 6 lpm of supplemental oxygen by oximyzer at rest, this may be her new baseline (PTA baseline was 5 lpm by nasal cannula). O2 with exertion at 8-10 lpm the last few days, O2 sat goal at 88-92%.  - Ambulate QID with assist, up to chair for all meals.  - Patient subjectively feels she is slowly improving, encouraged to continue activity in her room.  - Patient to follow-up in the pulmonary hypertension clinic.   - Pulmonary rehab on discharge.  - Care transitions assisting with discharge planning.  - Multiple referrals out for transitional care.  If unable to find accepting TCU, will need to consider discharge back to assisted living with increased services if they are able to meet her needs and supplemental oxygen requirements.     Acute COPD exacerbation   CAP  Respiratory acidosis  [PTA on Breo, Spiriva respimat, Alb inh and neb prn]  - Completed 5-day course of ceftriaxone+doxy on 4/2/24.  - Completed 8-day steroid taper on 4/7/24.  - Restarted PTA Breo on 4/8/24.  Wheezing significantly improved.  - Pulmonary consulted 4/23/24, appreciate their assistance.  Continue ICS-LABA (Breo).  Added Incruse daily, should discharge on [id number]-SSAN-CXPB per formulary. DuoNebs BID. May need to be on higher oxygen requirements even at discharge.  - VQ scan on 4/26  without evidence of acute PE or chronic thromboembolic disease.  - Patient to follow-up in the pulmonary hypertension clinic after discharge.     Weakness and deconditioning  - PT/OT consulted, initially recommending TCU, now potentially okay for home with assist and home care.  - Care transitions following.  - Supplemental oxygen requirements currently limiting discharge planning.     Hemoptysis, mild, resolved  *Reported orange sputum production at home, was producing small amounts of rober blood 4/1/24.  *Admission CT chest negative for PE, masses.  - Resumed aspirin 4/3 and reported small amount of hemoptysis 4/4, but no further hemoptysis after that.  Prophylactic lovenox started on 4/8/2024.  Held for cardiac catheterization, then resumed.     Acute kidney injury, improved  Chronic kidney disease, stage IIIa   *Suspect congestive nephropathy. Admission serum creatinine was 1.33, historic baseline 0.87 to 1.08 in 2020 to 2023.  - Avoid non-steroidal anti-inflammatory drugs (NSAIDs).  - Creatinine stabilized around 1.1.  - Monitor BMP intermittently.      Hypokalemia - resolved   *Due to diuresis. Was on KCl 40mEq TID while on IV diuresis which has since been discontinued.   - Replaced and rechecked per RN managed protocol.     History of deep venous thrombosis and pulmonary embolism  *CT chest on 3/29/2024 was negative for pulmonary embolism.  *Per pharmacy med rec, she is no longer on anticoagulation with warfarin.  - Prophylactic lovenox 40 mg BID while in hospital.     Skin ulcer, right lateral ankle  Concern for cellulitis?  - Wound nurse (WOC) consulted, appreciate their assistance.  - See pictures from 4/17, there was concern for developing cellulitis. Completed 7 day course of antibiotics (ceftriaxone --> keflex) on 4/26/2024.    Obstructive sleep apnea  - Continue CPAP per home settings.     Mild thrombocytopenia, resolved  Platelets normal initially, trended down to 98 on 4/16/2024, then trended up and  within normal limits since 4/22/2024.  - Monitor intermittently.     Moderate Malnutrition  In the context of acute illness, chronic illness or disease.  - Nutrition consulted.     Hyperlipidemia  Diet-controlled.  - Follow-up with PCP.    Hypothyroidism  Med rec did not show any thyroid supplement.  - TSH 6.28 and free T4 1.49 on 3/29/24.  - Follow up with PCP to recheck labs after she recovers from her acute illness.    Morbid obesity  Body mass index is 46.03 kg/m .  - Follow up with PCP regarding long term management.    History of cerebrovascular disease with prior stroke  - Continue PTA aspirin.  - She is not on a statin.    History of nicotine dependence  - Former smoker, quit in 2012.    History of monoclonal gammopathy of undetermined significance  - Noted, follow-up with PCP.    History of B12 deficiency  - Noted, continue PTA B12 replacement.    History of seizure disorder  - Noted in medical record, does not appear to be on any anti-epileptic medication.          Diet: Snacks/Supplements Adult: Expedite Bottle; Between Meals  Fluid restriction 1800 ML FLUID  Combination Diet Regular Diet; 2 gm NA Diet  Diet    DVT Prophylaxis: Enoxaparin (Lovenox) SQ  Miguel Catheter: Not present  Lines: None     Cardiac Monitoring: None  Code Status: Full Code      Clinically Significant Risk Factors                  # Hypertension: Noted on problem list         # Moderate Malnutrition: based on nutrition assessment    # Financial/Environmental Concerns: none  # COPD: noted on problem list        Disposition Plan     Medically Ready for Discharge: Ready Now pending facility that can meet O2 needs           Ady Ruiz MD  Hospitalist Service  [facility]  Securely message with [employer] (more info)  Text page via [employer] Paging/Directory   ______________________________________________________________________    Interval History   Linda Otero was seen this morning.  No new  complaints/concerns.  Went on to walks yesterday evening, feels like her shortness of breath with activity is slowly improving.  Discussed plan of care with nursing and social work this morning.    Physical Exam   Vital Signs: Temp: 97.8  F (36.6  C) Temp src: Oral BP: 130/81 Pulse: 93   Resp: 16 SpO2: 90 % O2 Device: BiPAP/CPAP    Weight: 284 lbs 11.2 oz    Constitutional: awake, alert, cooperative, no apparent distress, laying in the hospital bed  Respiratory: no increased work of breathing, clear to auscultation bilaterally, no crackles or wheezing  Cardiovascular: regular rate and rhythm, normal S1 and S2, no murmur noted  GI: normal bowel sounds, soft, non-distended, non-tender  Skin: warm, dry  Musculoskeletal: bilateral lower extremity lymphedema, right greater than left  Neurologic: awake, alert, answers questions appropriately, moves all extremities     Medical Decision Making       40 MINUTES SPENT BY ME on the date of service doing chart review, history, exam, documentation & further activities per the note.      Data   NOTE: Data reviewed over the past 24 hrs contributes toward MDM complexity

## 2024-05-07 NOTE — PROGRESS NOTES
Care Management Discharge Note    Discharge Date: 05/07/2024       Discharge Disposition: Transitional Care    Discharge Services:      Discharge DME:      Discharge Transportation: agency ([facility] BLS due to liter flow)    Private pay costs discussed: Not applicable    Does the patient's insurance plan have a 3 day qualifying hospital stay waiver?  No    PAS Confirmation Code: [id number]  Patient/family educated on Medicare website which has current facility and service quality ratings: yes    Education Provided on the Discharge Plan: Yes  Persons Notified of Discharge Plans: patient  Patient/Family in Agreement with the Plan: yes    Handoff Referral Completed: No    Additional Information:  Patient accepted by [facility] ([facility]  Reviewed with patient and gave her the address and phone #. Explained it is in the old [facility] site which she is familiar with.  She has never been a patient in a TCU before so writer did review general information about interdisciplinary staff, frequency of therapy and TCU  who can help with discharge planning back to her TOM. Reviewed PAS.  Patient extremely appreciative of arrangements.  Her goal is to regain as much strength as possible prior to return to [facility]. Writer reviewed length of stay at the TCU will be dependent upon her therapy progress/respiratory status..  She is aware our therapy staff had recommended home with home therapy.  She is more comfortable with TCU at this time.  Prior to discharge, discharge time coordinated with TCU and orders sent via [employer].  [facility] transport dispatch reported their portable oxygen tank on a medivan will not last the time need for transport when patient needs 7 liters so they could only offer BLS. Explained to patient.  Completed PCS and this along with the face sheet was faxed to [facility].   [facility] was arranged for a service window of 1430 to 1530.  PA approved.  Effective date:  5/7/24  PA reference #: [id number]  Pt. notified:   yes       TAYLOR WashingtonSW

## 2024-05-07 NOTE — PLAN OF CARE
Goal Outcome Evaluation:                      Summary Hypoxia , COPD/HF exacerbation      Hx COPD, chronic respiratory failure on home oxygen with history of hypercapnia, nicotine dependence, HFpEF, DVT/PE on past warfarin anticoagulation, recent hemoptysis, morbid obesity, CKD stage III, CAD, HLD, HTN, hypothyroidism, MGUS, seizure disorder, CVA     5/6/2024 [id number]    Orientation A&O x 4     Vitals/Tele VSS on 6-7 L oxymizer cannula while at rest, requires 8L when ambulating in hallway. Baseline 5L O2 use. Uses home CPAP when sleeping on scheduled nebs     Pain management rated 5 on pain scale but declined pain mediations offered hear or cold and she used heat with some relief      IV Access/drains PIV SL     Diet 2G NA FL Restriction 1800     Mobility Ind in room / SBA in ivey, too tired for walk this evening      GI/ Continent of B/B up to bedside commode     Wound/Skin BLE edema, has compression stockings on R ankle CDI     Consults Respiratory, WOC      Discharge Plan Pending TCU placement     See Flow sheets for assessment

## 2024-05-07 NOTE — PROGRESS NOTES
"[facility] Nurse Inpatient Assessment     Consulted for: Wound ulcer right lateral ankle     Summary: Right lateral ankle wound is chronic, multifactorial etiology   Fungal rash to right breast fold-resolved    Patient History (according to provider note(s):      \"64 year old female admitted on 3/29/2024.  Past medical history of COPD, chronic respiratory failure on home oxygen with history of hypercapnia, nicotine dependence, HFpEF, DVT/PE  on past warfarin anticoagulation, recent hemoptysis, morbid obesity, CKD stage III, CAD, HLD, HTN, hypothyroidism, MGUS, seizure disorder, CVA, admitted 3/29/2024 with acute on chronic respiratory failure. \"    Assessment:      Areas visualized during today's visit: Focused: and Right lateral ankle      Wound location: right lateral ankle area      Last photo: 5/7  Wound due to: Unknown Etiology patient reports this wound area is chronic and comes and goes, has treated the area \"four times\" before. Patient reports she has a wound care nurse at her facitlity who sees her and helps perform dressing changes. She does not follow up with a wound care MD, recommended for her to be seen as outpatient to monitor wound.    Wound history/plan of care: Wound dressed per POC. Dressing removal was atraumatic. Patient reports she feels wound looks better with current treatment. Provided education about removing non-viable tissue to promote wound healing. Writer unable to remove non-viable tissue from wound due to discomfort. Patient is not amenable to trialing alternative wound treatments at this time.  Wound base: slough and dry drainage, thickened epidermis; adherent dark brown-green non-viable tissue     Palpation of the wound bed:  textured        Drainage: scant     Description of drainage: serosanguinous and yellow     Measurements (length x width x depth, in cm): total area 6 x 10 x 0.1 cm     Tunneling: N/A     Undermining: N/A  Periwound skin: " Dry/scaly, Edematous, and Erythema- blanchable      Color: pale and pink      Temperature: normal   Odor: none  Pain: mild and during dressing change, tender  Pain interventions prior to dressing change: patient tolerated well, soaking, and slow and gentle cares   Treatment goal: Drainage control, Infection control/prevention, Increase granulation, and Protection  STATUS: stalled  Supplies ordered: at bedside, supplies stored on unit, discussed with RN, and discussed with patient      Treatment Plan:     Right lateral ankle wound: Every other day and PRN for soiled/loose dressing  Wrap right lower leg with Vashe-soaked gauze. Allow to soak for about two minutes.  Unwrap leg and gently wipe away any debris. Allow skin to dry.  Use a tongue depressor to apply Medihoney ([id number]) to wound.  Apply a small piece of Adaptic (oil emulsion gauze) over Medihoney.  Cover with a 4x4 gauze and secure with Kerlix gauze and Medipore tape.  Apply Spandigrip to right lower extremity.    Right breast fold rash: BID   Cleanse skin with warm water. Pat dry.  Apply a thin layer of Usman Antifungal to affected skin.    Orders: Reviewed    RECOMMEND PRIMARY TEAM ORDER: none at this time, rec Outpatient Wound Care physician follow up  Education provided: plan of care, wound progress, Infection prevention , Moisture management, and Hygiene  Discussed plan of care with: Patient and Nurse  WOC nurse follow-up plan: weekly  Notify WOC if wound(s) deteriorate.  Nursing to notify the Provider(s) and re-consult the WOC Nurse if new skin concern.    DATA:     Current support surface: Standard  Standard gel/foam mattress (IsoFlex, Atmos air, etc)  Containment of urine/stool: Continent of bladder and Continent of bowel  BMI: Body mass index is 45.95 kg/m .   Active diet order: Orders Placed This Encounter      Combination Diet Regular Diet; 2 gm NA Diet      Diet     Output: I/O last 3 completed shifts:  In: 840 [P.O.:840]  Out: -      Labs:   Recent  "Labs   Lab 05/07/24  1024   HGB 8.6*   WBC 8.6       Pressure injury risk assessment:   Sensory Perception: 4-->no impairment  Moisture: 4-->rarely moist  Activity: 3-->walks occasionally  Mobility: 3-->slightly limited  Nutrition: 3-->adequate  Friction and Shear: 3-->no apparent problem  Dorian Score: 20    Amada Gudino RN, CWOCN  Please contact via [employer] at name or group \"[facility] nurse\"- M-F 8A-4P  Leave  @ *[phone] for non-urgent needs. Checked occasionally M-F.     "

## 2024-05-08 NOTE — LETTER
5/8/2024        RE: Linda Otero  [address]   [address]  [facility] [address]        [facility] GERIATRICS    PRIMARY CARE PROVIDER AND CLINIC:  MALLORY Escamilla PHYSICIAN [facility] [address] [facility] [address]  No chief complaint on file.     [facility] Medical Record Number:  [id number]  Place of Service where encounter took place:  No question data found.    Linda Otero  is a 64 year old  (1960), admitted to the above facility from  [facility]. Hospital stay 3/29/24 through 5/7/24..   HPI:    Linda Otero is a 64 year old female who was admitted on 3/29/2024.  She has a past medical history of COPD, chronic respiratory failure on home oxygen with history of hypercapnia, nicotine dependence, HFpEF, DVT/PE on past warfarin anticoagulation, recent hemoptysis, morbid obesity, CKD stage III, CAD, HLD, HTN, hypothyroidism, MGUS, seizure disorder, CVA, admitted 3/29/2024 with acute on chronic respiratory failure.     The primary encounter diagnosis was Physical deconditioning. Diagnoses of Generalized muscle weakness, Acute on chronic respiratory failure with hypoxia (H), Acute on chronic heart failure with preserved ejection fraction (H), COPD exacerbation (H), JODI (obstructive sleep apnea), Severe pulmonary hypertension (H), Coronary artery disease involving native coronary artery of native heart with angina pectoris (H24), Personal history of DVT (deep vein thrombosis), History of pulmonary embolism, Community acquired pneumonia, unspecified laterality, Respiratory acidosis, Hemoptysis, SONAL (acute kidney injury) (H24), Stage 3 chronic kidney disease, unspecified whether stage 3a or 3b CKD (H), Hypokalemia, Ulcer of ankle, unspecified laterality, limited to breakdown of skin (H), Dermatitis, Thrombocytopenia (H24), Moderate malnutrition (H24), Morbid obesity (H), Mixed hyperlipidemia, Hypothyroidism, unspecified type, History of CVA (cerebrovascular  accident), MGUS (monoclonal gammopathy of unknown significance), Vitamin B12 deficiency (non anemic), and Hx of seizure disorder were also pertinent to this visit.    ***    BP Readings from Last 3 Encounters:   05/07/24 130/81   12/13/23 132/86   07/13/22 105/63     Wt Readings from Last 5 Encounters:   05/07/24 129.1 kg (284 lb 11.2 oz)   01/24/20 145.1 kg (319 lb 14.4 oz)   11/20/19 145.5 kg (320 lb 11.2 oz)   08/28/19 144 kg (317 lb 6.4 oz)   07/22/19 146.5 kg (323 lb 1 oz)     CODE STATUS/ADVANCE DIRECTIVES DISCUSSION:  Full Code  CPR/Full code   ALLERGIES:   Allergies   Allergen Reactions    Eliquis [Apixaban] Shortness Of Breath, Hives and Swelling     Pt. Reported     Penicillins Anaphylaxis     Per chart review: tolerated Augmentin in 2020.  Tolerated CTX 2024.       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  left thalamic stroke as well as patchy MCA branch infarcts    CVA (cerebral vascular accident) (H) 12/23/2012    Left thalamic stroke, MCA branch infarcts    Dermatitis     DVT (deep venous thrombosis) (H)     Right leg    Encephalopathy     after stroke, believed related to hypoxia    History of transfusion      Hyperlipidemia     Hypertension     Hypothyroidism     Lymphedema     MGUS (monoclonal gammopathy of unknown significance)     Neuropathy of right lower extremity     Obesity     On home oxygen therapy     PFO (patent foramen ovale)     closed after stroke, see cardiology notes care everywhere    PFO (patent foramen ovale)     Pneumonia     Primary hypercoagulable state (H24)     Pulmonary emboli (H) 2013    Pulmonary HTN (H)     Respiratory failure (H)     after stroke    Seizure (H)     Seizures (H)     at age 31    Skin cancer 2014    Sleep apnea     CPAP    Stroke (H) 2012    Umbilical hernia     Wound of right ankle       PAST SURGICAL HISTORY:   has a past surgical history that includes Repair patent foramen ovale (2013); IR Carotid Angiogram (12/26/2012); IR Miscellaneous Procedure (12/26/2012); IR Carotid Angiogram (12/26/2012); IR Miscellaneous Procedure (12/26/2012); IR Miscellaneous Procedure (12/26/2012); Biopsy skin (location); [place] (1985); Colonoscopy (N/A, 4/9/2018); Esophagoscopy, gastroscopy, duodenoscopy (EGD), combined (N/A, 4/9/2018); Xr Sacro Iliac Joint Injection Left (11/2012); Cardiac catheterization (2012); Cv Coronary Angiogram (N/A, 4/12/2019); Cv Right Heart Catheterization (N/A, 4/12/2019); Cv Left Heart Catheterization Without Left Ventriculogram (Left, 4/12/2019); Asd Repair; Asd Repair; Patent Foramen Ovale Closure; other surgical history; REMV ART CLOT ILIAC-POP,LEG INCIS (Left, 7/22/2019); Coronary Angiogram (N/A, 4/12/2024); Right Heart Catheterization (N/A, 4/12/2024); Intravascular Ultrasound (N/A, 4/12/2024); and Right Heart Catheterization (N/A, 4/19/2024).  FAMILY HISTORY: family history includes Angioedema in her mother; Cerebrovascular Disease in her father; Coronary Artery Disease in her brother; Pulmonary Embolism in her brother.  SOCIAL HISTORY:   reports that she quit smoking about 11 years ago. Her smoking use included cigarettes. She started smoking about 55  years ago. She has a 44 pack-year smoking history. She has never used smokeless tobacco. She reports that she does not drink alcohol and does not use drugs.  Patient's living condition: {LIVES WITH (NURSING HOME):203291}    Post Discharge Medication Reconciliation Status:   MED REC REQUIRED  Post Medication Reconciliation Status:  Discharge medications reconciled and changed, see notes/orders       Current Outpatient Medications   Medication Sig Dispense Refill    acetaminophen (TYLENOL) 325 MG tablet Take 650 mg by mouth every 4 hours as needed for pain or fever      albuterol (PROVENTIL) (2.5 MG/3ML) 0.083% neb solution Take 1 vial (2.5 mg) by nebulization every 6 hours as needed for shortness of breath or wheezing OFFICE VISIT NEEDED FOR ANY FURTHER REFILLS 90 mL 11    albuterol (VENTOLIN HFA) 108 (90 Base) MCG/ACT inhaler INHALE 2 PUFFS INTO THE LUNGS EVERY 4-6 HOURS AS NEEDED. 18 g 3    aspirin (ASA) 81 MG chewable tablet Take 81 mg by mouth 2 times daily      bisacodyl (DULCOLAX) 10 MG suppository Place 10 mg rectally daily as needed for constipation      bumetanide (BUMEX) 2 MG tablet Take 1 tablet (2 mg) by mouth daily      calcium carbonate (TUMS) 500 MG chewable tablet Take 1 chew tab by mouth 4 times daily as needed for heartburn      calcium carbonate 500 mg, elemental, (OSCAL) 500 MG tablet Take 1 tablet by mouth at bedtime      cyanocobalamin (CYANOCOBALAMIN) 1000 MCG/ML injection Inject 1 mL into the muscle every 30 days      DIETHYLTOLUAMIDE EX Externally apply topically as needed (bug bit prevention)      dimethicone-zinc oxide (KAYDEN PROTECT) external cream Apply topically 2 times daily as needed for dry skin or other      diphenhydrAMINE (BENADRYL) 25 MG capsule Take 1-2 capsules (25-50 mg) by mouth every 6 hours as needed for itching or allergies 100 capsule 0    empagliflozin (JARDIANCE) 10 MG TABS tablet Take 1 tablet (10 mg) by mouth daily 90 tablet 1    fluticasone-vilanterol (BREO ELLIPTA)  "200-25 MCG/ACT inhaler Inhale 1 puff into the lungs daily 30 each 11    guaiFENesin (ROBITUSSIN) 20 mg/mL liquid Take 200 mg by mouth every 4 hours as needed for cough      ipratropium - albuterol 0.5 mg/2.5 mg/3 mL (DUONEB) 0.5-2.5 (3) MG/3ML neb solution Take 1 vial (3 mLs) by nebulization 2 times daily      loperamide (IMODIUM A-D) 2 MG tablet Take 2 mg by mouth 4 times daily as needed for diarrhea      magnesium hydroxide (MILK OF MAGNESIA) 400 MG/5ML suspension Take 30 mLs by mouth daily as needed for constipation or heartburn      menthol-zinc oxide (CALMOSEPTINE) 0.44-20.6 % OINT ointment Apply topically daily as needed for skin protection      multivitamin, therapeutic (THERA-VIT) TABS tablet Take 1 tablet by mouth at bedtime      nystatin (MYCOSTATIN) 103130 UNIT/GM external powder Apply topically 2 times daily as needed for dry skin      Ostomy Supplies (SKIN PREP WIPES) MISC Apply topically as needed (to intact blister stage 2 deep tissue injury)      polyethylene glycol (MIRALAX) 17 GM/Dose powder Take 17 g by mouth daily      senna-docusate (SENOKOT-S/PERICOLACE) 8.6-50 MG tablet Take 2 tablets by mouth 2 times daily as needed for constipation      tiotropium (SPIRIVA RESPIMAT) 2.5 MCG/ACT inhaler INHALE TWO PUFFS BY MOUTH EVERY DAY 12 g 3    Vitamins A & D (VITAMIN A & D) external ointment Apply topically 2 times daily as needed       No current facility-administered medications for this visit.       ROS:  {ROS FGS:506218}    Vitals:  There were no vitals taken for this visit.  Exam:  GENERAL APPEARANCE:  {detention GENERAL APPEARANCE:955404}  ENT:  {detention ENT PE:486204::\"Mouth and posterior oropharynx normal, moist mucous membranes\"}  EYES:  {detention EYES PE :164549::\"EOM, conjunctivae, lids, pupils and irises normal\"}  NECK:  {NURSING HOME NECK PE:161169::\"No adenopathy,masses or thyromegaly\"}  RESP:  {detention RESP PE:992130}  CV:  {detention CV PE:913550::\"Palpation and " "auscultation of heart done \",\"regular rate and rhythm, no murmur, rub, or gallop\"}  ABDOMEN:  {[facility] GI PE:776497::\"normal bowel sounds, soft, nontender, no hepatosplenomegaly or other masses\"}  M/S:   {[facility] M/S PE:560671}  SKIN:  {NURSING HOME SKIN PE:846359}  NEURO:   {[facility] NEURO PE:165681}  PSYCH:  {[facility] PSYCH PE:758727}    Lab/Diagnostic data:  Most Recent 3 CBC's:  Recent Labs   Lab Test 05/07/24  1024 05/04/24  0921 05/01/24  1054 04/28/24  0643 04/27/24  2323 04/17/24  0800 04/16/24  0641   WBC 8.6  --   --   --  5.6  --  4.9   HGB 8.6*  --   --   --  8.6*  --  8.6*   MCV 89  --   --   --  89  --  82    207 230   < > 214   < > 98*    < > = values in this interval not displayed.     Most Recent 3 BMP's:  Recent Labs   Lab Test 05/07/24  1024 05/04/24  0921 05/03/24  0829 05/01/24  1940 05/01/24  1054     --  140  --  140   POTASSIUM 3.3* 3.6 3.6   < > 3.2*   CHLORIDE 98  --  98  --  96*   CO2 32*  --  32*  --  33*   BUN 33.7*  --  32.3*  --  27.9*   CR 1.13*  --  1.11*  --  1.11*   ANIONGAP 10  --  10  --  11   LUNA 9.4  --  9.4  --  9.5   *  --  100*  --  113*    < > = values in this interval not displayed.     Most Recent 2 LFT's:  Recent Labs   Lab Test 03/29/24  1453 07/10/23  0944   AST 19 19   ALT 14 15   ALKPHOS 115 84   BILITOTAL 1.4* 0.4     Most Recent 3 BNP's:  Recent Labs   Lab Test 03/29/24  1453   NTBNPI 4,421*     Most Recent D-dimer:  Recent Labs   Lab Test 03/29/24  1453   DD 3.33*     Most Recent Cholesterol Panel:  Recent Labs   Lab Test 11/20/20  1000   CHOL 178      HDL 59   TRIG 97     Most Recent TSH and T4:  Recent Labs   Lab Test 03/29/24  1453   TSH 6.28*   T4 1.49     Most Recent Hemoglobin A1c:  Recent Labs   Lab Test 09/19/22  0946   A1C 5.5     Most Recent Urinalysis:  Recent Labs   Lab Test 06/27/19  0552   COLOR Yellow   APPEARANCE Clear   URINEGLC Negative   URINEBILI Negative   URINEKETONE Negative   SG 1.020   UBLD " Small*   URINEPH 6.0   PROTEIN Trace*   UROBILINOGEN <2.0 E.U./dL   NITRITE Negative   LEUKEST Trace*   RBCU 0-2   WBCU 0-5     Most Recent ABG:  Recent Labs   Lab Test 04/12/24  1431   PH 7.45   PO2 48*   PCO2 51*   HCO3 35*   SHILPA 10.0*     Most Recent Anemia Panel:  Recent Labs   Lab Test 05/07/24  1024 03/29/24  1453 09/19/22  0946 04/06/18  1642 04/06/18  0916 04/06/18  0732 05/19/17  1516   WBC 8.6   < > 8.3   < >  --  8.6 9.2   HGB 8.6*   < > 11.4*   < >  --  5.8* 13.4   HCT 29.9*   < > 39.6   < >  --  24.7* 43.2   MCV 89   < > 94   < >  --  78* 92      < > 214   < >  --  272 208   IRON  --   --   --   --   --  14* 48   IRONSAT  --   --   --   --   --   --  15   FEB  --   --   --   --   --   --  330   KWESI  --   --   --   --  18  --  44   B12  --   --  511   < >  --   --  759    < > = values in this interval not displayed.     Magnesium   Date Value Ref Range Status   05/07/2024 2.2 1.7 - 2.3 mg/dL Final       ASSESSMENT/PLAN:    (R53.81) Physical deconditioning  (primary encounter diagnosis)  (M62.81) Generalized muscle weakness  Comment: Acute on chronic. S/T below  Plan:   -Continue Physical therapy and Occupational therapy as directed  - to remain involved for safe discharge planning needs    (J96.21) Acute on chronic respiratory failure with hypoxia (H)  (I50.33) Acute on chronic heart failure with preserved ejection fraction (H)  (J44.1) COPD exacerbation (H)  (G47.33) JODI (obstructive sleep apnea)  (I27.20) Severe pulmonary hypertension (H)  (I25.119) Coronary artery disease involving native coronary artery of native heart with angina pectoris (H24)  (J18.9) Community acquired pneumonia, unspecified laterality  (E87.29) Respiratory acidosis  Comment: Chronic. Recent exacerbations. Baseline 5 L NC PTA. CXR 3/29- Cardiomegaly. Ill-defined opacity at the right infrahilar lung could be acute airspace disease. A mass is difficult to exclude. Bilateral vascular congestion may be a degree of pulmonary  edema. Potential small right pleural fluid. CT C PE 3/29/2024 without any PE but did demonstrate pulmonary congestion, R pleural effusion; also enlarged main pulmonary artery, as evidence of pulmonary hypertension. TTE 4/1 showing EF 60-65% without WMA, severe RV dilation with moderately decreased RV function, mod-severe TR and pulm HTN. Was initially started on Lasix drip, then transitioned to bumex gtt, then to oral bumex 2mg daily starting on 4/19/24. Cardiology consulted, appreciate their assistance. Signed off 4/19/24. S/p left and right heart cath on 4/12/2024.  Showed normal coronary arteries.  Severely elevated right-sided pressures, severely elevated left-sided filling pressures.  Mixed precapillary and postcapillary pulmonary hypertension (mPA 59 mmHg, PCWP 28 mmHg) .  Normal cardiac index. S/p RHC on 4/19:  Her filling pressures have improved significantly. Her weight is now normal and her mean RA is mildly elevated. Her pulmonary hypertension has improved but still severe.  Currently on 6 lpm of supplemental oxygen by oximyzer at rest, this may be her new baseline (PTA baseline was 5 lpm by nasal cannula). O2 with exertion at 8-10 lpm the last few days, O2 sat goal at 88-92%. Completed 5-day course of ceftriaxone+doxy on 4/2/24. Completed 8-day steroid taper on 4/7/24. Restarted PTA Breo on 4/8/24.  Wheezing significantly improved. Pulmonary consulted 4/23/24, appreciate their assistance.  VQ scan on 4/26 without evidence of acute PE or chronic thromboembolic disease. Patient to follow-up in the pulmonary hypertension clinic after discharge.   Plan:  -Continue with Bumex 2mg daily. Add HOLD parameters.   -Continue duoneb BID  -Continue albuterol nebulization every 6 hours PRN  -Discontinue ventolin inhaler and continue nebulizations for now  -Continue robitussin PRN  -Change benadryl to 25mg every 6 hours PRN  -Oxygen by Nasal Cannula 6 LPM at rest and 8-10 LPM w/activity LPM to keep oxygen Sats 88 % or  greater.  -Continue ICS-LABA Breo ellipta daily.    -Continue spiriva daily   -Started on Jardiance 10 mg daily on 4/13/2024 for HFpEF.  -Lymphedema wraps with lymphedema therapy on site  -2gm Na diet.  -Fluid restriction 1800cc/day.  -Follow up with pulmonology as directed. Call [phone] to schedule. Followed with EMIL Mcmahon at [facility]  -Follow up with cardiology as directed. Scheduled for 5/10/24  -CPAP per home settings  -Daily weights as directed. Hospital weight~284#. Admit weight~  -BMP and CBC due 5/10/24    (Z86.718) Personal history of DVT (deep vein thrombosis)  (Z86.711) History of pulmonary embolism  Comment: Acute on chronic. *CT chest on 3/29/2024 was negative for pulmonary embolism. Per pharmacy med rec, she is no longer on anticoagulation with warfarin. Prophylactic lovenox 40 mg BID while in hospital.  Plan:   -Continue asa BID as directed  -Monitor for worsening s/symptoms of concerns  -BMP and CBC due 5/10/24    (R04.2) Hemoptysis  Comment: Acute/mild. Reported orange sputum production at home, was producing small amounts of rober blood 4/1/24. Hospital Admission CT chest negative for PE, masses. Resumed aspirin 4/3 and reported small amount of hemoptysis 4/4, but no further hemoptysis after that.  Prophylactic lovenox started on 4/8/2024.  Held for cardiac catheterization, then resumed.  Plan:   -Monitor for worsening s/symptoms of concerns.   -BMP and CBC due 5/10/24    (N17.9) SONAL (acute kidney injury) (H24)  (N18.30) Stage 3 chronic kidney disease, unspecified whether stage 3a or 3b CKD (H)  Comment: Acute on chronic. Suspect congestive nephropathy. Admission serum creatinine was 1.33, historic baseline 0.87 to 1.08 in 2020 to 2023. Avoid non-steroidal anti-inflammatory drugs (NSAIDs). Creatinine stabilized around 1.1.  Plan:   -Renally dose medications  -Avoid nephrotoxins  -BMP and CBC due 5/10/24  -Monitor urinary status    (E87.6) Hypokalemia  Comment: Acute. Noted per  chart review. Due to diuresis. Was on KCl 40mEq TID while on IV diuresis which has since been discontinued.   Plan:   -Monitor for worsening s/symptoms of concerns  -BMP and CBC due 5/10/24    (L97.301) Ulcer of ankle, unspecified laterality, limited to breakdown of skin (H)  Comment: Acute on chronic. WOCn reviewed in hospital. See pictures from 4/17, there was concern for developing cellulitis. Completed 7 day course of antibiotics (ceftriaxone --> keflex) on 4/26/2024.   Plan:   -Monitor for worsening s/symptoms of concerns  -BMP and CBC due 5/10/24  -Continue wound care as directed:  *Right lateral ankle wound: Every other day and PRN for soiled/loose dressing  Wrap right lower leg with Vashe-soaked gauze. Allow to soak for about two minutes.  Unwrap leg and gently wipe away any debris. Allow skin to dry.  Use a tongue depressor to apply Medihoney (164459) to wound.  Apply a small piece of Adaptic (oil emulsion gauze) over Medihoney.  Cover with a 4x4 gauze and secure with Kerlix gauze and Medipore tape.  Apply Spandigrip to right lower extremity.     (L30.9) Dermatitis  Comment: Acute on chronic. S/T morbid size  Plan:   -Monitor for worsening s/symptoms of concerns  -Continue current wound care as directed:  *Right breast fold rash: BID   Cleanse skin with warm water. Pat dry.  Apply nystatin powder    (D69.6) Thrombocytopenia (H24)  Comment: Acute on chronic. Platelets normal initially, trended down to 98 on 4/16/2024, then trended up and within normal limits since 4/22/2024.  Plan:   -Monitor for worsening s/symptoms of concerns  -BMP and CBC due 5/10/24    (E44.0) Moderate malnutrition (H24)  Comment: Acute on chronic. In context with recent hospitalization  Plan:   -Dietician to remain involved  -Monitor weight trends  -BMP and CBC due 5/10/24    (E66.01) Morbid obesity (H)  (E78.2) Mixed hyperlipidemia  Comment: Chronic. Diet controlled. Not on statin  Plan:   -Encourage ongoing weight loss  -Monitor for  worsening s/symptoms of concerns  -Monitor weight trends  -BMP and CBC due 5/10/24    (E03.9) Hypothyroidism, unspecified type  Comment: Acute on chronic. Med rec did not show any thyroid supplement. TSH 6.28 and free T4 1.49 on 3/29/24.  Plan:   -Monitor for worsening s/symptoms of concerns  -Continue without medication at this time  -BMP and CBC due 5/10/24  -Trend thyroid levels in 4-6 weeks. Due in 3-4 weeks    (Z86.73) History of CVA (cerebrovascular accident)  Comment: Chronic. Per chart review. Former smoker, quit in 2012.   Plan:   -Monitor for worsening s/symptoms of concerns  -Continue aspirin 81mg BID as directed  -Discontinue oscal at HS  -Discontinue diethyltolumaide  -Poor compliance with statin  -Follow up with cardiology as directed  -BMP and CBC due 5/10/24    (D47.2) MGUS (monoclonal gammopathy of unknown significance)  Comment: Chronic. Noted per chart review  Plan:   -Monitor for worsening s/symptoms of concerns  -Follow up with PCP post TCU  -BMP and CBC due 5/10/24    (E53.8) Vitamin B12 deficiency (non anemic)  Comment: Chronic. Noted per chart review  Plan:   -Monitor for worsening s/symptoms of concerns  -Continue vitamin B12 supplementation as directed of 1mL every month as directed  -Discontinue calmoseptine orders  -BMP and CBC due 5/10/24    (Z86.69) Hx of seizure disorder  Comment: Noted in medical record, does not appear to be on ant anti-epileptic medications  Plan:   -Monitor for seizure concerns  -Monitor for worsening s/symptoms of concerns  -Neurology PRN  -Discontinue A&E ointment orders  -BMP and CBC due 5/10/24    (K59.01) Constipation  Comment: Acute on chronic.   Plan:  -Monitor BM patterns  -Continue miralax daily  -Continue senna S 2 tabs BID PRN.  -Bisacodyl 10mg rectal daily PRN  -TUMS PRN  -PRN imodium for diarrhea concerns  -MOM daily PRN  -Discontinue dimethicone-zinc oxide KAYDEN orders    *** minutes spent on the date of the encounter doing chart review, history and  exam, PMH, documentation and further activities as noted above. Collaboration with nursing staff on site, clinical managers, and therapies were completed on site today.       Electronically signed by:  Dr. Pamella Horner DNP, APRN, FNP-C, WCS-C, EDS-C        [facility] GERIATRICS    PRIMARY CARE PROVIDER AND CLINIC:  MALLORY Escamilla PHYSICIAN [facility] [address] [facility] [address]  Chief Complaint   Patient presents with    [facility] Medical Record Number:  [id number]  Place of Service where encounter took place:  [facility] (TCU)(SNF) [[id number]]    Linda Otero  is a 64 year old  (1960), admitted to the above facility from  [facility]. Hospital stay 3/29/24 through 5/7/24..   HPI:    Linda Otero is a 64 year old female who was admitted on 3/29/2024.  She has a past medical history of COPD, chronic respiratory failure on home oxygen with history of hypercapnia, nicotine dependence, HFpEF, DVT/PE on past warfarin anticoagulation, recent hemoptysis, morbid obesity, CKD stage III, CAD, HLD, HTN, hypothyroidism, MGUS, seizure disorder, CVA, admitted 3/29/2024 with acute on chronic respiratory failure.     The primary encounter diagnosis was Physical deconditioning. Diagnoses of Generalized muscle weakness, Acute on chronic respiratory failure with hypoxia (H), Acute on chronic heart failure with preserved ejection fraction (H), COPD exacerbation (H), JODI (obstructive sleep apnea), Severe pulmonary hypertension (H), Coronary artery disease involving native coronary artery of native heart with angina pectoris (H24), Personal history of DVT (deep vein thrombosis), History of pulmonary embolism, Community acquired pneumonia, unspecified laterality, Respiratory acidosis, Hemoptysis, SONAL (acute kidney injury) (H24), Stage 3 chronic kidney disease, unspecified whether stage 3a or 3b CKD (H), Hypokalemia, Ulcer of ankle,  unspecified laterality, limited to breakdown of skin (H), Dermatitis, Thrombocytopenia (H24), Moderate malnutrition (H24), Morbid obesity (H), Mixed hyperlipidemia, Hypothyroidism, unspecified type, History of CVA (cerebrovascular accident), MGUS (monoclonal gammopathy of unknown significance), Vitamin B12 deficiency (non anemic), Hx of seizure disorder, and Slow transit constipation were also pertinent to this visit.    ***    BP Readings from Last 3 Encounters:   05/08/24 134/82   05/07/24 130/81   12/13/23 132/86     Wt Readings from Last 5 Encounters:   05/08/24 128.8 kg (284 lb)   05/07/24 129.1 kg (284 lb 11.2 oz)   01/24/20 145.1 kg (319 lb 14.4 oz)   11/20/19 145.5 kg (320 lb 11.2 oz)   08/28/19 144 kg (317 lb 6.4 oz)     CODE STATUS/ADVANCE DIRECTIVES DISCUSSION:  Full Code  CPR/Full code   ALLERGIES:   Allergies   Allergen Reactions    Eliquis [Apixaban] Shortness Of Breath, Hives and Swelling     Pt. Reported     Penicillins Anaphylaxis     Per chart review: tolerated Augmentin in 2020.  Tolerated CTX 2024.       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  left thalamic stroke as well as patchy MCA branch infarcts    CVA  (cerebral vascular accident) (H) 12/23/2012    Left thalamic stroke, MCA branch infarcts    Dermatitis     DVT (deep venous thrombosis) (H)     Right leg    Encephalopathy     after stroke, believed related to hypoxia    History of transfusion     Hyperlipidemia     Hypertension     Hypothyroidism     Lymphedema     MGUS (monoclonal gammopathy of unknown significance)     Neuropathy of right lower extremity     Obesity     On home oxygen therapy     PFO (patent foramen ovale)     closed after stroke, see cardiology notes care everywhere    PFO (patent foramen ovale)     Pneumonia     Primary hypercoagulable state (H24)     Pulmonary emboli (H) 2013    Pulmonary HTN (H)     Respiratory failure (H)     after stroke    Seizure (H)     Seizures (H)     at age 31    Skin cancer 2014    Sleep apnea     CPAP    Stroke (H) 2012    Umbilical hernia     Wound of right ankle       PAST SURGICAL HISTORY:   has a past surgical history that includes Repair patent foramen ovale (2013); IR Carotid Angiogram (12/26/2012); IR Miscellaneous Procedure (12/26/2012); IR Carotid Angiogram (12/26/2012); IR Miscellaneous Procedure (12/26/2012); IR Miscellaneous Procedure (12/26/2012); Biopsy skin (location); [place] (1985); Colonoscopy (N/A, 4/9/2018); Esophagoscopy, gastroscopy, duodenoscopy (EGD), combined (N/A, 4/9/2018); Xr Sacro Iliac Joint Injection Left (11/2012); Cardiac catheterization (2012); Cv Coronary Angiogram (N/A, 4/12/2019); Cv Right Heart Catheterization (N/A, 4/12/2019); Cv Left Heart Catheterization Without Left Ventriculogram (Left, 4/12/2019); Asd Repair; Asd Repair; Patent Foramen Ovale Closure; other surgical history; REMV ART CLOT ILIAC-POP,LEG INCIS (Left, 7/22/2019); Coronary Angiogram (N/A, 4/12/2024); Right Heart Catheterization (N/A, 4/12/2024); Intravascular Ultrasound (N/A, 4/12/2024); and Right Heart Catheterization (N/A, 4/19/2024).  FAMILY HISTORY: family history includes Angioedema in her mother;  Cerebrovascular Disease in her father; Coronary Artery Disease in her brother; Pulmonary Embolism in her brother.  SOCIAL HISTORY:   reports that she quit smoking about 11 years ago. Her smoking use included cigarettes. She started smoking about 55 years ago. She has a 44 pack-year smoking history. She has never used smokeless tobacco. She reports that she does not drink alcohol and does not use drugs.  Patient's living condition: {LIVES WITH (NURSING HOME):978559}    Post Discharge Medication Reconciliation Status:   MED REC REQUIRED  Post Medication Reconciliation Status:  Discharge medications reconciled and changed, see notes/orders       Current Outpatient Medications   Medication Sig Dispense Refill    acetaminophen (TYLENOL) 325 MG tablet Take 650 mg by mouth every 4 hours as needed for pain or fever      albuterol (PROVENTIL) (2.5 MG/3ML) 0.083% neb solution Take 1 vial (2.5 mg) by nebulization every 6 hours as needed for shortness of breath or wheezing OFFICE VISIT NEEDED FOR ANY FURTHER REFILLS 90 mL 11    albuterol (VENTOLIN HFA) 108 (90 Base) MCG/ACT inhaler INHALE 2 PUFFS INTO THE LUNGS EVERY 4-6 HOURS AS NEEDED. 18 g 3    aspirin (ASA) 81 MG chewable tablet Take 81 mg by mouth 2 times daily      bisacodyl (DULCOLAX) 10 MG suppository Place 10 mg rectally daily as needed for constipation      bumetanide (BUMEX) 2 MG tablet Take 1 tablet (2 mg) by mouth daily      calcium carbonate (TUMS) 500 MG chewable tablet Take 1 chew tab by mouth 4 times daily as needed for heartburn      calcium carbonate 500 mg, elemental, (OSCAL) 500 MG tablet Take 1 tablet by mouth at bedtime      cyanocobalamin (CYANOCOBALAMIN) 1000 MCG/ML injection Inject 1 mL into the muscle every 30 days      DIETHYLTOLUAMIDE EX Externally apply topically as needed (bug bit prevention)      dimethicone-zinc oxide (KAYDEN PROTECT) external cream Apply topically 2 times daily as needed for dry skin or other      diphenhydrAMINE (BENADRYL) 25 MG  "capsule Take 1-2 capsules (25-50 mg) by mouth every 6 hours as needed for itching or allergies 100 capsule 0    empagliflozin (JARDIANCE) 10 MG TABS tablet Take 1 tablet (10 mg) by mouth daily 90 tablet 1    fluticasone-vilanterol (BREO ELLIPTA) 200-25 MCG/ACT inhaler Inhale 1 puff into the lungs daily 30 each 11    guaiFENesin (ROBITUSSIN) 20 mg/mL liquid Take 200 mg by mouth every 4 hours as needed for cough      ipratropium - albuterol 0.5 mg/2.5 mg/3 mL (DUONEB) 0.5-2.5 (3) MG/3ML neb solution Take 1 vial (3 mLs) by nebulization 2 times daily      loperamide (IMODIUM A-D) 2 MG tablet Take 2 mg by mouth 4 times daily as needed for diarrhea      magnesium hydroxide (MILK OF MAGNESIA) 400 MG/5ML suspension Take 30 mLs by mouth daily as needed for constipation or heartburn      menthol-zinc oxide (CALMOSEPTINE) 0.44-20.6 % OINT ointment Apply topically daily as needed for skin protection      multivitamin, therapeutic (THERA-VIT) TABS tablet Take 1 tablet by mouth at bedtime      nystatin (MYCOSTATIN) 598136 UNIT/GM external powder Apply topically 2 times daily as needed for dry skin      Ostomy Supplies (SKIN PREP WIPES) MISC Apply topically as needed (to intact blister stage 2 deep tissue injury)      polyethylene glycol (MIRALAX) 17 GM/Dose powder Take 17 g by mouth daily      senna-docusate (SENOKOT-S/PERICOLACE) 8.6-50 MG tablet Take 2 tablets by mouth 2 times daily as needed for constipation      tiotropium (SPIRIVA RESPIMAT) 2.5 MCG/ACT inhaler INHALE TWO PUFFS BY MOUTH EVERY DAY 12 g 3    Vitamins A & D (VITAMIN A & D) external ointment Apply topically 2 times daily as needed       No current facility-administered medications for this visit.       ROS:  {ROS FGS:188027}    Vitals:  /82   Pulse 97   Temp 97.9  F (36.6  C)   Resp 19   Ht 1.676 m (5' 6\")   Wt 128.8 kg (284 lb)   SpO2 94%   BMI 45.84 kg/m    Exam:  GENERAL APPEARANCE:  {detention GENERAL APPEARANCE:745120}  ENT:  {NURSING HOME " "ENT PE:448360::\"Mouth and posterior oropharynx normal, moist mucous membranes\"}  EYES:  {[facility] EYES PE :688289::\"EOM, conjunctivae, lids, pupils and irises normal\"}  NECK:  {NURSING HOME NECK PE:915532::\"No adenopathy,masses or thyromegaly\"}  RESP:  {[facility] RESP PE:150259}  CV:  {[facility] CV PE:238801::\"Palpation and auscultation of heart done \",\"regular rate and rhythm, no murmur, rub, or gallop\"}  ABDOMEN:  {[facility] GI PE:010153::\"normal bowel sounds, soft, nontender, no hepatosplenomegaly or other masses\"}  M/S:   {[facility] M/S PE:698457}  SKIN:  {NURSING HOME SKIN PE:959284}  NEURO:   {[facility] NEURO PE:069240}  PSYCH:  {[facility] PSYCH PE:166319}    Lab/Diagnostic data:  Most Recent 3 CBC's:  Recent Labs   Lab Test 05/07/24  1024 05/04/24  0921 05/01/24  1054 04/28/24  0643 04/27/24  2323 04/17/24  0800 04/16/24  0641   WBC 8.6  --   --   --  5.6  --  4.9   HGB 8.6*  --   --   --  8.6*  --  8.6*   MCV 89  --   --   --  89  --  82    207 230   < > 214   < > 98*    < > = values in this interval not displayed.     Most Recent 3 BMP's:  Recent Labs   Lab Test 05/07/24  1024 05/04/24  0921 05/03/24  0829 05/01/24  1940 05/01/24  1054     --  140  --  140   POTASSIUM 3.3* 3.6 3.6   < > 3.2*   CHLORIDE 98  --  98  --  96*   CO2 32*  --  32*  --  33*   BUN 33.7*  --  32.3*  --  27.9*   CR 1.13*  --  1.11*  --  1.11*   ANIONGAP 10  --  10  --  11   LUNA 9.4  --  9.4  --  9.5   *  --  100*  --  113*    < > = values in this interval not displayed.     Most Recent 2 LFT's:  Recent Labs   Lab Test 03/29/24  1453 07/10/23  0944   AST 19 19   ALT 14 15   ALKPHOS 115 84   BILITOTAL 1.4* 0.4     Most Recent 3 BNP's:  Recent Labs   Lab Test 03/29/24  1453   NTBNPI 4,421*     Most Recent D-dimer:  Recent Labs   Lab Test 03/29/24  1453   DD 3.33*     Most Recent Cholesterol Panel:  Recent Labs   Lab Test 11/20/20  1000   CHOL 178      HDL 59   TRIG 97     Most Recent TSH " and T4:  Recent Labs   Lab Test 03/29/24  1453   TSH 6.28*   T4 1.49     Most Recent Hemoglobin A1c:  Recent Labs   Lab Test 09/19/22  0946   A1C 5.5     Most Recent Urinalysis:  Recent Labs   Lab Test 06/27/19  0552   COLOR Yellow   APPEARANCE Clear   URINEGLC Negative   URINEBILI Negative   URINEKETONE Negative   SG 1.020   UBLD Small*   URINEPH 6.0   PROTEIN Trace*   UROBILINOGEN <2.0 E.U./dL   NITRITE Negative   LEUKEST Trace*   RBCU 0-2   WBCU 0-5     Most Recent ABG:  Recent Labs   Lab Test 04/12/24  1431   PH 7.45   PO2 48*   PCO2 51*   HCO3 35*   SHILPA 10.0*     Most Recent Anemia Panel:  Recent Labs   Lab Test 05/07/24  1024 03/29/24  1453 09/19/22  0946 04/06/18  1642 04/06/18  0916 04/06/18  0732 05/19/17  1516   WBC 8.6   < > 8.3   < >  --  8.6 9.2   HGB 8.6*   < > 11.4*   < >  --  5.8* 13.4   HCT 29.9*   < > 39.6   < >  --  24.7* 43.2   MCV 89   < > 94   < >  --  78* 92      < > 214   < >  --  272 208   IRON  --   --   --   --   --  14* 48   IRONSAT  --   --   --   --   --   --  15   FEB  --   --   --   --   --   --  330   KWESI  --   --   --   --  18  --  44   B12  --   --  511   < >  --   --  759    < > = values in this interval not displayed.     Magnesium   Date Value Ref Range Status   05/07/2024 2.2 1.7 - 2.3 mg/dL Final       ASSESSMENT/PLAN:    (R53.81) Physical deconditioning  (primary encounter diagnosis)  (M62.81) Generalized muscle weakness  Comment: Acute on chronic. S/T below  Plan:   -Continue Physical therapy and Occupational therapy as directed  -SW to remain involved for safe discharge planning needs    (J96.21) Acute on chronic respiratory failure with hypoxia (H)  (I50.33) Acute on chronic heart failure with preserved ejection fraction (H)  (J44.1) COPD exacerbation (H)  (G47.33) JODI (obstructive sleep apnea)  (I27.20) Severe pulmonary hypertension (H)  (I25.119) Coronary artery disease involving native coronary artery of native heart with angina pectoris (H24)  (J18.9) Community  acquired pneumonia, unspecified laterality  (E87.29) Respiratory acidosis  Comment: Chronic. Recent exacerbations. Baseline 5 L NC PTA. CXR 3/29- Cardiomegaly. Ill-defined opacity at the right infrahilar lung could be acute airspace disease. A mass is difficult to exclude. Bilateral vascular congestion may be a degree of pulmonary edema. Potential small right pleural fluid. CT C PE 3/29/2024 without any PE but did demonstrate pulmonary congestion, R pleural effusion; also enlarged main pulmonary artery, as evidence of pulmonary hypertension. TTE 4/1 showing EF 60-65% without WMA, severe RV dilation with moderately decreased RV function, mod-severe TR and pulm HTN. Was initially started on Lasix drip, then transitioned to bumex gtt, then to oral bumex 2mg daily starting on 4/19/24. Cardiology consulted, appreciate their assistance. Signed off 4/19/24. S/p left and right heart cath on 4/12/2024.  Showed normal coronary arteries.  Severely elevated right-sided pressures, severely elevated left-sided filling pressures.  Mixed precapillary and postcapillary pulmonary hypertension (mPA 59 mmHg, PCWP 28 mmHg) .  Normal cardiac index. S/p RHC on 4/19:  Her filling pressures have improved significantly. Her weight is now normal and her mean RA is mildly elevated. Her pulmonary hypertension has improved but still severe.  Currently on 6 lpm of supplemental oxygen by oximyzer at rest, this may be her new baseline (PTA baseline was 5 lpm by nasal cannula). O2 with exertion at 8-10 lpm the last few days, O2 sat goal at 88-92%. Completed 5-day course of ceftriaxone+doxy on 4/2/24. Completed 8-day steroid taper on 4/7/24. Restarted PTA Breo on 4/8/24.  Wheezing significantly improved. Pulmonary consulted 4/23/24, appreciate their assistance.  VQ scan on 4/26 without evidence of acute PE or chronic thromboembolic disease. Patient to follow-up in the pulmonary hypertension clinic after discharge.   Plan:  -Continue with Bumex 2mg  daily. Add HOLD parameters.   -Continue duoneb BID  -Continue albuterol nebulization every 6 hours PRN  -Discontinue ventolin inhaler and continue nebulizations for now  -Continue robitussin PRN  -Change benadryl to 25mg every 6 hours PRN  -Oxygen by Nasal Cannula 6 LPM at rest and 8-10 LPM w/activity LPM to keep oxygen Sats 88 % or greater.  -Continue ICS-LABA Breo ellipta daily.    -Continue spiriva daily   -Started on Jardiance 10 mg daily on 4/13/2024 for HFpEF.  -Lymphedema wraps with lymphedema therapy on site  -2gm Na diet.  -Fluid restriction 1800cc/day.  -Follow up with pulmonology as directed. Call [phone] to schedule. Followed with EMIL Mcmahon at [facility]  -Follow up with cardiology as directed. Scheduled for 5/10/24  -CPAP per home settings  -Daily weights as directed. Hospital weight~284#. Admit weight~  -BMP and CBC due 5/10/24    (Z86.718) Personal history of DVT (deep vein thrombosis)  (Z86.711) History of pulmonary embolism  Comment: Acute on chronic. *CT chest on 3/29/2024 was negative for pulmonary embolism. Per pharmacy med rec, she is no longer on anticoagulation with warfarin. Prophylactic lovenox 40 mg BID while in hospital.  Plan:   -Continue asa BID as directed  -Monitor for worsening s/symptoms of concerns  -BMP and CBC due 5/10/24    (R04.2) Hemoptysis  Comment: Acute/mild. Reported orange sputum production at home, was producing small amounts of rober blood 4/1/24. Hospital Admission CT chest negative for PE, masses. Resumed aspirin 4/3 and reported small amount of hemoptysis 4/4, but no further hemoptysis after that.  Prophylactic lovenox started on 4/8/2024.  Held for cardiac catheterization, then resumed.  Plan:   -Monitor for worsening s/symptoms of concerns.   -BMP and CBC due 5/10/24    (N17.9) SONAL (acute kidney injury) (H24)  (N18.30) Stage 3 chronic kidney disease, unspecified whether stage 3a or 3b CKD (H)  Comment: Acute on chronic. Suspect congestive  nephropathy. Admission serum creatinine was 1.33, historic baseline 0.87 to 1.08 in 2020 to 2023. Avoid non-steroidal anti-inflammatory drugs (NSAIDs). Creatinine stabilized around 1.1.  Plan:   -Renally dose medications  -Avoid nephrotoxins  -BMP and CBC due 5/10/24  -Monitor urinary status    (E87.6) Hypokalemia  Comment: Acute. Noted per chart review. Due to diuresis. Was on KCl 40mEq TID while on IV diuresis which has since been discontinued.   Plan:   -Monitor for worsening s/symptoms of concerns  -BMP and CBC due 5/10/24    (L97.301) Ulcer of ankle, unspecified laterality, limited to breakdown of skin (H)  Comment: Acute on chronic. WOCn reviewed in hospital. See pictures from 4/17, there was concern for developing cellulitis. Completed 7 day course of antibiotics (ceftriaxone --> keflex) on 4/26/2024.   Plan:   -Monitor for worsening s/symptoms of concerns  -BMP and CBC due 5/10/24  -Continue wound care as directed:  *Right lateral ankle wound: Every other day and PRN for soiled/loose dressing  Wrap right lower leg with Vashe-soaked gauze. Allow to soak for about two minutes.  Unwrap leg and gently wipe away any debris. Allow skin to dry.  Use a tongue depressor to apply Medihoney ([id number]) to wound.  Apply a small piece of Adaptic (oil emulsion gauze) over Medihoney.  Cover with a 4x4 gauze and secure with Kerlix gauze and Medipore tape.  Apply Spandigrip to right lower extremity.     (L30.9) Dermatitis  Comment: Acute on chronic. S/T morbid size  Plan:   -Monitor for worsening s/symptoms of concerns  -Continue current wound care as directed:  *Right breast fold rash: BID   Cleanse skin with warm water. Pat dry.  Apply nystatin powder    (D69.6) Thrombocytopenia (H24)  Comment: Acute on chronic. Platelets normal initially, trended down to 98 on 4/16/2024, then trended up and within normal limits since 4/22/2024.  Plan:   -Monitor for worsening s/symptoms of concerns  -BMP and CBC due 5/10/24    (E44.0)  Moderate malnutrition (H24)  Comment: Acute on chronic. In context with recent hospitalization  Plan:   -Dietician to remain involved  -Monitor weight trends  -BMP and CBC due 5/10/24    (E66.01) Morbid obesity (H)  (E78.2) Mixed hyperlipidemia  Comment: Chronic. Diet controlled. Not on statin  Plan:   -Encourage ongoing weight loss  -Monitor for worsening s/symptoms of concerns  -Monitor weight trends  -BMP and CBC due 5/10/24    (E03.9) Hypothyroidism, unspecified type  Comment: Acute on chronic. Med rec did not show any thyroid supplement. TSH 6.28 and free T4 1.49 on 3/29/24.  Plan:   -Monitor for worsening s/symptoms of concerns  -Continue without medication at this time  -BMP and CBC due 5/10/24  -Trend thyroid levels in 4-6 weeks. Due in 3-4 weeks    (Z86.73) History of CVA (cerebrovascular accident)  Comment: Chronic. Per chart review. Former smoker, quit in 2012.   Plan:   -Monitor for worsening s/symptoms of concerns  -Continue aspirin 81mg BID as directed  -Discontinue oscal at HS  -Discontinue diethyltolumaide  -Poor compliance with statin  -Follow up with cardiology as directed  -BMP and CBC due 5/10/24    (D47.2) MGUS (monoclonal gammopathy of unknown significance)  Comment: Chronic. Noted per chart review  Plan:   -Monitor for worsening s/symptoms of concerns  -Follow up with PCP post TCU  -BMP and CBC due 5/10/24    (E53.8) Vitamin B12 deficiency (non anemic)  Comment: Chronic. Noted per chart review  Plan:   -Monitor for worsening s/symptoms of concerns  -Continue vitamin B12 supplementation as directed of 1mL every month as directed  -Discontinue calmoseptine orders  -BMP and CBC due 5/10/24    (Z86.69) Hx of seizure disorder  Comment: Noted in medical record, does not appear to be on ant anti-epileptic medications  Plan:   -Monitor for seizure concerns  -Monitor for worsening s/symptoms of concerns  -Neurology PRN  -Discontinue A&E ointment orders  -BMP and CBC due 5/10/24    (K59.01)  Constipation  Comment: Acute on chronic.   Plan:  -Monitor BM patterns  -Continue miralax daily  -Continue senna S 2 tabs BID PRN.  -Bisacodyl 10mg rectal daily PRN  -TUMS PRN  -PRN imodium for diarrhea concerns  -MOM daily PRN  -Discontinue dimethicone-zinc oxide KAYDEN orders    *** minutes spent on the date of the encounter doing chart review, history and exam, PMH, documentation and further activities as noted above. Collaboration with nursing staff on site, clinical managers, and therapies were completed on site today.       Electronically signed by:  Dr. Pamella Horner DNP, APRN, FNP-C, WCS-C, EDS-C           Sincerely,        Pamella Horner, JONATAN CNP

## 2024-05-08 NOTE — LETTER
5/8/2024        RE: Linda Otero  [address]   [address]  [facility] [address]        [facility] GERIATRICS    PRIMARY CARE PROVIDER AND CLINIC:  MALLORY Escamilla PHYSICIAN [facility] [address] [facility] / [facility] [address]  Chief Complaint   Patient presents with     [facility] Medical Record Number:  [id number]  Place of Service where encounter took place:  [facility] (TCU)(SNF) [[id number]]    Linda Otero  is a 64 year old  (1960), admitted to the above facility from  [facility]. Hospital stay 3/29/24 through 5/7/24..   HPI:    Linda Otero is a 64 year old female who was admitted on 3/29/2024.  She has a past medical history of COPD, chronic respiratory failure on home oxygen with history of hypercapnia, nicotine dependence, HFpEF, DVT/PE on past warfarin anticoagulation, recent hemoptysis, morbid obesity, CKD stage III, CAD, HLD, HTN, hypothyroidism, MGUS, seizure disorder, CVA, admitted 3/29/2024 with acute on chronic respiratory failure.     The primary encounter diagnosis was Physical deconditioning. Diagnoses of Generalized muscle weakness, Acute on chronic respiratory failure with hypoxia (H), Acute on chronic heart failure with preserved ejection fraction (H), COPD exacerbation (H), JODI (obstructive sleep apnea), Severe pulmonary hypertension (H), Coronary artery disease involving native coronary artery of native heart with angina pectoris (H24), Personal history of DVT (deep vein thrombosis), History of pulmonary embolism, Community acquired pneumonia, unspecified laterality, Respiratory acidosis, Hemoptysis, SONAL (acute kidney injury) (H24), Stage 3 chronic kidney disease, unspecified whether stage 3a or 3b CKD (H), Hypokalemia, Ulcer of ankle, unspecified laterality, limited to breakdown of skin (H), Dermatitis, Thrombocytopenia (H24), Moderate malnutrition (H24), Morbid obesity (H), Mixed hyperlipidemia,  Hypothyroidism, unspecified type, History of CVA (cerebrovascular accident), MGUS (monoclonal gammopathy of unknown significance), Vitamin B12 deficiency (non anemic), Hx of seizure disorder, and Slow transit constipation were also pertinent to this visit.    Met with patient who was found sitting upright at edge of bed. She denies any chest pain, palpitations, CERRATO, lightheadedness, dizziness. She endorses ongoing shortness of breath but reports this has improved since hospitalization. Continues to have productive cough at times that is slightly discolored of greenish/yellow/red in color. Small amounts were noted during visit today. Denies any abdominal discomfort. Denies N&V. Denies B&B concerns. Denies dysuria or frequency. Denies loose or constipation. Hernia present to abdomen. Non-tender and reducible. Appetite good. Sleeping well. Denies any complaints of pain. Nursing denies any acute concerns.     BP Readings from Last 3 Encounters:   05/08/24 134/82   05/07/24 130/81   12/13/23 132/86     Wt Readings from Last 5 Encounters:   05/08/24 128.8 kg (284 lb)   05/07/24 129.1 kg (284 lb 11.2 oz)   01/24/20 145.1 kg (319 lb 14.4 oz)   11/20/19 145.5 kg (320 lb 11.2 oz)   08/28/19 144 kg (317 lb 6.4 oz)     CODE STATUS/ADVANCE DIRECTIVES DISCUSSION:  Full Code  CPR/Full code   ALLERGIES:   Allergies   Allergen Reactions     Eliquis [Apixaban] Shortness Of Breath, Hives and Swelling     Pt. Reported      Penicillins Anaphylaxis     Per chart review: tolerated Augmentin in 2020.  Tolerated CTX 2024.        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  left thalamic stroke as well as patchy MCA branch infarcts     CVA (cerebral vascular accident) (H) 12/23/2012    Left thalamic stroke, MCA branch infarcts     Dermatitis      DVT (deep venous thrombosis) (H)     Right leg     Encephalopathy     after stroke, believed related to hypoxia     History of transfusion      Hyperlipidemia      Hypertension      Hypothyroidism      Lymphedema      MGUS (monoclonal gammopathy of unknown significance)      Neuropathy of right lower extremity      Obesity      On home oxygen therapy      PFO (patent foramen ovale)     closed after stroke, see cardiology notes care everywhere     PFO (patent foramen ovale)      Pneumonia      Primary hypercoagulable state (H24)      Pulmonary emboli (H) 2013     Pulmonary HTN (H)      Respiratory failure (H)     after stroke     Seizure (H)      Seizures (H)     at age 31     Skin cancer 2014     Sleep apnea     CPAP     Stroke (H) 2012     Umbilical hernia      Wound of right ankle       PAST SURGICAL HISTORY:   has a past surgical history that includes Repair patent foramen ovale (2013); IR Carotid Angiogram (12/26/2012); IR Miscellaneous Procedure (12/26/2012); IR Carotid Angiogram (12/26/2012); IR Miscellaneous Procedure (12/26/2012); IR Miscellaneous Procedure (12/26/2012); Biopsy skin (location); [place] (1985); Colonoscopy (N/A, 4/9/2018); Esophagoscopy, gastroscopy, duodenoscopy (EGD), combined (N/A, 4/9/2018); Xr Sacro Iliac Joint Injection Left (11/2012); Cardiac catheterization (2012); Cv  Coronary Angiogram (N/A, 4/12/2019); Cv Right Heart Catheterization (N/A, 4/12/2019); Cv Left Heart Catheterization Without Left Ventriculogram (Left, 4/12/2019); Asd Repair; Asd Repair; Patent Foramen Ovale Closure; other surgical history; REMV ART CLOT ILIAC-POP,LEG INCIS (Left, 7/22/2019); Coronary Angiogram (N/A, 4/12/2024); Right Heart Catheterization (N/A, 4/12/2024); Intravascular Ultrasound (N/A, 4/12/2024); and Right Heart Catheterization (N/A, 4/19/2024).  FAMILY HISTORY: family history includes Angioedema in her mother; Cerebrovascular Disease in her father; Coronary Artery Disease in her brother; Pulmonary Embolism in her brother.  SOCIAL HISTORY:   reports that she quit smoking about 11 years ago. Her smoking use included cigarettes. She started smoking about 55 years ago. She has a 44 pack-year smoking history. She has never used smokeless tobacco. She reports that she does not drink alcohol and does not use drugs.  Patient's living condition: lives alone    Post Discharge Medication Reconciliation Status:   MED REC REQUIRED  Post Medication Reconciliation Status:  Discharge medications reconciled and changed, see notes/orders       Current Outpatient Medications   Medication Sig Dispense Refill     acetaminophen (TYLENOL) 325 MG tablet Take 650 mg by mouth every 4 hours as needed for pain or fever       albuterol (PROVENTIL) (2.5 MG/3ML) 0.083% neb solution Take 1 vial (2.5 mg) by nebulization every 6 hours as needed for shortness of breath or wheezing OFFICE VISIT NEEDED FOR ANY FURTHER REFILLS 90 mL 11     albuterol (VENTOLIN HFA) 108 (90 Base) MCG/ACT inhaler INHALE 2 PUFFS INTO THE LUNGS EVERY 4-6 HOURS AS NEEDED. 18 g 3     aspirin (ASA) 81 MG chewable tablet Take 81 mg by mouth 2 times daily       bisacodyl (DULCOLAX) 10 MG suppository Place 10 mg rectally daily as needed for constipation       bumetanide (BUMEX) 2 MG tablet Take 1 tablet (2 mg) by mouth daily       calcium carbonate (TUMS) 500  MG chewable tablet Take 1 chew tab by mouth 4 times daily as needed for heartburn       calcium carbonate 500 mg, elemental, (OSCAL) 500 MG tablet Take 1 tablet by mouth at bedtime       cyanocobalamin (CYANOCOBALAMIN) 1000 MCG/ML injection Inject 1 mL into the muscle every 30 days       DIETHYLTOLUAMIDE EX Externally apply topically as needed (bug bit prevention)       dimethicone-zinc oxide (KAYDEN PROTECT) external cream Apply topically 2 times daily as needed for dry skin or other       diphenhydrAMINE (BENADRYL) 25 MG capsule Take 1-2 capsules (25-50 mg) by mouth every 6 hours as needed for itching or allergies 100 capsule 0     empagliflozin (JARDIANCE) 10 MG TABS tablet Take 1 tablet (10 mg) by mouth daily 90 tablet 1     fluticasone-vilanterol (BREO ELLIPTA) 200-25 MCG/ACT inhaler Inhale 1 puff into the lungs daily 30 each 11     guaiFENesin (ROBITUSSIN) 20 mg/mL liquid Take 200 mg by mouth every 4 hours as needed for cough       ipratropium - albuterol 0.5 mg/2.5 mg/3 mL (DUONEB) 0.5-2.5 (3) MG/3ML neb solution Take 1 vial (3 mLs) by nebulization 2 times daily       loperamide (IMODIUM A-D) 2 MG tablet Take 2 mg by mouth 4 times daily as needed for diarrhea       magnesium hydroxide (MILK OF MAGNESIA) 400 MG/5ML suspension Take 30 mLs by mouth daily as needed for constipation or heartburn       menthol-zinc oxide (CALMOSEPTINE) 0.44-20.6 % OINT ointment Apply topically daily as needed for skin protection       multivitamin, therapeutic (THERA-VIT) TABS tablet Take 1 tablet by mouth at bedtime       nystatin (MYCOSTATIN) 408924 UNIT/GM external powder Apply topically 2 times daily as needed for dry skin       Ostomy Supplies (SKIN PREP WIPES) MISC Apply topically as needed (to intact blister stage 2 deep tissue injury)       polyethylene glycol (MIRALAX) 17 GM/Dose powder Take 17 g by mouth daily       senna-docusate (SENOKOT-S/PERICOLACE) 8.6-50 MG tablet Take 2 tablets by mouth 2 times daily as needed for  "constipation       tiotropium (SPIRIVA RESPIMAT) 2.5 MCG/ACT inhaler INHALE TWO PUFFS BY MOUTH EVERY DAY 12 g 3     Vitamins A & D (VITAMIN A & D) external ointment Apply topically 2 times daily as needed       No current facility-administered medications for this visit.       ROS:  10 point ROS of systems including Constitutional, Eyes, Respiratory, Cardiovascular, Gastroenterology, Genitourinary, Integumentary, Musculoskeletal, Psychiatric were all negative except for pertinent positives noted in my HPI.    Vitals:  /82   Pulse 97   Temp 97.9  F (36.6  C)   Resp 19   Ht 1.676 m (5' 6\")   Wt 128.8 kg (284 lb)   SpO2 94%   BMI 45.84 kg/m    Exam:  GENERAL APPEARANCE:  Alert, in no distress, oriented, morbidly obese, cooperative  ENT:  Mouth and posterior oropharynx normal, moist mucous membranes, normal hearing acuity  EYES:  EOM, conjunctivae, lids, pupils and irises normal  NECK:  No adenopathy,masses or thyromegaly  RESP:  respiratory effort and palpation of chest normal, diminished breath sounds bibasilar Right> Left, cough productive with phlegm production  CV:  Palpation and auscultation of heart done , regular rate and rhythm, no murmur, rub, or gallop, peripheral edema 1+ in BLE  ABDOMEN:  normal bowel sounds, soft, nontender, no hepatosplenomegaly or other masses, no guarding or rebound  M/S:   Ambulates with walker  SKIN:  Inspection of skin and subcutaneous tissue baseline, Palpation of skin and subcutaneous tissue baseline, did not assess wound to RLE today as dressing is C/D/I  NEURO:   Cranial nerves 2-12 are normal tested and grossly at patient's baseline, no purposeful movement in upper and lower extremities  PSYCH:  oriented X 3, normal insight, judgement and memory, affect and mood normal    Lab/Diagnostic data:  Most Recent 3 CBC's:  Recent Labs   Lab Test 05/07/24  1024 05/04/24  0921 05/01/24  1054 04/28/24  0643 04/27/24  2323 04/17/24  0800 04/16/24  0641   WBC 8.6  --   --   --  " 5.6  --  4.9   HGB 8.6*  --   --   --  8.6*  --  8.6*   MCV 89  --   --   --  89  --  82    207 230   < > 214   < > 98*    < > = values in this interval not displayed.     Most Recent 3 BMP's:  Recent Labs   Lab Test 05/07/24  1024 05/04/24  0921 05/03/24  0829 05/01/24  1940 05/01/24  1054     --  140  --  140   POTASSIUM 3.3* 3.6 3.6   < > 3.2*   CHLORIDE 98  --  98  --  96*   CO2 32*  --  32*  --  33*   BUN 33.7*  --  32.3*  --  27.9*   CR 1.13*  --  1.11*  --  1.11*   ANIONGAP 10  --  10  --  11   LUNA 9.4  --  9.4  --  9.5   *  --  100*  --  113*    < > = values in this interval not displayed.     Most Recent 2 LFT's:  Recent Labs   Lab Test 03/29/24  1453 07/10/23  0944   AST 19 19   ALT 14 15   ALKPHOS 115 84   BILITOTAL 1.4* 0.4     Most Recent 3 BNP's:  Recent Labs   Lab Test 03/29/24  1453   NTBNPI 4,421*     Most Recent D-dimer:  Recent Labs   Lab Test 03/29/24  1453   DD 3.33*     Most Recent Cholesterol Panel:  Recent Labs   Lab Test 11/20/20  1000   CHOL 178      HDL 59   TRIG 97     Most Recent TSH and T4:  Recent Labs   Lab Test 03/29/24  1453   TSH 6.28*   T4 1.49     Most Recent Hemoglobin A1c:  Recent Labs   Lab Test 09/19/22  0946   A1C 5.5     Most Recent Urinalysis:  Recent Labs   Lab Test 06/27/19  0552   COLOR Yellow   APPEARANCE Clear   URINEGLC Negative   URINEBILI Negative   URINEKETONE Negative   SG 1.020   UBLD Small*   URINEPH 6.0   PROTEIN Trace*   UROBILINOGEN <2.0 E.U./dL   NITRITE Negative   LEUKEST Trace*   RBCU 0-2   WBCU 0-5     Most Recent ABG:  Recent Labs   Lab Test 04/12/24  1431   PH 7.45   PO2 48*   PCO2 51*   HCO3 35*   SHILPA 10.0*     Most Recent Anemia Panel:  Recent Labs   Lab Test 05/07/24  1024 03/29/24  1453 09/19/22  0946 04/06/18  1642 04/06/18  0916 04/06/18  0732 05/19/17  1516   WBC 8.6   < > 8.3   < >  --  8.6 9.2   HGB 8.6*   < > 11.4*   < >  --  5.8* 13.4   HCT 29.9*   < > 39.6   < >  --  24.7* 43.2   MCV 89   < > 94   < >  --  78* 92       < > 214   < >  --  272 208   IRON  --   --   --   --   --  14* 48   IRONSAT  --   --   --   --   --   --  15   FEB  --   --   --   --   --   --  330   KWESI  --   --   --   --  18  --  44   B12  --   --  511   < >  --   --  759    < > = values in this interval not displayed.     Magnesium   Date Value Ref Range Status   05/07/2024 2.2 1.7 - 2.3 mg/dL Final       ASSESSMENT/PLAN:    (R53.81) Physical deconditioning  (primary encounter diagnosis)  (M62.81) Generalized muscle weakness  Comment: Acute on chronic. S/T below  Plan:   -Continue Physical therapy and Occupational therapy as directed  -SW to remain involved for safe discharge planning needs    (J96.21) Acute on chronic respiratory failure with hypoxia (H)  (I50.33) Acute on chronic heart failure with preserved ejection fraction (H)  (J44.1) COPD exacerbation (H)  (G47.33) JODI (obstructive sleep apnea)  (I27.20) Severe pulmonary hypertension (H)  (I25.119) Coronary artery disease involving native coronary artery of native heart with angina pectoris (H24)  (J18.9) Community acquired pneumonia, unspecified laterality  (E87.29) Respiratory acidosis  Comment: Chronic. Recent exacerbations. Baseline 5 L NC PTA. CXR 3/29- Cardiomegaly. Ill-defined opacity at the right infrahilar lung could be acute airspace disease. A mass is difficult to exclude. Bilateral vascular congestion may be a degree of pulmonary edema. Potential small right pleural fluid. CT C PE 3/29/2024 without any PE but did demonstrate pulmonary congestion, R pleural effusion; also enlarged main pulmonary artery, as evidence of pulmonary hypertension. TTE 4/1 showing EF 60-65% without WMA, severe RV dilation with moderately decreased RV function, mod-severe TR and pulm HTN. Was initially started on Lasix drip, then transitioned to bumex gtt, then to oral bumex 2mg daily starting on 4/19/24. Cardiology consulted, appreciate their assistance. Signed off 4/19/24. S/p left and right heart cath  on 4/12/2024.  Showed normal coronary arteries.  Severely elevated right-sided pressures, severely elevated left-sided filling pressures.  Mixed precapillary and postcapillary pulmonary hypertension (mPA 59 mmHg, PCWP 28 mmHg) .  Normal cardiac index. S/p RHC on 4/19:  Her filling pressures have improved significantly. Her weight is now normal and her mean RA is mildly elevated. Her pulmonary hypertension has improved but still severe.  Currently on 6 lpm of supplemental oxygen by oximyzer at rest, this may be her new baseline (PTA baseline was 5 lpm by nasal cannula). O2 with exertion at 8-10 lpm the last few days, O2 sat goal at 88-92%. Completed 5-day course of ceftriaxone+doxy on 4/2/24. Completed 8-day steroid taper on 4/7/24. Restarted PTA Breo on 4/8/24.  Wheezing significantly improved. Pulmonary consulted 4/23/24, appreciate their assistance.  VQ scan on 4/26 without evidence of acute PE or chronic thromboembolic disease. Patient to follow-up in the pulmonary hypertension clinic after discharge.   Plan:  -Continue with Bumex 2mg daily. Add HOLD parameters.   -Continue duoneb BID  -Continue albuterol nebulization every 6 hours PRN  -Discontinue ventolin inhaler and continue nebulizations for now  -Continue robitussin PRN  -Change benadryl to 25mg every 6 hours PRN  -Oxygen by Nasal Cannula 6 LPM at rest and 8-10 LPM w/activity LPM to keep oxygen Sats 88 % or greater.  -Continue ICS-LABA Breo ellipta daily.    -Continue spiriva daily   -Started on Jardiance 10 mg daily on 4/13/2024 for HFpEF.  -Lymphedema wraps with lymphedema therapy on site  -2gm Na diet.  -Fluid restriction 1800cc/day.  -Follow up with pulmonology as directed. Call [phone] to schedule. Followed with EMIL Mcmahon at [facility]  -Follow up with cardiology as directed. Scheduled for 5/10/24  -CPAP per home settings  -Daily weights as directed. Hospital weight~284#. Admit weight~not completed yet  -BMP and CBC due  5/10/24    (Z86.718) Personal history of DVT (deep vein thrombosis)  (Z86.711) History of pulmonary embolism  Comment: Acute on chronic. *CT chest on 3/29/2024 was negative for pulmonary embolism. Per pharmacy med rec, she is no longer on anticoagulation with warfarin. Prophylactic lovenox 40 mg BID while in hospital.  Plan:   -Continue asa BID as directed  -Monitor for worsening s/symptoms of concerns  -BMP and CBC due 5/10/24    (R04.2) Hemoptysis  Comment: Acute/mild. Reported orange sputum production at home, was producing small amounts of rober blood 4/1/24. Hospital Admission CT chest negative for PE, masses. Resumed aspirin 4/3 and reported small amount of hemoptysis 4/4, but no further hemoptysis after that.  Prophylactic lovenox started on 4/8/2024.  Held for cardiac catheterization, then resumed.  Plan:   -Monitor for worsening s/symptoms of concerns.   -BMP and CBC due 5/10/24    (N17.9) SONAL (acute kidney injury) (H24)  (N18.30) Stage 3 chronic kidney disease, unspecified whether stage 3a or 3b CKD (H)  Comment: Acute on chronic. Suspect congestive nephropathy. Admission serum creatinine was 1.33, historic baseline 0.87 to 1.08 in 2020 to 2023. Avoid non-steroidal anti-inflammatory drugs (NSAIDs). Creatinine stabilized around 1.1.  Plan:   -Renally dose medications  -Avoid nephrotoxins  -BMP and CBC due 5/10/24  -Monitor urinary status    (E87.6) Hypokalemia  Comment: Acute. Noted per chart review. Due to diuresis. Was on KCl 40mEq TID while on IV diuresis which has since been discontinued.   Plan:   -Monitor for worsening s/symptoms of concerns  -BMP and CBC due 5/10/24    (L97.301) Ulcer of ankle, unspecified laterality, limited to breakdown of skin (H)  Comment: Acute on chronic. WOCn reviewed in hospital. See pictures from 4/17, there was concern for developing cellulitis. Completed 7 day course of antibiotics (ceftriaxone --> keflex) on 4/26/2024.   Plan:   -Monitor for worsening s/symptoms of  concerns  -BMP and CBC due 5/10/24  -Continue wound care as directed:  *Right lateral ankle wound: Every other day and PRN for soiled/loose dressing  Wrap right lower leg with Vashe-soaked gauze. Allow to soak for about two minutes.  Unwrap leg and gently wipe away any debris. Allow skin to dry.  Use a tongue depressor to apply Medihoney ([id number]) to wound.  Apply a small piece of Adaptic (oil emulsion gauze) over Medihoney.  Cover with a 4x4 gauze and secure with Kerlix gauze and Medipore tape.  Apply Spandigrip to right lower extremity.     (L30.9) Dermatitis  Comment: Acute on chronic. S/T morbid size  Plan:   -Monitor for worsening s/symptoms of concerns  -Continue current wound care as directed:  *Right breast fold rash: BID   Cleanse skin with warm water. Pat dry.  Apply nystatin powder    (D69.6) Thrombocytopenia (H24)  Comment: Acute on chronic. Platelets normal initially, trended down to 98 on 4/16/2024, then trended up and within normal limits since 4/22/2024.  Plan:   -Monitor for worsening s/symptoms of concerns  -BMP and CBC due 5/10/24    (E44.0) Moderate malnutrition (H24)  Comment: Acute on chronic. In context with recent hospitalization  Plan:   -Dietician to remain involved  -Monitor weight trends  -BMP and CBC due 5/10/24    (E66.01) Morbid obesity (H)  (E78.2) Mixed hyperlipidemia  Comment: Chronic. Diet controlled. Not on statin  Plan:   -Encourage ongoing weight loss  -Monitor for worsening s/symptoms of concerns  -Monitor weight trends  -BMP and CBC due 5/10/24    (E03.9) Hypothyroidism, unspecified type  Comment: Acute on chronic. Med rec did not show any thyroid supplement. TSH 6.28 and free T4 1.49 on 3/29/24.  Plan:   -Monitor for worsening s/symptoms of concerns  -Continue without medication at this time  -BMP and CBC due 5/10/24  -Trend thyroid levels in 4-6 weeks. Due in 3-4 weeks    (Z86.73) History of CVA (cerebrovascular accident)  Comment: Chronic. Per chart review. Former smoker,  quit in 2012.   Plan:   -Monitor for worsening s/symptoms of concerns  -Continue aspirin 81mg BID as directed  -Poor compliance with statin  -Follow up with cardiology as directed  -BMP and CBC due 5/10/24    (D47.2) MGUS (monoclonal gammopathy of unknown significance)  Comment: Chronic. Noted per chart review  Plan:   -Monitor for worsening s/symptoms of concerns  -Follow up with PCP post TCU  -BMP and CBC due 5/10/24    (E53.8) Vitamin B12 deficiency (non anemic)  Comment: Chronic. Noted per chart review  Plan:   -Monitor for worsening s/symptoms of concerns  -Continue vitamin B12 supplementation as directed of 1mL every month as directed- Last given today 5/8  -BMP and CBC due 5/10/24    (Z86.69) Hx of seizure disorder  Comment: Noted in medical record, does not appear to be on ant anti-epileptic medications  Plan:   -Monitor for seizure concerns  -Monitor for worsening s/symptoms of concerns  -Neurology PRN  -BMP and CBC due 5/10/24    (K59.01) Constipation  Comment: Acute on chronic.   Plan:  -Monitor BM patterns  -Continue miralax daily  -Continue senna S 2 tabs BID PRN.  -Bisacodyl 10mg rectal daily PRN  -TUMS PRN  -PRN imodium for diarrhea concerns  -MOM daily PRN    49 minutes spent on the date of the encounter doing chart review, history and exam, PMH, documentation and further activities as noted above. Collaboration with nursing staff on site, clinical managers, and therapies were completed on site today.       Electronically signed by:  Dr. Pamella Horner DNP, APRN, FNP-C, WCS-C, EDS-C          Sincerely,        Pamella Horner, APRN CNP

## 2024-05-09 NOTE — PROGRESS NOTES
[facility]ERIATRICS    Chief Complaint   Patient presents with    RECHECK     HPI:  Linda Otero is a 64 year old  (1960), who is being seen today for an episodic care visit at: [facility]) [[id number]]. Today's concern is: The primary encounter diagnosis was Physical deconditioning. Diagnoses of Generalized muscle weakness, Acute on chronic respiratory failure with hypoxia (H), Acute on chronic heart failure with preserved ejection fraction (H), COPD exacerbation (H), JODI (obstructive sleep apnea), Severe pulmonary hypertension (H), Coronary artery disease involving native coronary artery of native heart with angina pectoris (H24), Personal history of DVT (deep vein thrombosis), History of pulmonary embolism, Community acquired pneumonia, unspecified laterality, Respiratory acidosis, Hemoptysis, SONAL (acute kidney injury) (H24), Stage 3 chronic kidney disease, unspecified whether stage 3a or 3b CKD (H), Hypokalemia, Ulcer of ankle, unspecified laterality, limited to breakdown of skin (H), Dermatitis, Thrombocytopenia (H24), Moderate malnutrition (H24), Morbid obesity (H), Mixed hyperlipidemia, Hypothyroidism, unspecified type, History of CVA (cerebrovascular accident), MGUS (monoclonal gammopathy of unknown significance), Vitamin B12 deficiency (non anemic), Slow transit constipation, and Hx of seizure disorder were also pertinent to this visit.    Met with patient who denies any chest pain, palpitations, CERRATO, lightheadedness, dizziness. Continues to endorse shortness of breath, especially with activity. Continues to have thickened sputum that is slightly dark brown in color per her reports. Denies any abdominal discomfort. Denies N&V. Denies B&B concerns. Denies dysuria or frequency. Denies loose or constipation. Appetite good. Sleeping well. No pain complaints. She originally has a cardiology appt scheduled today, however out of fear of not having enough  "supply of oxygen she requested to cancel this appt. Staff aware and are in process to see if this can be changed to virtual appt or rescheduled for later date per patient request. Mood stable. Nursing denies any acute concerns.     BP Readings from Last 3 Encounters:   05/10/24 106/75   05/08/24 134/82   05/07/24 130/81     Wt Readings from Last 5 Encounters:   05/10/24 129.8 kg (286 lb 3.2 oz)   05/08/24 128.8 kg (284 lb)   05/07/24 129.1 kg (284 lb 11.2 oz)   01/24/20 145.1 kg (319 lb 14.4 oz)   11/20/19 145.5 kg (320 lb 11.2 oz)     Allergies, and PMH/PSH reviewed in EPIC today.  REVIEW OF SYSTEMS:  4 point ROS including Respiratory, CV, GI and , other than that noted in the HPI,  is negative    Objective:   /75   Pulse 100   Temp 98.8  F (37.1  C)   Resp 20   Ht 1.676 m (5' 6\")   Wt 129.8 kg (286 lb 3.2 oz)   SpO2 92%   BMI 46.19 kg/m    GENERAL APPEARANCE:  Alert, in no distress, oriented, morbidly obese, cooperative  ENT:  Mouth and posterior oropharynx normal, moist mucous membranes, normal hearing acuity  EYES:  EOM, conjunctivae, lids, pupils and irises normal  NECK:  No adenopathy,masses or thyromegaly  RESP:  respiratory effort and palpation of chest normal, lungs clear to auscultation , no respiratory distress  CV:  Palpation and auscultation of heart done , regular rate and rhythm, no murmur, rub, or gallop, peripheral edema 1-2+ in BLE  ABDOMEN:  normal bowel sounds, soft, nontender, no hepatosplenomegaly or other masses, no guarding or rebound  M/S:   Ambulates with walker  SKIN:  Inspection of skin and subcutaneous tissue baseline, wound healing well, no signs of infection see photos  NEURO:   Cranial nerves 2-12 are normal tested and grossly at patient's baseline, no purposeful movement in upper and lower extremities  PSYCH:  oriented X 3, normal insight, judgement and memory, affect and mood normal              Most Recent 3 CBC's:  Recent Labs   Lab Test 05/10/24  0905 " 05/07/24  1024 05/04/24  0921 04/28/24  0643 04/27/24  2323   WBC 6.9 8.6  --   --  5.6   HGB 8.7* 8.6*  --   --  8.6*   MCV 90 89  --   --  89    179 207   < > 214    < > = values in this interval not displayed.     Most Recent 3 BMP's:  Recent Labs   Lab Test 05/07/24  1024 05/04/24  0921 05/03/24  0829 05/01/24  1940 05/01/24  1054     --  140  --  140   POTASSIUM 3.3* 3.6 3.6   < > 3.2*   CHLORIDE 98  --  98  --  96*   CO2 32*  --  32*  --  33*   BUN 33.7*  --  32.3*  --  27.9*   CR 1.13*  --  1.11*  --  1.11*   ANIONGAP 10  --  10  --  11   LUNA 9.4  --  9.4  --  9.5   *  --  100*  --  113*    < > = values in this interval not displayed.     Most Recent 2 LFT's:  Recent Labs   Lab Test 03/29/24  1453 07/10/23  0944   AST 19 19   ALT 14 15   ALKPHOS 115 84   BILITOTAL 1.4* 0.4     Most Recent Anemia Panel:  Recent Labs   Lab Test 05/10/24  0905 03/29/24  1453 09/19/22  0946 04/06/18  1642 04/06/18  0916 04/06/18  0732 05/19/17  1516   WBC 6.9   < > 8.3   < >  --  8.6 9.2   HGB 8.7*   < > 11.4*   < >  --  5.8* 13.4   HCT 30.1*   < > 39.6   < >  --  24.7* 43.2   MCV 90   < > 94   < >  --  78* 92      < > 214   < >  --  272 208   IRON  --   --   --   --   --  14* 48   IRONSAT  --   --   --   --   --   --  15   FEB  --   --   --   --   --   --  330   KWESI  --   --   --   --  18  --  44   B12  --   --  511   < >  --   --  759    < > = values in this interval not displayed.       Provider reviewed records from facility, and interpreted most recent imaging/lab work, and vital signs.   Acute and chronic conditions managed by writer. Have been reviewed during today's exam.     ASSESSMENT/PLAN:     (R53.81) Physical deconditioning  (primary encounter diagnosis)  (M62.81) Generalized muscle weakness  Comment: Acute on chronic. S/T below  Plan:   -Continue Physical therapy and Occupational therapy as directed  -SW to remain involved for safe discharge planning needs     (J96.21) Acute on chronic  respiratory failure with hypoxia (H)  (I50.33) Acute on chronic heart failure with preserved ejection fraction (H)  (J44.1) COPD exacerbation (H)  (G47.33) JODI (obstructive sleep apnea)  (I27.20) Severe pulmonary hypertension (H)  (I25.119) Coronary artery disease involving native coronary artery of native heart with angina pectoris (H24)  (J18.9) Community acquired pneumonia, unspecified laterality  (E87.29) Respiratory acidosis  Comment: Chronic. Recent exacerbations. Baseline 5 L NC PTA. CXR 3/29- Cardiomegaly. Ill-defined opacity at the right infrahilar lung could be acute airspace disease. A mass is difficult to exclude. Bilateral vascular congestion may be a degree of pulmonary edema. Potential small right pleural fluid. CT C PE 3/29/2024 without any PE but did demonstrate pulmonary congestion, R pleural effusion; also enlarged main pulmonary artery, as evidence of pulmonary hypertension. TTE 4/1 showing EF 60-65% without WMA, severe RV dilation with moderately decreased RV function, mod-severe TR and pulm HTN. Was initially started on Lasix drip, then transitioned to bumex gtt, then to oral bumex 2mg daily starting on 4/19/24. Cardiology consulted, appreciate their assistance. Signed off 4/19/24. S/p left and right heart cath on 4/12/2024.  Showed normal coronary arteries.  Severely elevated right-sided pressures, severely elevated left-sided filling pressures.  Mixed precapillary and postcapillary pulmonary hypertension (mPA 59 mmHg, PCWP 28 mmHg) .  Normal cardiac index. S/p RHC on 4/19:  Her filling pressures have improved significantly. Her weight is now normal and her mean RA is mildly elevated. Her pulmonary hypertension has improved but still severe.  Currently on 6 lpm of supplemental oxygen by oximyzer at rest, this may be her new baseline (PTA baseline was 5 lpm by nasal cannula). O2 with exertion at 8-10 lpm the last few days, O2 sat goal at 88-92%. Completed 5-day course of ceftriaxone+doxy on  4/2/24. Completed 8-day steroid taper on 4/7/24. Restarted PTA Breo on 4/8/24.  Wheezing significantly improved. Pulmonary consulted 4/23/24, appreciate their assistance.  VQ scan on 4/26 without evidence of acute PE or chronic thromboembolic disease. Patient to follow-up in the pulmonary hypertension clinic after discharge.   Plan:  -Continue with Bumex 2mg daily. Add HOLD parameters.   -Continue duoneb BID  -Continue albuterol nebulization every 6 hours PRN  -Continue robitussin PRN  -Continue benadryl 25mg every 6 hours PRN  -Oxygen by Nasal Cannula 6 LPM at rest and 8-10 LPM w/activity LPM to keep oxygen Sats 88 % or greater.  -Continue ICS-LABA Breo ellipta daily.    -Continue spiriva daily   -Started on Jardiance 10 mg daily on 4/13/2024 for HFpEF.  -Lymphedema wraps with lymphedema therapy on site  -2gm Na diet.  -Fluid restriction 1800cc/day.  -Follow up with pulmonology as directed. Call [phone] to schedule. Followed with EMIL Mcmahon at [place] [facility]  -Follow up with cardiology as directed. Scheduled for 5/10/24 but patient requested to reschedule. Attempting to change to virtual if able  -CPAP per home settings  -Daily weights as directed. Hospital weight~284#. Admit weight~286#  -BMP and CBC due 5/10/24--results pending  -BMP and CBC due 5/15/24     (Z86.718) Personal history of DVT (deep vein thrombosis)  (Z86.711) History of pulmonary embolism  Comment: Acute on chronic. *CT chest on 3/29/2024 was negative for pulmonary embolism. Per pharmacy med rec, she is no longer on anticoagulation with warfarin. Prophylactic lovenox 40 mg BID while in hospital.  Plan:   -Reduce aspirin down to 81mg daily  -Monitor for worsening s/symptoms of concerns  -BMP and CBC due 5/10/24--results pending  -BMP and CBC due 5/15/24     (R04.2) Hemoptysis  Comment: Acute/mild. Reported orange sputum production at home, was producing small amounts of rober blood 4/1/24. Hospital Admission CT chest negative for PE,  masses. Resumed aspirin 4/3 and reported small amount of hemoptysis 4/4, but no further hemoptysis after that.  Prophylactic lovenox started on 4/8/2024.  Held for cardiac catheterization, then resumed.  Plan:   -Monitor for worsening s/symptoms of concerns.   -Reduce aspirin down to daily for now  -BMP and CBC due 5/10/24--results pending  -BMP and CBC due 5/15/24     (N17.9) SONAL (acute kidney injury) (H24)  (N18.30) Stage 3 chronic kidney disease, unspecified whether stage 3a or 3b CKD (H)  Comment: Acute on chronic. Suspect congestive nephropathy. Admission serum creatinine was 1.33, historic baseline 0.87 to 1.08 in 2020 to 2023. Avoid non-steroidal anti-inflammatory drugs (NSAIDs). Creatinine stabilized around 1.1.  Plan:   -Renally dose medications  -Avoid nephrotoxins  -BMP and CBC due 5/10/24--results pending  -BMP and CBC due 5/15/24  -Monitor urinary status     (E87.6) Hypokalemia  Comment: Acute. Noted per chart review. Due to diuresis. Was on KCl 40mEq TID while on IV diuresis which has since been discontinued.   Plan:   -Monitor for worsening s/symptoms of concerns  -BMP and CBC due 5/10/24--results pending  -BMP and CBC due 5/15/24     (L97.301) Ulcer of ankle, unspecified laterality, limited to breakdown of skin (H)  Comment: Acute on chronic. WOCn reviewed in hospital. See pictures from 4/17, there was concern for developing cellulitis. Completed 7 day course of antibiotics (ceftriaxone --> keflex) on 4/26/2024.   Plan:   -Monitor for worsening s/symptoms of concerns  -BMP and CBC due 5/10/24--results pending  -BMP and CBC due 5/15/24  -Continue wound care as directed:  -Continue Right lateral ankle wound: 3x weekly and PRN for soiled/loose dressing to assist with lymphedema therapy needs  Wrap right lower leg with Vashe-soaked gauze. Allow to soak for about two minutes.  Unwrap leg and gently wipe away any debris. Allow skin to dry.  Use a tongue depressor to apply Medihoney ([id number]) to  wound.  Apply a small piece of Adaptic (oil emulsion gauze) over Medihoney.  Cover with a 4x4 gauze and secure with Kerlix gauze and Medipore tape.  Apply Spandigrip to right lower extremity.     (L30.9) Dermatitis  Comment: Acute on chronic. S/T morbid size  Plan:   -Monitor for worsening s/symptoms of concerns  -Continue current wound care as directed:  *Right breast fold rash: BID   Cleanse skin with warm water. Pat dry.  Apply nystatin powder     (D69.6) Thrombocytopenia (H24)  Comment: Acute on chronic. Platelets normal initially, trended down to 98 on 4/16/2024, then trended up and within normal limits since 4/22/2024.  Plan:   -Monitor for worsening s/symptoms of concerns  -BMP and CBC due 5/10/24--results pending  -BMP and CBC due 5/15/24     (E44.0) Moderate malnutrition (H24)  Comment: Acute on chronic. In context with recent hospitalization  Plan:   -Dietician to remain involved  -Monitor weight trends  -BMP and CBC due 5/10/24--results pending  -BMP and CBC due 5/15/24     (E66.01) Morbid obesity (H)  (E78.2) Mixed hyperlipidemia  Comment: Chronic. Diet controlled. Not on statin  Plan:   -Encourage ongoing weight loss  -Monitor for worsening s/symptoms of concerns  -Monitor weight trends  -BMP and CBC due 5/10/24--results pending  -BMP and CBC due 5/15/24     (E03.9) Hypothyroidism, unspecified type  Comment: Acute on chronic. Med rec did not show any thyroid supplement. TSH 6.28 and free T4 1.49 on 3/29/24.  Plan:   -Monitor for worsening s/symptoms of concerns  -Continue without medication at this time  -BMP and CBC due 5/10/24--results pending  -BMP and CBC due 5/15/24  -Trend thyroid levels in 4-6 weeks.      (Z86.73) History of CVA (cerebrovascular accident)  Comment: Chronic. Per chart review. Former smoker, quit in 2012.   Plan:   -Monitor for worsening s/symptoms of concerns  -Reduce aspirin down to 81mg daily  -Poor compliance with statin  -Follow up with cardiology as directed  -BMP and CBC due  5/10/24--results pending  -BMP and CBC due 5/15/24     (D47.2) MGUS (monoclonal gammopathy of unknown significance)  Comment: Chronic. Noted per chart review  Plan:   -Monitor for worsening s/symptoms of concerns  -Follow up with PCP post TCU  -BMP and CBC due 5/10/24--results pending  -BMP and CBC due 5/15/24     (E53.8) Vitamin B12 deficiency (non anemic)  Comment: Chronic. Noted per chart review  Plan:   -Monitor for worsening s/symptoms of concerns  -Continue vitamin B12 supplementation as directed of 1mL every month as directed- Last given 5/8  -BMP and CBC due 5/10/24--results pending  -BMP and CBC due 5/15/24     (Z86.69) Hx of seizure disorder  Comment: Noted in medical record, does not appear to be on ant anti-epileptic medications  Plan:   -Monitor for seizure concerns  -Monitor for worsening s/symptoms of concerns  -Neurology PRN  -BMP and CBC due 5/10/24--results pending  -BMP and CBC due 5/15/24     (K59.01) Constipation  Comment: Acute on chronic. Stable  Plan:  -Monitor BM patterns  -Continue miralax daily  -Continue senna S 2 tabs BID PRN.  -Bisacodyl 10mg rectal daily PRN  -TUMS PRN  -PRN imodium for diarrhea concerns  -MOM daily PRN     Electronically signed by:  Dr. Pamella Horner DNP, APRN, FNP-C, WCS-C, EDS-C

## 2024-05-09 NOTE — PROGRESS NOTES
Connected Care Resource Center: [facility]    Background: Transitional Care Management program identified per system criteria and reviewed by Connected Care Resource Center team for possible outreach.    Assessment: Upon chart review, CCRC Team member will not proceed with patient outreach related to this episode of Transitional Care Management program due to reason below:    Non-[facility] TCU: CCRC team member noted patient discharged to TCU/ARU/LTACH. Patient is not established with a [facility] currently supported by Primary Care-Care Coordination therefore handoff to Primary Care-Care Coordination is not appropriate at this time.    Plan: Transitional Care Management episode addressed appropriately per reason noted above.      DONNA Reyes  General acute hospital, [facility]    *Connected Care Resource Team does NOT follow patient ongoing. Referrals are identified based on internal discharge reports and the outreach is to ensure patient has an understanding of their discharge instructions.

## 2024-05-10 NOTE — LETTER
5/10/2024        RE: Linda Otero  [address]   [address]  [facility] [id number]        [facility] GERIATRICS    Chief Complaint   Patient presents with     RECHECK     HPI:  Linda Otero is a 64 year old  (1960), who is being seen today for an episodic care visit at: [facility] (TCU)([facility]) [[id number]]. Today's concern is: The primary encounter diagnosis was Physical deconditioning. Diagnoses of Generalized muscle weakness, Acute on chronic respiratory failure with hypoxia (H), Acute on chronic heart failure with preserved ejection fraction (H), COPD exacerbation (H), JODI (obstructive sleep apnea), Severe pulmonary hypertension (H), Coronary artery disease involving native coronary artery of native heart with angina pectoris (H24), Personal history of DVT (deep vein thrombosis), History of pulmonary embolism, Community acquired pneumonia, unspecified laterality, Respiratory acidosis, Hemoptysis, SONAL (acute kidney injury) (H24), Stage 3 chronic kidney disease, unspecified whether stage 3a or 3b CKD (H), Hypokalemia, Ulcer of ankle, unspecified laterality, limited to breakdown of skin (H), Dermatitis, Thrombocytopenia (H24), Moderate malnutrition (H24), Morbid obesity (H), Mixed hyperlipidemia, Hypothyroidism, unspecified type, History of CVA (cerebrovascular accident), MGUS (monoclonal gammopathy of unknown significance), Vitamin B12 deficiency (non anemic), Slow transit constipation, and Hx of seizure disorder were also pertinent to this visit.    Met with patient who denies any chest pain, palpitations, CERRATO, lightheadedness, dizziness. Continues to endorse shortness of breath, especially with activity. Continues to have thickened sputum that is slightly dark brown in color per her reports. Denies any abdominal discomfort. Denies N&V. Denies B&B concerns. Denies dysuria or frequency. Denies loose or constipation. Appetite good. Sleeping well. No pain complaints. She  "originally has a cardiology appt scheduled today, however out of fear of not having enough supply of oxygen she requested to cancel this appt. Staff aware and are in process to see if this can be changed to virtual appt or rescheduled for later date per patient request. Mood stable. Nursing denies any acute concerns.     BP Readings from Last 3 Encounters:   05/10/24 106/75   05/08/24 134/82   05/07/24 130/81     Wt Readings from Last 5 Encounters:   05/10/24 129.8 kg (286 lb 3.2 oz)   05/08/24 128.8 kg (284 lb)   05/07/24 129.1 kg (284 lb 11.2 oz)   01/24/20 145.1 kg (319 lb 14.4 oz)   11/20/19 145.5 kg (320 lb 11.2 oz)     Allergies, and PMH/PSH reviewed in EPIC today.  REVIEW OF SYSTEMS:  4 point ROS including Respiratory, CV, GI and , other than that noted in the HPI,  is negative    Objective:   /75   Pulse 100   Temp 98.8  F (37.1  C)   Resp 20   Ht 1.676 m (5' 6\")   Wt 129.8 kg (286 lb 3.2 oz)   SpO2 92%   BMI 46.19 kg/m    GENERAL APPEARANCE:  Alert, in no distress, oriented, morbidly obese, cooperative  ENT:  Mouth and posterior oropharynx normal, moist mucous membranes, normal hearing acuity  EYES:  EOM, conjunctivae, lids, pupils and irises normal  NECK:  No adenopathy,masses or thyromegaly  RESP:  respiratory effort and palpation of chest normal, lungs clear to auscultation , no respiratory distress  CV:  Palpation and auscultation of heart done , regular rate and rhythm, no murmur, rub, or gallop, peripheral edema 1-2+ in BLE  ABDOMEN:  normal bowel sounds, soft, nontender, no hepatosplenomegaly or other masses, no guarding or rebound  M/S:   Ambulates with walker  SKIN:  Inspection of skin and subcutaneous tissue baseline, wound healing well, no signs of infection see photos  NEURO:   Cranial nerves 2-12 are normal tested and grossly at patient's baseline, no purposeful movement in upper and lower extremities  PSYCH:  oriented X 3, normal insight, judgement and memory, affect and mood " normal              Most Recent 3 CBC's:  Recent Labs   Lab Test 05/10/24  0905 05/07/24  1024 05/04/24  0921 04/28/24  0643 04/27/24  2323   WBC 6.9 8.6  --   --  5.6   HGB 8.7* 8.6*  --   --  8.6*   MCV 90 89  --   --  89    179 207   < > 214    < > = values in this interval not displayed.     Most Recent 3 BMP's:  Recent Labs   Lab Test 05/07/24  1024 05/04/24  0921 05/03/24  0829 05/01/24  1940 05/01/24  1054     --  140  --  140   POTASSIUM 3.3* 3.6 3.6   < > 3.2*   CHLORIDE 98  --  98  --  96*   CO2 32*  --  32*  --  33*   BUN 33.7*  --  32.3*  --  27.9*   CR 1.13*  --  1.11*  --  1.11*   ANIONGAP 10  --  10  --  11   LUNA 9.4  --  9.4  --  9.5   *  --  100*  --  113*    < > = values in this interval not displayed.     Most Recent 2 LFT's:  Recent Labs   Lab Test 03/29/24  1453 07/10/23  0944   AST 19 19   ALT 14 15   ALKPHOS 115 84   BILITOTAL 1.4* 0.4     Most Recent Anemia Panel:  Recent Labs   Lab Test 05/10/24  0905 03/29/24  1453 09/19/22  0946 04/06/18  1642 04/06/18  0916 04/06/18  0732 05/19/17  1516   WBC 6.9   < > 8.3   < >  --  8.6 9.2   HGB 8.7*   < > 11.4*   < >  --  5.8* 13.4   HCT 30.1*   < > 39.6   < >  --  24.7* 43.2   MCV 90   < > 94   < >  --  78* 92      < > 214   < >  --  272 208   IRON  --   --   --   --   --  14* 48   IRONSAT  --   --   --   --   --   --  15   FEB  --   --   --   --   --   --  330   KWESI  --   --   --   --  18  --  44   B12  --   --  511   < >  --   --  759    < > = values in this interval not displayed.       Provider reviewed records from facility, and interpreted most recent imaging/lab work, and vital signs.   Acute and chronic conditions managed by writer. Have been reviewed during today's exam.     ASSESSMENT/PLAN:     (R53.81) Physical deconditioning  (primary encounter diagnosis)  (M62.81) Generalized muscle weakness  Comment: Acute on chronic. S/T below  Plan:   -Continue Physical therapy and Occupational therapy as directed  -SW to  remain involved for safe discharge planning needs     (J96.21) Acute on chronic respiratory failure with hypoxia (H)  (I50.33) Acute on chronic heart failure with preserved ejection fraction (H)  (J44.1) COPD exacerbation (H)  (G47.33) JODI (obstructive sleep apnea)  (I27.20) Severe pulmonary hypertension (H)  (I25.119) Coronary artery disease involving native coronary artery of native heart with angina pectoris (H24)  (J18.9) Community acquired pneumonia, unspecified laterality  (E87.29) Respiratory acidosis  Comment: Chronic. Recent exacerbations. Baseline 5 L NC PTA. CXR 3/29- Cardiomegaly. Ill-defined opacity at the right infrahilar lung could be acute airspace disease. A mass is difficult to exclude. Bilateral vascular congestion may be a degree of pulmonary edema. Potential small right pleural fluid. CT C PE 3/29/2024 without any PE but did demonstrate pulmonary congestion, R pleural effusion; also enlarged main pulmonary artery, as evidence of pulmonary hypertension. TTE 4/1 showing EF 60-65% without WMA, severe RV dilation with moderately decreased RV function, mod-severe TR and pulm HTN. Was initially started on Lasix drip, then transitioned to bumex gtt, then to oral bumex 2mg daily starting on 4/19/24. Cardiology consulted, appreciate their assistance. Signed off 4/19/24. S/p left and right heart cath on 4/12/2024.  Showed normal coronary arteries.  Severely elevated right-sided pressures, severely elevated left-sided filling pressures.  Mixed precapillary and postcapillary pulmonary hypertension (mPA 59 mmHg, PCWP 28 mmHg) .  Normal cardiac index. S/p RHC on 4/19:  Her filling pressures have improved significantly. Her weight is now normal and her mean RA is mildly elevated. Her pulmonary hypertension has improved but still severe.  Currently on 6 lpm of supplemental oxygen by oximyzer at rest, this may be her new baseline (PTA baseline was 5 lpm by nasal cannula). O2 with exertion at 8-10 lpm the last  few days, O2 sat goal at 88-92%. Completed 5-day course of ceftriaxone+doxy on 4/2/24. Completed 8-day steroid taper on 4/7/24. Restarted PTA Breo on 4/8/24.  Wheezing significantly improved. Pulmonary consulted 4/23/24, appreciate their assistance.  VQ scan on 4/26 without evidence of acute PE or chronic thromboembolic disease. Patient to follow-up in the pulmonary hypertension clinic after discharge.   Plan:  -Continue with Bumex 2mg daily. Add HOLD parameters.   -Continue duoneb BID  -Continue albuterol nebulization every 6 hours PRN  -Continue robitussin PRN  -Continue benadryl 25mg every 6 hours PRN  -Oxygen by Nasal Cannula 6 LPM at rest and 8-10 LPM w/activity LPM to keep oxygen Sats 88 % or greater.  -Continue ICS-LABA Breo ellipta daily.    -Continue spiriva daily   -Started on Jardiance 10 mg daily on 4/13/2024 for HFpEF.  -Lymphedema wraps with lymphedema therapy on site  -2gm Na diet.  -Fluid restriction 1800cc/day.  -Follow up with pulmonology as directed. Call [phone] to schedule. Followed with EMIL Mcmaohn at [facility]  -Follow up with cardiology as directed. Scheduled for 5/10/24 but patient requested to reschedule. Attempting to change to virtual if able  -CPAP per home settings  -Daily weights as directed. Hospital weight~284#. Admit weight~286#  -BMP and CBC due 5/10/24--results pending  -BMP and CBC due 5/15/24     (Z86.718) Personal history of DVT (deep vein thrombosis)  (Z86.711) History of pulmonary embolism  Comment: Acute on chronic. *CT chest on 3/29/2024 was negative for pulmonary embolism. Per pharmacy med rec, she is no longer on anticoagulation with warfarin. Prophylactic lovenox 40 mg BID while in hospital.  Plan:   -Reduce aspirin down to 81mg daily  -Monitor for worsening s/symptoms of concerns  -BMP and CBC due 5/10/24--results pending  -BMP and CBC due 5/15/24     (R04.2) Hemoptysis  Comment: Acute/mild. Reported orange sputum production at home, was producing  small amounts of rober blood 4/1/24. Hospital Admission CT chest negative for PE, masses. Resumed aspirin 4/3 and reported small amount of hemoptysis 4/4, but no further hemoptysis after that.  Prophylactic lovenox started on 4/8/2024.  Held for cardiac catheterization, then resumed.  Plan:   -Monitor for worsening s/symptoms of concerns.   -Reduce aspirin down to daily for now  -BMP and CBC due 5/10/24--results pending  -BMP and CBC due 5/15/24     (N17.9) SONAL (acute kidney injury) (H24)  (N18.30) Stage 3 chronic kidney disease, unspecified whether stage 3a or 3b CKD (H)  Comment: Acute on chronic. Suspect congestive nephropathy. Admission serum creatinine was 1.33, historic baseline 0.87 to 1.08 in 2020 to 2023. Avoid non-steroidal anti-inflammatory drugs (NSAIDs). Creatinine stabilized around 1.1.  Plan:   -Renally dose medications  -Avoid nephrotoxins  -BMP and CBC due 5/10/24--results pending  -BMP and CBC due 5/15/24  -Monitor urinary status     (E87.6) Hypokalemia  Comment: Acute. Noted per chart review. Due to diuresis. Was on KCl 40mEq TID while on IV diuresis which has since been discontinued.   Plan:   -Monitor for worsening s/symptoms of concerns  -BMP and CBC due 5/10/24--results pending  -BMP and CBC due 5/15/24     (L97.301) Ulcer of ankle, unspecified laterality, limited to breakdown of skin (H)  Comment: Acute on chronic. WOCn reviewed in hospital. See pictures from 4/17, there was concern for developing cellulitis. Completed 7 day course of antibiotics (ceftriaxone --> keflex) on 4/26/2024.   Plan:   -Monitor for worsening s/symptoms of concerns  -BMP and CBC due 5/10/24--results pending  -BMP and CBC due 5/15/24  -Continue wound care as directed:  -Continue Right lateral ankle wound: 3x weekly and PRN for soiled/loose dressing to assist with lymphedema therapy needs  Wrap right lower leg with Vashe-soaked gauze. Allow to soak for about two minutes.  Unwrap leg and gently wipe away any debris.  Allow skin to dry.  Use a tongue depressor to apply Medihoney ([id number]) to wound.  Apply a small piece of Adaptic (oil emulsion gauze) over Medihoney.  Cover with a 4x4 gauze and secure with Kerlix gauze and Medipore tape.  Apply Spandigrip to right lower extremity.     (L30.9) Dermatitis  Comment: Acute on chronic. S/T morbid size  Plan:   -Monitor for worsening s/symptoms of concerns  -Continue current wound care as directed:  *Right breast fold rash: BID   Cleanse skin with warm water. Pat dry.  Apply nystatin powder     (D69.6) Thrombocytopenia (H24)  Comment: Acute on chronic. Platelets normal initially, trended down to 98 on 4/16/2024, then trended up and within normal limits since 4/22/2024.  Plan:   -Monitor for worsening s/symptoms of concerns  -BMP and CBC due 5/10/24--results pending  -BMP and CBC due 5/15/24     (E44.0) Moderate malnutrition (H24)  Comment: Acute on chronic. In context with recent hospitalization  Plan:   -Dietician to remain involved  -Monitor weight trends  -BMP and CBC due 5/10/24--results pending  -BMP and CBC due 5/15/24     (E66.01) Morbid obesity (H)  (E78.2) Mixed hyperlipidemia  Comment: Chronic. Diet controlled. Not on statin  Plan:   -Encourage ongoing weight loss  -Monitor for worsening s/symptoms of concerns  -Monitor weight trends  -BMP and CBC due 5/10/24--results pending  -BMP and CBC due 5/15/24     (E03.9) Hypothyroidism, unspecified type  Comment: Acute on chronic. Med rec did not show any thyroid supplement. TSH 6.28 and free T4 1.49 on 3/29/24.  Plan:   -Monitor for worsening s/symptoms of concerns  -Continue without medication at this time  -BMP and CBC due 5/10/24--results pending  -BMP and CBC due 5/15/24  -Trend thyroid levels in 4-6 weeks.      (Z86.73) History of CVA (cerebrovascular accident)  Comment: Chronic. Per chart review. Former smoker, quit in 2012.   Plan:   -Monitor for worsening s/symptoms of concerns  -Reduce aspirin down to 81mg daily  -Poor  compliance with statin  -Follow up with cardiology as directed  -BMP and CBC due 5/10/24--results pending  -BMP and CBC due 5/15/24     (D47.2) MGUS (monoclonal gammopathy of unknown significance)  Comment: Chronic. Noted per chart review  Plan:   -Monitor for worsening s/symptoms of concerns  -Follow up with PCP post TCU  -BMP and CBC due 5/10/24--results pending  -BMP and CBC due 5/15/24     (E53.8) Vitamin B12 deficiency (non anemic)  Comment: Chronic. Noted per chart review  Plan:   -Monitor for worsening s/symptoms of concerns  -Continue vitamin B12 supplementation as directed of 1mL every month as directed- Last given 5/8  -BMP and CBC due 5/10/24--results pending  -BMP and CBC due 5/15/24     (Z86.69) Hx of seizure disorder  Comment: Noted in medical record, does not appear to be on ant anti-epileptic medications  Plan:   -Monitor for seizure concerns  -Monitor for worsening s/symptoms of concerns  -Neurology PRN  -BMP and CBC due 5/10/24--results pending  -BMP and CBC due 5/15/24     (K59.01) Constipation  Comment: Acute on chronic. Stable  Plan:  -Monitor BM patterns  -Continue miralax daily  -Continue senna S 2 tabs BID PRN.  -Bisacodyl 10mg rectal daily PRN  -TUMS PRN  -PRN imodium for diarrhea concerns  -MOM daily PRN     Electronically signed by:  Dr. Pamella Horner DNP, APRN, FNP-C, WCS-C, EDS-C            Sincerely,        Pamella Horner, APRN CNP

## 2024-05-14 NOTE — PROGRESS NOTES
"[facility] GERIATRICS    Chief Complaint   Patient presents with    Nursing Home Acute     HPI:  Linda Otero is a 64 year old  (1960), who is being seen today for an episodic care visit at: [facility] ([facility]) [[id number]].     Patient seen today for subsequent  TCU NP visit in TCU:    Reports doing well, offers no additional concerns. Denies HA, chest pain, palpitations, dizziness. No troubles breathing, no SOB, continues on O2, no Concerns with urination, no constipation. Eating and drinking okay. Sleeping okay. Denies pain.   She originally has a cardiology appt scheduled 5/10  but cancelled, in process to see if this can be changed to virtual appt or rescheduled for later date per patient request. Mood stable. Nursing denies any acute concerns.     BP Readings from Last 3 Encounters:   05/14/24 108/69   05/10/24 106/75   05/08/24 134/82     Wt Readings from Last 5 Encounters:   05/14/24 130.5 kg (287 lb 12.8 oz)   05/10/24 129.8 kg (286 lb 3.2 oz)   05/08/24 128.8 kg (284 lb)   05/07/24 129.1 kg (284 lb 11.2 oz)   01/24/20 145.1 kg (319 lb 14.4 oz)     Allergies, and PMH/PSH reviewed in EPIC today.  REVIEW OF SYSTEMS:   ROS: 10 point ROS neg other than the symptoms noted above in the HPI.      Objective:   /69   Pulse 88   Temp 97.7  F (36.5  C)   Resp 20   Ht 1.676 m (5' 6\")   Wt 130.5 kg (287 lb 12.8 oz)   SpO2 94%   BMI 46.45 kg/m    GENERAL APPEARANCE:  Alert, in no distress, oriented, morbidly obese, cooperative. Sitting comfortably in bed.   ENT:  Mouth and posterior oropharynx normal, moist mucous membranes, normal hearing acuity  EYES:  EOM, conjunctivae, lids, pupils and irises normal  NECK:  No adenopathy,masses or thyromegaly  RESP:  respiratory effort and palpation of chest normal, lungs clear to auscultation , no respiratory distress  CV:  Palpation and auscultation of heart done , regular rate and rhythm, no murmur, rub, or gallop, " peripheral edema 1-2+ in BLE  ABDOMEN:  normal bowel sounds, soft, nontender, no hepatosplenomegaly or other masses, no guarding or rebound  M/S:   Ambulates with walker, moving extremities spontaneously.   SKIN:  Inspection of skin and subcutaneous tissue baseline, wound healing well.   NEURO:  no purposeful movement in upper and lower extremities  PSYCH:  oriented X 3, normal insight, judgement and memory, affect and mood normal    Most Recent 3 CBC's:  Recent Labs   Lab Test 05/10/24  0905 05/07/24  1024 05/04/24  0921 04/28/24  0643 04/27/24  2323   WBC 6.9 8.6  --   --  5.6   HGB 8.7* 8.6*  --   --  8.6*   MCV 90 89  --   --  89    179 207   < > 214    < > = values in this interval not displayed.     Most Recent 3 BMP's:  Recent Labs   Lab Test 05/10/24  0905 05/07/24  1024 05/04/24  0921 05/03/24  0829    140  --  140   POTASSIUM 3.9 3.3* 3.6 3.6   CHLORIDE 98 98  --  98   CO2 31* 32*  --  32*   BUN 41.3* 33.7*  --  32.3*   CR 1.22* 1.13*  --  1.11*   ANIONGAP 11 10  --  10   LUNA 9.2 9.4  --  9.4   * 118*  --  100*     Most Recent 2 LFT's:  Recent Labs   Lab Test 03/29/24  1453 07/10/23  0944   AST 19 19   ALT 14 15   ALKPHOS 115 84   BILITOTAL 1.4* 0.4     Most Recent Anemia Panel:  Recent Labs   Lab Test 05/10/24  0905 03/29/24  1453 09/19/22  0946 04/06/18  1642 04/06/18  0916 04/06/18  0732 05/19/17  1516   WBC 6.9   < > 8.3   < >  --  8.6 9.2   HGB 8.7*   < > 11.4*   < >  --  5.8* 13.4   HCT 30.1*   < > 39.6   < >  --  24.7* 43.2   MCV 90   < > 94   < >  --  78* 92      < > 214   < >  --  272 208   IRON  --   --   --   --   --  14* 48   IRONSAT  --   --   --   --   --   --  15   FEB  --   --   --   --   --   --  330   KWESI  --   --   --   --  18  --  44   B12  --   --  511   < >  --   --  759    < > = values in this interval not displayed.       Provider reviewed records from facility, and interpreted most recent imaging/lab work, and vital signs.      ASSESSMENT/PLAN:      (R53.81) Physical deconditioning  (primary encounter diagnosis)  (M62.81) Generalized muscle weakness  Comment: Acute on chronic. S/T below  Plan:   -Continue Physical therapy and Occupational therapy as directed  -SW to remain involved for safe discharge planning needs     (J96.21) Acute on chronic respiratory failure with hypoxia (H)  (I50.33) Acute on chronic heart failure with preserved ejection fraction (H)  (J44.1) COPD exacerbation (H)  (G47.33) JOID (obstructive sleep apnea)  (I27.20) Severe pulmonary hypertension (H)  (I25.119) Coronary artery disease involving native coronary artery of native heart with angina pectoris (H24)  (J18.9) Community acquired pneumonia, unspecified laterality  (E87.29) Respiratory acidosis  Comment: Chronic. Doing well.    Patient to follow-up in the pulmonary hypertension clinic after discharge.   Plan:  -Continue Bumex 2mg daily, duoneb BID, albuterol nebulization every 6 hours PRN  -Continue robitussin PRN, benadryl 25mg every 6 hours PRN  -Oxygen by Nasal Cannula 6 LPM at rest and 8-10 LPM w/activity LPM to keep oxygen Sats 88 % or greater. She is on oxygen at home.   -Continue ICS-LABA Breo ellipta daily, spiriva daily   -Started on Jardiance 10 mg daily on 4/13/2024 for HFpEF.  -Lymphedema wraps with lymphedema therapy on site  -2gm Na diet, Fluid restriction 1800cc/day.  -CPAP per home settings  -Daily weights as directed. Hospital weight~284#. Admit weight~286#  Follow up with Pulmonary and Cards  -BMP and CBC due 5/15/24     (Z86.718) Personal history of DVT (deep vein thrombosis)  (Z86.711) History of pulmonary embolism  Comment: Acute on chronic. *CT chest on 3/29/2024 was negative for pulmonary embolism. Per pharmacy med rec, she is no longer on anticoagulation with warfarin. Prophylactic lovenox 40 mg BID while in hospital.  Plan:   Monitor     (R04.2) Hemoptysis  Comment: Acute/mild. Resolved.   Eating and drinking okay      (N17.9) SONAL (acute kidney injury)  (H24)  (N18.30) Stage 3 chronic kidney disease, unspecified whether stage 3a or 3b CKD (H)  Comment: Creatinine stabilized around 1.1> 1.22 on recheck.   Plan:   -BMP and CBC due 5/15/24  -Monitor urinary status     (E87.6) Hypokalemia  Comment: Acute. Resolved, recheck 3.9 on 5/10  -BMP and CBC due 5/15/24     (L97.301) Ulcer of ankle, unspecified laterality, limited to breakdown of skin (H)  Comment: Acute on chronic. WOCn reviewed in hospital. See pictures from 4/17, there was concern for developing cellulitis. Completed 7 day course of antibiotics (ceftriaxone --> keflex) on 4/26/2024.   Plan:   -BMP and CBC due 5/15/24  -Continue wound care as directed    (L30.9) Dermatitis  Comment: Acute on chronic. S/T morbid size. Improved.   Plan:   -Continue current wound care as directed:  *Right breast fold rash: BID   Cleanse skin with warm water. Pat dry.  Apply nystatin powder     (D69.6) Thrombocytopenia (H24)  Comment: Acute on chronic. Platelets normal initially, trended down to 98 on 4/16/2024, then trended up and within normal limits since 4/22/2024.  Plan:   -BMP and CBC due 5/15/24     (E44.0) Moderate malnutrition (H24)  Comment: Acute on chronic. In context with recent hospitalization  Plan: Continue to follow with Dietician.      (E66.01) Morbid obesity (H)  (E78.2) Mixed hyperlipidemia  Comment: Chronic. Diet controlled. Not on statin  Plan:   -Encourage ongoing weight loss  PT/OT     (E03.9) Hypothyroidism, unspecified type  Comment: Acute on chronic. Med rec did not show any thyroid supplement. TSH 6.28 and free T4 1.49 on 3/29/24.  Plan:   -Trend thyroid levels in 4-6 weeks, defer to PCP.      (Z86.73) History of CVA (cerebrovascular accident)  Comment: Chronic. Per chart review. Former smoker, quit in 2012.   Plan:   Continue 81mg daily  -Poor compliance with statin  -Follow up with cardiology as directed       (D47.2) MGUS (monoclonal gammopathy of unknown significance)  Comment: Chronic. Noted    Plan:   -Monitor for worsening s/symptoms of concerns  -Follow up with PCP post TCU    (E53.8) Vitamin B12 deficiency (non anemic)  Comment: Chronic. Noted per chart review  Plan:   -Monitor for worsening s/symptoms of concerns  -Continue vitamin B12, Last given 5/8       (Z86.69) Hx of seizure disorder  Comment: Noted in medical record, does not appear to be on ant anti-epileptic medications, no seizures noted in TCU.   Plan:   -Monitor for seizure concerns    (K59.01) Constipation  Comment: Acute on chronic. Stable. Reports no concerns today.   Plan:  Continue stool softeners      Electronically signed by  JONATAN Sepulveda, GNP/ANP-BC  May 14, 2024      Total time greater than 45 minutes reviewing chart in EPIC and PCC, medications, labs, vitals. Discussing with social work, physical therapy, occupational therapy and nursing.

## 2024-05-14 NOTE — LETTER
"    5/14/2024        RE: Linda Otero  [address]   [address]  [place] [address]        [facility] GERIATRICS    Chief Complaint   Patient presents with     Nursing Home Acute     HPI:  Linda Otero is a 64 year old  (1960), who is being seen today for an episodic care visit at: [facility] ([facility])([facility]) [[id number]].     Patient seen today for subsequent  TCU NP visit in TCU:    Reports doing well, offers no additional concerns. Denies HA, chest pain, palpitations, dizziness. No troubles breathing, no SOB, continues on O2, no Concerns with urination, no constipation. Eating and drinking okay. Sleeping okay. Denies pain.   She originally has a cardiology appt scheduled 5/10  but cancelled, in process to see if this can be changed to virtual appt or rescheduled for later date per patient request. Mood stable. Nursing denies any acute concerns.     BP Readings from Last 3 Encounters:   05/14/24 108/69   05/10/24 106/75   05/08/24 134/82     Wt Readings from Last 5 Encounters:   05/14/24 130.5 kg (287 lb 12.8 oz)   05/10/24 129.8 kg (286 lb 3.2 oz)   05/08/24 128.8 kg (284 lb)   05/07/24 129.1 kg (284 lb 11.2 oz)   01/24/20 145.1 kg (319 lb 14.4 oz)     Allergies, and PMH/PSH reviewed in EPIC today.  REVIEW OF SYSTEMS:   ROS: 10 point ROS neg other than the symptoms noted above in the HPI.      Objective:   /69   Pulse 88   Temp 97.7  F (36.5  C)   Resp 20   Ht 1.676 m (5' 6\")   Wt 130.5 kg (287 lb 12.8 oz)   SpO2 94%   BMI 46.45 kg/m    GENERAL APPEARANCE:  Alert, in no distress, oriented, morbidly obese, cooperative. Sitting comfortably in bed.   ENT:  Mouth and posterior oropharynx normal, moist mucous membranes, normal hearing acuity  EYES:  EOM, conjunctivae, lids, pupils and irises normal  NECK:  No adenopathy,masses or thyromegaly  RESP:  respiratory effort and palpation of chest normal, lungs clear to auscultation , no respiratory distress  CV:  Palpation " and auscultation of heart done , regular rate and rhythm, no murmur, rub, or gallop, peripheral edema 1-2+ in BLE  ABDOMEN:  normal bowel sounds, soft, nontender, no hepatosplenomegaly or other masses, no guarding or rebound  M/S:   Ambulates with walker, moving extremities spontaneously.   SKIN:  Inspection of skin and subcutaneous tissue baseline, wound healing well.   NEURO:  no purposeful movement in upper and lower extremities  PSYCH:  oriented X 3, normal insight, judgement and memory, affect and mood normal    Most Recent 3 CBC's:  Recent Labs   Lab Test 05/10/24  0905 05/07/24  1024 05/04/24  0921 04/28/24  0643 04/27/24  2323   WBC 6.9 8.6  --   --  5.6   HGB 8.7* 8.6*  --   --  8.6*   MCV 90 89  --   --  89    179 207   < > 214    < > = values in this interval not displayed.     Most Recent 3 BMP's:  Recent Labs   Lab Test 05/10/24  0905 05/07/24  1024 05/04/24  0921 05/03/24  0829    140  --  140   POTASSIUM 3.9 3.3* 3.6 3.6   CHLORIDE 98 98  --  98   CO2 31* 32*  --  32*   BUN 41.3* 33.7*  --  32.3*   CR 1.22* 1.13*  --  1.11*   ANIONGAP 11 10  --  10   LUNA 9.2 9.4  --  9.4   * 118*  --  100*     Most Recent 2 LFT's:  Recent Labs   Lab Test 03/29/24  1453 07/10/23  0944   AST 19 19   ALT 14 15   ALKPHOS 115 84   BILITOTAL 1.4* 0.4     Most Recent Anemia Panel:  Recent Labs   Lab Test 05/10/24  0905 03/29/24  1453 09/19/22  0946 04/06/18  1642 04/06/18  0916 04/06/18  0732 05/19/17  1516   WBC 6.9   < > 8.3   < >  --  8.6 9.2   HGB 8.7*   < > 11.4*   < >  --  5.8* 13.4   HCT 30.1*   < > 39.6   < >  --  24.7* 43.2   MCV 90   < > 94   < >  --  78* 92      < > 214   < >  --  272 208   IRON  --   --   --   --   --  14* 48   IRONSAT  --   --   --   --   --   --  15   FEB  --   --   --   --   --   --  330   KWESI  --   --   --   --  18  --  44   B12  --   --  511   < >  --   --  759    < > = values in this interval not displayed.       Provider reviewed records from facility, and  interpreted most recent imaging/lab work, and vital signs.      ASSESSMENT/PLAN:     (R53.81) Physical deconditioning  (primary encounter diagnosis)  (M62.81) Generalized muscle weakness  Comment: Acute on chronic. S/T below  Plan:   -Continue Physical therapy and Occupational therapy as directed  -SW to remain involved for safe discharge planning needs     (J96.21) Acute on chronic respiratory failure with hypoxia (H)  (I50.33) Acute on chronic heart failure with preserved ejection fraction (H)  (J44.1) COPD exacerbation (H)  (G47.33) JODI (obstructive sleep apnea)  (I27.20) Severe pulmonary hypertension (H)  (I25.119) Coronary artery disease involving native coronary artery of native heart with angina pectoris (H24)  (J18.9) Community acquired pneumonia, unspecified laterality  (E87.29) Respiratory acidosis  Comment: Chronic. Doing well.    Patient to follow-up in the pulmonary hypertension clinic after discharge.   Plan:  -Continue Bumex 2mg daily, duoneb BID, albuterol nebulization every 6 hours PRN  -Continue robitussin PRN, benadryl 25mg every 6 hours PRN  -Oxygen by Nasal Cannula 6 LPM at rest and 8-10 LPM w/activity LPM to keep oxygen Sats 88 % or greater. She is on oxygen at home.   -Continue ICS-LABA Breo ellipta daily, spiriva daily   -Started on Jardiance 10 mg daily on 4/13/2024 for HFpEF.  -Lymphedema wraps with lymphedema therapy on site  -2gm Na diet, Fluid restriction 1800cc/day.  -CPAP per home settings  -Daily weights as directed. Hospital weight~284#. Admit weight~286#  Follow up with Pulmonary and Cards  -BMP and CBC due 5/15/24     (Z86.718) Personal history of DVT (deep vein thrombosis)  (Z86.711) History of pulmonary embolism  Comment: Acute on chronic. *CT chest on 3/29/2024 was negative for pulmonary embolism. Per pharmacy med rec, she is no longer on anticoagulation with warfarin. Prophylactic lovenox 40 mg BID while in hospital.  Plan:   Monitor     (R04.2) Hemoptysis  Comment: Acute/mild.  Resolved.   Eating and drinking okay      (N17.9) SONAL (acute kidney injury) (H24)  (N18.30) Stage 3 chronic kidney disease, unspecified whether stage 3a or 3b CKD (H)  Comment: Creatinine stabilized around 1.1> 1.22 on recheck.   Plan:   -BMP and CBC due 5/15/24  -Monitor urinary status     (E87.6) Hypokalemia  Comment: Acute. Resolved, recheck 3.9 on 5/10  -BMP and CBC due 5/15/24     (L97.301) Ulcer of ankle, unspecified laterality, limited to breakdown of skin (H)  Comment: Acute on chronic. WOCn reviewed in hospital. See pictures from 4/17, there was concern for developing cellulitis. Completed 7 day course of antibiotics (ceftriaxone --> keflex) on 4/26/2024.   Plan:   -BMP and CBC due 5/15/24  -Continue wound care as directed    (L30.9) Dermatitis  Comment: Acute on chronic. S/T morbid size. Improved.   Plan:   -Continue current wound care as directed:  *Right breast fold rash: BID   Cleanse skin with warm water. Pat dry.  Apply nystatin powder     (D69.6) Thrombocytopenia (H24)  Comment: Acute on chronic. Platelets normal initially, trended down to 98 on 4/16/2024, then trended up and within normal limits since 4/22/2024.  Plan:   -BMP and CBC due 5/15/24     (E44.0) Moderate malnutrition (H24)  Comment: Acute on chronic. In context with recent hospitalization  Plan: Continue to follow with Dietician.      (E66.01) Morbid obesity (H)  (E78.2) Mixed hyperlipidemia  Comment: Chronic. Diet controlled. Not on statin  Plan:   -Encourage ongoing weight loss  PT/OT     (E03.9) Hypothyroidism, unspecified type  Comment: Acute on chronic. Med rec did not show any thyroid supplement. TSH 6.28 and free T4 1.49 on 3/29/24.  Plan:   -Trend thyroid levels in 4-6 weeks, defer to PCP.      (Z86.73) History of CVA (cerebrovascular accident)  Comment: Chronic. Per chart review. Former smoker, quit in 2012.   Plan:   Continue 81mg daily  -Poor compliance with statin  -Follow up with cardiology as directed       (D47.2) MGUS  (monoclonal gammopathy of unknown significance)  Comment: Chronic. Noted   Plan:   -Monitor for worsening s/symptoms of concerns  -Follow up with PCP post TCU    (E53.8) Vitamin B12 deficiency (non anemic)  Comment: Chronic. Noted per chart review  Plan:   -Monitor for worsening s/symptoms of concerns  -Continue vitamin B12, Last given 5/8       (Z86.69) Hx of seizure disorder  Comment: Noted in medical record, does not appear to be on ant anti-epileptic medications, no seizures noted in TCU.   Plan:   -Monitor for seizure concerns    (K59.01) Constipation  Comment: Acute on chronic. Stable. Reports no concerns today.   Plan:  Continue stool softeners      Electronically signed by  JONATAN Sepulveda, GNP/ANP-BC  May 14, 2024      Total time greater than 45 minutes reviewing chart in EPIC and PCC, medications, labs, vitals. Discussing with social work, physical therapy, occupational therapy and nursing.             Sincerely,        Lizzie Sibley, CNP

## 2024-05-14 NOTE — PROGRESS NOTES
[facility]  INITIAL VISIT NOTE  May 15, 2024    PRIMARY CARE PROVIDER AND CLINIC:  Jayy Henson PHYSICIAN [facility] [address] [facility] [address]*    CHIEF COMPLAINT:  Hospital follow-up/Initial visit    HPI:    Linda Otero is a 64 year old  (1960) female who was seen at [facility]) on May 15, 2024 for an initial visit.     Medical history is notable for CHF, pulmonary hypertension, COPD, JODI, hypertension, dyslipidemia, CVA, PFO, DVT/PE, CKD, MGUS, B12 deficiency, chronic anemia, hypothyroidism, cellulitis, seizure disorder, umbilical hernia, osteoarthritis, and morbid obesity.    Summary of hospital course:  Patient was hospitalized at [facility] from March 29 through May 7, 2024 for acute on chronic hypoxic respiratory failure due to acute on chronic HFpEF, COPD exacerbation, and community-acquired pneumonia.  Chest x-ray was significant for cardiomegaly, ill-defined opacities at the right infrahilar lung and bilateral vascular congestion.  CT chest was negative for pulmonary embolism.  Transthoracic echocardiogram revealed LVEF 60 to 65%, moderately reduced RV systolic function, and moderate to severe tricuspid regurgitation.  Right heart catheterization was significant for severe pulmonary hypertension and normal coronaries.  She was diuresed with IV furosemide followed by bumetanide drip, then ultimately transitioned to oral bumetanide.  She was started on empagliflozin by cardiology.  She was treated with 5-day course of ceftriaxone and doxycycline as well as steroid.  Aspirin was held temporarily for mild hemoptysis.  U was recommended for rehab.    Patient is admitted to this facility for medical management, nursing care, and subacute rehab.     Of note, history was obtained from patient, facility staff, and extensive review of the chart including hospital admission note, consult notes, and discharge  summary.      Today's visit:  Patient was seen in her room, lying in bed.  She appears comfortable.  She is currently on oxygen at 4 L/min through nasal cannula.  Her oxygen needs increased with activities.  She feels short winded after walking approximately 50 feet.  She denies fever, chills, chest pain, palpitation, dyspnea, nausea, vomiting, abdominal pain, or urinary symptoms.  She had a BM yesterday.  She reports no melena or hematochezia.      CODE STATUS:   CPR/Full code     PAST MEDICAL HISTORY:   Chronic HFpEF (LVEF 60-65%, per echo on April 1, 2024)  Right heart failure (moderately reduced RV systolic function, per echo on April 1, 2024)  Severe pulmonary hypertension  Moderate-severe tricuspid regurgitation  COPD, on home oxygen at 5 L/min  Community-acquired pneumonia (right infrahilar) in April 2024  Hypertension  Dyslipidemia  CVA (left thalamic) in December 2012  PFO, s/p closure in 2013  RLE DVT in October 2015  Pulmonary embolism   CKD stage IIIa, baseline creatinine around 1.1  MGUS  B12 deficiency  Chronic anemia, baseline creatinine 11-12  Hypothyroidism  Cellulitis  Seizure disorder at age 31  Umbilical hernia  Right lateral ankle ulcer  Osteoarthritis  Moderate malnutrition  Morbid obesity, BMI 46.4  JODI    Note: No CAD, per cardiac catheterization on April 12, 2024    Past Medical History:   Diagnosis Date    Abnormality of gait due to impairment of balance     Acute and chronic respiratory failure with hypercapnia (H)     Acute deep venous thrombosis (H) 10/20/2015    Anemia     Iron deficiency    Aneurysm (H24) 2012    Arthritis     toes, thumb, elbow    B12 deficiency     Cancer (H) 1985    cervical    Cellulitis right foot    Chronic diastolic heart failure (H)     Chronic kidney disease     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  left thalamic stroke as well as patchy MCA branch infarcts    CVA (cerebral vascular accident) (H) 12/23/2012    Left thalamic stroke, MCA branch infarcts    Dermatitis     DVT (deep venous thrombosis) (H)     Right leg    Encephalopathy     after stroke, believed related to hypoxia    History of transfusion     Hyperlipidemia     Hypertension     Hypothyroidism     Lymphedema     MGUS (monoclonal gammopathy of unknown significance)     Neuropathy of right lower extremity     Obesity     On home oxygen therapy     PFO (patent foramen ovale)     closed after stroke, see cardiology notes care everywhere    PFO (patent foramen ovale)     Pneumonia     Primary hypercoagulable state (H24)     Pulmonary emboli (H) 2013    Pulmonary HTN (H)     Respiratory failure (H)     after stroke    Seizure (H)     Seizures (H)     at age 31    Skin cancer 2014    Sleep apnea     CPAP    Stroke (H) 2012    Umbilical hernia     Wound of right ankle        PAST SURGICAL HISTORY:   Past Surgical History:   Procedure Laterality Date    ASD REPAIR      ASD REPAIR      BIOPSY SKIN (LOCATION)      CARDIAC CATHETERIZATION  2012    2 stents    COLONOSCOPY N/A 4/9/2018    Procedure: COLONOSCOPY;  Surgeon: Dorene Araujo MD;  Location: [facility] Main OR;  Service:     CONIZATION  1985    CV CORONARY ANGIOGRAM N/A 4/12/2019    Procedure: Coronary Angiogram;  Surgeon: Brett Montalvo MD;  Location: [facility] Cath Lab;  Service: Cardiology    CV CORONARY ANGIOGRAM N/A 4/12/2024    Procedure: Coronary Angiogram;  Surgeon: Enoc Crocker MD;  Location: [facility] CARDIAC CATH LAB    CV INTRAVASULAR ULTRASOUND N/A 4/12/2024    Procedure: Intravascular Ultrasound;  Surgeon: Enoc Crocker MD;  Location: [facility] CARDIAC CATH LAB    CV LEFT HEART CATHETERIZATION WITHOUT LEFT VENTRICULOGRAM Left 4/12/2019    Procedure: Left Heart  Catheterization Without Left Ventriculogram;  Surgeon: Brett Montalvo MD;  Location: [facility] Cath Lab;  Service: Cardiology    CV RIGHT HEART CATH MEASUREMENTS RECORDED N/A 2024    Procedure: Right Heart Catheterization;  Surgeon: Enoc Crocker MD;  Location:  HEART CARDIAC CATH LAB    CV RIGHT HEART CATH MEASUREMENTS RECORDED N/A 2024    Procedure: Right Heart Cath;  Surgeon: Sparkle Suresh MD;  Location:  HEART CARDIAC CATH LAB    CV RIGHT HEART CATHETERIZATION N/A 2019    Procedure: Right Heart Catheterization;  Surgeon: Brett Montalvo MD;  Location: [facility] Cath Lab;  Service: Cardiology    ESOPHAGOSCOPY, GASTROSCOPY, DUODENOSCOPY (EGD), COMBINED N/A 2018    Procedure: ESOPHAGOGASTRODUODENOSCOPY (EGD);  Surgeon: Dorene Araujo MD;  Location: [facility];  Service:     IR CAROTID ANGIOGRAM  2012    IR CAROTID ANGIOGRAM  2012    IR MISCELLANEOUS PROCEDURE  2012    IR MISCELLANEOUS PROCEDURE  2012    IR MISCELLANEOUS PROCEDURE  2012    OTHER SURGICAL HISTORY      Cardiac stents    PATENT FORAMEN OVALE CLOSURE      REPAIR PATENT FORAMEN OVALE  2013    Helex device    XR SACRO ILIAC JOINT INJECTION LEFT  2012    ZC REMV ART CLOT ILIAC-POP,LEG INCIS Left 2019    Procedure: REPAIR LEFT FEMORAL ARTERIOVENOUS FISTULA WITH INTRAOPERATIVE ULTRASOUND;  Surgeon: Salinas Torres MD;  Location: [facility];  Service: General       FAMILY HISTORY:   Family History   Problem Relation Age of Onset    Angioedema Mother     Pulmonary Embolism Brother     Cerebrovascular Disease Father     Coronary Artery Disease Brother        SOCIAL HISTORY:  Social History     Tobacco Use    Smoking status: Former     Current packs/day: 0.00     Average packs/day: 1 pack/day for 44.0 years (44.0 ttl pk-yrs)     Types: Cigarettes     Start date: 1968     Quit date: 2012     Years since quittin.3    Smokeless  tobacco: Never   Substance Use Topics    Alcohol use: No     Alcohol/week: 0.0 standard drinks of alcohol       MEDICATIONS:  Current Outpatient Medications   Medication Sig Dispense Refill    acetaminophen (TYLENOL) 325 MG tablet Take 650 mg by mouth every 4 hours as needed for pain or fever      albuterol (PROVENTIL) (2.5 MG/3ML) 0.083% neb solution Take 1 vial (2.5 mg) by nebulization every 6 hours as needed for shortness of breath or wheezing OFFICE VISIT NEEDED FOR ANY FURTHER REFILLS 90 mL 11    aspirin (ASA) 81 MG chewable tablet Take 81 mg by mouth daily      bisacodyl (DULCOLAX) 10 MG suppository Place 10 mg rectally daily as needed for constipation      bumetanide (BUMEX) 2 MG tablet Take 1 tablet (2 mg) by mouth daily HOLD if SBP<100      calcium carbonate (TUMS) 500 MG chewable tablet Take 1 chew tab by mouth 4 times daily as needed for heartburn      cyanocobalamin (CYANOCOBALAMIN) 1000 MCG/ML injection Inject 1 mL into the muscle every 30 days      diphenhydrAMINE (BENADRYL) 25 MG capsule Take 1 capsule (25 mg) by mouth every 6 hours as needed for itching or allergies      empagliflozin (JARDIANCE) 10 MG TABS tablet Take 1 tablet (10 mg) by mouth daily 90 tablet 1    fluticasone-vilanterol (BREO ELLIPTA) 200-25 MCG/ACT inhaler Inhale 1 puff into the lungs daily 30 each 11    guaiFENesin (ROBITUSSIN) 20 mg/mL liquid Take 200 mg by mouth every 4 hours as needed for cough      ipratropium - albuterol 0.5 mg/2.5 mg/3 mL (DUONEB) 0.5-2.5 (3) MG/3ML neb solution Take 1 vial (3 mLs) by nebulization 2 times daily      loperamide (IMODIUM A-D) 2 MG tablet Take 2 mg by mouth 4 times daily as needed for diarrhea      magnesium hydroxide (MILK OF MAGNESIA) 400 MG/5ML suspension Take 30 mLs by mouth daily as needed for constipation or heartburn      multivitamin, therapeutic (THERA-VIT) TABS tablet Take 1 tablet by mouth at bedtime      nystatin (MYCOSTATIN) 717971 UNIT/GM external powder Apply to breast BID and  "BID  g 1    polyethylene glycol (MIRALAX) 17 GM/Dose powder Take 17 g by mouth daily      senna-docusate (SENOKOT-S/PERICOLACE) 8.6-50 MG tablet Take 2 tablets by mouth 2 times daily as needed for constipation      tiotropium (SPIRIVA RESPIMAT) 2.5 MCG/ACT inhaler INHALE TWO PUFFS BY MOUTH EVERY DAY 12 g 3       MED REC REQUIRED  Post Medication Reconciliation Status: medication reconcilation previously completed during another office visit        ALLERGIES:  Allergies   Allergen Reactions    Eliquis [Apixaban] Shortness Of Breath, Hives and Swelling     Pt. Reported     Penicillins Anaphylaxis     Per chart review: tolerated Augmentin in 2020.  Tolerated CTX 2024.       Erythromycin Hives and Itching    Peanut Oil Hives    Clindamycin Itching    Tetracycline Itching       ROS:  10 point ROS were negative other than the symptoms noted above in the HPI.    PHYSICAL EXAM:  Vital signs were reviewed in the chart.  Vital Signs: /74   Pulse 98   Temp 98.4  F (36.9  C)   Resp 20   Ht 1.676 m (5' 6\")   Wt 130.5 kg (287 lb 12.8 oz)   SpO2 (!) 89%   BMI 46.45 kg/m     General: Comfortable and in no acute distress  HEENT: Conjunctival pallor, no scleral icterus or injection, moist oral mucosa  Cardiovascular: Normal S1, S2, RRR  Respiratory: Lungs clear to auscultation bilaterally; no wheezing, rhonchi, or crackles  GI: Abdomen soft, non-tender, non-distended, +BS  Extremities: 3-4+ bilateral LE edema; compression stockings are on  Neuro: CX II-XII grossly intact; ROM in all four extremities grossly intact  Psych: Alert and oriented x3; normal affect  Skin: No acute rash    LABORATORY/IMAGING DATA:  All relevant labs and imaging data in [facility] and/or Care Everywhere were personally reviewed today.    Most Recent 3 CBC's:  Recent Labs   Lab Test 05/15/24  1055 05/10/24  0905 05/07/24  1024   WBC 6.6 6.9 8.6   HGB 9.4* 8.7* 8.6*   MCV 91 90 89    184 179     Most Recent 3 BMP's:  Recent Labs   Lab Test " 05/15/24  1055 05/10/24  0905 05/07/24  1024    140 140   POTASSIUM 3.8 3.9 3.3*   CHLORIDE 99 98 98   CO2 33* 31* 32*   BUN 36.2* 41.3* 33.7*   CR 1.16* 1.22* 1.13*   ANIONGAP 10 11 10   LUNA 9.6 9.2 9.4   * 113* 118*     Most Recent 2 LFT's:  Recent Labs   Lab Test 03/29/24  1453 07/10/23  0944   AST 19 19   ALT 14 15   ALKPHOS 115 84   BILITOTAL 1.4* 0.4         ASSESSMENT/PLAN:  Acute on chronic HFpEF (LVEF 60-65%, per echo on April 1, 2024),  Right heart failure (moderately reduced RV systolic function, per echo on April 1, 2024),  Severe pulmonary hypertension,  Moderate-severe tricuspid regurgitation.  Patient was diuresed with IV diuretics then transitioned to oral bumetanide.  Patient currently weighs 287.8 LBS and has 3-4+ lower extremity edema.  Plan:  Continue low-salt diet  Continue fluid restriction of 1800 mL  Continue empagliflozin 10 mg daily  Continue bumetanide 2 mg daily  Continue lymphedema therapy/compression stockings  Monitor weight and volume status  Follow-up with cardiology as directed    Acute on chronic hypoxic respiratory failure,  COPD with acute exacerbation,  Community-acquired pneumonia (right infrahilar).  Former smoker; quit in 2012.  At baseline on home oxygen at 5 L/min.  Completed the course of ceftriaxone/doxycycline as well as steroid in the hospital.  She is currently on oxygen at 4 L/min at rest but requires higher rate of oxygen with activities.  She states that she is not yet back to her baseline respiratory status.  Plan:  Continue supplemental oxygen  Continue Breo Ellipta 1 puff daily  Continue ipratropium-albuterol neb 3 mL twice daily  Continue Spiriva 2 puffs daily  Continue albuterol neb 2.5 mL every 6 hours PRN  Continue guaifenesin 200 mg every 4 hours as needed for cough  Monitor respiratory status  Follow-up with pulmonology as directed    JODI.  Patient reports that she was compliant with CPAP prior to admission.  Plan  Continue nocturnal CPAP per  home settings     Hypertension.  Blood pressure is fairly controlled.  Plan:  Continue bumetanide 2 mg daily  Monitor blood pressure    Dyslipidemia,  History of CVA (left thalamic) in December 2012,  History of PFO, s/p closure in 2013.  Patient is not on a statin therapy.  Plan:  Continue aspirin 81 mg daily    History of RLE DVT in October 2015,  History of pulmonary embolism.  No longer on anticoagulation therapy.  Plan:  Encourage ambulation    CKD stage IIIa.  Baseline creatinine around 1.1.  Today's creatinine is 1.16.  Plan:  Avoid NSAIDs and nephrotoxins  Monitor renal function periodically    Acute on chronic anemia,  MGUS.  B12 deficiency.  Baseline creatinine 11-12.  Today's hemoglobin is 9.4.  Plan:  Continue cyanocobalamin 1000 mcg IM every 30 days  Monitor hemoglobin periodically  Monitor for signs or symptoms of blood loss  Transfuse PRN for hemoglobin less than 7    History of hypothyroidism.  Per chart review.  Most recent TSH 6.28 and free T4 1.49 on March 29, 2024  Plan:   Follow-up as outpatient    Right lateral ankle ulcer.  Present on admission.  Plan:  Continue wound care as directed    Moderate malnutrition.  Plan:  Continue dietary supplement per RD    Slow transit constipation.  Plan:  Continue the bowel regimen     Morbid obesity, BMI 46.4.  Plan:  Encourage weight loss  Staff to assist with daily care and mobility    Physical deconditioning.  Plan:  Continue PT/OT evaluation and therapy       Orders written by provider at facility:  [facility] on May 20, DX: CHF, CKD          Disclaimer: This note contains text created using speech-recognition software and may have unintended word substitutions.      Electronically signed by:  Hans Pineda MD

## 2024-05-15 NOTE — LETTER
5/15/2024        RE: Linda Otero  [address]   [address]  [facility] [address]        [facility]  INITIAL VISIT NOTE  May 15, 2024    PRIMARY CARE PROVIDER AND CLINIC:  Jayy Henson PHYSICIAN [facility] [address] [facility] [address]*    CHIEF COMPLAINT:  Hospital follow-up/Initial visit    HPI:    Linda Otero is a 64 year old  (1960) female who was seen at [facility] (TCU) on May 15, 2024 for an initial visit.     Medical history is notable for CHF, pulmonary hypertension, COPD, JODI, hypertension, dyslipidemia, CVA, PFO, DVT/PE, CKD, MGUS, B12 deficiency, chronic anemia, hypothyroidism, cellulitis, seizure disorder, umbilical hernia, osteoarthritis, and morbid obesity.    Summary of hospital course:  Patient was hospitalized at [facility] from March 29 through May 7, 2024 for acute on chronic hypoxic respiratory failure due to acute on chronic HFpEF, COPD exacerbation, and community-acquired pneumonia.  Chest x-ray was significant for cardiomegaly, ill-defined opacities at the right infrahilar lung and bilateral vascular congestion.  CT chest was negative for pulmonary embolism.  Transthoracic echocardiogram revealed LVEF 60 to 65%, moderately reduced RV systolic function, and moderate to severe tricuspid regurgitation.  Right heart catheterization was significant for severe pulmonary hypertension and normal coronaries.  She was diuresed with IV furosemide followed by bumetanide drip, then ultimately transitioned to oral bumetanide.  She was started on empagliflozin by cardiology.  She was treated with 5-day course of ceftriaxone and doxycycline as well as steroid.  Aspirin was held temporarily for mild hemoptysis.  TCU was recommended for rehab.    Patient is admitted to this facility for medical management, nursing care, and subacute rehab.     Of note, history was obtained from patient, facility staff, and  extensive review of the chart including hospital admission note, consult notes, and discharge summary.      Today's visit:  Patient was seen in her room, lying in bed.  She appears comfortable.  She is currently on oxygen at 4 L/min through nasal cannula.  Her oxygen needs increased with activities.  She feels short winded after walking approximately 50 feet.  She denies fever, chills, chest pain, palpitation, dyspnea, nausea, vomiting, abdominal pain, or urinary symptoms.  She had a BM yesterday.  She reports no melena or hematochezia.      CODE STATUS:   CPR/Full code     PAST MEDICAL HISTORY:   Chronic HFpEF (LVEF 60-65%, per echo on April 1, 2024)  Right heart failure (moderately reduced RV systolic function, per echo on April 1, 2024)  Severe pulmonary hypertension  Moderate-severe tricuspid regurgitation  COPD, on home oxygen at 5 L/min  Community-acquired pneumonia (right infrahilar) in April 2024  Hypertension  Dyslipidemia  CVA (left thalamic) in December 2012  PFO, s/p closure in 2013  RLE DVT in October 2015  Pulmonary embolism   CKD stage IIIa, baseline creatinine around 1.1  MGUS  B12 deficiency  Chronic anemia, baseline creatinine 11-12  Hypothyroidism  Cellulitis  Seizure disorder at age 31  Umbilical hernia  Right lateral ankle ulcer  Osteoarthritis  Moderate malnutrition  Morbid obesity, BMI 46.4  JODI    Note: No CAD, per cardiac catheterization on April 12, 2024    Past Medical History:   Diagnosis Date     Abnormality of gait due to impairment of balance      Acute and chronic respiratory failure with hypercapnia (H)      Acute deep venous thrombosis (H) 10/20/2015     Anemia     Iron deficiency     Aneurysm (H24) 2012     Arthritis     toes, thumb, elbow     B12 deficiency      Cancer (H) 1985    cervical     Cellulitis right foot     Chronic diastolic heart failure (H)      Chronic kidney disease      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      Cor triatriatum dextra with PFO     COPD (chronic  obstructive pulmonary disease) (H)      Coronary artery disease      CRF (chronic renal failure), stage 3 (moderate) (H) 10/21/2015     CVA (cerebral infarction) 12/23    believed due to clot.  left thalamic stroke as well as patchy MCA branch infarcts     CVA (cerebral vascular accident) (H) 12/23/2012    Left thalamic stroke, MCA branch infarcts     Dermatitis      DVT (deep venous thrombosis) (H)     Right leg     Encephalopathy     after stroke, believed related to hypoxia     History of transfusion      Hyperlipidemia      Hypertension      Hypothyroidism      Lymphedema      MGUS (monoclonal gammopathy of unknown significance)      Neuropathy of right lower extremity      Obesity      On home oxygen therapy      PFO (patent foramen ovale)     closed after stroke, see cardiology notes care everywhere     PFO (patent foramen ovale)      Pneumonia      Primary hypercoagulable state (H24)      Pulmonary emboli (H) 2013     Pulmonary HTN (H)      Respiratory failure (H)     after stroke     Seizure (H)      Seizures (H)     at age 31     Skin cancer 2014     Sleep apnea     CPAP     Stroke (H) 2012     Umbilical hernia      Wound of right ankle        PAST SURGICAL HISTORY:   Past Surgical History:   Procedure Laterality Date     ASD REPAIR       ASD REPAIR       BIOPSY SKIN (LOCATION)       CARDIAC CATHETERIZATION  2012    2 stents     COLONOSCOPY N/A 4/9/2018    Procedure: COLONOSCOPY;  Surgeon: Dorene Araujo MD;  Location: [facility] Main OR;  Service:      CONIZATION  1985     CV CORONARY ANGIOGRAM N/A 4/12/2019    Procedure: Coronary Angiogram;  Surgeon: Brett Montalvo MD;  Location: [facility] Cath Lab;  Service: Cardiology     CV CORONARY ANGIOGRAM N/A 4/12/2024    Procedure: Coronary Angiogram;  Surgeon: Enoc Crocker MD;  Location: [facility] CARDIAC CATH LAB     CV INTRAVASULAR ULTRASOUND N/A 4/12/2024    Procedure: Intravascular Ultrasound;  Surgeon: Enoc Crocker MD;   Location:  HEART CARDIAC CATH LAB     CV LEFT HEART CATHETERIZATION WITHOUT LEFT VENTRICULOGRAM Left 4/12/2019    Procedure: Left Heart Catheterization Without Left Ventriculogram;  Surgeon: Brett Montalvo MD;  Location: [facility] Cath Lab;  Service: Cardiology     CV RIGHT HEART CATH MEASUREMENTS RECORDED N/A 4/12/2024    Procedure: Right Heart Catheterization;  Surgeon: Enoc Crocker MD;  Location:  HEART CARDIAC CATH LAB     CV RIGHT HEART CATH MEASUREMENTS RECORDED N/A 4/19/2024    Procedure: Right Heart Cath;  Surgeon: Sparkle Suresh MD;  Location: [facility] CARDIAC CATH LAB     CV RIGHT HEART CATHETERIZATION N/A 4/12/2019    Procedure: Right Heart Catheterization;  Surgeon: Brett Montalvo MD;  Location: [facility] Cath Lab;  Service: Cardiology     ESOPHAGOSCOPY, GASTROSCOPY, DUODENOSCOPY (EGD), COMBINED N/A 4/9/2018    Procedure: ESOPHAGOGASTRODUODENOSCOPY (EGD);  Surgeon: Dorene Araujo MD;  Location: [facility];  Service:      IR CAROTID ANGIOGRAM  12/26/2012     IR CAROTID ANGIOGRAM  12/26/2012     IR MISCELLANEOUS PROCEDURE  12/26/2012     IR MISCELLANEOUS PROCEDURE  12/26/2012     IR MISCELLANEOUS PROCEDURE  12/26/2012     OTHER SURGICAL HISTORY      Cardiac stents     PATENT FORAMEN OVALE CLOSURE       REPAIR PATENT FORAMEN OVALE  2013    Helex device     XR SACRO ILIAC JOINT INJECTION LEFT  11/2012     [facility] REMV ART CLOT ILIAC-POP,LEG INCIS Left 7/22/2019    Procedure: REPAIR LEFT FEMORAL ARTERIOVENOUS FISTULA WITH INTRAOPERATIVE ULTRASOUND;  Surgeon: Salinas Torres MD;  Location: [facility];  Service: General       FAMILY HISTORY:   Family History   Problem Relation Age of Onset     Angioedema Mother      Pulmonary Embolism Brother      Cerebrovascular Disease Father      Coronary Artery Disease Brother        SOCIAL HISTORY:  Social History     Tobacco Use     Smoking status: Former     Current packs/day: 0.00     Average packs/day: 1 pack/day  for 44.0 years (44.0 ttl pk-yrs)     Types: Cigarettes     Start date: 1968     Quit date: 2012     Years since quittin.3     Smokeless tobacco: Never   Substance Use Topics     Alcohol use: No     Alcohol/week: 0.0 standard drinks of alcohol       MEDICATIONS:  Current Outpatient Medications   Medication Sig Dispense Refill     acetaminophen (TYLENOL) 325 MG tablet Take 650 mg by mouth every 4 hours as needed for pain or fever       albuterol (PROVENTIL) (2.5 MG/3ML) 0.083% neb solution Take 1 vial (2.5 mg) by nebulization every 6 hours as needed for shortness of breath or wheezing OFFICE VISIT NEEDED FOR ANY FURTHER REFILLS 90 mL 11     aspirin (ASA) 81 MG chewable tablet Take 81 mg by mouth daily       bisacodyl (DULCOLAX) 10 MG suppository Place 10 mg rectally daily as needed for constipation       bumetanide (BUMEX) 2 MG tablet Take 1 tablet (2 mg) by mouth daily HOLD if SBP<100       calcium carbonate (TUMS) 500 MG chewable tablet Take 1 chew tab by mouth 4 times daily as needed for heartburn       cyanocobalamin (CYANOCOBALAMIN) 1000 MCG/ML injection Inject 1 mL into the muscle every 30 days       diphenhydrAMINE (BENADRYL) 25 MG capsule Take 1 capsule (25 mg) by mouth every 6 hours as needed for itching or allergies       empagliflozin (JARDIANCE) 10 MG TABS tablet Take 1 tablet (10 mg) by mouth daily 90 tablet 1     fluticasone-vilanterol (BREO ELLIPTA) 200-25 MCG/ACT inhaler Inhale 1 puff into the lungs daily 30 each 11     guaiFENesin (ROBITUSSIN) 20 mg/mL liquid Take 200 mg by mouth every 4 hours as needed for cough       ipratropium - albuterol 0.5 mg/2.5 mg/3 mL (DUONEB) 0.5-2.5 (3) MG/3ML neb solution Take 1 vial (3 mLs) by nebulization 2 times daily       loperamide (IMODIUM A-D) 2 MG tablet Take 2 mg by mouth 4 times daily as needed for diarrhea       magnesium hydroxide (MILK OF MAGNESIA) 400 MG/5ML suspension Take 30 mLs by mouth daily as needed for constipation or heartburn        "multivitamin, therapeutic (THERA-VIT) TABS tablet Take 1 tablet by mouth at bedtime       nystatin (MYCOSTATIN) 791512 UNIT/GM external powder Apply to breast BID and BID  g 1     polyethylene glycol (MIRALAX) 17 GM/Dose powder Take 17 g by mouth daily       senna-docusate (SENOKOT-S/PERICOLACE) 8.6-50 MG tablet Take 2 tablets by mouth 2 times daily as needed for constipation       tiotropium (SPIRIVA RESPIMAT) 2.5 MCG/ACT inhaler INHALE TWO PUFFS BY MOUTH EVERY DAY 12 g 3       MED REC REQUIRED  Post Medication Reconciliation Status: medication reconcilation previously completed during another office visit        ALLERGIES:  Allergies   Allergen Reactions     Eliquis [Apixaban] Shortness Of Breath, Hives and Swelling     Pt. Reported      Penicillins Anaphylaxis     Per chart review: tolerated Augmentin in 2020.  Tolerated CTX 2024.        Erythromycin Hives and Itching     Peanut Oil Hives     Clindamycin Itching     Tetracycline Itching       ROS:  10 point ROS were negative other than the symptoms noted above in the HPI.    PHYSICAL EXAM:  Vital signs were reviewed in the chart.  Vital Signs: /74   Pulse 98   Temp 98.4  F (36.9  C)   Resp 20   Ht 1.676 m (5' 6\")   Wt 130.5 kg (287 lb 12.8 oz)   SpO2 (!) 89%   BMI 46.45 kg/m     General: Comfortable and in no acute distress  HEENT: Conjunctival pallor, no scleral icterus or injection, moist oral mucosa  Cardiovascular: Normal S1, S2, RRR  Respiratory: Lungs clear to auscultation bilaterally; no wheezing, rhonchi, or crackles  GI: Abdomen soft, non-tender, non-distended, +BS  Extremities: 3-4+ bilateral LE edema; compression stockings are on  Neuro: CX II-XII grossly intact; ROM in all four extremities grossly intact  Psych: Alert and oriented x3; normal affect  Skin: No acute rash    LABORATORY/IMAGING DATA:  All relevant labs and imaging data in [facility] and/or Care Everywhere were personally reviewed today.    Most Recent 3 CBC's:  Recent Labs "   Lab Test 05/15/24  1055 05/10/24  0905 05/07/24  1024   WBC 6.6 6.9 8.6   HGB 9.4* 8.7* 8.6*   MCV 91 90 89    184 179     Most Recent 3 BMP's:  Recent Labs   Lab Test 05/10/24  0905 05/07/24  1024 05/04/24  0921 05/03/24  0829    140  --  140   POTASSIUM 3.9 3.3* 3.6 3.6   CHLORIDE 98 98  --  98   CO2 31* 32*  --  32*   BUN 41.3* 33.7*  --  32.3*   CR 1.22* 1.13*  --  1.11*   ANIONGAP 11 10  --  10   LUNA 9.2 9.4  --  9.4   * 118*  --  100*     Most Recent 2 LFT's:  Recent Labs   Lab Test 03/29/24  1453 07/10/23  0944   AST 19 19   ALT 14 15   ALKPHOS 115 84   BILITOTAL 1.4* 0.4         ASSESSMENT/PLAN:  Acute on chronic HFpEF (LVEF 60-65%, per echo on April 1, 2024),  Right heart failure (moderately reduced RV systolic function, per echo on April 1, 2024),  Severe pulmonary hypertension,  Moderate-severe tricuspid regurgitation.  Patient was diuresed with IV diuretics then transitioned to oral bumetanide.  Patient currently weighs 287.8 LBS and has 3-4+ lower extremity edema.  Plan:  Continue low-salt diet  Continue fluid restriction of 1800 mL  Continue empagliflozin 10 mg daily  Continue bumetanide 2 mg daily  Continue lymphedema therapy/compression stockings  Monitor weight and volume status  Follow-up with cardiology as directed    Acute on chronic hypoxic respiratory failure,  COPD with acute exacerbation,  Community-acquired pneumonia (right infrahilar).  Former smoker; quit in 2012.  At baseline on home oxygen at 5 L/min.  Completed the course of ceftriaxone/doxycycline as well as steroid in the hospital.  She is currently on oxygen at 4 L/min at rest but requires higher rate of oxygen with activities.  She states that she is not yet back to her baseline respiratory status.  Plan:  Continue supplemental oxygen  Continue Breo Ellipta 1 puff daily  Continue ipratropium-albuterol neb 3 mL twice daily  Continue Spiriva 2 puffs daily  Continue albuterol neb 2.5 mL every 6 hours PRN  Continue  guaifenesin 200 mg every 4 hours as needed for cough  Monitor respiratory status  Follow-up with pulmonology as directed    JODI.  Patient reports that she was compliant with CPAP prior to admission.  Plan  Continue nocturnal CPAP per home settings     Hypertension.  Blood pressure is fairly controlled.  Plan:  Continue bumetanide 2 mg daily  Monitor blood pressure    Dyslipidemia,  History of CVA (left thalamic) in December 2012,  History of PFO, s/p closure in 2013.  Patient is not on a statin therapy.  Plan:  Continue aspirin 81 mg daily    History of RLE DVT in October 2015,  History of pulmonary embolism.  No longer on anticoagulation therapy.  Plan:  Encourage ambulation    CKD stage IIIa.  Baseline creatinine around 1.1.  Most recent creatinine 1.22 on May 10.   Plan:  Avoid NSAIDs and nephrotoxins  Monitor renal function periodically    Acute on chronic anemia,  MGUS.  B12 deficiency.  Baseline creatinine 11-12.  Today's hemoglobin is 9.4.  Plan:  Continue cyanocobalamin 1000 mcg IM every 30 days  Monitor hemoglobin periodically  Monitor for signs or symptoms of blood loss  Transfuse PRN for hemoglobin less than 7    History of hypothyroidism.  Per chart review.  Most recent TSH 6.28 and free T4 1.49 on March 29, 2024  Plan:   Follow-up as outpatient    Right lateral ankle ulcer.  Present on admission.  Plan:  Continue wound care as directed    Moderate malnutrition.  Plan:  Continue dietary supplement per RD    Slow transit constipation.  Plan:  Continue the bowel regimen     Morbid obesity, BMI 46.4.  Plan:  Encourage weight loss  Staff to assist with daily care and mobility    Physical deconditioning.  Plan:  Continue PT/OT evaluation and therapy       Orders written by provider at facility:  [facility] on May 20, DX: CHF, CKD        Recommendation by provider at facility:  Follow-up on BMP from today, May 15        Disclaimer: This note contains text created using speech-recognition software and may have  unintended word substitutions.      Electronically signed by:  Hans Pineda MD                       Sincerely,        Hans Pineda MD

## 2024-05-21 NOTE — LETTER
5/21/2024        RE: Linda Otero  [address]   [address]  [facility] [id number]        [facility] GERIATRICS    Chief Complaint   Patient presents with     RECHECK     HPI:  Linda Otero is a 64 year old  (1960), who is being seen today for an episodic care visit at: [facility] ([facility])([facility]) [[id number]]. Today's concern is: The primary encounter diagnosis was Acute on chronic heart failure with preserved ejection fraction (H). Diagnoses of Chronic right-sided heart failure (H), Pulmonary hypertension (H), Acute on chronic respiratory failure with hypoxia (H), COPD with acute exacerbation (H), Community acquired pneumonia of right lung, unspecified part of lung, JODI (obstructive sleep apnea), Essential hypertension, Dyslipidemia, History of CVA (cerebrovascular accident), History of deep venous thrombosis, History of pulmonary embolism, Stage 3a chronic kidney disease (H), Acute on chronic anemia, MGUS (monoclonal gammopathy of unknown significance), B12 deficiency, History of hypothyroidism, Lower limb ulcer, ankle, right, with unspecified severity (H), Moderate malnutrition (H24), Slow transit constipation, Morbid obesity (H), Physical deconditioning, Generalized muscle weakness, COPD exacerbation (H), Severe pulmonary hypertension (H), Personal history of DVT (deep vein thrombosis), SONAL (acute kidney injury) (H24), Coronary artery disease involving native coronary artery of native heart with angina pectoris (H24), and Respiratory acidosis were also pertinent to this visit.    Met with patient who denies any chest pain, palpitations, CERRATO, lightheadedness, dizziness. She was found in her room quite shortness of breath. Noted oxygen settings to be at 12L. This was turned down to 7L for now. Oxygen saturations average around 88% which is where we want her oxygen levels to range given her COPD. I will update staff of this. She reports cough improving. Denies any abdominal  "discomfort. Denies N&V. Denies B&B concerns. Denies dysuria or frequency. Denies loose or constipation. Appetite good. Sleeping well. No pain complaints. Nursing denies any acute concerns.     BP Readings from Last 3 Encounters:   05/21/24 115/70   05/14/24 107/74   05/14/24 108/69     Wt Readings from Last 5 Encounters:   05/21/24 130.9 kg (288 lb 9.6 oz)   05/14/24 130.5 kg (287 lb 12.8 oz)   05/14/24 130.5 kg (287 lb 12.8 oz)   05/10/24 129.8 kg (286 lb 3.2 oz)   05/08/24 128.8 kg (284 lb)     Allergies, and PMH/PSH reviewed in EPIC today.  REVIEW OF SYSTEMS:  4 point ROS including Respiratory, CV, GI and , other than that noted in the HPI,  is negative    Objective:   /70   Pulse 84   Temp 97.9  F (36.6  C)   Resp 18   Ht 1.676 m (5' 6\")   Wt 130.9 kg (288 lb 9.6 oz)   SpO2 92%   BMI 46.58 kg/m    GENERAL APPEARANCE:  Alert, oriented, morbidly obese, cooperative  ENT:  Mouth and posterior oropharynx normal, moist mucous membranes, normal hearing acuity  EYES:  EOM, conjunctivae, lids, pupils and irises normal  NECK:  No adenopathy,masses or thyromegaly  RESP:  respiratory effort and palpation of chest normal, diminished breath sounds bibasilar, Right> left, dyspneic  CV:  Palpation and auscultation of heart done , regular rate and rhythm, no murmur, rub, or gallop, peripheral edema 1+ in BLE  ABDOMEN:  normal bowel sounds, soft, nontender, no hepatosplenomegaly or other masses, no guarding or rebound  M/S:   Ambulates with walker  SKIN:  Inspection of skin and subcutaneous tissue baseline, wound healing well, no signs of infection slowly improving  NEURO:   Cranial nerves 2-12 are normal tested and grossly at patient's baseline, no purposeful movement in upper and lower extremities  PSYCH:  oriented X 3, normal insight, judgement and memory, affect and mood normal    Most Recent 3 CBC's:  Recent Labs   Lab Test 05/15/24  1055 05/10/24  0905 05/07/24  1024   WBC 6.6 6.9 8.6   HGB 9.4* 8.7* 8.6* "   MCV 91 90 89    184 179     Most Recent 3 BMP's:  Recent Labs   Lab Test 05/20/24  1044 05/15/24  1055 05/10/24  0905    142 140   POTASSIUM 3.6 3.8 3.9   CHLORIDE 98 99 98   CO2 31* 33* 31*   BUN 33.3* 36.2* 41.3*   CR 1.17* 1.16* 1.22*   ANIONGAP 10 10 11   LUNA 9.4 9.6 9.2   * 106* 113*     Most Recent Anemia Panel:  Recent Labs   Lab Test 05/15/24  1055 03/29/24  1453 09/19/22  0946 04/06/18  1642 04/06/18  0916 04/06/18  0732 05/19/17  1516   WBC 6.6   < > 8.3   < >  --  8.6 9.2   HGB 9.4*   < > 11.4*   < >  --  5.8* 13.4   HCT 32.7*   < > 39.6   < >  --  24.7* 43.2   MCV 91   < > 94   < >  --  78* 92      < > 214   < >  --  272 208   IRON  --   --   --   --   --  14* 48   IRONSAT  --   --   --   --   --   --  15   FEB  --   --   --   --   --   --  330   KWESI  --   --   --   --  18  --  44   B12  --   --  511   < >  --   --  759    < > = values in this interval not displayed.       Provider reviewed records from facility, and interpreted most recent imaging/lab work, and vital signs.   Acute and chronic conditions managed by writer. Have been reviewed during today's exam.      ASSESSMENT/PLAN:     (R53.81) Physical deconditioning  (primary encounter diagnosis)  (M62.81) Generalized muscle weakness  Comment: Acute on chronic. S/T below  Plan:   -Continue Physical therapy and Occupational therapy as directed  -SW to remain involved for safe discharge planning needs     (J96.21) Acute on chronic respiratory failure with hypoxia (H)  (I50.33) Acute on chronic heart failure with preserved ejection fraction (H)  (J44.1) COPD exacerbation (H)  (G47.33) JODI (obstructive sleep apnea)  (I27.20) Severe pulmonary hypertension (H)  (I25.119) Coronary artery disease involving native coronary artery of native heart with angina pectoris (H24)  (J18.9) Community acquired pneumonia, unspecified laterality  (E87.29) Respiratory acidosis  Comment: Chronic. Recent exacerbations. Baseline 5 L NC PTA. CXR  3/29- Cardiomegaly. Ill-defined opacity at the right infrahilar lung could be acute airspace disease. A mass is difficult to exclude. Bilateral vascular congestion may be a degree of pulmonary edema. Potential small right pleural fluid. CT C PE 3/29/2024 without any PE but did demonstrate pulmonary congestion, R pleural effusion; also enlarged main pulmonary artery, as evidence of pulmonary hypertension. TTE 4/1 showing EF 60-65% without WMA, severe RV dilation with moderately decreased RV function, mod-severe TR and pulm HTN. Was initially started on Lasix drip, then transitioned to bumex gtt, then to oral bumex 2mg daily starting on 4/19/24. Cardiology consulted, appreciate their assistance. Signed off 4/19/24. S/p left and right heart cath on 4/12/2024.  Showed normal coronary arteries.  Severely elevated right-sided pressures, severely elevated left-sided filling pressures. Mixed precapillary and postcapillary pulmonary hypertension (mPA 59 mmHg, PCWP 28 mmHg) .  Normal cardiac index. S/p RHC on 4/19:  Her filling pressures have improved significantly. Her weight is now normal and her mean RA is mildly elevated. Her pulmonary hypertension has improved but still severe.  Currently on 6 lpm of supplemental oxygen by oximyzer at rest, this may be her new baseline (PTA baseline was 5 lpm by nasal cannula). O2 with exertion at 8-10 lpm the last few days, O2 sat goal at 88-92%. Completed 5-day course of ceftriaxone+doxy on 4/2/24. Completed 8-day steroid taper on 4/7/24. Restarted PTA Breo on 4/8/24.  Wheezing significantly improved. Pulmonary consulted 4/23/24, appreciate their assistance.  VQ scan on 4/26 without evidence of acute PE or chronic thromboembolic disease. Patient to follow-up in the pulmonary hypertension clinic after discharge.   Plan:  -Continue with Bumex 2mg daily.   -Continue duoneb BID  -Continue albuterol nebulization every 6 hours PRN  -Continue robitussin PRN  -Oxygen by Nasal Cannula 6 LPM at  rest and 8-10 LPM w/activity LPM to keep oxygen Sats 88 % or greater. Attempt to wean down to 5-6L prior to discharge if able. Encourage to keep sats 86-92%. Staff updated  -Continue ICS-LABA Breo ellipta daily.    -Continue spiriva daily   -Started on Jardiance 10 mg daily on 4/13/2024 for HFpEF.  -Lymphedema wraps with lymphedema therapy on site  -2gm Na diet.  -Fluid restriction 1800cc/day.  -Follow up with pulmonology as directed. Call [phone] to schedule. Followed with EMIL Mcmahon at [facility]  -Follow up with cardiology as directed. Scheduled for 7/2/24  -CPAP per home settings  -Daily weights as directed. Hospital weight~284#. Recent weight 288#  -Recent labs stable. Trend periodically while on TCU     (Z86.718) Personal history of DVT (deep vein thrombosis)  (Z86.711) History of pulmonary embolism  Comment: Acute on chronic. *CT chest on 3/29/2024 was negative for pulmonary embolism. Per pharmacy med rec, she is no longer on anticoagulation with warfarin. Prophylactic lovenox 40 mg BID while in hospital.  Plan:   -Continue aspirin 81mg daily.   -Monitor for worsening s/symptoms of concerns  -Recent labs stable. Trend periodically while on TCU     (R04.2) Hemoptysis  Comment: Acute/mild. Reported orange sputum production at home, was producing small amounts of rober blood 4/1/24. Hospital Admission CT chest negative for PE, masses. Resumed aspirin 4/3 and reported small amount of hemoptysis 4/4, but no further hemoptysis after that. Prophylactic lovenox started on 4/8/2024.  Held for cardiac catheterization, then resumed.  Plan:   -Monitor for worsening s/symptoms of concerns.   -Recent labs stable. Trend periodically while on TCU     (N17.9) SONAL (acute kidney injury) (H24)  (N18.30) Stage 3 chronic kidney disease, unspecified whether stage 3a or 3b CKD (H)  Comment: Acute on chronic. Suspect congestive nephropathy. Admission serum creatinine was 1.33, historic baseline 0.87 to 1.08 in 2020  to 2023. Avoid non-steroidal anti-inflammatory drugs (NSAIDs). Creatinine stabilized around 1.1.  Plan:   -Renally dose medications  -Avoid nephrotoxins  -Recent labs stable. Trend periodically while on TCU  -Monitor urinary status     (E87.6) Hypokalemia  Comment: Acute. Noted per chart review. Due to diuresis. Was on KCl 40mEq TID while on IV diuresis which has since been discontinued. Recent results stable.   Plan:   -Monitor for worsening s/symptoms of concerns  -Recent labs stable. Trend periodically while on TCU     (L97.301) Ulcer of ankle, unspecified laterality, limited to breakdown of skin (H)  Comment: Acute on chronic. WOCn reviewed in hospital. See pictures from 4/17, there was concern for developing cellulitis. Completed 7 day course of antibiotics (ceftriaxone --> keflex) on 4/26/2024.   Plan:   -Monitor for worsening s/symptoms of concerns  -Recent labs stable. Trend periodically while on TCU  -Continue wound care as directed:  -Continue Right lateral ankle wound: 3x weekly and PRN for soiled/loose dressing to assist with lymphedema therapy needs  Wrap right lower leg with Vashe-soaked gauze. Allow to soak for about two minutes.  Unwrap leg and gently wipe away any debris. Allow skin to dry.  Use a tongue depressor to apply Medihoney ([id number]) to wound.  Apply a small piece of Adaptic (oil emulsion gauze) over Medihoney.  Cover with a 4x4 gauze and secure with Kerlix gauze and Medipore tape.  Apply Spandigrip to right lower extremity.     (L30.9) Dermatitis  Comment: Acute on chronic. S/T morbid size  Plan:   -Monitor for worsening s/symptoms of concerns  -Continue current wound care as directed:  *Right breast fold rash: BID   Cleanse skin with warm water. Pat dry.  Apply nystatin powder     (D69.6) Thrombocytopenia (H24)  Comment: Acute on chronic. Platelets normal initially, trended down to 98 on 4/16/2024, then trended up and within normal limits since 4/22/2024.  Plan:   -Monitor for worsening  s/symptoms of concerns  -Recent labs stable. Trend periodically while on TCU     (E44.0) Moderate malnutrition (H24)  Comment: Acute on chronic. In context with recent hospitalization  Plan:   -Dietician to remain involved  -Monitor weight trends  -Recent labs stable. Trend periodically while on TCU     (E66.01) Morbid obesity (H)  (E78.2) Mixed hyperlipidemia  Comment: Chronic. Diet controlled. Not on statin  Plan:   -Encourage ongoing weight loss  -Monitor for worsening s/symptoms of concerns  -Monitor weight trends  -Recent labs stable. Trend periodically while on TCU     (E03.9) Hypothyroidism, unspecified type  Comment: Acute on chronic. Med rec did not show any thyroid supplement. TSH 6.28 and free T4 1.49 on 3/29/24.  Plan:   -Monitor for worsening s/symptoms of concerns  -Continue without medication at this time  -Recent labs stable. Trend periodically while on TCU  -Trend thyroid levels in 4-6 weeks.      (Z86.73) History of CVA (cerebrovascular accident)  Comment: Chronic. Per chart review. Former smoker, quit in 2012.   Plan:   -Monitor for worsening s/symptoms of concerns  -ASA daily  -Poor compliance with statin  -Follow up with cardiology as directed  -Recent labs stable. Trend periodically while on TCU     (D47.2) MGUS (monoclonal gammopathy of unknown significance)  Comment: Chronic. Noted per chart review  Plan:   -Monitor for worsening s/symptoms of concerns  -Follow up with PCP post TCU  -Recent labs stable. Trend periodically while on TCU     (E53.8) Vitamin B12 deficiency (non anemic)  Comment: Chronic. Noted per chart review  Plan:   -Monitor for worsening s/symptoms of concerns  -Continue vitamin B12 supplementation as directed of 1mL every month as directed- Last given 5/8  -Recent labs stable. Trend periodically while on TCU     (Z86.69) Hx of seizure disorder  Comment: Noted in medical record, does not appear to be on ant anti-epileptic medications  Plan:   -Monitor for seizure  concerns  -Monitor for worsening s/symptoms of concerns  -Neurology PRN  -Recent labs stable. Trend periodically while on TCU     (K59.01) Constipation  Comment: Acute on chronic. Stable  Plan:  -Monitor BM patterns  -Continue miralax daily  -Continue senna S 2 tabs BID PRN.  -Bisacodyl 10mg rectal daily PRN  -TUMS PRN  -PRN imodium for diarrhea concerns  -MOM daily PRN     Electronically signed by:  Dr. Pamella Horner DNP, APRN, FNP-C, WCS-C, EDS-C         Sincerely,        Pamella Horner, JONATAN CNP

## 2024-05-21 NOTE — PROGRESS NOTES
[facility] GERIATRICS    Chief Complaint   Patient presents with    RECHECK     HPI:  Linda Otero is a 64 year old  (1960), who is being seen today for an episodic care visit at: [facility]) [[id number]]. Today's concern is: The primary encounter diagnosis was Acute on chronic heart failure with preserved ejection fraction (H). Diagnoses of Chronic right-sided heart failure (H), Pulmonary hypertension (H), Acute on chronic respiratory failure with hypoxia (H), COPD with acute exacerbation (H), Community acquired pneumonia of right lung, unspecified part of lung, JODI (obstructive sleep apnea), Essential hypertension, Dyslipidemia, History of CVA (cerebrovascular accident), History of deep venous thrombosis, History of pulmonary embolism, Stage 3a chronic kidney disease (H), Acute on chronic anemia, MGUS (monoclonal gammopathy of unknown significance), B12 deficiency, History of hypothyroidism, Lower limb ulcer, ankle, right, with unspecified severity (H), Moderate malnutrition (H24), Slow transit constipation, Morbid obesity (H), Physical deconditioning, Generalized muscle weakness, COPD exacerbation (H), Severe pulmonary hypertension (H), Personal history of DVT (deep vein thrombosis), SONAL (acute kidney injury) (H24), Coronary artery disease involving native coronary artery of native heart with angina pectoris (H24), and Respiratory acidosis were also pertinent to this visit.    Met with patient who denies any chest pain, palpitations, CERRATO, lightheadedness, dizziness. She was found in her room quite shortness of breath. Noted oxygen settings to be at 12L. This was turned down to 7L for now. Oxygen saturations average around 88% which is where we want her oxygen levels to range given her COPD. I will update staff of this. She reports cough improving. Denies any abdominal discomfort. Denies N&V. Denies B&B concerns. Denies dysuria or frequency. Denies loose or  "constipation. Appetite good. Sleeping well. No pain complaints. Nursing denies any acute concerns.     BP Readings from Last 3 Encounters:   05/21/24 115/70   05/14/24 107/74   05/14/24 108/69     Wt Readings from Last 5 Encounters:   05/21/24 130.9 kg (288 lb 9.6 oz)   05/14/24 130.5 kg (287 lb 12.8 oz)   05/14/24 130.5 kg (287 lb 12.8 oz)   05/10/24 129.8 kg (286 lb 3.2 oz)   05/08/24 128.8 kg (284 lb)     Allergies, and PMH/PSH reviewed in EPIC today.  REVIEW OF SYSTEMS:  4 point ROS including Respiratory, CV, GI and , other than that noted in the HPI,  is negative    Objective:   /70   Pulse 84   Temp 97.9  F (36.6  C)   Resp 18   Ht 1.676 m (5' 6\")   Wt 130.9 kg (288 lb 9.6 oz)   SpO2 92%   BMI 46.58 kg/m    GENERAL APPEARANCE:  Alert, oriented, morbidly obese, cooperative  ENT:  Mouth and posterior oropharynx normal, moist mucous membranes, normal hearing acuity  EYES:  EOM, conjunctivae, lids, pupils and irises normal  NECK:  No adenopathy,masses or thyromegaly  RESP:  respiratory effort and palpation of chest normal, diminished breath sounds bibasilar, Right> left, dyspneic  CV:  Palpation and auscultation of heart done , regular rate and rhythm, no murmur, rub, or gallop, peripheral edema 1+ in BLE  ABDOMEN:  normal bowel sounds, soft, nontender, no hepatosplenomegaly or other masses, no guarding or rebound  M/S:   Ambulates with walker  SKIN:  Inspection of skin and subcutaneous tissue baseline, wound healing well, no signs of infection slowly improving  NEURO:   Cranial nerves 2-12 are normal tested and grossly at patient's baseline, no purposeful movement in upper and lower extremities  PSYCH:  oriented X 3, normal insight, judgement and memory, affect and mood normal    Most Recent 3 CBC's:  Recent Labs   Lab Test 05/15/24  1055 05/10/24  0905 05/07/24  1024   WBC 6.6 6.9 8.6   HGB 9.4* 8.7* 8.6*   MCV 91 90 89    184 179     Most Recent 3 BMP's:  Recent Labs   Lab Test " 05/20/24  1044 05/15/24  1055 05/10/24  0905    142 140   POTASSIUM 3.6 3.8 3.9   CHLORIDE 98 99 98   CO2 31* 33* 31*   BUN 33.3* 36.2* 41.3*   CR 1.17* 1.16* 1.22*   ANIONGAP 10 10 11   LUNA 9.4 9.6 9.2   * 106* 113*     Most Recent Anemia Panel:  Recent Labs   Lab Test 05/15/24  1055 03/29/24  1453 09/19/22  0946 04/06/18  1642 04/06/18  0916 04/06/18  0732 05/19/17  1516   WBC 6.6   < > 8.3   < >  --  8.6 9.2   HGB 9.4*   < > 11.4*   < >  --  5.8* 13.4   HCT 32.7*   < > 39.6   < >  --  24.7* 43.2   MCV 91   < > 94   < >  --  78* 92      < > 214   < >  --  272 208   IRON  --   --   --   --   --  14* 48   IRONSAT  --   --   --   --   --   --  15   FEB  --   --   --   --   --   --  330   KWESI  --   --   --   --  18  --  44   B12  --   --  511   < >  --   --  759    < > = values in this interval not displayed.       Provider reviewed records from facility, and interpreted most recent imaging/lab work, and vital signs.   Acute and chronic conditions managed by writer. Have been reviewed during today's exam.      ASSESSMENT/PLAN:     (R53.81) Physical deconditioning  (primary encounter diagnosis)  (M62.81) Generalized muscle weakness  Comment: Acute on chronic. S/T below  Plan:   -Continue Physical therapy and Occupational therapy as directed  -SW to remain involved for safe discharge planning needs     (J96.21) Acute on chronic respiratory failure with hypoxia (H)  (I50.33) Acute on chronic heart failure with preserved ejection fraction (H)  (J44.1) COPD exacerbation (H)  (G47.33) JODI (obstructive sleep apnea)  (I27.20) Severe pulmonary hypertension (H)  (I25.119) Coronary artery disease involving native coronary artery of native heart with angina pectoris (H24)  (J18.9) Community acquired pneumonia, unspecified laterality  (E87.29) Respiratory acidosis  Comment: Chronic. Recent exacerbations. Baseline 5 L NC PTA. CXR 3/29- Cardiomegaly. Ill-defined opacity at the right infrahilar lung could be acute  airspace disease. A mass is difficult to exclude. Bilateral vascular congestion may be a degree of pulmonary edema. Potential small right pleural fluid. CT C PE 3/29/2024 without any PE but did demonstrate pulmonary congestion, R pleural effusion; also enlarged main pulmonary artery, as evidence of pulmonary hypertension. TTE 4/1 showing EF 60-65% without WMA, severe RV dilation with moderately decreased RV function, mod-severe TR and pulm HTN. Was initially started on Lasix drip, then transitioned to bumex gtt, then to oral bumex 2mg daily starting on 4/19/24. Cardiology consulted, appreciate their assistance. Signed off 4/19/24. S/p left and right heart cath on 4/12/2024.  Showed normal coronary arteries.  Severely elevated right-sided pressures, severely elevated left-sided filling pressures. Mixed precapillary and postcapillary pulmonary hypertension (mPA 59 mmHg, PCWP 28 mmHg) .  Normal cardiac index. S/p RHC on 4/19:  Her filling pressures have improved significantly. Her weight is now normal and her mean RA is mildly elevated. Her pulmonary hypertension has improved but still severe.  Currently on 6 lpm of supplemental oxygen by oximyzer at rest, this may be her new baseline (PTA baseline was 5 lpm by nasal cannula). O2 with exertion at 8-10 lpm the last few days, O2 sat goal at 88-92%. Completed 5-day course of ceftriaxone+doxy on 4/2/24. Completed 8-day steroid taper on 4/7/24. Restarted PTA Breo on 4/8/24.  Wheezing significantly improved. Pulmonary consulted 4/23/24, appreciate their assistance.  VQ scan on 4/26 without evidence of acute PE or chronic thromboembolic disease. Patient to follow-up in the pulmonary hypertension clinic after discharge.   Plan:  -Continue with Bumex 2mg daily.   -Continue duoneb BID  -Continue albuterol nebulization every 6 hours PRN  -Continue robitussin PRN  -Oxygen by Nasal Cannula 6 LPM at rest and 8-10 LPM w/activity LPM to keep oxygen Sats 88 % or greater. Attempt to  wean down to 5-6L prior to discharge if able. Encourage to keep sats 86-92%. Staff updated  -Continue ICS-LABA Breo ellipta daily.    -Continue spiriva daily   -Started on Jardiance 10 mg daily on 4/13/2024 for HFpEF.  -Lymphedema wraps with lymphedema therapy on site  -2gm Na diet.  -Fluid restriction 1800cc/day.  -Follow up with pulmonology as directed. Call [phone] to schedule. Followed with EMIL Mcmahon at [facility]  -Follow up with cardiology as directed. Scheduled for 7/2/24  -CPAP per home settings  -Daily weights as directed. Hospital weight~284#. Recent weight 288#  -Recent labs stable. Trend periodically while on TCU     (Z86.718) Personal history of DVT (deep vein thrombosis)  (Z86.711) History of pulmonary embolism  Comment: Acute on chronic. *CT chest on 3/29/2024 was negative for pulmonary embolism. Per pharmacy med rec, she is no longer on anticoagulation with warfarin. Prophylactic lovenox 40 mg BID while in hospital.  Plan:   -Continue aspirin 81mg daily.   -Monitor for worsening s/symptoms of concerns  -Recent labs stable. Trend periodically while on TCU     (R04.2) Hemoptysis  Comment: Acute/mild. Reported orange sputum production at home, was producing small amounts of rober blood 4/1/24. Hospital Admission CT chest negative for PE, masses. Resumed aspirin 4/3 and reported small amount of hemoptysis 4/4, but no further hemoptysis after that. Prophylactic lovenox started on 4/8/2024.  Held for cardiac catheterization, then resumed.  Plan:   -Monitor for worsening s/symptoms of concerns.   -Recent labs stable. Trend periodically while on TCU     (N17.9) SONAL (acute kidney injury) (H24)  (N18.30) Stage 3 chronic kidney disease, unspecified whether stage 3a or 3b CKD (H)  Comment: Acute on chronic. Suspect congestive nephropathy. Admission serum creatinine was 1.33, historic baseline 0.87 to 1.08 in 2020 to 2023. Avoid non-steroidal anti-inflammatory drugs (NSAIDs). Creatinine  stabilized around 1.1.  Plan:   -Renally dose medications  -Avoid nephrotoxins  -Recent labs stable. Trend periodically while on TCU  -Monitor urinary status     (E87.6) Hypokalemia  Comment: Acute. Noted per chart review. Due to diuresis. Was on KCl 40mEq TID while on IV diuresis which has since been discontinued. Recent results stable.   Plan:   -Monitor for worsening s/symptoms of concerns  -Recent labs stable. Trend periodically while on TCU     (L97.301) Ulcer of ankle, unspecified laterality, limited to breakdown of skin (H)  Comment: Acute on chronic. WOCn reviewed in hospital. See pictures from 4/17, there was concern for developing cellulitis. Completed 7 day course of antibiotics (ceftriaxone --> keflex) on 4/26/2024.   Plan:   -Monitor for worsening s/symptoms of concerns  -Recent labs stable. Trend periodically while on TCU  -Continue wound care as directed:  -Continue Right lateral ankle wound: 3x weekly and PRN for soiled/loose dressing to assist with lymphedema therapy needs  Wrap right lower leg with Vashe-soaked gauze. Allow to soak for about two minutes.  Unwrap leg and gently wipe away any debris. Allow skin to dry.  Use a tongue depressor to apply Medihoney ([id number]) to wound.  Apply a small piece of Adaptic (oil emulsion gauze) over Medihoney.  Cover with a 4x4 gauze and secure with Kerlix gauze and Medipore tape.  Apply Spandigrip to right lower extremity.     (L30.9) Dermatitis  Comment: Acute on chronic. S/T morbid size  Plan:   -Monitor for worsening s/symptoms of concerns  -Continue current wound care as directed:  *Right breast fold rash: BID   Cleanse skin with warm water. Pat dry.  Apply nystatin powder     (D69.6) Thrombocytopenia (H24)  Comment: Acute on chronic. Platelets normal initially, trended down to 98 on 4/16/2024, then trended up and within normal limits since 4/22/2024.  Plan:   -Monitor for worsening s/symptoms of concerns  -Recent labs stable. Trend periodically while on  TCU     (E44.0) Moderate malnutrition (H24)  Comment: Acute on chronic. In context with recent hospitalization  Plan:   -Dietician to remain involved  -Monitor weight trends  -Recent labs stable. Trend periodically while on TCU     (E66.01) Morbid obesity (H)  (E78.2) Mixed hyperlipidemia  Comment: Chronic. Diet controlled. Not on statin  Plan:   -Encourage ongoing weight loss  -Monitor for worsening s/symptoms of concerns  -Monitor weight trends  -Recent labs stable. Trend periodically while on TCU     (E03.9) Hypothyroidism, unspecified type  Comment: Acute on chronic. Med rec did not show any thyroid supplement. TSH 6.28 and free T4 1.49 on 3/29/24.  Plan:   -Monitor for worsening s/symptoms of concerns  -Continue without medication at this time  -Recent labs stable. Trend periodically while on TCU  -Trend thyroid levels in 4-6 weeks.      (Z86.73) History of CVA (cerebrovascular accident)  Comment: Chronic. Per chart review. Former smoker, quit in 2012.   Plan:   -Monitor for worsening s/symptoms of concerns  -ASA daily  -Poor compliance with statin  -Follow up with cardiology as directed  -Recent labs stable. Trend periodically while on TCU     (D47.2) MGUS (monoclonal gammopathy of unknown significance)  Comment: Chronic. Noted per chart review  Plan:   -Monitor for worsening s/symptoms of concerns  -Follow up with PCP post TCU  -Recent labs stable. Trend periodically while on TCU     (E53.8) Vitamin B12 deficiency (non anemic)  Comment: Chronic. Noted per chart review  Plan:   -Monitor for worsening s/symptoms of concerns  -Continue vitamin B12 supplementation as directed of 1mL every month as directed- Last given 5/8  -Recent labs stable. Trend periodically while on TCU     (Z86.69) Hx of seizure disorder  Comment: Noted in medical record, does not appear to be on ant anti-epileptic medications  Plan:   -Monitor for seizure concerns  -Monitor for worsening s/symptoms of concerns  -Neurology PRN  -Recent labs  stable. Trend periodically while on TCU     (K59.01) Constipation  Comment: Acute on chronic. Stable  Plan:  -Monitor BM patterns  -Continue miralax daily  -Continue senna S 2 tabs BID PRN.  -Bisacodyl 10mg rectal daily PRN  -TUMS PRN  -PRN imodium for diarrhea concerns  -MOM daily PRN     Electronically signed by:  Dr. Pamella Horner DNP, APRN, FNP-C, WCS-C, EDS-C

## 2024-05-23 NOTE — PROGRESS NOTES
[facility] GERIATRICS    Chief Complaint   Patient presents with    RECHECK     HPI:  Linda Otero is a 64 year old  (1960), who is being seen today for an episodic care visit at: [facility] ([facility])([facility]) [[id number]]. Today's concern is: The primary encounter diagnosis was Acute on chronic heart failure with preserved ejection fraction (H). Diagnoses of Chronic right-sided heart failure (H), Pulmonary hypertension (H), Acute on chronic respiratory failure with hypoxia (H), COPD with acute exacerbation (H), Community acquired pneumonia of right lung, unspecified part of lung, JODI (obstructive sleep apnea), Essential hypertension, Dyslipidemia, History of CVA (cerebrovascular accident), History of deep venous thrombosis, History of pulmonary embolism, Stage 3a chronic kidney disease (H), Acute on chronic anemia, MGUS (monoclonal gammopathy of unknown significance), B12 deficiency, History of hypothyroidism, Lower limb ulcer, ankle, right, with unspecified severity (H), Moderate malnutrition (H24), Slow transit constipation, Morbid obesity (H), Physical deconditioning, Generalized muscle weakness, COPD exacerbation (H), Severe pulmonary hypertension (H), Personal history of DVT (deep vein thrombosis), SONAL (acute kidney injury) (H24), Coronary artery disease involving native coronary artery of native heart with angina pectoris (H24), and Respiratory acidosis were also pertinent to this visit.    Met with patient who denies any chest pain, palpitations, CERRATO, lightheadedness, dizziness, or cough. She continues to have quite a bit shortness of breath complaints despite oxygen use. Remains around 8L via oxymyzer. Oxygen saturations remain around 85-91% which is adequate new baseline. Denies any abdominal discomfort. Denies N&V. Denies B&B concerns. Denies dysuria or frequency. Denies loose or constipation. Appetite good. Sleeping well. No pain complaints    BP Readings from Last 3  "Encounters:   05/24/24 115/70   05/21/24 115/70   05/14/24 107/74     Wt Readings from Last 5 Encounters:   05/24/24 130.8 kg (288 lb 6.4 oz)   05/21/24 130.9 kg (288 lb 9.6 oz)   05/14/24 130.5 kg (287 lb 12.8 oz)   05/14/24 130.5 kg (287 lb 12.8 oz)   05/10/24 129.8 kg (286 lb 3.2 oz)     Allergies, and PMH/PSH reviewed in [facility] today.  REVIEW OF SYSTEMS:  4 point ROS including Respiratory, CV, GI and , other than that noted in the HPI,  is negative    Objective:   /70   Pulse 86   Temp 97.7  F (36.5  C)   Resp 18   Ht 1.676 m (5' 6\")   Wt 130.8 kg (288 lb 6.4 oz)   SpO2 95%   BMI 46.55 kg/m    GENERAL APPEARANCE:  Alert, in no distress, oriented, morbidly obese, cooperative  ENT:  Mouth and posterior oropharynx normal, moist mucous membranes, normal hearing acuity  EYES:  EOM, conjunctivae, lids, pupils and irises normal  NECK:  No adenopathy,masses or thyromegaly  RESP:  respiratory effort and palpation of chest normal, diminished breath sounds bibasilar, dyspneic  CV:  regular rate and rhythm, no murmur, rub, or gallop, peripheral edema 2+ in BLE  ABDOMEN:  normal bowel sounds, soft, nontender, no hepatosplenomegaly or other masses, no guarding or rebound  M/S:   Ambulates with walker  SKIN:  Inspection of skin and subcutaneous tissue baseline, wound healing well, no signs of infection dressing C/D/I  NEURO:   Cranial nerves 2-12 are normal tested and grossly at patient's baseline, no purposeful movement in upper and lower extremities  PSYCH:  oriented X 3, normal insight, judgement and memory, affect and mood normal    Most Recent 3 CBC's:  Recent Labs   Lab Test 05/15/24  1055 05/10/24  0905 05/07/24  1024   WBC 6.6 6.9 8.6   HGB 9.4* 8.7* 8.6*   MCV 91 90 89    184 179     Most Recent 3 BMP's:  Recent Labs   Lab Test 05/20/24  1044 05/15/24  1055 05/10/24  0905    142 140   POTASSIUM 3.6 3.8 3.9   CHLORIDE 98 99 98   CO2 31* 33* 31*   BUN 33.3* 36.2* 41.3*   CR 1.17* 1.16* 1.22* "   ANIONGAP 10 10 11   LUNA 9.4 9.6 9.2   * 106* 113*     Most Recent 2 LFT's:  Recent Labs   Lab Test 03/29/24  1453 07/10/23  0944   AST 19 19   ALT 14 15   ALKPHOS 115 84   BILITOTAL 1.4* 0.4     Most Recent Anemia Panel:  Recent Labs   Lab Test 05/15/24  1055 03/29/24  1453 09/19/22  0946 04/06/18  1642 04/06/18  0916 04/06/18  0732 05/19/17  1516   WBC 6.6   < > 8.3   < >  --  8.6 9.2   HGB 9.4*   < > 11.4*   < >  --  5.8* 13.4   HCT 32.7*   < > 39.6   < >  --  24.7* 43.2   MCV 91   < > 94   < >  --  78* 92      < > 214   < >  --  272 208   IRON  --   --   --   --   --  14* 48   IRONSAT  --   --   --   --   --   --  15   FEB  --   --   --   --   --   --  330   KWESI  --   --   --   --  18  --  44   B12  --   --  511   < >  --   --  759    < > = values in this interval not displayed.     Provider reviewed records from facility, and interpreted most recent imaging/lab work, and vital signs.   Acute and chronic conditions managed by writer. Have been reviewed during today's exam.      ASSESSMENT/PLAN:     (R53.81) Physical deconditioning  (primary encounter diagnosis)  (M62.81) Generalized muscle weakness  Comment: Acute on chronic. S/T below  Plan:   -Continue Physical therapy and Occupational therapy as directed  -SW to remain involved for safe discharge planning needs     (J96.21) Acute on chronic respiratory failure with hypoxia (H)  (I50.33) Acute on chronic heart failure with preserved ejection fraction (H)  (J44.1) COPD exacerbation (H)  (G47.33) JODI (obstructive sleep apnea)  (I27.20) Severe pulmonary hypertension (H)  (I25.119) Coronary artery disease involving native coronary artery of native heart with angina pectoris (H24)  (J18.9) Community acquired pneumonia, unspecified laterality  (E87.29) Respiratory acidosis  Comment: Chronic. Recent exacerbations. Baseline 5 L NC PTA. CXR 3/29- Cardiomegaly. Ill-defined opacity at the right infrahilar lung could be acute airspace disease. A mass is  difficult to exclude. Bilateral vascular congestion may be a degree of pulmonary edema. Potential small right pleural fluid. CT C PE 3/29/2024 without any PE but did demonstrate pulmonary congestion, R pleural effusion; also enlarged main pulmonary artery, as evidence of pulmonary hypertension. TTE 4/1 showing EF 60-65% without WMA, severe RV dilation with moderately decreased RV function, mod-severe TR and pulm HTN. Was initially started on Lasix drip, then transitioned to bumex gtt, then to oral bumex 2mg daily starting on 4/19/24. Cardiology consulted, appreciate their assistance. Signed off 4/19/24. S/p left and right heart cath on 4/12/2024.  Showed normal coronary arteries.  Severely elevated right-sided pressures, severely elevated left-sided filling pressures. Mixed precapillary and postcapillary pulmonary hypertension (mPA 59 mmHg, PCWP 28 mmHg) .  Normal cardiac index. S/p RHC on 4/19:  Her filling pressures have improved significantly. Her weight is now normal and her mean RA is mildly elevated. Her pulmonary hypertension has improved but still severe.  Currently on 6 lpm of supplemental oxygen by oximyzer at rest, this may be her new baseline (PTA baseline was 5 lpm by nasal cannula). O2 with exertion at 8-10 lpm the last few days, O2 sat goal at 88-92%. Completed 5-day course of ceftriaxone+doxy on 4/2/24. Completed 8-day steroid taper on 4/7/24. Restarted PTA Breo on 4/8/24.  Wheezing significantly improved. Pulmonary consulted 4/23/24, appreciate their assistance.  VQ scan on 4/26 without evidence of acute PE or chronic thromboembolic disease. Patient to follow-up in the pulmonary hypertension clinic after discharge.   Plan:  -Continue with Bumex 2mg daily.   -Continue duoneb BID  -Continue albuterol nebulization every 6 hours PRN  -Continue robitussin PRN  -Oxygen by Nasal Cannula 6 LPM at rest and 8-10 LPM w/activity LPM to keep oxygen Sats 88 % or greater. Attempt to wean down to 5-6L prior to  discharge if able. Encourage to keep sats 86-92%. Replace Oxymizer Pendant and tubing q 3 weeks per  instructions  -Continue ICS-LABA Breo ellipta daily.    -Continue spiriva daily   -Started on Jardiance 10 mg daily on 4/13/2024 for HFpEF.  -Lymphedema wraps with lymphedema therapy on site  -2gm Na diet.  -Fluid restriction 1800cc/day.  -Follow up with pulmonology as directed. Call [phone] to schedule. Followed with EMIL Mcmahon at [place] [facility]  -Follow up with cardiology as directed. Scheduled for 7/2/24  -CPAP per home settings  -Daily weights as directed. Hospital weight~284#. Recent weight 288#  -Recent labs stable. Trend periodically while on TCU     (Z86.718) Personal history of DVT (deep vein thrombosis)  (Z86.711) History of pulmonary embolism  Comment: Acute on chronic. *CT chest on 3/29/2024 was negative for pulmonary embolism. Per pharmacy med rec, she is no longer on anticoagulation with warfarin. Prophylactic lovenox 40 mg BID while in hospital.  Plan:   -Continue aspirin 81mg daily.   -Monitor for worsening s/symptoms of concerns  -Recent labs stable. Trend periodically while on TCU     (R04.2) Hemoptysis  Comment: Acute/mild. Reported orange sputum production at home, was producing small amounts of rober blood 4/1/24. Hospital Admission CT chest negative for PE, masses. Resumed aspirin 4/3 and reported small amount of hemoptysis 4/4, but no further hemoptysis after that. Prophylactic lovenox started on 4/8/2024.  Held for cardiac catheterization, then resumed.  Plan:   -Monitor for worsening s/symptoms of concerns.   -Recent labs stable. Trend periodically while on TCU     (N17.9) SONAL (acute kidney injury) (H24)  (N18.30) Stage 3 chronic kidney disease, unspecified whether stage 3a or 3b CKD (H)  Comment: Acute on chronic. Suspect congestive nephropathy. Admission serum creatinine was 1.33, historic baseline 0.87 to 1.08 in 2020 to 2023. Avoid non-steroidal anti-inflammatory  drugs (NSAIDs). Creatinine stabilized around 1.1.  Plan:   -Renally dose medications  -Avoid nephrotoxins  -Recent labs stable. Trend periodically while on TCU  -Monitor urinary status     (E87.6) Hypokalemia  Comment: Acute. Noted per chart review. Due to diuresis. Was on KCl 40mEq TID while on IV diuresis which has since been discontinued. Recent results stable.   Plan:   -Monitor for worsening s/symptoms of concerns  -Recent labs stable. Trend periodically while on TCU     (L97.301) Ulcer of ankle, unspecified laterality, limited to breakdown of skin (H)  Comment: Acute on chronic. WOCn reviewed in hospital. See pictures from 4/17, there was concern for developing cellulitis. Completed 7 day course of antibiotics (ceftriaxone --> keflex) on 4/26/2024.   Plan:   -Monitor for worsening s/symptoms of concerns  -Recent labs stable. Trend periodically while on TCU  -Continue wound care as directed:  -Continue Right lateral ankle wound: 3x weekly and PRN for soiled/loose dressing to assist with lymphedema therapy needs  Wrap right lower leg with Vashe-soaked gauze. Allow to soak for about two minutes.  Unwrap leg and gently wipe away any debris. Allow skin to dry.  Use a tongue depressor to apply Medihoney ([id number]) to wound.  Apply a small piece of Adaptic (oil emulsion gauze) over Medihoney.  Cover with a 4x4 gauze and secure with Kerlix gauze and Medipore tape.  Apply Spandigrip to right lower extremity.     (L30.9) Dermatitis  Comment: Acute on chronic. S/T morbid size  Plan:   -Monitor for worsening s/symptoms of concerns  -Continue current wound care as directed:  *Right breast fold rash: BID   Cleanse skin with warm water. Pat dry.  Apply nystatin powder     (D69.6) Thrombocytopenia (H24)  Comment: Acute on chronic. Platelets normal initially, trended down to 98 on 4/16/2024, then trended up and within normal limits since 4/22/2024.  Plan:   -Monitor for worsening s/symptoms of concerns  -Recent labs stable.  Trend periodically while on TCU     (E44.0) Moderate malnutrition (H24)  Comment: Acute on chronic. In context with recent hospitalization  Plan:   -Dietician to remain involved  -Monitor weight trends  -Recent labs stable. Trend periodically while on TCU     (E66.01) Morbid obesity (H)  (E78.2) Mixed hyperlipidemia  Comment: Chronic. Diet controlled. Not on statin  Plan:   -Encourage ongoing weight loss  -Monitor for worsening s/symptoms of concerns  -Monitor weight trends  -Recent labs stable. Trend periodically while on TCU     (E03.9) Hypothyroidism, unspecified type  Comment: Acute on chronic. Med rec did not show any thyroid supplement. TSH 6.28 and free T4 1.49 on 3/29/24.  Plan:   -Monitor for worsening s/symptoms of concerns  -Continue without medication at this time  -Recent labs stable. Trend periodically while on TCU  -Trend thyroid levels in 4-6 weeks.      (Z86.73) History of CVA (cerebrovascular accident)  Comment: Chronic. Per chart review. Former smoker, quit in 2012.   Plan:   -Monitor for worsening s/symptoms of concerns  -ASA daily  -Poor compliance with statin  -Follow up with cardiology as directed  -Recent labs stable. Trend periodically while on TCU     (D47.2) MGUS (monoclonal gammopathy of unknown significance)  Comment: Chronic. Noted per chart review  Plan:   -Monitor for worsening s/symptoms of concerns  -Follow up with PCP post TCU  -Recent labs stable. Trend periodically while on TCU     (E53.8) Vitamin B12 deficiency (non anemic)  Comment: Chronic. Noted per chart review  Plan:   -Monitor for worsening s/symptoms of concerns  -Continue vitamin B12 supplementation as directed of 1mL every month as directed- Last given 5/8  -Recent labs stable. Trend periodically while on TCU     (Z86.69) Hx of seizure disorder  Comment: Noted in medical record, does not appear to be on ant anti-epileptic medications  Plan:   -Monitor for seizure concerns  -Monitor for worsening s/symptoms of  concerns  -Neurology PRN  -Recent labs stable. Trend periodically while on TCU     (K59.01) Constipation  Comment: Acute on chronic. Stable  Plan:  -Monitor BM patterns  -Continue miralax daily  -Continue senna S 2 tabs BID PRN.  -Bisacodyl 10mg rectal daily PRN  -TUMS PRN  -PRN imodium for diarrhea concerns  -MOM daily PRN     Electronically signed by:  Dr. Pamella Horner DNP, APRN, FNP-C, WCS-C, EDS-C

## 2024-05-24 NOTE — LETTER
5/24/2024        RE: Linda Otero  [address]   [address]  [facility] [id number]        [facility] GERIATRICS    Chief Complaint   Patient presents with     RECHECK     HPI:  Linda Otero is a 64 year old  (1960), who is being seen today for an episodic care visit at: [facility] ([facility]) [[id number]]. Today's concern is: The primary encounter diagnosis was Acute on chronic heart failure with preserved ejection fraction (H). Diagnoses of Chronic right-sided heart failure (H), Pulmonary hypertension (H), Acute on chronic respiratory failure with hypoxia (H), COPD with acute exacerbation (H), Community acquired pneumonia of right lung, unspecified part of lung, JODI (obstructive sleep apnea), Essential hypertension, Dyslipidemia, History of CVA (cerebrovascular accident), History of deep venous thrombosis, History of pulmonary embolism, Stage 3a chronic kidney disease (H), Acute on chronic anemia, MGUS (monoclonal gammopathy of unknown significance), B12 deficiency, History of hypothyroidism, Lower limb ulcer, ankle, right, with unspecified severity (H), Moderate malnutrition (H24), Slow transit constipation, Morbid obesity (H), Physical deconditioning, Generalized muscle weakness, COPD exacerbation (H), Severe pulmonary hypertension (H), Personal history of DVT (deep vein thrombosis), SONAL (acute kidney injury) (H24), Coronary artery disease involving native coronary artery of native heart with angina pectoris (H24), and Respiratory acidosis were also pertinent to this visit.    Met with patient who denies any chest pain, palpitations, CERRATO, lightheadedness, dizziness, or cough. She continues to have quite a bit shortness of breath complaints despite oxygen use. Remains around 8L via oxymyzer. Oxygen saturations remain around 85-91% which is adequate new baseline. Denies any abdominal discomfort. Denies N&V. Denies B&B concerns. Denies dysuria or frequency. Denies loose  "or constipation. Appetite good. Sleeping well. No pain complaints    BP Readings from Last 3 Encounters:   05/24/24 115/70   05/21/24 115/70   05/14/24 107/74     Wt Readings from Last 5 Encounters:   05/24/24 130.8 kg (288 lb 6.4 oz)   05/21/24 130.9 kg (288 lb 9.6 oz)   05/14/24 130.5 kg (287 lb 12.8 oz)   05/14/24 130.5 kg (287 lb 12.8 oz)   05/10/24 129.8 kg (286 lb 3.2 oz)     Allergies, and PMH/PSH reviewed in EPIC today.  REVIEW OF SYSTEMS:  4 point ROS including Respiratory, CV, GI and , other than that noted in the HPI,  is negative    Objective:   /70   Pulse 86   Temp 97.7  F (36.5  C)   Resp 18   Ht 1.676 m (5' 6\")   Wt 130.8 kg (288 lb 6.4 oz)   SpO2 95%   BMI 46.55 kg/m    GENERAL APPEARANCE:  Alert, in no distress, oriented, morbidly obese, cooperative  ENT:  Mouth and posterior oropharynx normal, moist mucous membranes, normal hearing acuity  EYES:  EOM, conjunctivae, lids, pupils and irises normal  NECK:  No adenopathy,masses or thyromegaly  RESP:  respiratory effort and palpation of chest normal, diminished breath sounds bibasilar, dyspneic  CV:  regular rate and rhythm, no murmur, rub, or gallop, peripheral edema 2+ in BLE  ABDOMEN:  normal bowel sounds, soft, nontender, no hepatosplenomegaly or other masses, no guarding or rebound  M/S:   Ambulates with walker  SKIN:  Inspection of skin and subcutaneous tissue baseline, wound healing well, no signs of infection dressing C/D/I  NEURO:   Cranial nerves 2-12 are normal tested and grossly at patient's baseline, no purposeful movement in upper and lower extremities  PSYCH:  oriented X 3, normal insight, judgement and memory, affect and mood normal    Most Recent 3 CBC's:  Recent Labs   Lab Test 05/15/24  1055 05/10/24  0905 05/07/24  1024   WBC 6.6 6.9 8.6   HGB 9.4* 8.7* 8.6*   MCV 91 90 89    184 179     Most Recent 3 BMP's:  Recent Labs   Lab Test 05/20/24  1044 05/15/24  1055 05/10/24  0905    142 140   POTASSIUM 3.6 " 3.8 3.9   CHLORIDE 98 99 98   CO2 31* 33* 31*   BUN 33.3* 36.2* 41.3*   CR 1.17* 1.16* 1.22*   ANIONGAP 10 10 11   LUNA 9.4 9.6 9.2   * 106* 113*     Most Recent 2 LFT's:  Recent Labs   Lab Test 03/29/24  1453 07/10/23  0944   AST 19 19   ALT 14 15   ALKPHOS 115 84   BILITOTAL 1.4* 0.4     Most Recent Anemia Panel:  Recent Labs   Lab Test 05/15/24  1055 03/29/24  1453 09/19/22  0946 04/06/18  1642 04/06/18  0916 04/06/18  0732 05/19/17  1516   WBC 6.6   < > 8.3   < >  --  8.6 9.2   HGB 9.4*   < > 11.4*   < >  --  5.8* 13.4   HCT 32.7*   < > 39.6   < >  --  24.7* 43.2   MCV 91   < > 94   < >  --  78* 92      < > 214   < >  --  272 208   IRON  --   --   --   --   --  14* 48   IRONSAT  --   --   --   --   --   --  15   FEB  --   --   --   --   --   --  330   KWESI  --   --   --   --  18  --  44   B12  --   --  511   < >  --   --  759    < > = values in this interval not displayed.     Provider reviewed records from facility, and interpreted most recent imaging/lab work, and vital signs.   Acute and chronic conditions managed by writer. Have been reviewed during today's exam.      ASSESSMENT/PLAN:     (R53.81) Physical deconditioning  (primary encounter diagnosis)  (M62.81) Generalized muscle weakness  Comment: Acute on chronic. S/T below  Plan:   -Continue Physical therapy and Occupational therapy as directed  -SW to remain involved for safe discharge planning needs     (J96.21) Acute on chronic respiratory failure with hypoxia (H)  (I50.33) Acute on chronic heart failure with preserved ejection fraction (H)  (J44.1) COPD exacerbation (H)  (G47.33) JODI (obstructive sleep apnea)  (I27.20) Severe pulmonary hypertension (H)  (I25.119) Coronary artery disease involving native coronary artery of native heart with angina pectoris (H24)  (J18.9) Community acquired pneumonia, unspecified laterality  (E87.29) Respiratory acidosis  Comment: Chronic. Recent exacerbations. Baseline 5 L NC PTA. CXR 3/29- Cardiomegaly.  Ill-defined opacity at the right infrahilar lung could be acute airspace disease. A mass is difficult to exclude. Bilateral vascular congestion may be a degree of pulmonary edema. Potential small right pleural fluid. CT C PE 3/29/2024 without any PE but did demonstrate pulmonary congestion, R pleural effusion; also enlarged main pulmonary artery, as evidence of pulmonary hypertension. TTE 4/1 showing EF 60-65% without WMA, severe RV dilation with moderately decreased RV function, mod-severe TR and pulm HTN. Was initially started on Lasix drip, then transitioned to bumex gtt, then to oral bumex 2mg daily starting on 4/19/24. Cardiology consulted, appreciate their assistance. Signed off 4/19/24. S/p left and right heart cath on 4/12/2024.  Showed normal coronary arteries.  Severely elevated right-sided pressures, severely elevated left-sided filling pressures. Mixed precapillary and postcapillary pulmonary hypertension (mPA 59 mmHg, PCWP 28 mmHg) .  Normal cardiac index. S/p RHC on 4/19:  Her filling pressures have improved significantly. Her weight is now normal and her mean RA is mildly elevated. Her pulmonary hypertension has improved but still severe.  Currently on 6 lpm of supplemental oxygen by oximyzer at rest, this may be her new baseline (PTA baseline was 5 lpm by nasal cannula). O2 with exertion at 8-10 lpm the last few days, O2 sat goal at 88-92%. Completed 5-day course of ceftriaxone+doxy on 4/2/24. Completed 8-day steroid taper on 4/7/24. Restarted PTA Breo on 4/8/24.  Wheezing significantly improved. Pulmonary consulted 4/23/24, appreciate their assistance.  VQ scan on 4/26 without evidence of acute PE or chronic thromboembolic disease. Patient to follow-up in the pulmonary hypertension clinic after discharge.   Plan:  -Continue with Bumex 2mg daily.   -Continue duoneb BID  -Continue albuterol nebulization every 6 hours PRN  -Continue robitussin PRN  -Oxygen by Nasal Cannula 6 LPM at rest and 8-10 LPM  w/activity LPM to keep oxygen Sats 88 % or greater. Attempt to wean down to 5-6L prior to discharge if able. Encourage to keep sats 86-92%. Replace Oxymizer Pendant and tubing q 3 weeks per  instructions  -Continue ICS-LABA Breo ellipta daily.    -Continue spiriva daily   -Started on Jardiance 10 mg daily on 4/13/2024 for HFpEF.  -Lymphedema wraps with lymphedema therapy on site  -2gm Na diet.  -Fluid restriction 1800cc/day.  -Follow up with pulmonology as directed. Call [phone] to schedule. Followed with EMIL Mcmahon at [facility]  -Follow up with cardiology as directed. Scheduled for 7/2/24  -CPAP per home settings  -Daily weights as directed. Hospital weight~284#. Recent weight 288#  -Recent labs stable. Trend periodically while on TCU     (Z86.718) Personal history of DVT (deep vein thrombosis)  (Z86.711) History of pulmonary embolism  Comment: Acute on chronic. *CT chest on 3/29/2024 was negative for pulmonary embolism. Per pharmacy med rec, she is no longer on anticoagulation with warfarin. Prophylactic lovenox 40 mg BID while in hospital.  Plan:   -Continue aspirin 81mg daily.   -Monitor for worsening s/symptoms of concerns  -Recent labs stable. Trend periodically while on TCU     (R04.2) Hemoptysis  Comment: Acute/mild. Reported orange sputum production at home, was producing small amounts of rober blood 4/1/24. Hospital Admission CT chest negative for PE, masses. Resumed aspirin 4/3 and reported small amount of hemoptysis 4/4, but no further hemoptysis after that. Prophylactic lovenox started on 4/8/2024.  Held for cardiac catheterization, then resumed.  Plan:   -Monitor for worsening s/symptoms of concerns.   -Recent labs stable. Trend periodically while on TCU     (N17.9) SONAL (acute kidney injury) (H24)  (N18.30) Stage 3 chronic kidney disease, unspecified whether stage 3a or 3b CKD (H)  Comment: Acute on chronic. Suspect congestive nephropathy. Admission serum creatinine was  1.33, historic baseline 0.87 to 1.08 in 2020 to 2023. Avoid non-steroidal anti-inflammatory drugs (NSAIDs). Creatinine stabilized around 1.1.  Plan:   -Renally dose medications  -Avoid nephrotoxins  -Recent labs stable. Trend periodically while on TCU  -Monitor urinary status     (E87.6) Hypokalemia  Comment: Acute. Noted per chart review. Due to diuresis. Was on KCl 40mEq TID while on IV diuresis which has since been discontinued. Recent results stable.   Plan:   -Monitor for worsening s/symptoms of concerns  -Recent labs stable. Trend periodically while on TCU     (L97.301) Ulcer of ankle, unspecified laterality, limited to breakdown of skin (H)  Comment: Acute on chronic. WOCn reviewed in hospital. See pictures from 4/17, there was concern for developing cellulitis. Completed 7 day course of antibiotics (ceftriaxone --> keflex) on 4/26/2024.   Plan:   -Monitor for worsening s/symptoms of concerns  -Recent labs stable. Trend periodically while on TCU  -Continue wound care as directed:  -Continue Right lateral ankle wound: 3x weekly and PRN for soiled/loose dressing to assist with lymphedema therapy needs  Wrap right lower leg with Vashe-soaked gauze. Allow to soak for about two minutes.  Unwrap leg and gently wipe away any debris. Allow skin to dry.  Use a tongue depressor to apply Medihoney ([id number]) to wound.  Apply a small piece of Adaptic (oil emulsion gauze) over Medihoney.  Cover with a 4x4 gauze and secure with Kerlix gauze and Medipore tape.  Apply Spandigrip to right lower extremity.     (L30.9) Dermatitis  Comment: Acute on chronic. S/T morbid size  Plan:   -Monitor for worsening s/symptoms of concerns  -Continue current wound care as directed:  *Right breast fold rash: BID   Cleanse skin with warm water. Pat dry.  Apply nystatin powder     (D69.6) Thrombocytopenia (H24)  Comment: Acute on chronic. Platelets normal initially, trended down to 98 on 4/16/2024, then trended up and within normal limits since  4/22/2024.  Plan:   -Monitor for worsening s/symptoms of concerns  -Recent labs stable. Trend periodically while on TCU     (E44.0) Moderate malnutrition (H24)  Comment: Acute on chronic. In context with recent hospitalization  Plan:   -Dietician to remain involved  -Monitor weight trends  -Recent labs stable. Trend periodically while on TCU     (E66.01) Morbid obesity (H)  (E78.2) Mixed hyperlipidemia  Comment: Chronic. Diet controlled. Not on statin  Plan:   -Encourage ongoing weight loss  -Monitor for worsening s/symptoms of concerns  -Monitor weight trends  -Recent labs stable. Trend periodically while on TCU     (E03.9) Hypothyroidism, unspecified type  Comment: Acute on chronic. Med rec did not show any thyroid supplement. TSH 6.28 and free T4 1.49 on 3/29/24.  Plan:   -Monitor for worsening s/symptoms of concerns  -Continue without medication at this time  -Recent labs stable. Trend periodically while on TCU  -Trend thyroid levels in 4-6 weeks.      (Z86.73) History of CVA (cerebrovascular accident)  Comment: Chronic. Per chart review. Former smoker, quit in 2012.   Plan:   -Monitor for worsening s/symptoms of concerns  -ASA daily  -Poor compliance with statin  -Follow up with cardiology as directed  -Recent labs stable. Trend periodically while on TCU     (D47.2) MGUS (monoclonal gammopathy of unknown significance)  Comment: Chronic. Noted per chart review  Plan:   -Monitor for worsening s/symptoms of concerns  -Follow up with PCP post TCU  -Recent labs stable. Trend periodically while on TCU     (E53.8) Vitamin B12 deficiency (non anemic)  Comment: Chronic. Noted per chart review  Plan:   -Monitor for worsening s/symptoms of concerns  -Continue vitamin B12 supplementation as directed of 1mL every month as directed- Last given 5/8  -Recent labs stable. Trend periodically while on TCU     (Z86.69) Hx of seizure disorder  Comment: Noted in medical record, does not appear to be on ant anti-epileptic  medications  Plan:   -Monitor for seizure concerns  -Monitor for worsening s/symptoms of concerns  -Neurology PRN  -Recent labs stable. Trend periodically while on TCU     (K59.01) Constipation  Comment: Acute on chronic. Stable  Plan:  -Monitor BM patterns  -Continue miralax daily  -Continue senna S 2 tabs BID PRN.  -Bisacodyl 10mg rectal daily PRN  -TUMS PRN  -PRN imodium for diarrhea concerns  -MOM daily PRN     Electronically signed by:  Dr. Pamella Horner DNP, APRN, FNP-C, WCS-C, EDS-C        Sincerely,        Pamella Horner, APRN CNP

## 2024-05-30 NOTE — PROGRESS NOTES
[facility] GERIATRICS DISCHARGE SUMMARY  PATIENT'S NAME: Linda Otero  YOB: 1960  MEDICAL RECORD NUMBER:  [id number]  Place of Service where encounter took place:  [facility] (TCU)(SNF) [[id number]]    PRIMARY CARE PROVIDER AND CLINIC RESPONSIBLE AFTER TRANSFER:   MALLORY Escamilla PHYSICIAN [facility] [address] [facility] [address]   Non-FMG Provider     Transferring providers: JONATAN Boykin CNP, Chang Pineda MD  Recent Hospitalization/ED:  [facility] stay 3/29/24 to 5/7/24.  Date of SNF Admission:  5/7/24  Date of SNF (anticipated) Discharge:  6/5/24  Discharged to: previous assisted living  Cognitive Scores: BIMS: 15/15  Physical Function: Ambulating 100 ft with walker  DME: SNF  coordinating DME needs     CODE STATUS/ADVANCE DIRECTIVES DISCUSSION:  Full Code   ALLERGIES: Eliquis [apixaban], Penicillins, Erythromycin, Peanut oil, Clindamycin, and Tetracycline    NURSING FACILITY COURSE   Medication Changes/Rationale:   See below    Summary of nursing facility stay:     (R53.81) Physical deconditioning  (primary encounter diagnosis)  (M62.81) Generalized muscle weakness  Comment: Acute on chronic. S/T below  Plan:   -Continue Physical therapy and Occupational therapy as directed  -SW to remain involved for safe discharge planning needs  -Discharging home next week with services in place     (J96.21) Acute on chronic respiratory failure with hypoxia (H)  (I50.33) Acute on chronic heart failure with preserved ejection fraction (H)  (J44.1) COPD exacerbation (H)  (G47.33) JODI (obstructive sleep apnea)  (I27.20) Severe pulmonary hypertension (H)  (I25.119) Coronary artery disease involving native coronary artery of native heart with angina pectoris (H24)  (J18.9) Community acquired pneumonia, unspecified laterality  (E87.29) Respiratory acidosis  Comment: Chronic. Recent exacerbations. Baseline 5 L  NC PTA. CXR 3/29- Cardiomegaly. Ill-defined opacity at the right infrahilar lung could be acute airspace disease. A mass is difficult to exclude. Bilateral vascular congestion may be a degree of pulmonary edema. Potential small right pleural fluid. CT C PE 3/29/2024 without any PE but did demonstrate pulmonary congestion, R pleural effusion; also enlarged main pulmonary artery, as evidence of pulmonary hypertension. TTE 4/1 showing EF 60-65% without WMA, severe RV dilation with moderately decreased RV function, mod-severe TR and pulm HTN. Was initially started on Lasix drip, then transitioned to bumex gtt, then to oral bumex 2mg daily starting on 4/19/24. Cardiology consulted, appreciate their assistance. Signed off 4/19/24. S/p left and right heart cath on 4/12/2024.  Showed normal coronary arteries.  Severely elevated right-sided pressures, severely elevated left-sided filling pressures. Mixed precapillary and postcapillary pulmonary hypertension (mPA 59 mmHg, PCWP 28 mmHg) .  Normal cardiac index. S/p RHC on 4/19:  Her filling pressures have improved significantly. Her weight is now normal and her mean RA is mildly elevated. Her pulmonary hypertension has improved but still severe.  Currently on 6 lpm of supplemental oxygen by oximyzer at rest, this may be her new baseline (PTA baseline was 5 lpm by nasal cannula). O2 with exertion at 8-10 lpm the last few days, O2 sat goal at 88-92%. Completed 5-day course of ceftriaxone+doxy on 4/2/24. Completed 8-day steroid taper on 4/7/24. Restarted PTA Breo on 4/8/24.  Wheezing significantly improved. Pulmonary consulted 4/23/24, appreciate their assistance.  VQ scan on 4/26 without evidence of acute PE or chronic thromboembolic disease. Patient to follow-up in the pulmonary hypertension clinic after discharge.   Plan:  -Continue with Bumex 2mg daily.   -Continue duoneb BID  -Continue albuterol nebulization every 6 hours PRN  -Continue robitussin PRN  -Oxygen by Nasal  Cannula 6 LPM at rest and 6-8LPM w/activity LPM to keep oxygen Sats 88 % or greater. Attempt to wean down to 5-6L prior to discharge if able. Encourage to keep sats 86-92%. Replace Oxymizer Pendant and tubing q 3 weeks per  instructions  -Continue ICS-LABA Breo ellipta daily.    -Continue spiriva daily   -Started on Jardiance 10 mg daily on 4/13/2024 for HFpEF.  -Lymphedema wraps with lymphedema therapy on site  -2gm Na diet.  -Fluid restriction 1800cc/day.  -Follow up with pulmonology as directed. Call [phone] to schedule. Followed with EMIL Mcmahon at [facility]  -Follow up with cardiology as directed. Scheduled for 7/2/24  -CPAP per home settings  -Daily weights as directed. Hospital weight~284#. Recent weight 288#  -Recent labs stable. Trend labs with PCP post TCU     (Z86.718) Personal history of DVT (deep vein thrombosis)  (Z86.711) History of pulmonary embolism  Comment: Acute on chronic. *CT chest on 3/29/2024 was negative for pulmonary embolism. Per pharmacy med rec, she is no longer on anticoagulation with warfarin. Prophylactic lovenox 40 mg BID while in hospital.  Plan:   -Continue aspirin 81mg daily.   -Monitor for worsening s/symptoms of concerns  -Recent labs stable. Trend labs with PCP post TCU     (R04.2) Hemoptysis  Comment: Acute/mild. Reported orange sputum production at home, was producing small amounts of rober blood 4/1/24. Hospital Admission CT chest negative for PE, masses. Resumed aspirin 4/3 and reported small amount of hemoptysis 4/4, but no further hemoptysis after that. Prophylactic lovenox started on 4/8/2024.  Held for cardiac catheterization, then resumed.  Plan:   -Monitor for worsening s/symptoms of concerns.   -Recent labs stable. Trend labs with PCP post TCU     (N17.9) SONAL (acute kidney injury) (H24)  (N18.30) Stage 3 chronic kidney disease, unspecified whether stage 3a or 3b CKD (H)  Comment: Acute on chronic. Suspect congestive nephropathy.  Admission serum creatinine was 1.33, historic baseline 0.87 to 1.08 in 2020 to 2023. Avoid non-steroidal anti-inflammatory drugs (NSAIDs). Creatinine stabilized around 1.1.  Plan:   -Renally dose medications  -Avoid nephrotoxins  -Recent labs stable. Trend labs with PCP post TCU  -Monitor urinary status     (E87.6) Hypokalemia  Comment: Acute. Noted per chart review. Due to diuresis. Was on KCl 40mEq TID while on IV diuresis which has since been discontinued. Recent results stable.   Plan:   -Monitor for worsening s/symptoms of concerns  -Recent labs stable. Trend labs with PCP post TCU     (L97.301) Ulcer of ankle, unspecified laterality, limited to breakdown of skin (H)  Comment: Acute on chronic. WOCn reviewed in hospital. See pictures from 4/17, there was concern for developing cellulitis. Completed 7 day course of antibiotics (ceftriaxone --> keflex) on 4/26/2024.   Plan:   -Monitor for worsening s/symptoms of concerns  -Recent labs stable. Trend labs with PCP post TCU  -Continue wound care as directed:  -Continue Right lateral ankle wound: 3x weekly and PRN for soiled/loose dressing to assist with lymphedema therapy needs  Wrap right lower leg with Vashe-soaked gauze. Allow to soak for about two minutes.  Unwrap leg and gently wipe away any debris. Allow skin to dry.  Use a tongue depressor to apply Medihoney ([id number]) to wound.  Apply a small piece of Adaptic (oil emulsion gauze) over Medihoney.  Cover with a 4x4 gauze and secure with Kerlix gauze and Medipore tape.  Apply Spandigrip to right lower extremity.     (L30.9) Dermatitis  Comment: Acute on chronic. S/T morbid size  Plan:   -Monitor for worsening s/symptoms of concerns  -Recent labs stable. Trend labs with PCP post TCU  -Continue current wound care as directed:  *Right breast fold rash: BID   Cleanse skin with warm water. Pat dry.  Apply nystatin powder     (D69.6) Thrombocytopenia (H24)  Comment: Acute on chronic. Platelets normal initially, trended  down to 98 on 4/16/2024, then trended up and within normal limits since 4/22/2024.  Plan:   -Monitor for worsening s/symptoms of concerns  -Recent labs stable. Trend labs with PCP post TCU     (E44.0) Moderate malnutrition (H24)  Comment: Acute on chronic. In context with recent hospitalization  Plan:   -Dietician to remain involved  -Monitor weight trends  -Recent labs stable. Trend labs with PCP post TCU     (E66.01) Morbid obesity (H)  (E78.2) Mixed hyperlipidemia  Comment: Chronic. Diet controlled. Not on statin  Plan:   -Encourage ongoing weight loss  -Monitor for worsening s/symptoms of concerns  -Monitor weight trends  -Recent labs stable. Trend labs with PCP post TCU     (E03.9) Hypothyroidism, unspecified type  Comment: Acute on chronic. Med rec did not show any thyroid supplement. TSH 6.28 and free T4 1.49 on 3/29/24.  Plan:   -Monitor for worsening s/symptoms of concerns  -Continue without medication at this time  -Recent labs stable. Trend labs with PCP post TCU  -Trend thyroid levels in 4-6 weeks.      (Z86.73) History of CVA (cerebrovascular accident)  Comment: Chronic. Per chart review. Former smoker, quit in 2012.   Plan:   -Monitor for worsening s/symptoms of concerns  -ASA daily  -Poor compliance with statin  -Follow up with cardiology as directed  -Recent labs stable. Trend labs with PCP post TCU     (D47.2) MGUS (monoclonal gammopathy of unknown significance)  Comment: Chronic. Noted per chart review  Plan:   -Monitor for worsening s/symptoms of concerns  -Follow up with PCP post TCU  -Recent labs stable. Trend labs with PCP post TCU     (E53.8) Vitamin B12 deficiency (non anemic)  Comment: Chronic. Noted per chart review  Plan:   -Monitor for worsening s/symptoms of concerns  -Continue vitamin B12 supplementation as directed of 1mL every month as directed- Last given 5/8  -Recent labs stable. Trend labs with PCP post TCU     (Z86.69) Hx of seizure disorder  Comment: Noted in medical record, does  not appear to be on ant anti-epileptic medications  Plan:   -Monitor for seizure concerns  -Monitor for worsening s/symptoms of concerns  -Neurology PRN  -Recent labs stable. Trend labs with PCP post TCU     (K59.01) Constipation  Comment: Acute on chronic. Stable  Plan:  -Monitor BM patterns  -Continue miralax daily  -Continue senna S 2 tabs BID PRN.  -Bisacodyl 10mg rectal daily PRN  -TUMS PRN  -PRN imodium for diarrhea concerns  -MOM daily PRN     Discharge Medications:  MED REC REQUIRED  Post Medication Reconciliation Status:  Discharge medications reconciled and changed, see notes/orders     Current Outpatient Medications   Medication Sig Dispense Refill    acetaminophen (TYLENOL) 325 MG tablet Take 650 mg by mouth every 4 hours as needed for pain or fever      albuterol (PROVENTIL) (2.5 MG/3ML) 0.083% neb solution Take 1 vial (2.5 mg) by nebulization every 6 hours as needed for shortness of breath or wheezing OFFICE VISIT NEEDED FOR ANY FURTHER REFILLS 90 mL 11    aspirin (ASA) 81 MG chewable tablet Take 81 mg by mouth daily      bumetanide (BUMEX) 2 MG tablet Take 1 tablet (2 mg) by mouth daily HOLD if SBP<100      calcium carbonate (TUMS) 500 MG chewable tablet Take 1 chew tab by mouth 4 times daily as needed for heartburn      cyanocobalamin (CYANOCOBALAMIN) 1000 MCG/ML injection Inject 1 mL into the muscle every 30 days      empagliflozin (JARDIANCE) 10 MG TABS tablet Take 1 tablet (10 mg) by mouth daily 90 tablet 1    fluticasone-vilanterol (BREO ELLIPTA) 200-25 MCG/ACT inhaler Inhale 1 puff into the lungs daily 30 each 11    ipratropium - albuterol 0.5 mg/2.5 mg/3 mL (DUONEB) 0.5-2.5 (3) MG/3ML neb solution Take 1 vial (3 mLs) by nebulization 2 times daily      loperamide (IMODIUM A-D) 2 MG tablet Take 2 mg by mouth 4 times daily as needed for diarrhea      menthol-zinc oxide (CALMOSEPTINE) 0.44-20.6 % OINT ointment Apply topically daily as needed for skin protection Apply to affected area topically as  needed for affected  area daily      nystatin (MYCOSTATIN) 845794 UNIT/GM external powder Apply to breast BID and BID  g 1    polyethylene glycol (MIRALAX) 17 GM/Dose powder Take 17 g by mouth daily      senna-docusate (SENOKOT-S/PERICOLACE) 8.6-50 MG tablet Take 2 tablets by mouth 2 times daily as needed for constipation      tiotropium (SPIRIVA RESPIMAT) 2.5 MCG/ACT inhaler INHALE TWO PUFFS BY MOUTH EVERY DAY 12 g 3    guaiFENesin (ROBITUSSIN) 20 mg/mL liquid Take 200 mg by mouth every 4 hours as needed for cough (Patient not taking: Reported on 5/31/2024)      multivitamin, therapeutic (THERA-VIT) TABS tablet Take 1 tablet by mouth at bedtime (Patient not taking: Reported on 5/31/2024)        Controlled medications:   not applicable/none     Past Medical History:   Past Medical History:   Diagnosis Date    Abnormality of gait due to impairment of balance     Acute and chronic respiratory failure with hypercapnia (H)     Acute deep venous thrombosis (H) 10/20/2015    Anemia     Iron deficiency    Aneurysm (H24) 2012    Arthritis     toes, thumb, elbow    B12 deficiency     Cancer (H) 1985    cervical    Cellulitis right foot    Chronic diastolic heart failure (H)     Chronic kidney disease     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  left thalamic stroke as well as patchy MCA branch infarcts    CVA (cerebral vascular accident) (H) 12/23/2012    Left thalamic stroke, MCA branch infarcts    Dermatitis     DVT (deep venous thrombosis) (H)     Right leg    Encephalopathy     after stroke, believed related to hypoxia    History of transfusion     Hyperlipidemia     Hypertension     Hypothyroidism     Lymphedema     MGUS (monoclonal gammopathy of unknown significance)      "Neuropathy of right lower extremity     Obesity     On home oxygen therapy     PFO (patent foramen ovale)     closed after stroke, see cardiology notes care everywhere    PFO (patent foramen ovale)     Pneumonia     Primary hypercoagulable state (H24)     Pulmonary emboli (H) 2013    Pulmonary HTN (H)     Respiratory failure (H)     after stroke    Seizure (H)     Seizures (H)     at age 31    Skin cancer 2014    Sleep apnea     CPAP    Stroke (H) 2012    Umbilical hernia     Wound of right ankle      Physical Exam:   Vitals: /72   Pulse 92   Temp 97.3  F (36.3  C)   Resp 18   Ht 1.676 m (5' 6\")   Wt 130.7 kg (288 lb 3.2 oz)   SpO2 93%   BMI 46.52 kg/m    BMI: Body mass index is 46.52 kg/m .  GENERAL APPEARANCE:  Alert, in no distress, oriented, morbidly obese, cooperative  ENT:  Mouth and posterior oropharynx normal, moist mucous membranes, normal hearing acuity  EYES:  EOM, conjunctivae, lids, pupils and irises normal  NECK:  No adenopathy,masses or thyromegaly  RESP:  respiratory effort and palpation of chest normal, diminished breath sounds bibasilar, dyspneic  CV:  regular rate and rhythm, no murmur, rub, or gallop, peripheral edema 1+ in BLE  ABDOMEN:  normal bowel sounds, soft, nontender, no hepatosplenomegaly or other masses, no guarding or rebound  M/S:   Ambulates with walker  SKIN:  Inspection of skin and subcutaneous tissue baseline, Palpation of skin and subcutaneous tissue baseline, wound healing well, no signs of infection see photo  NEURO:   Cranial nerves 2-12 are normal tested and grossly at patient's baseline, no purposeful movement in upper and lower extremities  PSYCH:  oriented X 3, normal insight, judgement and memory, affect and mood normal             SNF labs: Most Recent 3 CBC's:  Recent Labs   Lab Test 05/15/24  1055 05/10/24  0905 05/07/24  1024   WBC 6.6 6.9 8.6   HGB 9.4* 8.7* 8.6*   MCV 91 90 89    184 179     Most Recent 3 BMP's:  Recent Labs   Lab Test " 05/20/24  1044 05/15/24  1055 05/10/24  0905    142 140   POTASSIUM 3.6 3.8 3.9   CHLORIDE 98 99 98   CO2 31* 33* 31*   BUN 33.3* 36.2* 41.3*   CR 1.17* 1.16* 1.22*   ANIONGAP 10 10 11   LUNA 9.4 9.6 9.2   * 106* 113*     Most Recent TSH and T4:  Recent Labs   Lab Test 03/29/24  1453   TSH 6.28*   T4 1.49     Most Recent Hemoglobin A1c:  Recent Labs   Lab Test 09/19/22  0946   A1C 5.5     Most Recent Anemia Panel:  Recent Labs   Lab Test 05/15/24  1055 03/29/24  1453 09/19/22  0946 04/06/18  1642 04/06/18  0916 04/06/18  0732 05/19/17  1516   WBC 6.6   < > 8.3   < >  --  8.6 9.2   HGB 9.4*   < > 11.4*   < >  --  5.8* 13.4   HCT 32.7*   < > 39.6   < >  --  24.7* 43.2   MCV 91   < > 94   < >  --  78* 92      < > 214   < >  --  272 208   IRON  --   --   --   --   --  14* 48   IRONSAT  --   --   --   --   --   --  15   FEB  --   --   --   --   --   --  330   KWESI  --   --   --   --  18  --  44   B12  --   --  511   < >  --   --  759    < > = values in this interval not displayed.       DISCHARGE PLAN:  Follow up labs: No labs orders/due  Medical Follow Up:      Follow up with primary care provider in 1-2 weeks  Follow up with specialist as directed below   [facility] scheduled appointments:  Appointments in Next Year      Jul 02, 2024 10:20 AM  (Arrive by 10:05 AM)  NEW HEART FAILURE with Brett Montalvo MD  [facility] ([facility] ) [phone]           Discharge Services: Home Care:  Occupational Therapy, Physical Therapy, Registered Nurse, and Home Health Aide  Discharge Instructions Verbalized to Patient at Discharge:   Weight bearing restrictions:  Weight bearing as tolerated.   Continue to follow your diet:  regular.   -Oxygen by Nasal Cannula 6 LPM at rest and 6-8LPM w/activity LPM to keep oxygen Sats 88 % or greater. Attempt to wean down to 5-6L prior to discharge if able. Encourage to keep sats 86-92%. Replace Oxymizer  Pendant and tubing q 3 weeks per  instructions  -Lymphedema wraps with lymphedema therapy on site  -2gm Na diet.  -Fluid restriction 1800cc/day.  -Follow up with pulmonology as directed. Call [phone] to schedule. Followed with EMIL Mcmahon at [facility]  -Follow up with cardiology as directed. Scheduled for 7/2/24  -CPAP per home settings  -Daily weights as directed. Hospital weight~284#. Recent weight 288#  -Continue wound care as directed:  -Continue Right lateral ankle wound: 3x weekly and PRN for soiled/loose dressing to assist with lymphedema therapy needs  Wrap right lower leg with Vashe-soaked gauze. Allow to soak for about two minutes.  Unwrap leg and gently wipe away any debris. Allow skin to dry.  Use a tongue depressor to apply Medihoney ([id number]) to wound.  Apply a small piece of Adaptic (oil emulsion gauze) over Medihoney.  Cover with a 4x4 gauze and secure with Kerlix gauze and Medipore tape.    TOTAL DISCHARGE TIME:   40 minutes  Electronically signed by:  Dr. Pamella Horner DNP, APRN, FNP-C, WCS-C, EDS-C    Documentation of Face to Face and Certification for Home Health Services    I certify that patient: Linda Otero is under my care and that I, or a nurse practitioner or physician's assistant working with me, had a face-to-face encounter that meets the physician face-to-face encounter requirements with this patient on: 5/31/2024.    This encounter with the patient was in whole, or in part, for the following medical condition, which is the primary reason for home health care: The primary encounter diagnosis was Acute on chronic heart failure with preserved ejection fraction (H). Diagnoses of Chronic right-sided heart failure (H), Pulmonary hypertension (H), Acute on chronic respiratory failure with hypoxia (H), COPD with acute exacerbation (H), Community acquired pneumonia of right lung, unspecified part of lung, JODI (obstructive sleep apnea), Essential hypertension,  Dyslipidemia, History of CVA (cerebrovascular accident), History of deep venous thrombosis, History of pulmonary embolism, Stage 3a chronic kidney disease (H), Acute on chronic anemia, MGUS (monoclonal gammopathy of unknown significance), B12 deficiency, History of hypothyroidism, Lower limb ulcer, ankle, right, with unspecified severity (H), Moderate malnutrition (H24), Slow transit constipation, Morbid obesity (H), Physical deconditioning, Generalized muscle weakness, COPD exacerbation (H), Severe pulmonary hypertension (H), Personal history of DVT (deep vein thrombosis), SONAL (acute kidney injury) (H24), Coronary artery disease involving native coronary artery of native heart with angina pectoris (H24), and Respiratory acidosis were also pertinent to this visit..    I certify that, based on my findings, the following services are medically necessary home health services: Nursing, Occupational Therapy, and Physical Therapy.    My clinical findings support the need for the above services because: Nurse is needed: To assess medication management, education and wound care after changes in medications or other medical regimen.., Occupational Therapy Services are needed to assess and treat cognitive ability and address ADL safety due to impairment in deconditioning and lymphedema therapy., and Physical Therapy Services are needed to assess and treat the following functional impairments: deconditioning .    Further, I certify that my clinical findings support that this patient is homebound (i.e. absences from home require considerable and taxing effort and are for medical reasons or Spiritism services or infrequently or of short duration when for other reasons) because: Requires assistance of another person or specialized equipment to access medical services because patient: Is unable to walk greater than 100 feet without rest. and Requires supervision of another for safe transfer...    Based on the above findings. I  certify that this patient is confined to the home and needs intermittent skilled nursing care, physical therapy and/or speech therapy.  The patient is under my care, and I have initiated the establishment of the plan of care.  This patient will be followed by a physician who will periodically review the plan of care.  Physician/Provider to provide follow up care: Jayy Henson    Attending [facility] physician (the Medicare certified PECOS provider): Dr.Mohammad Edwin MD, signing F2F and only signing for initial order. Please send all follow up questions and concerns or needed follow up signatures to the PCP, who [facility] has on file as:  Jayy Henson.    Physician Signature: See electronic signature associated with these discharge orders.  Date: 2024        Face to Face and Medical Necessity Statement for DME Provider visit    Demographic Information on Linda Otero:  Gender: female  : 1960  [address]     [place] [address]  [phone] (home)     Medical Record: [id number]  Social Security Number: [id number]  Primary Care Provider: Jayy Henson  Insurance: Payor: MEDICARE / Plan: MEDICARE / Product Type: Medicare /     HPI:   Linda Otero is a 64 year old  (1960), who is being seen today for a face to face provider visit at [facility]; medical necessity statement for DME included. This patient requires the following:  DME Ordered and Medical Necessity Statement   Oxygen: 6-8 L/min via oxymizer neither continuous nor needs portable tank due to mobility in home environment ; Clinical Documentation: (Obtain documented RA sat with in 2 days of discharge in acute setting or within 30 days of provider visit):  Method 1: Room air at rest: Qualifing sat level is < 88% or below on room air at rest on 24 date. Attempt to wean down to 5-6L prior to discharge if able. Encourage to keep sats 86-92%. Replace Oxymizer Pendant and tubing q 3 weeks per   instructions     Pt needing above DME with expected length of need of 99 year due to medical necessity associated with following diagnosis:     Acute on chronic heart failure with preserved ejection fraction (H)  Chronic right-sided heart failure (H)  Pulmonary hypertension (H)  Acute on chronic respiratory failure with hypoxia (H)  COPD with acute exacerbation (H)  Community acquired pneumonia of right lung, unspecified part of lung  JOID (obstructive sleep apnea)  Essential hypertension  Dyslipidemia  History of CVA (cerebrovascular accident)  History of deep venous thrombosis  History of pulmonary embolism  Stage 3a chronic kidney disease (H)  Acute on chronic anemia  MGUS (monoclonal gammopathy of unknown significance)  B12 deficiency  History of hypothyroidism  Lower limb ulcer, ankle, right, with unspecified severity (H)  Moderate malnutrition (H24)  Slow transit constipation  Morbid obesity (H)  Physical deconditioning  Generalized muscle weakness  COPD exacerbation (H)  Severe pulmonary hypertension (H)  Personal history of DVT (deep vein thrombosis)  SONAL (acute kidney injury) (H24)  Coronary artery disease involving native coronary artery of native heart with angina pectoris (H24)  Respiratory acidosis      PMH   has a past medical history of Abnormality of gait due to impairment of balance, Acute and chronic respiratory failure with hypercapnia (H), Acute deep venous thrombosis (H) (10/20/2015), Anemia, Aneurysm (H24) (2012), Arthritis, B12 deficiency, Cancer (H) (1985), Cellulitis (right foot), Chronic diastolic heart failure (H), Chronic kidney disease, 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, COPD (chronic obstructive pulmonary disease) (H), Coronary artery disease, CRF (chronic renal failure), stage 3 (moderate) (H) (10/21/2015), CVA (cerebral infarction) (12/23), CVA (cerebral vascular accident) (H) (12/23/2012), Dermatitis, DVT (deep venous thrombosis) (H), Encephalopathy, History of  "transfusion, Hyperlipidemia, Hypertension, Hypothyroidism, Lymphedema, MGUS (monoclonal gammopathy of unknown significance), Neuropathy of right lower extremity, Obesity, On home oxygen therapy, PFO (patent foramen ovale), PFO (patent foramen ovale), Pneumonia, Primary hypercoagulable state (H24), Pulmonary emboli (H) (2013), Pulmonary HTN (H), Respiratory failure (H), Seizure (H), Seizures (H), Skin cancer (2014), Sleep apnea, Stroke (H) (2012), Umbilical hernia, and Wound of right ankle.    ROS:4 point ROS including Respiratory, CV, GI and , other than that noted in the HPI,  is negative    EXAM  Vitals: /72   Pulse 92   Temp 97.3  F (36.3  C)   Resp 18   Ht 1.676 m (5' 6\")   Wt 130.7 kg (288 lb 3.2 oz)   SpO2 93%   BMI 46.52 kg/m  ;BMI= Body mass index is 46.52 kg/m .  GENERAL APPEARANCE:  Alert, in no distress, oriented, morbidly obese, cooperative  ENT:  Mouth and posterior oropharynx normal, moist mucous membranes, normal hearing acuity  EYES:  EOM, conjunctivae, lids, pupils and irises normal  NECK:  No adenopathy,masses or thyromegaly  RESP:  respiratory effort and palpation of chest normal, diminished breath sounds bibasilar, dyspneic  CV:  regular rate and rhythm, no murmur, rub, or gallop, peripheral edema 1+ in BLE  ABDOMEN:  normal bowel sounds, soft, nontender, no hepatosplenomegaly or other masses, no guarding or rebound  M/S:   Ambulates with walker  SKIN:  Inspection of skin and subcutaneous tissue baseline, Palpation of skin and subcutaneous tissue baseline, wound healing well, no signs of infection see photo  NEURO:   Cranial nerves 2-12 are normal tested and grossly at patient's baseline, no purposeful movement in upper and lower extremities  PSYCH:  oriented X 3, normal insight, judgement and memory, affect and mood normal      ASSESSMENT/PLAN:  1. Acute on chronic heart failure with preserved ejection fraction (H)    2. Chronic right-sided heart failure (H)    3. Pulmonary " hypertension (H)    4. Acute on chronic respiratory failure with hypoxia (H)    5. COPD with acute exacerbation (H)    6. Community acquired pneumonia of right lung, unspecified part of lung    7. JODI (obstructive sleep apnea)    8. Essential hypertension    9. Dyslipidemia    10. History of CVA (cerebrovascular accident)    11. History of deep venous thrombosis    12. History of pulmonary embolism    13. Stage 3a chronic kidney disease (H)    14. Acute on chronic anemia    15. MGUS (monoclonal gammopathy of unknown significance)    16. B12 deficiency    17. History of hypothyroidism    18. Lower limb ulcer, ankle, right, with unspecified severity (H)    19. Moderate malnutrition (H24)    20. Slow transit constipation    21. Morbid obesity (H)    22. Physical deconditioning    23. Generalized muscle weakness    24. COPD exacerbation (H)    25. Severe pulmonary hypertension (H)    26. Personal history of DVT (deep vein thrombosis)    27. SONAL (acute kidney injury) (H24)    28. Coronary artery disease involving native coronary artery of native heart with angina pectoris (H24)    29. Respiratory acidosis        Orders:  1. Facility staff/TC to contact DME company to get their order form for provider to fill out    ELECTRONICALLY SIGNED BY [employer]NETTE CERTIFIED PROVIDER:  JONATAN Boykin CNP   NPI: [id number]  [facility]  [address] [place] [address]

## 2024-05-31 NOTE — LETTER
5/31/2024        RE: Linda Otero  [address]   [address]  [facility] [address]        [facility] GERIATRICS DISCHARGE SUMMARY  PATIENT'S NAME: Linda Otero  YOB: 1960  MEDICAL RECORD NUMBER:  [id number]  Place of Service where encounter took place:  [facility] (TCU)(SNF) [[id number]]    PRIMARY CARE PROVIDER AND CLINIC RESPONSIBLE AFTER TRANSFER:   MALLORY Escamilla PHYSICIAN [facility] [address] [facility] / [facility]   Non-FMG Provider     Transferring providers: JONATAN Boykin CNP, Chang Pineda MD  Recent Hospitalization/ED:  [facility] Hospital stay 3/29/24 to 5/7/24.  Date of SNF Admission:  5/7/24  Date of SNF (anticipated) Discharge:  6/5/24  Discharged to: previous assisted living  Cognitive Scores: BIMS: 15/15  Physical Function: Ambulating 100 ft with walker  DME: SNF  coordinating DME needs     CODE STATUS/ADVANCE DIRECTIVES DISCUSSION:  Full Code   ALLERGIES: Eliquis [apixaban], Penicillins, Erythromycin, Peanut oil, Clindamycin, and Tetracycline    NURSING FACILITY COURSE   Medication Changes/Rationale:   See below    Summary of nursing facility stay:     (R53.81) Physical deconditioning  (primary encounter diagnosis)  (M62.81) Generalized muscle weakness  Comment: Acute on chronic. S/T below  Plan:   -Continue Physical therapy and Occupational therapy as directed  -SW to remain involved for safe discharge planning needs  -Discharging home next week with services in place     (J96.21) Acute on chronic respiratory failure with hypoxia (H)  (I50.33) Acute on chronic heart failure with preserved ejection fraction (H)  (J44.1) COPD exacerbation (H)  (G47.33) JODI (obstructive sleep apnea)  (I27.20) Severe pulmonary hypertension (H)  (I25.119) Coronary artery disease involving native coronary artery of native heart with angina pectoris (H24)  (J18.9) Community acquired pneumonia, unspecified  laterality  (E87.29) Respiratory acidosis  Comment: Chronic. Recent exacerbations. Baseline 5 L NC PTA. CXR 3/29- Cardiomegaly. Ill-defined opacity at the right infrahilar lung could be acute airspace disease. A mass is difficult to exclude. Bilateral vascular congestion may be a degree of pulmonary edema. Potential small right pleural fluid. CT C PE 3/29/2024 without any PE but did demonstrate pulmonary congestion, R pleural effusion; also enlarged main pulmonary artery, as evidence of pulmonary hypertension. TTE 4/1 showing EF 60-65% without WMA, severe RV dilation with moderately decreased RV function, mod-severe TR and pulm HTN. Was initially started on Lasix drip, then transitioned to bumex gtt, then to oral bumex 2mg daily starting on 4/19/24. Cardiology consulted, appreciate their assistance. Signed off 4/19/24. S/p left and right heart cath on 4/12/2024.  Showed normal coronary arteries.  Severely elevated right-sided pressures, severely elevated left-sided filling pressures. Mixed precapillary and postcapillary pulmonary hypertension (mPA 59 mmHg, PCWP 28 mmHg) .  Normal cardiac index. S/p RHC on 4/19:  Her filling pressures have improved significantly. Her weight is now normal and her mean RA is mildly elevated. Her pulmonary hypertension has improved but still severe.  Currently on 6 lpm of supplemental oxygen by oximyzer at rest, this may be her new baseline (PTA baseline was 5 lpm by nasal cannula). O2 with exertion at 8-10 lpm the last few days, O2 sat goal at 88-92%. Completed 5-day course of ceftriaxone+doxy on 4/2/24. Completed 8-day steroid taper on 4/7/24. Restarted PTA Breo on 4/8/24.  Wheezing significantly improved. Pulmonary consulted 4/23/24, appreciate their assistance.  VQ scan on 4/26 without evidence of acute PE or chronic thromboembolic disease. Patient to follow-up in the pulmonary hypertension clinic after discharge.   Plan:  -Continue with Bumex 2mg daily.   -Continue duoneb  BID  -Continue albuterol nebulization every 6 hours PRN  -Continue robitussin PRN  -Oxygen by Nasal Cannula 6 LPM at rest and 6-8LPM w/activity LPM to keep oxygen Sats 88 % or greater. Attempt to wean down to 5-6L prior to discharge if able. Encourage to keep sats 86-92%. Replace Oxymizer Pendant and tubing q 3 weeks per  instructions  -Continue ICS-LABA Breo ellipta daily.    -Continue spiriva daily   -Started on Jardiance 10 mg daily on 4/13/2024 for HFpEF.  -Lymphedema wraps with lymphedema therapy on site  -2gm Na diet.  -Fluid restriction 1800cc/day.  -Follow up with pulmonology as directed. Call [phone] to schedule. Followed with EMIL Mcmahon at [facility]  -Follow up with cardiology as directed. Scheduled for 7/2/24  -CPAP per home settings  -Daily weights as directed. Hospital weight~284#. Recent weight 288#  -Recent labs stable. Trend labs with PCP post TCU     (Z86.718) Personal history of DVT (deep vein thrombosis)  (Z86.711) History of pulmonary embolism  Comment: Acute on chronic. *CT chest on 3/29/2024 was negative for pulmonary embolism. Per pharmacy med rec, she is no longer on anticoagulation with warfarin. Prophylactic lovenox 40 mg BID while in hospital.  Plan:   -Continue aspirin 81mg daily.   -Monitor for worsening s/symptoms of concerns  -Recent labs stable. Trend labs with PCP post TCU     (R04.2) Hemoptysis  Comment: Acute/mild. Reported orange sputum production at home, was producing small amounts of rober blood 4/1/24. Hospital Admission CT chest negative for PE, masses. Resumed aspirin 4/3 and reported small amount of hemoptysis 4/4, but no further hemoptysis after that. Prophylactic lovenox started on 4/8/2024.  Held for cardiac catheterization, then resumed.  Plan:   -Monitor for worsening s/symptoms of concerns.   -Recent labs stable. Trend labs with PCP post TCU     (N17.9) SONAL (acute kidney injury) (H24)  (N18.30) Stage 3 chronic kidney disease, unspecified  whether stage 3a or 3b CKD (H)  Comment: Acute on chronic. Suspect congestive nephropathy. Admission serum creatinine was 1.33, historic baseline 0.87 to 1.08 in 2020 to 2023. Avoid non-steroidal anti-inflammatory drugs (NSAIDs). Creatinine stabilized around 1.1.  Plan:   -Renally dose medications  -Avoid nephrotoxins  -Recent labs stable. Trend labs with PCP post TCU  -Monitor urinary status     (E87.6) Hypokalemia  Comment: Acute. Noted per chart review. Due to diuresis. Was on KCl 40mEq TID while on IV diuresis which has since been discontinued. Recent results stable.   Plan:   -Monitor for worsening s/symptoms of concerns  -Recent labs stable. Trend labs with PCP post TCU     (L97.301) Ulcer of ankle, unspecified laterality, limited to breakdown of skin (H)  Comment: Acute on chronic. WOCn reviewed in hospital. See pictures from 4/17, there was concern for developing cellulitis. Completed 7 day course of antibiotics (ceftriaxone --> keflex) on 4/26/2024.   Plan:   -Monitor for worsening s/symptoms of concerns  -Recent labs stable. Trend labs with PCP post TCU  -Continue wound care as directed:  -Continue Right lateral ankle wound: 3x weekly and PRN for soiled/loose dressing to assist with lymphedema therapy needs  Wrap right lower leg with Vashe-soaked gauze. Allow to soak for about two minutes.  Unwrap leg and gently wipe away any debris. Allow skin to dry.  Use a tongue depressor to apply Medihoney (993843) to wound.  Apply a small piece of Adaptic (oil emulsion gauze) over Medihoney.  Cover with a 4x4 gauze and secure with Kerlix gauze and Medipore tape.  Apply Spandigrip to right lower extremity.     (L30.9) Dermatitis  Comment: Acute on chronic. S/T morbid size  Plan:   -Monitor for worsening s/symptoms of concerns  -Recent labs stable. Trend labs with PCP post TCU  -Continue current wound care as directed:  *Right breast fold rash: BID   Cleanse skin with warm water. Pat dry.  Apply nystatin powder      (D69.6) Thrombocytopenia (H24)  Comment: Acute on chronic. Platelets normal initially, trended down to 98 on 4/16/2024, then trended up and within normal limits since 4/22/2024.  Plan:   -Monitor for worsening s/symptoms of concerns  -Recent labs stable. Trend labs with PCP post TCU     (E44.0) Moderate malnutrition (H24)  Comment: Acute on chronic. In context with recent hospitalization  Plan:   -Dietician to remain involved  -Monitor weight trends  -Recent labs stable. Trend labs with PCP post TCU     (E66.01) Morbid obesity (H)  (E78.2) Mixed hyperlipidemia  Comment: Chronic. Diet controlled. Not on statin  Plan:   -Encourage ongoing weight loss  -Monitor for worsening s/symptoms of concerns  -Monitor weight trends  -Recent labs stable. Trend labs with PCP post TCU     (E03.9) Hypothyroidism, unspecified type  Comment: Acute on chronic. Med rec did not show any thyroid supplement. TSH 6.28 and free T4 1.49 on 3/29/24.  Plan:   -Monitor for worsening s/symptoms of concerns  -Continue without medication at this time  -Recent labs stable. Trend labs with PCP post TCU  -Trend thyroid levels in 4-6 weeks.      (Z86.73) History of CVA (cerebrovascular accident)  Comment: Chronic. Per chart review. Former smoker, quit in 2012.   Plan:   -Monitor for worsening s/symptoms of concerns  -ASA daily  -Poor compliance with statin  -Follow up with cardiology as directed  -Recent labs stable. Trend labs with PCP post TCU     (D47.2) MGUS (monoclonal gammopathy of unknown significance)  Comment: Chronic. Noted per chart review  Plan:   -Monitor for worsening s/symptoms of concerns  -Follow up with PCP post TCU  -Recent labs stable. Trend labs with PCP post TCU     (E53.8) Vitamin B12 deficiency (non anemic)  Comment: Chronic. Noted per chart review  Plan:   -Monitor for worsening s/symptoms of concerns  -Continue vitamin B12 supplementation as directed of 1mL every month as directed- Last given 5/8  -Recent labs stable. Trend  labs with PCP post TCU     (Z86.69) Hx of seizure disorder  Comment: Noted in medical record, does not appear to be on ant anti-epileptic medications  Plan:   -Monitor for seizure concerns  -Monitor for worsening s/symptoms of concerns  -Neurology PRN  -Recent labs stable. Trend labs with PCP post TCU     (K59.01) Constipation  Comment: Acute on chronic. Stable  Plan:  -Monitor BM patterns  -Continue miralax daily  -Continue senna S 2 tabs BID PRN.  -Bisacodyl 10mg rectal daily PRN  -TUMS PRN  -PRN imodium for diarrhea concerns  -MOM daily PRN     Discharge Medications:  MED REC REQUIRED  Post Medication Reconciliation Status:  Discharge medications reconciled and changed, see notes/orders     Current Outpatient Medications   Medication Sig Dispense Refill     acetaminophen (TYLENOL) 325 MG tablet Take 650 mg by mouth every 4 hours as needed for pain or fever       albuterol (PROVENTIL) (2.5 MG/3ML) 0.083% neb solution Take 1 vial (2.5 mg) by nebulization every 6 hours as needed for shortness of breath or wheezing OFFICE VISIT NEEDED FOR ANY FURTHER REFILLS 90 mL 11     aspirin (ASA) 81 MG chewable tablet Take 81 mg by mouth daily       bumetanide (BUMEX) 2 MG tablet Take 1 tablet (2 mg) by mouth daily HOLD if SBP<100       calcium carbonate (TUMS) 500 MG chewable tablet Take 1 chew tab by mouth 4 times daily as needed for heartburn       cyanocobalamin (CYANOCOBALAMIN) 1000 MCG/ML injection Inject 1 mL into the muscle every 30 days       empagliflozin (JARDIANCE) 10 MG TABS tablet Take 1 tablet (10 mg) by mouth daily 90 tablet 1     fluticasone-vilanterol (BREO ELLIPTA) 200-25 MCG/ACT inhaler Inhale 1 puff into the lungs daily 30 each 11     ipratropium - albuterol 0.5 mg/2.5 mg/3 mL (DUONEB) 0.5-2.5 (3) MG/3ML neb solution Take 1 vial (3 mLs) by nebulization 2 times daily       loperamide (IMODIUM A-D) 2 MG tablet Take 2 mg by mouth 4 times daily as needed for diarrhea       menthol-zinc oxide (CALMOSEPTINE)  0.44-20.6 % OINT ointment Apply topically daily as needed for skin protection Apply to affected area topically as needed for affected  area daily       nystatin (MYCOSTATIN) 578392 UNIT/GM external powder Apply to breast BID and BID  g 1     polyethylene glycol (MIRALAX) 17 GM/Dose powder Take 17 g by mouth daily       senna-docusate (SENOKOT-S/PERICOLACE) 8.6-50 MG tablet Take 2 tablets by mouth 2 times daily as needed for constipation       tiotropium (SPIRIVA RESPIMAT) 2.5 MCG/ACT inhaler INHALE TWO PUFFS BY MOUTH EVERY DAY 12 g 3     guaiFENesin (ROBITUSSIN) 20 mg/mL liquid Take 200 mg by mouth every 4 hours as needed for cough (Patient not taking: Reported on 5/31/2024)       multivitamin, therapeutic (THERA-VIT) TABS tablet Take 1 tablet by mouth at bedtime (Patient not taking: Reported on 5/31/2024)        Controlled medications:   not applicable/none     Past Medical History:   Past Medical History:   Diagnosis Date     Abnormality of gait due to impairment of balance      Acute and chronic respiratory failure with hypercapnia (H)      Acute deep venous thrombosis (H) 10/20/2015     Anemia     Iron deficiency     Aneurysm (H24) 2012     Arthritis     toes, thumb, elbow     B12 deficiency      Cancer (H) 1985    cervical     Cellulitis right foot     Chronic diastolic heart failure (H)      Chronic kidney disease      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      Cor triatriatum dextra with PFO     COPD (chronic obstructive pulmonary disease) (H)      Coronary artery disease      CRF (chronic renal failure), stage 3 (moderate) (H) 10/21/2015     CVA (cerebral infarction) 12/23    believed due to clot.  left thalamic stroke as well as patchy MCA branch infarcts     CVA (cerebral vascular accident) (H) 12/23/2012    Left thalamic stroke, MCA branch infarcts     Dermatitis      DVT (deep venous thrombosis) (H)     Right leg     Encephalopathy     after stroke, believed related to hypoxia     History of  "transfusion      Hyperlipidemia      Hypertension      Hypothyroidism      Lymphedema      MGUS (monoclonal gammopathy of unknown significance)      Neuropathy of right lower extremity      Obesity      On home oxygen therapy      PFO (patent foramen ovale)     closed after stroke, see cardiology notes care everywhere     PFO (patent foramen ovale)      Pneumonia      Primary hypercoagulable state (H24)      Pulmonary emboli (H) 2013     Pulmonary HTN (H)      Respiratory failure (H)     after stroke     Seizure (H)      Seizures (H)     at age 31     Skin cancer 2014     Sleep apnea     CPAP     Stroke (H) 2012     Umbilical hernia      Wound of right ankle      Physical Exam:   Vitals: /72   Pulse 92   Temp 97.3  F (36.3  C)   Resp 18   Ht 1.676 m (5' 6\")   Wt 130.7 kg (288 lb 3.2 oz)   SpO2 93%   BMI 46.52 kg/m    BMI: Body mass index is 46.52 kg/m .  GENERAL APPEARANCE:  Alert, in no distress, oriented, morbidly obese, cooperative  ENT:  Mouth and posterior oropharynx normal, moist mucous membranes, normal hearing acuity  EYES:  EOM, conjunctivae, lids, pupils and irises normal  NECK:  No adenopathy,masses or thyromegaly  RESP:  respiratory effort and palpation of chest normal, diminished breath sounds bibasilar, dyspneic  CV:  regular rate and rhythm, no murmur, rub, or gallop, peripheral edema 1+ in BLE  ABDOMEN:  normal bowel sounds, soft, nontender, no hepatosplenomegaly or other masses, no guarding or rebound  M/S:   Ambulates with walker  SKIN:  Inspection of skin and subcutaneous tissue baseline, Palpation of skin and subcutaneous tissue baseline, wound healing well, no signs of infection see photo  NEURO:   Cranial nerves 2-12 are normal tested and grossly at patient's baseline, no purposeful movement in upper and lower extremities  PSYCH:  oriented X 3, normal insight, judgement and memory, affect and mood normal             SNF labs: Most Recent 3 CBC's:  Recent Labs   Lab Test " 05/15/24  1055 05/10/24  0905 05/07/24  1024   WBC 6.6 6.9 8.6   HGB 9.4* 8.7* 8.6*   MCV 91 90 89    184 179     Most Recent 3 BMP's:  Recent Labs   Lab Test 05/20/24  1044 05/15/24  1055 05/10/24  0905    142 140   POTASSIUM 3.6 3.8 3.9   CHLORIDE 98 99 98   CO2 31* 33* 31*   BUN 33.3* 36.2* 41.3*   CR 1.17* 1.16* 1.22*   ANIONGAP 10 10 11   LUNA 9.4 9.6 9.2   * 106* 113*     Most Recent TSH and T4:  Recent Labs   Lab Test 03/29/24  1453   TSH 6.28*   T4 1.49     Most Recent Hemoglobin A1c:  Recent Labs   Lab Test 09/19/22  0946   A1C 5.5     Most Recent Anemia Panel:  Recent Labs   Lab Test 05/15/24  1055 03/29/24  1453 09/19/22  0946 04/06/18  1642 04/06/18  0916 04/06/18  0732 05/19/17  1516   WBC 6.6   < > 8.3   < >  --  8.6 9.2   HGB 9.4*   < > 11.4*   < >  --  5.8* 13.4   HCT 32.7*   < > 39.6   < >  --  24.7* 43.2   MCV 91   < > 94   < >  --  78* 92      < > 214   < >  --  272 208   IRON  --   --   --   --   --  14* 48   IRONSAT  --   --   --   --   --   --  15   FEB  --   --   --   --   --   --  330   KWESI  --   --   --   --  18  --  44   B12  --   --  511   < >  --   --  759    < > = values in this interval not displayed.       DISCHARGE PLAN:  Follow up labs: No labs orders/due  Medical Follow Up:      Follow up with primary care provider in 1-2 weeks  Follow up with specialist as directed below   [facility] scheduled appointments:  Appointments in Next Year      Jul 02, 2024 10:20 AM  (Arrive by 10:05 AM)  NEW HEART FAILURE with Brett Montalvo MD  [facility] [place] ([facility] ) [phone]           Discharge Services: Home Care:  Occupational Therapy, Physical Therapy, Registered Nurse, and Home Health Aide  Discharge Instructions Verbalized to Patient at Discharge:   Weight bearing restrictions:  Weight bearing as tolerated.   Continue to follow your diet:  regular.   -Oxygen by Nasal Cannula 6 LPM at rest and  6-8LPM w/activity LPM to keep oxygen Sats 88 % or greater. Attempt to wean down to 5-6L prior to discharge if able. Encourage to keep sats 86-92%. Replace Oxymizer Pendant and tubing q 3 weeks per  instructions  -Lymphedema wraps with lymphedema therapy on site  -2gm Na diet.  -Fluid restriction 1800cc/day.  -Follow up with pulmonology as directed. Call [phone] to schedule. Followed with EMIL Mcmahon at [facility]  -Follow up with cardiology as directed. Scheduled for 7/2/24  -CPAP per home settings  -Daily weights as directed. Hospital weight~284#. Recent weight 288#  -Continue wound care as directed:  -Continue Right lateral ankle wound: 3x weekly and PRN for soiled/loose dressing to assist with lymphedema therapy needs  Wrap right lower leg with Vashe-soaked gauze. Allow to soak for about two minutes.  Unwrap leg and gently wipe away any debris. Allow skin to dry.  Use a tongue depressor to apply Medihoney ([id number]) to wound.  Apply a small piece of Adaptic (oil emulsion gauze) over Medihoney.  Cover with a 4x4 gauze and secure with Kerlix gauze and Medipore tape.    TOTAL DISCHARGE TIME:   40 minutes  Electronically signed by:  Dr. Pamella Horner DNP, APRN, FNP-C, WCS-C, EDS-C    Documentation of Face to Face and Certification for Home Health Services    I certify that patient: Linda Otero is under my care and that I, or a nurse practitioner or physician's assistant working with me, had a face-to-face encounter that meets the physician face-to-face encounter requirements with this patient on: 5/31/2024.    This encounter with the patient was in whole, or in part, for the following medical condition, which is the primary reason for home health care: The primary encounter diagnosis was Acute on chronic heart failure with preserved ejection fraction (H). Diagnoses of Chronic right-sided heart failure (H), Pulmonary hypertension (H), Acute on chronic respiratory failure with hypoxia  (H), COPD with acute exacerbation (H), Community acquired pneumonia of right lung, unspecified part of lung, JODI (obstructive sleep apnea), Essential hypertension, Dyslipidemia, History of CVA (cerebrovascular accident), History of deep venous thrombosis, History of pulmonary embolism, Stage 3a chronic kidney disease (H), Acute on chronic anemia, MGUS (monoclonal gammopathy of unknown significance), B12 deficiency, History of hypothyroidism, Lower limb ulcer, ankle, right, with unspecified severity (H), Moderate malnutrition (H24), Slow transit constipation, Morbid obesity (H), Physical deconditioning, Generalized muscle weakness, COPD exacerbation (H), Severe pulmonary hypertension (H), Personal history of DVT (deep vein thrombosis), SONAL (acute kidney injury) (H24), Coronary artery disease involving native coronary artery of native heart with angina pectoris (H24), and Respiratory acidosis were also pertinent to this visit..    I certify that, based on my findings, the following services are medically necessary home health services: Nursing, Occupational Therapy, and Physical Therapy.    My clinical findings support the need for the above services because: Nurse is needed: To assess medication management, education and wound care after changes in medications or other medical regimen.., Occupational Therapy Services are needed to assess and treat cognitive ability and address ADL safety due to impairment in deconditioning and lymphedema therapy., and Physical Therapy Services are needed to assess and treat the following functional impairments: deconditioning .    Further, I certify that my clinical findings support that this patient is homebound (i.e. absences from home require considerable and taxing effort and are for medical reasons or [employer] services or infrequently or of short duration when for other reasons) because: Requires assistance of another person or specialized equipment to access medical services  because patient: Is unable to walk greater than 100 feet without rest. and Requires supervision of another for safe transfer...    Based on the above findings. I certify that this patient is confined to the home and needs intermittent skilled nursing care, physical therapy and/or speech therapy.  The patient is under my care, and I have initiated the establishment of the plan of care.  This patient will be followed by a physician who will periodically review the plan of care.  Physician/Provider to provide follow up care: Jayy Henson    Attending hospital physician (the Medicare certified PECOS provider): Dr.Mohammad Edwin MD, signing F2F and only signing for initial order. Please send all follow up questions and concerns or needed follow up signatures to the PCP, who [place] has on file as:  Jayy Henson.    Physician Signature: See electronic signature associated with these discharge orders.  Date: 2024        Face to Face and Medical Necessity Statement for DME Provider visit    Demographic Information on Linda Otero:  Gender: female  : 1960  [address]     [facility] [address]  [phone] (home)     Medical Record: [id number]  Social Security Number: [id number]  Primary Care Provider: Jayy Henson  Insurance: Payor: MEDICARE / Plan: MEDICARE / Product Type: Medicare /     HPI:   Linda Otero is a 64 year old  (1960), who is being seen today for a face to face provider visit at [facility]; medical necessity statement for DME included. This patient requires the following:  DME Ordered and Medical Necessity Statement   Oxygen: 6-8 L/min via oxymizer neither continuous nor needs portable tank due to mobility in home environment ; Clinical Documentation: (Obtain documented RA sat with in 2 days of discharge in acute setting or within 30 days of provider visit):  Method 1: Room air at rest: Qualifing sat level is < 88% or below on room air at  rest on 5/31/24 date. Attempt to wean down to 5-6L prior to discharge if able. Encourage to keep sats 86-92%. Replace Oxymizer Pendant and tubing q 3 weeks per  instructions     Pt needing above DME with expected length of need of 99 year due to medical necessity associated with following diagnosis:     Acute on chronic heart failure with preserved ejection fraction (H)  Chronic right-sided heart failure (H)  Pulmonary hypertension (H)  Acute on chronic respiratory failure with hypoxia (H)  COPD with acute exacerbation (H)  Community acquired pneumonia of right lung, unspecified part of lung  JODI (obstructive sleep apnea)  Essential hypertension  Dyslipidemia  History of CVA (cerebrovascular accident)  History of deep venous thrombosis  History of pulmonary embolism  Stage 3a chronic kidney disease (H)  Acute on chronic anemia  MGUS (monoclonal gammopathy of unknown significance)  B12 deficiency  History of hypothyroidism  Lower limb ulcer, ankle, right, with unspecified severity (H)  Moderate malnutrition (H24)  Slow transit constipation  Morbid obesity (H)  Physical deconditioning  Generalized muscle weakness  COPD exacerbation (H)  Severe pulmonary hypertension (H)  Personal history of DVT (deep vein thrombosis)  SONAL (acute kidney injury) (H24)  Coronary artery disease involving native coronary artery of native heart with angina pectoris (H24)  Respiratory acidosis      PMH   has a past medical history of Abnormality of gait due to impairment of balance, Acute and chronic respiratory failure with hypercapnia (H), Acute deep venous thrombosis (H) (10/20/2015), Anemia, Aneurysm (H24) (2012), Arthritis, B12 deficiency, Cancer (H) (1985), Cellulitis (right foot), Chronic diastolic heart failure (H), Chronic kidney disease, 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, COPD (chronic obstructive pulmonary disease) (H), Coronary artery disease, CRF (chronic renal failure), stage 3 (moderate) (H) (10/21/2015), CVA  "(cerebral infarction) (12/23), CVA (cerebral vascular accident) (H) (12/23/2012), Dermatitis, DVT (deep venous thrombosis) (H), Encephalopathy, History of transfusion, Hyperlipidemia, Hypertension, Hypothyroidism, Lymphedema, MGUS (monoclonal gammopathy of unknown significance), Neuropathy of right lower extremity, Obesity, On home oxygen therapy, PFO (patent foramen ovale), PFO (patent foramen ovale), Pneumonia, Primary hypercoagulable state (H24), Pulmonary emboli (H) (2013), Pulmonary HTN (H), Respiratory failure (H), Seizure (H), Seizures (H), Skin cancer (2014), Sleep apnea, Stroke (H) (2012), Umbilical hernia, and Wound of right ankle.    ROS:4 point ROS including Respiratory, CV, GI and , other than that noted in the HPI,  is negative    EXAM  Vitals: /72   Pulse 92   Temp 97.3  F (36.3  C)   Resp 18   Ht 1.676 m (5' 6\")   Wt 130.7 kg (288 lb 3.2 oz)   SpO2 93%   BMI 46.52 kg/m  ;BMI= Body mass index is 46.52 kg/m .  GENERAL APPEARANCE:  Alert, in no distress, oriented, morbidly obese, cooperative  ENT:  Mouth and posterior oropharynx normal, moist mucous membranes, normal hearing acuity  EYES:  EOM, conjunctivae, lids, pupils and irises normal  NECK:  No adenopathy,masses or thyromegaly  RESP:  respiratory effort and palpation of chest normal, diminished breath sounds bibasilar, dyspneic  CV:  regular rate and rhythm, no murmur, rub, or gallop, peripheral edema 1+ in BLE  ABDOMEN:  normal bowel sounds, soft, nontender, no hepatosplenomegaly or other masses, no guarding or rebound  M/S:   Ambulates with walker  SKIN:  Inspection of skin and subcutaneous tissue baseline, Palpation of skin and subcutaneous tissue baseline, wound healing well, no signs of infection see photo  NEURO:   Cranial nerves 2-12 are normal tested and grossly at patient's baseline, no purposeful movement in upper and lower extremities  PSYCH:  oriented X 3, normal insight, judgement and memory, affect and mood normal  "     ASSESSMENT/PLAN:  1. Acute on chronic heart failure with preserved ejection fraction (H)    2. Chronic right-sided heart failure (H)    3. Pulmonary hypertension (H)    4. Acute on chronic respiratory failure with hypoxia (H)    5. COPD with acute exacerbation (H)    6. Community acquired pneumonia of right lung, unspecified part of lung    7. JODI (obstructive sleep apnea)    8. Essential hypertension    9. Dyslipidemia    10. History of CVA (cerebrovascular accident)    11. History of deep venous thrombosis    12. History of pulmonary embolism    13. Stage 3a chronic kidney disease (H)    14. Acute on chronic anemia    15. MGUS (monoclonal gammopathy of unknown significance)    16. B12 deficiency    17. History of hypothyroidism    18. Lower limb ulcer, ankle, right, with unspecified severity (H)    19. Moderate malnutrition (H24)    20. Slow transit constipation    21. Morbid obesity (H)    22. Physical deconditioning    23. Generalized muscle weakness    24. COPD exacerbation (H)    25. Severe pulmonary hypertension (H)    26. Personal history of DVT (deep vein thrombosis)    27. SONAL (acute kidney injury) (H24)    28. Coronary artery disease involving native coronary artery of native heart with angina pectoris (H24)    29. Respiratory acidosis        Orders:  1. Facility staff/TC to contact DME company to get their order form for provider to fill out    ELECTRONICALLY SIGNED BY [place] CERTIFIED PROVIDER:  JONATAN Boykin CNP   NPI: [id number]  [facility]  [address] [place] [id number]                Sincerely,        JONATAN Boykin CNP

## 2024-07-02 NOTE — PROGRESS NOTES
Thank you, Dr. Montalvo, for asking the [facility] team to see Ms. Linda Otero to evaluate       Assessment/Recommendations   Assessment/Plan:  Pulm HTN due to COPD/JODI  - will refer to pulm HTN specialist re therapy and whether vasodilator study is needed.   HFpEF - on bumex, add spironolactone 25mg daily and check BMP on eweek later, might have to titrate up,     Follow up with specialist in pulm HTN, HF NP and myself     History of Present Illness/Subjective    Ms. Linda Otero is a 64 year old female with hx of CVA s/p PFO closure for paradoxical embolii,  in 2012COPD, JODI, s/p pneumonia, HFpEF resulting in pulm HTN, with imrpovement post diuresis but still present.  Edema improved but still present wrapping both legs, oxygen dependent and on CPAP.  Cors normal on angiography recently.  Notd PA mean 59mmHg with PCWP 28mmHg initially with RA 16mmHg, given iv diuretics in [facility] and return for RHC with PA mean down 50mmHg and PCWP donw 12mmHg.  Noted echo 2018 w with severe pulm.  HTN.   VQ scan 3/24 no PE or chronic thrombo embolic dz by Dr. Guillen.  CT PE neg acute PE< mild pulm edema, anlaged PA.           Physical Examination Review of Systems   /72 (BP Location: Right arm, Patient Position: Sitting, Cuff Size: Adult Large)   Pulse 93   Resp 14   Wt 127.5 kg (281 lb)   BMI 45.35 kg/m    Body mass index is 45.35 kg/m .  Wt Readings from Last 3 Encounters:   07/02/24 127.5 kg (281 lb)   05/31/24 130.7 kg (288 lb 3.2 oz)   05/24/24 130.8 kg (288 lb 6.4 oz)     [unfilled]  General Appearance:   no distress, normal body habitus   ENT/Mouth: membranes moist, no oral lesions or bleeding gums.      EYES:  no scleral icterus, normal conjunctivae   Neck: no carotid bruits or thyromegaly   Chest/Lungs:   lungs are clear to auscultation, no rales or wheezing,  sternal scar, equal chest wall expansion    Cardiovascular:   Regular. Normal first and second heart sounds with no  murmurs, rubs, or gallops; the carotid, radial and posterior tibial pulses are intact, Jugular venous pressure , edema bilaterally    Abdomen:  no organomegaly, masses, bruits, or tenderness; bowel sounds are present   Extremities: no cyanosis or clubbing   Skin: no xanthelasma, warm.    Neurologic: normal  bilateral, no tremors     Psychiatric: alert and oriented x3, calm     Review of Systems - 12 points nega other than above      Medical History  Surgical History Family History Social History   Past Medical History:   Diagnosis Date    Abnormality of gait due to impairment of balance     Acute and chronic respiratory failure with hypercapnia (H)     Acute deep venous thrombosis (H) 10/20/2015    Anemia     Iron deficiency    Aneurysm (H24) 2012    Arthritis     toes, thumb, elbow    B12 deficiency     Cancer (H) 1985    cervical    Cellulitis right foot    Chronic diastolic heart failure (H)     Chronic kidney disease     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  left thalamic stroke as well as patchy MCA branch infarcts    CVA (cerebral vascular accident) (H) 12/23/2012    Left thalamic stroke, MCA branch infarcts    Dermatitis     DVT (deep venous thrombosis) (H)     Right leg    Encephalopathy     after stroke, believed related to hypoxia    History of transfusion     Hyperlipidemia     Hypertension     Hypothyroidism     Lymphedema     MGUS (monoclonal gammopathy of unknown significance)     Neuropathy of right lower extremity     Obesity     On home oxygen therapy     PFO (patent foramen ovale)     closed after stroke, see cardiology notes care everywhere    PFO (patent foramen ovale)     Pneumonia     Primary hypercoagulable state (H24)     Pulmonary emboli (H) 2013     Pulmonary HTN (H)     Respiratory failure (H)     after stroke    Seizure (H)     Seizures (H)     at age 31    Skin cancer 2014    Sleep apnea     CPAP    Stroke (H) 2012    Umbilical hernia     Wound of right ankle     Past Surgical History:   Procedure Laterality Date    ASD REPAIR      ASD REPAIR      BIOPSY SKIN (LOCATION)      CARDIAC CATHETERIZATION  2012    2 stents    COLONOSCOPY N/A 4/9/2018    Procedure: COLONOSCOPY;  Surgeon: Dorene Araujo MD;  Location: [facility];  Service:     CONIZATION  1985    CV CORONARY ANGIOGRAM N/A 4/12/2019    Procedure: Coronary Angiogram;  Surgeon: Brett Montalvo MD;  Location: [facility] Cath Lab;  Service: Cardiology    CV CORONARY ANGIOGRAM N/A 4/12/2024    Procedure: Coronary Angiogram;  Surgeon: Enoc Crocker MD;  Location: [facility] CARDIAC CATH LAB    CV INTRAVASULAR ULTRASOUND N/A 4/12/2024    Procedure: Intravascular Ultrasound;  Surgeon: Enoc Crocker MD;  Location: [facility] CARDIAC CATH LAB    CV LEFT HEART CATHETERIZATION WITHOUT LEFT VENTRICULOGRAM Left 4/12/2019    Procedure: Left Heart Catheterization Without Left Ventriculogram;  Surgeon: Brett Montalvo MD;  Location: [facility] Cath Lab;  Service: Cardiology    CV RIGHT HEART CATH MEASUREMENTS RECORDED N/A 4/12/2024    Procedure: Right Heart Catheterization;  Surgeon: Enoc Crocker MD;  Location: [facility] CARDIAC CATH LAB    CV RIGHT HEART CATH MEASUREMENTS RECORDED N/A 4/19/2024    Procedure: Right Heart Cath;  Surgeon: Sparkle Suresh MD;  Location: [facility] CARDIAC CATH LAB    CV RIGHT HEART CATHETERIZATION N/A 4/12/2019    Procedure: Right Heart Catheterization;  Surgeon: Brett Montalvo MD;  Location: [facility] Cath Lab;  Service: Cardiology    ESOPHAGOSCOPY, GASTROSCOPY, DUODENOSCOPY (EGD), COMBINED N/A 4/9/2018    Procedure: ESOPHAGOGASTRODUODENOSCOPY (EGD);  Surgeon: Dorene Araujo MD;  Location: [facility];  Service:     IR  CAROTID ANGIOGRAM  2012    IR CAROTID ANGIOGRAM  2012    IR MISCELLANEOUS PROCEDURE  2012    IR MISCELLANEOUS PROCEDURE  2012    IR MISCELLANEOUS PROCEDURE  2012    OTHER SURGICAL HISTORY      Cardiac stents    PATENT FORAMEN OVALE CLOSURE      REPAIR PATENT FORAMEN OVALE      Helex device    XR SACRO ILIAC JOINT INJECTION LEFT  2012    ZZC REMV ART CLOT ILIAC-POP,LEG INCIS Left 2019    Procedure: REPAIR LEFT FEMORAL ARTERIOVENOUS FISTULA WITH INTRAOPERATIVE ULTRASOUND;  Surgeon: Salinas Torres MD;  Location: [facility];  Service: General    Family History   Problem Relation Age of Onset    Angioedema Mother     Pulmonary Embolism Brother     Cerebrovascular Disease Father     Coronary Artery Disease Brother     Social History     Socioeconomic History    Marital status:      Spouse name: Not on file    Number of children: Not on file    Years of education: Not on file    Highest education level: Not on file   Occupational History    Not on file   Tobacco Use    Smoking status: Former     Current packs/day: 0.00     Average packs/day: 1 pack/day for 44.0 years (44.0 ttl pk-yrs)     Types: Cigarettes     Start date: 1968     Quit date: 2012     Years since quittin.5    Smokeless tobacco: Never   Substance and Sexual Activity    Alcohol use: No     Alcohol/week: 0.0 standard drinks of alcohol    Drug use: No    Sexual activity: Not Currently   Other Topics Concern    Parent/sibling w/ CABG, MI or angioplasty before 65F 55M? Not Asked   Social History Narrative    Not on file     Social Determinants of Health     Financial Resource Strain: Not on file   Food Insecurity: Not on file   Transportation Needs: Not on file   Physical Activity: Not on file   Stress: Not on file   Social Connections: Not on file   Interpersonal Safety: Not on file   Housing Stability: Not on file          Medications  Allergies   Scheduled Meds:  No current  facility-administered medications for this visit.     Continuous Infusions:  No current facility-administered medications for this visit.     PRN Meds:.  No current facility-administered medications for this visit.    Allergies   Allergen Reactions    Eliquis [Apixaban] Shortness Of Breath, Hives and Swelling     Pt. Reported     Penicillins Anaphylaxis     Per chart review: tolerated Augmentin in 2020.  Tolerated CTX 2024.       Erythromycin Hives and Itching    Peanut Oil Hives    Clindamycin Itching    Tetracycline Itching         Lab Results    Chemistry/lipid CBC Cardiac Enzymes/BNP/TSH/INR   Lab Results   Component Value Date    CHOL 178 11/20/2020    HDL 59 11/20/2020    TRIG 97 11/20/2020    BUN 33.3 (H) 05/20/2024     05/20/2024    CO2 31 (H) 05/20/2024    Lab Results   Component Value Date    WBC 6.6 05/15/2024    HGB 9.4 (L) 05/15/2024    HCT 32.7 (L) 05/15/2024    MCV 91 05/15/2024     05/15/2024    Lab Results   Component Value Date    TROPONINI <0.01 01/19/2019    BNP 96 (H) 01/20/2019    TSH 6.28 (H) 03/29/2024    INR 1.20 (H) 03/29/2024              Brett Montalvo MD  Interventional Cardiology  [facility]

## 2024-07-02 NOTE — LETTER
7/2/2024    Jayy Henson  [facility] [address] [facility] [address]    RE: Linda Otero       Dear Colleague,     I had the pleasure of seeing Linda Otero in the [facility].    Thank you, Dr. Montalvo, for asking the [facility] team to see Ms. Linda Otero to evaluate       Assessment/Recommendations   Assessment/Plan:  Pulm HTN due to COPD/JODI  - will refer to pulm HTN specialist re therapy and whether vasodilator study is needed.   HFpEF - on bumex, add spironolactone 25mg daily and check BMP on eweek later, might have to titrate up,     Follow up with specialist in pulm HTN, HF NP and myself     History of Present Illness/Subjective    Ms. Linda Otero is a 64 year old female with hx of CVA s/p PFO closure for paradoxical embolii,  in 2012COPD, JODI, s/p pneumonia, HFpEF resulting in pulm HTN, with imrpovement post diuresis but still present.  Edema improved but still present wrapping both legs, oxygen dependent and on CPAP.  Cors normal on angiography recently.  Notd PA mean 59mmHg with PCWP 28mmHg initially with RA 16mmHg, given iv diuretics in [facility] and return for RHC with PA mean down 50mmHg and PCWP donw 12mmHg.  Noted echo 2018 w with severe pulm.  HTN.   VQ scan 3/24 no PE or chronic thrombo embolic dz by Dr. Guillen.  CT PE neg acute PE< mild pulm edema, anlaged PA.           Physical Examination Review of Systems   /72 (BP Location: Right arm, Patient Position: Sitting, Cuff Size: Adult Large)   Pulse 93   Resp 14   Wt 127.5 kg (281 lb)   BMI 45.35 kg/m    Body mass index is 45.35 kg/m .  Wt Readings from Last 3 Encounters:   07/02/24 127.5 kg (281 lb)   05/31/24 130.7 kg (288 lb 3.2 oz)   05/24/24 130.8 kg (288 lb 6.4 oz)     [unfilled]  General Appearance:   no distress, normal body habitus   ENT/Mouth: membranes moist, no oral lesions or bleeding gums.      EYES:  no scleral icterus, normal conjunctivae   Neck: no  carotid bruits or thyromegaly   Chest/Lungs:   lungs are clear to auscultation, no rales or wheezing,  sternal scar, equal chest wall expansion    Cardiovascular:   Regular. Normal first and second heart sounds with no murmurs, rubs, or gallops; the carotid, radial and posterior tibial pulses are intact, Jugular venous pressure , edema bilaterally    Abdomen:  no organomegaly, masses, bruits, or tenderness; bowel sounds are present   Extremities: no cyanosis or clubbing   Skin: no xanthelasma, warm.    Neurologic: normal  bilateral, no tremors     Psychiatric: alert and oriented x3, calm     Review of Systems - 12 points nega other than above      Medical History  Surgical History Family History Social History   Past Medical History:   Diagnosis Date    Abnormality of gait due to impairment of balance     Acute and chronic respiratory failure with hypercapnia (H)     Acute deep venous thrombosis (H) 10/20/2015    Anemia     Iron deficiency    Aneurysm (H24) 2012    Arthritis     toes, thumb, elbow    B12 deficiency     Cancer (H) 1985    cervical    Cellulitis right foot    Chronic diastolic heart failure (H)     Chronic kidney disease     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  left thalamic stroke as well as patchy MCA branch infarcts    CVA (cerebral vascular accident) (H) 12/23/2012    Left thalamic stroke, MCA branch infarcts    Dermatitis     DVT (deep venous thrombosis) (H)     Right leg    Encephalopathy     after stroke, believed related to hypoxia    History of transfusion     Hyperlipidemia     Hypertension     Hypothyroidism     Lymphedema     MGUS (monoclonal gammopathy of unknown significance)     Neuropathy of right lower extremity     Obesity     On home oxygen  therapy     PFO (patent foramen ovale)     closed after stroke, see cardiology notes care everywhere    PFO (patent foramen ovale)     Pneumonia     Primary hypercoagulable state (H24)     Pulmonary emboli (H) 2013    Pulmonary HTN (H)     Respiratory failure (H)     after stroke    Seizure (H)     Seizures (H)     at age 31    Skin cancer 2014    Sleep apnea     CPAP    Stroke (H) 2012    Umbilical hernia     Wound of right ankle     Past Surgical History:   Procedure Laterality Date    ASD REPAIR      ASD REPAIR      BIOPSY SKIN (LOCATION)      CARDIAC CATHETERIZATION  2012    2 stents    COLONOSCOPY N/A 4/9/2018    Procedure: COLONOSCOPY;  Surgeon: Dorene Araujo MD;  Location: [facility] Main [facility];  Service:     CONIZATION  1985    CV CORONARY ANGIOGRAM N/A 4/12/2019    Procedure: Coronary Angiogram;  Surgeon: Brett Montalvo MD;  Location: [facility] Cath Lab;  Service: Cardiology    CV CORONARY ANGIOGRAM N/A 4/12/2024    Procedure: Coronary Angiogram;  Surgeon: Enoc Crocker MD;  Location: [facility] CARDIAC CATH LAB    CV INTRAVASULAR ULTRASOUND N/A 4/12/2024    Procedure: Intravascular Ultrasound;  Surgeon: Enoc Crocker MD;  Location: [facility] CARDIAC CATH LAB    CV LEFT HEART CATHETERIZATION WITHOUT LEFT VENTRICULOGRAM Left 4/12/2019    Procedure: Left Heart Catheterization Without Left Ventriculogram;  Surgeon: Brett Montalvo MD;  Location: [facility] Cath Lab;  Service: Cardiology    CV RIGHT HEART CATH MEASUREMENTS RECORDED N/A 4/12/2024    Procedure: Right Heart Catheterization;  Surgeon: Enoc Crocker MD;  Location:  HEART CARDIAC CATH LAB    CV RIGHT HEART CATH MEASUREMENTS RECORDED N/A 4/19/2024    Procedure: Right Heart Cath;  Surgeon: Sparkle Suresh MD;  Location: [facility] CARDIAC CATH LAB    CV RIGHT HEART CATHETERIZATION N/A 4/12/2019    Procedure: Right Heart Catheterization;  Surgeon: Brett Montalvo MD;  Location: [facility] Cath Lab;   Service: Cardiology    ESOPHAGOSCOPY, GASTROSCOPY, DUODENOSCOPY (EGD), COMBINED N/A 2018    Procedure: ESOPHAGOGASTRODUODENOSCOPY (EGD);  Surgeon: Dorene Araujo MD;  Location: [facility];  Service:     IR CAROTID ANGIOGRAM  2012    IR CAROTID ANGIOGRAM  2012    IR MISCELLANEOUS PROCEDURE  2012    IR MISCELLANEOUS PROCEDURE  2012    IR MISCELLANEOUS PROCEDURE  2012    OTHER SURGICAL HISTORY      Cardiac stents    PATENT FORAMEN OVALE CLOSURE      REPAIR PATENT FORAMEN OVALE      Helex device    XR SACRO ILIAC JOINT INJECTION LEFT  2012    ZZC REMV ART CLOT ILIAC-POP,LEG INCIS Left 2019    Procedure: REPAIR LEFT FEMORAL ARTERIOVENOUS FISTULA WITH INTRAOPERATIVE ULTRASOUND;  Surgeon: Salinas Torres MD;  Location: [facility];  Service: General    Family History   Problem Relation Age of Onset    Angioedema Mother     Pulmonary Embolism Brother     Cerebrovascular Disease Father     Coronary Artery Disease Brother     Social History     Socioeconomic History    Marital status:      Spouse name: Not on file    Number of children: Not on file    Years of education: Not on file    Highest education level: Not on file   Occupational History    Not on file   Tobacco Use    Smoking status: Former     Current packs/day: 0.00     Average packs/day: 1 pack/day for 44.0 years (44.0 ttl pk-yrs)     Types: Cigarettes     Start date: 1968     Quit date: 2012     Years since quittin.5    Smokeless tobacco: Never   Substance and Sexual Activity    Alcohol use: No     Alcohol/week: 0.0 standard drinks of alcohol    Drug use: No    Sexual activity: Not Currently   Other Topics Concern    Parent/sibling w/ CABG, MI or angioplasty before 65F 55M? Not Asked   Social History Narrative    Not on file     Social Determinants of Health     Financial Resource Strain: Not on file   Food Insecurity: Not on file   Transportation Needs: Not on file    Physical Activity: Not on file   Stress: Not on file   Social Connections: Not on file   Interpersonal Safety: Not on file   Housing Stability: Not on file          Medications  Allergies   Scheduled Meds:  No current facility-administered medications for this visit.     Continuous Infusions:  No current facility-administered medications for this visit.     PRN Meds:.  No current facility-administered medications for this visit.    Allergies   Allergen Reactions    Eliquis [Apixaban] Shortness Of Breath, Hives and Swelling     Pt. Reported     Penicillins Anaphylaxis     Per chart review: tolerated Augmentin in 2020.  Tolerated CTX 2024.       Erythromycin Hives and Itching    Peanut Oil Hives    Clindamycin Itching    Tetracycline Itching         Lab Results    Chemistry/lipid CBC Cardiac Enzymes/BNP/TSH/INR   Lab Results   Component Value Date    CHOL 178 11/20/2020    HDL 59 11/20/2020    TRIG 97 11/20/2020    BUN 33.3 (H) 05/20/2024     05/20/2024    CO2 31 (H) 05/20/2024    Lab Results   Component Value Date    WBC 6.6 05/15/2024    HGB 9.4 (L) 05/15/2024    HCT 32.7 (L) 05/15/2024    MCV 91 05/15/2024     05/15/2024    Lab Results   Component Value Date    TROPONINI <0.01 01/19/2019    BNP 96 (H) 01/20/2019    TSH 6.28 (H) 03/29/2024    INR 1.20 (H) 03/29/2024            Brett Montalvo MD  Interventional Cardiology  [facility]        Thank you for allowing me to participate in the care of your patient.      Sincerely,     Brett Montalvo MD     [facility]  cc:   Brett Montalvo MD  [address]  [place] [address]

## 2024-07-10 NOTE — TELEPHONE ENCOUNTER
RECORDS RECEIVED FROM:    DATE RECEIVED:    GENERAL RECORDS STATUS DETAILS   OFFICE NOTE from cardiologists Internal 7-2-24 [place]   EKG (STRIPS & REPORTS) Internal 3-29-24   ECHOS (IMAGES AND REPORTS) Internal 3-29-24   PULMONARY HYPERTENSION      6 MINUTE WALK TEST N/A    PULMONARY FUNCTION TESTS Internal 1-24-20   RIGHT HEART CATH (IMAGES) Internal 3-29-24   SLEEP STUDY / OVERNIGHT OXIMETRY N/A    XR CHEST   (IMAGES AND REPORTS) Internal 4-26-24   CHEST CT  (IMAGES AND REPORTS) Internal 3-29-24   V/Q SCAN (IMAGES) Internal 4-26-24   LIVER US  (IMAGES AND REPORTS) N/A    ANGIOGRAMS (IMAGES) N/A    STRESS TEST   (IMAGES AND REPORTS) N/A

## 2024-07-11 NOTE — TELEPHONE ENCOUNTER
Called and LVM to schedule New PH ( referral says TT) but its okay to schedule with other providers with testing prior.    Nehal Riojas  Clinic    Pulmonary Hypertension   [facility]  (P) [phone]

## 2024-07-17 NOTE — TELEPHONE ENCOUNTER
Called and LVM to schedule New PH ( referral says TT) but its okay to schedule with other providers with testing prior.     Nehal Riojas  Clinic    Pulmonary Hypertension   [facility]  (P) [phone]

## 2024-07-17 NOTE — TELEPHONE ENCOUNTER
can someone put orders in for me for this new pt, trying to schedule pt right now  [phone]   [facility]

## 2024-07-19 NOTE — TELEPHONE ENCOUNTER
[facility] Call Center    Phone Message    May a detailed message be left on voicemail: yes     Reason for Call: Call received from pt with questions about her upcoming appt with Dr. Berrios on 9/24/24:     1.) She is wondering if she would be able to use the clinic's oxygen while she is there for her appt? She currently uses tanks that only have approximately 1.5 hours each, and is worried she would not have enough to last for her trip there, throughout her whole appt, and then back home again.   2.) Wondering if it would be beneficial to have her lab tests done at an earlier date, rather than 45 minutes before her appt (the way it is currently scheduled)? If so, she is wondering if it would be acceptable for her to have her lab tests done when she goes for her other appt @ [facility] on 8/13/24? It is very difficult for her to get around, so she would prefer to not have to make a trip solely to get these lab tests done.     Action Taken: Other: Cardio    Travel Screening: Not Applicable

## 2024-07-19 NOTE — TELEPHONE ENCOUNTER
Returned pt call regarding her message, LVM stating that she should ask to be on the clinic's oxygen and they will get her a tank.  She also could cancel this clinic appt for labs on 9/24 if she wanted and could schedule a lab appt the week prior to the 9/24 at an [facility] close to her if she chooses.  Left my call back information if needed.    Mandeep Russell RN on 7/19/2024 at 3:45 PM

## 2024-08-12 NOTE — TELEPHONE ENCOUNTER
Health Call Center    Phone Message    May a detailed message be left on voicemail: yes     Reason for Call: Medication Question or concern regarding medication   Prescription Clarification  Name of Medication: empagliflozin (JARDIANCE) 10 MG TABS tablet   Prescribing Provider: Dr. Montalvo   Pharmacy:    What on the order needs clarification? Patient states that she is having a problem getting her Jardiance refilled. She is wondering if a nurse could call her back to help her. She gets it through [facility]. Please call her back to discuss. She also has questions on her upcoming appt.       Action Taken: Other: cardiology    Travel Screening: Not Applicable   Thank you!  [facility]      Date of Service:

## 2024-08-12 NOTE — TELEPHONE ENCOUNTER
Dr Montalvo, This patient is asking if her jardiance can be managed and refilled through you. Please advise.  -sea    =============  PC from patient stating unable to obtain her jardiance, and asking if Dr Montalvo will manage refills. She is currently living at [facility] in [place], and she states they administer and manage her meds. Will fwd  to Dr Montalvo and contact her AL facility re: refill if he approves.  -sea

## 2024-08-12 NOTE — TELEPHONE ENCOUNTER
Called patient-- unable to reach. Left message for patient to return call to discuss medications. Per chart review this patient was started on Jardiance during hospital stay at  [facility] 3/29/24. [place] [facility].  -sea

## 2024-08-14 NOTE — TELEPHONE ENCOUNTER
----- Message -----  From: Brett Montalvo MD  Sent: 8/13/2024   4:37 PM CDT  To: Helene Hurt RN    Ok please refill most likely for HFpEF    ==  Medication refilled per okay from [facility]. -ejb

## 2024-08-28 NOTE — TELEPHONE ENCOUNTER
Left detailed message on identified voicemail. Advised patient that prescription for antibiotics would need to be obtained from per PCP or in-house provider at [facility]. This is not something we could prescribe and would depend on assessment/physical exam by PCP or her medical doctor. Provided contact information for questions/concerns. -bob

## 2024-08-28 NOTE — TELEPHONE ENCOUNTER
[facility] Call Center    Phone Message    May a detailed message be left on voicemail: yes     Reason for Call: Other: Pt called in stating she has a ankle/leg wound that requires an antibiotic but her assisted living will not help her in getting. Pt does not know who to call or reach out to for assistance. Please review and call pt back to further advise. Thank you!     Action Taken: Message routed to:  Other: [facility] [[id number]]    Travel Screening: Not Applicable     Date of Service:

## 2024-09-13 NOTE — TELEPHONE ENCOUNTER
Patient Name: Linda Otero Age: 64 year old, female, 1960 MRN: [id number]   Referring Provider:  Selvin Appt:  =       PH Medications:  =      Patient History: Pt has a history of possible Pulm HTN due to COPD/JODI , CVS s/p PFO 2012, JODI,  HFpEF    Referred by Selvin for possible PH. RHC completed 3/29/24    7/25 patient was called to remind her to schedule 6MWT and PFT prior to apt      Recent Testing:       Date Test Epic Media/Scan Care Everywhere     RHC             Mildly elevated right side fillng pressure and normal left side filling pressure (with marked respiratory variability due to obese body habitus).  RA mean 11mmHg  PCWP mean 12mmHg  Severe pulmonary hypertension  PA 76/28, mean 50mmHg  PVR: 5.46  CO 7.33  CI 3.15 []  []   []      Angio/Stress    Angiographically normal coronary arteries  []  []   []      Echo              Patient terminated exam prior to completion.  There is mild concentric left ventricular hypertrophy.  Left ventricular systolic function is normal.  The visual ejection fraction is 60-65%.  Flattened septum is consistent with RV pressure overload however unable to  estimate PA systolic pressure.  The right ventricle is severely dilated. Moderately decreased right  ventricular systolic function  There is moderate to mod-severe (2-3+) tricuspid regurgitation.  Intact atrial septum. Closure device well seated.  Inferior vena cava not well visualized for estimation of right atrial  pressure.  There is no pericardial effusion.                       []  []   []      EKG            Sinus rhythm   Possible Left atrial enlargement   Right axis deviation   Possible Right ventricular hypertrophy   Nonspecific ST abnormality   Abnormal QRS-T angle  []   []   []      V/Q Scan    .  No evidence of acute pulmonary embolism or chronic pulmonary thromboembolic disease.  2.  Heterogeneous pulmonary ventilation which can be seen with COPD. []   []   []      Abd/Liver US []   []   []       Misc:  []   []   []         []   []   []

## 2024-09-24 NOTE — TELEPHONE ENCOUNTER
9/24/2024  @ 4:56 PM -  sildenafil 20 mg (90/30) - new start     PA Initiation  Medication: sildenafil 20 mg (90/30) - new start   Insurance Company: [employer] - Phone [phone] Fax [phone]:  [employer] SEFERINO  Pharmacy Filling the Rx: [facility] - [place], [place] - [address] [address]  Filling Pharmacy Phone:    Filling Pharmacy Fax:    Start Date: 9/24/2024  via [facility], key :[id number] w/ RHC dated : 3/29/24; Dx Code: I27.2 OOP TO ILD.   PA Case ID #: [id number]      ----------------------------  Insurance: Elizabeth Ojeda / [employer]  BIN: [id number]  PCN: [id number]  RX GRP#: *  ID#:  -  [id number]    Medicare ID NOT [employer] Seferino ID**  - [id number]    [id number] - [place] Medicaid ID              [employer] CMA- Prior Auths  Cardiology/Pulmonary Hypertension

## 2024-09-24 NOTE — LETTER
2024    Jayy Henson  [place] [facility] [address] [address]    RE: Linda Otero       Dear Colleague,     I had the pleasure of seeing Linda Otero in the [facility].  Service Date: 2024    RE:  Linda Otero   MRN:  [id number]  :  1960      Dear Dr. Montalvo,    We had the pleasure of seeing Linda Otero at the [facility]. Although you are familiar with this patient's history, please allow me to summarize it for the purpose of our records.     64-year-old female with very complicated past medical history.  She was doing reasonably well up until 12 years ago when she had a stroke from a paradoxical embolism through PFO.  She had DVT and PE.  She underwent PFO closure.  During this same hospitalization, apparently, she was also diagnosed with COPD due to longtime smoking.  She was started on supplemental oxygen, which she has been on for the last 12 years.  Because of her stroke she has been living in a nursing home for the last 8 years.  She was doing reasonably well up until April when she was admitted to [facility] with worsening exertional shortness of breath and lower extremity edema.  She was found to be significantly fluid overloaded.  An echocardiogram revealed severely dilated right ventricle with severely reduced right ventricular function.  CT pulmonary angiogram showed no evidence of pulmonary embolism.  VQ scan was negative for chronic thromboembolic disease.  She subsequently had a right heart catheterization which showed elevated biventricular filling pressure with mild to moderate pulmonary hypertension.  She was diagnosed with pulmonary hypertension due to combination of COPD and diastolic dysfunction.  She underwent intravenous diuresis close to 60 pounds.  Her weight was 345 pounds at the time of admission and was 284 at the time of discharge.  She was  given empirically Eliquis for 3 months.  She was started on Jardiance, spironolactone and Bumex.    Following this hospitalization she establish care with Dr. Montalvo who she knows from before.  As her CT and V/Q showed no evidence of pulmonary embolism, her Eliquis was stopped and she was switched to aspirin.  She was referred to us for further evaluation and management of her pulmonary hypertension.    She is starting to have reaccumulation of fluid in her legs bilaterally.  Her weight has gone up to 304 pounds.  She is having increasing shortness of breath.  She is also having an ulcer in her right lower extremity which is preventing her from ambulating much.  She walks by herself inside her apartment in the nursing home.  She uses a walker when she goes outside or wheelchair.  I would currently characterize her as functional class III.  She states that she is not dependent for her activities of daily living.  She denies having any exertional chest pain or chest pressure.  She has no PND or orthopnea.  She uses a CPAP with supplemental oxygen at nighttime.  She uses 5 L of oxygen 24/7.  She has not had any exertional presyncope or syncope.    PAST MEDICAL HISTORY:  1.  Stroke due to paradoxical embolism through PFO-status post closure of the PFO  2.  DVT and PE in the past  3.  Oxygen dependent COPD with chronic hypercapnic hypoxic respiratory failure  4.  Obesity with obstructive sleep apnea  5.  Heart failure with preserved ejection fraction documented by elevated filling pressure on right heart catheterization  6.  Coronary artery disease  7.  Cor triatriatum dextra with PFO  8.  Hypertension  9.  Hyperlipidemia  10.  Hypothyroidism    PAST SURGICAL HISTORY:  1.  Status post PFO closure    CURRENT MEDICATIONS:  Current Outpatient Medications   Medication Sig Dispense Refill     acetaminophen (TYLENOL) 325 MG tablet Take 2 tablets (650 mg) by mouth every 4 hours as needed for pain or fever 60 tablet 0      albuterol (PROVENTIL) (2.5 MG/3ML) 0.083% neb solution Take 1 vial (2.5 mg) by nebulization every 6 hours as needed for shortness of breath or wheezing OFFICE VISIT NEEDED FOR ANY FURTHER REFILLS 90 mL 0     aspirin (ASA) 81 MG chewable tablet Take 1 tablet (81 mg) by mouth daily 30 tablet 0     bumetanide (BUMEX) 2 MG tablet Take 2 tablets (4 mg) by mouth daily. HOLD if SBP<100 180 tablet 3     cyanocobalamin (CYANOCOBALAMIN) 1000 MCG/ML injection Inject 1 mL (1,000 mcg) into the muscle every 30 days 1 mL 0     empagliflozin (JARDIANCE) 10 MG TABS tablet Take 1 tablet (10 mg) by mouth daily 30 tablet 5     fluticasone-vilanterol (BREO ELLIPTA) 200-25 MCG/ACT inhaler Inhale 1 puff into the lungs daily 30 each 0     ipratropium - albuterol 0.5 mg/2.5 mg/3 mL (DUONEB) 0.5-2.5 (3) MG/3ML neb solution Take 1 vial (3 mLs) by nebulization 2 times daily 180 mL 0     loperamide (IMODIUM A-D) 2 MG tablet Take 1 tablet (2 mg) by mouth 4 times daily as needed for diarrhea 30 tablet 0     menthol-zinc oxide (CALMOSEPTINE) 0.44-20.6 % OINT ointment Apply topically daily as needed for skin protection Apply to affected area topically as needed for affected area daily 71 g 0     nystatin (MYCOSTATIN) 004054 UNIT/GM external powder Apply to breast BID and BID  g 0     OXYGEN-HELIUM IN Inhale 5 L into the lungs continuous prn.       polyethylene glycol (MIRALAX) 17 GM/Dose powder Take 17 g by mouth daily 510 g 0     senna-docusate (SENOKOT-S/PERICOLACE) 8.6-50 MG tablet Take 2 tablets by mouth 2 times daily as needed for constipation 60 tablet 0     sildenafil (REVATIO) 20 MG tablet Take 1 tablet (20 mg) by mouth 3 times daily.       spironolactone (ALDACTONE) 25 MG tablet Take 1 tablet (25 mg) by mouth daily 90 tablet 11     tiotropium (SPIRIVA RESPIMAT) 2.5 MCG/ACT inhaler INHALE TWO PUFFS BY MOUTH EVERY DAY 12 g 0     No current facility-administered medications for this visit.       ROS:   10 point ROS negative except as  "discussed in above HPI.    SOCIAL HISTORY:  She is single mom.  She has 3 grown kids 2 daughters and a son.  She has 5 grandkids.  She is currently on disability and lives in a nursing home.  She quit smoking in 2012.  She smoked 2 packs from the age of [age].  She drinks alcohol socially.  No recreational drug use.  FAMILY HISTORY:  Family History   Problem Relation Age of Onset     Angioedema Mother      Pulmonary Embolism Brother      Cerebrovascular Disease Father      Coronary Artery Disease Brother        EXAM:  /72 (BP Location: Right arm, Patient Position: Sitting, Cuff Size: Adult Large)   Pulse 92   Resp 24   Ht 1.683 m (5' 6.25\")   Wt 137.2 kg (302 lb 6.4 oz)   BMI 48.44 kg/m    Awake, alert, and oriented x 3.   Comfortable. No apparent distress.  No pallor, cyanosis, or clubbing  Carotids 2+ bilateral and pulse was regular in rhythm.  Elevated jugular venous pressure to centimeters above manubrium sternal angle  Heart: regular, normal S1/S2, 3 x 6 holosystolic murmur along the right lower sternal border.  No gallop or rub.  Lungs: NVBS and clear to auscultation bilaterally, no rales or wheezing  Abdomen: soft, non-tender, bowel sounds present, no hepatosplenomegaly  Extremities: 1+ edema bilaterally.  Neurological: No gross focal neurological deficit    Labs:  Recent Results (from the past 168 hour(s))   Iron and iron binding capacity    Collection Time: 09/24/24  2:17 PM   Result Value Ref Range    Iron 35 (L) 37 - 145 ug/dL    Iron Binding Capacity 383 240 - 430 ug/dL    Iron Sat Index 9 (L) 15 - 46 %   CRP inflammation    Collection Time: 09/24/24  2:17 PM   Result Value Ref Range    CRP Inflammation 33.90 (H) <5.00 mg/L   TSH    Collection Time: 09/24/24  2:17 PM   Result Value Ref Range    TSH 4.90 (H) 0.30 - 4.20 uIU/mL   N terminal pro BNP outpatient    Collection Time: 09/24/24  2:17 PM   Result Value Ref Range    N Terminal Pro BNP Outpatient 3,606 (H) 0 - 900 pg/mL   INR    " Collection Time: 09/24/24  2:17 PM   Result Value Ref Range    INR 1.25 (H) 0.85 - 1.15   CBC with platelets    Collection Time: 09/24/24  2:17 PM   Result Value Ref Range    WBC Count 7.6 4.0 - 11.0 10e3/uL    RBC Count 4.92 3.80 - 5.20 10e6/uL    Hemoglobin 11.2 (L) 11.7 - 15.7 g/dL    Hematocrit 37.7 35.0 - 47.0 %    MCV 77 (L) 78 - 100 fL    MCH 22.8 (L) 26.5 - 33.0 pg    MCHC 29.7 (L) 31.5 - 36.5 g/dL    RDW 23.9 (H) 10.0 - 15.0 %    Platelet Count 192 150 - 450 10e3/uL   Hepatic panel    Collection Time: 09/24/24  2:17 PM   Result Value Ref Range    Protein Total 7.3 6.4 - 8.3 g/dL    Albumin 4.0 3.5 - 5.2 g/dL    Bilirubin Total 1.6 (H) <=1.2 mg/dL    Alkaline Phosphatase 136 40 - 150 U/L    AST 18 0 - 45 U/L    ALT 11 0 - 50 U/L    Bilirubin Direct 0.70 (H) 0.00 - 0.30 mg/dL   Basic metabolic panel    Collection Time: 09/24/24  2:17 PM   Result Value Ref Range    Sodium 139 135 - 145 mmol/L    Potassium 4.2 3.4 - 5.3 mmol/L    Chloride 97 (L) 98 - 107 mmol/L    Carbon Dioxide (CO2) 25 22 - 29 mmol/L    Anion Gap 17 (H) 7 - 15 mmol/L    Urea Nitrogen 29.6 (H) 8.0 - 23.0 mg/dL    Creatinine 1.43 (H) 0.51 - 0.95 mg/dL    GFR Estimate 41 (L) >60 mL/min/1.73m2    Calcium 9.2 8.8 - 10.4 mg/dL    Glucose 114 (H) 70 - 99 mg/dL         EKG (03/2024):    Sinus rhythm  Possible Left atrial enlargement  Right axis deviation  Possible Right ventricular hypertrophy  Nonspecific ST abnormality  Abnormal QRS-T angle, consider primary T wave abnormality  Abnormal ECG  When compared with ECG of 12-APR-2019 09:58,  QRS axis Shifted right  ST now depressed in Anterior leads  T wave inversion now evident in Inferior leads       Echocardiogram (04/2024):  There is mild concentric left ventricular hypertrophy.  Left ventricular systolic function is normal.  The visual ejection fraction is 60-65%.  Flattened septum is consistent with RV pressure overload however unable to  estimate PA systolic pressure.  The right ventricle is  severely dilated. Moderately decreased right  ventricular systolic function  There is moderate to mod-severe (2-3+) tricuspid regurgitation.  Intact atrial septum. Closure device well seated.  Inferior vena cava not well visualized for estimation of right atrial  pressure.  There is no pericardial effusion.     Compared to study dated 5/23/2017, there is severe right ventricular  enlargement, dysfunction and evidence of pulmonary hypertension.      RHC and coronary angiogram (04/2024)    Initial right heart catheterization before diuresis:  RA pressure of 16, RV of 82/16, PA of 89/39 with a mean of 59, pulmonary capillary wedge pressure of 25, PA saturation of 57%, systemic saturation of 84%, estimated Elsa cardiac output of 7.3 with an index of 3, and PVR of 4.79 Wood units.    Right heart catheterization after diuresis:  RA pressure of 11, RV of 78/15, PA of 76/38 with a mean of 50, pulmonary capital wedge pressure of 10, PA saturation of 72%, measured Elsa cardiac output of 7.3 with an index of 3.2, and PVR of 5.5 Wood units.    Her coronary angiogram showed no significant epicardial coronary artery disease.    PFT (2020)  Her PFTs in 2020 showed an FVC of 56%, FEV1 of 29%, FEV1 by FVC of 41%, total lung capacity of 119%, and DLCO 41%.      V/Q scan  (04/2024)  Normal perfusion with no evidence of chronic thromboembolic disease    CT Chest (04/2024)  1.  No acute pulmonary embolism.  2.  Mild pulmonary interstitial edema, with associated small right pleural effusion.  3.  Enlarged main pulmonary artery, as evidence of pulmonary hypertension. Associated asymmetric right heart enlargement.  4.  Small to moderate volume ascites.  5.  Hepatic steatosis.     6MWT : Unable to do due to inability to ambulate      Assessment and Plan:     In summary, Linda Otero is a 64-year-old female with complex past medical history including cor triatriatum, PFO, stroke due to a paradoxical embolus, DVT and pulmonary embolism,  COPD with an FEV1 by FVC ratio 41%, chronic oxygen dependent hypercapnic hypoxic respiratory failure, obesity, obstructive sleep apnea, heart failure with preserved ejection fraction was referred to us for further evaluation management of pulmonary hypertension    She very clearly has severe pulmonary hypertension with severe RV dysfunction.  This is likely multifactorial combined pre and postcapillary pulm hypertension but with predominant precapillary component.  I suspect her precapillary component is driven by chronic hypercapnic hypoxic respiratory failure.  While her CT chest does not show any significant evidence of emphysema, her PFTs does show significant obstruction with an FEV1 by FVC of 41%.  She also likely has obesity hypoventilation and obstructive sleep apnea. While she has prior history of DVT and PE, her VQ scan was completely normal in April which excludes liter.  Consistent with this her CT pulmonary angiogram also showed no evidence of disease.  Her workup for other autoimmune associated cause of pulmonary arterial hypertension has been negative.    As she has a predominant precapillary component to her pulm hypertension with severe RV dysfunction, I discussed the off label use of sildenafil.  In general, it is reasonably well-tolerated tolerated without significant worsening hypoxemia.  There can be some symptomatic improvement, albeit very limited.  She would like to try this.  Will start her on 20 mg 3 times a day.  Will see her back in a month.  If she has worsening fluid retention or hypoxemia we will discontinue this.  If she symptomatically feels better we will continue on this.    I agree with continuing her on empagliflozin, spironolactone and Bumex for her diastolic dysfunction.  She appears volume overloaded.,  Thus, I took the liberty and increase her Bumex to 4 mg daily.  We will have a repeat chemistry next week when she returns to see the core clinic here at [facility].    She is  on supplemental oxygen and CPAP therapy which she will continue.    It was a pleasure seeing Linda Dallasstan at the [facility]. Please contact us with any questions or concerns that you may have. We thank you for involving us in this patients care.    Total time today was 78 minutes reviewing notes, imaging, labs, patient visit, orders and documentation     Sincerely,      Yash Berrios MD  Professor of Medicine  [facility]  Heart Failure, Transplant, and Mechanical Circulatory Support Cardiology   Cardiovascular Division  [facility]   [phone]          Thank you for allowing me to participate in the care of your patient.      Sincerely,     Yash Berrios MD     [facility]  cc:   Brett Montalvo MD  [address]  [place] [address]

## 2024-09-24 NOTE — NURSING NOTE
"Patient educated to the following during visit and educated to contact RN or MD for any questions.     Plan reviewed with patient:   Schedule for PFT- phone number given  to patient  Follow-up in October- scheduled in clinic    INCREASE bumex to 4mg daily- faxed updated orders to [facility]  START PA for sildenafil- sent to team    Reviewed Med list and verified all medications with patient.   Lab:  results reviewed in clinic. Patient demonstrated understanding.   Diet: Patient instructed regarding a heart healthy diet, including discussion of reduced fat and sodium intake. Patient demonstrated understanding of this information and agreed to call with further questions or concerns  Completed AVS  Follow-up orders placed  Marked chart \"Ready for Checkout\"  Sent msg to Mandeep Russell RN (P: [phone])  and  BUFFY Riojas    Patient verbalized understanding of plan and follow-up and agreed to call with further questions or concerns.       Trisha Langley RN      "

## 2024-09-24 NOTE — PATIENT INSTRUCTIONS
You were seen today in the Pulmonary Hypertension Clinic at [facility] .     Cardiology Provider you saw during your visit:    Dr. Berrios    Medication Changes:  INCREASE bumetanide. Take 4mg daily (2 tablets)  We will start a prior authorization for Sildenafil (20mg three times daily) we will update you when this is approved      Follow-up:   Schedule a pulmonary function. Please call [phone] to schedule    Follow-up with Dr. Berrios on 10/22 at 11:20AM      Please call us immediately if you have any syncope (fainting or passing out), chest pain, edema (swelling or weight gain), or decline in your functional status (general decline in how you are feeling).    If you have emergent concerns after hours or on the weekend, please call our on-call Cardiologist at [phone], option [phone]. For emergencies call [phone].     Thank you for allowing us to be a part of your care here at the [facility]    Please visit the pulmonary hypertension website for more information and support. [email or web address]    If you have questions or concerns please contact us at:    Mandeep Russell RN (P: [phone])    Nurse Coordinator       Pulmonary Hypertension     [facility]         KRISTA Chandra   (Prior Auths & Patient Assistance )             ()  Clinic   Clinic   Pulmonary Hypertension   Pulmonary Hypertension  [facility]  [facility]  (P)[phone]    (P) [phone]  (F) [phone]

## 2024-09-24 NOTE — PROGRESS NOTES
Service Date: 2024    RE:  Linda Otero   MRN:  [id number]  :  1960      Dear Dr. Montalvo,    We had the pleasure of seeing Linda Otero at the [facility] Pulmonary Hypertension Clinic. Although you are familiar with this patient's history, please allow me to summarize it for the purpose of our records.     64-year-old female with very complicated past medical history.  She was doing reasonably well up until 12 years ago when she had a stroke from a paradoxical embolism through PFO.  She had DVT and PE.  She underwent PFO closure.  During this same hospitalization, apparently, she was also diagnosed with COPD due to longtime smoking.  She was started on supplemental oxygen, which she has been on for the last 12 years.  Because of her stroke she has been living in a nursing home for the last 8 years.  She was doing reasonably well up until April when she was admitted to [facility] with worsening exertional shortness of breath and lower extremity edema.  She was found to be significantly fluid overloaded.  An echocardiogram revealed severely dilated right ventricle with severely reduced right ventricular function.  CT pulmonary angiogram showed no evidence of pulmonary embolism.  VQ scan was negative for chronic thromboembolic disease.  She subsequently had a right heart catheterization which showed elevated biventricular filling pressure with mild to moderate pulmonary hypertension.  She was diagnosed with pulmonary hypertension due to combination of COPD and diastolic dysfunction.  She underwent intravenous diuresis close to 60 pounds.  Her weight was 345 pounds at the time of admission and was 284 at the time of discharge.  She was given empirically Eliquis for 3 months.  She was started on Jardiance, spironolactone and Bumex.    Following this hospitalization she establish care with Dr. Montalvo who she knows from before.  As her CT and V/Q showed no  evidence of pulmonary embolism, her Eliquis was stopped and she was switched to aspirin.  She was referred to us for further evaluation and management of her pulmonary hypertension.    She is starting to have reaccumulation of fluid in her legs bilaterally.  Her weight has gone up to 304 pounds.  She is having increasing shortness of breath.  She is also having an ulcer in her right lower extremity which is preventing her from ambulating much.  She walks by herself inside her apartment in the nursing home.  She uses a walker when she goes outside or wheelchair.  I would currently characterize her as functional class III.  She states that she is not dependent for her activities of daily living.  She denies having any exertional chest pain or chest pressure.  She has no PND or orthopnea.  She uses a CPAP with supplemental oxygen at nighttime.  She uses 5 L of oxygen 24/7.  She has not had any exertional presyncope or syncope.    PAST MEDICAL HISTORY:  1.  Stroke due to paradoxical embolism through PFO-status post closure of the PFO  2.  DVT and PE in the past  3.  Oxygen dependent COPD with chronic hypercapnic hypoxic respiratory failure  4.  Obesity with obstructive sleep apnea  5.  Heart failure with preserved ejection fraction documented by elevated filling pressure on right heart catheterization  6.  Coronary artery disease  7.  Cor triatriatum dextra with PFO  8.  Hypertension  9.  Hyperlipidemia  10.  Hypothyroidism    PAST SURGICAL HISTORY:  1.  Status post PFO closure    CURRENT MEDICATIONS:  Current Outpatient Medications   Medication Sig Dispense Refill    acetaminophen (TYLENOL) 325 MG tablet Take 2 tablets (650 mg) by mouth every 4 hours as needed for pain or fever 60 tablet 0    albuterol (PROVENTIL) (2.5 MG/3ML) 0.083% neb solution Take 1 vial (2.5 mg) by nebulization every 6 hours as needed for shortness of breath or wheezing OFFICE VISIT NEEDED FOR ANY FURTHER REFILLS 90 mL 0    aspirin (ASA) 81 MG  chewable tablet Take 1 tablet (81 mg) by mouth daily 30 tablet 0    bumetanide (BUMEX) 2 MG tablet Take 2 tablets (4 mg) by mouth daily. HOLD if SBP<100 180 tablet 3    cyanocobalamin (CYANOCOBALAMIN) 1000 MCG/ML injection Inject 1 mL (1,000 mcg) into the muscle every 30 days 1 mL 0    empagliflozin (JARDIANCE) 10 MG TABS tablet Take 1 tablet (10 mg) by mouth daily 30 tablet 5    fluticasone-vilanterol (BREO ELLIPTA) 200-25 MCG/ACT inhaler Inhale 1 puff into the lungs daily 30 each 0    ipratropium - albuterol 0.5 mg/2.5 mg/3 mL (DUONEB) 0.5-2.5 (3) MG/3ML neb solution Take 1 vial (3 mLs) by nebulization 2 times daily 180 mL 0    loperamide (IMODIUM A-D) 2 MG tablet Take 1 tablet (2 mg) by mouth 4 times daily as needed for diarrhea 30 tablet 0    menthol-zinc oxide (CALMOSEPTINE) 0.44-20.6 % OINT ointment Apply topically daily as needed for skin protection Apply to affected area topically as needed for affected area daily 71 g 0    nystatin (MYCOSTATIN) 388471 UNIT/GM external powder Apply to breast BID and BID  g 0    OXYGEN-HELIUM IN Inhale 5 L into the lungs continuous prn.      polyethylene glycol (MIRALAX) 17 GM/Dose powder Take 17 g by mouth daily 510 g 0    senna-docusate (SENOKOT-S/PERICOLACE) 8.6-50 MG tablet Take 2 tablets by mouth 2 times daily as needed for constipation 60 tablet 0    sildenafil (REVATIO) 20 MG tablet Take 1 tablet (20 mg) by mouth 3 times daily.      spironolactone (ALDACTONE) 25 MG tablet Take 1 tablet (25 mg) by mouth daily 90 tablet 11    tiotropium (SPIRIVA RESPIMAT) 2.5 MCG/ACT inhaler INHALE TWO PUFFS BY MOUTH EVERY DAY 12 g 0     No current facility-administered medications for this visit.       ROS:   10 point ROS negative except as discussed in above HPI.    SOCIAL HISTORY:  She is single mom.  She has 3 grown kids 2 daughters and a son.  She has 5 grandkids.  She is currently on disability and lives in a nursing home.  She quit smoking in 2012.  She smoked 2 packs from  "the age of [age].  She drinks alcohol socially.  No recreational drug use.  FAMILY HISTORY:  Family History   Problem Relation Age of Onset    Angioedema Mother     Pulmonary Embolism Brother     Cerebrovascular Disease Father     Coronary Artery Disease Brother        EXAM:  /72 (BP Location: Right arm, Patient Position: Sitting, Cuff Size: Adult Large)   Pulse 92   Resp 24   Ht 1.683 m (5' 6.25\")   Wt 137.2 kg (302 lb 6.4 oz)   BMI 48.44 kg/m    Awake, alert, and oriented x 3.   Comfortable. No apparent distress.  No pallor, cyanosis, or clubbing  Carotids 2+ bilateral and pulse was regular in rhythm.  Elevated jugular venous pressure to centimeters above manubrium sternal angle  Heart: regular, normal S1/S2, 3 x 6 holosystolic murmur along the right lower sternal border.  No gallop or rub.  Lungs: NVBS and clear to auscultation bilaterally, no rales or wheezing  Abdomen: soft, non-tender, bowel sounds present, no hepatosplenomegaly  Extremities: 1+ edema bilaterally.  Neurological: No gross focal neurological deficit    Labs:  Recent Results (from the past 168 hour(s))   Iron and iron binding capacity    Collection Time: 09/24/24  2:17 PM   Result Value Ref Range    Iron 35 (L) 37 - 145 ug/dL    Iron Binding Capacity 383 240 - 430 ug/dL    Iron Sat Index 9 (L) 15 - 46 %   CRP inflammation    Collection Time: 09/24/24  2:17 PM   Result Value Ref Range    CRP Inflammation 33.90 (H) <5.00 mg/L   TSH    Collection Time: 09/24/24  2:17 PM   Result Value Ref Range    TSH 4.90 (H) 0.30 - 4.20 uIU/mL   N terminal pro BNP outpatient    Collection Time: 09/24/24  2:17 PM   Result Value Ref Range    N Terminal Pro BNP Outpatient 3,606 (H) 0 - 900 pg/mL   INR    Collection Time: 09/24/24  2:17 PM   Result Value Ref Range    INR 1.25 (H) 0.85 - 1.15   CBC with platelets    Collection Time: 09/24/24  2:17 PM   Result Value Ref Range    WBC Count 7.6 4.0 - 11.0 10e3/uL    RBC Count 4.92 3.80 - 5.20 10e6/uL    " Hemoglobin 11.2 (L) 11.7 - 15.7 g/dL    Hematocrit 37.7 35.0 - 47.0 %    MCV 77 (L) 78 - 100 fL    MCH 22.8 (L) 26.5 - 33.0 pg    MCHC 29.7 (L) 31.5 - 36.5 g/dL    RDW 23.9 (H) 10.0 - 15.0 %    Platelet Count 192 150 - 450 10e3/uL   Hepatic panel    Collection Time: 09/24/24  2:17 PM   Result Value Ref Range    Protein Total 7.3 6.4 - 8.3 g/dL    Albumin 4.0 3.5 - 5.2 g/dL    Bilirubin Total 1.6 (H) <=1.2 mg/dL    Alkaline Phosphatase 136 40 - 150 U/L    AST 18 0 - 45 U/L    ALT 11 0 - 50 U/L    Bilirubin Direct 0.70 (H) 0.00 - 0.30 mg/dL   Basic metabolic panel    Collection Time: 09/24/24  2:17 PM   Result Value Ref Range    Sodium 139 135 - 145 mmol/L    Potassium 4.2 3.4 - 5.3 mmol/L    Chloride 97 (L) 98 - 107 mmol/L    Carbon Dioxide (CO2) 25 22 - 29 mmol/L    Anion Gap 17 (H) 7 - 15 mmol/L    Urea Nitrogen 29.6 (H) 8.0 - 23.0 mg/dL    Creatinine 1.43 (H) 0.51 - 0.95 mg/dL    GFR Estimate 41 (L) >60 mL/min/1.73m2    Calcium 9.2 8.8 - 10.4 mg/dL    Glucose 114 (H) 70 - 99 mg/dL         EKG (03/2024):    Sinus rhythm  Possible Left atrial enlargement  Right axis deviation  Possible Right ventricular hypertrophy  Nonspecific ST abnormality  Abnormal QRS-T angle, consider primary T wave abnormality  Abnormal ECG  When compared with ECG of 12-APR-2019 09:58,  QRS axis Shifted right  ST now depressed in Anterior leads  T wave inversion now evident in Inferior leads       Echocardiogram (04/2024):  There is mild concentric left ventricular hypertrophy.  Left ventricular systolic function is normal.  The visual ejection fraction is 60-65%.  Flattened septum is consistent with RV pressure overload however unable to  estimate PA systolic pressure.  The right ventricle is severely dilated. Moderately decreased right  ventricular systolic function  There is moderate to mod-severe (2-3+) tricuspid regurgitation.  Intact atrial septum. Closure device well seated.  Inferior vena cava not well visualized for estimation of  right atrial  pressure.  There is no pericardial effusion.     Compared to study dated 5/23/2017, there is severe right ventricular  enlargement, dysfunction and evidence of pulmonary hypertension.      RHC and coronary angiogram (04/2024)    Initial right heart catheterization before diuresis:  RA pressure of 16, RV of 82/16, PA of 89/39 with a mean of 59, pulmonary capillary wedge pressure of 25, PA saturation of 57%, systemic saturation of 84%, estimated Elsa cardiac output of 7.3 with an index of 3, and PVR of 4.79 Wood units.    Right heart catheterization after diuresis:  RA pressure of 11, RV of 78/15, PA of 76/38 with a mean of 50, pulmonary capital wedge pressure of 10, PA saturation of 72%, measured Elsa cardiac output of 7.3 with an index of 3.2, and PVR of 5.5 Wood units.    Her coronary angiogram showed no significant epicardial coronary artery disease.    PFT (2020)  Her PFTs in 2020 showed an FVC of 56%, FEV1 of 29%, FEV1 by FVC of 41%, total lung capacity of 119%, and DLCO 41%.      V/Q scan  (04/2024)  Normal perfusion with no evidence of chronic thromboembolic disease    CT Chest (04/2024)  1.  No acute pulmonary embolism.  2.  Mild pulmonary interstitial edema, with associated small right pleural effusion.  3.  Enlarged main pulmonary artery, as evidence of pulmonary hypertension. Associated asymmetric right heart enlargement.  4.  Small to moderate volume ascites.  5.  Hepatic steatosis.     6MWT : Unable to do due to inability to ambulate      Assessment and Plan:     In summary, Linda Otero is a 64-year-old female with complex past medical history including cor triatriatum, PFO, stroke due to a paradoxical embolus, DVT and pulmonary embolism, COPD with an FEV1 by FVC ratio 41%, chronic oxygen dependent hypercapnic hypoxic respiratory failure, obesity, obstructive sleep apnea, heart failure with preserved ejection fraction was referred to us for further evaluation management of pulmonary  hypertension    She very clearly has severe pulmonary hypertension with severe RV dysfunction.  This is likely multifactorial combined pre and postcapillary pulm hypertension but with predominant precapillary component.  I suspect her precapillary component is driven by chronic hypercapnic hypoxic respiratory failure.  While her CT chest does not show any significant evidence of emphysema, her PFTs does show significant obstruction with an FEV1 by FVC of 41%.  She also likely has obesity hypoventilation and obstructive sleep apnea. While she has prior history of DVT and PE, her VQ scan was completely normal in April which excludes liter.  Consistent with this her CT pulmonary angiogram also showed no evidence of disease.  Her workup for other autoimmune associated cause of pulmonary arterial hypertension has been negative.    As she has a predominant precapillary component to her pulm hypertension with severe RV dysfunction, I discussed the off label use of sildenafil.  In general, it is reasonably well-tolerated tolerated without significant worsening hypoxemia.  There can be some symptomatic improvement, albeit very limited.  She would like to try this.  Will start her on 20 mg 3 times a day.  Will see her back in a month.  If she has worsening fluid retention or hypoxemia we will discontinue this.  If she symptomatically feels better we will continue on this.    I agree with continuing her on empagliflozin, spironolactone and Bumex for her diastolic dysfunction.  She appears volume overloaded.,  Thus, I took the liberty and increase her Bumex to 4 mg daily.  We will have a repeat chemistry next week when she returns to see the core clinic here at [facility].    She is on supplemental oxygen and CPAP therapy which she will continue.    It was a pleasure seeing Linda Otero at the [facility] Pulmonary Hypertension Clinic. Please contact us with any questions or concerns that you may have. We thank  you for involving us in this patients care.    Total time today was 78 minutes reviewing notes, imaging, labs, patient visit, orders and documentation     Sincerely,      Yash Berrios MD  Professor of Medicine  [facility] Pulmonary Hypertension  Heart Failure, Transplant, and Mechanical Circulatory Support Cardiology   Cardiovascular Division  [facility]   [phone]

## 2024-09-24 NOTE — TELEPHONE ENCOUNTER
----- Message from Trisha LYON sent at 9/24/2024  4:25 PM CDT -----  Regarding: RE: PA  Here is what I took from her cards today FYI. She might have [employer] MDCR? I asked her to have the  scan it since I didn't see it in the chart      [employer] Card  ID [id number]  RX:   PCN: [id number]  BIN [id number]  ----- Message -----  From: Trisha Langley, FRANCISCO  Sent: 9/24/2024   4:19 PM CDT  To: Trisha Langley RN; Georgette Mondragon CMA; #  Subject: Yash Lowery MD would like patient     to start on : sildenafil 20 mg (generic)       Dx Code:  I27.23 - PH ILD -PH due to lung diseases and hypoxia. She does have PAH OOP   WHO Group :   1  FC:  III      Appointments needed :  There is a few moving parts to this patient. She lives at [facility]. I called them to have them follow-up if this would need to come from specialty. [facility] will call me back on this as an in-case. Most of her meds are filled through this mail order pharmacy ([facility]) hopefully it can just be filled at general pharm. Nurses manage her meds and admin. New perspective [facility] [phone]. You have to fax them updated orders if we change anything. Today I faxed over th e increased bumex plan. She does have a  that helps manage her finances (listed on snapshot)    Mandeep- she needs to schedule a PFT- I gave her the number. She has a follow-up with TT in October at  already scheduled today. Ill let you know what I find out from [facility] or if they don't call me back    Lmk if you have questions.

## 2024-09-25 NOTE — TELEPHONE ENCOUNTER
9/25/2024  @ 10:32 AM -  sildenafil 20 mg (90/30) - approved Approved today by [employer] [facility] 2017  PA Case: [id number], Status: Approved, Coverage Starts on: 8/1/2024 12:00:00 AM, Coverage Ends on: 12/31/2024 12:00:00 AM. Questions? Contact [phone].   Authorization Expiration Date: 12/30/2024    Prior Authorization Approval  Medication: SILDENAFIL CITRATE 20 MG PO TABS  Authorization Effective Date: 9/25/2024  Authorization Expiration Date: 12/31/2024  Approved Dose/Quantity: 90/30  Reference #: [id number]   Insurance Company: [employer] - Phone [phone] Fax [phone]:  [employer] [place]  Expected CoPay: $    CoPay Card Available:      Financial Assistance Needed: *Undetermined as of the date of this note   Which Pharmacy is filling the prescription: [facility] - [address]  Pharmacy Notified: *  Patient Notified: *    Updated Snapshot, added to PA Calendar; Routed to  Nursing :  Mandeep Russell, FRANCISCO GOODMAN CMA- Prior Auths  Cardiology/Pulmonary Hypertension

## 2024-09-25 NOTE — TELEPHONE ENCOUNTER
LVM for pt with the below recommendation of Dr Berrios.      Team,     She is iron deficient. She can benefit from IV iron infusion. I would appreciate it if you could schedule for IV iron infusion as per our protocol.     Thank you     TT    Mandeep Russell RN on 9/25/2024 at 4:20 PM

## 2024-10-01 PROBLEM — I50.32 CHRONIC HEART FAILURE WITH PRESERVED EJECTION FRACTION (HFPEF) (H): Status: ACTIVE | Noted: 2024-01-01

## 2024-10-01 NOTE — PROGRESS NOTES
Assessment/Recommendations   Assessment:    #   Acute on chronic Diastolic heart failure, NYHA Class III-IV:  Patient was hospitalized in March through May 7 at [facility] with acute on chronic hypoxic respiratory failure likely multifactorial, acute heart failure exacerbation, COPD exacerbation, acute right-sided heart failure with moderate to severe tricuspid regurgitation, severe pulmonary hypertension with mixed WHO group 2 and 3, hemoptysis, weakness and deconditioning, SONAL, and suspected cellulitis. Patient was followed by cardiology group at [facility] in the hospital.  Patient was treated with course of antibiotic, steroid therapy and started on COPD treatment.    Patient was previously followed by Dr. Montalvo and was last seen in 2019.  Discharge weight in May 2024 was 284 pounds.    Echocardiogram on 4/1/2024 showed preserved LVEF of 60 to 65% with mild concentric LVH, moderate to severe tricuspid regurgitation, flattened septum consistent with RV pressure overload.      Coronary angiogram in April 2024 showed normal coronary arteries with severely elevated right-sided filling pressure, left-sided filling pressure and severe mixed precapillary and postcapillary pulmonary hypertension.    Heart cath  on 4/19/2024 showed mildly elevated right-sided filling pressure, normal left-sided filling pressure, and severely elevated pulmonary artery hypertension.    Patient saw Dr. Berrios on 9/24/2024. Her severe pulmonary hypertension was thought to be  Multifactorial.       She was found volume overload.  Weight was found at 302 pounds.  Weight is up approximately 18 pounds.    Bumex dose was increased to 4 mg daily.    Patient is hypervolemic on exam.    Heart failure regimen includes:    -Aldosterone blocker/MRA therapy with spironolactone 25 mg daily    -SGLT2 Inhibitor with Jardiance 10 mg daily    -Diuretic therapy with Bumex 4 mg daily    We discussed and reviewed about heart failure,  "medication management, and lifestyle management including low sodium diet <2 g/day, daily weight, and staying physically active as tolerated. Patient met with [facility] nurse clinician for heart failure education.    #  Acute on Chronic Kidney Disease Stage IIIb:BMP on 9/24/24 K+ 4.2 and Cr. 1.43.  Creatinine slightly higher than previous.  Blood pressure stable at 112/50    #  Hypertension:    # Morbid Obesity with a BMI of 48.60, Obstructive sleep apnea/COPD with chronic O2 5 L per nasal cannula.  Using CPAP at night.    Plan/Recommendation:  -Given significant weight gain with hypervolemic on exam, we discussed about ER visit for further evaluation management of heart failure but patient declined.  - BMP/NTproBN/Mg+ pending  - Will consider further diuretic adjustment with lab result.  - low-sodium diet <2000 mg per day, daily weight monitoring, and maintain fluid intake at 50 to 60 ounces per day  -Enroll in [employer] heart failure diet program    We discussed about  follow-up with Dr. Wright for heart failure consultation but patient would like to hold off for now.  We discussed about potential IV diuretic as an outpatient.  Patient is agreeable.    Follow up with Philipp in 1-2 months in HF clinic     The longitudinal plan of care for Acute on chronic diastolic heart failure, acute on chronic kidney diease stage IIIb was addressed during this visit.?Due to the added complexity in care, I will continue to support Linda Otero in the subsequent management of this condition(s) and with the ongoing continuity of care of this condition(s)\".     History of Present Illness/Subjective    Ms. Linda Otero is a 64 year old female with a past medical history of CVA with PFO closure for paradoxical emboli in 2012, COPD, obstructive sleep apnea on CPAP, obesity hypoventilation syndrome, morbid obesity, pulmonary hypertension, and acute on chronic heart failure with preserved ejection fraction who is [facility] at [facility] for post hospitalization heart failure follow up.     Today, Linda is accompanied by her caregiver. She reports minimal or no improvement in HF symptoms since on higher dose of Bumex.   She denies lightheadedness, shortness of breath, orthopnea, PND, palpitations, and chest pain.  Her physical activity is limited with SOB.    Patient lives in an assisted living. She tries to cook at home at time. She doesn't like food from cafeteria.    ECHO from 4/2024-Reviewed:   Interpretation Summary   Patient terminated exam prior to completion.  There is mild concentric left ventricular hypertrophy.  Left ventricular systolic function is normal.  The visual ejection fraction is 60-65%.  Flattened septum is consistent with RV pressure overload however unable to  estimate PA systolic pressure.  The right ventricle is severely dilated. Moderately decreased right  ventricular systolic function  There is moderate to mod-severe (2-3+) tricuspid regurgitation.  Intact atrial septum. Closure device well seated.  Inferior vena cava not well visualized for estimation of right atrial  pressure.  There is no pericardial effusion.     Compared to study dated 5/23/2017, there is severe right ventricular  enlargement, dysfunction and evidence of pulmonary hypertension    Right heart cath 3/2024: reviewed:  Conclusion     Right sided filling pressures are mildly elevated.    Left sided filling pressures are normal.    Severely elevated pulmonary artery hypertension.    Normal cardiac output level.     Mildly elevated right side fillng pressure and normal left side filling pressure (with marked respiratory variability due to obese body habitus).  RA mean 11mmHg  PCWP mean 12mmHg  Severe pulmonary hypertension  PA 76/28, mean 50mmHg     Physical Examination Review of Systems   /50 (BP Location: Left arm, Patient Position: Sitting, Cuff Size: Adult Regular)   Pulse 104   Resp 12   Ht 1.676  "m (5' 6\")   Wt 136.6 kg (301 lb 1.6 oz)   BMI 48.60 kg/m    Body mass index is 48.6 kg/m .  Wt Readings from Last 3 Encounters:   10/02/24 136.6 kg (301 lb 1.6 oz)   09/24/24 137.2 kg (302 lb 6.4 oz)   07/02/24 127.5 kg (281 lb)     General Appearance:   no distress, normal body habitus   ENT/Mouth: membranes moist, no oral lesions or bleeding gums.      EYES:  no scleral icterus, normal conjunctivae   Neck: no carotid bruits or thyromegaly   Chest/Lungs:   lungs are clear to auscultation, no rales or wheezing, equal chest wall expansion except crackles in bilateral bases   Cardiovascular:   Tachycardic. Normal first and second heart sounds with no murmurs, rubs, or gallops; JVP is difficult to assess due to the patient's obesity and body habitus   , 2+ pitting edema bilaterally extending into lower posterior thigh   Abdomen:  Large; no organomegaly, masses, bruits, or tenderness; bowel sounds are present   Extremities   no cyanosis or clubbing    CMS intact.   Skin: no xanthelasma, warm.    Neurologic: Alert and oriented X 3 no tremors   Psychiatric: calm and cooperative                                                   Negative unless noted in HPI     Medical History  Surgical History Family History Social History   Past Medical History:   Diagnosis Date    Abnormality of gait due to impairment of balance     Acute and chronic respiratory failure with hypercapnia (H)     Acute deep venous thrombosis (H) 10/20/2015    Anemia     Iron deficiency    Aneurysm (H) 2012    Arthritis     toes, thumb, elbow    B12 deficiency     Cancer (H) 1985    cervical    Cellulitis right foot    Chronic diastolic heart failure (H)     Chronic kidney disease     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  left thalamic stroke as well as patchy MCA branch infarcts    CVA (cerebral vascular accident) (H) 12/23/2012    Left thalamic stroke, MCA branch infarcts    Dermatitis     DVT (deep venous thrombosis) (H)     Right leg    Encephalopathy     after stroke, believed related to hypoxia    History of transfusion     Hyperlipidemia     Hypertension     Hypothyroidism     Lymphedema     MGUS (monoclonal gammopathy of unknown significance)     Neuropathy of right lower extremity     Obesity     On home oxygen therapy     PFO (patent foramen ovale)     closed after stroke, see cardiology notes care everywhere    PFO (patent foramen ovale)     Pneumonia     Primary hypercoagulable state (H)     Pulmonary emboli (H) 2013    Pulmonary HTN (H)     Respiratory failure (H)     after stroke    Seizure (H)     Seizures (H)     at age 31    Skin cancer 2014    Sleep apnea     CPAP    Stroke (H) 2012    Umbilical hernia     Wound of right ankle     Past Surgical History:   Procedure Laterality Date    ASD REPAIR      ASD REPAIR      BIOPSY SKIN (LOCATION)      CARDIAC CATHETERIZATION  2012    2 stents    COLONOSCOPY N/A 4/9/2018    Procedure: COLONOSCOPY;  Surgeon: Dorene Araujo MD;  Location: [facility] OR;  Service:     CONIZATION  1985    CV CORONARY ANGIOGRAM N/A 4/12/2019    Procedure: Coronary Angiogram;  Surgeon: Brett Montalvo MD;  Location: [facility] Cath Lab;  Service: Cardiology    CV CORONARY ANGIOGRAM N/A 4/12/2024    Procedure: Coronary Angiogram;  Surgeon: Enoc Crocker MD;  Location: [facility] CARDIAC CATH LAB    CV INTRAVASULAR ULTRASOUND N/A 4/12/2024    Procedure: Intravascular Ultrasound;  Surgeon: Enoc Crocker MD;  Location: [facility] CARDIAC CATH LAB    CV LEFT HEART CATHETERIZATION WITHOUT LEFT VENTRICULOGRAM Left 4/12/2019    Procedure: Left Heart Catheterization Without Left Ventriculogram;  Surgeon: Brett Montalvo MD;  Location: [facility] Cath Lab;   Service: Cardiology    CV RIGHT HEART CATH MEASUREMENTS RECORDED N/A 4/12/2024    Procedure: Right Heart Catheterization;  Surgeon: Enoc Crocker MD;  Location:  HEART CARDIAC CATH LAB    CV RIGHT HEART CATH MEASUREMENTS RECORDED N/A 4/19/2024    Procedure: Right Heart Cath;  Surgeon: Sparkle Suresh MD;  Location:  HEART CARDIAC CATH LAB    CV RIGHT HEART CATHETERIZATION N/A 4/12/2019    Procedure: Right Heart Catheterization;  Surgeon: Brett Montalvo MD;  Location: [facility] Cath Lab;  Service: Cardiology    ESOPHAGOSCOPY, GASTROSCOPY, DUODENOSCOPY (EGD), COMBINED N/A 4/9/2018    Procedure: ESOPHAGOGASTRODUODENOSCOPY (EGD);  Surgeon: Dorene Araujo MD;  Location: [facility];  Service:     IR CAROTID ANGIOGRAM  12/26/2012    IR CAROTID ANGIOGRAM  12/26/2012    IR MISCELLANEOUS PROCEDURE  12/26/2012    IR MISCELLANEOUS PROCEDURE  12/26/2012    IR MISCELLANEOUS PROCEDURE  12/26/2012    OTHER SURGICAL HISTORY      Cardiac stents    PATENT FORAMEN OVALE CLOSURE      REPAIR PATENT FORAMEN OVALE  2013    Helex device    XR SACRO ILIAC JOINT INJECTION LEFT  11/2012    ZZC REMV ART CLOT ILIAC-POP,LEG INCIS Left 7/22/2019    Procedure: REPAIR LEFT FEMORAL ARTERIOVENOUS FISTULA WITH INTRAOPERATIVE ULTRASOUND;  Surgeon: Salinas Torres MD;  Location: [facility];  Service: General    Family History   Problem Relation Age of Onset    Angioedema Mother     Pulmonary Embolism Brother     Cerebrovascular Disease Father     Coronary Artery Disease Brother     Social History     Socioeconomic History    Marital status:      Spouse name: Not on file    Number of children: Not on file    Years of education: Not on file    Highest education level: Not on file   Occupational History    Not on file   Tobacco Use    Smoking status: Former     Current packs/day: 0.00     Average packs/day: 1 pack/day for 44.0 years (44.0 ttl pk-yrs)     Types: Cigarettes     Start date: 12/23/1968      Quit date: 2012     Years since quittin.7    Smokeless tobacco: Never   Vaping Use    Vaping status: Never Used   Substance and Sexual Activity    Alcohol use: No     Alcohol/week: 0.0 standard drinks of alcohol    Drug use: No    Sexual activity: Not Currently   Other Topics Concern    Parent/sibling w/ CABG, MI or angioplasty before 65F 55M? Not Asked   Social History Narrative    Not on file     Social Determinants of Health     Financial Resource Strain: Not on file   Food Insecurity: Not on file   Transportation Needs: Not on file   Physical Activity: Not on file   Stress: Not on file   Social Connections: Not on file   Interpersonal Safety: Not on file   Housing Stability: Not on file          Medications  Allergies   Current Outpatient Medications   Medication Sig Dispense Refill    acetaminophen (TYLENOL) 325 MG tablet Take 2 tablets (650 mg) by mouth every 4 hours as needed for pain or fever 60 tablet 0    albuterol (PROVENTIL) (2.5 MG/3ML) 0.083% neb solution Take 1 vial (2.5 mg) by nebulization every 6 hours as needed for shortness of breath or wheezing OFFICE VISIT NEEDED FOR ANY FURTHER REFILLS 90 mL 0    aspirin (ASA) 81 MG chewable tablet Take 1 tablet (81 mg) by mouth daily 30 tablet 0    bumetanide (BUMEX) 2 MG tablet Take 2 tablets (4 mg) by mouth daily. 180 tablet 3    cyanocobalamin (CYANOCOBALAMIN) 1000 MCG/ML injection Inject 1 mL (1,000 mcg) into the muscle every 30 days 1 mL 0    empagliflozin (JARDIANCE) 10 MG TABS tablet Take 1 tablet (10 mg) by mouth daily 30 tablet 5    fluticasone-vilanterol (BREO ELLIPTA) 200-25 MCG/ACT inhaler Inhale 1 puff into the lungs daily 30 each 0    ipratropium - albuterol 0.5 mg/2.5 mg/3 mL (DUONEB) 0.5-2.5 (3) MG/3ML neb solution Take 1 vial (3 mLs) by nebulization 2 times daily 180 mL 0    loperamide (IMODIUM A-D) 2 MG tablet Take 1 tablet (2 mg) by mouth 4 times daily as needed for diarrhea 30 tablet 0    menthol-zinc oxide (CALMOSEPTINE)  0.44-20.6 % OINT ointment Apply topically daily as needed for skin protection Apply to affected area topically as needed for affected area daily 71 g 0    nystatin (MYCOSTATIN) 985294 UNIT/GM external powder Apply to breast BID and BID  g 0    OXYGEN-HELIUM IN Inhale 5 L into the lungs continuous prn.      polyethylene glycol (MIRALAX) 17 GM/Dose powder Take 17 g by mouth daily 510 g 0    senna-docusate (SENOKOT-S/PERICOLACE) 8.6-50 MG tablet Take 2 tablets by mouth 2 times daily as needed for constipation 60 tablet 0    sildenafil (REVATIO) 20 MG tablet Take 1 tablet (20 mg) by mouth 3 times daily. Take 20mg (1 tablet) for 30 days then increase to 40mg (2 tablets) after. 90 tablet 11    sildenafil (REVATIO) 20 MG tablet Take 1 tablet (20 mg) by mouth 3 times daily.      spironolactone (ALDACTONE) 25 MG tablet Take 1 tablet (25 mg) by mouth daily 90 tablet 11    tiotropium (SPIRIVA RESPIMAT) 2.5 MCG/ACT inhaler INHALE TWO PUFFS BY MOUTH EVERY DAY 12 g 0    Allergies   Allergen Reactions    Eliquis [Apixaban] Shortness Of Breath, Hives and Swelling     Pt. Reported     Penicillins Anaphylaxis     Per chart review: tolerated Augmentin in 2020.  Tolerated CTX 2024.       Erythromycin Hives and Itching    Peanut Oil Hives    Tetracaine     Clindamycin Itching    Tetracycline Itching         Lab Results    Chemistry/lipid CBC Cardiac Enzymes/BNP/TSH/INR   Lab Results   Component Value Date    CHOL 130 09/24/2024    HDL 43 (L) 09/24/2024    TRIG 89 09/24/2024    BUN 29.6 (H) 09/24/2024     09/24/2024    CO2 25 09/24/2024    Lab Results   Component Value Date    WBC 7.6 09/24/2024    HGB 11.2 (L) 09/24/2024    HCT 37.7 09/24/2024    MCV 77 (L) 09/24/2024     09/24/2024    Lab Results   Component Value Date    TROPONINI <0.01 01/19/2019    BNP 96 (H) 01/20/2019    TSH 4.90 (H) 09/24/2024    INR 1.25 (H) 09/24/2024        40  minutes spent on the date of encounter doing chart review, review of test  results, interpretation with above tests, patient visit, documentation, and discussion with family.        This note has been dictated using voice recognition software. Any grammatical, typographical, or context distortions are unintentional and inherent to the software

## 2024-10-02 NOTE — TELEPHONE ENCOUNTER
----- Message from Philipp Frazier sent at 10/2/2024 12:39 PM CDT -----  NTproBNP is similar to a week ago.  Stable renal function.  Take metolazone 2.5 mg X 1. After 30 minutes, take extra 4 mg Bumex today.  Start on potassium chloride 20 mEq daily.  BMP tomorrow.  Thank you!  CY

## 2024-10-02 NOTE — TELEPHONE ENCOUNTER
Spoke renea Crane, Nurse at pt's [facility]. They usually get medications the next day there. They do have the ability to draw labs every Monday and they result through [facility] per her report, but unsure if same day. She confirmed that pt is currently on bumex 4mg daily, Jardiance 10mg daily, spironolactone 2mg daily. NOT on potassium supplement yet. She will fax med list over for our reference.     Called pt to see if she would be agreeable and able to going out to get blood work drawn this Friday 10/4 at the OP lab, as that will make a difference in plan of care. No answer, LM to call back to discuss.    Spoke w/ pt's caregiver Daniela, who was at the visit today. She reports no longer being with the pt, but that she will be with her tomorrow [id number]. She is with her pretty much every Tues and Thurs, some Wed. She reports that the pt is likely sleeping and did not plan on doing anything further today as she is exhausted from her outing this morning, and usually has her phone off. She reports that she is pretty much pt's sole transportation and that her son Cosme is not involved in her care.     BHAVNA Kenney RN

## 2024-10-02 NOTE — TELEPHONE ENCOUNTER
Spoke w/ Philipp in person to discuss plan, as pt is at [facility] and they manage her meds. So it may not be possible to start potassium or take dose of metolazone today. If original plan cannot be implemented today, Philipp made recommendation for pt to take extra 4mg bumex this afternoon, then start potassium chloride tomorrow and take metolazone 2.mg x1 dose prior to AM dose of bumex with BMP the next day.     Will discuss w/ pt and [facility] nurse staff.     BHAVNA Kenney RN

## 2024-10-02 NOTE — PROGRESS NOTES
[facility]: Heart Failure Care Coordination   Heart Failure Education    Situation/Background:      RN CC provided heart failure education to pt during clinic visit.    Assessment:      Living situation: assisted living, [facility] in [place] on [address]    Barriers to Heart Failure follow-up: Physical impairment, Transportation concerns, and pt being overwhelmed by her current health status/situation    Pt uses a WC for going out of jail and requires assistance for this, but denies using a RW or WC in her apartment.    Medication management: facility-dispensed    Pt has a personal scale at her jail, but is not currently taking her weight daily. Advised her to start doing so and to write it in the weight log for close monitoring, pt verbalized understanding. Pt reports eating about 3 meals per week at her facility. She has limited financial resources, but has her caregiver Daniela grocery shop for some things for her such as fruits and vegetables, yogurt. We reviewed reading nutrition labels, looking for low sodium products, utilizing sodium free seasonings. Pt does not cook w/ salt or add it elsewhere. Pt is aware that the facility foods are higher in sodium and is frustrated by this. She is very interested in [employer]. Provided application and instructions, as well as address to [facility] to mail it back to when finished so we can complete. Pt has been eating ice chips for moisture/ hydration. Reviewed recommendation of drinking 50-60oz daily and the importance of adequate hydration, but not excess. Pt doesn't think she's been drinking up to 50oz even, encouraged utilizing a water bottle, etc for monitoring.    Today, pt had a lab drawn for Dr. Berrios prior to her appt w/ Philipp downstairs at the OP lab as she is a difficult stick. Philipp recommended labs today so that she could further adjust her diuretics, so unfortunately pt was going to need to be stuck again. Pt was agreeable, but frustrated. She went  back down to the lab and had Hirae's labs drawn.    Intervention/Plan:      CM/HF education topics reviewed:  Low sodium: 2000 mg or less daily, meal choices and label reading   Fluid Restriction: 50-60oz   Daily weight monitoring and logging   Overview of heart failure appointments and testing   Symptoms of HF to be reported to Core Team      Education materials provided:  Low sodium food and drink handout  Already prepared low salt meal delivery services handout  HF stoplight tool  Cardiac medication handout     HF resources reviewed:  [employer]      Patient to follow up as scheduled.  RN CC reviewed and reinforced fluid/dietary sodium restrictions; patient stated understanding.  Instructed patient to call RN line with new or worsening heart failure symptoms and/or rapid weight gain.     Patient expressed understanding of above education/instructions and denied further questions at this time.     MARY [email or web address] RN

## 2024-10-02 NOTE — LETTER
10/2/2024    Jayy Henson  [facility] [address] [facility] [address]    RE: Linda Otero       Dear Colleague,     I had the pleasure of seeing Linda Otero in the [facility].          Assessment/Recommendations   Assessment:    #   Acute on chronic Diastolic heart failure, NYHA Class III-IV:  Patient was hospitalized in March through May 7 at [facility] with acute on chronic hypoxic respiratory failure likely multifactorial, acute heart failure exacerbation, COPD exacerbation, acute right-sided heart failure with moderate to severe tricuspid regurgitation, severe pulmonary hypertension with mixed WHO group 2 and 3, hemoptysis, weakness and deconditioning, SONAL, and suspected cellulitis. Patient was followed by cardiology group at [facility] in the hospital.  Patient was treated with course of antibiotic, steroid therapy and started on COPD treatment.    Patient was previously followed by Dr. Montalvo and was last seen in 2019.  Discharge weight in May 2024 was 284 pounds.    Echocardiogram on 4/1/2024 showed preserved LVEF of 60 to 65% with mild concentric LVH, moderate to severe tricuspid regurgitation, flattened septum consistent with RV pressure overload.      Coronary angiogram in April 2024 showed normal coronary arteries with severely elevated right-sided filling pressure, left-sided filling pressure and severe mixed precapillary and postcapillary pulmonary hypertension.    Heart cath  on 4/19/2024 showed mildly elevated right-sided filling pressure, normal left-sided filling pressure, and severely elevated pulmonary artery hypertension.    Patient saw Dr. Berrios on 9/24/2024. Her severe pulmonary hypertension was thought to be  Multifactorial.       She was found volume overload.  Weight was found at 302 pounds.  Weight is up approximately 18 pounds.    Bumex dose was increased to 4 mg daily.    Patient is hypervolemic on exam.    Heart failure regimen  "includes:    -Aldosterone blocker/MRA therapy with spironolactone 25 mg daily    -SGLT2 Inhibitor with Jardiance 10 mg daily    -Diuretic therapy with Bumex 4 mg daily    We discussed and reviewed about heart failure, medication management, and lifestyle management including low sodium diet <2 g/day, daily weight, and staying physically active as tolerated. Patient met with [facility] nurse clinician for heart failure education.    #  Acute on Chronic Kidney Disease Stage IIIb:BMP on 9/24/24 K+ 4.2 and Cr. 1.43.  Creatinine slightly higher than previous.  Blood pressure stable at 112/50    #  Hypertension:    # Morbid Obesity with a BMI of 48.60, Obstructive sleep apnea/COPD with chronic O2 5 L per nasal cannula.  Using CPAP at night.    Plan/Recommendation:  -Given significant weight gain with hypervolemic on exam, we discussed about ER visit for further evaluation management of heart failure but patient declined.  - BMP/NTproBN/Mg+ pending  - Will consider further diuretic adjustment with lab result.  - low-sodium diet <2000 mg per day, daily weight monitoring, and maintain fluid intake at 50 to 60 ounces per day  -Enroll in [facility] heart failure diet program    We discussed about  follow-up with Dr. Wright for heart failure consultation but patient would like to hold off for now.  We discussed about potential IV diuretic as an outpatient.  Patient is agreeable.    Follow up with Philipp in 1-2 months in HF clinic     The longitudinal plan of care for Acute on chronic diastolic heart failure, acute on chronic kidney diease stage IIIb was addressed during this visit.?Due to the added complexity in care, I will continue to support Linda Otero in the subsequent management of this condition(s) and with the ongoing continuity of care of this condition(s)\".     History of Present Illness/Subjective    Ms. Linda Otero is a 64 year old female with a past medical history of CVA with PFO closure for " paradoxical emboli in 2012, COPD, obstructive sleep apnea on CPAP, obesity hypoventilation syndrome, morbid obesity, pulmonary hypertension, and acute on chronic heart failure with preserved ejection fraction who is seen at [facility] for post hospitalization heart failure follow up.     Today, Linda is accompanied by her caregiver. She reports minimal or no improvement in HF symptoms since on higher dose of Bumex.   She denies lightheadedness, shortness of breath, orthopnea, PND, palpitations, and chest pain.  Her physical activity is limited with SOB.    Patient lives in an assisted living. She tries to cook at home at time. She doesn't like food from cafeteria.    ECHO from 4/2024-Reviewed:   Interpretation Summary   Patient terminated exam prior to completion.  There is mild concentric left ventricular hypertrophy.  Left ventricular systolic function is normal.  The visual ejection fraction is 60-65%.  Flattened septum is consistent with RV pressure overload however unable to  estimate PA systolic pressure.  The right ventricle is severely dilated. Moderately decreased right  ventricular systolic function  There is moderate to mod-severe (2-3+) tricuspid regurgitation.  Intact atrial septum. Closure device well seated.  Inferior vena cava not well visualized for estimation of right atrial  pressure.  There is no pericardial effusion.     Compared to study dated 5/23/2017, there is severe right ventricular  enlargement, dysfunction and evidence of pulmonary hypertension    Right heart cath 3/2024: reviewed:  Conclusion      Right sided filling pressures are mildly elevated.     Left sided filling pressures are normal.     Severely elevated pulmonary artery hypertension.     Normal cardiac output level.     Mildly elevated right side fillng pressure and normal left side filling pressure (with marked respiratory variability due to obese body habitus).  RA mean 11mmHg  PCWP  "mean 12mmHg  Severe pulmonary hypertension  PA 76/28, mean 50mmHg     Physical Examination Review of Systems   /50 (BP Location: Left arm, Patient Position: Sitting, Cuff Size: Adult Regular)   Pulse 104   Resp 12   Ht 1.676 m (5' 6\")   Wt 136.6 kg (301 lb 1.6 oz)   BMI 48.60 kg/m    Body mass index is 48.6 kg/m .  Wt Readings from Last 3 Encounters:   10/02/24 136.6 kg (301 lb 1.6 oz)   09/24/24 137.2 kg (302 lb 6.4 oz)   07/02/24 127.5 kg (281 lb)     General Appearance:   no distress, normal body habitus   ENT/Mouth: membranes moist, no oral lesions or bleeding gums.      EYES:  no scleral icterus, normal conjunctivae   Neck: no carotid bruits or thyromegaly   Chest/Lungs:   lungs are clear to auscultation, no rales or wheezing, equal chest wall expansion    Cardiovascular:   Tachycardic. Normal first and second heart sounds with no murmurs, rubs, or gallops; JVP is difficult to assess due to the patient's obesity and body habitus   , 2+ pitting edema bilaterally extending into lower posterior thigh   Abdomen:  Large; no organomegaly, masses, bruits, or tenderness; bowel sounds are present   Extremities   no cyanosis or clubbing    CMS intact.   Skin: no xanthelasma, warm.    Neurologic: Alert and oriented X 3 no tremors   Psychiatric: calm and cooperative                                                   Negative unless noted in HPI     Medical History  Surgical History Family History Social History   Past Medical History:   Diagnosis Date     Abnormality of gait due to impairment of balance      Acute and chronic respiratory failure with hypercapnia (H)      Acute deep venous thrombosis (H) 10/20/2015     Anemia     Iron deficiency     Aneurysm (H) 2012     Arthritis     toes, thumb, elbow     B12 deficiency      Cancer (H) 1985    cervical     Cellulitis right foot     Chronic diastolic heart failure (H)      Chronic kidney disease      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  left thalamic stroke as well as patchy MCA branch infarcts     CVA (cerebral vascular accident) (H) 12/23/2012    Left thalamic stroke, MCA branch infarcts     Dermatitis      DVT (deep venous thrombosis) (H)     Right leg     Encephalopathy     after stroke, believed related to hypoxia     History of transfusion      Hyperlipidemia      Hypertension      Hypothyroidism      Lymphedema      MGUS (monoclonal gammopathy of unknown significance)      Neuropathy of right lower extremity      Obesity      On home oxygen therapy      PFO (patent foramen ovale)     closed after stroke, see cardiology notes care everywhere     PFO (patent foramen ovale)      Pneumonia      Primary hypercoagulable state (H)      Pulmonary emboli (H) 2013     Pulmonary HTN (H)      Respiratory failure (H)     after stroke     Seizure (H)      Seizures (H)     at age 31     Skin cancer 2014     Sleep apnea     CPAP     Stroke (H) 2012     Umbilical hernia      Wound of right ankle     Past Surgical History:   Procedure Laterality Date     ASD REPAIR       ASD REPAIR       BIOPSY SKIN (LOCATION)       CARDIAC CATHETERIZATION  2012    2 stents     COLONOSCOPY N/A 4/9/2018    Procedure: COLONOSCOPY;  Surgeon: Dorene Araujo MD;  Location: [facility] Main OR;  Service:      CONIZATION  1985     CV CORONARY ANGIOGRAM N/A 4/12/2019    Procedure: Coronary Angiogram;  Surgeon: Brett Montalvo MD;  Location: [facility] Cath Lab;  Service: Cardiology     CV CORONARY ANGIOGRAM N/A 4/12/2024    Procedure: Coronary Angiogram;  Surgeon: Enoc Crocker MD;  Location: [facility] CARDIAC CATH LAB     CV INTRAVASULAR ULTRASOUND N/A 4/12/2024    Procedure: Intravascular Ultrasound;  Surgeon: Enoc Crocker MD;  Location:   HEART CARDIAC CATH LAB     CV LEFT HEART CATHETERIZATION WITHOUT LEFT VENTRICULOGRAM Left 4/12/2019    Procedure: Left Heart Catheterization Without Left Ventriculogram;  Surgeon: Brett Montalvo MD;  Location: [facility] Cath Lab;  Service: Cardiology     CV RIGHT HEART CATH MEASUREMENTS RECORDED N/A 4/12/2024    Procedure: Right Heart Catheterization;  Surgeon: Enoc Crocker MD;  Location:  HEART CARDIAC CATH LAB     CV RIGHT HEART CATH MEASUREMENTS RECORDED N/A 4/19/2024    Procedure: Right Heart Cath;  Surgeon: Sparkle Suresh MD;  Location: [facility] CARDIAC CATH LAB     CV RIGHT HEART CATHETERIZATION N/A 4/12/2019    Procedure: Right Heart Catheterization;  Surgeon: Brett Montalvo MD;  Location: [facility] Cath Lab;  Service: Cardiology     ESOPHAGOSCOPY, GASTROSCOPY, DUODENOSCOPY (EGD), COMBINED N/A 4/9/2018    Procedure: ESOPHAGOGASTRODUODENOSCOPY (EGD);  Surgeon: Dorene Araujo MD;  Location: [facility];  Service:      IR CAROTID ANGIOGRAM  12/26/2012     IR CAROTID ANGIOGRAM  12/26/2012     IR MISCELLANEOUS PROCEDURE  12/26/2012     IR MISCELLANEOUS PROCEDURE  12/26/2012     IR MISCELLANEOUS PROCEDURE  12/26/2012     OTHER SURGICAL HISTORY      Cardiac stents     PATENT FORAMEN OVALE CLOSURE       REPAIR PATENT FORAMEN OVALE  2013    Helex device     XR SACRO ILIAC JOINT INJECTION LEFT  11/2012     ZC REMV ART CLOT ILIAC-POP,LEG INCIS Left 7/22/2019    Procedure: REPAIR LEFT FEMORAL ARTERIOVENOUS FISTULA WITH INTRAOPERATIVE ULTRASOUND;  Surgeon: Salinas Torres MD;  Location: [facility];  Service: General    Family History   Problem Relation Age of Onset     Angioedema Mother      Pulmonary Embolism Brother      Cerebrovascular Disease Father      Coronary Artery Disease Brother     Social History     Socioeconomic History     Marital status:      Spouse name: Not on file     Number of children: Not on file     Years of education: Not on file      Highest education level: Not on file   Occupational History     Not on file   Tobacco Use     Smoking status: Former     Current packs/day: 0.00     Average packs/day: 1 pack/day for 44.0 years (44.0 ttl pk-yrs)     Types: Cigarettes     Start date: 1968     Quit date: 2012     Years since quittin.7     Smokeless tobacco: Never   Vaping Use     Vaping status: Never Used   Substance and Sexual Activity     Alcohol use: No     Alcohol/week: 0.0 standard drinks of alcohol     Drug use: No     Sexual activity: Not Currently   Other Topics Concern     Parent/sibling w/ CABG, MI or angioplasty before 65F 55M? Not Asked   Social History Narrative     Not on file     Social Determinants of Health     Financial Resource Strain: Not on file   Food Insecurity: Not on file   Transportation Needs: Not on file   Physical Activity: Not on file   Stress: Not on file   Social Connections: Not on file   Interpersonal Safety: Not on file   Housing Stability: Not on file          Medications  Allergies   Current Outpatient Medications   Medication Sig Dispense Refill     acetaminophen (TYLENOL) 325 MG tablet Take 2 tablets (650 mg) by mouth every 4 hours as needed for pain or fever 60 tablet 0     albuterol (PROVENTIL) (2.5 MG/3ML) 0.083% neb solution Take 1 vial (2.5 mg) by nebulization every 6 hours as needed for shortness of breath or wheezing OFFICE VISIT NEEDED FOR ANY FURTHER REFILLS 90 mL 0     aspirin (ASA) 81 MG chewable tablet Take 1 tablet (81 mg) by mouth daily 30 tablet 0     bumetanide (BUMEX) 2 MG tablet Take 2 tablets (4 mg) by mouth daily. 180 tablet 3     cyanocobalamin (CYANOCOBALAMIN) 1000 MCG/ML injection Inject 1 mL (1,000 mcg) into the muscle every 30 days 1 mL 0     empagliflozin (JARDIANCE) 10 MG TABS tablet Take 1 tablet (10 mg) by mouth daily 30 tablet 5     fluticasone-vilanterol (BREO ELLIPTA) 200-25 MCG/ACT inhaler Inhale 1 puff into the lungs daily 30 each 0     ipratropium -  albuterol 0.5 mg/2.5 mg/3 mL (DUONEB) 0.5-2.5 (3) MG/3ML neb solution Take 1 vial (3 mLs) by nebulization 2 times daily 180 mL 0     loperamide (IMODIUM A-D) 2 MG tablet Take 1 tablet (2 mg) by mouth 4 times daily as needed for diarrhea 30 tablet 0     menthol-zinc oxide (CALMOSEPTINE) 0.44-20.6 % OINT ointment Apply topically daily as needed for skin protection Apply to affected area topically as needed for affected area daily 71 g 0     nystatin (MYCOSTATIN) 494598 UNIT/GM external powder Apply to breast BID and BID  g 0     OXYGEN-HELIUM IN Inhale 5 L into the lungs continuous prn.       polyethylene glycol (MIRALAX) 17 GM/Dose powder Take 17 g by mouth daily 510 g 0     senna-docusate (SENOKOT-S/PERICOLACE) 8.6-50 MG tablet Take 2 tablets by mouth 2 times daily as needed for constipation 60 tablet 0     sildenafil (REVATIO) 20 MG tablet Take 1 tablet (20 mg) by mouth 3 times daily. Take 20mg (1 tablet) for 30 days then increase to 40mg (2 tablets) after. 90 tablet 11     sildenafil (REVATIO) 20 MG tablet Take 1 tablet (20 mg) by mouth 3 times daily.       spironolactone (ALDACTONE) 25 MG tablet Take 1 tablet (25 mg) by mouth daily 90 tablet 11     tiotropium (SPIRIVA RESPIMAT) 2.5 MCG/ACT inhaler INHALE TWO PUFFS BY MOUTH EVERY DAY 12 g 0    Allergies   Allergen Reactions     Eliquis [Apixaban] Shortness Of Breath, Hives and Swelling     Pt. Reported      Penicillins Anaphylaxis     Per chart review: tolerated Augmentin in 2020.  Tolerated CTX 2024.        Erythromycin Hives and Itching     Peanut Oil Hives     Tetracaine      Clindamycin Itching     Tetracycline Itching         Lab Results    Chemistry/lipid CBC Cardiac Enzymes/BNP/TSH/INR   Lab Results   Component Value Date    CHOL 130 09/24/2024    HDL 43 (L) 09/24/2024    TRIG 89 09/24/2024    BUN 29.6 (H) 09/24/2024     09/24/2024    CO2 25 09/24/2024    Lab Results   Component Value Date    WBC 7.6 09/24/2024    HGB 11.2 (L) 09/24/2024    HCT  37.7 09/24/2024    MCV 77 (L) 09/24/2024     09/24/2024    Lab Results   Component Value Date    TROPONINI <0.01 01/19/2019    BNP 96 (H) 01/20/2019    TSH 4.90 (H) 09/24/2024    INR 1.25 (H) 09/24/2024        40  minutes spent on the date of encounter doing chart review, review of test results, interpretation with above tests, patient visit, documentation, and discussion with family.        This note has been dictated using voice recognition software. Any grammatical, typographical, or context distortions are unintentional and inherent to the software          Thank you for allowing me to participate in the care of your patient.      Sincerely,     JONATAN Kelsey [facility]  cc:   Brett Montalvo MD  [address]  [place] [address]

## 2024-10-02 NOTE — TELEPHONE ENCOUNTER
Spoke w/ Philipp in person. Unable to get a hold of pt today, and do not want to make changes to pt's medications without her knowledge (extra bumex, dose of metolazone) and a follow up plan. So will plan to call pt first thing in the AM on 10/3 to discuss lab results and recommendations from Philipp. Will still send potassium chloride 20mEq daily script to start tomorrow 10/2, as it will take time to get there and will be able to talk with pt before then.     Spoke w/ Rosa Maria and updated her w/ above information. She verbalized understanding.    MICHAEL [email or web address] RN

## 2024-10-02 NOTE — PATIENT INSTRUCTIONS
Linda Otero,    It was a pleasure to see you today at the [facility].     My recommendations after this visit include:    - No medications changes made today    - We will follow up with your lab result    - Follow up with Dr. Wright in 1-2 months     - Follow up with Philipp Frazier CNP in 3 months    - Please call Heart Failure Nurse Line at [phone], if you have any questions or concerns    Philipp Frazier CNP       What is the [facility] Failure Program?     The [facility] Failure Program is aheart failure specialty clinic within [facility].  You will work with your cardiologist, nurse practitioner, and nurses to carefully adjust medications and learn how to live with heart failure.  The Heart FailureProgram will help you:    Better understand your chronic heart condition  Feel better and avoid hospital stays    Monitoring for Symptoms      Call the Heart Failure Phone Line ([phone]) if you have any of these symptoms:   Increased shortness of breath/shortness ofbreath at rest  Waking up at night with difficulty breathing  Unable to lie down for sleep due to symptoms or needing to sit uprightfor sleep  Weight gain of 2 pounds a day for 2 days in a row OR 5 pounds in 1 week  Increased swelling in your ankles or legs  Dizziness or lightheadedness    Medications     Take your medications as prescribed  Bring all your medications in their original bottles to every appointment  Avoid non-steroidal anti-inflammatory medications (Advil,Aleve, Ibuprofen, Naprosyn, Naproxen, Celebrex)  Do not stop taking your medications or begin taking over-the-counter or herbal medications without first talking to your doctor ornurse practitioner    Diet and Lifestyle     Limit sodium/salt to 2000 mg daily   Read food labels for sodium content  Do not add salt when cooking or add salt at the table  Weigh yourself every day and record in your daily weight log   Call if you  gain 2 pounds a day for 2 days in a row OR 5 pounds in 1 week  Bring daily weight log to every appointment  Stay active, pace yourself, listen to yourbody, and rest when tired  Elevate your legs if they are swollen. Ask about using compression/support stockings  Stop smoking  Lose weight if you are overweight  Avoid drinking alcohol or limit amount  Stay updated on your immunizations including flu and pneumonia vaccines

## 2024-10-03 NOTE — TELEPHONE ENCOUNTER
Spoke w/ Philipp to determine plan for pt, as metolazone dose w/ BMP the day following this week is not likely feasible given pt's lack transportation, time for TOM to get meds, not being able to contact pt yesterday. Philipp made recommendation for pt to start taking bumex 4mg BID and potassium chloride 20mEq daily, w/ BMP Monday 10/7 at pt's facility.     Spoke w/ pt and updated her w/ lab results and recommendations from Philipp. Pt verbalized understanding and is agreeable to this plan. She was advised to present to the ED if lack of improvement or worsening HF symptoms at any point. Pt verbalized understanding, but is still hoping to avoid this.     Spoke w/ Rosa Maria and updated her w/ recommendations from Philipp. She verbalized understanding and they should be able to make the changes today. Will fax the orders over for their records.     BHAVNA Kenney RN

## 2024-10-23 NOTE — TELEPHONE ENCOUNTER
LVM for pt regarding the start of her Sildenafil and if she had started her IV Iron.  Requested returned phone call.    Mandeep Russell RN on 10/23/2024 at 12:34 PM

## 2024-10-26 PROBLEM — E87.70 HYPERVOLEMIA, UNSPECIFIED HYPERVOLEMIA TYPE: Status: ACTIVE | Noted: 2024-01-01

## 2024-10-26 PROBLEM — I48.91 ATRIAL FIBRILLATION WITH RVR (H): Status: ACTIVE | Noted: 2024-01-01

## 2024-10-26 PROBLEM — J96.02 ACUTE RESPIRATORY FAILURE WITH HYPOXIA AND HYPERCAPNIA (H): Status: ACTIVE | Noted: 2024-01-01

## 2024-10-26 PROBLEM — J96.01 ACUTE RESPIRATORY FAILURE WITH HYPOXIA AND HYPERCAPNIA (H): Status: ACTIVE | Noted: 2024-01-01

## 2024-10-26 NOTE — ED NOTES
Writer placed pt on monitor. Pt placed on tele, BP and SPO2. Pt on NC via 5 LPM. Pt O2 sats at 82%. Pt placed on oxy mask via 10 LPM. Pt's O2 sat at 96%. MD notified. Applied purewick to patient. Side rails up, call light within reach.

## 2024-10-26 NOTE — ED PROVIDER NOTES
Emergency Department Note      History of Present Illness     Chief Complaint   Copd Exacerbation      HPI   Linda Otero is a 64 year old female presenting via EMS with shortness of breath.  First responders arrived and was found to have an oxygen saturation in the 80s on 5 L via nasal cannula.  They increased the patient to 7 L which increased her oxygen saturation.  She has a history of CHF and COPD.  EMS administered an albuterol nebulizer and a DuoNeb.  The patient states that her shortness of breath started 2 days ago.  No fever, chills, chest pain, nausea, vomiting, abdominal pain.  She states that her legs are more swollen than usual.    Independent Historian   EMS as detailed above.    Review of External Notes   10/2/24: Clinic note reviewed     Past Medical History     Medical History and Problem List   Past Medical History:   Diagnosis Date    Abnormality of gait due to impairment of balance     Acute and chronic respiratory failure with hypercapnia (H)     Acute deep venous thrombosis (H) 10/20/2015    Anemia     Aneurysm (H) 2012    Arthritis     B12 deficiency     Cancer (H) 1985    Cellulitis right foot    Chronic diastolic heart failure (H)     Chronic kidney disease     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     COPD (chronic obstructive pulmonary disease) (H)     Coronary artery disease     CRF (chronic renal failure), stage 3 (moderate) (H) 10/21/2015    CVA (cerebral infarction) 12/23    CVA (cerebral vascular accident) (H) 12/23/2012    Dermatitis     DVT (deep venous thrombosis) (H)     Encephalopathy     History of transfusion     Hyperlipidemia     Hypertension     Hypothyroidism     Lymphedema     MGUS (monoclonal gammopathy of unknown significance)     Neuropathy of right lower extremity     Obesity     On home oxygen therapy     PFO (patent foramen ovale)     PFO (patent foramen ovale)     Pneumonia     Primary hypercoagulable state (H)     Pulmonary emboli (H) 2013    Pulmonary HTN (H)      Respiratory failure (H)     Seizure (H)     Seizures (H)     Skin cancer 2014    Sleep apnea     Stroke (H) 2012    Umbilical hernia     Wound of right ankle        Medications   acetaminophen (TYLENOL) 325 MG tablet  albuterol (PROVENTIL) (2.5 MG/3ML) 0.083% neb solution  aspirin (ASA) 81 MG chewable tablet  bumetanide (BUMEX) 2 MG tablet  cyanocobalamin (CYANOCOBALAMIN) 1000 MCG/ML injection  empagliflozin (JARDIANCE) 10 MG TABS tablet  fluticasone-vilanterol (BREO ELLIPTA) 200-25 MCG/ACT inhaler  ipratropium - albuterol 0.5 mg/2.5 mg/3 mL (DUONEB) 0.5-2.5 (3) MG/3ML neb solution  potassium chloride marcie ER (KLOR-CON M20) 20 MEQ CR tablet  sildenafil (REVATIO) 20 MG tablet  spironolactone (ALDACTONE) 25 MG tablet  tiotropium (SPIRIVA RESPIMAT) 2.5 MCG/ACT inhaler  traMADol (ULTRAM) 50 MG tablet  loperamide (IMODIUM A-D) 2 MG tablet  menthol-zinc oxide (CALMOSEPTINE) 0.44-20.6 % OINT ointment  nystatin (MYCOSTATIN) 995301 UNIT/GM external powder  OXYGEN-HELIUM IN  polyethylene glycol (MIRALAX) 17 GM/Dose powder  senna-docusate (SENOKOT-S/PERICOLACE) 8.6-50 MG tablet        Surgical History   Past Surgical History:   Procedure Laterality Date    ASD REPAIR      ASD REPAIR      BIOPSY SKIN (LOCATION)      CARDIAC CATHETERIZATION  2012    2 stents    COLONOSCOPY N/A 4/9/2018    Procedure: COLONOSCOPY;  Surgeon: Dorene Araujo MD;  Location: [facility] OR;  Service:     CONIZATION  1985    CV CORONARY ANGIOGRAM N/A 4/12/2019    Procedure: Coronary Angiogram;  Surgeon: Brett Montalvo MD;  Location: [facility] Cath Lab;  Service: Cardiology    CV CORONARY ANGIOGRAM N/A 4/12/2024    Procedure: Coronary Angiogram;  Surgeon: Enoc Crocker MD;  Location: [facility] CARDIAC CATH LAB    CV INTRAVASULAR ULTRASOUND N/A 4/12/2024    Procedure: Intravascular Ultrasound;  Surgeon: Enoc Crocker MD;  [facility] LAB    CV LEFT HEART CATHETERIZATION WITHOUT LEFT VENTRICULOGRAM  Left 4/12/2019    Procedure: Left Heart Catheterization Without Left Ventriculogram;  Surgeon: Brett Montalvo MD;  Location: [facility] Cath Lab;  Service: Cardiology    CV RIGHT HEART CATH MEASUREMENTS RECORDED N/A 4/12/2024    Procedure: Right Heart Catheterization;  Surgeon: Enoc Crocker MD;  Location:  HEART CARDIAC CATH LAB    CV RIGHT HEART CATH MEASUREMENTS RECORDED N/A 4/19/2024    Procedure: Right Heart Cath;  Surgeon: Sparkle Suresh MD;  Location:  HEART CARDIAC CATH LAB    CV RIGHT HEART CATHETERIZATION N/A 4/12/2019    Procedure: Right Heart Catheterization;  Surgeon: Brett Montalvo MD;  Location: [facility] Cath Lab;  Service: Cardiology    ESOPHAGOSCOPY, GASTROSCOPY, DUODENOSCOPY (EGD), COMBINED N/A 4/9/2018    Procedure: ESOPHAGOGASTRODUODENOSCOPY (EGD);  Surgeon: Dorene Araujo MD;  Location: [facility];  Service:     IR CAROTID ANGIOGRAM  12/26/2012    IR CAROTID ANGIOGRAM  12/26/2012    IR MISCELLANEOUS PROCEDURE  12/26/2012    IR MISCELLANEOUS PROCEDURE  12/26/2012    IR MISCELLANEOUS PROCEDURE  12/26/2012    OTHER SURGICAL HISTORY      Cardiac stents    PATENT FORAMEN OVALE CLOSURE      REPAIR PATENT FORAMEN OVALE  2013    Helex device    XR SACRO ILIAC JOINT INJECTION LEFT  11/2012    [facility] REMV ART CLOT ILIAC-POP,LEG INCIS Left 7/22/2019    Procedure: REPAIR LEFT FEMORAL ARTERIOVENOUS FISTULA WITH INTRAOPERATIVE ULTRASOUND;  Surgeon: Salinas Torres MD;  Location: [facility] OR;  Service: General       Physical Exam     Patient Vitals for the past 24 hrs:   BP Temp Temp src Pulse Resp SpO2 Height Weight   10/26/24 2101 -- -- -- (!) 146 19 98 % -- --   10/26/24 2100 96/70 -- -- (!) 151 (!) 35 98 % -- --   10/26/24 2059 -- -- -- (!) 156 -- 98 % -- --   10/26/24 2058 -- -- -- (!) 149 -- 98 % -- --   10/26/24 2057 -- -- -- (!) 149 -- 98 % -- --   10/26/24 2056 -- -- -- (!) 152 -- 98 % -- --   10/26/24 2055 100/72 -- -- (!) 151 -- 98 % -- --    10/26/24 2046 -- -- -- (!) 151 23 100 % -- --   10/26/24 2045 100/65 -- -- (!) 152 25 100 % -- --   10/26/24 2044 -- -- -- (!) 154 25 100 % -- --   10/26/24 2043 -- -- -- (!) 152 25 100 % -- --   10/26/24 2042 -- -- -- (!) 158 23 99 % -- --   10/26/24 2041 -- -- -- (!) 147 (!) 0 100 % -- --   10/26/24 2040 92/67 -- -- (!) 151 (!) 9 100 % -- --   10/26/24 2027 -- -- -- (!) 147 12 100 % -- --   10/26/24 2026 -- -- -- (!) 160 28 98 % -- --   10/26/24 2025 107/76 -- -- (!) 152 23 100 % -- --   10/26/24 2006 -- -- -- (!) 152 28 100 % -- --   10/26/24 2005 103/87 -- -- (!) 160 23 100 % -- --   10/26/24 2004 -- -- -- (!) 161 28 100 % -- --   10/26/24 2003 -- -- -- (!) 158 28 100 % -- --   10/26/24 2002 -- -- -- (!) 147 24 100 % -- --   10/26/24 2001 -- -- -- (!) 151 22 100 % -- --   10/26/24 2000 95/69 -- -- (!) 156 15 100 % -- --   10/26/24 1957 -- -- -- (!) 163 24 100 % -- --   10/26/24 1956 -- -- -- (!) 154 23 100 % -- --   10/26/24 1955 (!) 85/49 -- -- (!) 154 20 100 % -- --   10/26/24 1952 -- -- -- (!) 163 13 (!) 81 % -- --   10/26/24 1951 -- -- -- (!) 154 16 100 % -- --   10/26/24 1950 104/87 -- -- (!) 149 25 99 % -- --   10/26/24 1915 105/87 -- -- (!) 151 27 93 % -- --   10/26/24 1905 96/87 -- -- (!) 165 25 100 % -- --   10/26/24 1850 100/57 -- -- (!) 149 23 100 % -- --   10/26/24 1840 92/71 -- -- (!) 147 24 100 % -- --   10/26/24 1830 108/89 -- -- (!) 152 21 100 % -- --   10/26/24 1820 103/59 -- -- (!) 160 (!) 40 100 % -- --   10/26/24 1815 91/66 -- -- (!) 141 28 100 % -- --   10/26/24 1810 (!) 76/54 -- -- (!) 142 26 96 % -- --   10/26/24 1800 110/60 -- -- (!) 149 25 100 % -- --   10/26/24 1750 (!) 80/40 -- -- (!) 146 28 98 % -- --   10/26/24 1745 -- -- -- -- 23 99 % -- --   10/26/24 1740 92/76 -- -- (!) 176 29 99 % -- --   10/26/24 1730 (!) 70/49 -- -- (!) 163 (!) 36 94 % -- --   10/26/24 1725 (!) 87/55 -- -- (!) 179 (!) 31 92 % -- --   10/26/24 1723 (!) 82/45 -- -- (!) 174 (!) 35 (!) 81 % -- --   10/26/24 1716  "(!) 89/54 97.5  F (36.4  C) Oral (!) 158 25 92 % 1.676 m (5' 6\") 138.6 kg (305 lb 9.6 oz)     Physical Exam  General: No acute distress  Head: No obvious trauma to head.  Ears, Nose, Throat:  External ears normal.  Nose normal.  No pharyngeal erythema, swelling or exudate.  Midline uvula. Moist mucus membranes.  Eyes:  Conjunctivae clear.   Neck: Normal range of motion.  Neck supple.   CV: Regular rate and rhythm.  No murmurs.      Respiratory: Increased work of breathing, tachypneic.  Decreased breath sounds diffusely.  No wheezing or crackles.   Gastrointestinal: Soft.  No distension. There is no tenderness.  There is no rigidity, no rebound and no guarding.  Abdominal pitting edema  Musculoskeletal: Normal range of motion.  Non tender extremities to palpations.  Bilateral lower extremity edema  Neuro: Alert. Moving all extremities appropriately.  Normal speech.    Skin: Skin is warm and dry.  No rash noted.   Psych: Normal mood and affect. Behavior is normal.       Diagnostics     Lab Results   Labs Ordered and Resulted from Time of ED Arrival to Time of ED Departure   BASIC METABOLIC PANEL - Abnormal       Result Value    Sodium 138      Potassium 5.6 (*)     Chloride 96 (*)     Carbon Dioxide (CO2) 22      Anion Gap 20 (*)     Urea Nitrogen 52.5 (*)     Creatinine 2.91 (*)     GFR Estimate 17 (*)     Calcium 9.6      Glucose 132 (*)    NT PROBNP INPATIENT - Abnormal    N terminal Pro BNP Inpatient 43,417 (*)    TROPONIN T, HIGH SENSITIVITY - Abnormal    Troponin T, High Sensitivity 194 (*)    CBC WITH PLATELETS AND DIFFERENTIAL - Abnormal    WBC Count 16.4 (*)     RBC Count 5.17      Hemoglobin 11.4 (*)     Hematocrit 40.2      MCV 78      MCH 22.1 (*)     MCHC 28.4 (*)     RDW 22.5 (*)     Platelet Count 212      % Neutrophils 84      % Lymphocytes 2      % Monocytes 9      % Eosinophils 1      % Basophils 1      % Immature Granulocytes 3      NRBCs per 100 WBC 0      Absolute Neutrophils 13.8 (*)     Absolute " Lymphocytes 0.3 (*)     Absolute Monocytes 1.5 (*)     Absolute Eosinophils 0.2      Absolute Basophils 0.1      Absolute Immature Granulocytes 0.5 (*)     Absolute NRBCs 0.0     ISTAT GASES LACTATE VENOUS POCT - Abnormal    Lactic Acid POCT 3.4 (*)     Bicarbonate Venous POCT 27      O2 Sat, Venous POCT 26 (*)     pCO2 Venous POCT 60 (*)     pH Venous POCT 7.26 (*)     pO2 Venous POCT 21 (*)    TROPONIN T, HIGH SENSITIVITY - Abnormal    Troponin T, High Sensitivity 199 (*)    LACTIC ACID WHOLE BLOOD - Abnormal    Lactic Acid 2.7 (*)    ISTAT GASES LACTATE VENOUS POCT   BLOOD CULTURE       Imaging   XR Chest Port 1 View   Final Result   IMPRESSION: Mild pulmonary vascular congestion, with associated small to moderate size right pleural effusion. No pneumothorax.      Unchanged cardiomegaly. Atherosclerotic calcifications of the thoracic aorta and mild enlargement of the main pulmonary artery.          EKG   ECG results from 10/26/24   EKG 12 lead     Value    Systolic Blood Pressure     Diastolic Blood Pressure     Ventricular Rate 167    Atrial Rate     SD Interval     QRS Duration 102        QTc 467    P Axis     R AXIS 126    T Axis -33    Interpretation ECG      Atrial fibrillation with rapid ventricular response  Incomplete right bundle branch block  Possible Right ventricular hypertrophy  When compared with ECG of 29-Mar-2024 16:30,  Atrial fibrillation has replaced Sinus rhythm  Vent. rate has increased by  67 bpm     EKG 12 lead     Value    Systolic Blood Pressure     Diastolic Blood Pressure     Ventricular Rate 82    Atrial Rate 82    SD Interval 154    QRS Duration 104        QTc 446    P Axis 45    R AXIS 107    T Axis 18    Interpretation ECG      Sinus rhythm with Premature atrial complexes  Incomplete right bundle branch block  When compared with ECG of 26-Oct-2024 17:21  Sinus rhythm has replaced Atrial fibrillation  Vent. rate has decreased by  85 bpm           Independent  Interpretation   CXR: pulmonary vascular congestion.    ED Course      Medications Administered   Medications   amiodarone (NEXTERONE) bolus 150 mg (has no administration in time range)   amiodarone (NEXTERONE) 1.8 mg/mL in dextrose 5% 200 mL ADULT STANDARD infusion (has no administration in time range)   amiodarone (NEXTERONE) 1.8 mg/mL in dextrose 5% 200 mL ADULT STANDARD infusion (has no administration in time range)   furosemide (LASIX) injection 60 mg (60 mg Intravenous $Given 10/26/24 1736)   diltiazem (CARDIZEM) injection 25 mg (25 mg Intravenous $Given 10/26/24 1741)   sodium chloride 0.9% BOLUS 500 mL (0 mLs Intravenous Stopped 10/26/24 1854)   doxycycline (VIBRAMYCIN) 100 mg vial to attach to  mL bag (0 mg Intravenous Stopped 10/26/24 1918)   cefTRIAXone (ROCEPHIN) 2 g vial to attach to  ml bag for ADULTS or NS 50 ml bag for PEDS (0 g Intravenous Stopped 10/26/24 1946)   methylPREDNISolone Na Suc (solu-MEDROL) injection 125 mg (125 mg Intravenous $Given 10/26/24 1810)       Procedures   Procedures     Discussion of Management   Admitting Hospitalist, Dr. Christian    ED Course        Additional Documentation  None    Medical Decision Making / Diagnosis     CMS Diagnoses: Lactic acid elevated due to decreased intravascular volume. At this time there are no signs of sepsis or septic shock and None    MIPS       None    [facility]   Linda Otero is a 64 year old female presenting with shortness of breath.  There is concern for COPD exacerbation versus CHF exacerbation.  She has decreased breath sounds.  She is given Solu-Medrol and started on BiPAP.  She has lower extremity edema, as well as abdominal edema.  BNP is significantly elevated.  Lactate is elevated, and she is hypotensive but given her fluid overload status, she is only given 500 cc of normal saline.  She is significantly tachycardic since she is in A-fib with RVR, and there is hope that slowing down her heart rate will improve her blood  pressure.  She is given a bolus of diltiazem, which slightly improves her heart rate and blood pressure.  She is given 60 mg IV Lasix.  She has leukocytosis.  Chest x-ray shows pulmonary congestion.  Rocephin and doxycycline are given empirically.  She has an elevated troponin, which is likely due to the significant tachycardia.  Troponin is stable on 2-hour recheck.  Lactate slightly improves after fluids.  She remains stable on BiPAP.  She is started on a diltiazem infusion, however her heart rate remains significantly elevated.  She is transition from diltiazem to amiodarone, and she converts to sinus rhythm.  This is confirmed on repeat EKG.  I discussed the patient with the hospitalist who agreed to admit the patient to the ICU.  She remains in stable condition.      Critical Care time was 45 minutes for this patient excluding procedures.      Disposition   The patient was admitted to the hospital.     Diagnosis     ICD-10-CM    1. Acute respiratory failure with hypoxia and hypercapnia (H)  J96.01     J96.02       2. Hypervolemia, unspecified hypervolemia type  E87.70       3. Atrial fibrillation with RVR (H)  I48.91            Discharge Medications   New Prescriptions    No medications on file         MD Cayetano aGrcia Stephen, MD  10/26/24 [id number]

## 2024-10-26 NOTE — ED TRIAGE NOTES
Pt arrives via EMS from assisted living for shortness of breath for two days. Fire found pt in bed with O2 in 80's on 5L NC. Increased pt to 7L and O2 increased to 88-92%. EKG a-fib RVR with -180s. History of heart failure and COPD. Also notes increased swelling in legs bilaterally. Denies chest pain. One duoneb and one albuterol neb given en route. 20L placed in left AC. Blood sugar 153. 100cc 0.9% NaCl given en route.

## 2024-10-26 NOTE — ED NOTES
Bed: ED09  Expected date:   Expected time:   Means of arrival:   Comments:  [facility] 64F   [facility]  [id number]  PROGRESS NOTE   Patient: Ruth Ann Tan  Today's Date: 7/26/2024    YOB: 1944  Admission Date: 7/22/2024    MRN: [id number]  Inpatient LOS: 2    Attending: Kevin Medina MD  Hospital Day: Hospital Day: 5    Subjective   HISTORY AND SUBJECTIVE COMPLAINTS     Chief Complaint:   Right Hip pain    Interval History / Subjective:   Patient reports poor appetite, only eating 50% of meals. She vomited yesterday. No current N/V. Passing BMs.     Hospital Course:  Patient is a pleasant 79-year-old female with history of type 2 diabetes mellitus, hypertension, dyslipidemia and hypothyroidism admitted for an elective total hip arthroplasty on 7/22/24 with Dr. Median.  Procedure was completed without any immediate postoperative complication.  Internal medicine was consulted for medical management of her diabetes hypertension and dyslipidemia.  Prior to the procedure patient does underwent nuclear stress test which was negative.     ROS:  Pertinent systems negative except as above.    Objective   PHYSICAL EXAMINATION     Vital 24 Hour Range Most Recent Value   Temperature Temp  Min: 97.9 °F (36.6 °C)  Max: 99 °F (37.2 °C) 98.3 °F (36.8 °C)   Pulse Pulse  Min: 89  Max: 95 92   Respiratory Resp  Min: 17  Max: 19 18   Blood Pressure BP  Min: 137/65  Max: 144/72 137/65   Pulse Oximetry SpO2  Min: 92 %  Max: 95 % 94 %   Arterial BP No data recorded     O2 No data recorded       Recorded Intake and Output:  Intake/Output Summary (Last 24 hours) at 7/26/2024 0928  Last data filed at 7/26/2024 0635  Gross per 24 hour   Intake 150 ml   Output 1875 ml   Net -1725 ml      Recorded Last Stool Occurrence: 1 (07/26/24 0020)     Vital Most Recent Value First Value   Weight 103.4 kg (228 lb) Weight: 103.4 kg (228 lb)   Height 5' 3\" (160 cm) Height: 5' 3\" (160 cm)   BMI 40.39 N/A     Physical Exam:   General:  No acute distress.   Skin:  Warm and dry without rash.  HEENT: Normocephalic,  Lizette Pérez PA-C  [facility]  7/26/2024    Available through Epic Secure Chat from 7AM to 7PM    Geisinger Encompass Health Rehabilitation Hospital 

## 2024-10-27 NOTE — PROGRESS NOTES
[facility]    Medicine Progress Note - Hospitalist Service    Date of Admission:  10/26/2024    Assessment & Plan   Linda Otero is a 64 year old female admitted on 10/26/2024. She presents with worsening of shortness of breath, she has been found with atrial fibrillation with RVR.  Hypercapnia, hypoxemia, worsening peripheral edema.  She denies chest pain, she reports to be compliant with her medications.  No recent acute intercurrent infections, no other identifiable trigger.  Apparently she has had similar presentations in the past.  In the emergency department she had Lasix 60 mg IV, started on BiPAP and a drip of diltiazem.  She has been switched to amiodarone, diltiazem discontinue with better result for rate control.  She was also started empirically on ceftriaxone and doxycycline, however I am not going to continue antibiotics until there is solid proof of infection.  She has no localizing signs, she does not look toxemic.  She has a reported allergy to rivaroxaban, she is not on any anticoagulation despite of her history of DVT/PE and atrial fibrillation.  Following admission she had a blood culture turn positive.   This is felt possibly related to an ankle wound.    Bacteremia with strep, possible degree of sepsis with mixed shock picture  Right ankle wound:  -  ID consult appreciated, ceftriaxone IV.  -  Podiatry consult + R ankle MRI today    Hypotension/Shock:  -  Unclear if more cardiac or infectious shock.  Continue low dose norepi.     Acute on chronic hypoxic resp failure (baseline 4 L O2 use)  Severe PH, cor pulmonale with HFpEF exacerbation  Cor triatriatum. CVA s/p PFO closure  JODI  SONAL, possible ATN  Elevated troponin, probable demand ischemia (possible NSTEMI):  -  Cr currently ~3 with oliguria from baseline 1.2   -  Bumex IV, will see how diuresis proceeds.  If not responding consider diuretic drip.  Nephrology consult appreciated.  Monitor I/O.  -   Echocardiogram  -  Wean O2 as able back to baseline 5L, improved and off BiPAP.  Intermittent CPAP when sleeping.  -  Cardiology consult appreciated    Episode of atrial fibrillation with RVR:  -  Improved now, converted.  Continue IV amiodarone.  Cardiology consult appreciated.  Continue cardiac monitoring.    Elevated LFT's markedly:  -  Possible shock related or more congestion with cor pulmonale/severe pulm HTN.  Trend LFT's.      History of DVT/PE:  -  Not on anticoagulation baseline.  On heparin drip empirically currently.      PPE Used:  Mask    Case discussed with intensivist.  He also updated patients son.        Diet: NPO for Medical/Clinical Reasons Except for: Meds, Ice Chips    DVT Prophylaxis: Heparin Drip  Miguel Catheter: PRESENT, indication: ?  (Error. Value could not be saved.), Acute retention or obstruction  Lines: None     Cardiac Monitoring: ACTIVE order. Indication: Tachyarrhythmias, acute (48 hours)  Code Status: No CPR- Do NOT Intubate      Clinically Significant Risk Factors Present on Admission        # Hyperkalemia: Highest K = 5.6 mmol/L in last 2 days, will monitor as appropriate   # Hypochloremia: Lowest Cl = 95 mmol/L in last 2 days, will monitor as appropriate     # Anion Gap Metabolic Acidosis: Highest Anion Gap = 23 mmol/L in last 2 days, will monitor and treat as appropriate    # Drug Induced Platelet Defect: home medication list includes an antiplatelet medication  # Acute Kidney Injury, unspecified: based on a >150% or 0.3 mg/dL increase in last creatinine compared to past 90 day average, will monitor renal function  # Hypertension: Noted on problem list   # Circulatory Shock: required vasopressors within past 24 hours    # Acute Hypoxic Respiratory Failure: Documented O2 saturation < 90%. Continue supplemental oxygen as needed  # Acute Hypercapnic Respiratory Failure: based on venous blood gas results.  Continue supplemental oxygen and ventilatory support as indicated.   #  "Anemia: based on hgb <11       # Severe Obesity: Estimated body mass index is 47.86 kg/m  as calculated from the following:    Height as of this encounter: 1.676 m (5' 6\").    Weight as of this encounter: 134.5 kg (296 lb 8.3 oz).       # Financial/Environmental Concerns:    # COPD: noted on problem list        Disposition Plan     Medically Ready for Discharge: Anticipated in 5+ Days     Caden Farrell DO  Hospitalist Service  [facility]  Securely message with [employer] (more info)  Text page via [facility] Paging/Directory   ______________________________________________________________________    Interval History   Assumed hospitalist team care, history reviewed.  Ms. Otero reported feeling better today.  Still SOB but less.  Denied any major nausea, reported feeling hungry.  No chest pain.    Physical Exam   Vital Signs: Temp: 97.8  F (36.6  C) Temp src: Temporal BP: 118/63 Pulse: 85   Resp: 26 SpO2: 94 % O2 Device: Oxymask Oxygen Delivery: 10 LPM  Weight: 296 lbs 8.3 oz    GEN:  Alert, oriented, appears comfortable but ill.  HEENT:  Normocephalic/atraumatic, no scleral icterus, no nasal discharge, mouth moist.  CV:  Regular rate and rhythm, distant.  LUNGS:  Somewhat coarse bilaterally, no wheezing/retractions.  Symmetric chest rise on inhalation noted.  ABD:  Active bowel sounds, soft, non-tender, mildly distended.  No guarding/rigidity.  EXT:  +2-3 edema.  No cyanosis.  No new joint synovitis noted.  SKIN:  Dry to touch, no new exanthems noted in the visualized areas.    Medical Decision Making       Over 50 MINUTES SPENT BY ME on the date of service doing chart review, history, exam, documentation & further activities per the note.      Data   Medications   Current Facility-Administered Medications   Medication Dose Route Frequency Provider Last Rate Last Admin    amiodarone (NEXTERONE) 1.8 mg/mL in dextrose 5% 200 mL ADULT STANDARD infusion  0.5 mg/min Intravenous Continuous Gino, " Amrik GONZALES MD 16.7 mL/hr at 10/27/24 1402 0.5 mg/min at 10/27/24 1402    dextrose 10% infusion   Intravenous Continuous PRN Luis Enrique Mondragon MD        heparin 25,000 units in 0.45% NaCl 250 mL ANTICOAGULANT infusion  0-5,000 Units/hr Intravenous Continuous Amrik Christian MD 15 mL/hr at 10/27/24 1453 1,500 Units/hr at 10/27/24 1453    insulin regular (MYXREDLIN) 1 unit/mL infusion  0-24 Units/hr Intravenous Continuous Luis Enrique Mondragon MD 0.5 mL/hr at 10/27/24 1401 0.5 Units/hr at 10/27/24 1401    norepinephrine (LEVOPHED) 4 mg in  mL PERIPHERAL infusion  0.01-0.125 mcg/kg/min Intravenous Continuous Salinas Marr DO   Stopped at 10/27/24 1453     Current Facility-Administered Medications   Medication Dose Route Frequency Provider Last Rate Last Admin    aspirin (ASA) chewable tablet 81 mg  81 mg Oral Daily Amrik Christian MD   81 mg at 10/27/24 1024    bumetanide (BUMEX) injection 4 mg  4 mg Intravenous Q12H Enrrique Odom MD        cefTRIAXone (ROCEPHIN) 2 g vial to attach to  ml bag for ADULTS or NS 50 ml bag for PEDS  2 g Intravenous Q24H Isabelle Brown MD        cyanocobalamin injection 1,000 mcg  1,000 mcg Intramuscular Q30 Days Amrik Christian MD   1,000 mcg at 10/27/24 1029    [Held by provider] empagliflozin (JARDIANCE) tablet 10 mg  10 mg Oral Daily Amrik Christian MD        fluticasone-vilanterol (BREO ELLIPTA) 200-25 MCG/ACT inhaler 1 puff  1 puff Inhalation Daily Amrik Christian MD   1 puff at 10/27/24 0801    ipratropium - albuterol 0.5 mg/2.5 mg/3 mL (DUONEB) neb solution 3 mL  3 mL Nebulization BID Amrik Christian MD   3 mL at 10/27/24 0801    methylPREDNISolone sodium succinate (SOLU-MEDROL) injection 40 mg  40 mg Intravenous Q12H Luis Enrique Mondragon MD   40 mg at 10/27/24 1025    miconazole (MICATIN) 2 % powder   Topical BID Amrik Christian MD   Given at 10/27/24 1031    midodrine (PROAMATINE) tablet 10 mg  10 mg Oral TID Luis Enrique Mondragon MD    10 mg at 10/27/24 1028    polyethylene glycol (MIRALAX) Packet 17 g  17 g Oral Daily Amrik Christian MD        sildenafil (REVATIO) tablet 20 mg  20 mg Oral TID Amrik Christian MD   20 mg at 10/27/24 1024    [Held by provider] spironolactone (ALDACTONE) tablet 25 mg  25 mg Oral Daily Amrik Christian MD        thiamine (B-1) injection 200 mg  200 mg Intravenous BID Luis Enrique Mondragon MD   200 mg at 10/27/24 1028    umeclidinium (INCRUSE ELLIPTA) 62.5 MCG/ACT inhaler 1 puff  1 puff Inhalation Daily Amrik Christian MD   1 puff at 10/27/24 0801     Labs and Imaging results below reviewed today.  Recent Labs   Lab 10/27/24  0121 10/26/24  1719   WBC 13.5* 16.4*   HGB 10.3* 11.4*   HCT 35.2 40.2   MCV 76* 78    212     Recent Labs   Lab 10/27/24  1505 10/27/24  1345 10/27/24  1201 10/27/24  0816 10/27/24  0534 10/26/24  2346 10/26/24  1719   NA  --   --   --   --  138  --  138   POTASSIUM  --   --   --   --  5.4*  --  5.6*   CHLORIDE  --   --   --   --  95*  --  96*   CO2  --   --   --   --  20*  --  22   ANIONGAP  --   --   --   --  23*  --  20*   * 148* 152*   < > 146*   < > 132*   BUN  --   --   --   --  57.9*  --  52.5*   CR  --   --   --   --  3.01*  --  2.91*   GFRESTIMATED  --   --   --   --  17*  --  17*   LUNA  --   --   --   --  9.5  --  9.6   MAG  --   --   --   --  2.4*  --   --    PHOS  --   --   --   --  5.4*  --   --    PROTTOTAL  --   --   --   --  6.8  --   --    ALBUMIN  --   --   --   --  3.5  --   --    BILITOTAL  --   --   --   --  2.1*  --   --    ALKPHOS  --   --   --   --  118  --   --    AST  --   --   --   --  1,060*  --   --    ALT  --   --   --   --  788*  --   --     < > = values in this interval not displayed.     Recent Labs   Lab 10/27/24  0534 10/26/24  2024 10/26/24  1723   LACT 1.9 2.7* 3.4*     Recent Labs   Lab 10/27/24  0458   COLOR Dark Yellow*   APPEARANCE Slightly Cloudy*   URINEGLC Negative   URINEBILI Small*   URINEKETONE Negative   SG 1.024   UBLD  Small*   URINEPH 5.0   PROTEIN 20*   NITRITE Negative   LEUKEST Negative   RBCU 15*   WBCU 6*     Recent Results (from the past 24 hours)   XR Chest Port 1 View    Narrative    EXAM: XR CHEST PORT 1 VIEW  LOCATION: [facility]  DATE: 10/26/2024    INDICATION: Shortness of breath.  COMPARISON: 4/26/2024.      Impression    IMPRESSION: Mild pulmonary vascular congestion, with associated small to moderate size right pleural effusion. No pneumothorax.    Unchanged cardiomegaly. Atherosclerotic calcifications of the thoracic aorta and mild enlargement of the main pulmonary artery.   US Abdomen Limited w Abdomen Doppler Limited    Narrative    EXAM: US ABDOMEN LIMITED W ABDOMEN DOPPLER LIMITED  LOCATION: [facility]  DATE: 10/27/2024    INDICATION: severe transaminitis  COMPARISON: CT 6/27/19  TECHNIQUE: Limited abdominal ultrasound. Color flow with spectral Doppler and waveform analysis performed.     FINDINGS:    GALLBLADDER: Gallstones and sludge with mild wall thickening. No pericholecystic fluid. Negative sonographic Villar's sign.     BILE DUCTS: No biliary dilatation. The common duct measures 8 mm.    LIVER: Coarsened echotexture, suggesting underlying fibrosis. Nodular contour. No focal mass.     RIGHT KIDNEY: No hydronephrosis.     PANCREAS: The pancreas is largely obscured by overlying gas.    Small volume ascites.    ABDOMINAL DUPLEX: Very limited evaluation due to body habitus and labored breathing. The hepatic veins and main portal vein are patent with flow in the normal direction.       Impression    IMPRESSION:  1.  Coarsened hepatic echotexture with nodular contour suggestive of cirrhosis.  2.  Small volume ascites.  3.  Gallstones and gallbladder sludge with wall thickening, nonspecific in the setting of ascites.  4.  Limited Doppler evaluation to body habitus and labored breathing. The hepatic veins and main portal vein are patent.     Echocardiogram Complete    Result Value    LVEF  60-65%    [facility]    [id number]  [id number]  [id number]  [id number]^OSMAN^GE^CHRISTIAN     [facility]  Echocardiography Laboratory  [address] [place] [address]     Name: LOKESH MCKEON  MRN: [id number]  : 1960  Study Date: 10/27/2024 10:42 AM  Age: 64 yrs  Gender: Female  Patient Location: [facility]  Reason For Study: Atrial Fibrillation  Ordering Physician: GE BREWER  Referring Physician: Jayy Henson  Performed By: Loulou Parra RDCS     BSA: 2.4 m2  Height: 66 in  Weight: 296 lb  HR: 83  BP: 118/62 mmHg  ______________________________________________________________________________  Procedure  Complete Portable Echo Adult.  ______________________________________________________________________________  Interpretation Summary     Left ventricular systolic function is normal. The visual ejection fraction is  60-65%.  Septal motion is consistent with conduction abnormality. Flattened septum is  consistent with RV pressure/volume overload.  The right ventricle is severely dilated. The right ventricular systolic  function is moderately reduced.  Moderately severe (3+) tricuspid regurgitation.  Pulmonary hypertension present. The right ventricular systolic pressure is  approximated at 53mmHg plus the right atrial pressure.  Dilation of the inferior vena cava is present with abnormal respiratory  variation in diameter.  History of ASD closure device placement. No obvious interatrial shunt on color  Doppler interrogation.     This study was compared to a TTE from 2024. Global biventricular function  is stable.  ______________________________________________________________________________  Left Ventricle  The left ventricle is normal in size. There is mild concentric left  ventricular hypertrophy. Left ventricular systolic function is normal. The  visual ejection fraction is 60-65%. Left ventricular diastolic function is  indeterminate. Septal motion is  consistent with conduction abnormality.  Flattened septum is consistent with RV pressure/volume overload.     Right Ventricle  The right ventricle is severely dilated. The right ventricular systolic  function is moderately reduced.     Atria  Normal left atrial size. Right atrial size is normal. History of ASD closure  device placement. No obvious interatrial shunt on color Doppler interrogation.     Mitral Valve  There is mild to moderate mitral annular calcification. There is trace to mild  mitral regurgitation.     Tricuspid Valve  There is moderately severe (3+) tricuspid regurgitation. Pulmonary  hypertension. The right ventricular systolic pressure is approximated at  53mmHg plus the right atrial pressure.     Aortic Valve  The aortic valve is trileaflet with aortic valve sclerosis.     Pulmonic Valve  The pulmonic valve is not well seen, but is grossly normal.     Vessels  The aortic root is normal size. Normal size ascending aorta. Dilation of the  inferior vena cava is present with abnormal respiratory variation in diameter.     Pericardium  There is no pericardial effusion.     ______________________________________________________________________________  MMode/2D Measurements & Calculations  IVSd: 1.3 cm  LVIDd: 4.8 cm  LVIDs: 2.8 cm  LVPWd: 1.3 cm  IVC diam: 3.7 cm  FS: 42.0 %  LV mass(C)d: 257.1 grams  LV mass(C)dI: 108.8 grams/m2     Ao root diam: 3.3 cm  LA dimension: 4.7 cm  asc Aorta Diam: 3.9 cm  LA/Ao: 1.4  LVOT diam: 2.0 cm  LVOT area: 3.2 cm2  Ao root diam index Ht(cm/m): 1.9  Ao root diam index BSA (cm/m2): 1.4  Asc Ao diam index BSA (cm/m2): 1.6  Asc Ao diam index Ht(cm/m): 2.3  LA Volume (BP): 51.2 ml  LA Volume Index (BP): 21.7 ml/m2  RV Base: 6.7 cm     RWT: 0.56  TAPSE: 2.8 cm     Doppler Measurements & Calculations  MV E max anastacia: 111.6 cm/sec  MV A max anastacia: 109.0 cm/sec  MV E/A: 1.0  MV dec time: 0.21 sec  Ao V2 max: 154.0 cm/sec  Ao max P.0 mmHg  PA acc time: 0.14 sec  TR max anastacia:  365.0 cm/sec  TR max P.3 mmHg  E/E' av.8  Lateral E/e': 7.9  Medial E/e': 11.7  RV S Josh: 11.0 cm/sec     ______________________________________________________________________________  Report approved by: Jazzmine Groves 10/27/2024 12:43 PM         MR Ankle Right w/o & w Contrast    Narrative    EXAM: MR ANKLE RIGHT W/O and W CONTRAST  LOCATION: [facility]  DATE: 10/27/2024    INDICATION: Wound with strep bacteremia.  COMPARISON: None.  TECHNIQUE: Routine. Additional postgadolinium T1 sequences were obtained.  IV CONTRAST: 13mL Gadavist.    FINDINGS:     TENDONS:   -Peroneal: Peroneus longus and brevis tendons are intact. No tendinopathy or tenosynovitis. No subluxation.  -Medial: Posterior tibialis tendon is intact. No tendinopathy or tenosynovitis. Flexor digitorum longus and flexor hallucis longus tendons are normal. No tenosynovitis.  -Anterior: Anterior tibialis, extensor hallucis longus, and extensor digitorum longus tendons are normal. No tenosynovitis.  -Achilles: No tendinopathy or paratenonitis.    LIGAMENTS:   -Anterior talofibular ligament: Intact.   -Calcaneofibular ligament: Intact.   -Posterior talofibular ligament: Intact.  -Syndesmotic inferior tibiofibular ligaments: Intact.  -Deltoid ligament complex: Sequelae of chronic deltoid ligament sprain. No acute injury.  -Spring ligament complex: Intact.    JOINTS AND BONES:   -No evidence for acute ankle or hindfoot fracture. No calcaneus stress fracture. Tarsal bones and visualized metatarsals intact. Multifocal red marrow reconversion. Nothing definitive to suggest acute osteomyelitis.  -No advanced arthrosis in the ankle, hindfoot or midfoot. No sizable joint effusion or significant synovitis in the ankle or hindfoot or midfoot joints.    SOFT TISSUES:  -Plantar fascia: Intact. No acute fasciitis or tear.  -Sinus tarsi and tarsal tunnel: Preserved sinus Tarsi fat signal. No space-occupying mass is seen along the course  of the tarsal tunnel.  -Muscles: Severe diffuse intrinsic foot muscle atrophy with multiple muscle group muscle atrophy in the distal leg. Patchy muscle edema without significant myositis.    There is likely a wound and subcutaneous fibrosis along the posterolateral ankle soft tissues. No well-defined drainable fluid collection to suggest abscess. Distal leg and ankle soft tissue swelling.    A small complex poorly defined fluid collection or area of soft tissue edema is present within a Kager's fat pad without organized drainable abscess.      Impression    IMPRESSION:  1.  Probable wound/ulcer posterolateral right ankle with likely subdermal fibrosis along the lateral ankle and hindfoot. No well-defined drainable fluid collection to suggest abscess.  2.  No evidence for acute osteomyelitis.  3.  No finding for septic arthritis.  4.  Additional incidental findings as detailed fully above.

## 2024-10-27 NOTE — ED NOTES
[facility]  ED Nurse Handoff Report    ED Chief complaint: Copd Exacerbation  . ED Diagnosis:   Final diagnoses:   Acute respiratory failure with hypoxia and hypercapnia (H)   Hypervolemia, unspecified hypervolemia type   Atrial fibrillation with RVR (H)       Allergies:   Allergies   Allergen Reactions    Eliquis [Apixaban] Shortness Of Breath, Hives and Swelling     Pt. Reported     Penicillins Anaphylaxis     Per chart review: tolerated Augmentin in 2020.  Tolerated CTX 2024.       Erythromycin Hives and Itching    Peanut Oil Hives    Tetracaine     Clindamycin Itching    Tetracycline Itching       Code Status: DNR / DNI    Activity level - Baseline/Home:  assist of 2.  Activity Level - Current:   assist of 2.   Lift room needed: No.   Bariatric: No   Needed: No   Isolation: No.   Infection: Not Applicable.     Respiratory status: BiPap    Vital Signs (within 30 minutes):   Vitals:    10/26/24 1952 10/26/24 1955 10/26/24 1956 10/26/24 1957   BP:  (!) 85/49     Pulse: (!) 163 (!) 154 (!) 154 (!) 163   Resp: 13 20 23 24   Temp:       TempSrc:       SpO2: (!) 81% 100% 100% 100%   Weight:       Height:           Cardiac Rhythm:  ,   Cardiac  Cardiac Rhythm: Atrial fibrillation (a fib RVR)  Pain level:    Patient confused: No.   Patient Falls Risk: patient and family education.   Elimination Status:  has carolinak      Patient Report - Initial Complaint: Pt arrives via EMS from assisted living for shortness of breath for two days. Fire found pt in bed with O2 in 80's on 5L NC. Increased pt to 7L and O2 increased to 88-92%. EKG a-fib RVR with -180s. History of heart failure and COPD. Also notes increased swelling in legs bilaterally. Denies chest pain. One duoneb and one albuterol neb given en route. 20L placed in left AC. Blood sugar 153. 100cc 0.9% NaCl given en route   Focused Assessment: sob, afib with RVR, COPD     Abnormal Results:   Labs Ordered and Resulted from Time of ED  Arrival to Time of ED Departure   BASIC METABOLIC PANEL - Abnormal       Result Value    Sodium 138      Potassium 5.6 (*)     Chloride 96 (*)     Carbon Dioxide (CO2) 22      Anion Gap 20 (*)     Urea Nitrogen 52.5 (*)     Creatinine 2.91 (*)     GFR Estimate 17 (*)     Calcium 9.6      Glucose 132 (*)    NT PROBNP INPATIENT - Abnormal    N terminal Pro BNP Inpatient 43,417 (*)    TROPONIN T, HIGH SENSITIVITY - Abnormal    Troponin T, High Sensitivity 194 (*)    CBC WITH PLATELETS AND DIFFERENTIAL - Abnormal    WBC Count 16.4 (*)     RBC Count 5.17      Hemoglobin 11.4 (*)     Hematocrit 40.2      MCV 78      MCH 22.1 (*)     MCHC 28.4 (*)     RDW 22.5 (*)     Platelet Count 212      % Neutrophils 84      % Lymphocytes 2      % Monocytes 9      % Eosinophils 1      % Basophils 1      % Immature Granulocytes 3      NRBCs per 100 WBC 0      Absolute Neutrophils 13.8 (*)     Absolute Lymphocytes 0.3 (*)     Absolute Monocytes 1.5 (*)     Absolute Eosinophils 0.2      Absolute Basophils 0.1      Absolute Immature Granulocytes 0.5 (*)     Absolute NRBCs 0.0     ISTAT GASES LACTATE VENOUS POCT - Abnormal    Lactic Acid POCT 3.4 (*)     Bicarbonate Venous POCT 27      O2 Sat, Venous POCT 26 (*)     pCO2 Venous POCT 60 (*)     pH Venous POCT 7.26 (*)     pO2 Venous POCT 21 (*)    ISTAT GASES LACTATE VENOUS POCT   TROPONIN T, HIGH SENSITIVITY   LACTIC ACID WHOLE BLOOD   BLOOD CULTURE        XR Chest Port 1 View   Final Result   IMPRESSION: Mild pulmonary vascular congestion, with associated small to moderate size right pleural effusion. No pneumothorax.      Unchanged cardiomegaly. Atherosclerotic calcifications of the thoracic aorta and mild enlargement of the main pulmonary artery.          Treatments provided: see MAR  Family Comments:   OBS brochure/video discussed/provided to patient:  N/A  ED Medications:   Medications   diltiazem (CARDIZEM) 125 mg in dextrose 5 % 125 mL infusion (5 mg/hr Intravenous Rate/Dose Change  10/26/24 1945)   furosemide (LASIX) injection 60 mg (60 mg Intravenous $Given 10/26/24 1736)   diltiazem (CARDIZEM) injection 25 mg (25 mg Intravenous $Given 10/26/24 1741)   sodium chloride 0.9% BOLUS 500 mL (0 mLs Intravenous Stopped 10/26/24 1854)   doxycycline (VIBRAMYCIN) 100 mg vial to attach to  mL bag (0 mg Intravenous Stopped 10/26/24 1918)   cefTRIAXone (ROCEPHIN) 2 g vial to attach to  ml bag for ADULTS or NS 50 ml bag for PEDS (0 g Intravenous Stopped 10/26/24 1946)   methylPREDNISolone Na Suc (solu-MEDROL) injection 125 mg (125 mg Intravenous $Given 10/26/24 1810)       Drips infusing:  No  For the majority of the shift this patient was Green.   Interventions performed were .    Sepsis treatment initiated: No    Cares/treatment/interventions/medications to be completed following ED care: all admit orders    ED Nurse Name: Makenna Reeves RN  7:59 PM

## 2024-10-27 NOTE — CONSULTS
[facility]    Nephrology Consultation     Date of Admission:  10/26/2024    Assessment & Plan     Linda Otero is a 64 year old female with PMH lymphedema, COPD, JODI, congenital heart disease (cor triatriatum), CAD, DVT, HLD, HTN, hypothyroidism, severe pulmonary HTN who was admitted on 10/26/2024 with SOB found to have afib w RVR.     Assessment:      SONAL on CKD 3a, oliguric   Mixed shock, cardiogenic and septic   Severe pulmonary HTN, Cor pulmonale   HFpEF exacerbation   Hypervolemia   GPC bacteremia  Transaminitis (congestive vs shock liver)  Baseline Cr 1.1-1.45 (eGFR 40-50). Cr now 3. Worsening dyspnea and edema, elevated BNP 43K in setting of afib w RVR and decompensated HFpEF exacerbation. Weight up ~20 lbs. Started IV bumex 2 mg one time, minimal UOP. Also with multifactorial shock (septic shock, cardiogenic shock). UA grossly abnormal with RBC, WBCs, hyaline casts, protein, blood. Suspect grossly abnormal due to urinary stasis. Miguel placed for urinary retention. Suspect ATN related to sepsis, mixed shock.   - TTE pending   - empiric abx per ID   - agree with diuretics and no IVF   - will see response to 2mg IV lasix, agree with higher dose 4mg for next dose. Could consider bumex gtt if not responding.   - no CRRT for now, but if unresponsive to diuretics then may need it for volume   - strict I/Os   - hold jardiance and spironolactone     Hyperkalemia   Mild, K 5.4 in setting of oliguric SONAL. Should improve with diuretics.     AGMA   Lactate 3.4, improved to 1.9 with resolution of afib. Ketones 1.53. suspect multifactorial related to lactic acidosis, ketoacidosis, and SONAL. SGLT2-I held.     Afib w RVR, resolved   On presentation, symptomatic. Initial tx with dilt bolus and infusion without improvement. Switched to amio with conversion to NSR, but watching closely given abnormal LFTs.       Plan/Recs:  1) if no response to bumex 2 mg IV, then give 4mg IV bumex then start bumex  gtt 0.5 mg/h   2) consented for dialysis in case UOP doesn't        Discussed with Dr. Mondragon (ICU) and Dr. Farrell (hospialist). Reviewed notes from Dr. Mondragon, Dr. Odom (cardiology), Dr. Najera (DP).     Hina Miller MD   [facility] - [facility]ph[facility]  [phone]  --------------------------------------------------------------------------------------------  Reason for Consult     I was asked to see the patient for SONAL on CKD with fluid overload.    Primary Care Physician     Jayy Henson    Chief Complaint     SOB    History is obtained from the patient and chart review.      History of Present Illness     Linda Otero is a 64 year old female who presents with SOB.     Pt had 2 day onset SOB, EMS noted hypoxia to 80s on 5L NC. She also notes increase in LE edema. Due to concern for COPD vs CHF exacerbation, she was given IV steroids and started on bipap. She was hypotensive, given 500 ml NS bolus. Due to afib w RVR, she was given diltiazem bolus, then infusion. She then transitioned to amio (due to lack of improvement in HR and worsened hypotension) with conversion to NSR. She was then admitted to ICU. Notably overnight, blood cultures returned with strep species, she was started on empiric abx, ID consulted.      Respiratory status has remained labored, currently on 10L facemask vs bipap. BP is soft, she is on low dose norepi and started on midodrine 10 mg TID. She is oliguric, thus far has only made 307 ml. Received IV lasix 60 mg overnight, 20 mg this aM, then bumex IV 2 mg late morning with dose of 4mg planned this evening. Cardiology consulted, recommending diuresis.     Also concern for euglycemic DKA, holding SGLT2-I.     She continues to have SOB worse than baseline. She feels edematous more than baseline. Denies fevers/chills.       Past Medical History   I have reviewed this patient's medical history and updated it with pertinent information if needed.   Past Medical History:    Diagnosis Date    Abnormality of gait due to impairment of balance     Acute and chronic respiratory failure with hypercapnia (H)     Acute deep venous thrombosis (H) 10/20/2015    Anemia     Iron deficiency    Aneurysm (H) 2012    Arthritis     toes, thumb, elbow    B12 deficiency     Cancer (H) 1985    cervical    Cellulitis right foot    Chronic diastolic heart failure (H)     Chronic kidney disease     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  left thalamic stroke as well as patchy MCA branch infarcts    CVA (cerebral vascular accident) (H) 12/23/2012    Left thalamic stroke, MCA branch infarcts    Dermatitis     DVT (deep venous thrombosis) (H)     Right leg    Encephalopathy     after stroke, believed related to hypoxia    History of transfusion     Hyperlipidemia     Hypertension     Hypothyroidism     Lymphedema     MGUS (monoclonal gammopathy of unknown significance)     Neuropathy of right lower extremity     Obesity     On home oxygen therapy     PFO (patent foramen ovale)     closed after stroke, see cardiology notes care everywhere    PFO (patent foramen ovale)     Pneumonia     Primary hypercoagulable state (H)     Pulmonary emboli (H) 2013    Pulmonary HTN (H)     Respiratory failure (H)     after stroke    Seizure (H)     Seizures (H)     at age 31    Skin cancer 2014    Sleep apnea     CPAP    Stroke (H) 2012    Umbilical hernia     Wound of right ankle        Past Surgical History   I have reviewed this patient's surgical history and updated it with pertinent information if needed.  Past Surgical History:   Procedure Laterality Date    ASD REPAIR      ASD REPAIR      BIOPSY SKIN (LOCATION)      CARDIAC CATHETERIZATION  2012    2 stents    COLONOSCOPY N/A 4/9/2018     Procedure: COLONOSCOPY;  Surgeon: Dorene Araujo MD;  Location: [facility] Main OR;  Service:     CONIZATION  1985    CV CORONARY ANGIOGRAM N/A 4/12/2019    Procedure: Coronary Angiogram;  Surgeon: Brett Montalvo MD;  Location: [facility] Cath Lab;  Service: Cardiology    CV CORONARY ANGIOGRAM N/A 4/12/2024    Procedure: Coronary Angiogram;  Surgeon: Enoc Crocker MD;  Location: [facility] CARDIAC CATH LAB    CV INTRAVASULAR ULTRASOUND N/A 4/12/2024    Procedure: Intravascular Ultrasound;  Surgeon: Enoc Crocker MD;  Location: [facility] CARDIAC CATH LAB    CV LEFT HEART CATHETERIZATION WITHOUT LEFT VENTRICULOGRAM Left 4/12/2019    Procedure: Left Heart Catheterization Without Left Ventriculogram;  Surgeon: Brett Montalvo MD;  Location: [facility] Cath Lab;  Service: Cardiology    CV RIGHT HEART CATH MEASUREMENTS RECORDED N/A 4/12/2024    Procedure: Right Heart Catheterization;  Surgeon: Enoc Crocker MD;  Location: [facility] CARDIAC CATH LAB    CV RIGHT HEART CATH MEASUREMENTS RECORDED N/A 4/19/2024    Procedure: Right Heart Cath;  Surgeon: Sparkle Suresh MD;  Location: [facility] CARDIAC CATH LAB    CV RIGHT HEART CATHETERIZATION N/A 4/12/2019    Procedure: Right Heart Catheterization;  Surgeon: Brett Montalvo MD;  Location: [facility] Cath Lab;  Service: Cardiology    ESOPHAGOSCOPY, GASTROSCOPY, DUODENOSCOPY (EGD), COMBINED N/A 4/9/2018    Procedure: ESOPHAGOGASTRODUODENOSCOPY (EGD);  Surgeon: Dorene Araujo MD;  Location: [facility] OR;  Service:     IR CAROTID ANGIOGRAM  12/26/2012    IR CAROTID ANGIOGRAM  12/26/2012    IR MISCELLANEOUS PROCEDURE  12/26/2012    IR MISCELLANEOUS PROCEDURE  12/26/2012    IR MISCELLANEOUS PROCEDURE  12/26/2012    OTHER SURGICAL HISTORY      Cardiac stents    PATENT FORAMEN OVALE CLOSURE      REPAIR PATENT FORAMEN OVALE  2013    Helex device    XR SACRO ILIAC JOINT INJECTION LEFT  11/2012    [facility] REMV ART CLOT  ILIAC-POP,LEG INCIS Left 7/22/2019    Procedure: REPAIR LEFT FEMORAL ARTERIOVENOUS FISTULA WITH INTRAOPERATIVE ULTRASOUND;  Surgeon: Salinas Torres MD;  Location: [facility];  Service: General       Prior to Admission Medications   Prior to Admission Medications   Prescriptions Last Dose Informant Patient Reported? Taking?   OXYGEN-HELIUM IN Unknown  Yes No   Sig: Inhale 5 L into the lungs continuous prn.   acetaminophen (TYLENOL) 325 MG tablet Past Month  No Yes   Sig: Take 2 tablets (650 mg) by mouth every 4 hours as needed for pain or fever   albuterol (PROVENTIL) (2.5 MG/3ML) 0.083% neb solution 10/26/2024  No Yes   Sig: Take 1 vial (2.5 mg) by nebulization every 6 hours as needed for shortness of breath or wheezing OFFICE VISIT NEEDED FOR ANY FURTHER REFILLS   aspirin (ASA) 81 MG chewable tablet 10/26/2024  No Yes   Sig: Take 1 tablet (81 mg) by mouth daily   bumetanide (BUMEX) 2 MG tablet 10/26/2024  No Yes   Sig: Take 2 tablets (4 mg) by mouth 2 times daily.   cyanocobalamin (CYANOCOBALAMIN) 1000 MCG/ML injection Past Month  No Yes   Sig: Inject 1 mL (1,000 mcg) into the muscle every 30 days   empagliflozin (JARDIANCE) 10 MG TABS tablet 10/26/2024  No Yes   Sig: Take 1 tablet (10 mg) by mouth daily   fluticasone-vilanterol (BREO ELLIPTA) 200-25 MCG/ACT inhaler 10/26/2024  No Yes   Sig: Inhale 1 puff into the lungs daily   ipratropium - albuterol 0.5 mg/2.5 mg/3 mL (DUONEB) 0.5-2.5 (3) MG/3ML neb solution 10/26/2024  No Yes   Sig: Take 1 vial (3 mLs) by nebulization 2 times daily   loperamide (IMODIUM A-D) 2 MG tablet Unknown  No No   Sig: Take 1 tablet (2 mg) by mouth 4 times daily as needed for diarrhea   menthol-zinc oxide (CALMOSEPTINE) 0.44-20.6 % OINT ointment Unknown  No No   Sig: Apply topically daily as needed for skin protection Apply to affected area topically as needed for affected area daily   nystatin (MYCOSTATIN) 171056 UNIT/GM external powder Unknown  No No   Sig: Apply to breast  BID and BID PRN   polyethylene glycol (MIRALAX) 17 GM/Dose powder Unknown  No No   Sig: Take 17 g by mouth daily   potassium chloride marcie ER (KLOR-CON M20) 20 MEQ CR tablet 10/26/2024  No Yes   Sig: Take 1 tablet (20 mEq) by mouth daily.   senna-docusate (SENOKOT-S/PERICOLACE) 8.6-50 MG tablet Unknown  No No   Sig: Take 2 tablets by mouth 2 times daily as needed for constipation   sildenafil (REVATIO) 20 MG tablet 10/26/2024 Morning  No Yes   Sig: Take 1 tablet (20 mg) by mouth 3 times daily. Take 20mg (1 tablet) for 30 days then increase to 40mg (2 tablets) after.   spironolactone (ALDACTONE) 25 MG tablet 10/26/2024  No Yes   Sig: Take 1 tablet (25 mg) by mouth daily   tiotropium (SPIRIVA RESPIMAT) 2.5 MCG/ACT inhaler 10/26/2024  No Yes   Sig: INHALE TWO PUFFS BY MOUTH EVERY DAY   traMADol (ULTRAM) 50 MG tablet 10/25/2024  Yes Yes   Sig: Take 50 mg by mouth once as needed for severe pain.      Facility-Administered Medications: None     Allergies   Allergies   Allergen Reactions    Eliquis [Apixaban] Shortness Of Breath, Hives and Swelling     Pt. Reported     Penicillins Anaphylaxis     Per chart review: tolerated Augmentin in 2020.  Tolerated CTX 2024.       Erythromycin Hives and Itching    Peanut Oil Hives    Tetracaine     Clindamycin Itching    Tetracycline Itching       Social History   I have reviewed this patient's social history and updated it with pertinent information if needed. Linda Otero  reports that she quit smoking about 11 years ago. Her smoking use included cigarettes. She started smoking about 55 years ago. She has a 44 pack-year smoking history. She has never used smokeless tobacco. She reports that she does not drink alcohol and does not use drugs.    Family History   I have reviewed this patient's family history and updated it with pertinent information if needed.   Family History   Problem Relation Age of Onset    Angioedema Mother     Pulmonary Embolism Brother     Cerebrovascular  "Disease Father     Coronary Artery Disease Brother        Review of Systems   The 10 point Review of Systems is negative other than noted in the HPI.     Physical Exam   Temp: 97.8  F (36.6  C) Temp src: Temporal BP: 94/46 Pulse: 84   Resp: 14 SpO2: 94 % O2 Device: BiPAP/CPAP Oxygen Delivery: 10 LPM  Vital Signs with Ranges  Temp:  [97.5  F (36.4  C)-98.6  F (37  C)] 97.8  F (36.6  C)  Pulse:  [] 84  Resp:  [0-40] 14  BP: ()/(40-96) 94/46  FiO2 (%):  [40 %-60 %] 40 %  SpO2:  [81 %-100 %] 94 %  296 lbs 8.3 oz    GENERAL: ill appearing  HEENT:  Normocephalic. Dry MM   CV: RRR, systolic murmur  RESP: poor air movement, crackles bilaterally   MUSCULOSKELETAL: LE lymphedema. Tight extremities, 2+ pitting.   NEURO:  drowsy but awakens easily to voice. Normal speech.   PSYCH: mood good, affect appropriate      Data   BMP  Recent Labs   Lab 10/27/24  1113 10/27/24  0816 10/27/24  0534 10/27/24  0412 10/26/24  2346 10/26/24  1719   NA  --   --  138  --   --  138   POTASSIUM  --   --  5.4*  --   --  5.6*   CHLORIDE  --   --  95*  --   --  96*   LUNA  --   --  9.5  --   --  9.6   CO2  --   --  20*  --   --  22   BUN  --   --  57.9*  --   --  52.5*   CR  --   --  3.01*  --   --  2.91*   * 153* 146* 153*   < > 132*    < > = values in this interval not displayed.     Phos@LABRCNTIPR(phos:4)  CBC)  Recent Labs   Lab 10/27/24  0121 10/26/24  1719   WBC 13.5* 16.4*   HGB 10.3* 11.4*   HCT 35.2 40.2   MCV 76* 78    212     Recent Labs   Lab 10/27/24  0534   AST 1,060*   *   ALKPHOS 118   BILITOTAL 2.1*     No lab results found in last 7 days.  No results found for: \"D2VIT\", \"D3VIT\", \"DTOT\"  Recent Labs   Lab 10/27/24  0121   HGB 10.3*   HCT 35.2   MCV 76*     No results for input(s): \"PTHI\" in the last 168 hours.  Color Urine (no units)   Date Value   10/27/2024 Dark Yellow (A)     Appearance Urine (no units)   Date Value   10/27/2024 Slightly Cloudy (A)     Glucose Urine (mg/dL)   Date Value "   10/27/2024 Negative     Bilirubin Urine (no units)   Date Value   10/27/2024 Small (A)     Ketones Urine (mg/dL)   Date Value   10/27/2024 Negative     Specific Gravity Urine (no units)   Date Value   10/27/2024 1.024     pH Urine (no units)   Date Value   10/27/2024 5.0     Protein Albumin Urine (mg/dL)   Date Value   10/27/2024 20 (A)     Urobilinogen Urine (no units)   Date Value   06/27/2019 <2.0 E.U./dL     Nitrite Urine (no units)   Date Value   10/27/2024 Negative     Leukocyte Esterase Urine (no units)   Date Value   10/27/2024 Negative     CXR  IMPRESSION: Mild pulmonary vascular congestion, with associated small to moderate size right pleural effusion. No pneumothorax.     Unchanged cardiomegaly. Atherosclerotic calcifications of the thoracic aorta and mild enlargement of the main pulmonary artery.      Abd US   FINDINGS:     GALLBLADDER: Gallstones and sludge with mild wall thickening. No pericholecystic fluid. Negative sonographic Villar's sign.      BILE DUCTS: No biliary dilatation. The common duct measures 8 mm.     LIVER: Coarsened echotexture, suggesting underlying fibrosis. Nodular contour. No focal mass.      RIGHT KIDNEY: No hydronephrosis.      PANCREAS: The pancreas is largely obscured by overlying gas.     Small volume ascites.     ABDOMINAL DUPLEX: Very limited evaluation due to body habitus and labored breathing. The hepatic veins and main portal vein are patent with flow in the normal direction.                                                                       IMPRESSION:  1.  Coarsened hepatic echotexture with nodular contour suggestive of cirrhosis.  2.  Small volume ascites.  3.  Gallstones and gallbladder sludge with wall thickening, nonspecific in the setting of ascites.  4.  Limited Doppler evaluation to body habitus and labored breathing. The hepatic veins and main portal vein are patent.          Hina Miller MD   [facility] - Nephrology  [phone]

## 2024-10-27 NOTE — PROGRESS NOTES
Notified provider about indwelling hathaway catheter discussed removal or continued need.    Did provider choose to remove indwelling hathaway catheter? NO    Provider's hathaway indication for keeping indwelling hathaway catheter: Indication for continued use: Retention    Is there an order for indwelling hathaway catheter? YES    *If there is a plan to keep hathaway catheter in place at discharge daily notification with provider is not necessary, but please add a notation in the treatment team sticky note that the patient will be discharging with the catheter.

## 2024-10-27 NOTE — PHARMACY-VANCOMYCIN DOSING SERVICE
Pharmacy Vancomycin Initial Note  Date of Service 2024  Patient's  1960  64 year old, female    Indication: Sepsis    Current estimated CrCl = Estimated Creatinine Clearance: 27.9 mL/min (A) (based on SCr of 2.91 mg/dL (H)).    Creatinine for last 3 days  10/26/2024:  5:19 PM Creatinine 2.91 mg/dL    Recent Vancomycin Level(s) for last 3 days  No results found for requested labs within last 3 days.      Vancomycin IV Administrations (past 72 hours)        No vancomycin orders with administrations in past 72 hours.                    Nephrotoxins and other renal medications (From now, onward)      Start     Dose/Rate Route Frequency Ordered Stop    10/27/24 0900  empagliflozin (JARDIANCE) tablet 10 mg        Note to Pharmacy: PTA Sig:Take 1 tablet (10 mg) by mouth daily      10 mg Oral DAILY 10/27/24 0125      10/27/24 0600  furosemide (LASIX) injection 20 mg         20 mg  over 1-3 Minutes Intravenous EVERY 12 HOURS 10/27/24 0125      10/27/24 0430  norepinephrine (LEVOPHED) 4 mg in  mL PERIPHERAL infusion         0.01-0.125 mcg/kg/min × 134.5 kg  5-63 mL/hr  Intravenous CONTINUOUS 10/27/24 0427              Contrast Orders - past 72 hours (72h ago, onward)      None            [employer] Prediction of Planned Initial Vancomycin Regimen  Loading dose: 2500 mg at 08:00 10/27/2024.  Regimen: 1500 mg IV every 48 hours.  Start time: 08:00 on 10/28/2024  Exposure target: AUC24 (range)400-600 mg/L.hr   AUC24,ss: 647 mg/L.hr  Probability of AUC24 > 400: 85 %  Ctrough,ss: 17.2 mg/L  Probability of Ctrough,ss > 20: 43 %  Probability of nephrotoxicity (Lodise CAIO ): 13 %  Plan:  Start vancomycin  2500 mg IV loading dose 10/27/24 0800 18.5mg/kg then scheduled 1500mg IV q48h start 0800 on 10/28/24   Vancomycin monitoring method: AUC  Vancomycin therapeutic monitoring goal: 400-600 mg*h/L  Pharmacy will check vancomycin levels as appropriate in 1-3 Days.    Serum creatinine levels will be ordered  daily for the first week of therapy and at least twice weekly for subsequent weeks.      Chuy Lynn, [facility]

## 2024-10-27 NOTE — PROGRESS NOTES
INTENSIVIST FACULTY NOTE:  Got BIPAP overnight  Converted back to sinus with amiodarone    Exam awake, lucid.  Slightly scratchy voice  Breathing easily off BIPAP without desaturation  Morbidly obese  No candidal issues under her pannus / groin  Slightly crackly in lungs  Right ankle wound evaluated - see picture below and in media.  No obvious surrounding cellulitis  Both calves are woody, chronic venous stasis changes  Miguel concentrated, scant urine    Labs notable for slightly elevated potassium at 5.4  Creatinine is ~3, from a baseline of 1.2 about 5 months ago. eGFR <20  Bicarb is 20, but baseline bicarb is probalby around 30, and there is a significant anion gap (presumed to be uremic, as her lactate has normalized)  Not actually that hypoalbuminemic  Transaminases are markedly elevated, with AST >1k  Bilirubin is only slightly elevated  Ketones elevated, but lactate normalized  Glucose in goal range despite steroids  BNP >40k  Troponin >180, but downtrending, and the renal failure is likely a contributing factor   Has a leukocytosis and mild anemia  Not thrombocytopenic  Urinalysis bloody, but not definitely infected.  Hyaline casts suggest ATN    Imaging RUQ US pending  CXR congested, but no obviosu signs of infection  Formal echo pending    This is a 64F hx morbid obesity (BMI 48) with JODI, COPD on daily inhaler, severe pulmonary HTN seen by Dr. Shearer & on sildenafil, HFpEF, CAD, HLD, CKD III, remote hx of DVT/PE not currently anticoagulated, chronic skin ulcer, hypothyroid, remove CVA without residual deficits, and remote stress-related seizure not on anti-epileptics, who presents with apparent Strep bacteremia and afib with RVR.    She has cardioverted back into sinus with amiodarone, but remains mildly hypotensive and with a new transaminitis that I am assuming is congestive related to her chronic right heart issues.  Will ask cardiology to follow her given her severe pulmonary HTN and for an afib  regimen in the context of her transaminitis (not sure we can continue on amiodarone).  Will ask ID to follow given bacteremia, although it seems likely that the ankle wound is the source, and with strep, unlikely to have endocarditis.  Will send an MRI for osteomyelitis.  She appears to have a euglycemic ketoacidosis; probably related to her SGLT-2 inhibitor.  Will hold this.    METABOLIC ENCEPHALOPATHY / DELIRIUM:  -due to hypercarbia, infection; monitoring with clinical exam; largely resolved now after respiratory support overnight.      JODI:  -CPAP at night with sleep    COPD:  -home inhalers; spiriva and duoneb with Breo ellipta  -methylpred 40 bid for sepsis and possible COPD exacerbation    POSSIBLE PNEUMONIA:  -she reports no significant phlegm / cough.  CXR simply looked more congested to me than infective.  Discontinue ceftriaxone (will need to remain on vancomycin for undifferentiated Strep coverage)    CHRONIC DIASTOLIC HEART FAILURE:  -LVEF 60-65% by echo in March of this year.  Repeat echo pending given afib (has a known atrial septal defect closure device  -preload: bumex 4mg bid is home dose.  Will give half that (2mg bid) IV today given sepsis  -afterload: not on an ACE-I at home  -beta blocker: start metoprolol once off pressors (not a home med)  -antiplatelet: ASA  -anticoagulation: heparin drip for now given afib; patient reports an allergy to apixaban; should probably be started on warfarin then once she is clearly recovering and hepatic function is normalizing  -Neurohormonal: not on an ACE-I or ARB at home  -on an SGLT-2 inibitor (empagliflozin) at home, but holding to avoid the risk of exacerbating euglycemic ketoacidosis while the patient's enteral intake is still not fully consistent or predictable.    PULMONARY HYPERTENSION:  -continue home sildenafil  -important to maintain euvolemia - continue some degree of diuresis despite SONAL and hypotension.  My marker of volume overload is her  presumptive congestive transaminitis, afib, and decline in all counts on CBC.     ATRIAL FIBRILLATION:  -presumably secondary to acute illness / fluid & electrolyte shifts; will obtain formal transthoracic echocardiogram when able to rule out structural disease  -hemodynamically stable after cardioversion with amiodarone  -given acute hepatitis, may need to use alternative rhythm-controlling agent.  Will discuss further with cardiology.    -CHADS-2 score of >2 implies the need for long-term systemic anticoagulation.  Patient typically would seem to be a good candidate for a novel oral anticoagulant like apixaban, but she reports an allergy to this, so will likely need to start on warfarin.  Continue heparin drip for now.      TROPONINEMIA:  -NSTEMI / Type 2 demand ischemia in the context of afib and sepsis.  Troponin will probably be slow to clear because of renal failure  -aspirin and heparin gtt    SEVERE SEPTIC SHOCK:  -lactate originally >2; SIRS criteria / hypotension; presumed source bacteremia from right ankle wound  -empiric antibiotics with vancomycin for strep coverage based on verigene  -received fluid bolus of ~30ml/kg as per guidelines; will continue fluid resuscitation as necessary guided by pulse pressure variation, transthoracic echocardiogram / IVC sonography, CVP, passive leg raise, ScvO2%, capillary refill; whatever is available to me at the time  -norepinephrine as primary pressor as per guidelines; if doses go higher than what she is at currently, may need central access with PICC or CVC, but for now will trial some midodrine, even though unopposed alpha isn't necessarily her optimal pressor regimen, for recovering septic shock as in Scott, CHEST 2015.  -thiamine 200mg IV q12h for minimum 4d or until off pressors.  Justifications for thiamine include the followin.) Lasix diuresis can contribute to thiamine deficiency;  2.) studies have shown thiamine deficiency is under-recognized on  "admission to ICUs, even among non-alcoholics, and can develop during the course of critical illness from metabolic stress; 3.) thiamine has been shown to improve LVEF% (slightly); 4.) thiamine deficiency is associated with delirium - not just Wernicke's encephalopathy and Korsakoff syndrome; 5.) thiamine deficiency can cause a variety of GI symptoms that mimic an acute surgical abdomen; 6.) thiamine questionably may help clear lactic acidosis in patients with normal hepatic function.    -methylpred 40mg bid to decrease pressor requirement and for potential mortality benefit as per the trials of hydrocortisone such as Nancie, NATACHA 2002 and as per APROCHSS trial, NEJ 2019    TRANSAMINITIS:  -for now assuming some degree of \"shock liver\" together with congestion from pulmonary HTN / right heart issues with her afib, but will get RUQ U/S with dopplers to rule out portal venous thrombus.    ACUTE KIDNEY INJURY:  -Cr currently ~3 with oliguria from baseline 1.2  -due to septic shock; POCUS did not demonstrate significant hydronephrosis. Hyaline casts on urinalysis suggest ATN  -monitoring with strict I/Os and serial Cr checks  -nephrology consult given potential need for RRT based on potassium and fljuid overload.  For now attemtping diuresis with IV bumex    EUGLYCEMIC KETOACIDOSIS:  -probably related to SGL2 inhibitor.  Hold this  -start insulin drip until anion gap has closed.  May need D10 infusion to tolerate this    SKIN ULCER RIGHT ANKLE:    -I do not apprecaite a surrounding cellulitis  -still the likely source of bacteremia  -MRI to evaluate for osteomyelitis  -podiatry consult  -she says she gets dressing chagnes twice a week at her facility. WOC consult here    STREPTOCOCCAL BACTEREMIA:  -ankle wound presumed source  -ID consult  -repeat blood cultures daily; infective endocarditis unlikely so TTE should be adequate    MISC:  -Code status is DNR/DNI.  I talked with her about this at length, and called her son " Cosme, who said this was consistent with previously-articulated wishes.  -I called her son Cosme to let him know she has been admitted to the hospital/ICU with her permission, and he is willing to serve as emergency decision maker, but Linda implied that she does not want Cosme called on a regular basis  -SQH not necessary on heparin gtt; PPI for high dose steroids  -lines: peripheral only for now; may need central access for pressors  -hathaway for SONAL with oliguria  -anticipate discharge to skilled nursing vs rehab in a few days, less likely her home assisted living, if she recovers quickly from this infection    ADDENDUM:  -after discussion with nephrology, will consider bumex infusion at 0.5mg/h if had minimal response to intermittent bolus dose bumex.    Billing statement: 51min of critical care time; spent in an initial review of imaging, labs, physical exam, and discussion of the patient with my own team and the extended care team including the primary service; Based on this patient's presentation / recent intervention and my bedside assessment, I felt there was or is a reasonably high probability of imminent or life-threatening deterioration today or tonight for septic reasons.   My overall critical care time, as described in detail above, includes such things as coordination of care, arrhythmia and hemodynamics management with infusions of medicines, respiratory management, fluid therapy including fluid boluses, and pain and sedation therapy. This time excludes time I spent personally performing or supervising procedures for this patient.    SHABBIR Mondragon MD  Clinical   Anesthesia / Critical Care  *[phone]

## 2024-10-27 NOTE — PHARMACY-ADMISSION MEDICATION HISTORY
Pharmacist Admission Medication History    Admission medication history is complete. The information provided in this note is only as accurate as the sources available at the time of the update.    Information Source(s): Patient via in-person    Pertinent Information: none    Changes made to PTA medication list:  Added: Tramadol 50mg  Deleted: None  Changed: None    Allergies reviewed with patient and updates made in EHR: yes    Medication History Completed By: Sukhwinder Parsons [facility] 10/26/2024 8:48 PM    PTA Med List   Medication Sig Note Last Dose/Taking    acetaminophen (TYLENOL) 325 MG tablet Take 2 tablets (650 mg) by mouth every 4 hours as needed for pain or fever  Past Month    albuterol (PROVENTIL) (2.5 MG/3ML) 0.083% neb solution Take 1 vial (2.5 mg) by nebulization every 6 hours as needed for shortness of breath or wheezing OFFICE VISIT NEEDED FOR ANY FURTHER REFILLS  10/26/2024    aspirin (ASA) 81 MG chewable tablet Take 1 tablet (81 mg) by mouth daily  10/26/2024    bumetanide (BUMEX) 2 MG tablet Take 2 tablets (4 mg) by mouth 2 times daily.  10/26/2024    cyanocobalamin (CYANOCOBALAMIN) 1000 MCG/ML injection Inject 1 mL (1,000 mcg) into the muscle every 30 days  Past Month    empagliflozin (JARDIANCE) 10 MG TABS tablet Take 1 tablet (10 mg) by mouth daily  10/26/2024    fluticasone-vilanterol (BREO ELLIPTA) 200-25 MCG/ACT inhaler Inhale 1 puff into the lungs daily  10/26/2024    ipratropium - albuterol 0.5 mg/2.5 mg/3 mL (DUONEB) 0.5-2.5 (3) MG/3ML neb solution Take 1 vial (3 mLs) by nebulization 2 times daily  10/26/2024    potassium chloride marcie ER (KLOR-CON M20) 20 MEQ CR tablet Take 1 tablet (20 mEq) by mouth daily.  10/26/2024    sildenafil (REVATIO) 20 MG tablet Take 1 tablet (20 mg) by mouth 3 times daily. Take 20mg (1 tablet) for 30 days then increase to 40mg (2 tablets) after. 10/26/2024: Taking 1 tablet three times daily 10/26/2024 Morning    spironolactone (ALDACTONE) 25 MG tablet Take 1 tablet  (25 mg) by mouth daily  10/26/2024    tiotropium (SPIRIVA RESPIMAT) 2.5 MCG/ACT inhaler INHALE TWO PUFFS BY MOUTH EVERY DAY  10/26/2024    traMADol (ULTRAM) 50 MG tablet Take 50 mg by mouth once as needed for severe pain.  10/25/2024

## 2024-10-27 NOTE — PROGRESS NOTES
"Cross Cover    Called for procal of 8  Admit with AF RVR and pulmonary edema complicated by hypoxic respiratory failure     Empirically given ceftriaxone and doxy for \"sepsis\" given leukocytosis/tachycardia/hypoxic respiratory failure/elevated lactate.  She has not been febrile   ? Lung infection although CXR looks more like pulmonary edema.     Abx not continued by admitting MD as infectious process not solidified.       I think there's an explanation for all of her sepsis parameters in the setting of AF with RVR  Ck UA and will continue to hold abx at this time  Low threshold to start should she start having fevers or localizing sx     Cross Cover #2  BCx back and positive for GPC in pairs and chains consistent with strep species.  Given 2 gm of ceftriaxone in the ER and I will continue         In discussion with RN she has a right foot/ankle wound with drainage which could certainly be the source    Cross Cover #3  No uo overnight and bladder scanned for > 1500 ml  Orders in for hathaway for acute urinary retention         "

## 2024-10-27 NOTE — PROVIDER NOTIFICATION
10/27/24 0504   Tech Time   $Tech Time (10 minute increments) 3   Mode: CPAP/ BiPAP/ AVAPS/ AVAPS AE   CPAP/BiPAP/ AVAPS/ AVAPS AE Mode BiPAP S/T   Equipment   Device v60   Device Serial Number 1   CPAP/BiPAP/Settings   $CPAP/BiPAP Subsequent completed   BIPAP/CPAP On Standby On   IPAP/EPAP (cmH2O) 16/6   Rate (breaths/min) 12   Oxygen (%) 50   Timed Inspiration (sec) 0.9   IPAP RISE  Settings (V60) 2   CPAP/BiPAP Patient Parameters   IPAP (cm H2O) 16 cmH2O   EPAP (cm H2O) 6 cmH2O   Pressure Support (cm H2O) 10 cmH2O   RR Total (breaths/min) 22 breaths/min   Vt (mL) 776 mL   Minute Ventilation (L/min) 17.1 L/min   Peak Inspiratory Pressure (cm H2O) 16 cmH2O   Pt.  Leak (L/min) 0 L/min   CPAP/BiPAP/AVAPS/AVAPS AE Alarms   High Pressure (cm H2O) 25 cmH2O   Low Pressure (cm H2O) 5   Low Pressure Delay (sec) 20 sec   Lo Min Vent 2   High Rate (breaths/min) 50 breaths/min   Low Rate (breaths/min) 10   Audible Alarm set at (Volume of Alarm) 7   Humidifier Checked N/A   RT Device Skin Assessment   Oxygen Delivery Device CPAP/BiPAP Mask   Interface Under-nose Mask - Medium   Ventilator Arm In Place No   Site Appearance neck circumference Clean and dry   Site Appearance bridge of nose Clean and dry   Site Appearance occiput Clean and dry   Strap Tightness Finger Allowance between head and device strap   Device Skin Interventions Taken No adjustments needed   Respiratory WDL   Respiratory WDL X   Rhythm/Pattern, Respiratory assisted mechanically   Expansion/Accessory Muscles/Retractions expansion symmetric;no retractions;no use of accessory muscles     Salinas Mendez, RT on 10/27/2024 at 5:07 AM

## 2024-10-27 NOTE — H&P
[facility]    History and Physical - Hospitalist Service       Date of Admission:  10/26/2024    Assessment & Plan   Linda Otero is a 64 year old female admitted on 10/26/2024. She presents with worsening of shortness of breath, she has been found with atrial fibrillation with RVR.  Hypercapnia, hypoxemia, worsening peripheral edema.  She denies chest pain, she reports to be compliant with her medications.  No recent acute intercurrent infections, no other identifiable trigger.  Apparently she has had similar presentations in the past.  In the emergency department she had Lasix 60 mg IV, started on BiPAP and a drip of diltiazem.  She has been switched to amiodarone, diltiazem discontinue with better result for rate control.  She was also started empirically on ceftriaxone and doxycycline, however I am not going to continue antibiotics until there is solid proof of infection.  She has no localizing signs, she does not look toxemic.  She has a reported allergy to rivaroxaban, she is not on any anticoagulation despite of her history of DVT/PE and atrial fibrillation.    Atrial fibrillation with RVR. [id number] = 6  Acute hypoxemic/hypercarbic respiratory failure with COPD exacerbation/obstructive sleep apnea/OHS.  Heart failure with preserved ejection fraction, suspect exacerbation secondary to the above.  Admit to ICU.  N.p.o. while on BiPAP.  Strict ELVIS's, daily weight.  Cardiac telemetry.  Amiodarone drip per protocol, goal is ventricular rate less than 100.  Continue Aldactone, Jardiance, hold Bumex p.o., start Lasix IV tomorrow until seen by cardiology.  Heparin drip.  Complete serial troponin.  Echocardiogram in AM.  Cardiology consultation  BiPAP overnight    History of coronary artery disease.  Troponin elevation, suspect non-STEMI versus demand supply ischemia due to sustained RVR.  Continue baby aspirin, heparin drip    History of severe pulmonary hypertension.  Moderate to  "severe tricuspid regurgitation.  Suspect chronic cor pulmonale.  Continue Rivatio as prior to admission    History of DVT/PE.  Not on anticoagulation.  Heparin drip      Diet:  2 g sodium  DVT Prophylaxis: Heparin drip     Miguel Catheter: Not present  Lines: None     Cardiac Monitoring: None  Code Status:  Full code    Clinically Significant Risk Factors Present on Admission        # Hyperkalemia: Highest K = 5.6 mmol/L in last 2 days, will monitor as appropriate   # Hypochloremia: Lowest Cl = 96 mmol/L in last 2 days, will monitor as appropriate     # Anion Gap Metabolic Acidosis: Highest Anion Gap = 20 mmol/L in last 2 days, will monitor and treat as appropriate    # Drug Induced Platelet Defect: home medication list includes an antiplatelet medication  # Acute Kidney Injury, unspecified: based on a >150% or 0.3 mg/dL increase in last creatinine compared to past 90 day average, will monitor renal function  # Hypertension: Noted on problem list   # Acute Hypoxic Respiratory Failure: Documented O2 saturation < 90%. Continue supplemental oxygen as needed  # Acute Hypercapnic Respiratory Failure: based on venous blood gas results.  Continue supplemental oxygen and ventilatory support as indicated.        # Severe Obesity: Estimated body mass index is 49.33 kg/m  as calculated from the following:    Height as of this encounter: 1.676 m (5' 6\").    Weight as of this encounter: 138.6 kg (305 lb 9.6 oz).       # Financial/Environmental Concerns:    # COPD: noted on problem list        Disposition Plan     Medically Ready for Discharge: Anticipated in 2-4 Days        Amrik Christian MD  Hospitalist Service  [facility]  Securely message with Fwd: [place] (more info)  Text page via [facility] Paging/Directory     ______________________________________________________________________    Chief Complaint   Shortness of breath, palpitation.    History is obtained from the patient, electronic health record, and " emergency department physician    History of Present Illness   Linda Otero is a 64 year old female who presents with shortness of breath and has been found with atrial fibrillation w/RVR.  She resides in an assisted living facility and has a very complicated past medical history.  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  She denies any acute symptoms preceding this onset (no fever, no cough, no nausea vomitus or diarrhea, no urinary symptoms).  She is reported to be compliant with all her medications.  She states that in the past she has had similar presentations.  I have discussed his care with Dr. Monteiro,  Emergency department physician who is requesting admission to the ICU for further treatment and follow-up.  On arrival she has been started on  Lasix IV 60 mg one-time, BiPAP therapy, and diltiazem drip for rate control after initial bolus.  VBG is notable for hypercapnia and hypoxemia.  Leukocytosis 16.4, absolute neutrophil count of.13.5, absolute monocyte count 1.5, hemoglobin 11.4 with microcytosis.  Chest x-ray.    impression:   Mild pulmonary vascular congestion, with associated small to moderate size right pleural effusion. No pneumothorax.  Unchanged cardiomegaly. Atherosclerotic calcifications of the thoracic aorta and mild enlargement of the main pulmonary artery.  EKG shows atrial fibrillation with RVR.  Troponin 194, second sample 199.  NT proBNP 43, 417  BUN/creatinine/GFR are compatible with acute kidney injury on top of her known history of chronic kidney disease.  In the emergency department she has not had any rate control with the diltiazem drip, difficult to titrate up because of blood pressure dropping.  I have discussed again with Dr. Monteiro  the necessity to start amiodarone (bolus and drip).  I will be admitting her to ICU for hemodynamic stabilization and continuity of care.      Past Medical History    Past Medical History:   Diagnosis Date    Abnormality of gait due to impairment of balance     Acute and chronic respiratory failure with hypercapnia (H)     Acute deep venous thrombosis (H) 10/20/2015    Anemia     Iron deficiency    Aneurysm (H) 2012    Arthritis     toes, thumb, elbow    B12 deficiency     Cancer (H) 1985    cervical    Cellulitis right foot    Chronic diastolic heart failure (H)     Chronic kidney disease     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  left thalamic stroke as well as patchy MCA branch infarcts    CVA (cerebral vascular accident) (H) 12/23/2012    Left thalamic stroke, MCA branch infarcts    Dermatitis     DVT (deep venous thrombosis) (H)     Right leg    Encephalopathy     after stroke, believed related to hypoxia    History of transfusion     Hyperlipidemia     Hypertension     Hypothyroidism     Lymphedema     MGUS (monoclonal gammopathy of unknown significance)     Neuropathy of right lower extremity     Obesity     On home oxygen therapy     PFO (patent foramen ovale)     closed after stroke, see cardiology notes care everywhere    PFO (patent foramen ovale)     Pneumonia     Primary hypercoagulable state (H)     Pulmonary emboli (H) 2013    Pulmonary HTN (H)     Respiratory failure (H)     after stroke    Seizure (H)     Seizures (H)     at age 31    Skin cancer 2014    Sleep apnea     CPAP    Stroke (H) 2012    Umbilical hernia     Wound of right ankle        Past Surgical History   Past Surgical History:   Procedure Laterality Date    ASD REPAIR      ASD REPAIR      BIOPSY SKIN  (LOCATION)      CARDIAC CATHETERIZATION  2012    2 stents    COLONOSCOPY N/A 4/9/2018    Procedure: COLONOSCOPY;  Surgeon: Dorene Araujo MD;  Location: [facility] Main OR;  Service:     CONIZATION  1985    CV CORONARY ANGIOGRAM N/A 4/12/2019    Procedure: Coronary Angiogram;  Surgeon: Brett Montalvo MD;  Location: [facility] Cath Lab;  Service: Cardiology    CV CORONARY ANGIOGRAM N/A 4/12/2024    Procedure: Coronary Angiogram;  Surgeon: Enoc Crocker MD;  Location: [facility] CARDIAC CATH LAB    CV INTRAVASULAR ULTRASOUND N/A 4/12/2024    Procedure: Intravascular Ultrasound;  Surgeon: Enoc Crocker MD;  Location: [facility] CARDIAC CATH LAB    CV LEFT HEART CATHETERIZATION WITHOUT LEFT VENTRICULOGRAM Left 4/12/2019    Procedure: Left Heart Catheterization Without Left Ventriculogram;  Surgeon: Brett Montalvo MD;  Location: [facility] Cath Lab;  Service: Cardiology    CV RIGHT HEART CATH MEASUREMENTS RECORDED N/A 4/12/2024    Procedure: Right Heart Catheterization;  Surgeon: Enoc Crocker MD;  Location: [facility] CARDIAC CATH LAB    CV RIGHT HEART CATH MEASUREMENTS RECORDED N/A 4/19/2024    Procedure: Right Heart Cath;  Surgeon: Sparkle Suresh MD;  Location: [facility] CARDIAC CATH LAB    CV RIGHT HEART CATHETERIZATION N/A 4/12/2019    Procedure: Right Heart Catheterization;  Surgeon: Brett Montalvo MD;  Location: [facility] Cath Lab;  Service: Cardiology    ESOPHAGOSCOPY, GASTROSCOPY, DUODENOSCOPY (EGD), COMBINED N/A 4/9/2018    Procedure: ESOPHAGOGASTRODUODENOSCOPY (EGD);  Surgeon: Dorene Araujo MD;  Location: [facility] OR;  Service:     IR CAROTID ANGIOGRAM  12/26/2012    IR CAROTID ANGIOGRAM  12/26/2012    IR MISCELLANEOUS PROCEDURE  12/26/2012    IR MISCELLANEOUS PROCEDURE  12/26/2012    IR MISCELLANEOUS PROCEDURE  12/26/2012    OTHER SURGICAL HISTORY      Cardiac stents    PATENT FORAMEN OVALE CLOSURE      REPAIR PATENT FORAMEN OVALE  2013    Helex  device    XR SACRO ILIAC JOINT INJECTION LEFT  11/2012    ZC REMV ART CLOT ILIAC-POP,LEG INCIS Left 7/22/2019    Procedure: REPAIR LEFT FEMORAL ARTERIOVENOUS FISTULA WITH INTRAOPERATIVE ULTRASOUND;  Surgeon: Salinas Torres MD;  Location: [facility];  Service: General       Prior to Admission Medications   Prior to Admission Medications   Prescriptions Last Dose Informant Patient Reported? Taking?   OXYGEN-HELIUM IN   Yes No   Sig: Inhale 5 L into the lungs continuous prn.   acetaminophen (TYLENOL) 325 MG tablet   No No   Sig: Take 2 tablets (650 mg) by mouth every 4 hours as needed for pain or fever   albuterol (PROVENTIL) (2.5 MG/3ML) 0.083% neb solution   No No   Sig: Take 1 vial (2.5 mg) by nebulization every 6 hours as needed for shortness of breath or wheezing OFFICE VISIT NEEDED FOR ANY FURTHER REFILLS   aspirin (ASA) 81 MG chewable tablet   No No   Sig: Take 1 tablet (81 mg) by mouth daily   bumetanide (BUMEX) 2 MG tablet   No No   Sig: Take 2 tablets (4 mg) by mouth 2 times daily.   cyanocobalamin (CYANOCOBALAMIN) 1000 MCG/ML injection   No No   Sig: Inject 1 mL (1,000 mcg) into the muscle every 30 days   empagliflozin (JARDIANCE) 10 MG TABS tablet   No No   Sig: Take 1 tablet (10 mg) by mouth daily   fluticasone-vilanterol (BREO ELLIPTA) 200-25 MCG/ACT inhaler   No No   Sig: Inhale 1 puff into the lungs daily   ipratropium - albuterol 0.5 mg/2.5 mg/3 mL (DUONEB) 0.5-2.5 (3) MG/3ML neb solution   No No   Sig: Take 1 vial (3 mLs) by nebulization 2 times daily   loperamide (IMODIUM A-D) 2 MG tablet   No No   Sig: Take 1 tablet (2 mg) by mouth 4 times daily as needed for diarrhea   menthol-zinc oxide (CALMOSEPTINE) 0.44-20.6 % OINT ointment   No No   Sig: Apply topically daily as needed for skin protection Apply to affected area topically as needed for affected area daily   nystatin (MYCOSTATIN) 094904 UNIT/GM external powder   No No   Sig: Apply to breast BID and BID PRN   polyethylene glycol  (MIRALAX) 17 GM/Dose powder   No No   Sig: Take 17 g by mouth daily   potassium chloride marcie ER (KLOR-CON M20) 20 MEQ CR tablet   No No   Sig: Take 1 tablet (20 mEq) by mouth daily.   senna-docusate (SENOKOT-S/PERICOLACE) 8.6-50 MG tablet   No No   Sig: Take 2 tablets by mouth 2 times daily as needed for constipation   sildenafil (REVATIO) 20 MG tablet   Yes No   Sig: Take 1 tablet (20 mg) by mouth 3 times daily.   sildenafil (REVATIO) 20 MG tablet   No No   Sig: Take 1 tablet (20 mg) by mouth 3 times daily. Take 20mg (1 tablet) for 30 days then increase to 40mg (2 tablets) after.   spironolactone (ALDACTONE) 25 MG tablet   No No   Sig: Take 1 tablet (25 mg) by mouth daily   tiotropium (SPIRIVA RESPIMAT) 2.5 MCG/ACT inhaler   No No   Sig: INHALE TWO PUFFS BY MOUTH EVERY DAY      Facility-Administered Medications: None        Review of Systems    The 10 point Review of Systems is negative other than noted in the HPI or here.       Physical Exam   Vital Signs: Temp: 97.5  F (36.4  C) Temp src: Oral BP: 107/76 Pulse: (!) 147   Resp: 12 SpO2: 100 % O2 Device: BiPAP/CPAP    Weight: 305 lbs 9.6 oz    GEN: Obese.  Alert, oriented x 3, appears comfortable, NAD.  HEENT:  Normocephalic/atraumatic, no scleral icterus, no nasal discharge, mouth moist.  CV:  Regular rate and rhythm, no murmur or JVD.  S1 + S2 noted, no S3 or S4.  LUNGS: On BiPAP, mask on top of the face.  Transmitted ventilator sounds to auscultation bilaterall, fine crackles in bases .  Symmetric chest rise on inhalation noted.  ABD: Prominent, protruding and difficult to palpate.  Active bowel sounds, soft, non-tender/non-distended.  No rebound/guarding/rigidity.  EXT: Bilateral lower extremity edema 3+, no cyanosis.  No joint synovitis noted.  SKIN:  Dry to touch, no exanthems noted in the visualized areas.         Medical Decision Making       75 MINUTES SPENT BY ME on the date of service doing chart review, history, exam, documentation & further activities  per the note.      Data     I have personally reviewed the following data over the past 24 hrs:    16.4 (H)  \   11.4 (L)   / 212     138 96 (L) 52.5 (H) /  132 (H)   5.6 (H) 22 2.91 (H) \     Trop: 194 (HH) BNP: 43,417 (H)     Procal: N/A CRP: N/A Lactic Acid: 2.7 (H)         Imaging results reviewed over the past 24 hrs:   Recent Results (from the past 24 hours)   XR Chest Port 1 View    Narrative    EXAM: XR CHEST PORT 1 VIEW  LOCATION: [facility]  DATE: 10/26/2024    INDICATION: Shortness of breath.  COMPARISON: 4/26/2024.      Impression    IMPRESSION: Mild pulmonary vascular congestion, with associated small to moderate size right pleural effusion. No pneumothorax.    Unchanged cardiomegaly. Atherosclerotic calcifications of the thoracic aorta and mild enlargement of the main pulmonary artery.

## 2024-10-27 NOTE — CONSULTS
[facility]    Cardiology Consultation     Linda Otero MRN#: [id number]   YOB: 1960 Age: 64 year old     Date of Admission:  10/26/2024    Consult Indication:  CHF, PH, shock     Assessment & Plan     # Shock, seems mixed cardiogenic from atrial fibrillation with RVR and cor pulmonale and septic shock, GPC bacteremia  # Severe PH, cor pulmonale, HFpEF. Weight on admission 305 lb, dry weight approx 284 lb. NTproBNP on admission 43,000  # Acute on chronic hypoxic respiratory failure, O2 dependent COPD, now with decompensated HFpEF, COPD exacerbation, OHS  # Atrial fibrillation with RVR, new diagnosis, status post conversion to sinus rhythm with amiodarone gtt. administered in ED  # SONAL on CKD  # Mildly elevated troponin, consistent with demand ischemia, coronary angiogram 4/2024 demonstrated normal coronary arteries  # Transaminitis, could be from shock liver vs venous congestion   # Untreated JODI  # Cor triatriatum. CVA s/p PFO closure    - Agree with norepinephrine GTT  - Continue amiodarone gtt for now given hypotension with RVR in ED, though will need to monitor LFTs closely  - Hold spironolactone given SONAL on CKD and hypotension  - Continue aspirin, heparin gtt.   - Continue sildenafil 20 mg TID  - Patient is grossly fluid overloaded however now in renal failure, had been on Bumex 4 mg p.o. BID, will give IV dose now, recommend consultation with Nephrology for guidance on diuretics   - Hold Jardiance pending renal function  - TTE pending  - Consider thoracentesis  - Cardiology will follow     Please do not hesitate to page with any questions or concerns.     Enrrique Odom MD, [facility]  Cardiology  October 27, 2024    Voice recognition software utilized.   Critical Care: Total critical care time spent: 45    History of Present Illness     Patient is a 64-year-old female with a history of CVA thought to be due to paradoxical embolism status post PFO closure,  oxygen dependent COPD, prior VTE, CKD, CAD, hypertension, hyperlipidemia, seizure disorder, JODI with noncompliance with CPAP, severe pulmonary hypertension and cor pulmonale, who presents with dyspnea found to be in atrial fibrillation and RVR and sepsis with GPC bacteremia.    Patient is followed by Pulmonary hypertension clinic, Dr. Berrios at [facility].  Patient was admitted 4/2024 with respiratory failure.  Right heart cath/12/2024 showed mixed precapillary and postcapillary pulmonary hypertension, mean PA 50 mmHg, PCWP 20 mmHg.  TTE 4/1/2024 LVEF 60 to 65%, severe RV dilatation with moderately reduced RV function, moderate to severe TR.  Last seen in clinic 10/2/2024, dry weight approximately 284 pounds, at that clinic visit she was noted to be in decompensated heart failure.  She was started on metolazone and instructed to take extra Bumex.  She presented to the ED yesterday 10/26/2024 with about 2 to 3 days of worsening dyspnea.  In the ED initial blood pressure was low at 89/54 mmHg, ECG demonstrated atrial fibrillation with RVR and incomplete right bundle branch block.  Labs notable for potassium 5.6, creatinine 2.9, NT proBNP 43,000, high-sensitivity troponin was around 190 and flat in trajectory.  Procalcitonin 7.9.  Lactate 2.7, she was administered 500 cc of normal saline.  She was administered IV diltiazem, as well as IV Lasix.  She was also started on antibiotics.  Heart rate remained elevated and she was transition to amiodarone and then converted to sinus rhythm.  She was admitted to the ICU.  Continued on amiodarone gtt., heparin GTT.  She was continued on spironolactone.  Jardiance held.  Blood cultures came back positive for GPC in pairs and chains.      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  left thalamic stroke as well as patchy MCA branch infarcts    CVA (cerebral vascular accident) (H) 12/23/2012    Left thalamic stroke, MCA branch infarcts    Dermatitis     DVT (deep venous thrombosis) (H)     Right leg    Encephalopathy     after stroke, believed related to hypoxia    History of transfusion     Hyperlipidemia     Hypertension     Hypothyroidism     Lymphedema     MGUS (monoclonal gammopathy of unknown significance)     Neuropathy of right lower extremity     Obesity     On home oxygen therapy     PFO (patent foramen ovale)     closed after stroke, see cardiology notes care everywhere    PFO (patent foramen ovale)     Pneumonia     Primary hypercoagulable state (H)     Pulmonary emboli (H) 2013    Pulmonary HTN (H)     Respiratory failure (H)     after stroke    Seizure (H)     Seizures (H)     at age 31    Skin cancer 2014    Sleep apnea     CPAP    Stroke (H) 2012    Umbilical hernia     Wound of right ankle        Past Surgical History   Past Surgical History:   Procedure Laterality Date    ASD REPAIR      ASD REPAIR      BIOPSY SKIN (LOCATION)      CARDIAC CATHETERIZATION  2012    2 stents    COLONOSCOPY N/A 4/9/2018    Procedure: COLONOSCOPY;  Surgeon: Dorene Araujo MD;  Location: [facility] OR;  Service:     CONIZATION  1985    CV CORONARY ANGIOGRAM N/A 4/12/2019    Procedure: Coronary Angiogram;  Surgeon: Brett Montalvo MD;  Location: [facility] Cath Lab;  Service: Cardiology    CV CORONARY  ANGIOGRAM N/A 4/12/2024    Procedure: Coronary Angiogram;  Surgeon: Enoc Crocker MD;  Location: [facility] CARDIAC CATH LAB    CV INTRAVASULAR ULTRASOUND N/A 4/12/2024    Procedure: Intravascular Ultrasound;  Surgeon: Enoc Crocker MD;  Location: [facility] CARDIAC CATH LAB    CV LEFT HEART CATHETERIZATION WITHOUT LEFT VENTRICULOGRAM Left 4/12/2019    Procedure: Left Heart Catheterization Without Left Ventriculogram;  Surgeon: Brett Montalvo MD;  Location: [facility] Cath Lab;  Service: Cardiology    CV RIGHT HEART CATH MEASUREMENTS RECORDED N/A 4/12/2024    Procedure: Right Heart Catheterization;  Surgeon: Enoc Crocker MD;  Location:  HEART CARDIAC CATH LAB    CV RIGHT HEART CATH MEASUREMENTS RECORDED N/A 4/19/2024    Procedure: Right Heart Cath;  Surgeon: Sparkle Suresh MD;  Location: [facility] CARDIAC CATH LAB    CV RIGHT HEART CATHETERIZATION N/A 4/12/2019    Procedure: Right Heart Catheterization;  Surgeon: Brett Montalvo MD;  Location: [facility] Cath Lab;  Service: Cardiology    ESOPHAGOSCOPY, GASTROSCOPY, DUODENOSCOPY (EGD), COMBINED N/A 4/9/2018    Procedure: ESOPHAGOGASTRODUODENOSCOPY (EGD);  Surgeon: Dorene Araujo MD;  Location: [facility];  Service:     IR CAROTID ANGIOGRAM  12/26/2012    IR CAROTID ANGIOGRAM  12/26/2012    IR MISCELLANEOUS PROCEDURE  12/26/2012    IR MISCELLANEOUS PROCEDURE  12/26/2012    IR MISCELLANEOUS PROCEDURE  12/26/2012    OTHER SURGICAL HISTORY      Cardiac stents    PATENT FORAMEN OVALE CLOSURE      REPAIR PATENT FORAMEN OVALE  2013    Helex device    XR SACRO ILIAC JOINT INJECTION LEFT  11/2012    [facility] REMV ART CLOT ILIAC-POP,LEG INCIS Left 7/22/2019    Procedure: REPAIR LEFT FEMORAL ARTERIOVENOUS FISTULA WITH INTRAOPERATIVE ULTRASOUND;  Surgeon: Salinas Torres MD;  Location: [facility];  Service: General       Prior to Admission Medications   Prior to Admission Medications   Prescriptions Last Dose  Informant Patient Reported? Taking?   OXYGEN-HELIUM IN Unknown  Yes No   Sig: Inhale 5 L into the lungs continuous prn.   acetaminophen (TYLENOL) 325 MG tablet Past Month  No Yes   Sig: Take 2 tablets (650 mg) by mouth every 4 hours as needed for pain or fever   albuterol (PROVENTIL) (2.5 MG/3ML) 0.083% neb solution 10/26/2024  No Yes   Sig: Take 1 vial (2.5 mg) by nebulization every 6 hours as needed for shortness of breath or wheezing OFFICE VISIT NEEDED FOR ANY FURTHER REFILLS   aspirin (ASA) 81 MG chewable tablet 10/26/2024  No Yes   Sig: Take 1 tablet (81 mg) by mouth daily   bumetanide (BUMEX) 2 MG tablet 10/26/2024  No Yes   Sig: Take 2 tablets (4 mg) by mouth 2 times daily.   cyanocobalamin (CYANOCOBALAMIN) 1000 MCG/ML injection Past Month  No Yes   Sig: Inject 1 mL (1,000 mcg) into the muscle every 30 days   empagliflozin (JARDIANCE) 10 MG TABS tablet 10/26/2024  No Yes   Sig: Take 1 tablet (10 mg) by mouth daily   fluticasone-vilanterol (BREO ELLIPTA) 200-25 MCG/ACT inhaler 10/26/2024  No Yes   Sig: Inhale 1 puff into the lungs daily   ipratropium - albuterol 0.5 mg/2.5 mg/3 mL (DUONEB) 0.5-2.5 (3) MG/3ML neb solution 10/26/2024  No Yes   Sig: Take 1 vial (3 mLs) by nebulization 2 times daily   loperamide (IMODIUM A-D) 2 MG tablet Unknown  No No   Sig: Take 1 tablet (2 mg) by mouth 4 times daily as needed for diarrhea   menthol-zinc oxide (CALMOSEPTINE) 0.44-20.6 % OINT ointment Unknown  No No   Sig: Apply topically daily as needed for skin protection Apply to affected area topically as needed for affected area daily   nystatin (MYCOSTATIN) 281897 UNIT/GM external powder Unknown  No No   Sig: Apply to breast BID and BID PRN   polyethylene glycol (MIRALAX) 17 GM/Dose powder Unknown  No No   Sig: Take 17 g by mouth daily   potassium chloride marcie ER (KLOR-CON M20) 20 MEQ CR tablet 10/26/2024  No Yes   Sig: Take 1 tablet (20 mEq) by mouth daily.   senna-docusate (SENOKOT-S/PERICOLACE) 8.6-50 MG tablet Unknown   No No   Sig: Take 2 tablets by mouth 2 times daily as needed for constipation   sildenafil (REVATIO) 20 MG tablet 10/26/2024 Morning  No Yes   Sig: Take 1 tablet (20 mg) by mouth 3 times daily. Take 20mg (1 tablet) for 30 days then increase to 40mg (2 tablets) after.   spironolactone (ALDACTONE) 25 MG tablet 10/26/2024  No Yes   Sig: Take 1 tablet (25 mg) by mouth daily   tiotropium (SPIRIVA RESPIMAT) 2.5 MCG/ACT inhaler 10/26/2024  No Yes   Sig: INHALE TWO PUFFS BY MOUTH EVERY DAY   traMADol (ULTRAM) 50 MG tablet 10/25/2024  Yes Yes   Sig: Take 50 mg by mouth once as needed for severe pain.      Facility-Administered Medications: None     Current Facility-Administered Medications   Medication Dose Route Frequency Provider Last Rate Last Admin    acetaminophen (TYLENOL) tablet 650 mg  650 mg Oral Q4H PRN Amrik Christian MD        albuterol (PROVENTIL) neb solution 2.5 mg  2.5 mg Nebulization Q6H PRN Amrik Christian MD        amiodarone (NEXTERONE) 1.8 mg/mL in dextrose 5% 200 mL ADULT STANDARD infusion  0.5 mg/min Intravenous Continuous Amrik Christian MD 16.7 mL/hr at 10/27/24 0734 0.5 mg/min at 10/27/24 0734    aspirin (ASA) chewable tablet 81 mg  81 mg Oral Daily Amrik Christian MD        cyanocobalamin injection 1,000 mcg  1,000 mcg Intramuscular Q30 Days Amrik Christian MD        glucose gel 15-30 g  15-30 g Oral Q15 Min PRN Amrik Christian MD        Or    dextrose 50 % injection 25-50 mL  25-50 mL Intravenous Q15 Min PRN Amrik Christian MD        Or    glucagon injection 1 mg  1 mg Subcutaneous Q15 Min PRN Amrik Christian MD        empagliflozin (JARDIANCE) tablet 10 mg  10 mg Oral Daily Amrik Christian MD        fluticasone-vilanterol (BREO ELLIPTA) 200-25 MCG/ACT inhaler 1 puff  1 puff Inhalation Daily Amrik Christian MD   1 puff at 10/27/24 0801    furosemide (LASIX) injection 20 mg  20 mg Intravenous Q12H Amrik Christian MD   20 mg at 10/27/24 0700    heparin 25,000 units in 0.45%  NaCl 250 mL ANTICOAGULANT infusion  0-5,000 Units/hr Intravenous Continuous Amrik Christian MD 15 mL/hr at 10/27/24 0735 1,500 Units/hr at 10/27/24 0735    HYDROmorphone (DILAUDID) injection 0.2 mg  0.2 mg Intravenous Q1H PRN Luis Enrique Mondragon MD   0.2 mg at 10/27/24 0750    ipratropium - albuterol 0.5 mg/2.5 mg/3 mL (DUONEB) neb solution 3 mL  3 mL Nebulization BID Amrik Christian MD   3 mL at 10/27/24 0801    loperamide (IMODIUM) capsule 2 mg  2 mg Oral 4x Daily PRN Amrik Christian MD        methylPREDNISolone sodium succinate (SOLU-MEDROL) injection 40 mg  40 mg Intravenous Q12H Luis Enrique Mondragon MD        miconazole (MICATIN) 2 % powder   Topical BID Amrik Christian MD        naloxone (NARCAN) injection 0.2 mg  0.2 mg Intravenous Q2 Min PRN Amrik Christian MD        Or    naloxone (NARCAN) injection 0.4 mg  0.4 mg Intravenous Q2 Min PRN Amrik Christian MD        Or    naloxone (NARCAN) injection 0.2 mg  0.2 mg Intramuscular Q2 Min PRN Amrik Christian MD        Or    naloxone (NARCAN) injection 0.4 mg  0.4 mg Intramuscular Q2 Min PRN Amrik Christian MD        norepinephrine (LEVOPHED) 4 mg in  mL PERIPHERAL infusion  0.01-0.125 mcg/kg/min Intravenous Continuous Salinas Marr DO 5 mL/hr at 10/27/24 0735 0.01 mcg/kg/min at 10/27/24 0735    ondansetron (ZOFRAN ODT) ODT tab 4 mg  4 mg Oral Q6H PRN Amrik Christian MD        Or    ondansetron (ZOFRAN) injection 4 mg  4 mg Intravenous Q6H PRN Amrik Christian MD        polyethylene glycol (MIRALAX) Packet 17 g  17 g Oral Daily Amrik Christian MD        prochlorperazine (COMPAZINE) injection 10 mg  10 mg Intravenous Q6H PRN Amrik Christian MD        Or    prochlorperazine (COMPAZINE) tablet 10 mg  10 mg Oral Q6H PRN Amrik Christian MD        Or    prochlorperazine (COMPAZINE) suppository 25 mg  25 mg Rectal Q12H PRN Amrik Christian MD        senna-docusate (SENOKOT-S/PERICOLACE) 8.6-50 MG per tablet 2 tablet  2  tablet Oral BID PRN Amrik Christian MD        sildenafil (REVATIO) tablet 20 mg  20 mg Oral TID Amrik Christian MD        spironolactone (ALDACTONE) tablet 25 mg  25 mg Oral Daily Amrik Christian MD        thiamine (B-1) injection 200 mg  200 mg Intravenous BID Luis Enrique Mondragon MD        traMADol (ULTRAM) tablet 50 mg  50 mg Oral Once PRN Amrik Christian MD   50 mg at 10/27/24 0631    umeclidinium (INCRUSE ELLIPTA) 62.5 MCG/ACT inhaler 1 puff  1 puff Inhalation Daily Amrik Christian MD   1 puff at 10/27/24 0801    [START ON 10/28/2024] vancomycin (VANCOCIN) 1,500 mg in 0.9% NaCl 265 mL intermittent infusion  1,500 mg Intravenous Q48H Amrik Christian MD        vancomycin (VANCOCIN) 2,500 mg in 0.9% NaCl 525 mL intermittent infusion  2,500 mg Intravenous Once Amrik Christian MD         Current Facility-Administered Medications   Medication Dose Route Frequency Provider Last Rate Last Admin    acetaminophen (TYLENOL) tablet 650 mg  650 mg Oral Q4H PRN Amrik Christian MD        albuterol (PROVENTIL) neb solution 2.5 mg  2.5 mg Nebulization Q6H PRN Amrik Christian MD        amiodarone (NEXTERONE) 1.8 mg/mL in dextrose 5% 200 mL ADULT STANDARD infusion  0.5 mg/min Intravenous Continuous Amrik Christian MD 16.7 mL/hr at 10/27/24 0734 0.5 mg/min at 10/27/24 0734    aspirin (ASA) chewable tablet 81 mg  81 mg Oral Daily Amrik Christian MD        cyanocobalamin injection 1,000 mcg  1,000 mcg Intramuscular Q30 Days Amrik Christian MD        glucose gel 15-30 g  15-30 g Oral Q15 Min PRN Amrik Christian MD        Or    dextrose 50 % injection 25-50 mL  25-50 mL Intravenous Q15 Min PRN Amrik Christian MD        Or    glucagon injection 1 mg  1 mg Subcutaneous Q15 Min PRN Amrik Christian MD        empagliflozin (JARDIANCE) tablet 10 mg  10 mg Oral Daily Amrik Christian MD        fluticasone-vilanterol (BREO ELLIPTA) 200-25 MCG/ACT inhaler 1 puff  1 puff Inhalation Daily Amrik Christian MD   1  puff at 10/27/24 0801    furosemide (LASIX) injection 20 mg  20 mg Intravenous Q12H Amrik Christian MD   20 mg at 10/27/24 0700    heparin 25,000 units in 0.45% NaCl 250 mL ANTICOAGULANT infusion  0-5,000 Units/hr Intravenous Continuous Amrik Christian MD 15 mL/hr at 10/27/24 0735 1,500 Units/hr at 10/27/24 0735    HYDROmorphone (DILAUDID) injection 0.2 mg  0.2 mg Intravenous Q1H PRN Luis Enrique Mondragon MD   0.2 mg at 10/27/24 0750    ipratropium - albuterol 0.5 mg/2.5 mg/3 mL (DUONEB) neb solution 3 mL  3 mL Nebulization BID Amrik Christian MD   3 mL at 10/27/24 0801    loperamide (IMODIUM) capsule 2 mg  2 mg Oral 4x Daily PRN Amrik Christian MD        methylPREDNISolone sodium succinate (SOLU-MEDROL) injection 40 mg  40 mg Intravenous Q12H Luis Enrique Mondragon MD        miconazole (MICATIN) 2 % powder   Topical BID Amrik Christian MD        naloxone (NARCAN) injection 0.2 mg  0.2 mg Intravenous Q2 Min PRN Amrik Christian MD        Or    naloxone (NARCAN) injection 0.4 mg  0.4 mg Intravenous Q2 Min PRN Amrik Christian MD        Or    naloxone (NARCAN) injection 0.2 mg  0.2 mg Intramuscular Q2 Min PRN Amrik Christian MD        Or    naloxone (NARCAN) injection 0.4 mg  0.4 mg Intramuscular Q2 Min PRN Amrik Christian MD        norepinephrine (LEVOPHED) 4 mg in  mL PERIPHERAL infusion  0.01-0.125 mcg/kg/min Intravenous Continuous Salinas Marr DO 5 mL/hr at 10/27/24 0735 0.01 mcg/kg/min at 10/27/24 0735    ondansetron (ZOFRAN ODT) ODT tab 4 mg  4 mg Oral Q6H PRN Amrik Christian MD        Or    ondansetron (ZOFRAN) injection 4 mg  4 mg Intravenous Q6H PRN Amrik Christian MD        polyethylene glycol (MIRALAX) Packet 17 g  17 g Oral Daily Amrik Christian MD        prochlorperazine (COMPAZINE) injection 10 mg  10 mg Intravenous Q6H PRN Amrik Christian MD        Or    prochlorperazine (COMPAZINE) tablet 10 mg  10 mg Oral Q6H PRN Amrik Christian MD        Or     prochlorperazine (COMPAZINE) suppository 25 mg  25 mg Rectal Q12H PRN Amrik Christian MD        senna-docusate (SENOKOT-S/PERICOLACE) 8.6-50 MG per tablet 2 tablet  2 tablet Oral BID PRN Amrik Christian MD        sildenafil (REVATIO) tablet 20 mg  20 mg Oral TID Amrik Christian MD        spironolactone (ALDACTONE) tablet 25 mg  25 mg Oral Daily Amrik Christian MD        thiamine (B-1) injection 200 mg  200 mg Intravenous BID Luis Enrique Mondragon MD        traMADol (ULTRAM) tablet 50 mg  50 mg Oral Once PRN Amrik Christian MD   50 mg at 10/27/24 0631    umeclidinium (INCRUSE ELLIPTA) 62.5 MCG/ACT inhaler 1 puff  1 puff Inhalation Daily Amrik Christian MD   1 puff at 10/27/24 0801    [START ON 10/28/2024] vancomycin (VANCOCIN) 1,500 mg in 0.9% NaCl 265 mL intermittent infusion  1,500 mg Intravenous Q48H Amrik Christian MD        vancomycin (VANCOCIN) 2,500 mg in 0.9% NaCl 525 mL intermittent infusion  2,500 mg Intravenous Once Amrik Christian MD         Allergies   Allergies   Allergen Reactions    Eliquis [Apixaban] Shortness Of Breath, Hives and Swelling     Pt. Reported     Penicillins Anaphylaxis     Per chart review: tolerated Augmentin in 2020.  Tolerated CTX 2024.       Erythromycin Hives and Itching    Peanut Oil Hives    Tetracaine     Clindamycin Itching    Tetracycline Itching       Social History    reports that she quit smoking about 11 years ago. Her smoking use included cigarettes. She started smoking about 55 years ago. She has a 44 pack-year smoking history. She has never used smokeless tobacco. She reports that she does not drink alcohol and does not use drugs.    Family History   I have reviewed this patient's family history and updated it with pertinent information if needed.  Family History   Problem Relation Age of Onset    Angioedema Mother     Pulmonary Embolism Brother     Cerebrovascular Disease Father     Coronary Artery Disease Brother           Review of Systems   A  comprehensive review of system was performed and is negative other than that noted in the HPI or here.     Physical Exam   Vital Signs with Ranges  Temp:  [97.5  F (36.4  C)-98.6  F (37  C)] 98.6  F (37  C)  Pulse:  [] 84  Resp:  [0-40] 20  BP: ()/(40-96) 128/78  FiO2 (%):  [50 %-60 %] 50 %  SpO2:  [81 %-100 %] 94 %  Wt Readings from Last 4 Encounters:   10/27/24 134.5 kg (296 lb 8.3 oz)   10/02/24 136.6 kg (301 lb 1.6 oz)   24 137.2 kg (302 lb 6.4 oz)   24 127.5 kg (281 lb)     I/O last 3 completed shifts:  In: 438.3 [I.V.:438.3]  Out: 250 [Urine:250]    Vital signs were personally reviewed:  Temperatures:  Current - Temp: 98.6  F (37  C); Max - Temp  Av.2  F (36.8  C)  Min: 97.5  F (36.4  C)  Max: 98.6  F (37  C)  Respiration range: Resp  Av.1  Min: 0  Max: 40  Pulse range: Pulse  Av.9  Min: 77  Max: 179  Blood pressure range: Systolic (24hrs), Av , Min:70 , Max:128   ; Diastolic (24hrs), Av, Min:40, Max:96    Pulse oximetry range: SpO2  Av.4 %  Min: 81 %  Max: 100 %    Intake/Output Summary (Last 24 hours) at 10/27/2024 0924  Last data filed at 10/27/2024 0707  Gross per 24 hour   Intake 479.33 ml   Output 250 ml   Net 229.33 ml     296 lbs 8.3 oz  Body mass index is 47.86 kg/m .   Body surface area is 2.5 meters squared.    Physical Exam:   General/Constitutional: appears stated age, obese, on BiPAP but able to answer questions  Respiratory: diminished breath sounds bilat  Cardiovascular: JVP difficult to discern, distant heart sounds but RRR, 1+ BLE edema     Laboratory tests personally reviewed:   CMP  Recent Labs   Lab 10/27/24  0816 10/27/24  0534 10/27/24  0412 10/26/24  4896 10/26/24  8502   NA  --  138  --   --  138   POTASSIUM  --  5.4*  --   --  5.6*   CHLORIDE  --  95*  --   --  96*   CO2  --  20*  --   --  22   ANIONGAP  --  23*  --   --  20*   * 146* 153* 143* 132*   BUN  --  57.9*  --   --  52.5*   CR  --  3.01*  --   --  2.91*  "  GFRESTIMATED  --  17*  --   --  17*   LUNA  --  9.5  --   --  9.6   MAG  --  2.4*  --   --   --    PHOS  --  5.4*  --   --   --    PROTTOTAL  --  6.8  --   --   --    ALBUMIN  --  3.5  --   --   --    BILITOTAL  --  2.1*  --   --   --    ALKPHOS  --  118  --   --   --    AST  --  1,060*  --   --   --    ALT  --  788*  --   --   --      CBC  Recent Labs   Lab 10/27/24  0121 10/26/24  1719   WBC 13.5* 16.4*   RBC 4.62 5.17   HGB 10.3* 11.4*   HCT 35.2 40.2   MCV 76* 78   MCH 22.3* 22.1*   MCHC 29.3* 28.4*   RDW 22.0* 22.5*    212     INRNo lab results found in last 7 days.  No results found for: \"TROPI\", \"TROPONIN\", \"TROPR\", \"TROPN\"  Recent Labs   Lab Test 09/24/24  1417 11/20/20  1000   CHOL 130 178   HDL 43* 59   LDL 69 100   TRIG 89 97     Lab Results   Component Value Date    A1C 5.5 09/19/2022    A1C 5.1 11/20/2020    A1C 5.5 11/01/2016    A1C 6.2 12/22/2012     TSH   Date Value Ref Range Status   09/24/2024 4.90 (H) 0.30 - 4.20 uIU/mL Final   08/10/2018 2.09 0.30 - 5.00 uIU/mL Final   11/01/2016 4.46 (H) 0.40 - 4.00 mU/L Final       Clinically Significant Risk Factors Present on Admission       # Hyperkalemia: Highest K = 5.6 mmol/L in last 2 days, will monitor as appropriate   # Hypochloremia: Lowest Cl = 95 mmol/L in last 2 days, will monitor as appropriate     # Anion Gap Metabolic Acidosis: Highest Anion Gap = 23 mmol/L in last 2 days, will monitor and treat as appropriate   # Acute Kidney Injury, unspecified: based on a >150% or 0.3 mg/dL increase in last creatinine compared to past 90 day average, will monitor renal function   # Drug Induced Platelet Defect: home medication list includes an antiplatelet medication   # Circulatory Shock: required vasopressors within past 24 hours    # Severe Obesity: Estimated body mass index is 47.86 kg/m  as calculated from the following:    Height as of this encounter: 1.676 m (5' 6\").    Weight as of this encounter: 134.5 kg (296 lb 8.3 oz).    Cardiovascular : " Cardiomyopathy      Nephrology: Stage 3 (unspecified if a or b) (GFR 30 - 59)    Systemic: Chronic Fatigue and Other Debilities: Chronic fatigue, unspecified

## 2024-10-27 NOTE — PLAN OF CARE
ICU End of Shift Summary.  For vital signs and complete assessments, please see documentation flowsheets.      Pertinent assessments:   Neuro: A&Ox4, Afebrile  Cardiac: SR  Resp: BIPAP 16/6 50%. LS diminished  GI: no BM overnight. + BS   : hathawya for urinary retention  Skin: wound Right foot  Lines: PIVx3  Drips: Amiodarone, heparin, levophed    Major Shift Events:   Pt. New admit for COPD exacerbation placed on BIPAP 50%. Has a right foot wound present on admission dressing changed provider notified and WOC consult ordered. Patient bladder scanned >915 urine, straight cath done only obtained 250 ml clear, norbert urine. Urine sample sent to lab. Bladder scanned patient again >1500 retained. Dr. Purcell made aware orders for hathaway obtained. Inserted hathaway but only few urine drained. Patient MAP < 65 started peripheral levophed. Blood cultures positive antibiotics ordered by hospitalist.   Plan (Upcoming Events): WOC consult  Discharge/Transfer Needs: TBD     Bedside Shift Report Completed : yes  Bedside Safety Check Completed: yes      Problem: Adult Inpatient Plan of Care  Goal: Absence of Hospital-Acquired Illness or Injury  Intervention: Identify and Manage Fall Risk  Recent Flowsheet Documentation  Taken 10/27/2024 0000 by Cherelle Conner RN  Safety Promotion/Fall Prevention:   clutter free environment maintained   lighting adjusted   patient and family education   room door open   treat reversible contributory factors   treat underlying cause   room organization consistent  Intervention: Prevent Skin Injury  Recent Flowsheet Documentation  Taken 10/27/2024 0000 by Cherelle Conner RN  Body Position:   turned   right   left   side-lying  Intervention: Prevent and Manage VTE (Venous Thromboembolism) Risk  Recent Flowsheet Documentation  Taken 10/27/2024 0000 by Cherelle Conner RN  VTE Prevention/Management: (heparin drip)   SCDs off (sequential compression devices)   other (see comments)  Goal: Optimal Comfort and  Wellbeing  Intervention: Provide Person-Centered Care  Recent Flowsheet Documentation  Taken 10/27/2024 0000 by Cherelle Conner, RN  Trust Relationship/Rapport:   care explained   choices provided   emotional support provided   questions answered   thoughts/feelings acknowledged  Goal: Readiness for Transition of Care  Intervention: Mutually Develop Transition Plan  Recent Flowsheet Documentation  Taken 10/27/2024 0102 by Cherelle Conner, RN  Equipment Currently Used at Home: walker, standard   Goal Outcome Evaluation:

## 2024-10-27 NOTE — CONSULTS
PATIENT HISTORY:  Linda Otero is a 64 year old female who was admitted for shortness of breath.      I was asked to see Linda Otero  by  for right ankle ulcer.    Patient seen at bedside.  Patient notes he has had the ulcer to her ankle for quite a while.  Did not give an exact timeframe.  Has been seeing [facility] twice a week for dressing changes.  Notes lots of pain to the right ankle.  They have been putting Vashe on the wound with dressing changes.  She does not remember what else they been putting on it.    Review of Systems:  Patient denies fever, chills, rash,  stiffness, limping, numbness, weak excessive thirst, fatigue, depression, anxiety.  Patient admits to swelling, wound.     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  left thalamic stroke as well as patchy MCA branch infarcts    CVA (cerebral vascular accident) (H) 12/23/2012    Left thalamic stroke, MCA branch infarcts    Dermatitis     DVT (deep venous thrombosis) (H)     Right leg    Encephalopathy     after stroke, believed related to hypoxia    History of transfusion     Hyperlipidemia     Hypertension     Hypothyroidism     Lymphedema     MGUS (monoclonal gammopathy of unknown significance)     Neuropathy of right lower extremity     Obesity      On home oxygen therapy     PFO (patent foramen ovale)     closed after stroke, see cardiology notes care everywhere    PFO (patent foramen ovale)     Pneumonia     Primary hypercoagulable state (H)     Pulmonary emboli (H) 2013    Pulmonary HTN (H)     Respiratory failure (H)     after stroke    Seizure (H)     Seizures (H)     at age 31    Skin cancer 2014    Sleep apnea     CPAP    Stroke (H) 2012    Umbilical hernia     Wound of right ankle         PAST SURGICAL HISTORY:   Past Surgical History:   Procedure Laterality Date    ASD REPAIR      ASD REPAIR      BIOPSY SKIN (LOCATION)      CARDIAC CATHETERIZATION  2012    2 stents    COLONOSCOPY N/A 4/9/2018    Procedure: COLONOSCOPY;  Surgeon: Dorene Araujo MD;  Location: [facility] Main OR;  Service:     CONIZATION  1985    CV CORONARY ANGIOGRAM N/A 4/12/2019    Procedure: Coronary Angiogram;  Surgeon: Brett Montalvo MD;  Location: [facility] Cath Lab;  Service: Cardiology    CV CORONARY ANGIOGRAM N/A 4/12/2024    Procedure: Coronary Angiogram;  Surgeon: Enoc Crocker MD;  Location: [facility] CARDIAC CATH LAB    CV INTRAVASULAR ULTRASOUND N/A 4/12/2024    Procedure: Intravascular Ultrasound;  Surgeon: Enoc Crocker MD;  Location: [facility] CARDIAC CATH LAB    CV LEFT HEART CATHETERIZATION WITHOUT LEFT VENTRICULOGRAM Left 4/12/2019    Procedure: Left Heart Catheterization Without Left Ventriculogram;  Surgeon: Brett Montalvo MD;  Location: [facility] Cath Lab;  Service: Cardiology    CV RIGHT HEART CATH MEASUREMENTS RECORDED N/A 4/12/2024    Procedure: Right Heart Catheterization;  Surgeon: Enoc Crocker MD;  Location:  HEART CARDIAC CATH LAB    CV RIGHT HEART CATH MEASUREMENTS RECORDED N/A 4/19/2024    Procedure: Right Heart Cath;  Surgeon: Sparkle Suresh MD;  Location: [facility] CARDIAC CATH LAB    CV RIGHT HEART CATHETERIZATION N/A 4/12/2019    Procedure: Right Heart Catheterization;  Surgeon: Brett Montalvo  MD Balbir;  Location: [facility] Cath Lab;  Service: Cardiology    ESOPHAGOSCOPY, GASTROSCOPY, DUODENOSCOPY (EGD), COMBINED N/A 4/9/2018    Procedure: ESOPHAGOGASTRODUODENOSCOPY (EGD);  Surgeon: Dorene Araujo MD;  Location: [facility];  Service:     IR CAROTID ANGIOGRAM  12/26/2012    IR CAROTID ANGIOGRAM  12/26/2012    IR MISCELLANEOUS PROCEDURE  12/26/2012    IR MISCELLANEOUS PROCEDURE  12/26/2012    IR MISCELLANEOUS PROCEDURE  12/26/2012    OTHER SURGICAL HISTORY      Cardiac stents    PATENT FORAMEN OVALE CLOSURE      REPAIR PATENT FORAMEN OVALE  2013    Helex device    XR SACRO ILIAC JOINT INJECTION LEFT  11/2012    ZZC REMV ART CLOT ILIAC-POP,LEG INCIS Left 7/22/2019    Procedure: REPAIR LEFT FEMORAL ARTERIOVENOUS FISTULA WITH INTRAOPERATIVE ULTRASOUND;  Surgeon: Salinas Torres MD;  Location: [facility];  Service: General        MEDICATIONS:   Current Facility-Administered Medications:     acetaminophen (TYLENOL) tablet 650 mg, 650 mg, Oral, Q4H PRN, Amrik Christian MD    albuterol (PROVENTIL) neb solution 2.5 mg, 2.5 mg, Nebulization, Q6H PRN, Amrik Christian MD    amiodarone (NEXTERONE) 1.8 mg/mL in dextrose 5% 200 mL ADULT STANDARD infusion, 0.5 mg/min, Intravenous, Continuous, Amrik Christian MD, Last Rate: 16.7 mL/hr at 10/27/24 0734, 0.5 mg/min at 10/27/24 0734    aspirin (ASA) chewable tablet 81 mg, 81 mg, Oral, Daily, Amrik Christian MD    cyanocobalamin injection 1,000 mcg, 1,000 mcg, Intramuscular, Q30 Days, Amrik Christian MD    glucose gel 15-30 g, 15-30 g, Oral, Q15 Min PRN **OR** dextrose 50 % injection 25-50 mL, 25-50 mL, Intravenous, Q15 Min PRN **OR** glucagon injection 1 mg, 1 mg, Subcutaneous, Q15 Min PRN, Amrik Christian MD    empagliflozin (JARDIANCE) tablet 10 mg, 10 mg, Oral, Daily, Amrik Christian MD    fluticasone-vilanterol (BREO ELLIPTA) 200-25 MCG/ACT inhaler 1 puff, 1 puff, Inhalation, Daily, Amrik Christian MD, 1 puff at 10/27/24 0801     furosemide (LASIX) injection 20 mg, 20 mg, Intravenous, Q12H, Amrik Christian MD, 20 mg at 10/27/24 0700    heparin 25,000 units in 0.45% NaCl 250 mL ANTICOAGULANT infusion, 0-5,000 Units/hr, Intravenous, Continuous, Amrik Christian MD, Last Rate: 15 mL/hr at 10/27/24 0735, 1,500 Units/hr at 10/27/24 0735    HYDROmorphone (DILAUDID) injection 0.2 mg, 0.2 mg, Intravenous, Q1H PRN, Luis Enrique Mondragon MD, 0.2 mg at 10/27/24 0750    ipratropium - albuterol 0.5 mg/2.5 mg/3 mL (DUONEB) neb solution 3 mL, 3 mL, Nebulization, BID, Amrik Christian MD, 3 mL at 10/27/24 0801    loperamide (IMODIUM) capsule 2 mg, 2 mg, Oral, 4x Daily PRN, Amrik Christian MD    methylPREDNISolone sodium succinate (SOLU-MEDROL) injection 40 mg, 40 mg, Intravenous, Q12H, Luis Enrique Mondragon MD    miconazole (MICATIN) 2 % powder, , Topical, BID, Amrik Christian MD    naloxone (NARCAN) injection 0.2 mg, 0.2 mg, Intravenous, Q2 Min PRN **OR** naloxone (NARCAN) injection 0.4 mg, 0.4 mg, Intravenous, Q2 Min PRN **OR** naloxone (NARCAN) injection 0.2 mg, 0.2 mg, Intramuscular, Q2 Min PRN **OR** naloxone (NARCAN) injection 0.4 mg, 0.4 mg, Intramuscular, Q2 Min PRN, Amrik Christian MD    norepinephrine (LEVOPHED) 4 mg in  mL PERIPHERAL infusion, 0.01-0.125 mcg/kg/min, Intravenous, Continuous, Salinas Marr DO, Last Rate: 5 mL/hr at 10/27/24 0735, 0.01 mcg/kg/min at 10/27/24 0735    ondansetron (ZOFRAN ODT) ODT tab 4 mg, 4 mg, Oral, Q6H PRN **OR** ondansetron (ZOFRAN) injection 4 mg, 4 mg, Intravenous, Q6H PRN, Amrik Christian MD    polyethylene glycol (MIRALAX) Packet 17 g, 17 g, Oral, Daily, Amrik Christian MD    prochlorperazine (COMPAZINE) injection 10 mg, 10 mg, Intravenous, Q6H PRN **OR** prochlorperazine (COMPAZINE) tablet 10 mg, 10 mg, Oral, Q6H PRN **OR** prochlorperazine (COMPAZINE) suppository 25 mg, 25 mg, Rectal, Q12H PRN, Amrik Christian MD    senna-docusate (SENOKOT-S/PERICOLACE) 8.6-50 MG per  tablet 2 tablet, 2 tablet, Oral, BID PRN, Amrik Christian MD    sildenafil (REVATIO) tablet 20 mg, 20 mg, Oral, TID, Amrik Christian MD    spironolactone (ALDACTONE) tablet 25 mg, 25 mg, Oral, Daily, Amrik Christian MD    traMADol (ULTRAM) tablet 50 mg, 50 mg, Oral, Once PRN, Amrik Christian MD, 50 mg at 10/27/24 0631    umeclidinium (INCRUSE ELLIPTA) 62.5 MCG/ACT inhaler 1 puff, 1 puff, Inhalation, Daily, Amrik Christian MD, 1 puff at 10/27/24 0801    [START ON 10/28/2024] vancomycin (VANCOCIN) 1,500 mg in 0.9% NaCl 265 mL intermittent infusion, 1,500 mg, Intravenous, Q48H, Amrik Christian MD    vancomycin (VANCOCIN) 2,500 mg in 0.9% NaCl 525 mL intermittent infusion, 2,500 mg, Intravenous, Once, Amrik Christian MD     ALLERGIES:    Allergies   Allergen Reactions    Eliquis [Apixaban] Shortness Of Breath, Hives and Swelling     Pt. Reported     Penicillins Anaphylaxis     Per chart review: tolerated Augmentin in .  Tolerated CTX .       Erythromycin Hives and Itching    Peanut Oil Hives    Tetracaine     Clindamycin Itching    Tetracycline Itching        SOCIAL HISTORY:   Social History     Socioeconomic History    Marital status:      Spouse name: Not on file    Number of children: Not on file    Years of education: Not on file    Highest education level: Not on file   Occupational History    Not on file   Tobacco Use    Smoking status: Former     Current packs/day: 0.00     Average packs/day: 1 pack/day for 44.0 years (44.0 ttl pk-yrs)     Types: Cigarettes     Start date: 1968     Quit date: 2012     Years since quittin.8    Smokeless tobacco: Never   Vaping Use    Vaping status: Never Used   Substance and Sexual Activity    Alcohol use: No     Alcohol/week: 0.0 standard drinks of alcohol    Drug use: No    Sexual activity: Not Currently   Other Topics Concern    Parent/sibling w/ CABG, MI or angioplasty before 65F 55M? Not Asked   Social History Narrative    Not on  "file     Social Drivers of Health     Financial Resource Strain: Low Risk  (10/27/2024)    Financial Resource Strain     Within the past 12 months, have you or your family members you live with been unable to get utilities (heat, electricity) when it was really needed?: No   Food Insecurity: Low Risk  (10/27/2024)    Food Insecurity     Within the past 12 months, did you worry that your food would run out before you got money to buy more?: No     Within the past 12 months, did the food you bought just not last and you didn t have money to get more?: No   Transportation Needs: Low Risk  (10/27/2024)    Transportation Needs     Within the past 12 months, has lack of transportation kept you from medical appointments, getting your medicines, non-medical meetings or appointments, work, or from getting things that you need?: No   Physical Activity: Not on file   Stress: Not on file   Social Connections: Not on file   Interpersonal Safety: Low Risk  (10/27/2024)    Interpersonal Safety     Do you feel physically and emotionally safe where you currently live?: Yes     Within the past 12 months, have you been hit, slapped, kicked or otherwise physically hurt by someone?: No     Within the past 12 months, have you been humiliated or emotionally abused in other ways by your partner or ex-partner?: No   Housing Stability: Low Risk  (10/27/2024)    Housing Stability     Do you have housing? : Yes     Are you worried about losing your housing?: No        FAMILY HISTORY:   Family History   Problem Relation Age of Onset    Angioedema Mother     Pulmonary Embolism Brother     Cerebrovascular Disease Father     Coronary Artery Disease Brother         EXAM:Vitals: /78   Pulse 84   Temp 98.6  F (37  C) (Axillary)   Resp 20   Ht 1.676 m (5' 6\")   Wt 134.5 kg (296 lb 8.3 oz)   SpO2 94%   BMI 47.86 kg/m    BMI= Body mass index is 47.86 kg/m .    LABS:    WBC   Date Value Ref Range Status   05/19/2017 9.2 4.0 - 11.0 10e9/L " Final     WBC Count   Date Value Ref Range Status   10/27/2024 13.5 (H) 4.0 - 11.0 10e3/uL Final     General appearance: Patient is alert and fully cooperative with history & exam.  No sign of distress is noted during the visit.      Psychiatric: Affect is pleasant & appropriate.  Patient appears motivated to improve health.       Respiratory: Breathing is regular & unlabored while sitting.      HEENT: Hearing is intact to spoken word.  Speech is clear.  No gross evidence of visual impairment that would impact ambulation.       Dermatologic:   Full-thickness stage III ulceration to the lateral aspect of the right ankle.  It measures approximately 2 and half centimeters by 3 cm x 0.3 centimeters in depth.  Base of the wound is granular.  Minimal surrounding redness.  No purulent drainage is noted.  Moderate amounts of clear serous drainage is noted.     Vascular: DP & PT pulses are not readily palpable bilaterally.  significant edema but no varicosities noted.  CFT and skin temperature is normal to both lower extremities.       Neurologic: Lower extremity sensation is intact to light touch.  No evidence of weakness or contracture in the lower extremities.  No evidence of neuropathy.       Musculoskeletal: Patient is ambulatory without assistive device or brace.  No gross ankle deformity noted.  No foot or ankle joint effusion is noted.      IMAGING: MRI right ankle - pending     ASSESSMENT: 64-year-old female with lymphedema and chronic right ankle ulceration with bacteremia, rule out osteomyelitis.     PLAN:  Reviewed patient[facility] in [facility].    -Dressing was changed at bedside  -She was on her way down to get the MRI.  -At this time patient can be weightbearing as tolerated.  -Keep foot dressing dry.  -As of is not able to readily palpate pulses did order the arterial duplex ultrasound to assess blood flow.  -Dr. Gaxiola will follow up on MRI results tomorrow.         Jaimie Morejon DPM, Podiatry/Foot and Ankle  Surgery    9:06 AM

## 2024-10-27 NOTE — CONSULTS
[facility]    Infectious Disease Consultation     Date of Admission:  10/26/2024  Date of Consult (When I saw the patient): 10/27/24    Assessment & Plan   Linda Otero is a 64 year old female who was admitted on 10/26/2024.     Impression:  65 yo presented with worsening of shortness of breath, she has been found with atrial fibrillation with RVR.  Hypercapnia, hypoxemia, worsening peripheral edema.   Blood cultures positive for strep species   Ankle wound   PCN listed as allergy but tolerated augmentin   Initially on ceftri now on vanco   Sonal     Recommendations:   With strep species and SONAL on CKD revert antibiotics back to ceftiaxone   Repeat a set of blood cultures   Noted plan for MRI on the foot will follow       Isabelle Brown MD    Reason for Consult   Reason for consult: I was asked to evaluate this patient for bacteremia.    Primary Care Physician   Jayy Henson    Chief Complaint   Bacteremia     History is obtained from the patient and medical records    History of Present Illness   Linda Otero is a 64 year old female who presents with    Past Medical History   I have reviewed this patient's medical history and updated it with pertinent information if needed.   Past Medical History:   Diagnosis Date    Abnormality of gait due to impairment of balance     Acute and chronic respiratory failure with hypercapnia (H)     Acute deep venous thrombosis (H) 10/20/2015    Anemia     Iron deficiency    Aneurysm (H) 2012    Arthritis     toes, thumb, elbow    B12 deficiency     Cancer (H) 1985    cervical    Cellulitis right foot    Chronic diastolic heart failure (H)     Chronic kidney disease     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  left  thalamic stroke as well as patchy MCA branch infarcts    CVA (cerebral vascular accident) (H) 12/23/2012    Left thalamic stroke, MCA branch infarcts    Dermatitis     DVT (deep venous thrombosis) (H)     Right leg    Encephalopathy     after stroke, believed related to hypoxia    History of transfusion     Hyperlipidemia     Hypertension     Hypothyroidism     Lymphedema     MGUS (monoclonal gammopathy of unknown significance)     Neuropathy of right lower extremity     Obesity     On home oxygen therapy     PFO (patent foramen ovale)     closed after stroke, see cardiology notes care everywhere    PFO (patent foramen ovale)     Pneumonia     Primary hypercoagulable state (H)     Pulmonary emboli (H) 2013    Pulmonary HTN (H)     Respiratory failure (H)     after stroke    Seizure (H)     Seizures (H)     at age 31    Skin cancer 2014    Sleep apnea     CPAP    Stroke (H) 2012    Umbilical hernia     Wound of right ankle        Past Surgical History   I have reviewed this patient's surgical history and updated it with pertinent information if needed.  Past Surgical History:   Procedure Laterality Date    ASD REPAIR      ASD REPAIR      BIOPSY SKIN (LOCATION)      CARDIAC CATHETERIZATION  2012    2 stents    COLONOSCOPY N/A 4/9/2018    Procedure: COLONOSCOPY;  Surgeon: Dorene Araujo MD;  Location: [facility] Main OR;  Service:     CONIZATION  1985    CV CORONARY ANGIOGRAM N/A 4/12/2019    Procedure: Coronary Angiogram;  Surgeon: Brett Montalvo MD;  Location: [facility] Cath Lab;  Service: Cardiology    CV CORONARY ANGIOGRAM N/A 4/12/2024    Procedure: Coronary Angiogram;  Surgeon: Enoc Crocker MD;  Location: [facility] CARDIAC CATH LAB    CV INTRAVASULAR ULTRASOUND N/A 4/12/2024    Procedure: Intravascular Ultrasound;  Surgeon: Enoc Crocker MD;  Location: [facility] CARDIAC CATH LAB    CV LEFT HEART CATHETERIZATION WITHOUT LEFT VENTRICULOGRAM Left 4/12/2019    Procedure: Left Heart  Catheterization Without Left Ventriculogram;  Surgeon: Brett Montalvo MD;  Location: [facility] Cath Lab;  Service: Cardiology    CV RIGHT HEART CATH MEASUREMENTS RECORDED N/A 4/12/2024    Procedure: Right Heart Catheterization;  Surgeon: Enoc Crocker MD;  Location:  HEART CARDIAC CATH LAB    CV RIGHT HEART CATH MEASUREMENTS RECORDED N/A 4/19/2024    Procedure: Right Heart Cath;  Surgeon: Sparkle Suresh MD;  Location:  HEART CARDIAC CATH LAB    CV RIGHT HEART CATHETERIZATION N/A 4/12/2019    Procedure: Right Heart Catheterization;  Surgeon: Brett Montalvo MD;  Location: [facility] Cath Lab;  Service: Cardiology    ESOPHAGOSCOPY, GASTROSCOPY, DUODENOSCOPY (EGD), COMBINED N/A 4/9/2018    Procedure: ESOPHAGOGASTRODUODENOSCOPY (EGD);  Surgeon: Dorene Araujo MD;  Location: [facility];  Service:     IR CAROTID ANGIOGRAM  12/26/2012    IR CAROTID ANGIOGRAM  12/26/2012    IR MISCELLANEOUS PROCEDURE  12/26/2012    IR MISCELLANEOUS PROCEDURE  12/26/2012    IR MISCELLANEOUS PROCEDURE  12/26/2012    OTHER SURGICAL HISTORY      Cardiac stents    PATENT FORAMEN OVALE CLOSURE      REPAIR PATENT FORAMEN OVALE  2013    Helex device    XR SACRO ILIAC JOINT INJECTION LEFT  11/2012    ZZC REMV ART CLOT ILIAC-POP,LEG INCIS Left 7/22/2019    Procedure: REPAIR LEFT FEMORAL ARTERIOVENOUS FISTULA WITH INTRAOPERATIVE ULTRASOUND;  Surgeon: Salinas Torres MD;  Location: [facility];  Service: General       Prior to Admission Medications   Prior to Admission Medications   Prescriptions Last Dose Informant Patient Reported? Taking?   OXYGEN-HELIUM IN Unknown  Yes No   Sig: Inhale 5 L into the lungs continuous prn.   acetaminophen (TYLENOL) 325 MG tablet Past Month  No Yes   Sig: Take 2 tablets (650 mg) by mouth every 4 hours as needed for pain or fever   albuterol (PROVENTIL) (2.5 MG/3ML) 0.083% neb solution 10/26/2024  No Yes   Sig: Take 1 vial (2.5 mg) by nebulization every 6 hours as  needed for shortness of breath or wheezing OFFICE VISIT NEEDED FOR ANY FURTHER REFILLS   aspirin (ASA) 81 MG chewable tablet 10/26/2024  No Yes   Sig: Take 1 tablet (81 mg) by mouth daily   bumetanide (BUMEX) 2 MG tablet 10/26/2024  No Yes   Sig: Take 2 tablets (4 mg) by mouth 2 times daily.   cyanocobalamin (CYANOCOBALAMIN) 1000 MCG/ML injection Past Month  No Yes   Sig: Inject 1 mL (1,000 mcg) into the muscle every 30 days   empagliflozin (JARDIANCE) 10 MG TABS tablet 10/26/2024  No Yes   Sig: Take 1 tablet (10 mg) by mouth daily   fluticasone-vilanterol (BREO ELLIPTA) 200-25 MCG/ACT inhaler 10/26/2024  No Yes   Sig: Inhale 1 puff into the lungs daily   ipratropium - albuterol 0.5 mg/2.5 mg/3 mL (DUONEB) 0.5-2.5 (3) MG/3ML neb solution 10/26/2024  No Yes   Sig: Take 1 vial (3 mLs) by nebulization 2 times daily   loperamide (IMODIUM A-D) 2 MG tablet Unknown  No No   Sig: Take 1 tablet (2 mg) by mouth 4 times daily as needed for diarrhea   menthol-zinc oxide (CALMOSEPTINE) 0.44-20.6 % OINT ointment Unknown  No No   Sig: Apply topically daily as needed for skin protection Apply to affected area topically as needed for affected area daily   nystatin (MYCOSTATIN) 028033 UNIT/GM external powder Unknown  No No   Sig: Apply to breast BID and BID PRN   polyethylene glycol (MIRALAX) 17 GM/Dose powder Unknown  No No   Sig: Take 17 g by mouth daily   potassium chloride marcie ER (KLOR-CON M20) 20 MEQ CR tablet 10/26/2024  No Yes   Sig: Take 1 tablet (20 mEq) by mouth daily.   senna-docusate (SENOKOT-S/PERICOLACE) 8.6-50 MG tablet Unknown  No No   Sig: Take 2 tablets by mouth 2 times daily as needed for constipation   sildenafil (REVATIO) 20 MG tablet 10/26/2024 Morning  No Yes   Sig: Take 1 tablet (20 mg) by mouth 3 times daily. Take 20mg (1 tablet) for 30 days then increase to 40mg (2 tablets) after.   spironolactone (ALDACTONE) 25 MG tablet 10/26/2024  No Yes   Sig: Take 1 tablet (25 mg) by mouth daily   tiotropium (SPIRIVA  RESPIMAT) 2.5 MCG/ACT inhaler 10/26/2024  No Yes   Sig: INHALE TWO PUFFS BY MOUTH EVERY DAY   traMADol (ULTRAM) 50 MG tablet 10/25/2024  Yes Yes   Sig: Take 50 mg by mouth once as needed for severe pain.      Facility-Administered Medications: None     Allergies   Allergies   Allergen Reactions    Eliquis [Apixaban] Shortness Of Breath, Hives and Swelling     Pt. Reported     Penicillins Anaphylaxis     Per chart review: tolerated Augmentin in 2020.  Tolerated CTX 2024.       Erythromycin Hives and Itching    Peanut Oil Hives    Tetracaine     Clindamycin Itching    Tetracycline Itching       Immunization History   Immunization History   Administered Date(s) Administered    COVID-19 MONOVALENT 12+ (Pfizer) 01/25/2021    COVID-19 Monovalent 18+ (Moderna) 01/25/2021, 02/22/2021, 12/09/2021    Flu, Unspecified 09/28/2018    Influenza (IIV3) PF 10/13/2015    Influenza Vaccine 18-64 (Flublok) 10/09/2020    Influenza Vaccine >6 months,quad, PF 10/13/2015, 04/08/2018    Influenza Vaccine, 6+MO IM (QUADRIVALENT W/PRESERVATIVES) 10/01/2015    Pneumococcal 20 valent Conjugate (Prevnar 20) 12/13/2023    Pneumococcal 23 valent 12/25/2012, 10/03/2018    TDAP Vaccine (Adacel) 11/01/2016       Social History   I have reviewed this patient's social history and updated it with pertinent information if needed. Linda Otero  reports that she quit smoking about 11 years ago. Her smoking use included cigarettes. She started smoking about 55 years ago. She has a 44 pack-year smoking history. She has never used smokeless tobacco. She reports that she does not drink alcohol and does not use drugs.    Family History   I have reviewed this patient's family history and updated it with pertinent information if needed.   Family History   Problem Relation Age of Onset    Angioedema Mother     Pulmonary Embolism Brother     Cerebrovascular Disease Father     Coronary Artery Disease Brother        Review of Systems   The 10 point Review of  "Systems is negative    Physical Exam   Temp: 97.8  F (36.6  C) Temp src: Temporal BP: 94/46 Pulse: 84   Resp: 14 SpO2: 94 % O2 Device: BiPAP/CPAP Oxygen Delivery: 10 LPM  Vital Signs with Ranges  Temp:  [97.5  F (36.4  C)-98.6  F (37  C)] 97.8  F (36.6  C)  Pulse:  [] 84  Resp:  [0-40] 14  BP: ()/(40-96) 94/46  FiO2 (%):  [40 %-60 %] 40 %  SpO2:  [81 %-100 %] 94 %  296 lbs 8.3 oz  Body mass index is 47.86 kg/m .    GENERAL APPEARANCE:  on 15 L frail   EYES: Eyes grossly normal to inspection  NECK: no adenopathy  RESP: lungs clear   CV: regular rates and rhythm  LYMPHATICS: normal ant/post cervical and supraclavicular nodes  ABDOMEN: soft, nontender  MS: chronic appearing ulcer on the right ankle       Data   All laboratory and imaging data in the past 24 hours reviewed  No results for input(s): \"CULT\" in the last 168 hours.  No lab results found.    Invalid input(s): \"UC\"       All cultures:  Recent Labs   Lab 10/26/24  1809   CULTURE Positive on the 1st day of incubation*  Gram positive cocci in pairs and chains*      Blood culture:  Results for orders placed or performed during the hospital encounter of 10/26/24   Blood Culture Peripheral Blood    Collection Time: 10/26/24  6:09 PM    Specimen: Peripheral Blood   Result Value Ref Range    Culture Positive on the 1st day of incubation (A)     Culture Gram positive cocci in pairs and chains (AA)    Results for orders placed or performed during the hospital encounter of 03/29/24   Blood Culture Peripheral Blood    Collection Time: 03/29/24  2:53 PM    Specimen: Peripheral Blood   Result Value Ref Range    Culture No Growth    Blood Culture Peripheral Blood    Collection Time: 03/29/24  2:53 PM    Specimen: Peripheral Blood   Result Value Ref Range    Culture No Growth       Urine culture:  No results found for this or any previous visit.          "

## 2024-10-28 NOTE — PROGRESS NOTES
Renal Medicine Progress Note                                Linda Otero MRN# [id number]   Age: 64 year old YOB: 1960   Date of Admission: 10/26/2024 Hospital LOS: 2                  Assessment/Plan:     SONAL on CKD 3a, oliguric   Mixed shock, cardiogenic and septic   Severe pulmonary HTN, Cor pulmonale   HFpEF exacerbation   Hypervolemia   GPC bacteremia  Transaminitis (congestive vs shock liver)    Baseline Cr 1.1-1.45 (eGFR 40-50). Cr now 3 and slowly rising   hold jardiance and spironolactone      Hyperkalemia     AGMA   Lactate 3.4, improved to 1.9 with resolution of afib. Ketones 1.53. suspect multifactorial related to lactic acidosis, ketoacidosis, and SONAL. SGLT2-I held.      Afib w RVR, resolved   On presentation, symptomatic. Initial tx with dilt bolus and infusion without improvement. Switched to amio with conversion to NSR, but watching closely given abnormal LFTs.       Limited response to diuretic infusion  Dobutamine added per cards  Could consider 25% albumin    Poor dialysis candidate given significant cardiac morbidity   GOC discussions appropriate     Interval History:     Seen and examined  Appropriate but lethargic  Wants to sleep    Not certain regarding potential need for dialysis     IO reviewed  UO noted     Discussed with ICU Manuel Rubin and Cristóbal   Reviewed with bedside RN       ROS:     GENERAL: NAD, No fever,chills  R: NEGATIVE for significant cough or SOB  CV: NEGATIVE for chest pain, palpitations  EXT: no change edema  ROS otherwise negative    Medications and Allergies:     Reviewed    Physical Exam:     Vitals were reviewed  Patient Vitals for the past 8 hrs:   BP Temp Temp src Pulse Resp SpO2   10/28/24 1200 95/55 97.8  F (36.6  C) Temporal 70 21 99 %   10/28/24 1000 -- -- -- 73 29 97 %   10/28/24 0900 (!) 89/64 98.6  F (37  C) Temporal 70 25 97 %   10/28/24 0810 -- -- -- -- 24 --   10/28/24 0630 118/69 -- -- 75 -- 91 %   10/28/24 0610 -- -- -- -- 17 --   10/28/24  0600 109/58 -- -- 73 -- 91 %   10/28/24 0530 97/59 -- -- 75 -- 94 %   10/28/24 0500 108/59 -- -- 75 -- 92 %   10/28/24 0430 109/63 97.6  F (36.4  C) Axillary 74 -- 95 %     Wt Readings from Last 4 Encounters:   10/27/24 134.5 kg (296 lb 8.3 oz)   10/02/24 136.6 kg (301 lb 1.6 oz)   09/24/24 137.2 kg (302 lb 6.4 oz)   07/02/24 127.5 kg (281 lb)     I/O last 3 completed shifts:  In: 1158.12 [P.O.:240; I.V.:918.12]  Out: 182 [Urine:182]    Vitals:    10/26/24 1716 10/27/24 0000   Weight: 138.6 kg (305 lb 9.6 oz) 134.5 kg (296 lb 8.3 oz)         GENERAL: awake, alert, follows  HEENT: NC/AT, PERRLA, EOMI, non icteric, pharynx moist without lesion  RESP: basilar crackles   CV: RRR, normal S1 S2  ABDOMEN: soft, nontender, no HSM or masses and bowel sounds normal  EXT: 3+ edema to thigh/legs wrapped     Data:     Recent Labs   Lab 10/28/24  1013 10/28/24  0905 10/28/24  0807 10/28/24  0656 10/28/24  0615 10/28/24  0518 10/27/24  1903 10/27/24  1820 10/27/24  1609 10/27/24  1540 10/27/24  0816 10/27/24  0534   NA  --   --   --   --   --  134*  --  136  --  137  --  138   POTASSIUM  --   --   --   --   --  5.3  --  5.9*  --  5.1  --  5.4*   CHLORIDE  --   --   --   --   --  95*  --  96*  --  96*  --  95*   CO2  --   --   --   --   --  22  --  21*  --  23  --  20*   ANIONGAP  --   --   --   --   --  17*  --  19*  --  18*  --  23*   * 136* 201* 140*   < > 152*   < > 147*   < > 142*   < > 146*   BUN  --   --   --   --   --  69.5*  --  63.9*  --  63.4*  --  57.9*   CR  --   --   --   --   --  3.33*  --  3.13*  --  3.12*  --  3.01*   GFRESTIMATED  --   --   --   --   --  15*  --  16*  --  16*  --  17*   LUNA  --   --   --   --   --  9.6  --  9.7  --  9.5  --  9.5    < > = values in this interval not displayed.         Recent Labs   Lab 10/28/24  0518 10/27/24  1820 10/27/24  1540 10/27/24  0534 10/26/24  1719   CR 3.33* 3.13* 3.12* 3.01* 2.91*     Recent Labs   Lab 10/28/24  0518 10/27/24  1820 10/27/24  1540 10/27/24  0534  10/26/24  1719   * 136 137 138 138     Recent Labs   Lab 10/28/24  0518 10/27/24  1540 10/27/24  0534   ALBUMIN 3.5 3.6 3.5     Recent Labs   Lab 10/28/24  0518 10/27/24  0534 10/27/24  0121 10/26/24  1719   PHOS  --  5.4*  --   --    HGB 10.2*  --  10.3* 11.4*         G Clement Cheung MD    [facility] - Nephrology  [phone]

## 2024-10-28 NOTE — CONSULTS
"CLINICAL NUTRITION SERVICES  -  ASSESSMENT NOTE    RECOMMENDATIONS FOR MD/PROVIDER TO ORDER:   - Diet per MD     Recommendations Ordered by Registered Dietitian (RD):   - Encourage adequate intakes as able. Added 1 ensure daily to support intake.   - Will continue to follow and adjust nutrition recommendations pending clinical course.      MALNUTRITION:  % Weight Loss:  Weight loss does not meet criteria for malnutrition, suspect fluid is masking true weight loss  % Intake:  <75% for >/= 1 month   Subcutaneous Fat Loss:  None observed  Muscle Loss:  None observed, difficult to observe with body habitus and current edema  Fluid Retention:  2-3+ in BLE    Malnutrition Diagnosis: Patient does not meet two of the above criteria necessary for diagnosing malnutrition     REASON FOR ASSESSMENT  Linda Otero is a 64 year old female seen by Registered Dietitian for +MST.     Linda Otero admitted for worsening of shortness of breath, she has been found with atrial fibrillation with RVR. Hypercapnia, hypoxemia, worsening peripheral edema.     PMH: Severe obesity, chronic peripheral edema with lymphedema, COPD on CPAP, JODI, congenital heart disease cor triatriatum Dextra with PFO, coronary artery disease, history of DVT, hyperlipidemia, hypertension, hypothyroidism among many others       NUTRITION HISTORY  Information obtained from patient and chart review.     Patient is from [facility].   Met with patient at bedside today. Patient did not provide me with much of a nutrition history when asked, she said to call her facility ([facility]) and ask them, she says \"I'm doing the best that I can\". She agreed that her intakes have been variable. She does not know her UBW, she knows her weight fluctuates a lot. She has used ensure/boost supplements in the past.     Food Allergies/Intolerances: Peanut oil     CURRENT NUTRITION ORDERS  Diet Order:  Regular     Current Intake/Tolerance:   - Per [facility]: Patient has ordered 3 meal " "trays total during admission so far.   - Per Flowsheet: 2, 75% intakes noted   - Per pt report: Currently she has poor appetite and has to force herself to eat. She kept telling me that she is trying the best she can.   - RD d/w pt eating small frequent meals or snacks or supplements to increase appetite and meet nutrition needs. Dicussed negatives of unintentional weight loss, and the importance of adequate protein/energy intakes for wound recovery. RD offered ensure supplement for patient to increase intake, patient was agreeable to set up an ensure once daily.     Labs: reviewed; Na 134 (L), Mag 2.4 (H), Phos 5.4 (H), , , ketones 1.53,   -- Nephrology following    Medications: reviewed; solumedrol, miralax, thiamine, bumex drip  -- insulin drip discontinued this morning; pt required levophed briefly on 10/27    Skin: 2-3+ in BLE  -- WOC consulted for wound/ulcer on posterolateral right ankle and likely subdermal fibrosis along with lateral ankle and hindfoot.     I/Os: no BM noted       ANTHROPOMETRICS  Height: 5' 6\"  Weight: 296 lbs 8.3 oz  Body mass index is 47.86 kg/m .  IBW: 130 lbs  %IBW: 228%  Weight History: 1.6% weight loss in 3.5 weeks, not clinically significant. Also query accuracy of weights given fluid  Wt Readings from Last 10 Encounters:   10/27/24 134.5 kg (296 lb 8.3 oz)   10/02/24 136.6 kg (301 lb 1.6 oz)   09/24/24 137.2 kg (302 lb 6.4 oz)   07/02/24 127.5 kg (281 lb)   05/31/24 130.7 kg (288 lb 3.2 oz)   05/24/24 130.8 kg (288 lb 6.4 oz)   05/21/24 130.9 kg (288 lb 9.6 oz)   05/14/24 130.5 kg (287 lb 12.8 oz)   05/14/24 130.5 kg (287 lb 12.8 oz)   05/10/24 129.8 kg (286 lb 3.2 oz)     ASSESSED NUTRITION NEEDS PER APPROVED PRACTICE GUIDELINES:  Dosing Weight 134.5 kg (actual BW) for energy, 59 kg IBW for protein   Estimated Energy Needs: 20-25 Kcal/Kg  Justification: maintenance and obese  Estimated Protein Needs: 1.5-2 g pro/Kg  Justification: obesity guidelines  and " CKD  Estimated Fluid Needs: per provider pending fluid status      NUTRITION DIAGNOSIS:  Inadequate oral intake related to poor appetite as evidenced by patient report, mild weight loss, increased needs for wound healing.       NUTRITION INTERVENTIONS  Recommendations / Nutrition Prescription  See above.       Implementation  Nutrition education: as above  Medical Food Supplement   Collaboration of Nutrition care - patient discussed during rounds      Nutrition Goals  Consume % of nutritionally adequate meals/supplements TID       MONITORING AND EVALUATION:  Progress towards goals will be monitored and evaluated per protocol and Practice Guidelines      Patrica Willis MS, RD, LD  Clinical Dietitian  3rd floor/ICU: [phone]  All other floors: [phone]  Weekend/holiday: [phone]  Office: [phone]

## 2024-10-28 NOTE — PROGRESS NOTES
[facility] Progress Note     Ten Fragoso MD  10/28/2024         Interval History:      Poor urine output despite escalation of diuresis.  On amiodarone drip and midodrine.  Renal functions worsening.       Assessment and Plan:      Acute on chronic cor pulmonale with the severe reduced RV systolic function in the setting of severe pulmonary hypertension.  On sildenafil.  Was on spironolactone which has been appropriately discontinued because of worsening kidney function and hypotension.  Overall guarded prognosis.  Can give a trial of low-dose dobutamine for inotropic effect and possibly improvement in renal perfusion also.  2.5 mcg/min.  Reasonable to try one-time dose of IV digoxin for inotropic effect, not optimal for maintenance therapy given acute renal failure.  Shock multifactorial from underlying bacteremia sepsis and also cardiogenic with severe reduced LV systolic function.  Trial of low-dose dobutamine to see if it helps.  Newly diagnosed atrial fibrillation with rapid ventricular rate.  On amiodarone drip back in sinus.  Recommend continuing amiodarone drip for now.  Slight increase risk of going back in A-fib now being dobutamine added.  Elevated liver enzymes noted due to likely shock liver they have been decreasing.  Continue to monitor that especially while being on amiodarone.  Likely can change amiodarone drip to oral in a day or so.  Continue heparin drip.  Severe pulm hypertension being followed by pulmonary hypertension clinic at the [facility].  [place] to be a mix of pre and postcapillary.  On sildenafil.  History of CVA status post PFO closure.  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  I am not able to see any conclusive evidence for the same on previous imaging.  Acute on chronic renal failure  Bacteremia with possible sepsis  Frail status    Recommendations  Continue heparin, amiodarone drip  Trial of low-dose dobutamine 2.5 mcg/ per KG per min  One-time dose of IV digoxin 125  "mcg.  Continue Bumex drip.  Nephrology following.  Poor urine output so far.  If this does not improve patient may need CRRT.  Overall guarded prognosis.    Discussed in detail with ICU care team.  45 minutes of critical care time spent today.         Physical Exam:       , Blood pressure (!) 89/64, pulse 73, temperature 98.6  F (37  C), temperature source Temporal, resp. rate 29, height 1.676 m (5' 6\"), weight 134.5 kg (296 lb 8.3 oz), SpO2 97%, not currently breastfeeding.  Vitals:    10/26/24 1716 10/27/24 0000   Weight: 138.6 kg (305 lb 9.6 oz) 134.5 kg (296 lb 8.3 oz)     Vital Signs with Ranges  Temp:  [96.9  F (36.1  C)-98.6  F (37  C)] 98.6  F (37  C)  Pulse:  [70-86] 73  Resp:  [17-34] 29  BP: ()/(26-95) 89/64  FiO2 (%):  [40 %] 40 %  SpO2:  [87 %-100 %] 97 %  I/O's Last 24 hours  I/O last 3 completed shifts:  In: 1158.12 [P.O.:240; I.V.:918.12]  Out: 182 [Urine:182]    General Patient appears frail but not in distress  Neck JVP elevated to around 12 to 14 cm  Cardiovascular system S1-S2 normal there is grade 3/6 systolic murmur at the left lower sternal border, no S3-S4 rub or gallop respiratory system a few crackles at the bases         Medications:        Current Facility-Administered Medications   Medication Dose Route Frequency Provider Last Rate Last Admin    aspirin (ASA) chewable tablet 81 mg  81 mg Oral Daily Amrik Christian MD   81 mg at 10/28/24 0833    cefTRIAXone (ROCEPHIN) 2 g vial to attach to  ml bag for ADULTS or NS 50 ml bag for PEDS  2 g Intravenous Q24H Isabelle Brown MD   2 g at 10/27/24 1500    cyanocobalamin injection 1,000 mcg  1,000 mcg Intramuscular Q30 Days Amrik Christian MD   1,000 mcg at 10/27/24 1029    digoxin (LANOXIN) injection 125 mcg  125 mcg Intravenous Once Faraz Ambrocio MD        [Held by provider] empagliflozin (JARDIANCE) tablet 10 mg  10 mg Oral Daily Amrik Christian MD        fluticasone-vilanterol (BREO ELLIPTA) 200-25 MCG/ACT inhaler 1 puff  1 " puff Inhalation Daily Amrik Christian MD   1 puff at 10/27/24 0801    ipratropium - albuterol 0.5 mg/2.5 mg/3 mL (DUONEB) neb solution 3 mL  3 mL Nebulization BID Amrik Christian MD   3 mL at 10/28/24 0810    methylPREDNISolone sodium succinate (SOLU-MEDROL) injection 40 mg  40 mg Intravenous Q12H Luis Enrique Mondragon MD   40 mg at 10/28/24 0832    miconazole (MICATIN) 2 % powder   Topical BID Amrik Christian MD   Given at 10/28/24 0849    midodrine (PROAMATINE) tablet 10 mg  10 mg Oral TID Luis Enrique Mondragon MD   10 mg at 10/28/24 0833    polyethylene glycol (MIRALAX) Packet 17 g  17 g Oral Daily Amrik Christian MD   17 g at 10/28/24 0849    sildenafil (REVATIO) tablet 20 mg  20 mg Oral TID Amrik Christian MD   20 mg at 10/28/24 0914    [Held by provider] spironolactone (ALDACTONE) tablet 25 mg  25 mg Oral Daily Amrik Christian MD        thiamine (B-1) injection 200 mg  200 mg Intravenous BID Luis Enrique Mondragon MD   200 mg at 10/28/24 0832    umeclidinium (INCRUSE ELLIPTA) 62.5 MCG/ACT inhaler 1 puff  1 puff Inhalation Daily Amrik Christian MD   1 puff at 10/27/24 0801     PRN Meds:   Current Facility-Administered Medications   Medication Dose Route Frequency Provider Last Rate Last Admin    acetaminophen (TYLENOL) tablet 650 mg  650 mg Oral Q4H PRN Amrik Christian MD        albuterol (PROVENTIL) neb solution 2.5 mg  2.5 mg Nebulization Q6H PRN Amrik Christian MD        dextrose 10% infusion   Intravenous Continuous PRN Luis Enrique Mondragon MD        glucose gel 15-30 g  15-30 g Oral Q15 Min PRN Amrik Christian MD        Or    dextrose 50 % injection 25-50 mL  25-50 mL Intravenous Q15 Min PRN Amrik Christian MD        Or    glucagon injection 1 mg  1 mg Subcutaneous Q15 Min PRN Amrik Christian MD        HYDROmorphone (DILAUDID) injection 0.2 mg  0.2 mg Intravenous Q1H PRN Luis Enrique Mondragon MD   0.2 mg at 10/27/24 0750    loperamide (IMODIUM) capsule 2 mg  2 mg Oral 4x Daily  PRN Amrik Christian MD        naloxone (NARCAN) injection 0.2 mg  0.2 mg Intravenous Q2 Min PRN Amrik Christian MD        Or    naloxone (NARCAN) injection 0.4 mg  0.4 mg Intravenous Q2 Min PRN Amrik Christian MD        Or    naloxone (NARCAN) injection 0.2 mg  0.2 mg Intramuscular Q2 Min PRN Amrik Christian MD        Or    naloxone (NARCAN) injection 0.4 mg  0.4 mg Intramuscular Q2 Min PRN Amrik Christian MD        ondansetron (ZOFRAN ODT) ODT tab 4 mg  4 mg Oral Q6H PRN Amrik Christian MD        Or    ondansetron (ZOFRAN) injection 4 mg  4 mg Intravenous Q6H PRN Amrik Christian MD        oxyCODONE (ROXICODONE) tablet 5 mg  5 mg Oral Q3H PRN Luis Enrique Mondragon MD   5 mg at 10/27/24 2034    prochlorperazine (COMPAZINE) injection 10 mg  10 mg Intravenous Q6H PRN Amrik Christian MD        Or    prochlorperazine (COMPAZINE) tablet 10 mg  10 mg Oral Q6H PRN Amrik Christian MD        Or    prochlorperazine (COMPAZINE) suppository 25 mg  25 mg Rectal Q12H PRN Amrik Christian MD        senna-docusate (SENOKOT-S/PERICOLACE) 8.6-50 MG per tablet 2 tablet  2 tablet Oral BID PRN Amrik Christian MD   2 tablet at 10/27/24 1024            Data:      All new lab and imaging data was reviewed.   Recent Labs   Lab Test 10/28/24  0518 10/27/24  0121 10/26/24  1719 09/24/24  1417 03/30/24  0612 03/29/24  1453 07/18/19  1107 06/27/19  0441   WBC 15.6* 13.5* 16.4* 7.6   < > 7.3   < >  --    HGB 10.2* 10.3* 11.4* 11.2*   < > 12.3   < >  --    MCV 77* 76* 78 77*   < > 80   < >  --    * 160 212 192   < > 186   < >  --    INR  --   --   --  1.25*  --  1.20*  --  0.98    < > = values in this interval not displayed.      Recent Labs   Lab Test 10/28/24  1013 10/28/24  0905 10/28/24  0807 10/28/24  0615 10/28/24  0518 10/27/24  1903 10/27/24  1820 10/27/24  1609 10/27/24  1540   NA  --   --   --   --  134*  --  136  --  137   POTASSIUM  --   --   --   --  5.3  --  5.9*  --  5.1   CHLORIDE  --   --   --   --   "95*  --  96*  --  96*   CO2  --   --   --   --  22  --  21*  --  23   BUN  --   --   --   --  69.5*  --  63.9*  --  63.4*   CR  --   --   --   --  3.33*  --  3.13*  --  3.12*   ANIONGAP  --   --   --   --  17*  --  19*  --  18*   LUNA  --   --   --   --  9.6  --  9.7  --  9.5   * 136* 201*   < > 152*   < > 147*   < > 142*    < > = values in this interval not displayed.     No lab results found.    Invalid input(s): \"TROP\", \"TROPONINIES\"     Ten Fragoso MD  10/28/2024  Pager:  [phone]   "

## 2024-10-28 NOTE — PROGRESS NOTES
ICU Staff:     I examined the patient at the bedside in the [facility] ICU. I managed the patient at the bedside in the [facility] ICU for their critical illness.  I reviewed the patient's medical records, progress notes, and imaging studies. The patient is a 64 year old woman admitted to the ICU admitted for heart failure requiring dobutamine drip and digoxin to increase renal perfusion. The patient has been evaluated today in the ICU by the Cardiology and the Nephrology services. The patient has advanced  pulmonary HTN and acute on chronic right-sided heart failure consistent with cor pulmonale. The patient lives in assisted living.      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  left thalamic stroke as well as patchy MCA branch infarcts    CVA (cerebral vascular accident) (H) 12/23/2012    Left thalamic stroke, MCA branch infarcts    Dermatitis     DVT (deep venous thrombosis) (H)     Right leg    Encephalopathy     after stroke, believed related to hypoxia    History of transfusion     Hyperlipidemia     Hypertension     Hypothyroidism     Lymphedema     MGUS (monoclonal gammopathy of unknown significance)     Neuropathy of right lower extremity     Obesity     On home oxygen therapy     PFO (patent foramen ovale)     closed after  stroke, see cardiology notes care everywhere    PFO (patent foramen ovale)     Pneumonia     Primary hypercoagulable state (H)     Pulmonary emboli (H) 2013    Pulmonary HTN (H)     Respiratory failure (H)     after stroke    Seizure (H)     Seizures (H)     at age 31    Skin cancer 2014    Sleep apnea     CPAP    Stroke (H) 2012    Umbilical hernia     Wound of right ankle         PAST SURGICAL HISTORY:  Past Surgical History:   Procedure Laterality Date    ASD REPAIR      ASD REPAIR      BIOPSY SKIN (LOCATION)      CARDIAC CATHETERIZATION  2012    2 stents    COLONOSCOPY N/A 4/9/2018    Procedure: COLONOSCOPY;  Surgeon: Dorene Araujo MD;  Location: [facility] Main OR;  Service:     CONIZATION  1985    CV CORONARY ANGIOGRAM N/A 4/12/2019    Procedure: Coronary Angiogram;  Surgeon: Brett Montalvo MD;  Location: [facility] Cath Lab;  Service: Cardiology    CV CORONARY ANGIOGRAM N/A 4/12/2024    Procedure: Coronary Angiogram;  Surgeon: Enoc Crocker MD;  Location: [facility] CARDIAC CATH LAB    CV INTRAVASULAR ULTRASOUND N/A 4/12/2024    Procedure: Intravascular Ultrasound;  Surgeon: Enoc Crocker MD;  Location: [facility] CARDIAC CATH LAB    CV LEFT HEART CATHETERIZATION WITHOUT LEFT VENTRICULOGRAM Left 4/12/2019    Procedure: Left Heart Catheterization Without Left Ventriculogram;  Surgeon: Brett Montalvo MD;  Location: [facility] Cath Lab;  Service: Cardiology    CV RIGHT HEART CATH MEASUREMENTS RECORDED N/A 4/12/2024    Procedure: Right Heart Catheterization;  Surgeon: Enoc Crocker MD;  Location:  HEART CARDIAC CATH LAB    CV RIGHT HEART CATH MEASUREMENTS RECORDED N/A 4/19/2024    Procedure: Right Heart Cath;  Surgeon: Sparkle Suresh MD;  Location: [facility] CARDIAC CATH LAB    CV RIGHT HEART CATHETERIZATION N/A 4/12/2019    Procedure: Right Heart Catheterization;  Surgeon: Brett Montalvo MD;  Location: [facility] Cath Lab;  Service: Cardiology     ESOPHAGOSCOPY, GASTROSCOPY, DUODENOSCOPY (EGD), COMBINED N/A 4/9/2018    Procedure: ESOPHAGOGASTRODUODENOSCOPY (EGD);  Surgeon: Dorene Araujo MD;  Location: [facility];  Service:     IR CAROTID ANGIOGRAM  12/26/2012    IR CAROTID ANGIOGRAM  12/26/2012    IR MISCELLANEOUS PROCEDURE  12/26/2012    IR MISCELLANEOUS PROCEDURE  12/26/2012    IR MISCELLANEOUS PROCEDURE  12/26/2012    OTHER SURGICAL HISTORY      Cardiac stents    PATENT FORAMEN OVALE CLOSURE      REPAIR PATENT FORAMEN OVALE  2013    Helex device    XR SACRO ILIAC JOINT INJECTION LEFT  11/2012    ZZC REMV ART CLOT ILIAC-POP,LEG INCIS Left 7/22/2019    Procedure: REPAIR LEFT FEMORAL ARTERIOVENOUS FISTULA WITH INTRAOPERATIVE ULTRASOUND;  Surgeon: Salinas Torres MD;  Location: [facility];  Service: General     MEDICATIONS:  Current Facility-Administered Medications   Medication Dose Route Frequency Provider Last Rate Last Admin    acetaminophen (TYLENOL) tablet 650 mg  650 mg Oral Q4H PRN Amrik Christian MD        albuterol (PROVENTIL) neb solution 2.5 mg  2.5 mg Nebulization Q6H PRN Amrik Christian MD        amiodarone (NEXTERONE) 1.8 mg/mL in sodium chloride 0.9% in non-PVC container 500 mL ADULT STANDARD infusion  0.5 mg/min Intravenous Continuous Amrik Christian MD 16.67 mL/hr at 10/28/24 1600 0.5 mg/min at 10/28/24 1600    aspirin (ASA) chewable tablet 81 mg  81 mg Oral Daily Amrik Christian MD   81 mg at 10/28/24 0833    bumetanide (BUMEX) 0.25 mg/mL infusion  0.5 mg/hr Intravenous Continuous Luis Enrique Mondragon MD 2 mL/hr at 10/28/24 1600 0.5 mg/hr at 10/28/24 1600    cefTRIAXone (ROCEPHIN) 2 g vial to attach to  ml bag for ADULTS or NS 50 ml bag for PEDS  2 g Intravenous Q24H Isabelle Brown MD   2 g at 10/28/24 1157    cyanocobalamin injection 1,000 mcg  1,000 mcg Intramuscular Q30 Days Amrik Christian MD   1,000 mcg at 10/27/24 1029    dextrose 10% infusion   Intravenous Continuous PRN Luis Enrique Mondragon MD         glucose gel 15-30 g  15-30 g Oral Q15 Min PRN Amrik Christian MD        Or    dextrose 50 % injection 25-50 mL  25-50 mL Intravenous Q15 Min PRN Amrik Christian MD        Or    glucagon injection 1 mg  1 mg Subcutaneous Q15 Min PRN Amrik Christian MD        DOBUTamine (DOBUTREX) 500 mg in D5W 250 mL infusion (adult std conc)  2.5 mcg/kg/min Intravenous Continuous Faraz Ambrocio MD 10.1 mL/hr at 10/28/24 1600 2.5 mcg/kg/min at 10/28/24 1600    fluticasone-vilanterol (BREO ELLIPTA) 200-25 MCG/ACT inhaler 1 puff  1 puff Inhalation Daily Amrik Christian MD   1 puff at 10/27/24 0801    heparin 25,000 units in 0.45% NaCl 250 mL ANTICOAGULANT infusion  0-5,000 Units/hr Intravenous Continuous Amrik Christian MD 21 mL/hr at 10/28/24 1600 2,100 Units/hr at 10/28/24 1600    HYDROmorphone (DILAUDID) injection 0.2 mg  0.2 mg Intravenous Q1H PRN Luis Enrique Mondragon MD   0.2 mg at 10/27/24 0750    insulin aspart (NovoLOG) injection (RAPID ACTING)  1-7 Units Subcutaneous TID AC Faraz Ambrocio MD   1 Units at 10/28/24 1610    insulin aspart (NovoLOG) injection (RAPID ACTING)  1-5 Units Subcutaneous At Bedtime Faraz Ambrocio MD        ipratropium - albuterol 0.5 mg/2.5 mg/3 mL (DUONEB) neb solution 3 mL  3 mL Nebulization BID Amrik Christian MD   3 mL at 10/28/24 0810    loperamide (IMODIUM) capsule 2 mg  2 mg Oral 4x Daily PRN Amrik Christian MD        methylPREDNISolone sodium succinate (SOLU-MEDROL) injection 40 mg  40 mg Intravenous Q12H Luis Enrique Mondragon MD   40 mg at 10/28/24 0832    miconazole (MICATIN) 2 % powder   Topical BID Amrik Christian MD   Given at 10/28/24 0849    midodrine (PROAMATINE) tablet 10 mg  10 mg Oral TID Luis Enrique Mondragon MD   10 mg at 10/28/24 1609    naloxone (NARCAN) injection 0.2 mg  0.2 mg Intravenous Q2 Min PRN Amrik Christian MD        Or    naloxone (NARCAN) injection 0.4 mg  0.4 mg Intravenous Q2 Min PRN Amrik Christian MD        Or    naloxone (NARCAN) injection  0.2 mg  0.2 mg Intramuscular Q2 Min PRN Amrik Christian MD        Or    naloxone (NARCAN) injection 0.4 mg  0.4 mg Intramuscular Q2 Min PRN Amrik Christian MD        norepinephrine (LEVOPHED) 4 mg in  mL PERIPHERAL infusion  0.01-0.125 mcg/kg/min Intravenous Continuous Salinas Marr, DO   Stopped at 10/27/24 1649    ondansetron (ZOFRAN ODT) ODT tab 4 mg  4 mg Oral Q6H PRN Amrik Christian MD        Or    ondansetron (ZOFRAN) injection 4 mg  4 mg Intravenous Q6H PRN Amrik Christian MD        oxyCODONE (ROXICODONE) tablet 5 mg  5 mg Oral Q3H PRN Luis Enrique Mondragon MD   5 mg at 10/27/24 2034    polyethylene glycol (MIRALAX) Packet 17 g  17 g Oral Daily Amrik Christian MD   17 g at 10/28/24 0849    prochlorperazine (COMPAZINE) injection 10 mg  10 mg Intravenous Q6H PRN Amrik Christian MD        Or    prochlorperazine (COMPAZINE) tablet 10 mg  10 mg Oral Q6H PRN Amrik Christian MD        Or    prochlorperazine (COMPAZINE) suppository 25 mg  25 mg Rectal Q12H PRN Amrik Christian MD        senna-docusate (SENOKOT-S/PERICOLACE) 8.6-50 MG per tablet 2 tablet  2 tablet Oral BID PRN Amrik Christian MD   2 tablet at 10/27/24 1024    sildenafil (REVATIO) tablet 20 mg  20 mg Oral TID Amrik Christian MD   20 mg at 10/28/24 1609    thiamine (B-1) injection 200 mg  200 mg Intravenous BID Luis Enrique Mondragon MD   200 mg at 10/28/24 0832    umeclidinium (INCRUSE ELLIPTA) 62.5 MCG/ACT inhaler 1 puff  1 puff Inhalation Daily Amrik Christian MD   1 puff at 10/27/24 0801     ALLERGIES:  Allergies   Allergen Reactions    Eliquis [Apixaban] Shortness Of Breath, Hives and Swelling     Pt. Reported     Penicillins Anaphylaxis     Per chart review: tolerated Augmentin in 2020.  Tolerated CTX 2024.       Erythromycin Hives and Itching    Peanut Oil Hives    Tetracaine     Clindamycin Itching    Tetracycline Itching     SOCIAL HISTORY:  Social History     Socioeconomic History    Marital status:     Tobacco Use    Smoking status: Former     Current packs/day: 0.00     Average packs/day: 1 pack/day for 44.0 years (44.0 ttl pk-yrs)     Types: Cigarettes     Start date: 1968     Quit date: 2012     Years since quittin.8    Smokeless tobacco: Never   Vaping Use    Vaping status: Never Used   Substance and Sexual Activity    Alcohol use: No     Alcohol/week: 0.0 standard drinks of alcohol    Drug use: No    Sexual activity: Not Currently     Social Drivers of Health     Financial Resource Strain: Low Risk  (10/27/2024)    Financial Resource Strain     Within the past 12 months, have you or your family members you live with been unable to get utilities (heat, electricity) when it was really needed?: No   Food Insecurity: Low Risk  (10/27/2024)    Food Insecurity     Within the past 12 months, did you worry that your food would run out before you got money to buy more?: No     Within the past 12 months, did the food you bought just not last and you didn t have money to get more?: No   Transportation Needs: Low Risk  (10/27/2024)    Transportation Needs     Within the past 12 months, has lack of transportation kept you from medical appointments, getting your medicines, non-medical meetings or appointments, work, or from getting things that you need?: No   Interpersonal Safety: Low Risk  (10/27/2024)    Interpersonal Safety     Do you feel physically and emotionally safe where you currently live?: Yes     Within the past 12 months, have you been hit, slapped, kicked or otherwise physically hurt by someone?: No     Within the past 12 months, have you been humiliated or emotionally abused in other ways by your partner or ex-partner?: No   Housing Stability: Low Risk  (10/27/2024)    Housing Stability     Do you have housing? : Yes     Are you worried about losing your housing?: No     FAMILY HISTORY:  Family History   Problem Relation Age of Onset    Angioedema Mother     Pulmonary Embolism  "Brother     Cerebrovascular Disease Father     Coronary Artery Disease Brother      PHYSICAL EXAM:  Vital Signs: /58 (BP Location: Right arm)   Pulse 82   Temp 98  F (36.7  C) (Temporal)   Resp 27   Ht 1.676 m (5' 6\")   Wt 134.5 kg (296 lb 8.3 oz)   SpO2 92%   BMI 47.86 kg/m    GEN: The patient is resting in the ICU bed.       HEENT: Pupils 3 mm equal and reactive.       Chest: Course BS bilaterally     Cor: RRR     ABD: soft, no masses, no hernias.      Ext: warm and well perfused.       Neuro: grossly symmetric motor and sensory exam; somnolent but conversational using short sentences.         A/P: The patient is a 64 year old woman admitted to the ICU admitted for heart failure requiring dobutamine drip and digoxin to increase renal perfusion. The patient has been evaluated today in the ICU by the Cardiology and the Nephrology services. The patient has advanced  pulmonary HTN and acute on chronic right-sided heart failure consistent with cor pulmonale. The patient lives in assisted living.    Pulm: Acute on chronic hypoxemic respiratory failure. Pulmonary HTN on sildenafil therapy.     Cor: Cardiogenic shock with acute on chronic right-sided heart failure; the patient has been started on a dobutamine drip and has been given a digoxin load. The patient was in atrial fibrillation with RVR but has now converted to NSR; will continue amiodarone.  Cardiology is following; appreciate input.    : Acute kidney injury; the patient is on Bumex drip and will follow the UO now that the patient has started the dobutamine drip and received a digoxin load.      ID:  right ankle wound associated with Strep bacteremia.    HEME: patient with a history for DVT and PE; on heparin drip.      GI:  Hepatic injury with elevated of the patient's LFT's most likely due to shock; will continue to support the patient's cardiac function with the dobutamine drip and digoxin.       : Acute on chronic kidney injury.  Now on " dobutamine and digoxin to support renal perfusion.  Ne[phrology consulting with consideration for possible CRRT     HEME: The patient has a history of DVT and PE.  The patient is on a heparin drip empirically currently.     I personally examined and evaluated the patient today in the [facility] ICU. The patient remains critically ill today.  All of the ICU patient care orders and treatments were placed at my direction. I personally managed the patient's ICU supportive measures that were required as the patient's critical illness creates a continuous threat for loss of life for the patient.  Key critical care decisions were made by me today to maintain life saving effective ICU supportive care.  I spent 45 minutes providing critical care services at the bedside, and on the critical care unit, evaluating the patient, directing care and reviewing the patient's laboratory values and the patient's radiologic imaging reports associated with the patient's critical illness.  I have evaluated all laboratory values and imaging studies from the past 24 hours. I actively assessed this acute critically ill patient and I personally provided supportive interventions that were required because the patient has acute and persistent imminently life threatening organ system failure. The critical care provided required high complexity decision making and clinical evaluation as the patient has acute critical illness that impairs one or more vital organs. The patient has a high probability of imminent or life threatening deterioration. I personally spent 45 minutes of Critical Care Time which was exclusive of any time required to perform any bedside procedures. The Critical Care Time was required to personally provide assessment of the patient's need for ongoing pressor agent drips, to assess the patient's response to the diuretic therapy, to reassess the patient's response to the IV dobutamine drip and to coordinate the patient's  critical care with the Cardiology and the Nephrology services in the [facility] ICU today. I personally managed the patient's sedation, pain control and analgesia, metabolic abnormalities, nutritional status and vasoactive medications.     Balbir Rubin MD, PhD

## 2024-10-28 NOTE — PROGRESS NOTES
[facility]    Infectious Disease Progress Note    Date of Service : 10/28/2024     Assessment:  63 yo presented with worsening of shortness of breath, she has been found with atrial fibrillation with RVR.  She is in acute on chronic congestive heart failure , has pulmonary hypertension with cor pulmonale and cardiogenic shock with hypoxic respiratory failure  Blood cultures positive for  strep dysgalactiae likely of skin source from right ankle wound  Ankle wound without obvious signs of infection  PCN listed as allergy but tolerated augmentin   Neftaly is worsening     Recommendations:   Follow up blood cx from 10/27 is negative thus far  Continue Ceftriaxone  Local wound care  Management of CHF and other ongoing acute medical issues per primary team, cardiology and Nephrology    Lulú Huston MD    Interval History   Ding poorly, very short of breath, on high flow oxygen, decreased urine output and worsening renal functions      Physical Exam   Temp: 97.8  F (36.6  C) Temp src: Temporal BP: 106/55 Pulse: 77   Resp: 16 SpO2: 97 % O2 Device: Oxymizer cannula Oxygen Delivery: 8 LPM  Vitals:    10/26/24 1716 10/27/24 0000   Weight: 138.6 kg (305 lb 9.6 oz) 134.5 kg (296 lb 8.3 oz)     Vital Signs with Ranges  Temp:  [96.9  F (36.1  C)-98.6  F (37  C)] 97.8  F (36.6  C)  Pulse:  [70-86] 77  Resp:  [16-34] 16  BP: ()/(22-95) 106/55  FiO2 (%):  [40 %] 40 %  SpO2:  [87 %-100 %] 97 %    Constitutional: Awake, restless, in respiratory distress on high flow oxygen  Cardiovascular:  S1 and S2,  Skin: No right ankle wound  MS : LE edema    Other:    Medications   Current Facility-Administered Medications   Medication Dose Route Frequency Provider Last Rate Last Admin    amiodarone (NEXTERONE) 1.8 mg/mL in sodium chloride 0.9% in non-PVC container 500 mL ADULT STANDARD infusion  0.5 mg/min Intravenous Continuous Amrik Christian MD 16.67 mL/hr at 10/28/24 1400 0.5 mg/min at 10/28/24 1400    bumetanide  (BUMEX) 0.25 mg/mL infusion  0.5 mg/hr Intravenous Continuous Luis Enrique Mondragon MD 2 mL/hr at 10/28/24 1400 0.5 mg/hr at 10/28/24 1400    dextrose 10% infusion   Intravenous Continuous PRN Luis Enrique Mondragon MD        DOBUTamine (DOBUTREX) 500 mg in D5W 250 mL infusion (adult std conc)  2.5 mcg/kg/min Intravenous Continuous Faraz Ambrocio MD 10.1 mL/hr at 10/28/24 1400 2.5 mcg/kg/min at 10/28/24 1400    heparin 25,000 units in 0.45% NaCl 250 mL ANTICOAGULANT infusion  0-5,000 Units/hr Intravenous Continuous Amrik Christian MD 21 mL/hr at 10/28/24 1400 2,100 Units/hr at 10/28/24 1400    norepinephrine (LEVOPHED) 4 mg in  mL PERIPHERAL infusion  0.01-0.125 mcg/kg/min Intravenous Continuous Salinas Marr DO Stopped at 10/27/24 1649     Current Facility-Administered Medications   Medication Dose Route Frequency Provider Last Rate Last Admin    aspirin (ASA) chewable tablet 81 mg  81 mg Oral Daily Amrik Christian MD   81 mg at 10/28/24 0833    cefTRIAXone (ROCEPHIN) 2 g vial to attach to  ml bag for ADULTS or NS 50 ml bag for PEDS  2 g Intravenous Q24H Isabelle Brown MD   2 g at 10/28/24 1157    cyanocobalamin injection 1,000 mcg  1,000 mcg Intramuscular Q30 Days Amrik Christian MD   1,000 mcg at 10/27/24 1029    fluticasone-vilanterol (BREO ELLIPTA) 200-25 MCG/ACT inhaler 1 puff  1 puff Inhalation Daily Amrik Christian MD   1 puff at 10/27/24 0801    insulin aspart (NovoLOG) injection (RAPID ACTING)  1-7 Units Subcutaneous TID AC Faraz Ambrocio MD        insulin aspart (NovoLOG) injection (RAPID ACTING)  1-5 Units Subcutaneous At Bedtime Faraz Ambrocio MD        ipratropium - albuterol 0.5 mg/2.5 mg/3 mL (DUONEB) neb solution 3 mL  3 mL Nebulization BID Amrik Christian MD   3 mL at 10/28/24 0810    methylPREDNISolone sodium succinate (SOLU-MEDROL) injection 40 mg  40 mg Intravenous Q12H Luis Enrique Mondragon MD   40 mg at 10/28/24 0832    miconazole (MICATIN) 2 % powder    Topical BID Amrik Christian MD   Given at 10/28/24 0849    midodrine (PROAMATINE) tablet 10 mg  10 mg Oral TID Luis Enrique Mondragon MD   10 mg at 10/28/24 0833    polyethylene glycol (MIRALAX) Packet 17 g  17 g Oral Daily Amrik Christian MD   17 g at 10/28/24 0849    sildenafil (REVATIO) tablet 20 mg  20 mg Oral TID Amrik Christian MD   20 mg at 10/28/24 0914    thiamine (B-1) injection 200 mg  200 mg Intravenous BID Luis Enrique Mondragon MD   200 mg at 10/28/24 0832    umeclidinium (INCRUSE ELLIPTA) 62.5 MCG/ACT inhaler 1 puff  1 puff Inhalation Daily Amrik Christian MD   1 puff at 10/27/24 0801       Data   All microbiology laboratory data reviewed.  Recent Labs   Lab Test 10/28/24  0518 10/27/24  0121 10/26/24  1719   WBC 15.6* 13.5* 16.4*   HGB 10.2* 10.3* 11.4*   HCT 35.5 35.2 40.2   MCV 77* 76* 78   * 160 212     Recent Labs   Lab Test 10/28/24  0518 10/27/24  1820 10/27/24  1540   CR 3.33* 3.13* 3.12*        Microbiology  10/27/2024 1021 10/27/2024 1334 MRSA MSSA PCR, Nasal Swab [[id number]]    (Abnormal)   Swab from Nares, Bilateral    Final result Component Value   MRSA Target DNA Positive Abnormal    SA Target DNA Positive          10/27/2024 1021 10/28/2024 1006 MRSA Culture Reflex [[id number]]   Swab from Nares, Bilateral    Preliminary result Component Value   Culture Culture in progress P             10/27/2024 0745 10/28/2024 1046 Blood Culture Arm, Left [[id number]]   Blood from Arm, Left    Preliminary result Component Value   Culture No growth after 1 day P             10/26/2024 1809 10/28/2024 1414 Blood Culture Peripheral Blood [[id number]]   (Abnormal)   Peripheral Blood    Preliminary result Component Value   Culture Positive on the 1st day of incubation Abnormal  P    Streptococcus dysgalactiae (Group G Streptococcus) Panic  P    2 of 2 bottles

## 2024-10-28 NOTE — PROGRESS NOTES
[facility]    Hospitalist Progress Note      Assessment & Plan   Linda Otero is a 64 year old female admitted on 10/26/2024. She presents with worsening of shortness of breath, she has been found with atrial fibrillation with RVR.      #Acute on chronic hypoxemic respiratory failure.  Acute on chronic right-sided heart failure.  Pulmonary HTN with cor pulmonale. Cardiogenic shock: She resides in an assisted living facility and has a very complicated past medical history.   She has had progressive SOB over last 2-3 days.  She does have chronic SOB related to her underlying severe pulmonary HTN and core pulmonale.  She follows with cardiology.  She is chronically on sildenafil therapy.  Apparently she has had similar presentations in the past.  Uses 4L O2 at baseline.    -In the emergency department she had Lasix 60 mg IV, started on BiPAP and a drip of diltiazem.  She was found to have acute renal failure.  She has been switched to amiodarone.  She was also started empirically on ceftriaxone and doxycycline.   She was started on heparin gtt for empiric treatment for possible PE given her history of DVT/PE and worsening respiratory status.  -Pt underwent TTE that showed EF 60-65% but RV severely dilated, systolic function moderately reduced with moderately severe tricuspid regurg with RV pressure at 53 mm Hg, dilation of IVC noted.    -Pt remains on oxygen support at 8L on 10/28.    -Suspect her presentation is due to her RV failure leading to acute renal failure.  She has elevation of her LFT's likely due to hepatic congestion from the same.  She is essentially in multiorgan failure as a result of severity of her RV failure.  This was discussed with the patient.  -Discussed with cards, nephrology and palliative care on 10/28.  -Was on norepinephrine for a bit on admission to ICU.  Discussed with cardiolgist.  Will trial bumex gtt, give dose of digoxin 125 mcg once, and start dobutamine at  low dose to see if it helps with renal perfusion.  -Her overall prognosis is very guarded.  Palliative consulted.      #Strep bacteremia suspected from right ankle wound: Blood culture from admission showing strep species. Await speciation and susceptibility.  I don't think this is  of her presentation.  Podiatry evaluated and underwent MRI without deep infection or fluid collection.     -Continue ceftriaxone.  ID consulted.      #SONAL on CKD2-3: Cr currently ~3 with oliguria from baseline 1.2.  Secondary to HF as above.  Difficult situation and trending BMP daily.  Potentially looking at CRRT but overall prognosis is poor.  Nephrology consulted.      #Episode of atrial fibrillation with RVR: Improved now, converted.  Continue IV amiodarone.  Cardiology consult appreciated.  Continue cardiac monitoring.     #Elevated LFT's markedly: Possible shock related or more congestion with cor pulmonale/severe pulm HTN.  Trend LFT's.      #History of DVT/PE: Not on anticoagulation baseline.  On heparin drip empirically currently.    DVT Prophylaxis: Hep gtt  Code Status: No CPR- Do NOT Intubate  Medically Ready for Discharge: Anticipated in 5+ Days    Discussed with intensivist, cardiologist, nephrologist, Pallitiave care, bedside nurse, RT and pharmacy.      Offered to call family but patient declines this due to difficult family dynamics.     Faraz Ambrocio MD    Interval History   Assumed care. Pt notes she feels about same. Not making much urine. Creatinine slightly worse.  Wants to sleep. Denies any pain.     -Data reviewed today: I reviewed all new labs and imaging results over the last 24 hours. I personally reviewed     Physical Exam   Temp: 97.8  F (36.6  C) Temp src: Temporal BP: 95/55 Pulse: 70   Resp: 21 SpO2: 99 % O2 Device: Oxymizer cannula Oxygen Delivery: 8 LPM  Vitals:    10/26/24 1716 10/27/24 0000   Weight: 138.6 kg (305 lb 9.6 oz) 134.5 kg (296 lb 8.3 oz)     Vital Signs with Ranges  Temp:  [96.9  F (36.1   C)-98.6  F (37  C)] 97.8  F (36.6  C)  Pulse:  [70-86] 70  Resp:  [17-34] 21  BP: ()/(26-95) 95/55  FiO2 (%):  [40 %] 40 %  SpO2:  [87 %-100 %] 99 %  I/O last 3 completed shifts:  In: 1158.12 [P.O.:240; I.V.:918.12]  Out: 182 [Urine:182]    GEN:  Chronically ill and deconditioned. NAD. Answers appropriately.   HEENT: +Elevation of JVD noted at 45 degrees. MMM.   CV:  Regular rate and rhythm. Systolic murmur noted.   LUNGS: Diminished bilaterally. Coarse at bases. No wheeze.   ABD:  Active bowel sounds, soft, non-tender, mildly distended.  No guarding/rigidity.  EXT:  +2-3 edema.  No cyanosis.  No new joint synovitis noted.  SKIN:  Dry to touch, no new exanthems noted in the visualized areas.    Medications   Current Facility-Administered Medications   Medication Dose Route Frequency Provider Last Rate Last Admin    amiodarone (NEXTERONE) 1.8 mg/mL in dextrose 5% 200 mL ADULT STANDARD infusion  0.5 mg/min Intravenous Continuous Amrik Christian MD 16.7 mL/hr at 10/28/24 1200 0.5 mg/min at 10/28/24 1200    bumetanide (BUMEX) 0.25 mg/mL infusion  0.5 mg/hr Intravenous Continuous Luis Enrique Mondragon MD 2 mL/hr at 10/28/24 1200 0.5 mg/hr at 10/28/24 1200    dextrose 10% infusion   Intravenous Continuous PRN Luis Enrique Mondragon MD        DOBUTamine (DOBUTREX) 500 mg in D5W 250 mL infusion (adult std conc)  2.5 mcg/kg/min Intravenous Continuous Faraz Ambrocio MD        heparin 25,000 units in 0.45% NaCl 250 mL ANTICOAGULANT infusion  0-5,000 Units/hr Intravenous Continuous Amrik Christian MD 21 mL/hr at 10/28/24 1200 2,100 Units/hr at 10/28/24 1200    norepinephrine (LEVOPHED) 4 mg in  mL PERIPHERAL infusion  0.01-0.125 mcg/kg/min Intravenous Continuous Faustin-Melvin, Salinas Henry, DO   Stopped at 10/27/24 0827     Current Facility-Administered Medications   Medication Dose Route Frequency Provider Last Rate Last Admin    aspirin (ASA) chewable tablet 81 mg  81 mg Oral Daily Amrik Christian MD   81  mg at 10/28/24 0833    cefTRIAXone (ROCEPHIN) 2 g vial to attach to  ml bag for ADULTS or NS 50 ml bag for PEDS  2 g Intravenous Q24H Isabelle Brown MD   2 g at 10/28/24 1157    cyanocobalamin injection 1,000 mcg  1,000 mcg Intramuscular Q30 Days Amrik Christian MD   1,000 mcg at 10/27/24 1029    fluticasone-vilanterol (BREO ELLIPTA) 200-25 MCG/ACT inhaler 1 puff  1 puff Inhalation Daily Amrik Christian MD   1 puff at 10/27/24 0801    insulin aspart (NovoLOG) injection (RAPID ACTING)  1-7 Units Subcutaneous TID AC Faraz Ambrocio MD        insulin aspart (NovoLOG) injection (RAPID ACTING)  1-5 Units Subcutaneous At Bedtime Faraz Ambrocio MD        ipratropium - albuterol 0.5 mg/2.5 mg/3 mL (DUONEB) neb solution 3 mL  3 mL Nebulization BID Amrik Christian MD   3 mL at 10/28/24 0810    methylPREDNISolone sodium succinate (SOLU-MEDROL) injection 40 mg  40 mg Intravenous Q12H Luis Enrique Mondragon MD   40 mg at 10/28/24 0832    miconazole (MICATIN) 2 % powder   Topical BID Amrik Christian MD   Given at 10/28/24 0849    midodrine (PROAMATINE) tablet 10 mg  10 mg Oral TID Luis Enrique Mondragon MD   10 mg at 10/28/24 0833    polyethylene glycol (MIRALAX) Packet 17 g  17 g Oral Daily Amrik Christian MD   17 g at 10/28/24 0849    sildenafil (REVATIO) tablet 20 mg  20 mg Oral TID Amrik Christian MD   20 mg at 10/28/24 0914    thiamine (B-1) injection 200 mg  200 mg Intravenous BID Luis Enrique Mondragon MD   200 mg at 10/28/24 0832    umeclidinium (INCRUSE ELLIPTA) 62.5 MCG/ACT inhaler 1 puff  1 puff Inhalation Daily Amrik Christian MD   1 puff at 10/27/24 0801       Data   Recent Labs   Lab 10/28/24  1013 10/28/24  0905 10/28/24  0807 10/28/24  0615 10/28/24  0518 10/27/24  1903 10/27/24  1820 10/27/24  1609 10/27/24  1540 10/27/24  0412 10/27/24  0121 10/26/24  2346 10/26/24  3439   WBC  --   --   --   --  15.6*  --   --   --   --   --  13.5*  --  16.4*   HGB  --   --   --   --  10.2*  --   --   --   --   --   10.3*  --  11.4*   MCV  --   --   --   --  77*  --   --   --   --   --  76*  --  78   PLT  --   --   --   --  120*  --   --   --   --   --  160  --  212   NA  --   --   --   --  134*  --  136  --  137   < >  --   --  138   POTASSIUM  --   --   --   --  5.3  --  5.9*  --  5.1   < >  --   --  5.6*   CHLORIDE  --   --   --   --  95*  --  96*  --  96*   < >  --   --  96*   CO2  --   --   --   --  22  --  21*  --  23   < >  --   --  22   BUN  --   --   --   --  69.5*  --  63.9*  --  63.4*   < >  --   --  52.5*   CR  --   --   --   --  3.33*  --  3.13*  --  3.12*   < >  --   --  2.91*   ANIONGAP  --   --   --   --  17*  --  19*  --  18*   < >  --   --  20*   LUNA  --   --   --   --  9.6  --  9.7  --  9.5   < >  --   --  9.6   * 136* 201*   < > 152*   < > 147*   < > 142*   < >  --    < > 132*   ALBUMIN  --   --   --   --  3.5  --   --   --  3.6   < >  --   --   --    PROTTOTAL  --   --   --   --  6.8  --   --   --  7.0   < >  --   --   --    BILITOTAL  --   --   --   --  1.3*  --   --   --  1.9*   < >  --   --   --    ALKPHOS  --   --   --   --  118  --   --   --  119   < >  --   --   --    ALT  --   --   --   --  533*  --   --   --  662*   < >  --   --   --    AST  --   --   --   --  278*  --   --   --  557*   < >  --   --   --     < > = values in this interval not displayed.       Recent Results (from the past 24 hours)   MR Ankle Right w/o & w Contrast    Narrative    EXAM: MR ANKLE RIGHT W/O and W CONTRAST  LOCATION: [facility]  DATE: 10/27/2024    INDICATION: Wound with strep bacteremia.  COMPARISON: None.  TECHNIQUE: Routine. Additional postgadolinium T1 sequences were obtained.  IV CONTRAST: 13mL Gadavist.    FINDINGS:     TENDONS:   -Peroneal: Peroneus longus and brevis tendons are intact. No tendinopathy or tenosynovitis. No subluxation.  -Medial: Posterior tibialis tendon is intact. No tendinopathy or tenosynovitis. Flexor digitorum longus and flexor hallucis longus tendons are  normal. No tenosynovitis.  -Anterior: Anterior tibialis, extensor hallucis longus, and extensor digitorum longus tendons are normal. No tenosynovitis.  -Achilles: No tendinopathy or paratenonitis.    LIGAMENTS:   -Anterior talofibular ligament: Intact.   -Calcaneofibular ligament: Intact.   -Posterior talofibular ligament: Intact.  -Syndesmotic inferior tibiofibular ligaments: Intact.  -Deltoid ligament complex: Sequelae of chronic deltoid ligament sprain. No acute injury.  -Spring ligament complex: Intact.    JOINTS AND BONES:   -No evidence for acute ankle or hindfoot fracture. No calcaneus stress fracture. Tarsal bones and visualized metatarsals intact. Multifocal red marrow reconversion. Nothing definitive to suggest acute osteomyelitis.  -No advanced arthrosis in the ankle, hindfoot or midfoot. No sizable joint effusion or significant synovitis in the ankle or hindfoot or midfoot joints.    SOFT TISSUES:  -Plantar fascia: Intact. No acute fasciitis or tear.  -Sinus tarsi and tarsal tunnel: Preserved sinus Tarsi fat signal. No space-occupying mass is seen along the course of the tarsal tunnel.  -Muscles: Severe diffuse intrinsic foot muscle atrophy with multiple muscle group muscle atrophy in the distal leg. Patchy muscle edema without significant myositis.    There is likely a wound and subcutaneous fibrosis along the posterolateral ankle soft tissues. No well-defined drainable fluid collection to suggest abscess. Distal leg and ankle soft tissue swelling.    A small complex poorly defined fluid collection or area of soft tissue edema is present within a Kager's fat pad without organized drainable abscess.      Impression    IMPRESSION:  1.  Probable wound/ulcer posterolateral right ankle with likely subdermal fibrosis along the lateral ankle and hindfoot. No well-defined drainable fluid collection to suggest abscess.  2.  No evidence for acute osteomyelitis.  3.  No finding for septic arthritis.  4.   Additional incidental findings as detailed fully above.   US Lower Extremity Arterial Duplex Bilateral    Narrative    EXAM: US LOWER EXTREMITY ARTERIAL DUPLEX BILATERAL  LOCATION: [facility]  DATE: 10/27/2024    INDICATION: Assess blood flow due to chronic nonhealing ulceration on the right ankle.  COMPARISON: None.  TECHNIQUE: Duplex utilizing 2D gray-scale imaging, Doppler interrogation with color-flow and spectral waveform analysis.    FINDINGS: There is superficial edema in the right and left lower extremity. There is slight spectral broadening in the right lower extremity vessels and left SFA. Study is slightly compromised due to body habitus.    RIGHT LOWER EXTREMITY ARTERIAL ASSESSMENT:  Common femoral artery: 119/23 cm/s  Profunda femoris artery: 129 cm/s  SFA (proximal): 129/15 cm/s  SFA (mid): 120/19 cm/s  SFA (distal): Not seen  POP Prox: 90/16 cm/s  POP Dist: 38/8 cm/s  Posterior tibial artery: 91/25 cm/s  Peron Dist: 62/12 cm/s  Anterior tibial artery: Not seen    LEFT LOWER EXTREMITY ARTERIAL ASSESSMENT:  Common femoral artery: 132/20 cm/s  Profunda femoris artery: 60 cm/s  SFA (proximal): 104/14 cm/s  SFA (mid): 105/12 cm/s  SFA (distal): Not seen  POP Prox: 48 cm/s  Posterior tibial artery: 74/16 cm/s  Peron Dist: Not seen  Anterior tibial artery: 75/11 cm/s      Impression    IMPRESSION:  1.  Right lower extremity and left SFA spectral broadening suggest turbulence. Some of the vessels are not well assessed due to body habitus.   2.  There is bilateral lower extremity edema.

## 2024-10-28 NOTE — PROVIDER NOTIFICATION
10/27/24 4289   Tech Time   $Tech Time (10 minute increments) 3   Mode: CPAP/ BiPAP/ AVAPS/ AVAPS AE   CPAP/BiPAP/ AVAPS/ AVAPS AE Mode CPAP   Equipment   Device v60   Device Serial Number 1   CPAP/BiPAP/Settings   BIPAP/CPAP On Standby On   Oxygen (%) 40   CPAP/BiPAP Patient Parameters   CPAP (cm H2O) 10 cmh2o   RR Total (breaths/min) 20 breaths/min   Vt (mL) 527 mL   Minute Ventilation (L/min) 10.1 L/min   Peak Inspiratory Pressure (cm H2O) 12 cmH2O   Pt.  Leak (L/min) 0 L/min   CPAP/BiPAP/AVAPS/AVAPS AE Alarms   High Pressure (cm H2O) 25 cmH2O   Low Pressure (cm H2O) 5   Low Pressure Delay (sec) 20 sec   Lo Min Vent 2   High Rate (breaths/min) 50 breaths/min   Low Rate (breaths/min) 10   Audible Alarm set at (Volume of Alarm) 7   Humidifier Checked N/A   RT Device Skin Assessment   Device Skin Interventions Taken No adjustments needed   Respiratory WDL   Respiratory WDL X   Rhythm/Pattern, Respiratory assisted mechanically   Expansion/Accessory Muscles/Retractions expansion symmetric;no retractions;no use of accessory muscles     Placed on CPAP for NOC use.    Salinas Mendez, RT on 10/27/2024 at 11:46 PM

## 2024-10-28 NOTE — PROGRESS NOTES
10/28/24 1000   Appointment Info   Signing Clinician's Name / Credentials (OT) Moraima Ochoatom, OTRL   Living Environment   People in Home facility resident   Current Living Arrangements assisted living   Home Accessibility wheelchair accessible   Living Environment Comments Pt lives in an assisted living facility, no stairs in facility. Walk in shower with chair and grab bars, raised toilet with grab bars   Self-Care   Usual Activity Tolerance moderate   Current Activity Tolerance poor   Regular Exercise No   Equipment Currently Used at Home walker, rolling   Fall history within last six months no   Activity/Exercise/Self-Care Comment Pt reports independence with ADLs and mobility with 4WW at baseline. Has assist with all IADLS from facility   Instrumental Activities of Daily Living (IADL)   IADL Comments Facility assists with all IADLS including medicaiton management at baseline   General Information   Onset of Illness/Injury or Date of Surgery 10/26/24   Referring Physician Amrik Christian MD   Patient/Family Therapy Goal Statement (OT) Pt would like to go home eventually   Additional Occupational Profile Info/Pertinent History of Current Problem 64 year old female admitted on 10/26/2024. She presents with worsening of shortness of breath, she has been found with atrial fibrillation with RVR.  Hypercapnia, hypoxemia, worsening peripheral edema.  She denies chest pain, she reports to be compliant with her medications.  No recent acute intercurrent infections, no other identifiable trigger.  Apparently she has had similar presentations in the past.  In the emergency department she had Lasix 60 mg IV, started on BiPAP and a drip of diltiazem.  She has been switched to amiodarone, diltiazem discontinue with better result for rate control.  She was also started empirically on ceftriaxone and doxycycline, however I am not going to continue antibiotics until there is solid proof of infection.  She has no localizing  signs, she does not look toxemic.  She has a reported allergy to rivaroxaban, she is not on any anticoagulation despite of her history of DVT/PE and atrial fibrillation.  Following admission she had a blood culture turn positive.   This is felt possibly related to an ankle wound.     Bacteremia with strep, possible degree of sepsis with mixed shock picture   Existing Precautions/Restrictions fall   Left Lower Extremity (Weight-bearing Status) full weight-bearing (FWB)   Right Lower Extremity (Weight-bearing Status) full weight-bearing (FWB)   Cognitive Status Examination   Orientation Status orientation to person, place and time   Affect/Mental Status (Cognitive) anxious   Follows Commands WFL   Cognitive Status Comments Pt appears anxious during session but cognitively intact. Per chart review, 15/15 on the BIMS   Visual Perception   Visual Impairment/Limitations WFL   Sensory   Sensory Comments Pt reports some intermittent numbness in BLE   Pain Assessment   Patient Currently in Pain Yes, see Vital Sign flowsheet   Posture   Posture forward head position;protracted shoulders   Range of Motion Comprehensive   Comment, General Range of Motion R ankle impaired due to wound and pain, BLE impaired due to edema   Strength Comprehensive (MMT)   Comment, General Manual Muscle Testing (MMT) Assessment Global weakness, poor tolerance for activity   Coordination   Coordination Comments Impaired due to edema   Bed Mobility   Comment (Bed Mobility) Mod assist   Transfers   Transfer Comments Max assist   Balance   Balance Comments Impaired seated and standing   Activities of Daily Living   BADL Assessment/Intervention bathing;lower body dressing;grooming;toileting   Bathing Assessment/Intervention   Comment, (Bathing) Max assist perclinical reasoning   Lower Body Dressing Assessment/Training   Comment, (Lower Body Dressing) Max assist   Grooming Assessment/Training   Comment, (Grooming) Poor tolerance for activity in standing    Toileting   Comment, (Toileting) Max assist   Clinical Impression   Criteria for Skilled Therapeutic Interventions Met (OT) Yes, treatment indicated   OT Diagnosis Decline in ADL independence and safety   Influenced by the following impairments LE edema, LE infection, sepsis   OT Problem List-Impairments impacting ADL problems related to;balance;activity tolerance impaired;coordination;mobility;range of motion (ROM);strength;pain   Assessment of Occupational Performance 5 or more Performance Deficits   Identified Performance Deficits Impaired dressing bathing toileting and grooming   Planned Therapy Interventions (OT) ADL retraining;strengthening;ROM;progressive activity/exercise   Clinical Decision Making Complexity (OT) detailed assessment/moderate complexity   Risk & Benefits of therapy have been explained care plan/treatment goals reviewed;evaluation/treatment results reviewed;risks/benefits reviewed;current/potential barriers reviewed;participants voiced agreement with care plan;patient;participants included   OT Total Evaluation Time   OT Eval, Moderate Complexity Minutes (13432) 9   OT Goals   Therapy Frequency (OT) 5 times/week   OT Predicted Duration/Target Date for Goal Attainment 11/06/24   OT Goals Hygiene/Grooming;Lower Body Dressing;Lower Body Bathing;Toilet Transfer/Toileting   OT: Hygiene/Grooming modified independent;using adaptive equipment;while standing   OT: Lower Body Dressing Modified independent;using adaptive equipment;including set-up/clothing retrieval   OT: Lower Body Bathing Modified independent;using adaptive equipment   OT: Toilet Transfer/Toileting Modified independent;toilet transfer;cleaning and garment management;using adaptive equipment   Therapeutic Activities   Therapeutic Activity Minutes (24745) 53   Symptoms noted during/after treatment fatigue;behavioral stress signs   Treatment Detail/Skilled Intervention OT; Pt agreeable to therapy. Time taken to organize lines and  drainsand assess vitals for pt safety. Pt required significant encouragement during session due to apartent fear regarding moving and pain. Pt educated on supine to ist transfer, with bed rails and min assist for RLE, pt ocmpleted with mod glo tfor trunk support. EOB pt cued for positioning appearing retropulsive, repeated cues to have pt weigfht shift forward. Vitals assessed, BP 100s but pt reports this is baseline. Pt cued for sit to stand transfer, pt attempted x2 but appears fearful and retropulsive not weight shifting forward. Pt unable to stand with max assist. Pt educated on SS, after education, pt completed with mod-min assist x1. Pt initially observed minimally participating in transfer, after cues for posture pt required min assist. Pt transferred to chair with SS. Pt completed sit to stand from SS seat with cGA. Pt completed g/h in chair with set up assist and cues to weight shift towards midline in chair. Pt in chair iwht alarm on and call light upon TO deparutre   OT Discharge Planning   OT Plan Sit to stand with SS, progress to commode transfer with SS, LE dressing in chair, g/h unsupported EOB   OT Discharge Recommendation (DC Rec) Transitional Care Facility   OT Rationale for DC Rec Patient presents significantly below baseline requiring assist for all mobility and ADLs. Pt will require TCU at discharge to progress to prior level of function. OT will continue to follow   OT Brief overview of current status Max assist SS sit to stand

## 2024-10-28 NOTE — PROGRESS NOTES
PATIENT HISTORY:  Linda Otero is a 64 year old female who was admitted for bacteremia and sepsis. Had her right ankle MRI yesterday. Currently states she feels slightly better since admission. Getting ready to move to the chair with PT. e    Review of Systems:  Patient denies fever, chills, rash,  stiffness, limping, numbness, weak excessive thirst, fatigue, depression, anxiety.  Patient admits to swelling, wound.     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  left thalamic stroke as well as patchy MCA branch infarcts    CVA (cerebral vascular accident) (H) 12/23/2012    Left thalamic stroke, MCA branch infarcts    Dermatitis     DVT (deep venous thrombosis) (H)     Right leg    Encephalopathy     after stroke, believed related to hypoxia    History of transfusion     Hyperlipidemia     Hypertension     Hypothyroidism     Lymphedema     MGUS (monoclonal gammopathy of unknown significance)     Neuropathy of right lower extremity     Obesity     On home oxygen therapy     PFO (patent foramen ovale)     closed after stroke, see cardiology notes care everywhere    PFO (patent foramen ovale)     Pneumonia     Primary hypercoagulable state (H)     Pulmonary emboli (H) 2013    Pulmonary HTN (H)     Respiratory failure (H)     after  stroke    Seizure (H)     Seizures (H)     at age 31    Skin cancer 2014    Sleep apnea     CPAP    Stroke (H) 2012    Umbilical hernia     Wound of right ankle         PAST SURGICAL HISTORY:   Past Surgical History:   Procedure Laterality Date    ASD REPAIR      ASD REPAIR      BIOPSY SKIN (LOCATION)      CARDIAC CATHETERIZATION  2012    2 stents    COLONOSCOPY N/A 4/9/2018    Procedure: COLONOSCOPY;  Surgeon: Dorene Araujo MD;  Location: [facility] OR;  Service:     CONIZATION  1985    CV CORONARY ANGIOGRAM N/A 4/12/2019    Procedure: Coronary Angiogram;  Surgeon: Brett Montalvo MD;  Location: [facility] Cath Lab;  Service: Cardiology    CV CORONARY ANGIOGRAM N/A 4/12/2024    Procedure: Coronary Angiogram;  Surgeon: Enoc Crocker MD;  Location: [facility] CARDIAC CATH LAB    CV INTRAVASULAR ULTRASOUND N/A 4/12/2024    Procedure: Intravascular Ultrasound;  Surgeon: Enoc Crocker MD;  Location: [facility] CARDIAC CATH LAB    CV LEFT HEART CATHETERIZATION WITHOUT LEFT VENTRICULOGRAM Left 4/12/2019    Procedure: Left Heart Catheterization Without Left Ventriculogram;  Surgeon: Brett Montalvo MD;  Location: [facility] Cath Lab;  Service: Cardiology    CV RIGHT HEART CATH MEASUREMENTS RECORDED N/A 4/12/2024    Procedure: Right Heart Catheterization;  Surgeon: Enoc Crocker MD;  Location: [facility] CARDIAC CATH LAB    CV RIGHT HEART CATH MEASUREMENTS RECORDED N/A 4/19/2024    Procedure: Right Heart Cath;  Surgeon: Sparkle Suresh MD;  Location: [facility] CARDIAC CATH LAB    CV RIGHT HEART CATHETERIZATION N/A 4/12/2019    Procedure: Right Heart Catheterization;  Surgeon: Brett Montalvo MD;  Location: [facility] Cath Lab;  Service: Cardiology    ESOPHAGOSCOPY, GASTROSCOPY, DUODENOSCOPY (EGD), COMBINED N/A 4/9/2018    Procedure: ESOPHAGOGASTRODUODENOSCOPY (EGD);  Surgeon: Dorene Araujo MD;  Location: [facility] OR;  Service:     IR CAROTID ANGIOGRAM   12/26/2012    IR CAROTID ANGIOGRAM  12/26/2012    IR MISCELLANEOUS PROCEDURE  12/26/2012    IR MISCELLANEOUS PROCEDURE  12/26/2012    IR MISCELLANEOUS PROCEDURE  12/26/2012    OTHER SURGICAL HISTORY      Cardiac stents    PATENT FORAMEN OVALE CLOSURE      REPAIR PATENT FORAMEN OVALE  2013    Helex device    XR SACRO ILIAC JOINT INJECTION LEFT  11/2012    C REMV ART CLOT ILIAC-POP,LEG INCIS Left 7/22/2019    Procedure: REPAIR LEFT FEMORAL ARTERIOVENOUS FISTULA WITH INTRAOPERATIVE ULTRASOUND;  Surgeon: Salinas Torres MD;  Location: [facility];  Service: General        MEDICATIONS:   Current Facility-Administered Medications:     acetaminophen (TYLENOL) tablet 650 mg, 650 mg, Oral, Q4H PRN, Amrik Christian MD    albuterol (PROVENTIL) neb solution 2.5 mg, 2.5 mg, Nebulization, Q6H PRN, Amrik Christian MD    amiodarone (NEXTERONE) 1.8 mg/mL in dextrose 5% 200 mL ADULT STANDARD infusion, 0.5 mg/min, Intravenous, Continuous, Amrik Christian MD, Last Rate: 16.7 mL/hr at 10/28/24 0700, 0.5 mg/min at 10/28/24 0700    aspirin (ASA) chewable tablet 81 mg, 81 mg, Oral, Daily, Amrik Christian MD, 81 mg at 10/28/24 0833    bumetanide (BUMEX) 0.25 mg/mL infusion, 0.5 mg/hr, Intravenous, Continuous, Luis Enrique Mondragon MD, Last Rate: 2 mL/hr at 10/28/24 0600, 0.5 mg/hr at 10/28/24 0600    cefTRIAXone (ROCEPHIN) 2 g vial to attach to  ml bag for ADULTS or NS 50 ml bag for PEDS, 2 g, Intravenous, Q24H, Isabelle Brown MD, 2 g at 10/27/24 1500    cyanocobalamin injection 1,000 mcg, 1,000 mcg, Intramuscular, Q30 Days, Amrik Christian MD, 1,000 mcg at 10/27/24 1029    dextrose 10% infusion, , Intravenous, Continuous PRN, Luis Enrique Mondragon MD    glucose gel 15-30 g, 15-30 g, Oral, Q15 Min PRN **OR** dextrose 50 % injection 25-50 mL, 25-50 mL, Intravenous, Q15 Min PRN **OR** glucagon injection 1 mg, 1 mg, Subcutaneous, Q15 Min PRN, Amrik Christian MD    [Held by provider] empagliflozin (JARDIANCE)  tablet 10 mg, 10 mg, Oral, Daily, Amrik Christian MD    fluticasone-vilanterol (BREO ELLIPTA) 200-25 MCG/ACT inhaler 1 puff, 1 puff, Inhalation, Daily, Amrik Christian MD, 1 puff at 10/27/24 0801    heparin 25,000 units in 0.45% NaCl 250 mL ANTICOAGULANT infusion, 0-5,000 Units/hr, Intravenous, Continuous, Amrik Christian MD, Last Rate: 21 mL/hr at 10/28/24 0800, 2,100 Units/hr at 10/28/24 0800    HYDROmorphone (DILAUDID) injection 0.2 mg, 0.2 mg, Intravenous, Q1H PRN, Luis Enrique Mondragon MD, 0.2 mg at 10/27/24 0750    insulin regular (MYXREDLIN) 1 unit/mL infusion, 0-24 Units/hr, Intravenous, Continuous, Luis Enrique Mondragon MD, Last Rate: 0.5 mL/hr at 10/28/24 0700, 0.5 Units/hr at 10/28/24 0700    ipratropium - albuterol 0.5 mg/2.5 mg/3 mL (DUONEB) neb solution 3 mL, 3 mL, Nebulization, BID, Amrik Christian MD, 3 mL at 10/28/24 0810    loperamide (IMODIUM) capsule 2 mg, 2 mg, Oral, 4x Daily PRN, Amrik Christian MD    methylPREDNISolone sodium succinate (SOLU-MEDROL) injection 40 mg, 40 mg, Intravenous, Q12H, Luis Enrique Mondragon MD, 40 mg at 10/28/24 0832    miconazole (MICATIN) 2 % powder, , Topical, BID, Amrik Christian MD, Given at 10/28/24 0849    midodrine (PROAMATINE) tablet 10 mg, 10 mg, Oral, TID, Luis Enrique Mondragon MD, 10 mg at 10/28/24 0833    naloxone (NARCAN) injection 0.2 mg, 0.2 mg, Intravenous, Q2 Min PRN **OR** naloxone (NARCAN) injection 0.4 mg, 0.4 mg, Intravenous, Q2 Min PRN **OR** naloxone (NARCAN) injection 0.2 mg, 0.2 mg, Intramuscular, Q2 Min PRN **OR** naloxone (NARCAN) injection 0.4 mg, 0.4 mg, Intramuscular, Q2 Min PRN, Amrik Christian MD    norepinephrine (LEVOPHED) 4 mg in  mL PERIPHERAL infusion, 0.01-0.125 mcg/kg/min, Intravenous, Continuous, Salinas Marr DO, Stopped at 10/27/24 5648    ondansetron (ZOFRAN ODT) ODT tab 4 mg, 4 mg, Oral, Q6H PRN **OR** ondansetron (ZOFRAN) injection 4 mg, 4 mg, Intravenous, Q6H PRN, Amrik Christian,  MD    oxyCODONE (ROXICODONE) tablet 5 mg, 5 mg, Oral, Q3H PRN, Luis Enrique Mondragon MD, 5 mg at 10/27/24 2034    polyethylene glycol (MIRALAX) Packet 17 g, 17 g, Oral, Daily, Amrik Christian MD, 17 g at 10/28/24 0849    prochlorperazine (COMPAZINE) injection 10 mg, 10 mg, Intravenous, Q6H PRN **OR** prochlorperazine (COMPAZINE) tablet 10 mg, 10 mg, Oral, Q6H PRN **OR** prochlorperazine (COMPAZINE) suppository 25 mg, 25 mg, Rectal, Q12H PRN, Amrik Christian MD    senna-docusate (SENOKOT-S/PERICOLACE) 8.6-50 MG per tablet 2 tablet, 2 tablet, Oral, BID PRN, Amrik Christian MD, 2 tablet at 10/27/24 1024    sildenafil (REVATIO) tablet 20 mg, 20 mg, Oral, TID, Amrik Christian MD, 20 mg at 10/27/24 2202    [Held by provider] spironolactone (ALDACTONE) tablet 25 mg, 25 mg, Oral, Daily, Amrik Christian MD    thiamine (B-1) injection 200 mg, 200 mg, Intravenous, BID, Luis Enrique Mondragon MD, 200 mg at 10/28/24 0832    umeclidinium (INCRUSE ELLIPTA) 62.5 MCG/ACT inhaler 1 puff, 1 puff, Inhalation, Daily, Amrik Christian MD, 1 puff at 10/27/24 0801     ALLERGIES:    Allergies   Allergen Reactions    Eliquis [Apixaban] Shortness Of Breath, Hives and Swelling     Pt. Reported     Penicillins Anaphylaxis     Per chart review: tolerated Augmentin in .  Tolerated CTX .       Erythromycin Hives and Itching    Peanut Oil Hives    Tetracaine     Clindamycin Itching    Tetracycline Itching        SOCIAL HISTORY:   Social History     Socioeconomic History    Marital status:      Spouse name: Not on file    Number of children: Not on file    Years of education: Not on file    Highest education level: Not on file   Occupational History    Not on file   Tobacco Use    Smoking status: Former     Current packs/day: 0.00     Average packs/day: 1 pack/day for 44.0 years (44.0 ttl pk-yrs)     Types: Cigarettes     Start date: 1968     Quit date: 2012     Years since quittin.8    Smokeless tobacco:  Never   Vaping Use    Vaping status: Never Used   Substance and Sexual Activity    Alcohol use: No     Alcohol/week: 0.0 standard drinks of alcohol    Drug use: No    Sexual activity: Not Currently   Other Topics Concern    Parent/sibling w/ CABG, MI or angioplasty before 65F 55M? Not Asked   Social History Narrative    Not on file     Social Drivers of Health     Financial Resource Strain: Low Risk  (10/27/2024)    Financial Resource Strain     Within the past 12 months, have you or your family members you live with been unable to get utilities (heat, electricity) when it was really needed?: No   Food Insecurity: Low Risk  (10/27/2024)    Food Insecurity     Within the past 12 months, did you worry that your food would run out before you got money to buy more?: No     Within the past 12 months, did the food you bought just not last and you didn t have money to get more?: No   Transportation Needs: Low Risk  (10/27/2024)    Transportation Needs     Within the past 12 months, has lack of transportation kept you from medical appointments, getting your medicines, non-medical meetings or appointments, work, or from getting things that you need?: No   Physical Activity: Not on file   Stress: Not on file   Social Connections: Not on file   Interpersonal Safety: Low Risk  (10/27/2024)    Interpersonal Safety     Do you feel physically and emotionally safe where you currently live?: Yes     Within the past 12 months, have you been hit, slapped, kicked or otherwise physically hurt by someone?: No     Within the past 12 months, have you been humiliated or emotionally abused in other ways by your partner or ex-partner?: No   Housing Stability: Low Risk  (10/27/2024)    Housing Stability     Do you have housing? : Yes     Are you worried about losing your housing?: No        FAMILY HISTORY:   Family History   Problem Relation Age of Onset    Angioedema Mother     Pulmonary Embolism Brother     Cerebrovascular Disease Father      "Coronary Artery Disease Brother         EXAM:Vitals: /69   Pulse 75   Temp 97.6  F (36.4  C) (Axillary)   Resp 24   Ht 1.676 m (5' 6\")   Wt 134.5 kg (296 lb 8.3 oz)   SpO2 91%   BMI 47.86 kg/m    BMI= Body mass index is 47.86 kg/m .    LABS:    WBC   Date Value Ref Range Status   05/19/2017 9.2 4.0 - 11.0 10e9/L Final     WBC Count   Date Value Ref Range Status   10/28/2024 15.6 (H) 4.0 - 11.0 10e3/uL Final     General appearance: Patient is alert and fully cooperative with history & exam.  No sign of distress is noted during the visit.      Psychiatric: Affect is pleasant & appropriate.  Patient appears motivated to improve health.       Respiratory: Breathing is regular & unlabored while sitting.      HEENT: Hearing is intact to spoken word.  Speech is clear.  No gross evidence of visual impairment that would impact ambulation.       Dermatologic:   Full-thickness stage III ulceration to the lateral aspect of the right ankle.  It measures approximately 2 and half centimeters by 3 cm x 0.3 centimeters in depth.  Base of the wound is granular.  Minimal surrounding redness.  No purulent drainage is noted.  Moderate amounts of clear serous drainage is noted. --> dressing left intact today.      Vascular: DP & PT pulses are not readily palpable bilaterally.  significant edema but no varicosities noted.  CFT and skin temperature is normal to both lower extremities.       Neurologic: Lower extremity sensation is intact to light touch.  No evidence of weakness or contracture in the lower extremities.  No evidence of neuropathy.       Musculoskeletal: Patient is ambulatory without assistive device or brace.  No gross ankle deformity noted.  No foot or ankle joint effusion is noted.      IMAGING: MRI right ankle    IMPRESSION:  1.  Probable wound/ulcer posterolateral right ankle with likely subdermal fibrosis along the lateral ankle and hindfoot. No well-defined drainable fluid collection to suggest " abscess.  2.  No evidence for acute osteomyelitis.  3.  No finding for septic arthritis.  4.  Additional incidental findings as detailed fully above.    I personally reviewed the images and agree with the reports as stated.  No signs of osteomyelitis.      ASSESSMENT: 64-year-old female with lymphedema and chronic right ankle ulceration with bacteremia, rule out osteomyelitis.     PLAN:  Reviewed patient's chart in epic.  -Discussed all findings with patient. Chart and imaging reviewed.   -Right ankle MRI reviewed, negative for osteomyelitis. No surgical plans for right ankle.   -Discussed with patient need for follow up. Sounds that she hasn't seen any providers for her right ankle wound and receives wound care at her assisted living facility. Will provide wound care center follow up for patient. Recommend continued follow up outpatient.   -Further wound care per WOC orders.   -Will sign off. Vocera text with questions. Okay for full WB to RLE.       Tawnya Gaxiola DPM    9:06 AM      Addendum 10/29 at 1109 re: coding query  -MRI reviewed regarding patient's right ankle ulcer, with patient. Patient has a chronic right ankle ulcer down to and including subcutaneous tissue. as described above, being treated with ongoing wound care as it has no osteomyelitis.

## 2024-10-28 NOTE — PLAN OF CARE
"  Problem: Adult Inpatient Plan of Care  Goal: Plan of Care Review  Description: The Plan of Care Review/Shift note should be completed every shift.  The Outcome Evaluation is a brief statement about your assessment that the patient is improving, declining, or no change.  This information will be displayed automatically on your shift  note.  Outcome: Progressing  Flowsheets (Taken 10/27/2024 1950)  Outcome Evaluation: Patient alert and oriented. Producing poor urine output. Neph/cardiolofy/PT/OT/podiatry/ID following pt  Plan of Care Reviewed With: patient  Overall Patient Progress: no change  Goal: Patient-Specific Goal (Individualized)  Description: You can add care plan individualizations to a care plan. Examples of Individualization might be:  \"Parent requests to be called daily at 9am for status\", \"I have a hard time hearing out of my right ear\", or \"Do not touch me to wake me up as it startles  me\".  Outcome: Progressing  Goal: Absence of Hospital-Acquired Illness or Injury  Outcome: Progressing  Intervention: Identify and Manage Fall Risk  Recent Flowsheet Documentation  Taken 10/27/2024 1625 by Stephanie Wells RN  Safety Promotion/Fall Prevention:   activity supervised   assistive device/personal items within reach   clutter free environment maintained   increased rounding and observation   increase visualization of patient   patient and family education   nonskid shoes/slippers when out of bed   room near nurse's station   room organization consistent   supervised activity   safety round/check completed   treat reversible contributory factors   treat underlying cause  Taken 10/27/2024 1250 by Stephanie Wells, RN  Safety Promotion/Fall Prevention:   activity supervised   assistive device/personal items within reach   clutter free environment maintained   increased rounding and observation   increase visualization of patient   patient and family education   nonskid shoes/slippers when out of bed   " room near nurse's station   room organization consistent   supervised activity   safety round/check completed   treat reversible contributory factors   treat underlying cause  Intervention: Prevent Skin Injury  Recent Flowsheet Documentation  Taken 10/27/2024 1800 by Stephanie Wells RN  Body Position:   weight shifting   supine, legs elevated  Taken 10/27/2024 1630 by Stephanie Wells RN  Body Position:   turned   supine, head elevated   supine, legs elevated  Taken 10/27/2024 1409 by Stephanie Wells RN  Body Position: (at ultrasound) --  Taken 10/27/2024 1250 by Stephanie Wells RN  Body Position: (at MRI) other (see comments)  Taken 10/27/2024 1000 by Stephanie Wells RN  Body Position:   supine, head elevated   supine, legs elevated   weight shifting  Taken 10/27/2024 0800 by Stephanie Wells RN  Body Position:   supine, head elevated   supine, legs elevated   weight shifting  Intervention: Prevent and Manage VTE (Venous Thromboembolism) Risk  Recent Flowsheet Documentation  Taken 10/27/2024 1625 by Stephanie Wells RN  VTE Prevention/Management: (on heparin) SCDs off (sequential compression devices)  Taken 10/27/2024 1250 by Stephanie Wells RN  VTE Prevention/Management: (on heparin) SCDs off (sequential compression devices)  Taken 10/27/2024 0800 by Stephanie Wells RN  VTE Prevention/Management: (on heparin) SCDs off (sequential compression devices)  Goal: Optimal Comfort and Wellbeing  Outcome: Progressing  Intervention: Monitor Pain and Promote Comfort  Recent Flowsheet Documentation  Taken 10/27/2024 0750 by Stephanie Wells RN  Pain Management Interventions: medication (see MAR)  Intervention: Provide Person-Centered Care  Recent Flowsheet Documentation  Taken 10/27/2024 1625 by Stephanie Wells RN  Trust Relationship/Rapport:   choices provided   care explained   emotional support provided   empathic listening provided   questions answered   questions  encouraged   reassurance provided   thoughts/feelings acknowledged  Taken 10/27/2024 1250 by Stephanie Wells RN  Trust Relationship/Rapport:   choices provided   care explained   emotional support provided   empathic listening provided   questions answered   questions encouraged   reassurance provided   thoughts/feelings acknowledged  Taken 10/27/2024 0800 by Stephanie Wells RN  Trust Relationship/Rapport:   choices provided   care explained   emotional support provided   empathic listening provided   questions answered   questions encouraged   reassurance provided   thoughts/feelings acknowledged  Goal: Readiness for Transition of Care  Outcome: Progressing   Goal Outcome Evaluation:      Plan of Care Reviewed With: patient    Overall Patient Progress: no changeOverall Patient Progress: no change    Outcome Evaluation: Patient alert and oriented. Producing poor urine output. Neph/cardiolofy/PT/OT/podiatry/ID following pt

## 2024-10-28 NOTE — DISCHARGE INSTRUCTIONS
Right lateral ankle wound(s): Every other day  Cleanse with vashe soaked gauze for 5 minutes. Pat dry.  Cut hydrofera blueto fit wound and lighlty moisten with normal saline to active. Apply to wound bed  Cover with optifoam gentle border   Ok to apply Spandigrip to BLE from toes to knees

## 2024-10-28 NOTE — CONSULTS
[facility] Nurse Inpatient Assessment     Consulted for: Legs    Summary: Stage 3 pressure injury to lateral right ankle present on admission    Patient History (according to provider note(s):      4 year old female admitted on 10/26/2024. She presents with worsening of shortness of breath, she has been found with atrial fibrillation with RVR.       #Acute on chronic hypoxemic respiratory failure.  Acute on chronic right-sided heart failure.  Pulmonary HTN with cor pulmonale. Cardiogenic shock: She resides in an assisted living facility and has a very complicated past medical history.   She has had progressive SOB over last 2-3 days.  She does have chronic SOB related to her underlying severe pulmonary HTN and core pulmonale.  She follows with cardiology.  She is chronically on sildenafil therapy.  Apparently she has had similar presentations in the past.  Uses 4L O2 at baseline.      Assessment:      Areas visualized during today's visit: Lower extremities     Pressure Injury Location: Right lateral ankle    Last photo: 10/28/24    Wound type: Pressure Injury     Pressure Injury Stage: 3, present on admission       Wound history/plan of care:   patient admits with open wound over right lateral malleolus.     Wound base: 80 % Granulation tissue, 20 % [place]     Palpation of the wound bed: normal      Drainage: scant     Description of drainage: serosanguinous     Measurements (length x width x depth, in cm) 3.8  x 2.8  x  0.2 cm      Tunneling N/A     Undermining N/A  Periwound skin: Intact      Color: normal and consistent with surrounding tissue      Temperature: normal   Odor: none  Pain: moderate, sharp  Pain intervention prior to dressing change: slow and gentle cares   Treatment goal: Heal  and Infection control/prevention  STATUS: initial assessment  Supplies ordered: supplies stored on unit, discussed with RN, and discussed with patient       Treatment Plan:     Right lateral  ankle wound(s): Every other day  Cleanse with vashe soaked gauze for 5 minutes. Pat dry.  Cut hydrofera blue ([id number]) to fit wound and lighlty moisten with normal saline to active. Apply to wound bed  Cover with optifoam gentle border ([id number])  Ok to apply Spandigrip to BLE from toes to knees    Orders: Written    RECOMMEND PRIMARY TEAM ORDER: None, at this time  Education provided: plan of care  Discussed plan of care with: Patient and Nurse  [facility] nurse follow-up plan: weekly  Notify WOC if wound(s) deteriorate.  Nursing to notify the Provider(s) and re-consult the WO Nurse if new skin concern.    DATA:     Current support surface: Bariatric Low air loss (ANGELINE pump, Isolibrium, Pulsate)  Containment of urine/stool: Incontinent pad in bed and Indwelling catheter  BMI: Body mass index is 47.86 kg/m .   Active diet order: Orders Placed This Encounter      Regular Diet Adult     Output: I/O last 3 completed shifts:  In: 1158.12 [P.O.:240; I.V.:918.12]  Out: 182 [Urine:182]     Labs:   Recent Labs   Lab 10/28/24  0518 10/27/24  0534 10/27/24  0121   ALBUMIN 3.5   < >  --    HGB 10.2*  --  10.3*   WBC 15.6*  --  13.5*   A1C  --   --  5.5    < > = values in this interval not displayed.     Pressure injury risk assessment:   Sensory Perception: 3-->slightly limited  Moisture: 4-->rarely moist  Activity: 1-->bedfast  Mobility: 2-->very limited  Nutrition: 2-->probably inadequate  Friction and Shear: 1-->problem  Dorian Score: 13    June Biswas RN CWOCN  Contact [facility] Nurse ([place])  Dept. Office Number: [phone]

## 2024-10-28 NOTE — CONSULTS
"  Palliative Care Consultation [facility]      Patient: Linda Otero  Date of Admission:  10/26/2024    Requesting Clinician / Team: Hospitalist Faraz Ambrocio MD   Reason for consult: Goals of care     Recommendations & Counseling     GOALS OF CARE:   Life-prolonging with limits  - Patient confirmed No CPR- Do NOT Intubate. She request to continue other restorative efforts.   She declined offer to provide support to family and friends.  In asking about a surrogate decision maker, she repeatedly stated \"It's between me and God.\"       ADVANCE CARE PLANNING:  No health care directive on file. Per system policy, Surrogate Decision-makers for Patients With Diminished Decision-making Capacity offers guidance on possible decision-makers. No person has been identified as a surrogate decision maker.  If patient is unable to make decisions, her three children are next-of-kin.  There is a POLST form on file, this was reviewed and current.  Code status: No CPR- Do NOT Intubate    MEDICAL MANAGEMENT:   We are not actively managing symptoms at this time.    PSYCHOSOCIAL/SPIRITUAL SUPPORT:  Family - Patient indicates she had a \"complicated divorce\" and is \"not on speaking terms\" with her three children (2 daughters and a son) and 5 grandchildren.    [place] community: [place]   Would appreciate Spiritual Health Services, Consult has been placed for support     Palliative Care will continue to follow. Thank you for the consult and allowing us to aid in the care of Linda Otero.    These recommendations have been discussed with Hospitalist Faraz Ambrocio MD and bedside nurse FRANCISCO Baeza.    JONATAN Cedillo CNS  [facility], Palliative Care  Securely message with the Vocera Web Console (learn more here) or  Text page via [facility] Paging/Directory         Assessment      Linda Otero is a 64 year old female admitted 10/26/2024 with worsening shortness of breath, atrial fibrillation with RVR, " "hypercapnia, hypoemia and worsening peripheral edema. Her past medical history is significant for HFpEF, CAD, severe pulmonary hypertension, COPD (previous tobaccoism - quit 2012), JODI, history of DVT/PE (not on anticoagulation on admission) , s/p PFO closure for CVA, and CKD.  Podiatry has seen patient regarding right ankle ulcer. Infectious Disease is following given strep species in blood cultures.Cardiology is following regarding shock, severe pulmonary hypertension, cor pulmonale, and atrial fibrillation with RVR. 10/27/2024 Echo EF 60-65%, RV dilated, moderate/severe tricuspid regurgitation and pulmonary hypertension 53 mmHg, ASD closure device without shunt. Nephrology saw patient and indicated she is a poor dialysis candidate given significant cardiac morbidity.    Today, the patient was seen for:  Goals of care    History of Present Illness   Met with Linda as she was resting in bed. In asking about her apparent shortness of breath, she acknowledged \"I know it's bad and it's only going to get worse.\" I asked her thoughts on this. Linda explained \"I know the time is coming. It's all in God's hands.\" She agreed in an offer for spiritual support.  I introduced our role as an extra layer of support and how we help patients and families dealing with serious, potentially life-limiting illnesses. I explained the composition of the palliative care team.  Palliative care helps patients and families navigate their care while focusing on the whole person; providing emotional, social and spiritual support  Palliative care often assists with symptom management, information sharing about what to expect from the illness, available treatment options and what effect those options may have on the disease course, and provide effective communication and caring support. In asking about her supports, Linda acknowledged she has close friends who are aware of her hospitalization. In asking about her family, Linda explained " "\"It was a nasty divorce.\" She explained \"My  was a . He just decided one day, he didn't want to be .\" She has three children whom she is estranged. I offered concern for her family. Linda explained that she prefers her children are not contacted and indicated her friends are \"doing well. They can visit if they need to.\" In asking Linda what the doctors have told her, Linda offered \"I don't think I have much time. It's between me and God now.\" I inquired as to who she would like to make decisions if she is unable and Linda offered \"That's between me and God. Do whatever you can for now and when He's ready I'll go there.\" She explained having strong morales and would like to speak with our .       Prognosis, Goals, & Planning:   Functional Status just prior to this current hospitalization:  Outpatient Palliative Performance Score (PPS) 40%  Extensive disease. Mainly in bed w/little ambulation. ADLs/activities mainly w/assistance. Normal or reduced intake. Full LOC or drowsy or confused.     Prognosis, Goals, and/or Advance Care Planning:  We discussed general treatment options (full/restorative, selective/conservatives, and comfort only/hospice). We then discussed how these specifically apply to Linda's request.  Based on this discussion, Linda has decided to continue current restorative efforts.    Code Status was addressed today:   Yes, We discussed potential risks and rationale of attempting cardiac resuscitation, intubation, and mechanical ventilation.  We also discussed probability of survival as well as quality of life implications.  Based on this discussion, patient or surrogate response/decision: No CPR- Do NOT Intubate.      Patient's decision making preferences: independently        Patient has decision-making capacity today for complex decisions: Intact          Coping, Meaning, & Spirituality:   Mood, coping, and/or meaning in the context of serious illness were addressed " today: Yes    Social:   Living situation:resides in a nursing home [facility] in [place]     Medications:  Reviewed this patient's medication profile and medications from this hospitalization.[place] Board of Pharmacy Data Base Reviewed: Yes:   reviewed - controlled substances prescribed by other outside provider(s). Tramadol 50 mg per provider at [facility].     ROS:  Comprehensive ROS is reviewed and is negative except as here & per HPI:     Physical Exam   Vital Signs with Ranges  Temp:  [96.9  F (36.1  C)-98.6  F (37  C)] 97.8  F (36.6  C)  Pulse:  [70-86] 77  Resp:  [16-34] 16  BP: ()/(22-95) 106/55  FiO2 (%):  [40 %] 40 %  SpO2:  [87 %-100 %] 97 %  Wt Readings from Last 10 Encounters:   10/27/24 134.5 kg (296 lb 8.3 oz)   10/02/24 136.6 kg (301 lb 1.6 oz)   09/24/24 137.2 kg (302 lb 6.4 oz)   07/02/24 127.5 kg (281 lb)   05/31/24 130.7 kg (288 lb 3.2 oz)   05/24/24 130.8 kg (288 lb 6.4 oz)   05/21/24 130.9 kg (288 lb 9.6 oz)   05/14/24 130.5 kg (287 lb 12.8 oz)   05/14/24 130.5 kg (287 lb 12.8 oz)   05/10/24 129.8 kg (286 lb 3.2 oz)     296 lbs 8.3 oz    PHYSICAL EXAM:  GEN:  Morbidly obese, chronically ill and dyspneic appearing female on oxygen at 8 liters. Alert, oriented x 3, and denies distress.  HEENT:  Normocephalic/atraumatic, no scleral icterus, no nasal discharge, mouth moist.  CV:  RRR, S1, S2; systolic murmur. +JVP +3 DP/PT pulses bilatererally; bilateral leg edema.  RESP:  Coarse bilateral breath sounds. Symmetric chest rise on inhalation noted.  Poor inspiratory effort.  ABD:  Rounded, soft, non-tender/non-distended.  +BS  EXT:  CMS intact x 4.     M/S:   Right ankle discomfort with palpation of extremities.     SKIN:  Warm and dry to touch, no mottling.  Right ankle was not inspected (see Podiatry note from yesterday and today's WOC note)  PAIN BEHAVIOR: Cooperative  Psych:  Normal affect.  Calm, cooperative, conversant appropriately.    Data reviewed:  Results for orders  placed or performed during the hospital encounter of 10/26/24   XR Chest Port 1 View     Status: None    Narrative    EXAM: XR CHEST PORT 1 VIEW  LOCATION: [facility]  DATE: 10/26/2024    INDICATION: Shortness of breath.  COMPARISON: 4/26/2024.      Impression    IMPRESSION: Mild pulmonary vascular congestion, with associated small to moderate size right pleural effusion. No pneumothorax.    Unchanged cardiomegaly. Atherosclerotic calcifications of the thoracic aorta and mild enlargement of the main pulmonary artery.   MR Ankle Right w/o & w Contrast     Status: None    Narrative    EXAM: MR ANKLE RIGHT W/O and W CONTRAST  LOCATION: [facility]  DATE: 10/27/2024    INDICATION: Wound with strep bacteremia.  COMPARISON: None.  TECHNIQUE: Routine. Additional postgadolinium T1 sequences were obtained.  IV CONTRAST: 13mL Gadavist.    FINDINGS:     TENDONS:   -Peroneal: Peroneus longus and brevis tendons are intact. No tendinopathy or tenosynovitis. No subluxation.  -Medial: Posterior tibialis tendon is intact. No tendinopathy or tenosynovitis. Flexor digitorum longus and flexor hallucis longus tendons are normal. No tenosynovitis.  -Anterior: Anterior tibialis, extensor hallucis longus, and extensor digitorum longus tendons are normal. No tenosynovitis.  -Achilles: No tendinopathy or paratenonitis.    LIGAMENTS:   -Anterior talofibular ligament: Intact.   -Calcaneofibular ligament: Intact.   -Posterior talofibular ligament: Intact.  -Syndesmotic inferior tibiofibular ligaments: Intact.  -Deltoid ligament complex: Sequelae of chronic deltoid ligament sprain. No acute injury.  -Spring ligament complex: Intact.    JOINTS AND BONES:   -No evidence for acute ankle or hindfoot fracture. No calcaneus stress fracture. Tarsal bones and visualized metatarsals intact. Multifocal red marrow reconversion. Nothing definitive to suggest acute osteomyelitis.  -No advanced arthrosis in the  ankle, hindfoot or midfoot. No sizable joint effusion or significant synovitis in the ankle or hindfoot or midfoot joints.    SOFT TISSUES:  -Plantar fascia: Intact. No acute fasciitis or tear.  -Sinus tarsi and tarsal tunnel: Preserved sinus Tarsi fat signal. No space-occupying mass is seen along the course of the tarsal tunnel.  -Muscles: Severe diffuse intrinsic foot muscle atrophy with multiple muscle group muscle atrophy in the distal leg. Patchy muscle edema without significant myositis.    There is likely a wound and subcutaneous fibrosis along the posterolateral ankle soft tissues. No well-defined drainable fluid collection to suggest abscess. Distal leg and ankle soft tissue swelling.    A small complex poorly defined fluid collection or area of soft tissue edema is present within a Kager's fat pad without organized drainable abscess.      Impression    IMPRESSION:  1.  Probable wound/ulcer posterolateral right ankle with likely subdermal fibrosis along the lateral ankle and hindfoot. No well-defined drainable fluid collection to suggest abscess.  2.  No evidence for acute osteomyelitis.  3.  No finding for septic arthritis.  4.  Additional incidental findings as detailed fully above.   US Abdomen Limited w Abdomen Doppler Limited     Status: None    Narrative    EXAM: US ABDOMEN LIMITED W ABDOMEN DOPPLER LIMITED  LOCATION: [facility]  DATE: 10/27/2024    INDICATION: severe transaminitis  COMPARISON: CT 6/27/19  TECHNIQUE: Limited abdominal ultrasound. Color flow with spectral Doppler and waveform analysis performed.     FINDINGS:    GALLBLADDER: Gallstones and sludge with mild wall thickening. No pericholecystic fluid. Negative sonographic Villar's sign.     BILE DUCTS: No biliary dilatation. The common duct measures 8 mm.    LIVER: Coarsened echotexture, suggesting underlying fibrosis. Nodular contour. No focal mass.     RIGHT KIDNEY: No hydronephrosis.     PANCREAS: The pancreas is  largely obscured by overlying gas.    Small volume ascites.    ABDOMINAL DUPLEX: Very limited evaluation due to body habitus and labored breathing. The hepatic veins and main portal vein are patent with flow in the normal direction.       Impression    IMPRESSION:  1.  Coarsened hepatic echotexture with nodular contour suggestive of cirrhosis.  2.  Small volume ascites.  3.  Gallstones and gallbladder sludge with wall thickening, nonspecific in the setting of ascites.  4.  Limited Doppler evaluation to body habitus and labored breathing. The hepatic veins and main portal vein are patent.     US Lower Extremity Arterial Duplex Bilateral     Status: None    Narrative    EXAM: US LOWER EXTREMITY ARTERIAL DUPLEX BILATERAL  LOCATION: [facility]  DATE: 10/27/2024    INDICATION: Assess blood flow due to chronic nonhealing ulceration on the right ankle.  COMPARISON: None.  TECHNIQUE: Duplex utilizing 2D gray-scale imaging, Doppler interrogation with color-flow and spectral waveform analysis.    FINDINGS: There is superficial edema in the right and left lower extremity. There is slight spectral broadening in the right lower extremity vessels and left SFA. Study is slightly compromised due to body habitus.    RIGHT LOWER EXTREMITY ARTERIAL ASSESSMENT:  Common femoral artery: 119/23 cm/s  Profunda femoris artery: 129 cm/s  SFA (proximal): 129/15 cm/s  SFA (mid): 120/19 cm/s  SFA (distal): Not seen  POP Prox: 90/16 cm/s  POP Dist: 38/8 cm/s  Posterior tibial artery: 91/25 cm/s  Peron Dist: 62/12 cm/s  Anterior tibial artery: Not seen    LEFT LOWER EXTREMITY ARTERIAL ASSESSMENT:  Common femoral artery: 132/20 cm/s  Profunda femoris artery: 60 cm/s  SFA (proximal): 104/14 cm/s  SFA (mid): 105/12 cm/s  SFA (distal): Not seen  POP Prox: 48 cm/s  Posterior tibial artery: 74/16 cm/s  Peron Dist: Not seen  Anterior tibial artery: 75/11 cm/s      Impression    IMPRESSION:  1.  Right lower extremity and left SFA  spectral broadening suggest turbulence. Some of the vessels are not well assessed due to body habitus.   2.  There is bilateral lower extremity edema.   [place] Draw     Status: None    Narrative    The following orders were created for panel order [place] Draw.  Procedure                               Abnormality         Status                     ---------                               -----------         ------                     Extra Blue Top Tube[[id number]]                              Final result               Extra Red Top Tube[[id number]]                               Final result               Extra Green Top (Lithium...[[id number]]                      Final result               Extra Purple Top Tube[[id number]]                            Final result                 Please view results for these tests on the individual orders.   Extra Blue Top Tube     Status: None   Result Value Ref Range    Hold Specimen JIC    Extra Red Top Tube     Status: None   Result Value Ref Range    Hold Specimen JIC    Extra Green Top (Lithium Heparin) Tube     Status: None   Result Value Ref Range    Hold Specimen JIC    Extra Purple Top Tube     Status: None   Result Value Ref Range    Hold Specimen JIC    Basic metabolic panel (BMP)     Status: Abnormal   Result Value Ref Range    Sodium 138 135 - 145 mmol/L    Potassium 5.6 (H) 3.4 - 5.3 mmol/L    Chloride 96 (L) 98 - 107 mmol/L    Carbon Dioxide (CO2) 22 22 - 29 mmol/L    Anion Gap 20 (H) 7 - 15 mmol/L    Urea Nitrogen 52.5 (H) 8.0 - 23.0 mg/dL    Creatinine 2.91 (H) 0.51 - 0.95 mg/dL    GFR Estimate 17 (L) >60 mL/min/1.73m2    Calcium 9.6 8.8 - 10.4 mg/dL    Glucose 132 (H) 70 - 99 mg/dL   BNP     Status: Abnormal   Result Value Ref Range    N terminal Pro BNP Inpatient 43,417 (H) 0 - 900 pg/mL   Troponin T, High Sensitivity     Status: Abnormal   Result Value Ref Range    Troponin T, High Sensitivity 194 (HH) <=14 ng/L   CBC with platelets and differential     Status:  Abnormal   Result Value Ref Range    WBC Count 16.4 (H) 4.0 - 11.0 10e3/uL    RBC Count 5.17 3.80 - 5.20 10e6/uL    Hemoglobin 11.4 (L) 11.7 - 15.7 g/dL    Hematocrit 40.2 35.0 - 47.0 %    MCV 78 78 - 100 fL    MCH 22.1 (L) 26.5 - 33.0 pg    MCHC 28.4 (L) 31.5 - 36.5 g/dL    RDW 22.5 (H) 10.0 - 15.0 %    Platelet Count 212 150 - 450 10e3/uL    % Neutrophils 84 %    % Lymphocytes 2 %    % Monocytes 9 %    % Eosinophils 1 %    % Basophils 1 %    % Immature Granulocytes 3 %    NRBCs per 100 WBC 0 <1 /100    Absolute Neutrophils 13.8 (H) 1.6 - 8.3 10e3/uL    Absolute Lymphocytes 0.3 (L) 0.8 - 5.3 10e3/uL    Absolute Monocytes 1.5 (H) 0.0 - 1.3 10e3/uL    Absolute Eosinophils 0.2 0.0 - 0.7 10e3/uL    Absolute Basophils 0.1 0.0 - 0.2 10e3/uL    Absolute Immature Granulocytes 0.5 (H) <=0.4 10e3/uL    Absolute NRBCs 0.0 10e3/uL   iStat Gases (lactate) venous, POCT     Status: Abnormal   Result Value Ref Range    Lactic Acid POCT 3.4 (H) <=2.0 mmol/L    Bicarbonate Venous POCT 27 21 - 28 mmol/L    O2 Sat, Venous POCT 26 (L) 70 - 75 %    pCO2 Venous POCT 60 (H) 40 - 50 mm Hg    pH Venous POCT 7.26 (L) 7.32 - 7.43    pO2 Venous POCT 21 (L) 25 - 47 mm Hg   Troponin T, High Sensitivity     Status: Abnormal   Result Value Ref Range    Troponin T, High Sensitivity 199 (HH) <=14 ng/L   Lactic acid whole blood     Status: Abnormal   Result Value Ref Range    Lactic Acid 2.7 (H) 0.7 - 2.0 mmol/L   CBC with platelets     Status: Abnormal   Result Value Ref Range    WBC Count 13.5 (H) 4.0 - 11.0 10e3/uL    RBC Count 4.62 3.80 - 5.20 10e6/uL    Hemoglobin 10.3 (L) 11.7 - 15.7 g/dL    Hematocrit 35.2 35.0 - 47.0 %    MCV 76 (L) 78 - 100 fL    MCH 22.3 (L) 26.5 - 33.0 pg    MCHC 29.3 (L) 31.5 - 36.5 g/dL    RDW 22.0 (H) 10.0 - 15.0 %    Platelet Count 160 150 - 450 10e3/uL   Heparin Unfractionated Anti Xa Level     Status: Normal   Result Value Ref Range    Anti Xa Unfractionated Heparin <0.10 For Reference Range, See Comment IU/mL     Narrative    Therapeutic Range: UFH: 0.25-0.50 IU/mL for low intensity dosing,  0.30-0.70 IU/mL for high intensity dosing DVT and PE.  This test is not validated for other direct factor X inhibitors (e.g. rivaroxaban, apixaban, edoxaban, betrixaban, fondaparinux) and should not be used for monitoring of other medications.   Glucose by meter     Status: Abnormal   Result Value Ref Range    GLUCOSE BY METER POCT 143 (H) 70 - 99 mg/dL   Procalcitonin     Status: Abnormal   Result Value Ref Range    Procalcitonin 7.92 (HH) <0.50 ng/mL   Troponin T, High Sensitivity     Status: Abnormal   Result Value Ref Range    Troponin T, High Sensitivity 187 (HH) <=14 ng/L   Troponin T, High Sensitivity     Status: Abnormal   Result Value Ref Range    Troponin T, High Sensitivity 193 (HH) <=14 ng/L   RBC and Platelet Morphology     Status: Abnormal   Result Value Ref Range    RBC Morphology Confirmed RBC Indices     Platelet Assessment  Automated Count Confirmed. Platelet morphology is normal.     Automated Count Confirmed. Platelet morphology is normal.    Rakel Cells Moderate (A) None Seen   UA with Microscopic reflex to Culture     Status: Abnormal    Specimen: Urine, Straight Catheter   Result Value Ref Range    Color Urine Dark Yellow (A) Colorless, Straw, Light Yellow, Yellow    Appearance Urine Slightly Cloudy (A) Clear    Glucose Urine Negative Negative mg/dL    Bilirubin Urine Small (A) Negative    Ketones Urine Negative Negative mg/dL    Specific Gravity Urine 1.024 1.003 - 1.035    Blood Urine Small (A) Negative    pH Urine 5.0 5.0 - 7.0    Protein Albumin Urine 20 (A) Negative mg/dL    Urobilinogen Urine 4.0 (A) Normal, 2.0 mg/dL    Nitrite Urine Negative Negative    Leukocyte Esterase Urine Negative Negative    Mucus Urine Present (A) None Seen /LPF    RBC Urine 15 (H) <=2 /HPF    WBC Urine 6 (H) <=5 /HPF    Squamous Epithelials Urine 3 (H) <=1 /HPF    Hyaline Casts Urine 48 (H) <=2 /LPF    Narrative    Urine Culture not  indicated   Troponin T, High Sensitivity     Status: Abnormal   Result Value Ref Range    Troponin T, High Sensitivity 197 (HH) <=14 ng/L   Glucose by meter     Status: Abnormal   Result Value Ref Range    GLUCOSE BY METER POCT 153 (H) 70 - 99 mg/dL   Lactic acid whole blood     Status: Normal   Result Value Ref Range    Lactic Acid 1.9 0.7 - 2.0 mmol/L   Troponin T, High Sensitivity     Status: Abnormal   Result Value Ref Range    Troponin T, High Sensitivity 185 (HH) <=14 ng/L   Comprehensive metabolic panel     Status: Abnormal   Result Value Ref Range    Sodium 138 135 - 145 mmol/L    Potassium 5.4 (H) 3.4 - 5.3 mmol/L    Carbon Dioxide (CO2) 20 (L) 22 - 29 mmol/L    Anion Gap 23 (H) 7 - 15 mmol/L    Urea Nitrogen 57.9 (H) 8.0 - 23.0 mg/dL    Creatinine 3.01 (H) 0.51 - 0.95 mg/dL    GFR Estimate 17 (L) >60 mL/min/1.73m2    Calcium 9.5 8.8 - 10.4 mg/dL    Chloride 95 (L) 98 - 107 mmol/L    Glucose 146 (H) 70 - 99 mg/dL    Alkaline Phosphatase 118 40 - 150 U/L    AST 1,060 (HH) 0 - 45 U/L     (HH) 0 - 50 U/L    Protein Total 6.8 6.4 - 8.3 g/dL    Albumin 3.5 3.5 - 5.2 g/dL    Bilirubin Total 2.1 (H) <=1.2 mg/dL   Magnesium     Status: Abnormal   Result Value Ref Range    Magnesium 2.4 (H) 1.7 - 2.3 mg/dL   Phosphorus     Status: Abnormal   Result Value Ref Range    Phosphorus 5.4 (H) 2.5 - 4.5 mg/dL   Heparin Unfractionated Anti Xa Level     Status: Normal   Result Value Ref Range    Anti Xa Unfractionated Heparin <0.10 For Reference Range, See Comment IU/mL    Narrative    Therapeutic Range: UFH: 0.25-0.50 IU/mL for low intensity dosing,  0.30-0.70 IU/mL for high intensity dosing DVT and PE.  This test is not validated for other direct factor X inhibitors (e.g. rivaroxaban, apixaban, edoxaban, betrixaban, fondaparinux) and should not be used for monitoring of other medications.   Glucose by meter     Status: Abnormal   Result Value Ref Range    GLUCOSE BY METER POCT 153 (H) 70 - 99 mg/dL   Hemoglobin A1c      Status: Normal   Result Value Ref Range    Estimated Average Glucose 111 <117 mg/dL    Hemoglobin A1C 5.5 <5.7 %   Ketone Beta-Hydroxybutyrate Quantitative     Status: Abnormal   Result Value Ref Range    Ketone (Beta-Hydroxybutyrate) Quantitative 1.53 (HH) <=0.30 mmol/L   Comprehensive metabolic panel     Status: Abnormal   Result Value Ref Range    Sodium 137 135 - 145 mmol/L    Potassium 5.1 3.4 - 5.3 mmol/L    Carbon Dioxide (CO2) 23 22 - 29 mmol/L    Anion Gap 18 (H) 7 - 15 mmol/L    Urea Nitrogen 63.4 (H) 8.0 - 23.0 mg/dL    Creatinine 3.12 (H) 0.51 - 0.95 mg/dL    GFR Estimate 16 (L) >60 mL/min/1.73m2    Calcium 9.5 8.8 - 10.4 mg/dL    Chloride 96 (L) 98 - 107 mmol/L    Glucose 142 (H) 70 - 99 mg/dL    Alkaline Phosphatase 119 40 - 150 U/L     (HH) 0 - 45 U/L     (HH) 0 - 50 U/L    Protein Total 7.0 6.4 - 8.3 g/dL    Albumin 3.6 3.5 - 5.2 g/dL    Bilirubin Total 1.9 (H) <=1.2 mg/dL   Troponin T, High Sensitivity     Status: Abnormal   Result Value Ref Range    Troponin T, High Sensitivity 204 (HH) <=14 ng/L   Glucose by meter     Status: Abnormal   Result Value Ref Range    GLUCOSE BY METER POCT 143 (H) 70 - 99 mg/dL   Glucose by meter     Status: Abnormal   Result Value Ref Range    GLUCOSE BY METER POCT 152 (H) 70 - 99 mg/dL   Glucose by meter     Status: Abnormal   Result Value Ref Range    GLUCOSE BY METER POCT 148 (H) 70 - 99 mg/dL   Glucose by meter     Status: Abnormal   Result Value Ref Range    GLUCOSE BY METER POCT 143 (H) 70 - 99 mg/dL   Basic metabolic panel     Status: Abnormal   Result Value Ref Range    Sodium 136 135 - 145 mmol/L    Potassium 5.9 (H) 3.4 - 5.3 mmol/L    Chloride 96 (L) 98 - 107 mmol/L    Carbon Dioxide (CO2) 21 (L) 22 - 29 mmol/L    Anion Gap 19 (H) 7 - 15 mmol/L    Urea Nitrogen 63.9 (H) 8.0 - 23.0 mg/dL    Creatinine 3.13 (H) 0.51 - 0.95 mg/dL    GFR Estimate 16 (L) >60 mL/min/1.73m2    Calcium 9.7 8.8 - 10.4 mg/dL    Glucose 147 (H) 70 - 99 mg/dL   Glucose  by meter     Status: Abnormal   Result Value Ref Range    GLUCOSE BY METER POCT 128 (H) 70 - 99 mg/dL   Heparin Unfractionated Anti Xa Level     Status: Normal   Result Value Ref Range    Anti Xa Unfractionated Heparin 0.14 For Reference Range, See Comment IU/mL    Narrative    Therapeutic Range: UFH: 0.25-0.50 IU/mL for low intensity dosing,  0.30-0.70 IU/mL for high intensity dosing DVT and PE.  This test is not validated for other direct factor X inhibitors (e.g. rivaroxaban, apixaban, edoxaban, betrixaban, fondaparinux) and should not be used for monitoring of other medications.   Glucose by meter     Status: Abnormal   Result Value Ref Range    GLUCOSE BY METER POCT 138 (H) 70 - 99 mg/dL   Glucose by meter     Status: Abnormal   Result Value Ref Range    GLUCOSE BY METER POCT 148 (H) 70 - 99 mg/dL   Glucose by meter     Status: Abnormal   Result Value Ref Range    GLUCOSE BY METER POCT 140 (H) 70 - 99 mg/dL   Glucose by meter     Status: Abnormal   Result Value Ref Range    GLUCOSE BY METER POCT 131 (H) 70 - 99 mg/dL   Glucose by meter     Status: Abnormal   Result Value Ref Range    GLUCOSE BY METER POCT 155 (H) 70 - 99 mg/dL   Glucose by meter     Status: Abnormal   Result Value Ref Range    GLUCOSE BY METER POCT 182 (H) 70 - 99 mg/dL   Heparin Unfractionated Anti Xa Level     Status: Normal   Result Value Ref Range    Anti Xa Unfractionated Heparin 0.15 For Reference Range, See Comment IU/mL    Narrative    Therapeutic Range: UFH: 0.25-0.50 IU/mL for low intensity dosing,  0.30-0.70 IU/mL for high intensity dosing DVT and PE.  This test is not validated for other direct factor X inhibitors (e.g. rivaroxaban, apixaban, edoxaban, betrixaban, fondaparinux) and should not be used for monitoring of other medications.   Glucose by meter     Status: Abnormal   Result Value Ref Range    GLUCOSE BY METER POCT 160 (H) 70 - 99 mg/dL   CBC with platelets     Status: Abnormal   Result Value Ref Range    WBC Count 15.6  (H) 4.0 - 11.0 10e3/uL    RBC Count 4.61 3.80 - 5.20 10e6/uL    Hemoglobin 10.2 (L) 11.7 - 15.7 g/dL    Hematocrit 35.5 35.0 - 47.0 %    MCV 77 (L) 78 - 100 fL    MCH 22.1 (L) 26.5 - 33.0 pg    MCHC 28.7 (L) 31.5 - 36.5 g/dL    RDW 21.9 (H) 10.0 - 15.0 %    Platelet Count 120 (L) 150 - 450 10e3/uL   Comprehensive metabolic panel     Status: Abnormal   Result Value Ref Range    Sodium 134 (L) 135 - 145 mmol/L    Potassium 5.3 3.4 - 5.3 mmol/L    Carbon Dioxide (CO2) 22 22 - 29 mmol/L    Anion Gap 17 (H) 7 - 15 mmol/L    Urea Nitrogen 69.5 (H) 8.0 - 23.0 mg/dL    Creatinine 3.33 (H) 0.51 - 0.95 mg/dL    GFR Estimate 15 (L) >60 mL/min/1.73m2    Calcium 9.6 8.8 - 10.4 mg/dL    Chloride 95 (L) 98 - 107 mmol/L    Glucose 152 (H) 70 - 99 mg/dL    Alkaline Phosphatase 118 40 - 150 U/L     (H) 0 - 45 U/L     (HH) 0 - 50 U/L    Protein Total 6.8 6.4 - 8.3 g/dL    Albumin 3.5 3.5 - 5.2 g/dL    Bilirubin Total 1.3 (H) <=1.2 mg/dL   Nt probnp inpatient     Status: Abnormal   Result Value Ref Range    N terminal Pro BNP Inpatient 24,186 (H) 0 - 900 pg/mL   Glucose by meter     Status: Abnormal   Result Value Ref Range    GLUCOSE BY METER POCT 152 (H) 70 - 99 mg/dL   Glucose by meter     Status: Abnormal   Result Value Ref Range    GLUCOSE BY METER POCT 145 (H) 70 - 99 mg/dL   Glucose by meter     Status: Abnormal   Result Value Ref Range    GLUCOSE BY METER POCT 145 (H) 70 - 99 mg/dL   Glucose by meter     Status: Abnormal   Result Value Ref Range    GLUCOSE BY METER POCT 162 (H) 70 - 99 mg/dL   Glucose by meter     Status: Abnormal   Result Value Ref Range    GLUCOSE BY METER POCT 145 (H) 70 - 99 mg/dL   Glucose by meter     Status: Abnormal   Result Value Ref Range    GLUCOSE BY METER POCT 144 (H) 70 - 99 mg/dL   Glucose by meter     Status: Abnormal   Result Value Ref Range    GLUCOSE BY METER POCT 144 (H) 70 - 99 mg/dL   Heparin Unfractionated Anti Xa Level     Status: Normal   Result Value Ref Range    Anti Xa  Unfractionated Heparin 0.33 For Reference Range, See Comment IU/mL    Narrative    Therapeutic Range: UFH: 0.25-0.50 IU/mL for low intensity dosing,  0.30-0.70 IU/mL for high intensity dosing DVT and PE.  This test is not validated for other direct factor X inhibitors (e.g. rivaroxaban, apixaban, edoxaban, betrixaban, fondaparinux) and should not be used for monitoring of other medications.   Glucose by meter     Status: Abnormal   Result Value Ref Range    GLUCOSE BY METER POCT 140 (H) 70 - 99 mg/dL   Glucose by meter     Status: Abnormal   Result Value Ref Range    GLUCOSE BY METER POCT 201 (H) 70 - 99 mg/dL   Glucose by meter     Status: Abnormal   Result Value Ref Range    GLUCOSE BY METER POCT 136 (H) 70 - 99 mg/dL   Glucose by meter     Status: Abnormal   Result Value Ref Range    GLUCOSE BY METER POCT 147 (H) 70 - 99 mg/dL   Glucose by meter     Status: Abnormal   Result Value Ref Range    GLUCOSE BY METER POCT 139 (H) 70 - 99 mg/dL   EKG 12 lead     Status: None   Result Value Ref Range    Systolic Blood Pressure  mmHg    Diastolic Blood Pressure  mmHg    Ventricular Rate 167 BPM    Atrial Rate  BPM    NE Interval  ms    QRS Duration 102 ms     ms    QTc 467 ms    P Axis  degrees    R AXIS 126 degrees    T Axis -33 degrees    Interpretation ECG       Atrial fibrillation with rapid ventricular response  Right axis deviation  Incomplete right bundle branch block  Possible Right ventricular hypertrophy  ST & T wave abnormality, consider inferior ischemia  Abnormal ECG  When compared with ECG of 29-Mar-2024 16:30,  Atrial fibrillation has replaced Sinus rhythm  Vent. rate has increased by  67 bpm  T wave inversion now evident in Anterior leads  Confirmed by - EMERGENCY ROOM, PHYSICIAN ([id number]),  NATHAN JAMES (Ethel) on 10/28/2024 7:16:36 AM     EKG 12 lead     Status: None   Result Value Ref Range    Systolic Blood Pressure  mmHg    Diastolic Blood Pressure  mmHg    Ventricular Rate 82 BPM    Atrial  Rate 82 BPM    DC Interval 154 ms    QRS Duration 104 ms     ms    QTc 446 ms    P Axis 45 degrees    R AXIS 107 degrees    T Axis 18 degrees    Interpretation ECG       Sinus rhythm with Premature atrial complexes  Rightward axis  Incomplete right bundle branch block  Borderline ECG  When compared with ECG of 26-Oct-2024 17:21, (unconfirmed)  Sinus rhythm has replaced Atrial fibrillation  Vent. rate has decreased by  85 bpm  T wave inversion no longer evident in Inferior leads  T wave inversion no longer evident in Anterior leads  Confirmed by - EMERGENCY ROOM, PHYSICIAN ([id number]),  NATHAN JAMES ([id number]) on 10/28/2024 7:16:46 AM     Echocardiogram Complete     Status: None   Result Value Ref Range    LVEF  60-65%     Narrative    [id number]  [id number]  [id number]  [id number]^OSMAN^GE^CHRISTIAN     [facility]  Echocardiography Laboratory  [address] [place] [address]     Name: LOKESH MCKEON  MRN: [id number]  : 1960  Study Date: 10/27/2024 10:42 AM  Age: 64 yrs  Gender: Female  Patient [facility]  Reason For Study: Atrial Fibrillation  Ordering Physician: GE BREWER  Referring Physician: Jayy Henson  Performed By: Loulou Parra RDCS     BSA: 2.4 m2  Height: 66 in  Weight: 296 lb  HR: 83  BP: 118/62 mmHg  ______________________________________________________________________________  Procedure  Complete Portable Echo Adult.  ______________________________________________________________________________  Interpretation Summary     Left ventricular systolic function is normal. The visual ejection fraction is  60-65%.  Septal motion is consistent with conduction abnormality. Flattened septum is  consistent with RV pressure/volume overload.  The right ventricle is severely dilated. The right ventricular systolic  function is moderately reduced.  Moderately severe (3+) tricuspid regurgitation.  Pulmonary hypertension present. The right ventricular systolic pressure  is  approximated at 53mmHg plus the right atrial pressure.  Dilation of the inferior vena cava is present with abnormal respiratory  variation in diameter.  History of ASD closure device placement. No obvious interatrial shunt on color  Doppler interrogation.     This study was compared to a TTE from 4/1/2024. Global biventricular function  is stable.  ______________________________________________________________________________  Left Ventricle  The left ventricle is normal in size. There is mild concentric left  ventricular hypertrophy. Left ventricular systolic function is normal. The  visual ejection fraction is 60-65%. Left ventricular diastolic function is  indeterminate. Septal motion is consistent with conduction abnormality.  Flattened septum is consistent with RV pressure/volume overload.     Right Ventricle  The right ventricle is severely dilated. The right ventricular systolic  function is moderately reduced.     Atria  Normal left atrial size. Right atrial size is normal. History of ASD closure  device placement. No obvious interatrial shunt on color Doppler interrogation.     Mitral Valve  There is mild to moderate mitral annular calcification. There is trace to mild  mitral regurgitation.     Tricuspid Valve  There is moderately severe (3+) tricuspid regurgitation. Pulmonary  hypertension. The right ventricular systolic pressure is approximated at  53mmHg plus the right atrial pressure.     Aortic Valve  The aortic valve is trileaflet with aortic valve sclerosis.     Pulmonic Valve  The pulmonic valve is not well seen, but is grossly normal.     Vessels  The aortic root is normal size. Normal size ascending aorta. Dilation of the  inferior vena cava is present with abnormal respiratory variation in diameter.     Pericardium  There is no pericardial effusion.     ______________________________________________________________________________  MMode/2D Measurements & Calculations  IVSd: 1.3 cm  LVIDd:  4.8 cm  LVIDs: 2.8 cm  LVPWd: 1.3 cm  IVC diam: 3.7 cm  FS: 42.0 %  LV mass(C)d: 257.1 grams  LV mass(C)dI: 108.8 grams/m2     Ao root diam: 3.3 cm  LA dimension: 4.7 cm  asc Aorta Diam: 3.9 cm  LA/Ao: 1.4  LVOT diam: 2.0 cm  LVOT area: 3.2 cm2  Ao root diam index Ht(cm/m): 1.9  Ao root diam index BSA (cm/m2): 1.4  Asc Ao diam index BSA (cm/m2): 1.6  Asc Ao diam index Ht(cm/m): 2.3  LA Volume (BP): 51.2 ml  LA Volume Index (BP): 21.7 ml/m2  RV Base: 6.7 cm     RWT: 0.56  TAPSE: 2.8 cm     Doppler Measurements & Calculations  MV E max josh: 111.6 cm/sec  MV A max josh: 109.0 cm/sec  MV E/A: 1.0  MV dec time: 0.21 sec  Ao V2 max: 154.0 cm/sec  Ao max P.0 mmHg  PA acc time: 0.14 sec  TR max josh: 365.0 cm/sec  TR max P.3 mmHg  E/E' av.8  Lateral E/e': 7.9  Medial E/e': 11.7  RV S Josh: 11.0 cm/sec     ______________________________________________________________________________  Report approved by: Jazzmine Groves 10/27/2024 12:43 PM         Blood Culture Peripheral Blood     Status: Abnormal (Preliminary result)    Specimen: Peripheral Blood   Result Value Ref Range    Culture Positive on the 1st day of incubation (A)     Culture Streptococcus dysgalactiae (Group G Streptococcus) (AA)    Verigene GP Panel     Status: Abnormal    Specimen: Peripheral Blood   Result Value Ref Range    Staphylococcus species Not Detected Not Detected    Staphylococcus aureus Not Detected Not Detected    Staphylococcus epidermidis Not Detected Not Detected    Staphylococcus lugdunensis Not Detected Not Detected    Enterococcus faecalis Not Detected Not Detected    Enterococcus faecium Not Detected Not Detected    Streptococcus species Detected (A) Not Detected    Streptococcus agalactiae Not Detected Not Detected    Streptococcus anginosus group Not Detected Not Detected    Streptococcus pneumoniae Not Detected Not Detected    Streptococcus pyogenes Not Detected Not Detected    Listeria species Not Detected Not Detected     Narrative    Specimen tested with Hive guard unlimitedigene multiplex, gram-positive blood culture nucleic acid test for the following targets: Staphylococcus aureus, Staphylococcus epidermidis, Staphylococcus lugdunensis, other Staphylococcus species, Enterococcus faecalis, Enterococcus faecium, Streptococcus species, Streptococcus agalactiae, Streptococcus anginosus group, Streptococcus pneumoniae, Streptococcus pyogenes, Listeria species, mecA (methicillin resistance), and Dwayne/vanB (vancomycin resistance).   MRSA MSSA PCR, Nasal Swab     Status: Abnormal    Specimen: Nares, Bilateral; Swab   Result Value Ref Range    MRSA Target DNA Positive (A) Negative    SA Target DNA Positive     Narrative    The [employer]  Xpert SA Nasal Complete assay performed in the [employer]  Dx System is a qualitative in vitro diagnostic test designed for rapid detection of Staphylococcus aureus (SA) and methicillin-resistant Staphylococcus aureus (MRSA) from nasal swabs in patients at risk for nasal colonization. The test utilizes automated real-time polymerase chain reaction (PCR) to detect MRSA/SA DNA. The Xpert SA Nasal Complete assay is intended to aid in the prevention and control of MRSA/SA infections in healthcare settings. The assay is not intended to diagnose, guide or monitor treatment for MRSA/SA infections, or provide results of susceptibility to methicillin. A negative result does not preclude MRSA/SA nasal colonization.    Blood Culture Arm, Left     Status: Normal (Preliminary result)    Specimen: Arm, Left; Blood   Result Value Ref Range    Culture No growth after 1 day    MRSA Culture Reflex     Status: None (Preliminary result)    Specimen: Nares, Bilateral; Swab   Result Value Ref Range    Culture Culture in progress    CBC + differential     Status: Abnormal    Narrative    The following orders were created for panel order CBC + differential.  Procedure                               Abnormality         Status                      ---------                               -----------         ------                     CBC with platelets and d...[[id number]]  Abnormal            Final result               RBC and Platelet Morphology[[id number]]                                                   Please view results for these tests on the individual orders.         Medical Decision Making       MANAGEMENT DISCUSSED with the following over the past 24 hours: Hospitalist Faraz Ambrocio MD and bedside nurse FRANCISCO Baeza.    2:35 PM until 3:55 PM - 80 MINUTES SPENT BY ME on the date of service doing chart review, history, exam, documentation & further activities per the note.

## 2024-10-28 NOTE — PLAN OF CARE
Goal Outcome Evaluation:       ICU End of Shift Summary.  For vital signs and complete assessments, please see documentation flowsheets.      Pertinent assessments: A&OX4- afebrile-Tele SR-Lungs Dim bilat,on 8L oximizer- Miguel draining small amounts of Dk Brown urine-  Appetite poor- Up in chair 2X today-    GTTS-  Amiodarone             - Heparin             - Bumex             - Dobutamine    Major Shift Events: Dobutamine gtt started.                                    - One dose of dig given    Plan (Upcoming Events): Continue to monitor.  Discharge/Transfer Needs: TBD     Bedside Shift Report Completed :   Bedside Safety Check Completed:

## 2024-10-28 NOTE — TELEPHONE ENCOUNTER
LVM for pt to inquire if she had started her sildenafil yet or not.  Requested returned phone call, number given on VM.    Mandeep Russell RN on 10/28/2024 at 11:22 AM

## 2024-10-28 NOTE — CONSULTS
"SPIRITUAL HEALTH SERVICES Progress Note  ICU    Met with patient regarding Palliative Team consult request for prayer and blanket.    She states that her morales tradition is [employer].  She did not name a Yarsanism affiliation.    She expressed gratitude for the prayer blanket, which she said, \"Is very pretty.\"    Prayed with patient seated in chair; with request that God would look after her family.    No additional needs.  [facility] remains available.      Rev. Georgie Sullivan M.Div.  Staff   Phone  [phone].  "

## 2024-10-29 NOTE — PLAN OF CARE
"  RN  1100- 1500      Goal Outcome Evaluation:      Plan of Care Reviewed With: patient    Overall Patient Progress: improvingOverall Patient Progress: improving    Outcome Evaluation: Patient continues to remains very anvious with any activity. A&O, confused at times. VSS. Afebrile, hep, Bumex and Dobutamine gtts continues. Tele; NSR,  low UOP, MD aware. Very poor appetite, Plan to place a PICC this afternoon. PT, OT, Cards and Nephrology following      Notified provider about indwelling hathaway catheter discussed removal or continued need.    Did provider choose to remove indwelling hathaway catheter? NO    Provider's hathaway indication for keeping indwelling hathaway catheter: Indication for continued use: Retention    Is there an order for indwelling hathaway catheter? YES    *If there is a plan to keep hathaway catheter in place at discharge daily notification with provider is not necessary, but please add a notation in the treatment team sticky note that the patient will be discharging with the catheter.       Problem: Adult Inpatient Plan of Care  Goal: Plan of Care Review  Description: The Plan of Care Review/Shift note should be completed every shift.  The Outcome Evaluation is a brief statement about your assessment that the patient is improving, declining, or no change.  This information will be displayed automatically on your shift  note.  Outcome: Progressing  Flowsheets (Taken 10/29/2024 1314)  Outcome Evaluation:   Patient continues to remains very anvious with any activity. A&O, confused at times. VSS. Afebrile, hep, Bumex and Dobutamine gtts continues. Tele   NSR,  low UOP, MD aware. Very poor appetite, Plan to place a PICC this afternoon. PT, OT, Cards and Nephrology following  Plan of Care Reviewed With: patient  Overall Patient Progress: improving  Goal: Patient-Specific Goal (Individualized)  Description: You can add care plan individualizations to a care plan. Examples of Individualization might be:  \"Parent " "requests to be called daily at 9am for status\", \"I have a hard time hearing out of my right ear\", or \"Do not touch me to wake me up as it startles  me\".  Outcome: Progressing  Goal: Absence of Hospital-Acquired Illness or Injury  Outcome: Progressing  Intervention: Identify and Manage Fall Risk  Recent Flowsheet Documentation  Taken 10/29/2024 1230 by Neptali Singh RN  Safety Promotion/Fall Prevention:   activity supervised   clutter free environment maintained   increased rounding and observation   lighting adjusted   room organization consistent   safety round/check completed   treat reversible contributory factors   treat underlying cause  Intervention: Prevent Skin Injury  Recent Flowsheet Documentation  Taken 10/29/2024 1230 by Neptali Singh RN  Body Position:   turned   side-lying   legs elevated   heels elevated   upper extremity elevated  Intervention: Prevent and Manage VTE (Venous Thromboembolism) Risk  Recent Flowsheet Documentation  Taken 10/29/2024 1230 by Neptali Singh RN  VTE Prevention/Management: (Heparin drip) SCDs off (sequential compression devices)  Goal: Optimal Comfort and Wellbeing  Outcome: Progressing  Intervention: Provide Person-Centered Care  Recent Flowsheet Documentation  Taken 10/29/2024 1230 by Neptali Singh RN  Trust Relationship/Rapport:   care explained   choices provided   emotional support provided   empathic listening provided   questions answered   questions encouraged   reassurance provided   thoughts/feelings acknowledged  Goal: Readiness for Transition of Care  Outcome: Progressing     Problem: Comorbidity Management  Goal: Maintenance of Asthma Control  Outcome: Progressing  Intervention: Maintain Asthma Symptom Control  Recent Flowsheet Documentation  Taken 10/29/2024 1230 by Neptali Singh RN  Medication Review/Management:   medications reviewed   high-risk medications identified  Goal: Maintenance of Behavioral Health Symptom Control  Outcome: " Progressing  Intervention: Maintain Behavioral Health Symptom Control  Recent Flowsheet Documentation  Taken 10/29/2024 1230 by Neptali Singh RN  Medication Review/Management:   medications reviewed   high-risk medications identified  Goal: Maintenance of COPD Symptom Control  Outcome: Progressing  Intervention: Maintain COPD Symptom Control  Recent Flowsheet Documentation  Taken 10/29/2024 1230 by Neptali Singh RN  Medication Review/Management:   medications reviewed   high-risk medications identified  Goal: Blood Glucose Levels Within Targeted Range  Outcome: Progressing  Intervention: Monitor and Manage Glycemia  Recent Flowsheet Documentation  Taken 10/29/2024 1230 by Neptali Singh RN  Medication Review/Management:   medications reviewed   high-risk medications identified  Goal: Maintenance of Heart Failure Symptom Control  Outcome: Progressing  Intervention: Maintain Heart Failure Management  Recent Flowsheet Documentation  Taken 10/29/2024 1230 by Neptali Singh RN  Medication Review/Management:   medications reviewed   high-risk medications identified  Goal: Blood Pressure in Desired Range  Outcome: Progressing  Intervention: Maintain Blood Pressure Management  Recent Flowsheet Documentation  Taken 10/29/2024 1230 by Neptali Singh RN  Medication Review/Management:   medications reviewed   high-risk medications identified  Goal: Maintenance of Osteoarthritis Symptom Control  Outcome: Progressing  Intervention: Maintain Osteoarthritis Symptom Control  Recent Flowsheet Documentation  Taken 10/29/2024 1230 by Neptali Singh RN  Assistive Device Utilized: lift device  Activity Management: activity adjusted per tolerance  Medication Review/Management:   medications reviewed   high-risk medications identified  Goal: Bariatric Home Regimen Maintained  Outcome: Progressing  Intervention: Maintain and Manage Postbariatric Surgery Care  Recent Flowsheet Documentation  Taken 10/29/2024 1230 by  Neptali Singh RN  Medication Review/Management:   medications reviewed   high-risk medications identified  Goal: Maintenance of Seizure Control  Outcome: Progressing  Intervention: Maintain Seizure Symptom Control  Recent Flowsheet Documentation  Taken 10/29/2024 1230 by Neptali Singh RN  Sensory Stimulation Regulation:   auditory stimulation minimized   care clustered   lighting decreased   quiet environment promoted   visual stimulation minimized  Seizure Precautions:   activity supervised   clutter-free environment maintained   emergency equipment at bedside  Medication Review/Management:   medications reviewed   high-risk medications identified     Problem: Gas Exchange Impaired  Goal: Optimal Gas Exchange  Outcome: Progressing  Intervention: Optimize Oxygenation and Ventilation  Recent Flowsheet Documentation  Taken 10/29/2024 1230 by Neptali Singh RN  Airway/Ventilation Management:   airway patency maintained   calming measures promoted   pulmonary hygiene promoted  Head of Bed (HOB) Positioning: HOB at 30-45 degrees     Problem: Fall Injury Risk  Goal: Absence of Fall and Fall-Related Injury  Outcome: Progressing  Intervention: Identify and Manage Contributors  Recent Flowsheet Documentation  Taken 10/29/2024 1230 by Neptali Singh RN  Self-Care Promotion:   independence encouraged   BADL personal objects within reach  Medication Review/Management:   medications reviewed   high-risk medications identified  Intervention: Promote Injury-Free Environment  Recent Flowsheet Documentation  Taken 10/29/2024 1230 by Neptali Singh RN  Safety Promotion/Fall Prevention:   activity supervised   clutter free environment maintained   increased rounding and observation   lighting adjusted   room organization consistent   safety round/check completed   treat reversible contributory factors   treat underlying cause     Problem: Pain Acute  Goal: Optimal Pain Control and Function  Outcome:  Progressing  Intervention: Optimize Psychosocial Wellbeing  Recent Flowsheet Documentation  Taken 10/29/2024 1230 by Neptali Singh, RN  Supportive Measures:   active listening utilized   positive reinforcement provided  Intervention: Prevent or Manage Pain  Recent Flowsheet Documentation  Taken 10/29/2024 1230 by Neptali Singh, RN  Sensory Stimulation Regulation:   auditory stimulation minimized   care clustered   lighting decreased   quiet environment promoted   visual stimulation minimized  Medication Review/Management:   medications reviewed   high-risk medications identified

## 2024-10-29 NOTE — PROGRESS NOTES
Patient developed a bloody nose- MD notified- Heparin gtt stopped and Afrin spray ordered.  Bleeding stopped .  Urine output increased some, Dobutamine gtt increased to 5mcg/kg/min- will continue to monitor urine output.

## 2024-10-29 NOTE — PROGRESS NOTES
Renal Medicine Progress Note                                Linda Otero MRN# [id number]   Age: 64 year old YOB: 1960   Date of Admission: 10/26/2024 Hospital LOS: 3                  Assessment/Plan:     SONAL on CKD 3a, oliguric   Mixed shock, cardiogenic and septic   Severe pulmonary HTN, Cor pulmonale   HFpEF exacerbation   Hypervolemia   GPC bacteremia  Transaminitis (congestive vs shock liver)    Baseline Cr 1.1-1.45 (eGFR 40-50). Cr now 3 and slowly rising   hold jardiance and spironolactone      Hyperkalemia     AGMA   Lactate 3.4, improved to 1.9 with resolution of afib. Ketones 1.53. suspect multifactorial related to lactic acidosis, ketoacidosis, and SONAL. SGLT2-I held.      Afib w RVR, resolved   On presentation, symptomatic. Initial tx with dilt bolus and infusion without improvement. Switched to amio with conversion to NSR, but watching closely given abnormal LFTs.       Limited response to diuretic infusion  Dobutamine added per cards    Poor dialysis candidate given significant cardiac morbidity   GOC discussions appropriate     Reviewed with patient and son at bedside  No dialysis per patient wishes     Continue current management     Interval History:     Seen and examined  Appropriate but lethargic  Up in chair bedside     IO and UO reviewed     Discussed with ICU Manuel Rubin and Cristóbal   Reviewed with bedside RN       ROS:     GENERAL: NAD, No fever,chills  R: NEGATIVE for significant cough or SOB  CV: NEGATIVE for chest pain, palpitations  EXT: no change edema  ROS otherwise negative    Medications and Allergies:     Reviewed    Physical Exam:     Vitals were reviewed  Patient Vitals for the past 8 hrs:   BP Temp Temp src Pulse Resp SpO2   10/29/24 1100 114/62 -- -- 80 28 --   10/29/24 1000 112/59 -- -- 81 25 94 %   10/29/24 0958 112/59 -- -- 82 23 92 %   10/29/24 0900 107/68 97.6  F (36.4  C) Temporal 82 26 95 %   10/29/24 0821 -- -- -- 84 26 94 %   10/29/24 0700 127/67 -- -- 85  24 90 %   10/29/24 0630 126/68 -- -- 83 30 (!) 86 %   10/29/24 0624 114/75 -- -- 80 (!) 32 94 %   10/29/24 0530 95/71 -- -- 85 23 --   10/29/24 0500 121/63 -- -- 80 17 94 %     Wt Readings from Last 4 Encounters:   10/29/24 132.4 kg (291 lb 14.8 oz)   10/02/24 136.6 kg (301 lb 1.6 oz)   09/24/24 137.2 kg (302 lb 6.4 oz)   07/02/24 127.5 kg (281 lb)     I/O last 3 completed shifts:  In: 1441.21 [P.O.:310; I.V.:1131.21]  Out: 320 [Urine:320]    Vitals:    10/26/24 1716 10/27/24 0000 10/29/24 0430   Weight: 138.6 kg (305 lb 9.6 oz) 134.5 kg (296 lb 8.3 oz) 132.4 kg (291 lb 14.8 oz)         GENERAL: awake, alert, follows  HEENT: NC/AT, PERRLA, EOMI, non icteric, pharynx moist without lesion  RESP: basilar crackles   CV: RRR, normal S1 S2  ABDOMEN: soft, nontender, no HSM or masses and bowel sounds normal  EXT: 3+ edema to thigh/legs wrapped     Data:     Recent Labs   Lab 10/29/24  1218 10/29/24  0907 10/29/24  0555 10/29/24  0127 10/28/24  0615 10/28/24  0518 10/27/24  1903 10/27/24  1820 10/27/24  1609 10/27/24  1540   NA  --   --  135  --   --  134*  --  136  --  137   POTASSIUM  --   --  5.2  --   --  5.3  --  5.9*  --  5.1   CHLORIDE  --   --  95*  --   --  95*  --  96*  --  96*   CO2  --   --  23  --   --  22  --  21*  --  23   ANIONGAP  --   --  17*  --   --  17*  --  19*  --  18*   * 178* 191* 192*   < > 152*   < > 147*   < > 142*   BUN  --   --  78.7*  --   --  69.5*  --  63.9*  --  63.4*   CR  --   --  3.72*  --   --  3.33*  --  3.13*  --  3.12*   GFRESTIMATED  --   --  13*  --   --  15*  --  16*  --  16*   LUNA  --   --  8.9  --   --  9.6  --  9.7  --  9.5    < > = values in this interval not displayed.         Recent Labs   Lab 10/29/24  0555 10/28/24  0518 10/27/24  1820 10/27/24  1540 10/27/24  0534 10/26/24  1719   CR 3.72* 3.33* 3.13* 3.12* 3.01* 2.91*     Recent Labs   Lab 10/29/24  0555 10/28/24  0518 10/27/24  1820 10/27/24  1540 10/27/24  0534    134* 136 137 138     Recent Labs   Lab  10/29/24  0555 10/28/24  0518 10/27/24  1540 10/27/24  0534   ALBUMIN 3.5 3.5 3.6 3.5     Recent Labs   Lab 10/29/24  0555 10/28/24  0518 10/27/24  0534 10/27/24  0121 10/26/24  1719   PHOS 6.2*  --  5.4*  --   --    HGB 9.3* 10.2*  --  10.3* 11.4*         G Clement Cheung MD    [facility] - Nephrology  [phone]

## 2024-10-29 NOTE — PROGRESS NOTES
PALLIATIVE CARE PROGRESS NOTE  [facility]     Patient Name: Linda Otero  Date of Admission: 10/26/2024   Today the patient was seen for: Emotional support and Goals of care     Recommendations & Counseling     GOALS OF CARE:   Life-prolonging with limits  - Patient confirmed No CPR- Do NOT Intubate and NO DIALYSIS.   Patient request to continue other restorative efforts and when she is unable to participate in decision making her son,  Cosme Otero is aware of her request for a natural passing.       ADVANCE CARE PLANNING:  No health care directive on file. Per system policy, Surrogate Decision-makers for Patients With Diminished Decision-making Capacity offers guidance on possible decision-makers. Son, Cosme Otero has been identified as a surrogate decision maker.    There is a POLST form on file, this was reviewed and current.  Code status: No CPR- Do NOT Intubate     MEDICAL MANAGEMENT:   We are not actively managing symptoms at this time.     PSYCHOSOCIAL/SPIRITUAL SUPPORT:  Family - Son, Cosme Otero is supportive and indicates his two sisters are aware of Linda's hospitalization.   [place] community: Catholic   Appreciate input of SHIRLEY Rivera.     Palliative Care will continue to follow. Thank you for the consult and allowing us to aid in the care of Linda Otero.    These recommendations have been discussed with ICU Rounding Team including Hospitalist Faraz Ambrocio MD, Intensivist Balbir Rubin MD, Cardiologist Ten Fragoso MD and bedside nurse FRANCISCO Baeza.    JONATAN Cedillo CNS  [facility], Palliative Care  Securely message with the [employer] Web Console (learn more here) or  Text page via [facility] Paging/Directory        Assessment          Linda Otero is a 64 year old female admitted 10/26/2024 with worsening shortness of breath, atrial fibrillation with RVR, hypercapnia, hypoemia and worsening peripheral edema. Her past medical history is significant  "for HFpEF, CAD, severe pulmonary hypertension, COPD (previous tobaccoism - quit 2012), JODI, history of DVT/PE (not on anticoagulation on admission) , s/p PFO closure for CVA, and CKD.  Podiatry has seen patient regarding right ankle ulcer. Infectious Disease is following given strep species in blood cultures.Cardiology is following regarding shock, severe pulmonary hypertension, cor pulmonale, and atrial fibrillation with RVR. 10/27/2024 Echo EF 60-65%, RV dilated, moderate/severe tricuspid regurgitation and pulmonary hypertension 53 mmHg, ASD closure device without shunt. Nephrology saw patient and indicated she is a poor dialysis candidate given significant cardiac morbidity.       Interval History:     Multidisciplinary collaboration:  Appreciate input of ICU Rounding Team including Cardiology. Patient continues to have minimal urine output despite increase in norepinephrine.    Patient/family narrative  Met with Linda as she was sitting up in a chair, she acknowledged her son, Cosme at bedside, who introduced himself. Cosme offered \"I talked to the doctor. It doesn't sound good.\" I explained my role to Cosme as Linda was fidgeting with her cell phone (which was dead, despite her efforts). I asked Linda for permission to discuss her care with Cosme, which she agreed. Linda explained \"He knows what I want when the time comes.\" I asked Cosme what his mother had requested. \"I guess to let her go peaceful when it's time.\" I inquired about his family. Cosme acknowledged his siblings are aware of his mother's hospitalization.   Review of Systems:     Besides above, ROS was reviewed and is unremarkable        Physical Exam:   Temp:  [97  F (36.1  C)-98  F (36.7  C)] 97.6  F (36.4  C)  Pulse:  [77-85] 80  Resp:  [15-32] 28  BP: ()/(22-75) 114/62  FiO2 (%):  [40 %] 40 %  SpO2:  [72 %-99 %] 94 %  291 lbs 14.79 oz    Physical Exam  GEN:  Morbidly obese, chronically-ill appearing female on oxygen via nasal oximyzer. " Alert, oriented to self and situation, yet unable to provide insight regarding hospitalization, appears comfortable, no apparent distress.  HEENT:  Normocephalic/atraumatic, no scleral icterus, no nasal discharge, mouth moist.  RESP: Symmetric chest rise on inhalation noted.  Normal respiratory effort.  PAIN BEHAVIOR: Cooperative  Psych:  Normal affect.  Calm, cooperative, conversant appropriately.        Data Reviewed:     Results for orders placed or performed during the hospital encounter of 10/26/24 (from the past 24 hours)   Glucose by meter   Result Value Ref Range    GLUCOSE BY METER POCT 139 (H) 70 - 99 mg/dL   Heparin Unfractionated Anti Xa Level   Result Value Ref Range    Anti Xa Unfractionated Heparin 0.33 For Reference Range, See Comment IU/mL    Narrative    Therapeutic Range: UFH: 0.25-0.50 IU/mL for low intensity dosing,  0.30-0.70 IU/mL for high intensity dosing DVT and PE.  This test is not validated for other direct factor X inhibitors (e.g. rivaroxaban, apixaban, edoxaban, betrixaban, fondaparinux) and should not be used for monitoring of other medications.   Glucose by meter   Result Value Ref Range    GLUCOSE BY METER POCT 160 (H) 70 - 99 mg/dL   Heparin Unfractionated Anti Xa Level   Result Value Ref Range    Anti Xa Unfractionated Heparin 0.30 For Reference Range, See Comment IU/mL    Narrative    Therapeutic Range: UFH: 0.25-0.50 IU/mL for low intensity dosing,  0.30-0.70 IU/mL for high intensity dosing DVT and PE.  This test is not validated for other direct factor X inhibitors (e.g. rivaroxaban, apixaban, edoxaban, betrixaban, fondaparinux) and should not be used for monitoring of other medications.   Glucose by meter   Result Value Ref Range    GLUCOSE BY METER POCT 151 (H) 70 - 99 mg/dL   Glucose by meter   Result Value Ref Range    GLUCOSE BY METER POCT 192 (H) 70 - 99 mg/dL   CBC with platelets   Result Value Ref Range    WBC Count 9.7 4.0 - 11.0 10e3/uL    RBC Count 4.20 3.80 - 5.20  10e6/uL    Hemoglobin 9.3 (L) 11.7 - 15.7 g/dL    Hematocrit 31.7 (L) 35.0 - 47.0 %    MCV 76 (L) 78 - 100 fL    MCH 22.1 (L) 26.5 - 33.0 pg    MCHC 29.3 (L) 31.5 - 36.5 g/dL    RDW 21.2 (H) 10.0 - 15.0 %    Platelet Count 82 (L) 150 - 450 10e3/uL   Comprehensive metabolic panel   Result Value Ref Range    Sodium 135 135 - 145 mmol/L    Potassium 5.2 3.4 - 5.3 mmol/L    Carbon Dioxide (CO2) 23 22 - 29 mmol/L    Anion Gap 17 (H) 7 - 15 mmol/L    Urea Nitrogen 78.7 (H) 8.0 - 23.0 mg/dL    Creatinine 3.72 (H) 0.51 - 0.95 mg/dL    GFR Estimate 13 (L) >60 mL/min/1.73m2    Calcium 8.9 8.8 - 10.4 mg/dL    Chloride 95 (L) 98 - 107 mmol/L    Glucose 191 (H) 70 - 99 mg/dL    Alkaline Phosphatase 113 40 - 150 U/L     (H) 0 - 45 U/L     (H) 0 - 50 U/L    Protein Total 6.5 6.4 - 8.3 g/dL    Albumin 3.5 3.5 - 5.2 g/dL    Bilirubin Total 1.1 <=1.2 mg/dL   Nt probnp inpatient   Result Value Ref Range    N terminal Pro BNP Inpatient 22,813 (H) 0 - 900 pg/mL   Heparin Unfractionated Anti Xa Level   Result Value Ref Range    Anti Xa Unfractionated Heparin 0.39 For Reference Range, See Comment IU/mL    Narrative    Therapeutic Range: UFH: 0.25-0.50 IU/mL for low intensity dosing,  0.30-0.70 IU/mL for high intensity dosing DVT and PE.  This test is not validated for other direct factor X inhibitors (e.g. rivaroxaban, apixaban, edoxaban, betrixaban, fondaparinux) and should not be used for monitoring of other medications.   Magnesium   Result Value Ref Range    Magnesium 2.4 (H) 1.7 - 2.3 mg/dL   Phosphorus   Result Value Ref Range    Phosphorus 6.2 (H) 2.5 - 4.5 mg/dL   Glucose by meter   Result Value Ref Range    GLUCOSE BY METER POCT 178 (H) 70 - 99 mg/dL       MANAGEMENT DISCUSSED with the following over the past 24 hours: ICU Rounding Team Hospitalist Faraz Ambrocio MD, Intensivist Balbir Rubin MD Cardiologist Ten Fragoso MD and bedside nurse FRANCISCO Baeza.   40 MINUTES SPENT BY ME on the date of service doing chart  review, history, exam, documentation & further activities per the note.

## 2024-10-29 NOTE — PROCEDURES
[facility]    Triple Lumen PICC Placement-UNSUCCESSFUL    Date/Time: 10/29/2024 4:01 PM    Performed by: Shelby Rice RN  Authorized by: Faraz Ambrocio MD  Indications: vascular access      UNIVERSAL PROTOCOL   Site Marked: Yes  Prior Images Obtained and Reviewed:  Yes  Required items: Required blood products, implants, devices and special equipment available    Patient identity confirmed:  Verbally with patient, arm band, provided demographic data and hospital-assigned identification number  NA - No sedation, light sedation, or local anesthesia  Confirmation Checklist:  Patient's identity using two indicators, relevant allergies and procedure was appropriate and matched the consent or emergent situation  Time out: Immediately prior to the procedure a time out was called (with vini bhatt)    Universal Protocol: the Joint Commission Universal Protocol was followed    Preparation: Patient was prepped and draped in usual sterile fashion       ANESTHESIA    Local Anesthetic:  Lidocaine 1% without epinephrine      SEDATION    Patient Sedated: No        Preparation: skin prepped with ChloraPrep  Skin prep agent: skin prep agent completely dried prior to procedure  Sterile barriers: maximum sterile barriers were used: cap, mask, sterile gown, sterile gloves, and large sterile sheet  Hand hygiene: hand hygiene performed prior to central venous catheter insertion  Type of line used: PICC  Catheter type: triple lumen  Catheter size: 5 Fr  Brand: Bard  Lot number: [id number]  Placement method: venipuncture, MST and ultrasound  Number of attempts: 1  Difficulty threading catheter: yes  Successful placement: no  Comment: unable to advance and provider notified  Orientation: left  Catheter to Vein (%): 11  Location: cephalic vein  Arm circumference: adults 10 cm  Extremity circumference: 32.5  Total catheter length: 46  PROCEDURE Describe Procedure: Pt has very healthy veins in left arm, attempted  "cephalic vein, able to access on one poke without difficulty.  Advanced catheter to 15cm then met resistance, retracted fully and attempted to advance again meeting the same issue at 15 cm of catheter left.  Considered possible vasospasm of vessel and let rest for a few minutes and attempted to advance a third time still meeting resistance at 15cm.  Decided to abort after trying many techniques to get the catheter to advance.  Did not look at right arm due to patient being very anxious and unwilling to sit still, during procedure kept demanding to \"get me up\" and would move her left hand to the point of almost breaking sterile field and required a 2nd RN to physically hold her right hand and arm down under the sterile blanket.  Will discuss with provider regarding need for either a CVC or IR to place picc.  Updated bedside nurse and family   Disposal: sharps and needle count correct at the end of procedure, needles and guidewire disposed in sharps container        "

## 2024-10-29 NOTE — PROGRESS NOTES
[place] Progress Note     Ten Fragoso MD  10/29/2024         Interval History:      Overall poor urine output despite starting low-dose dobutamine yesterday.  However of late the last 3 hours she had made about 85 cc of urine which is a significant improvement compared to previously.  Continues to remain in sinus rhythm.  Creatinine worse today.  Also mental status slightly worse today compared to yesterday       Assessment and Plan:      Acute on chronic cor pulmonale with the severe reduced RV systolic function in the setting of severe pulmonary hypertension.  On sildenafil.  Was on spironolactone which has been appropriately discontinued because of worsening kidney function and hypotension.  Has been receiving Bumex drip and also Diuril with minimal increase in urine output.  Received one-time dose of IV digoxin yesterday, not a candidate for maintenance therapy due to acute on chronic renal failure.  Started on low-dose dobutamine infusion yesterday.  Earlier this morning noted to have some increase in urine output.  Recommend cautious uptitration of dobutamine to 3.5 mics per KG per minute.  She recently did A-fib with RVR so at risk of having recurrence with the inotrope.  Nephrology following.  Concern for some uremic symptoms with acute on chronic renal failure.  Overall is not felt to be a good candidate for renal replacement therapy.  Overall still quite guarded prognosis.  Shock multifactorial from underlying bacteremia sepsis and also cardiogenic with severe reduced LV systolic function.  Blood pressure did improve with dobutamine.  Newly diagnosed atrial fibrillation with rapid ventricular rate.  On amiodarone drip back in sinus.  To decrease IV fluid intake and amiodarone drip recommend changing amiodarone drip to oral amiodarone 400 mg 2 daily for 7 days and then subsequently could be 200 mg daily.  Continue heparin.  Severe pulm hypertension [facility]  Felt to be a mix of pre and postcapillary.  On sildenafil.  History of CVA status post PFO closure.  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  I am not able to see any conclusive evidence for the same on previous imaging.  Acute on chronic renal failure, creatinine worsening.  Concern for some uremic symptoms.  Nephrology following.  Bacteremia with possible sepsis  Frail status  Elevated liver enzymes likely shock liver, liver enzymes are downtrending.  Recommend continued close follow-up as she is on amiodarone also.    Recommendations  Increase dobutamine to 3.5 mcgs per KG per minute  Change IV amiodarone to p.o. 400 mg daily for 7 days.  Then subsequently to 200 mg daily  Continue heparin  Continue Bumex drip.  Overall guarded prognosis.    40 minutes of critical care time spent today.           Physical Exam:       , Blood pressure 127/67, pulse 84, temperature 97  F (36.1  C), temperature source Axillary, resp. rate 26, height 1.676 m (5' 6\"), weight 132.4 kg (291 lb 14.8 oz), SpO2 94%, not currently breastfeeding.  Vitals:    10/26/24 1716 10/27/24 0000 10/29/24 0430   Weight: 138.6 kg (305 lb 9.6 oz) 134.5 kg (296 lb 8.3 oz) 132.4 kg (291 lb 14.8 oz)     Vital Signs with Ranges  Temp:  [97  F (36.1  C)-98  F (36.7  C)] 97  F (36.1  C)  Pulse:  [70-85] 84  Resp:  [15-32] 26  BP: ()/(22-75) 127/67  FiO2 (%):  [40 %] 40 %  SpO2:  [72 %-99 %] 94 %  I/O's Last 24 hours  I/O last 3 completed shifts:  In: 1441.21 [P.O.:310; I.V.:1131.21]  Out: 320 [Urine:320]    General Patient appears in altered mental status  Cardiovascular regular, grade 2 x 6 systolic murmur at the left lower sternal border, no rub or gallop, elevated JVP  Respiratory symptoms no rhonchi or crackles  Extremities no significant edema       Medications:        Current Facility-Administered Medications   Medication Dose Route Frequency Provider Last Rate Last Admin    amiodarone (PACERONE) tablet 400 mg  400 mg Oral Daily " Ten Fragoso MD        aspirin (ASA) chewable tablet 81 mg  81 mg Oral Daily Amrik Christian MD   81 mg at 10/29/24 0859    cefTRIAXone (ROCEPHIN) 2 g vial to attach to  ml bag for ADULTS or NS 50 ml bag for PEDS  2 g Intravenous Q24H Isabelle Brown MD   2 g at 10/28/24 1157    cyanocobalamin injection 1,000 mcg  1,000 mcg Intramuscular Q30 Days Amrik Christian MD   1,000 mcg at 10/27/24 1029    fluticasone-vilanterol (BREO ELLIPTA) 200-25 MCG/ACT inhaler 1 puff  1 puff Inhalation Daily Amrik Christian MD   1 puff at 10/29/24 0827    insulin aspart (NovoLOG) injection (RAPID ACTING)  1-7 Units Subcutaneous TID AC Faraz Ambrocio MD   1 Units at 10/29/24 0908    insulin aspart (NovoLOG) injection (RAPID ACTING)  1-5 Units Subcutaneous At Bedtime Faraz Ambrocio MD        ipratropium - albuterol 0.5 mg/2.5 mg/3 mL (DUONEB) neb solution 3 mL  3 mL Nebulization BID Amrik Christian MD   3 mL at 10/29/24 0821    methylPREDNISolone sodium succinate (SOLU-MEDROL) injection 40 mg  40 mg Intravenous Q12H Luis Enrique Mondragon MD   40 mg at 10/29/24 0859    miconazole (MICATIN) 2 % powder   Topical BID Amrik Christian MD   Given at 10/29/24 0859    midodrine (PROAMATINE) tablet 10 mg  10 mg Oral TID Luis Enrique Mondragon MD   10 mg at 10/29/24 0859    polyethylene glycol (MIRALAX) Packet 17 g  17 g Oral Daily Amrik Christian MD   17 g at 10/29/24 0859    sildenafil (REVATIO) tablet 20 mg  20 mg Oral TID Amrik Christian MD   20 mg at 10/29/24 0859    thiamine (B-1) injection 200 mg  200 mg Intravenous BID Luis Enrique Mondragon MD   200 mg at 10/29/24 0859    umeclidinium (INCRUSE ELLIPTA) 62.5 MCG/ACT inhaler 1 puff  1 puff Inhalation Daily Amrik Christian MD   1 puff at 10/29/24 0827     PRN Meds:   Current Facility-Administered Medications   Medication Dose Route Frequency Provider Last Rate Last Admin    acetaminophen (TYLENOL) tablet 650 mg  650 mg Oral Q4H PRN Amrik Christian MD        albuterol  (PROVENTIL) neb solution 2.5 mg  2.5 mg Nebulization Q6H PRN Amrik Christian MD        dextrose 10% infusion   Intravenous Continuous PRN Luis Enrique Mondragon MD        glucose gel 15-30 g  15-30 g Oral Q15 Min PRN Amrik Christian MD        Or    dextrose 50 % injection 25-50 mL  25-50 mL Intravenous Q15 Min PRN Amrik Christian MD        Or    glucagon injection 1 mg  1 mg Subcutaneous Q15 Min PRN Amrik Christian MD        HYDROmorphone (DILAUDID) injection 0.2 mg  0.2 mg Intravenous Q1H PRN Luis Enrique Mondragon MD   0.2 mg at 10/27/24 0750    loperamide (IMODIUM) capsule 2 mg  2 mg Oral 4x Daily PRN Amrik Christian MD        naloxone (NARCAN) injection 0.2 mg  0.2 mg Intravenous Q2 Min PRN Amrik Christian MD        Or    naloxone (NARCAN) injection 0.4 mg  0.4 mg Intravenous Q2 Min PRN Amrik Christian MD        Or    naloxone (NARCAN) injection 0.2 mg  0.2 mg Intramuscular Q2 Min PRN Amrik Christian MD        Or    naloxone (NARCAN) injection 0.4 mg  0.4 mg Intramuscular Q2 Min PRN Amrik Christian MD        ondansetron (ZOFRAN ODT) ODT tab 4 mg  4 mg Oral Q6H PRN Amrik Christian MD        Or    ondansetron (ZOFRAN) injection 4 mg  4 mg Intravenous Q6H PRN Amrik Christian MD        oxyCODONE (ROXICODONE) tablet 5 mg  5 mg Oral Q3H PRN Luis Enrique Mondragon MD   5 mg at 10/27/24 2034    prochlorperazine (COMPAZINE) injection 10 mg  10 mg Intravenous Q6H PRN Amrik Christian MD        Or    prochlorperazine (COMPAZINE) tablet 10 mg  10 mg Oral Q6H PRN Amrik Christian MD        Or    prochlorperazine (COMPAZINE) suppository 25 mg  25 mg Rectal Q12H PRN Gino Amrik F, MD        senna-docusate (SENOKOT-S/PERICOLACE) 8.6-50 MG per tablet 2 tablet  2 tablet Oral BID Amrik Chew MD   2 tablet at 10/27/24 1024            Data:      All new lab and imaging data was reviewed.   Recent Labs   Lab Test 10/29/24  0555 10/28/24  0518 10/27/24  0121 10/26/24  1719 09/24/24  1417  "03/30/24  0612 03/29/24  1453 07/18/19  1107 06/27/19  0441   WBC 9.7 15.6* 13.5*   < > 7.6   < > 7.3   < >  --    HGB 9.3* 10.2* 10.3*   < > 11.2*   < > 12.3   < >  --    MCV 76* 77* 76*   < > 77*   < > 80   < >  --    PLT 82* 120* 160   < > 192   < > 186   < >  --    INR  --   --   --   --  1.25*  --  1.20*  --  0.98    < > = values in this interval not displayed.      Recent Labs   Lab Test 10/29/24  0907 10/29/24  0555 10/29/24  0127 10/28/24  0615 10/28/24  0518 10/27/24  1903 10/27/24  1820   NA  --  135  --   --  134*  --  136   POTASSIUM  --  5.2  --   --  5.3  --  5.9*   CHLORIDE  --  95*  --   --  95*  --  96*   CO2  --  23  --   --  22  --  21*   BUN  --  78.7*  --   --  69.5*  --  63.9*   CR  --  3.72*  --   --  3.33*  --  3.13*   ANIONGAP  --  17*  --   --  17*  --  19*   LUNA  --  8.9  --   --  9.6  --  9.7   * 191* 192*   < > 152*   < > 147*    < > = values in this interval not displayed.     No lab results found.    Invalid input(s): \"TROP\", \"TROPONINIES\"     Ten Fragoso MD  10/29/2024  Pager:  [phone]    "

## 2024-10-29 NOTE — PROGRESS NOTES
[facility]    Hospitalist Progress Note      Assessment & Plan   Linda Otero is a 64 year old female admitted on 10/26/2024. She presents with worsening of shortness of breath, she has been found with atrial fibrillation with RVR.       #Acute on chronic hypoxemic respiratory failure.  Acute on chronic right-sided heart failure.  Pulmonary HTN with cor pulmonale. Cardiogenic shock: She resides in an assisted living facility and has a very complicated past medical history.   She has had progressive SOB over last 2-3 days.  She does have chronic SOB related to her underlying severe pulmonary HTN and core pulmonale.  She follows with cardiology.  She is chronically on sildenafil therapy.  Apparently she has had similar presentations in the past.  Uses 4L O2 at baseline.    -In the emergency department she had Lasix 60 mg IV, started on BiPAP and a drip of diltiazem.  She was found to have acute renal failure.  She has been switched to amiodarone.  She was also started empirically on ceftriaxone and doxycycline.   She was started on heparin gtt for empiric treatment for possible PE given her history of DVT/PE and worsening respiratory status.  -Pt underwent TTE that showed EF 60-65% but RV severely dilated, systolic function moderately reduced with moderately severe tricuspid regurg with RV pressure at 53 mm Hg, dilation of IVC noted.    -Pt remains on oxygen support at 8L on 10/28.    -Suspect her presentation is due to her RV failure leading to acute renal failure.  She has elevation of her LFT's likely due to hepatic congestion from the same.  She is essentially in multiorgan failure as a result of severity of her RV failure.  This was discussed with the patient on 10/28.  -Working closely with cardiology, nephrology and palliative care.  -Was on norepinephrine for a bit on admission to ICU.  Discussed with cardiolgist.  Trialing bumex gtt, gave dose of digoxin 125 mcg once on 10/28, and  start dobutamine at low dose to see if it helps with renal perfusion.  Renal function still worsening on 10/29 and concern she may be developing uremic symptoms.    -Patient did give me permission to call her son, Cosme, on 10/29.  He was updated.  I did relay to him my concern of her overall prognosis and that she has end-stage right-sided heart failure with resulting multiorgan failure.  He did voiced understanding and is coming to visit her this evening.  -Will continue to discuss with cardiology and nephrology daily.  -Her overall prognosis is very guarded.  Palliative consulted.     Addendum: Notified that patient having bloody nose.  Will pause heparin.  Apply pressure.  Ordered afrin spray.  Also vascular access attempted PICC line placement but unsuccessful; if dobutamine continuing tomorrow would need central line placement.      #Strep bacteremia suspected from right ankle wound: Blood culture from admission showing strep species. Await speciation and susceptibility.  I don't think this is  of her presentation.  Podiatry evaluated and underwent MRI without deep infection or fluid collection.     -Continue ceftriaxone.  ID consulted.  -Repeat blood cultures from 10/27 still negative.  If remain negative first and if we will be able to place a PICC line given need for dobutamine drip.     #SONAL on CKD2-3: Cr currently ~3 with oliguria from baseline 1.2.  Secondary to HF as above.  Difficult situation and trending BMP daily.  Potentially looking at CRRT but overall prognosis is poor.  Renal function still worsening on 10/29.  Making minimal urine.  Nephrology consulted.      #Paroxysmal atrial fibrillation with RVR: Improved now, converted.  Continue IV amiodarone.  Cardiology consult appreciated.  Continue cardiac monitoring.    #Thrombocytopenia: Downtrending platelet count. Has been on heparing gtt but 4T score 2-3 making lower probability. Suspect due to sepsis.  Monitor for bleeding.  If platelets  below 50 will need to stop heparin gtt.      #Elevated LFT's markedly: Possible shock related or more congestion with cor pulmonale/severe pulm HTN.  Trend LFT's.      #History of DVT/PE: Not on anticoagulation baseline.  On heparin drip empirically currently.     DVT Prophylaxis: Hep gtt  Code Status: No CPR- Do NOT Intubate  Medically Ready for Discharge: Anticipated in 5+ Days.  Patient will need to remain in the ICU.    Son, Cosme updated by phone on 10/29.    Faraz Ambrocio MD    Interval History   Some intermittent confusion overnight.  Making minimal urine but some.  She has some left shoulder pain.  She did give me permission to call her son and he was updated on 10/29.    -Data reviewed today: I reviewed all new labs and imaging results over the last 24 hours. I personally reviewed     Physical Exam   Temp: 97  F (36.1  C) Temp src: Axillary BP: 127/67 Pulse: 84   Resp: 26 SpO2: 94 % O2 Device: (S) Oxymizer cannula Oxygen Delivery: 10 LPM  Vitals:    10/26/24 1716 10/27/24 0000 10/29/24 0430   Weight: 138.6 kg (305 lb 9.6 oz) 134.5 kg (296 lb 8.3 oz) 132.4 kg (291 lb 14.8 oz)     Vital Signs with Ranges  Temp:  [97  F (36.1  C)-98.6  F (37  C)] 97  F (36.1  C)  Pulse:  [70-85] 84  Resp:  [15-32] 26  BP: ()/(22-75) 127/67  FiO2 (%):  [40 %] 40 %  SpO2:  [72 %-99 %] 94 %  I/O last 3 completed shifts:  In: 1441.21 [P.O.:310; I.V.:1131.21]  Out: 320 [Urine:320]    GEN:  Chronically ill and deconditioned. NAD. Answers appropriately.   HEENT: +Elevation of JVD noted at 45 degrees. MMM.   CV:  Regular rate and rhythm. Systolic murmur noted.   LUNGS: Diminished bilaterally. Coarse at bases. No wheeze.   ABD:  Active bowel sounds, soft, non-tender, mildly distended.  No guarding/rigidity.  EXT:  +2-3 edema.  No cyanosis.  No new joint synovitis noted.  SKIN:  Dry to touch, no new exanthems noted in the visualized areas.    Medications   Current Facility-Administered Medications   Medication Dose Route Frequency  Provider Last Rate Last Admin    amiodarone (NEXTERONE) 1.8 mg/mL in sodium chloride 0.9% in non-PVC container 500 mL ADULT STANDARD infusion  0.5 mg/min Intravenous Continuous Amrik Christian MD 16.67 mL/hr at 10/29/24 0600 0.5 mg/min at 10/29/24 0600    bumetanide (BUMEX) 0.25 mg/mL infusion  0.5 mg/hr Intravenous Continuous Luis Enrique Mondragon MD 2 mL/hr at 10/29/24 0600 0.5 mg/hr at 10/29/24 0600    dextrose 10% infusion   Intravenous Continuous PRN Luis Enrique Mondragon MD        DOBUTamine (DOBUTREX) 500 mg in D5W 250 mL infusion (adult std conc)  2.5 mcg/kg/min Intravenous Continuous Faraz Ambrocio MD 10.1 mL/hr at 10/29/24 0600 2.5 mcg/kg/min at 10/29/24 0600    heparin 25,000 units in 0.45% NaCl 250 mL ANTICOAGULANT infusion  0-5,000 Units/hr Intravenous Continuous Amrik Christian MD 21 mL/hr at 10/29/24 0542 2,100 Units/hr at 10/29/24 0542    norepinephrine (LEVOPHED) 4 mg in  mL PERIPHERAL infusion  0.01-0.125 mcg/kg/min Intravenous Continuous Salinas Marr DO   Stopped at 10/27/24 1649     Current Facility-Administered Medications   Medication Dose Route Frequency Provider Last Rate Last Admin    aspirin (ASA) chewable tablet 81 mg  81 mg Oral Daily Amrik Christian MD   81 mg at 10/28/24 0833    cefTRIAXone (ROCEPHIN) 2 g vial to attach to  ml bag for ADULTS or NS 50 ml bag for PEDS  2 g Intravenous Q24H Isabelle Brown MD   2 g at 10/28/24 1157    cyanocobalamin injection 1,000 mcg  1,000 mcg Intramuscular Q30 Days Amrik Christian MD   1,000 mcg at 10/27/24 1029    fluticasone-vilanterol (BREO ELLIPTA) 200-25 MCG/ACT inhaler 1 puff  1 puff Inhalation Daily Amrik Christian MD   1 puff at 10/29/24 0827    insulin aspart (NovoLOG) injection (RAPID ACTING)  1-7 Units Subcutaneous TID AC Faraz Ambrocio MD   1 Units at 10/28/24 1610    insulin aspart (NovoLOG) injection (RAPID ACTING)  1-5 Units Subcutaneous At Bedtime Faraz Ambrocio MD        ipratropium - albuterol 0.5  mg/2.5 mg/3 mL (DUONEB) neb solution 3 mL  3 mL Nebulization BID Amrik Christian MD   3 mL at 10/29/24 0821    methylPREDNISolone sodium succinate (SOLU-MEDROL) injection 40 mg  40 mg Intravenous Q12H Luis Enrique Mondragon MD   40 mg at 10/28/24 2123    miconazole (MICATIN) 2 % powder   Topical BID Amrik Christian MD   Given at 10/28/24 2131    midodrine (PROAMATINE) tablet 10 mg  10 mg Oral TID Luis Enrique Mondragon MD   10 mg at 10/28/24 2122    polyethylene glycol (MIRALAX) Packet 17 g  17 g Oral Daily Amrik Christian MD   17 g at 10/28/24 0849    sildenafil (REVATIO) tablet 20 mg  20 mg Oral TID Amrik Christian MD   20 mg at 10/28/24 2122    thiamine (B-1) injection 200 mg  200 mg Intravenous BID Luis Enrique Mondragon MD   200 mg at 10/28/24 2123    umeclidinium (INCRUSE ELLIPTA) 62.5 MCG/ACT inhaler 1 puff  1 puff Inhalation Daily Amrik Christian MD   1 puff at 10/29/24 0827       Data   Recent Labs   Lab 10/29/24  0555 10/29/24  0127 10/28/24  2100 10/28/24  0615 10/28/24  0518 10/27/24  1903 10/27/24  1820 10/27/24  0412 10/27/24  0121   WBC 9.7  --   --   --  15.6*  --   --   --  13.5*   HGB 9.3*  --   --   --  10.2*  --   --   --  10.3*   MCV 76*  --   --   --  77*  --   --   --  76*   PLT 82*  --   --   --  120*  --   --   --  160     --   --   --  134*  --  136   < >  --    POTASSIUM 5.2  --   --   --  5.3  --  5.9*   < >  --    CHLORIDE 95*  --   --   --  95*  --  96*   < >  --    CO2 23  --   --   --  22  --  21*   < >  --    BUN 78.7*  --   --   --  69.5*  --  63.9*   < >  --    CR 3.72*  --   --   --  3.33*  --  3.13*   < >  --    ANIONGAP 17*  --   --   --  17*  --  19*   < >  --    LUNA 8.9  --   --   --  9.6  --  9.7   < >  --    * 192* 151*   < > 152*   < > 147*   < >  --    ALBUMIN 3.5  --   --   --  3.5  --   --    < >  --    PROTTOTAL 6.5  --   --   --  6.8  --   --    < >  --    BILITOTAL 1.1  --   --   --  1.3*  --   --    < >  --    ALKPHOS 113  --   --   --  118  --    --    < >  --    *  --   --   --  533*  --   --    < >  --    *  --   --   --  278*  --   --    < >  --     < > = values in this interval not displayed.       No results found for this or any previous visit (from the past 24 hours).

## 2024-10-29 NOTE — PROGRESS NOTES
"   10/29/24 5654   Appointment Info   Signing Clinician's Name / Credentials (PT) Yesenia Chaidez, WILBERT   Living Environment   People in Home facility resident   Current Living Arrangements assisted living   Home Accessibility wheelchair accessible   Self-Care   Usual Activity Tolerance moderate   Current Activity Tolerance poor   Equipment Currently Used at Home walker, rolling;wheelchair, manual   Fall history within last six months no  (per patient report)   Activity/Exercise/Self-Care Comment normally able to ambulate to meals per her report   General Information   Onset of Illness/Injury or Date of Surgery 10/26/24   Referring Physician Amrik Christian MD   Patient/Family Therapy Goals Statement (PT) not stated   Pertinent History of Current Problem (include personal factors and/or comorbidities that impact the POC) per chart: \"Linda Otero is a 64 year old female admitted on 10/26/2024. She presents with worsening of shortness of breath, she has been found with atrial fibrillation with RVR. Found to have \"Acute on chronic hypoxemic respiratory failure.  Acute on chronic right-sided heart failure.  Pulmonary HTN with cor pulmonale. Cardiogenic shock, Strep bacteremia suspected from right ankle wound:,Strep bacteremia suspected from right ankle wound, SONAL on CKD2-3,Thrombocytopenia; see medical record for further information   Existing Precautions/Restrictions fall;oxygen therapy device and L/min  (8L oxymizer)   Cognition   Orientation Status (Cognition) person  (knew month; didn't know she was in the hospital; didn't know year)   Cognitive Status Comments confused;   Pain Assessment   Patient Currently in Pain Yes, see Vital Sign flowsheet  (hypersensitive to touch)   Integumentary/Edema   Integumentary/Edema Comments Appears edematous in LE's; hypersensitive to touch; see nursing notes for further information   Posture    Posture Comments NT; patient needing to stay in bed for PICC placement   Range of Motion " (ROM)   ROM Comment UE's appear functional; unable to show active movement of LE's; hypersensitive to touch with attempts at moving LE's; very painful for patient   Strength (Manual Muscle Testing)   Strength Comments functionally weak; unable to lift legs off bed; difficulty lifting UE's toward ceiling   Bed Mobility   Comment, (Bed Mobility) NT; nurse wanting patient to remain in bed for PICC; lift dependent per nurse   Transfers   Comment, (Transfers) currently lift dependent   Gait/Stairs (Locomotion)   Comment, (Gait/Stairs) see above; not ambulatory currently; ambulatory at baseline   Balance   Balance Comments NT   Sensory Examination   Sensory Perception Comments hypersensitive to touch   Clinical Impression   Criteria for Skilled Therapeutic Intervention Yes, treatment indicated   PT Diagnosis (PT) impaired functional mobility   Influenced by the following impairments pain; weakness; decreased LE ROM; decreased activity tolerance; high O2 needs; likely impaired balance secondary to walker use at baseline; impaired cognition   Functional limitations due to impairments impaired independence with mobility and cares secondary to above deficits   Clinical Presentation (PT Evaluation Complexity) evolving   Clinical Presentation Rationale clinical judgement; level of assist   Clinical Decision Making (Complexity) moderate complexity   Planned Therapy Interventions (PT) bed mobility training;balance training;gait training;strengthening;ROM (range of motion);transfer training;progressive activity/exercise   Risk & Benefits of therapy have been explained evaluation/treatment results reviewed;care plan/treatment goals reviewed;participants included;patient   Clinical Impression Comments sigificantly below reported baseline   PT Total Evaluation Time   PT Saundra, Moderate Complexity Minutes (07216) 9   Physical Therapy Goals   PT Frequency 5x/week   PT Predicted Duration/Target Date for Goal Attainment 11/05/24   PT  Goals Bed Mobility;Transfers;Gait   PT: Bed Mobility Moderate assist;Supine to/from sit;Rolling   PT: Transfers Moderate assist;Sit to/from stand;Bed to/from chair;Assistive device   PT: Gait Moderate assist;Rolling walker;25 feet   PT Discharge Planning   PT Discharge Recommendation (DC Rec) Transitional Care Facility   PT Rationale for DC Rec Patient significantly below reported baseline level of function and would benefit from PT to address current deficits; comes from assisted living and reports being ambulatory; currently lift dependent   PT Brief overview of current status lift dependent with nursing; low level ability to participate secondary to fatigue, pain in legs   PT Equipment Needed at Discharge   (to be determined)

## 2024-10-29 NOTE — PROGRESS NOTES
A CPAP of +10 and 40% was applied to patient for overnight use. Tolerating well, skin integrity is good/intact. RT will monitor.    Pam Mayberry, RT

## 2024-10-29 NOTE — PLAN OF CARE
ICU End of Shift Summary.  For vital signs and complete assessments, please see documentation flowsheets.      Pertinent assessments: Pt very restless/anxious at the start of the shift due to back pain and requested to get up to the chair. Pain relieved for a short time when up to the chair but then was intermittent and patient had to be repositioned frequently to help relieve. Pt placed back in bed around 2330 and repositioned completely from side to side throughout shift with patient denying pain the rest of the shift. Forgetful with what appears to be intermittent confusion but patient continues to be able to answer all orientation questions. Up with the lift. Placed on Cpap around 2330 and remained on the rest of the night. Satting in the low to mid 90s on 40% Cpap  and 10L Oxymizer. Tele SR. No BM. Abdomen distended and taut - appears slightly larger then start of the shift.  Major Shift Events: 0110 - Notified Tele-Hub of continued low urine output with output being 0 the last 2 1/2 hours. No change to POC. Miguel flushed around 0200 after continued no urine output. 55 mL out after with a total of 125 mL out this shift.  Plan (Upcoming Events): Continue Bumex and Dobutamine drips and monitor urine output closley. Continue Amiodarone. Continue antibiotics per ID and await cultures. Cardio and Nephro following along with Podiatry, Palliative, PT and OT.  Discharge/Transfer Needs: TBD. Continue ICU cares at this time.     Bedside Shift Report Completed   Bedside Safety Check Completed    Goal Outcome Evaluation:      Plan of Care Reviewed With: patient    Overall Patient Progress: no changeOverall Patient Progress: no change    Outcome Evaluation: Pt anxious/restless for first half of shift, then able to get a couple 2 hour stretches of sleep. Forgetful at times but able to answer orientation questions. Urine output remains very low - MD aware.      Problem: Adult Inpatient Plan of Care  Goal: Plan of Care  "Review  Description: The Plan of Care Review/Shift note should be completed every shift.  The Outcome Evaluation is a brief statement about your assessment that the patient is improving, declining, or no change.  This information will be displayed automatically on your shift  note.  Outcome: Not Progressing  Flowsheets (Taken 10/29/2024 0544)  Outcome Evaluation: Pt anxious/restless for first half of shift, then able to get a couple 2 hour stretches of sleep. Forgetful at times but able to answer orientation questions. Urine output remains very low - MD aware.  Plan of Care Reviewed With: patient  Overall Patient Progress: no change  Goal: Patient-Specific Goal (Individualized)  Description: You can add care plan individualizations to a care plan. Examples of Individualization might be:  \"Parent requests to be called daily at 9am for status\", \"I have a hard time hearing out of my right ear\", or \"Do not touch me to wake me up as it startles  me\".  Outcome: Not Progressing  Goal: Absence of Hospital-Acquired Illness or Injury  Outcome: Not Progressing  Intervention: Identify and Manage Fall Risk  Recent Flowsheet Documentation  Taken 10/29/2024 0415 by Adilene Jenkins, RN  Safety Promotion/Fall Prevention:   activity supervised   clutter free environment maintained   increased rounding and observation   lighting adjusted   room organization consistent   safety round/check completed   treat reversible contributory factors   treat underlying cause  Taken 10/28/2024 2330 by Adilene Jenkins, RN  Safety Promotion/Fall Prevention:   activity supervised   clutter free environment maintained   increased rounding and observation   lighting adjusted   room organization consistent   safety round/check completed   treat reversible contributory factors   treat underlying cause  Taken 10/28/2024 2100 by Adilene Jenkins, RN  Safety Promotion/Fall Prevention:   activity supervised   clutter free environment maintained   " increased rounding and observation   lighting adjusted   room organization consistent   safety round/check completed   treat reversible contributory factors   treat underlying cause  Intervention: Prevent Skin Injury  Recent Flowsheet Documentation  Taken 10/29/2024 0415 by Adilene Jenkins RN  Body Position:   turned   right   side-lying  Taken 10/29/2024 0345 by Adilene Jenkins RN  Body Position: refuses positioning  Taken 10/29/2024 0130 by Adilene Jenkins RN  Body Position:   turned   left   side-lying  Taken 10/28/2024 2330 by Adilene Jenkins RN  Body Position:   turned   right   side-lying  Taken 10/28/2024 2300 by Adilene Jenkins RN  Body Position: weight shifting  Taken 10/28/2024 2230 by Adilene Jenkins RN  Body Position: weight shifting  Taken 10/28/2024 2200 by Adilene Jenkins RN  Body Position: weight shifting  Intervention: Prevent and Manage VTE (Venous Thromboembolism) Risk  Recent Flowsheet Documentation  Taken 10/29/2024 0415 by Adilene Jenkins RN  VTE Prevention/Management: (Heparin drip) SCDs off (sequential compression devices)  Taken 10/28/2024 2330 by Adilene Jenkins RN  VTE Prevention/Management: (Heparin drip) SCDs off (sequential compression devices)  Taken 10/28/2024 2100 by Adilene Jenkins RN  VTE Prevention/Management: (Heparin drip) SCDs off (sequential compression devices)  Goal: Optimal Comfort and Wellbeing  Outcome: Not Progressing  Intervention: Monitor Pain and Promote Comfort  Recent Flowsheet Documentation  Taken 10/28/2024 2330 by Adilene Jenkins RN  Pain Management Interventions: repositioned  Taken 10/28/2024 2100 by Adilene Jenkins RN  Pain Management Interventions:   care clustered   distraction   pillow support provided   quiet environment facilitated   repositioned   rest  Intervention: Provide Person-Centered Care  Recent Flowsheet Documentation  Taken 10/29/2024 0415 by Adilene Jenkins RN  Trust Relationship/Rapport:    care explained   choices provided   emotional support provided   empathic listening provided   questions answered   questions encouraged   reassurance provided   thoughts/feelings acknowledged  Taken 10/28/2024 2330 by Adilene Jenkins RN  Trust Relationship/Rapport:   care explained   choices provided   emotional support provided   empathic listening provided   questions answered   questions encouraged   reassurance provided   thoughts/feelings acknowledged  Taken 10/28/2024 2100 by Adilene Jenkins RN  Trust Relationship/Rapport:   care explained   choices provided   emotional support provided   empathic listening provided   questions answered   questions encouraged   reassurance provided   thoughts/feelings acknowledged  Goal: Readiness for Transition of Care  Outcome: Not Progressing     Problem: Comorbidity Management  Goal: Maintenance of Asthma Control  Outcome: Not Progressing  Intervention: Maintain Asthma Symptom Control  Recent Flowsheet Documentation  Taken 10/29/2024 0415 by Adilene Jenkins RN  Medication Review/Management:   medications reviewed   high-risk medications identified  Taken 10/28/2024 2330 by Adilene Jenkins RN  Medication Review/Management:   medications reviewed   high-risk medications identified  Taken 10/28/2024 2100 by Adilene Jenkins RN  Medication Review/Management:   medications reviewed   high-risk medications identified  Goal: Maintenance of Behavioral Health Symptom Control  Outcome: Not Progressing  Intervention: Maintain Behavioral Health Symptom Control  Recent Flowsheet Documentation  Taken 10/29/2024 0415 by Adilene Jenkins RN  Medication Review/Management:   medications reviewed   high-risk medications identified  Taken 10/28/2024 2330 by Adilene Jenkins RN  Medication Review/Management:   medications reviewed   high-risk medications identified  Taken 10/28/2024 2100 by Adilene Jenkins RN  Medication Review/Management:   medications reviewed    high-risk medications identified  Goal: Maintenance of COPD Symptom Control  Outcome: Not Progressing  Intervention: Maintain COPD Symptom Control  Recent Flowsheet Documentation  Taken 10/29/2024 0415 by Adilene Jenkins RN  Medication Review/Management:   medications reviewed   high-risk medications identified  Taken 10/28/2024 2330 by Adilene Jenkins RN  Medication Review/Management:   medications reviewed   high-risk medications identified  Taken 10/28/2024 2100 by Adilene Jenkins RN  Medication Review/Management:   medications reviewed   high-risk medications identified  Goal: Blood Glucose Levels Within Targeted Range  Outcome: Not Progressing  Intervention: Monitor and Manage Glycemia  Recent Flowsheet Documentation  Taken 10/29/2024 0415 by Adilene Jenkins RN  Medication Review/Management:   medications reviewed   high-risk medications identified  Taken 10/28/2024 2330 by Adilene Jenkins RN  Medication Review/Management:   medications reviewed   high-risk medications identified  Taken 10/28/2024 2100 by Adilene Jenkins RN  Medication Review/Management:   medications reviewed   high-risk medications identified  Goal: Maintenance of Heart Failure Symptom Control  Outcome: Not Progressing  Intervention: Maintain Heart Failure Management  Recent Flowsheet Documentation  Taken 10/29/2024 0415 by Adilene Jenkins RN  Medication Review/Management:   medications reviewed   high-risk medications identified  Taken 10/28/2024 2330 by Adilene Jenkins RN  Medication Review/Management:   medications reviewed   high-risk medications identified  Taken 10/28/2024 2100 by Adilene Jenkins RN  Medication Review/Management:   medications reviewed   high-risk medications identified  Goal: Blood Pressure in Desired Range  Outcome: Not Progressing  Intervention: Maintain Blood Pressure Management  Recent Flowsheet Documentation  Taken 10/29/2024 0415 by Adilene Jenkins RN  Medication  Review/Management:   medications reviewed   high-risk medications identified  Taken 10/28/2024 2330 by Adilene Jenkins RN  Medication Review/Management:   medications reviewed   high-risk medications identified  Taken 10/28/2024 2100 by Adilene Jenkins RN  Medication Review/Management:   medications reviewed   high-risk medications identified  Goal: Maintenance of Osteoarthritis Symptom Control  Outcome: Not Progressing  Intervention: Maintain Osteoarthritis Symptom Control  Recent Flowsheet Documentation  Taken 10/29/2024 0415 by Adilene Jenkins RN  Assistive Device Utilized: lift device  Activity Management: activity adjusted per tolerance  Medication Review/Management:   medications reviewed   high-risk medications identified  Taken 10/28/2024 2330 by Adilene Jenkins RN  Assistive Device Utilized: lift device  Activity Management: activity adjusted per tolerance  Medication Review/Management:   medications reviewed   high-risk medications identified  Taken 10/28/2024 2100 by Adilene Jenkins RN  Assistive Device Utilized: lift device  Activity Management: activity adjusted per tolerance  Medication Review/Management:   medications reviewed   high-risk medications identified  Goal: Bariatric Home Regimen Maintained  Outcome: Not Progressing  Intervention: Maintain and Manage Postbariatric Surgery Care  Recent Flowsheet Documentation  Taken 10/29/2024 0415 by Adilene Jenkins RN  Medication Review/Management:   medications reviewed   high-risk medications identified  Taken 10/28/2024 2330 by Adilene Jenkins RN  Medication Review/Management:   medications reviewed   high-risk medications identified  Taken 10/28/2024 2100 by Adilene Jenkins RN  Medication Review/Management:   medications reviewed   high-risk medications identified  Goal: Maintenance of Seizure Control  Outcome: Not Progressing  Intervention: Maintain Seizure Symptom Control  Recent Flowsheet Documentation  Taken  10/29/2024 0415 by Adilene Jenkins RN  Sensory Stimulation Regulation:   auditory stimulation minimized   care clustered   lighting decreased   quiet environment promoted   visual stimulation minimized  Seizure Precautions:   activity supervised   clutter-free environment maintained   emergency equipment at bedside  Medication Review/Management:   medications reviewed   high-risk medications identified  Taken 10/28/2024 2330 by Adilene Jenkins RN  Medication Review/Management:   medications reviewed   high-risk medications identified  Taken 10/28/2024 2100 by Adilene Jenkins RN  Medication Review/Management:   medications reviewed   high-risk medications identified     Problem: Gas Exchange Impaired  Goal: Optimal Gas Exchange  Outcome: Not Progressing  Intervention: Optimize Oxygenation and Ventilation  Recent Flowsheet Documentation  Taken 10/29/2024 0415 by Adilene Jenkins RN  Airway/Ventilation Management:   airway patency maintained   calming measures promoted   pulmonary hygiene promoted  Head of Bed (HOB) Positioning: HOB at 15 degrees  Taken 10/29/2024 0345 by Adilene Jenkins RN  Head of Bed (HOB) Positioning: HOB at 15 degrees  Taken 10/29/2024 0130 by Adilene Jenkins RN  Head of Bed (HOB) Positioning: HOB at 15 degrees  Taken 10/28/2024 2330 by Adilene Jenkins RN  Head of Bed (HOB) Positioning: HOB at 15 degrees     Problem: Fall Injury Risk  Goal: Absence of Fall and Fall-Related Injury  Outcome: Not Progressing  Intervention: Identify and Manage Contributors  Recent Flowsheet Documentation  Taken 10/29/2024 0415 by Adilene Jenkins RN  Self-Care Promotion:   independence encouraged   BADL personal objects within reach  Medication Review/Management:   medications reviewed   high-risk medications identified  Taken 10/28/2024 2330 by Adilene Jenkins RN  Medication Review/Management:   medications reviewed   high-risk medications identified  Taken 10/28/2024 2100 by Alice  Adilene Chaidez, RN  Medication Review/Management:   medications reviewed   high-risk medications identified  Intervention: Promote Injury-Free Environment  Recent Flowsheet Documentation  Taken 10/29/2024 0415 by Adilene Jenkins, RN  Safety Promotion/Fall Prevention:   activity supervised   clutter free environment maintained   increased rounding and observation   lighting adjusted   room organization consistent   safety round/check completed   treat reversible contributory factors   treat underlying cause  Taken 10/28/2024 2330 by Adilene Jenkins, RN  Safety Promotion/Fall Prevention:   activity supervised   clutter free environment maintained   increased rounding and observation   lighting adjusted   room organization consistent   safety round/check completed   treat reversible contributory factors   treat underlying cause  Taken 10/28/2024 2100 by Adilene Jenkins, RN  Safety Promotion/Fall Prevention:   activity supervised   clutter free environment maintained   increased rounding and observation   lighting adjusted   room organization consistent   safety round/check completed   treat reversible contributory factors   treat underlying cause     Problem: Pain Acute  Goal: Optimal Pain Control and Function  Outcome: Not Progressing  Intervention: Optimize Psychosocial Wellbeing  Recent Flowsheet Documentation  Taken 10/29/2024 0415 by Adilene Jenkins, RN  Supportive Measures:   active listening utilized   positive reinforcement provided  Intervention: Develop Pain Management Plan  Recent Flowsheet Documentation  Taken 10/28/2024 2330 by Adilene Jenkins RN  Pain Management Interventions: repositioned  Taken 10/28/2024 2100 by Adilene Jenkins, RN  Pain Management Interventions:   care clustered   distraction   pillow support provided   quiet environment facilitated   repositioned   rest  Intervention: Prevent or Manage Pain  Recent Flowsheet Documentation  Taken 10/29/2024 0415 by Adilene Jenkins,  RN  Sensory Stimulation Regulation:   auditory stimulation minimized   care clustered   lighting decreased   quiet environment promoted   visual stimulation minimized  Medication Review/Management:   medications reviewed   high-risk medications identified  Taken 10/28/2024 2330 by Adilene Jenkins, RN  Medication Review/Management:   medications reviewed   high-risk medications identified  Taken 10/28/2024 2100 by Adilene Jenkins, RN  Medication Review/Management:   medications reviewed   high-risk medications identified

## 2024-10-30 NOTE — PROCEDURES
[facility]    Triple Lumen PICC Placement    Date/Time: 10/30/2024 11:08 AM    Performed by: Gualberto Holt RN  Authorized by: Anatoly Romo MD  Indications: vascular access      UNIVERSAL PROTOCOL   Site Marked: Yes  Prior Images Obtained and Reviewed:  Yes  Required items: Required blood products, implants, devices and special equipment available    Patient identity confirmed:  Verbally with patient, arm band and hospital-assigned identification number  NA - No sedation, light sedation, or local anesthesia  Confirmation Checklist:  Patient's identity using two indicators, relevant allergies, procedure was appropriate and matched the consent or emergent situation and correct equipment/implants were available  Time out: Immediately prior to the procedure a time out was called    Universal Protocol: the Joint Commission Universal Protocol was followed    Preparation: Patient was prepped and draped in usual sterile fashion       ANESTHESIA    Anesthesia:  Local infiltration  Local Anesthetic:  Lidocaine 1% without epinephrine  Anesthetic Total (mL):  2      SEDATION    Patient Sedated: No        Preparation: skin prepped with ChloraPrep  Skin prep agent: skin prep agent completely dried prior to procedure  Sterile barriers: maximum sterile barriers were used: cap, mask, sterile gown, sterile gloves, and large sterile sheet  Hand hygiene: hand hygiene performed prior to central venous catheter insertion  Type of line used: PICC  Catheter type: triple lumen  Lumen type: power PICC and valved  Lumen Identification: Red, White and Gray  Catheter size: 5 Fr  Brand: Bard  Lot number: [id number]  Placement method: venipuncture, MST, ultrasound and tip navigation system  Number of attempts: 1  Difficulty threading catheter: no  Successful placement: yes  Orientation: right    Location: basilic vein (6.7mm)  Tip Location: SVC/RA Junction  Arm circumference: adults 10 cm  Extremity circumference:  39  Visible catheter length: 3  Total catheter length: 46  Dressing and securement: blood removed, chlorhexidine disc applied, sterile dressing applied, subcutaneous anchor securement system, securement device, site cleansed, blood cleaned with CHG and alcohol impregnated caps  Post procedure assessment: blood return through all ports, free fluid flow and placement verified by 3CG technology  PROCEDURE   Patient Tolerance:  Patient tolerated the procedure well with no immediate complications     PICC ok to use, verified with 3cg Tip confirmation, Pam TERRAZAS RN informed    Disposal: sharps and needle count correct at the end of procedure, needles and guidewire disposed in sharps container

## 2024-10-30 NOTE — PROGRESS NOTES
Care Management Follow Up    Length of Stay (days): 4    Concerns to be Addressed: discharge planning      Patient plan of care discussed at interdisciplinary rounds: Yes    Additional Information:  Sw received a call from Anabela at [facility] , [phone], who said that the pt receives  skilled Nursing for WOC up to three times per week.    If needed, Anabela said that they also have [facility] if the plan of care changes.    Next Steps:   Sw will continue with discharge planning and will be available as needed until discharge.      NGOC Bishop, [facility]  Inpatient Care Coordination  [facility]  [phone]

## 2024-10-30 NOTE — PROGRESS NOTES
Renal Medicine Progress Note                                Linda Otero MRN# [id number]   Age: 64 year old YOB: 1960   Date of Admission: 10/26/2024 Hospital LOS: 4                  Assessment/Plan:     SONAL on CKD 3a, oliguric   Mixed shock, cardiogenic and septic   Severe pulmonary HTN, Cor pulmonale   HFpEF exacerbation   Hypervolemia   GPC bacteremia  Transaminitis (congestive vs shock liver)    Baseline Cr 1.1-1.45 (eGFR 40-50). Cr now 3 and slowly rising   hold jardiance and spironolactone      Hyperkalemia     AGMA   Lactate 3.4, improved to 1.9 with resolution of afib. Ketones 1.53. suspect multifactorial related to lactic acidosis, ketoacidosis, and SONAL. SGLT2-I held.      Afib w RVR, resolved   On presentation, symptomatic. Initial tx with dilt bolus and infusion without improvement. Switched to amio with conversion to NSR, but watching closely given abnormal LFTs.       Limited response to diuretic infusion  Dobutamine added per cards    Poor dialysis candidate given significant cardiac morbidity   GOC discussions appropriate     Reviewed with patient and son at bedside  No dialysis per patient wishes     Continue current management   Add 25% albumin    Interval History:     Seen and examined  Appropriate but lethargic  In bed     IO and UO reviewed     Discussed with ICU Manuel Rubin and Cristóbal   Reviewed with bedside RN     Discussed with Dr. Rubin      ROS:     GENERAL: NAD, No fever,chills  R: NEGATIVE for significant cough or SOB  CV: NEGATIVE for chest pain, palpitations  EXT: no change edema  ROS otherwise negative    Medications and Allergies:     Reviewed    Physical Exam:     Vitals were reviewed  Patient Vitals for the past 8 hrs:   BP Temp Pulse Resp SpO2 Weight   10/30/24 1145 130/65 -- 85 22 98 % --   10/30/24 0900 99/50 -- 90 29 96 % --   10/30/24 0854 -- 97.1  F (36.2  C) 82 26 95 % --   10/30/24 0830 108/56 -- 86 27 95 % --   10/30/24 0800 103/51 -- 82 23 96 % --    10/30/24 0730 104/49 -- 89 25 95 % --   10/30/24 0700 105/53 -- 80 13 96 % --   10/30/24 0600 106/48 -- 82 17 95 % 138.7 kg (305 lb 12.5 oz)   10/30/24 0530 (!) 89/50 -- 86 (!) 35 95 % --   10/30/24 0500 113/43 -- 81 23 94 % --   10/30/24 0430 106/78 -- 82 14 99 % --     Wt Readings from Last 4 Encounters:   10/30/24 138.7 kg (305 lb 12.5 oz)   10/02/24 136.6 kg (301 lb 1.6 oz)   09/24/24 137.2 kg (302 lb 6.4 oz)   07/02/24 127.5 kg (281 lb)     I/O last 3 completed shifts:  In: 845.83 [P.O.:150; I.V.:695.83]  Out: 575 [Urine:575]    Vitals:    10/26/24 1716 10/27/24 0000 10/29/24 0430 10/30/24 0600   Weight: 138.6 kg (305 lb 9.6 oz) 134.5 kg (296 lb 8.3 oz) 132.4 kg (291 lb 14.8 oz) 138.7 kg (305 lb 12.5 oz)         GENERAL: awake, alert, follows  HEENT: NC/AT, PERRLA, EOMI, non icteric, pharynx moist without lesion  RESP: basilar crackles   CV: RRR, normal S1 S2  ABDOMEN: soft, nontender, no HSM or masses and bowel sounds normal  EXT: 3+ edema to thigh/legs wrapped     Data:     Recent Labs   Lab 10/30/24  0742 10/30/24  0535 10/30/24  0259 10/29/24  2109 10/29/24  0907 10/29/24  0555 10/28/24  0615 10/28/24  0518 10/27/24  1903 10/27/24  1820   NA  --  135  --   --   --  135  --  134*  --  136   POTASSIUM  --  5.3  --   --   --  5.2  --  5.3  --  5.9*   CHLORIDE  --  95*  --   --   --  95*  --  95*  --  96*   CO2  --  23  --   --   --  23  --  22  --  21*   ANIONGAP  --  17*  --   --   --  17*  --  17*  --  19*   * 157* 152* 163*   < > 191*   < > 152*   < > 147*   BUN  --  87.7*  --   --   --  78.7*  --  69.5*  --  63.9*   CR  --  4.03*  --   --   --  3.72*  --  3.33*  --  3.13*   GFRESTIMATED  --  12*  --   --   --  13*  --  15*  --  16*   LUNA  --  9.3  --   --   --  8.9  --  9.6  --  9.7    < > = values in this interval not displayed.         Recent Labs   Lab 10/30/24  0535 10/29/24  0555 10/28/24  0518 10/27/24  1820 10/27/24  1540 10/27/24  0534 10/26/24  1719   CR 4.03* 3.72* 3.33* 3.13* 3.12*  3.01* 2.91*     Recent Labs   Lab 10/30/24  0535 10/29/24  0555 10/28/24  0518 10/27/24  1820 10/27/24  1540    135 134* 136 137     Recent Labs   Lab 10/30/24  0535 10/29/24  0555 10/28/24  0518 10/27/24  1540   ALBUMIN 3.7 3.5 3.5 3.6     Recent Labs   Lab 10/30/24  0535 10/29/24  0555 10/28/24  0518 10/27/24  0534 10/27/24  0121   PHOS 7.1* 6.2*  --  5.4*  --    HGB 9.2* 9.3* 10.2*  --  10.3*         G Clement Cheung MD    [facility] - Nephrology  [phone]

## 2024-10-30 NOTE — PROGRESS NOTES
[facility]    Hospitalist Progress Note      Assessment & Plan   Linda Otero is a 64 year old female admitted on 10/26/2024. She presents with worsening of shortness of breath, she has been found with atrial fibrillation with RVR.       #Acute on chronic hypoxemic respiratory failure.  Acute on chronic right-sided heart failure.  Pulmonary HTN with cor pulmonale. Cardiogenic shock:   She resides in an assisted living facility and has a very complicated past medical history.   She has had progressive SOB over last 2-3 days prior to admission.  She does have chronic SOB related to her underlying severe pulmonary HTN and core pulmonale.  She follows with cardiology.  She is chronically on sildenafil therapy.  Apparently she has had similar presentations in the past.  Uses 4L O2 at baseline.  In the emergency department she had Lasix 60 mg IV, started on BiPAP and a drip of diltiazem.  She was found to have acute renal failure.  She has been switched to amiodarone.  She was also started empirically on ceftriaxone and doxycycline.   She was started on heparin gtt for empiric treatment for possible PE given her history of DVT/PE and worsening respiratory status.  -Pt underwent TTE that showed EF 60-65% but RV severely dilated, systolic function moderately reduced with moderately severe tricuspid regurg with RV pressure at 53 mm Hg, dilation of IVC noted.    -Pt remains on oxygen support at 8L on 10/28.    -Suspect her presentation is due to her RV failure leading to acute renal failure.  She has elevation of her LFT's likely due to hepatic congestion from the same.  She is essentially in multiorgan failure as a result of severity of her RV failure.  This was discussed with the patient on 10/28.  -Working closely with cardiology, nephrology and palliative care. Despite our efforts her creatinine continues to climb up to 4.03 and BNP is nearly 48519  -Was on norepinephrine for a bit on admission  to ICU.  Discussed with cardiolgist.  Trialing bumex gtt, gave dose of digoxin 125 mcg once on 10/28, and start dobutamine at low dose to see if it helps with renal perfusion.  Renal function still worsening on 10/29 and concern she may be developing uremic symptoms.    -Patient did give me permission to call her son, Cosme, on 10/29.  He was updated.  My partner did relay to him our concern of her overall prognosis and that she has end-stage right-sided heart failure with resulting multiorgan failure.    -Her overall prognosis is poor.  Patient overall appears to be dying.  Palliative consulted.      Access  PICC placed 10/30     #Strep bacteremia suspected from right ankle wound: Blood culture from admission showing strep species. Await speciation and susceptibility.  I don't think this is  of her presentation.  Podiatry evaluated and underwent MRI without deep infection or fluid collection.    Continue ceftriaxone.  ID consulted.  Repeat blood cultures from 10/27 still negative.        #acute renal failure due to ATN, cardiorenal on CKD2-3: Cr currently ~over 4 with oliguria from baseline 1.2.  Secondary to HF as above.  Difficult situation and trending BMP daily.  Potentially looking at CRRT but overall prognosis is poor.  Given need for pressors support not a good dialysis candidate.  Making minimal urine.  Nephrology consulted.      #Paroxysmal atrial fibrillation with RVR:resolved now, converted.  Continue IV amiodarone.  Cardiology consult appreciated.  Continue cardiac monitoring.    #Thrombocytopenia: Downtrending platelet count. Has been on heparing gtt but 4T score 2-3 making lower probability. Suspect due to sepsis.  Monitor for bleeding.  If platelets below 50 will need to stop heparin gtt.      #Elevated LFT's markedly: Possible shock related or more congestion with cor pulmonale/severe pulm HTN.  Trend LFT's.      #History of DVT/PE: Not on anticoagulation baseline.  On heparin drip empirically  currently.    ADVANCE CARE PLANNING, failure to thrive:  Needs to have a conversation about our goals.  She is not a HD candidate and she would not want it.  She is DNR/I but is continue to worsen even with our best efforts. She appears to be dying.  Would recommend focusing on comfort and stopping our aggressive management. Awaiting for son to arrive   Addendum: son and daughter in law arrived and daughter is on her way in.  Patient continues to be critically ill and is dying.  Patient is having ongoing uremic symptoms with confusion, headache, metalic taste.  She wants to be comfortable.  Spoke for 30 minutes.  Will make comfort care at this point once the daughter arrives.  Updated palliative care and nursing too     DVT Prophylaxis: Hep gtt  Code Status: No CPR- Do NOT Intubate  Medically Ready for Discharge: Anticipated in 2-4 Days.  Patient will need to remain in the ICU. I think she is dying and may not be long until she passes    Discussed with nursing, care team on rounds.  Reviewed notes from cardiology, nephrology    Anatoly Romo MD    Interval History   Patient is new to me.  Has had ongoing  intermittent confusion and seems more uremic.        -Data reviewed today: I reviewed all new labs and imaging results over the last 24 hours. I personally reviewed     Physical Exam   Temp: 97.4  F (36.3  C) Temp src: Temporal BP: (!) 144/70 Pulse: 93   Resp: 25 SpO2: 93 % O2 Device: Oxymask Oxygen Delivery: 15 LPM  Vitals:    10/27/24 0000 10/29/24 0430 10/30/24 0600   Weight: 134.5 kg (296 lb 8.3 oz) 132.4 kg (291 lb 14.8 oz) 138.7 kg (305 lb 12.5 oz)     Vital Signs with Ranges  Temp:  [96.9  F (36.1  C)-97.4  F (36.3  C)] 97.4  F (36.3  C)  Pulse:  [80-94] 93  Resp:  [8-48] 25  BP: ()/() 144/70  FiO2 (%):  [40 %-60 %] 40 %  SpO2:  [80 %-100 %] 93 %  I/O last 3 completed shifts:  In: 640.09 [I.V.:540.09]  Out: 630 [Urine:630]    GEN:  Chronically ill and deconditioned. NAD. Answers  appropriately.   HEENT: +Elevation of JVD . MMM. Wearing face mask  CV:  Regular rate and rhythm. Systolic murmur noted.   LUNGS: Diminished bilaterally. Coarse at bases. No wheeze. Poor air movemen   ABD:  Active bowel sounds, soft, non-tender, mildly distended.  No guarding/rigidity.  EXT:  +2-3 edema.  No cyanosis.  No new joint synovitis noted.  SKIN:  Dry to touch, no new exanthems noted in the visualized areas.    Medications   Current Facility-Administered Medications   Medication Dose Route Frequency Provider Last Rate Last Admin    bumetanide (BUMEX) 0.25 mg/mL infusion  1 mg/hr Intravenous Continuous Balbir Rubin MD 4 mL/hr at 10/30/24 1441 1 mg/hr at 10/30/24 1441    dextrose 10% infusion   Intravenous Continuous PRN Luis Enrique Mondragon MD        DOBUTamine (DOBUTREX) 500 mg in D5W 250 mL infusion (adult std conc)  5 mcg/kg/min Intravenous Continuous Ten Fragoso MD 19.9 mL/hr at 10/30/24 1200 5 mcg/kg/min at 10/30/24 1200    [Held by provider] heparin 25,000 units in 0.45% NaCl 250 mL ANTICOAGULANT infusion  0-5,000 Units/hr Intravenous Continuous Amrik Christian MD   Stopped at 10/29/24 1800    norepinephrine (LEVOPHED) 4 mg in  mL PERIPHERAL infusion  0.01-0.125 mcg/kg/min Intravenous Continuous Salinas Marr DO   Stopped at 10/27/24 1649     Current Facility-Administered Medications   Medication Dose Route Frequency Provider Last Rate Last Admin    albumin human 25 % injection 25 g  25 g Intravenous Q6H CINTHIA Cheung MD   25 g at 10/30/24 1324    amiodarone (PACERONE) tablet 400 mg  400 mg Oral Daily Ten Fragoso MD   400 mg at 10/30/24 0858    aspirin (ASA) chewable tablet 81 mg  81 mg Oral Daily Amrik Christian MD   81 mg at 10/30/24 0858    cefTRIAXone (ROCEPHIN) 2 g vial to attach to  ml bag for ADULTS or NS 50 ml bag for PEDS  2 g Intravenous Q24H Isabelle Brown MD   2 g at 10/30/24 1138    cyanocobalamin injection 1,000 mcg  1,000 mcg  Intramuscular Q30 Days Amrik Christian MD   1,000 mcg at 10/27/24 1029    fluticasone-vilanterol (BREO ELLIPTA) 200-25 MCG/ACT inhaler 1 puff  1 puff Inhalation Daily Amrik Christian MD   1 puff at 10/29/24 0827    insulin aspart (NovoLOG) injection (RAPID ACTING)  1-7 Units Subcutaneous TID AC Faraz Ambrocio MD   1 Units at 10/30/24 0831    insulin aspart (NovoLOG) injection (RAPID ACTING)  1-5 Units Subcutaneous At Bedtime Faraz Ambrocio MD        ipratropium - albuterol 0.5 mg/2.5 mg/3 mL (DUONEB) neb solution 3 mL  3 mL Nebulization BID Amrik Christian MD   3 mL at 10/30/24 0816    metolazone (ZAROXOLYN) tablet 10 mg  10 mg Oral BID Balbir Rubin MD        miconazole (MICATIN) 2 % powder   Topical BID Amrik Christian MD   Given at 10/30/24 0858    midodrine (PROAMATINE) tablet 10 mg  10 mg Oral TID Luis Enrique Mondragon MD   10 mg at 10/30/24 0858    oxymetazoline (AFRIN) 0.05 % spray 2 spray  2 spray Both Nostrils BID Faraz Ambrocio MD   2 spray at 10/29/24 2104    polyethylene glycol (MIRALAX) Packet 17 g  17 g Oral Daily Amrik Christian MD   17 g at 10/29/24 0859    sildenafil (REVATIO) tablet 20 mg  20 mg Oral TID Amrik Christian MD   20 mg at 10/29/24 1625    sodium chloride (PF) 0.9% PF flush 10-40 mL  10-40 mL Intracatheter Q7 Days Anatoly Romo MD   10 mL at 10/30/24 1216    thiamine (B-1) injection 200 mg  200 mg Intravenous BID Luis Enrique Mondragon MD   200 mg at 10/30/24 0858    umeclidinium (INCRUSE ELLIPTA) 62.5 MCG/ACT inhaler 1 puff  1 puff Inhalation Daily Amrik Christian MD   1 puff at 10/29/24 0827       Data   Recent Labs   Lab 10/30/24  0742 10/30/24  0535 10/30/24  0259 10/29/24  0907 10/29/24  0555 10/28/24  0615 10/28/24  0518   WBC  --  8.8  --   --  9.7  --  15.6*   HGB  --  9.2*  --   --  9.3*  --  10.2*   MCV  --  77*  --   --  76*  --  77*   PLT  --  77*  --   --  82*  --  120*   NA  --  135  --   --  135  --  134*   POTASSIUM  --  5.3  --   --  5.2  --  5.3    CHLORIDE  --  95*  --   --  95*  --  95*   CO2  --  23  --   --  23  --  22   BUN  --  87.7*  --   --  78.7*  --  69.5*   CR  --  4.03*  --   --  3.72*  --  3.33*   ANIONGAP  --  17*  --   --  17*  --  17*   LUNA  --  9.3  --   --  8.9  --  9.6   * 157* 152*   < > 191*   < > 152*   ALBUMIN  --  3.7  --   --  3.5  --  3.5   PROTTOTAL  --  7.1  --   --  6.5  --  6.8   BILITOTAL  --  1.0  --   --  1.1  --  1.3*   ALKPHOS  --  115  --   --  113  --  118   ALT  --  376*  --   --  416*  --  533*   AST  --  142*  --   --  195*  --  278*    < > = values in this interval not displayed.       No results found for this or any previous visit (from the past 24 hours).

## 2024-10-30 NOTE — PLAN OF CARE
ICU End of Shift Summary. For vital signs, labs, and complete assessment please see Documentation Flowsheet    Pertinent Assessment:  Able to answer some orientation questions  Tele: SR/ST, murmer detected  Lungs dim, on 15LPM oxymask when not on Cpap  Obese w/ non-distended abd. Has small BM. Poor appetite, had 1 small bite of apple sauce this AM  Oliguric to aneuric later in shift  Wound on perineum, bruised throughout L abd/hip/thigh, bruised upper L arm, wound on R calf/ankle  Major Shift Events:  Picc placed at 1130  PRN's given for comfort/pain/anxiety  Increased dose of bumex  Comfort cares at ~1700  All drips stopped, O2 dropped to 2LPM, monitoring equipment removed  Family at bedside   Discharge/Transfer needs: TBD    Bedside shift Report Completed: Y  Bedside Safety Check Completed: Y  ------------------------------------------  Notified provider about indwelling hathaway catheter discussed removal or continued need.    Did provider choose to remove indwelling hathaway catheter? NO    Provider's hathaway indication for keeping indwelling hathaway catheter: Indication for continued use: End of Live    Is there an order for indwelling hathaway catheter? YES    *If there is a plan to keep hathaway catheter in place at discharge daily notification with provider is not necessary, but please add a notation in the treatment team sticky note that the patient will be discharging with the catheter.     Problem: Adult Inpatient Plan of Care  Goal: Plan of Care Review  Description: The Plan of Care Review/Shift note should be completed every shift.  The Outcome Evaluation is a brief statement about your assessment that the patient is improving, declining, or no change.  This information will be displayed automatically on your shift  note.  Outcome: Not Progressing  Flowsheets (Taken 10/30/2024 1757)  Outcome Evaluation: See note  Plan of Care Reviewed With: patient  Overall Patient Progress: declining  Goal: Patient-Specific Goal  "(Individualized)  Description: You can add care plan individualizations to a care plan. Examples of Individualization might be:  \"Parent requests to be called daily at 9am for status\", \"I have a hard time hearing out of my right ear\", or \"Do not touch me to wake me up as it startles  me\".  Outcome: Not Progressing  Goal: Absence of Hospital-Acquired Illness or Injury  Outcome: Not Progressing  Intervention: Identify and Manage Fall Risk  Recent Flowsheet Documentation  Taken 10/30/2024 1200 by Pam Lopez RN  Safety Promotion/Fall Prevention:   activity supervised   room near nurse's station  Taken 10/30/2024 0800 by Pam Lopez RN  Safety Promotion/Fall Prevention:   activity supervised   room near nurse's station  Intervention: Prevent Skin Injury  Recent Flowsheet Documentation  Taken 10/30/2024 1440 by Pam Lopez RN  Body Position:   turned   left   side-lying  Taken 10/30/2024 1100 by Pam Lopez RN  Body Position: (for picc placement)   weight shifting   turned   left  Taken 10/30/2024 0816 by Pam Lopez RN  Body Position: (rolled side to side in bed for re-tucking linen) weight shifting  Intervention: Prevent and Manage VTE (Venous Thromboembolism) Risk  Recent Flowsheet Documentation  Taken 10/30/2024 1200 by Pam Lopez RN  VTE Prevention/Management: SCDs off (sequential compression devices)  Taken 10/30/2024 0800 by Pam Lopez RN  VTE Prevention/Management: SCDs off (sequential compression devices)  Goal: Optimal Comfort and Wellbeing  Outcome: Not Progressing  Intervention: Provide Person-Centered Care  Recent Flowsheet Documentation  Taken 10/30/2024 1200 by Pam Lopez RN  Trust Relationship/Rapport:   care explained   emotional support provided  Taken 10/30/2024 0800 by Pam Lopez RN  Trust Relationship/Rapport:   care explained   emotional support provided  Goal: Readiness for Transition of Care  Outcome: Not " Progressing   Goal Outcome Evaluation:      Plan of Care Reviewed With: patient    Overall Patient Progress: decliningOverall Patient Progress: declining    Outcome Evaluation: See note

## 2024-10-30 NOTE — PROGRESS NOTES
"[facility] Progress Note     Ten Fragoso MD  10/30/2024         Interval History:      No significant increase in urine output despite increase in dobutamine dose.  Creatinine worse, BUN worse.  Patient more lethargic and altered mental status.  DNR/DNI.  Nephrology following and patient is not a candidate for dialysis.       Assessment and Plan:      Acute on chronic cor pulmonale with the severe reduced RV systolic function in the setting of severe pulmonary hypertension.  On sildenafil.  Continues to have poor urine output despite IV Bumex drip as well as escalation of dobutamine.  Shock multifactorial from underlying bacteremia sepsis and also cardiogenic with severe reduced LV systolic function.  Blood pressure did improve with dobutamine.  Newly diagnosed atrial fibrillation with rapid ventricular rate.  On amiodarone.  Now in sinus.  Severe pulm hypertension being followed by pulmonary hypertension clinic at [facility].  [place] to be a mix of pre and postcapillary.  On sildenafil.  History of CVA status post PFO closure.  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  I am not able to see any conclusive evidence for the same on previous imaging.  Acute on chronic renal failure, creatinine worsening.  Concern for some uremic symptoms.  Nephrology following.  Not a candidate for dialysis.  Palliative  Bacteremia with possible sepsis  Frail status  Elevated liver enzymes likely shock liver, liver enzymes downtrending.  DNR/DNI    Recommendations  Very guarded prognosis given worsening kidney function and patient not a candidate for renal replacement therapy  Agree with palliative involvement.    Discussed with ICU team.  Cardiology will sign off.  Please feel free to call with any questions.  30 minutes of critical care time spent.       Physical Exam:       , Blood pressure 99/50, pulse 90, temperature 97.1  F (36.2  C), resp. rate 29, height 1.676 m (5' 6\"), weight 138.7 kg (305 lb 12.5 oz), SpO2 96%, not " currently breastfeeding.  Vitals:    10/27/24 0000 10/29/24 0430 10/30/24 0600   Weight: 134.5 kg (296 lb 8.3 oz) 132.4 kg (291 lb 14.8 oz) 138.7 kg (305 lb 12.5 oz)     Vital Signs with Ranges  Temp:  [96.9  F (36.1  C)-97.1  F (36.2  C)] 97.1  F (36.2  C)  Pulse:  [80-94] 90  Resp:  [8-48] 29  BP: ()/() 99/50  FiO2 (%):  [40 %-60 %] 40 %  SpO2:  [80 %-100 %] 96 %  I/O's Last 24 hours  I/O last 3 completed shifts:  In: 845.83 [P.O.:150; I.V.:695.83]  Out: 575 [Urine:575]    General Patient appears lethargic  Cardiovascular system S1-S2 normal grade 2/6 systolic murmur at the left lower sternal border, no S3-S4 rub or gallop  Respiratory system decreased air entry slightly at the bases but no wheezing or clear-cut crackles       Medications:        Current Facility-Administered Medications   Medication Dose Route Frequency Provider Last Rate Last Admin    amiodarone (PACERONE) tablet 400 mg  400 mg Oral Daily Ten Fragoso MD   400 mg at 10/30/24 0858    aspirin (ASA) chewable tablet 81 mg  81 mg Oral Daily Amrik Christian MD   81 mg at 10/30/24 0858    cefTRIAXone (ROCEPHIN) 2 g vial to attach to  ml bag for ADULTS or NS 50 ml bag for PEDS  2 g Intravenous Q24H Isabelle Brown MD   2 g at 10/29/24 1108    cyanocobalamin injection 1,000 mcg  1,000 mcg Intramuscular Q30 Days Amrik Christian MD   1,000 mcg at 10/27/24 1029    fluticasone-vilanterol (BREO ELLIPTA) 200-25 MCG/ACT inhaler 1 puff  1 puff Inhalation Daily Amrik Christian MD   1 puff at 10/29/24 0827    insulin aspart (NovoLOG) injection (RAPID ACTING)  1-7 Units Subcutaneous TID AC Faraz Ambrocio MD   1 Units at 10/30/24 0831    insulin aspart (NovoLOG) injection (RAPID ACTING)  1-5 Units Subcutaneous At Bedtime Faraz Ambrocio MD        ipratropium - albuterol 0.5 mg/2.5 mg/3 mL (DUONEB) neb solution 3 mL  3 mL Nebulization BID Amrik Christian MD   3 mL at 10/30/24 0816    miconazole (MICATIN) 2 % powder   Topical BID Amrik Christian  MD CHRISTIAN   Given at 10/30/24 0858    midodrine (PROAMATINE) tablet 10 mg  10 mg Oral TID Luis Enrique Mondragon MD   10 mg at 10/30/24 0858    oxymetazoline (AFRIN) 0.05 % spray 2 spray  2 spray Both Nostrils BID Faraz Ambrocio MD   2 spray at 10/29/24 2104    polyethylene glycol (MIRALAX) Packet 17 g  17 g Oral Daily Amrik Christian MD   17 g at 10/29/24 0859    sildenafil (REVATIO) tablet 20 mg  20 mg Oral TID Amrik Christian MD   20 mg at 10/29/24 1625    thiamine (B-1) injection 200 mg  200 mg Intravenous BID Luis Enrique Mondragon MD   200 mg at 10/30/24 0858    umeclidinium (INCRUSE ELLIPTA) 62.5 MCG/ACT inhaler 1 puff  1 puff Inhalation Daily Amrik Christian MD   1 puff at 10/29/24 0827     PRN Meds:   Current Facility-Administered Medications   Medication Dose Route Frequency Provider Last Rate Last Admin    acetaminophen (TYLENOL) tablet 650 mg  650 mg Oral Q4H PRN Amrik Christian MD        albuterol (PROVENTIL) neb solution 2.5 mg  2.5 mg Nebulization Q6H PRN Amrik Christian MD        dextrose 10% infusion   Intravenous Continuous PRN Luis Enrique Mondragon MD        glucose gel 15-30 g  15-30 g Oral Q15 Min PRN Amrik Christian MD        Or    dextrose 50 % injection 25-50 mL  25-50 mL Intravenous Q15 Min PRN Amrik Christian MD        Or    glucagon injection 1 mg  1 mg Subcutaneous Q15 Min PRN Amrik Christian MD        HYDROmorphone (DILAUDID) injection 0.2 mg  0.2 mg Intravenous Q1H PRN Luis Enrique Mondragon MD   0.2 mg at 10/30/24 1005    lidocaine (LMX4) cream   Topical Q1H PRN Faraz Ambrocio MD        lidocaine 1 % 0.1-5 mL  0.1-5 mL Other Q1H PRN Faraz Ambrocio MD        loperamide (IMODIUM) capsule 2 mg  2 mg Oral 4x Daily PRN Amrik Christian MD        naloxone (NARCAN) injection 0.2 mg  0.2 mg Intravenous Q2 Min PRN Amrik Christian MD        Or    naloxone (NARCAN) injection 0.4 mg  0.4 mg Intravenous Q2 Min PRN Amrik Christian MD        Or    naloxone (NARCAN) injection 0.2 mg  0.2  mg Intramuscular Q2 Min PRN Amrik Christian MD        Or    naloxone (NARCAN) injection 0.4 mg  0.4 mg Intramuscular Q2 Min PRN Amrik Christian MD        ondansetron (ZOFRAN ODT) ODT tab 4 mg  4 mg Oral Q6H PRN Amrik Christian MD        Or    ondansetron (ZOFRAN) injection 4 mg  4 mg Intravenous Q6H PRN Amrik Christian MD        oxyCODONE (ROXICODONE) tablet 5 mg  5 mg Oral Q3H PRN Luis Enrique Mondragon MD   5 mg at 10/27/24 2034    prochlorperazine (COMPAZINE) injection 10 mg  10 mg Intravenous Q6H PRN Amrik Christian MD        Or    prochlorperazine (COMPAZINE) tablet 10 mg  10 mg Oral Q6H PRN Amrik Christian MD        Or    prochlorperazine (COMPAZINE) suppository 25 mg  25 mg Rectal Q12H PRN Amrik Christian MD        senna-docusate (SENOKOT-S/PERICOLACE) 8.6-50 MG per tablet 2 tablet  2 tablet Oral BID PRN Amrik Christian MD   2 tablet at 10/27/24 1024    sodium chloride (PF) 0.9% PF flush 10-40 mL  10-40 mL Intracatheter Once PRN Faraz Ambrocio MD                Data:      All new lab and imaging data was reviewed.   Recent Labs   Lab Test 10/30/24  0535 10/29/24  0555 10/28/24  0518 10/26/24  1719 09/24/24  1417 03/30/24  0612 03/29/24  1453 07/18/19  1107 06/27/19  0441   WBC 8.8 9.7 15.6*   < > 7.6   < > 7.3   < >  --    HGB 9.2* 9.3* 10.2*   < > 11.2*   < > 12.3   < >  --    MCV 77* 76* 77*   < > 77*   < > 80   < >  --    PLT 77* 82* 120*   < > 192   < > 186   < >  --    INR  --   --   --   --  1.25*  --  1.20*  --  0.98    < > = values in this interval not displayed.      Recent Labs   Lab Test 10/30/24  0742 10/30/24  0535 10/30/24  0259 10/29/24  0907 10/29/24  0555 10/28/24  0615 10/28/24  0518   NA  --  135  --   --  135  --  134*   POTASSIUM  --  5.3  --   --  5.2  --  5.3   CHLORIDE  --  95*  --   --  95*  --  95*   CO2  --  23  --   --  23  --  22   BUN  --  87.7*  --   --  78.7*  --  69.5*   CR  --  4.03*  --   --  3.72*  --  3.33*   ANIONGAP  --  17*  --   --  17*  --  17*   LUNA  --  " 9.3  --   --  8.9  --  9.6   * 157* 152*   < > 191*   < > 152*    < > = values in this interval not displayed.     No lab results found.    Invalid input(s): \"TROP\", \"TROPONINIES\"     eTn Fragoso MD  10/30/2024  Pager:  [phone]    "

## 2024-10-30 NOTE — PLAN OF CARE
"/48   Pulse 82   Temp 96.9  F (36.1  C) (Axillary)   Resp 17   Ht 1.676 m (5' 6\")   Wt 138.7 kg (305 lb 12.5 oz)   SpO2 95%   BMI 49.35 kg/m      General: Alert and Decision making.   Neuro: AxO to self and time / date  Resp: Lungs diminished on CPAP 40% FiO2; desats to 70s when CPAP removed for 2-3 minutes  Cards: Tele SR peaked T  GI: No stools, denies pain, denies n/v/d  : hathaway, 30-50 ml output average Q2  Mobility: Baseline walker / wheelchair, turns self in bed w/ encouragement Lift  Skin: Red abdominal folds, abrasion in abd fold on left side above hip, Micantin applied, mepilex x2 on R ankle open sore weeping. (L) arm hematoma, wrapped in coban  Diet: Regular as tolerated, poor appetite, refused PO liquids, refused food    L/D - PIV x3  Infusing Bumex and Dobutamine    Plan: Diurese, monitor edema, weight is up from previous. Mentation appeared worsened, creatinine worsened, K increased. Encourage pt to participate as she refused meds and cares and is pulling at L/Ds    Goal Outcome Evaluation:      Plan of Care Reviewed With: patient    Overall Patient Progress: decliningOverall Patient Progress: declining    Outcome Evaluation: Worsened mentation, oriented to self and date / year, refused medications despite many attempts, desats to mid 70s off CPAP, Creatinine worsening to 4.3, K at 5.3, pt not participate in cares, pulling at lines, removing O2, requires sitter      Problem: Adult Inpatient Plan of Care  Goal: Plan of Care Review  Description: The Plan of Care Review/Shift note should be completed every shift.  The Outcome Evaluation is a brief statement about your assessment that the patient is improving, declining, or no change.  This information will be displayed automatically on your shift  note.  Outcome: Progressing  Flowsheets (Taken 10/30/2024 0636)  Outcome Evaluation: Worsened mentation, oriented to self and date / year, refused medications despite many attempts, desats to mid " "70s off CPAP, Creatinine worsening to 4.3, K at 5.3, pt not participate in cares, pulling at lines, removing O2, requires sitter  Plan of Care Reviewed With: patient  Overall Patient Progress: declining  Goal: Patient-Specific Goal (Individualized)  Description: You can add care plan individualizations to a care plan. Examples of Individualization might be:  \"Parent requests to be called daily at 9am for status\", \"I have a hard time hearing out of my right ear\", or \"Do not touch me to wake me up as it startles  me\".  Outcome: Progressing  Goal: Absence of Hospital-Acquired Illness or Injury  Outcome: Progressing  Intervention: Identify and Manage Fall Risk  Recent Flowsheet Documentation  Taken 10/30/2024 0429 by Yuliya Piña RN  Safety Promotion/Fall Prevention:   activity supervised   clutter free environment maintained   increased rounding and observation   lighting adjusted   room organization consistent   safety round/check completed   treat reversible contributory factors   treat underlying cause  Taken 10/30/2024 0000 by Yuliya Piña RN  Safety Promotion/Fall Prevention:   activity supervised   clutter free environment maintained   increased rounding and observation   lighting adjusted   room organization consistent   safety round/check completed   treat reversible contributory factors   treat underlying cause  Taken 10/29/2024 2045 by Yuliya Piña RN  Safety Promotion/Fall Prevention:   activity supervised   clutter free environment maintained   increased rounding and observation   lighting adjusted   room organization consistent   safety round/check completed   treat reversible contributory factors   treat underlying cause  Intervention: Prevent Skin Injury  Recent Flowsheet Documentation  Taken 10/30/2024 0515 by Yuliya Piña RN  Body Position:   turned   left  Taken 10/30/2024 0500 by Yuliya Piña RN  Body Position: supine  Taken 10/30/2024 0429 by Yuliya Piña RN  Body Position:   " turned   left   side-lying  Taken 10/29/2024 2345 by Yuliya Piña RN  Body Position:   weight shifting   position changed independently  Taken 10/29/2024 2100 by Yuliya Piña RN  Body Position:   weight shifting   position changed independently   turned   left  Taken 10/29/2024 2045 by Yuliya Piña RN  Body Position: weight shifting  Intervention: Prevent and Manage VTE (Venous Thromboembolism) Risk  Recent Flowsheet Documentation  Taken 10/30/2024 0429 by Yuliya Piña RN  VTE Prevention/Management: compression stockings on  Taken 10/30/2024 0000 by Yuliya Piña RN  VTE Prevention/Management: compression stockings on  Taken 10/29/2024 2045 by Yuliya Piña RN  VTE Prevention/Management: compression stockings on  Goal: Optimal Comfort and Wellbeing  Outcome: Progressing  Intervention: Monitor Pain and Promote Comfort  Recent Flowsheet Documentation  Taken 10/29/2024 2244 by Yuliya Piña RN  Pain Management Interventions: medication (see MAR)  Taken 10/29/2024 2000 by Yuliya Piña RN  Pain Management Interventions:   distraction   medication offered but refused  Intervention: Provide Person-Centered Care  Recent Flowsheet Documentation  Taken 10/30/2024 0429 by Yuliya Piña RN  Trust Relationship/Rapport:   care explained   choices provided   emotional support provided   empathic listening provided   questions answered   questions encouraged   reassurance provided   thoughts/feelings acknowledged  Taken 10/30/2024 0000 by Yuliya Piña RN  Trust Relationship/Rapport:   care explained   choices provided   emotional support provided   empathic listening provided   questions answered   questions encouraged   reassurance provided   thoughts/feelings acknowledged  Taken 10/29/2024 2045 by Yuliya Piña RN  Trust Relationship/Rapport:   care explained   choices provided   emotional support provided   empathic listening provided   questions answered   questions encouraged    reassurance provided   thoughts/feelings acknowledged  Goal: Readiness for Transition of Care  Outcome: Progressing     Problem: Comorbidity Management  Goal: Maintenance of Asthma Control  Outcome: Progressing  Intervention: Maintain Asthma Symptom Control  Recent Flowsheet Documentation  Taken 10/30/2024 0429 by Yuliya Piña RN  Medication Review/Management:   medications reviewed   high-risk medications identified  Taken 10/30/2024 0000 by Yuliya Piña RN  Medication Review/Management:   medications reviewed   high-risk medications identified  Taken 10/29/2024 2045 by Yuliya Piña RN  Medication Review/Management:   medications reviewed   high-risk medications identified  Goal: Maintenance of Behavioral Health Symptom Control  Outcome: Progressing  Intervention: Maintain Behavioral Health Symptom Control  Recent Flowsheet Documentation  Taken 10/30/2024 0429 by Yuliya Piña RN  Medication Review/Management:   medications reviewed   high-risk medications identified  Taken 10/30/2024 0000 by Yuliya Piña RN  Medication Review/Management:   medications reviewed   high-risk medications identified  Taken 10/29/2024 2045 by Yuliya Piña RN  Medication Review/Management:   medications reviewed   high-risk medications identified  Goal: Maintenance of COPD Symptom Control  Outcome: Progressing  Intervention: Maintain COPD Symptom Control  Recent Flowsheet Documentation  Taken 10/30/2024 0429 by Yuliya Piña RN  Medication Review/Management:   medications reviewed   high-risk medications identified  Taken 10/30/2024 0000 by Yuliya Piña RN  Medication Review/Management:   medications reviewed   high-risk medications identified  Taken 10/29/2024 2045 by Yuliya Piña RN  Medication Review/Management:   medications reviewed   high-risk medications identified  Goal: Blood Glucose Levels Within Targeted Range  Outcome: Progressing  Intervention: Monitor and Manage Glycemia  Recent Flowsheet  Documentation  Taken 10/30/2024 0429 by Yuliya Piña RN  Medication Review/Management:   medications reviewed   high-risk medications identified  Taken 10/30/2024 0000 by Yuliya Piña RN  Medication Review/Management:   medications reviewed   high-risk medications identified  Taken 10/29/2024 2045 by Yuliya Piña RN  Medication Review/Management:   medications reviewed   high-risk medications identified  Goal: Maintenance of Heart Failure Symptom Control  Outcome: Progressing  Intervention: Maintain Heart Failure Management  Recent Flowsheet Documentation  Taken 10/30/2024 0429 by Yuliya Piña RN  Medication Review/Management:   medications reviewed   high-risk medications identified  Taken 10/30/2024 0000 by Yuliya Piña RN  Medication Review/Management:   medications reviewed   high-risk medications identified  Taken 10/29/2024 2045 by Yuliya Piña RN  Medication Review/Management:   medications reviewed   high-risk medications identified  Goal: Blood Pressure in Desired Range  Outcome: Progressing  Intervention: Maintain Blood Pressure Management  Recent Flowsheet Documentation  Taken 10/30/2024 0429 by Yuliya Piña RN  Medication Review/Management:   medications reviewed   high-risk medications identified  Taken 10/30/2024 0000 by Yuliya Piña RN  Medication Review/Management:   medications reviewed   high-risk medications identified  Taken 10/29/2024 2045 by Yuliya Piña RN  Medication Review/Management:   medications reviewed   high-risk medications identified  Goal: Maintenance of Osteoarthritis Symptom Control  Outcome: Progressing  Intervention: Maintain Osteoarthritis Symptom Control  Recent Flowsheet Documentation  Taken 10/30/2024 0429 by Yuliya Piña RN  Assistive Device Utilized: lift device  Activity Management: activity adjusted per tolerance  Medication Review/Management:   medications reviewed   high-risk medications identified  Taken 10/30/2024 0000 by  Yuliya Piña RN  Assistive Device Utilized: lift device  Activity Management: activity adjusted per tolerance  Medication Review/Management:   medications reviewed   high-risk medications identified  Taken 10/29/2024 2045 by Yuliya Piña RN  Assistive Device Utilized: lift device  Activity Management: activity adjusted per tolerance  Medication Review/Management:   medications reviewed   high-risk medications identified  Goal: Bariatric Home Regimen Maintained  Outcome: Progressing  Intervention: Maintain and Manage Postbariatric Surgery Care  Recent Flowsheet Documentation  Taken 10/30/2024 0429 by Yuliya Piña RN  Medication Review/Management:   medications reviewed   high-risk medications identified  Taken 10/30/2024 0000 by Yuliya Piña RN  Medication Review/Management:   medications reviewed   high-risk medications identified  Taken 10/29/2024 2045 by Yuliya Piña RN  Medication Review/Management:   medications reviewed   high-risk medications identified  Goal: Maintenance of Seizure Control  Outcome: Progressing  Intervention: Maintain Seizure Symptom Control  Recent Flowsheet Documentation  Taken 10/30/2024 0429 by Yuliya Piña RN  Sensory Stimulation Regulation:   auditory stimulation minimized   care clustered   lighting decreased   quiet environment promoted   visual stimulation minimized  Seizure Precautions:   activity supervised   clutter-free environment maintained   emergency equipment at bedside  Medication Review/Management:   medications reviewed   high-risk medications identified  Taken 10/30/2024 0000 by Yuliya Piña, RN  Sensory Stimulation Regulation:   auditory stimulation minimized   care clustered   lighting decreased   quiet environment promoted   visual stimulation minimized  Seizure Precautions:   activity supervised   clutter-free environment maintained   emergency equipment at bedside  Medication Review/Management:   medications reviewed   high-risk  medications identified  Taken 10/29/2024 2045 by Yuliya Piña RN  Sensory Stimulation Regulation:   auditory stimulation minimized   care clustered   lighting decreased   quiet environment promoted   visual stimulation minimized  Seizure Precautions:   activity supervised   clutter-free environment maintained   emergency equipment at bedside  Medication Review/Management:   medications reviewed   high-risk medications identified     Problem: Gas Exchange Impaired  Goal: Optimal Gas Exchange  Outcome: Progressing  Intervention: Optimize Oxygenation and Ventilation  Recent Flowsheet Documentation  Taken 10/30/2024 0515 by Yuliya Piña RN  Head of Bed (HOB) Positioning: HOB at 20-30 degrees  Taken 10/30/2024 0429 by Yuliya Piña RN  Airway/Ventilation Management:   airway patency maintained   calming measures promoted   pulmonary hygiene promoted  Head of Bed (HOB) Positioning: HOB at 20-30 degrees  Taken 10/30/2024 0000 by Yuliya Piña RN  Airway/Ventilation Management:   airway patency maintained   calming measures promoted   pulmonary hygiene promoted  Taken 10/29/2024 2345 by Yuliya Piña RN  Head of Bed (HOB) Positioning: HOB at 30-45 degrees  Taken 10/29/2024 2100 by Yuliya Piña RN  Head of Bed (HOB) Positioning: HOB at 45 degrees  Taken 10/29/2024 2045 by Yuliya Piña RN  Airway/Ventilation Management:   airway patency maintained   calming measures promoted   pulmonary hygiene promoted  Head of Bed (HOB) Positioning: HOB at 30-45 degrees     Problem: Fall Injury Risk  Goal: Absence of Fall and Fall-Related Injury  Outcome: Progressing  Intervention: Identify and Manage Contributors  Recent Flowsheet Documentation  Taken 10/30/2024 0429 by Yuliya Piña RN  Self-Care Promotion:   independence encouraged   BADL personal objects within reach  Medication Review/Management:   medications reviewed   high-risk medications identified  Taken 10/30/2024 0000 by Yuliya Piña  RN  Self-Care Promotion:   independence encouraged   BADL personal objects within reach  Medication Review/Management:   medications reviewed   high-risk medications identified  Taken 10/29/2024 2045 by Yuliya Piña RN  Self-Care Promotion:   independence encouraged   BADL personal objects within reach  Medication Review/Management:   medications reviewed   high-risk medications identified  Intervention: Promote Injury-Free Environment  Recent Flowsheet Documentation  Taken 10/30/2024 0429 by Yuliya Piña RN  Safety Promotion/Fall Prevention:   activity supervised   clutter free environment maintained   increased rounding and observation   lighting adjusted   room organization consistent   safety round/check completed   treat reversible contributory factors   treat underlying cause  Taken 10/30/2024 0000 by Yuliya Piña RN  Safety Promotion/Fall Prevention:   activity supervised   clutter free environment maintained   increased rounding and observation   lighting adjusted   room organization consistent   safety round/check completed   treat reversible contributory factors   treat underlying cause  Taken 10/29/2024 2045 by Yuliya Piña RN  Safety Promotion/Fall Prevention:   activity supervised   clutter free environment maintained   increased rounding and observation   lighting adjusted   room organization consistent   safety round/check completed   treat reversible contributory factors   treat underlying cause     Problem: Pain Acute  Goal: Optimal Pain Control and Function  Outcome: Progressing  Intervention: Optimize Psychosocial Wellbeing  Recent Flowsheet Documentation  Taken 10/30/2024 0429 by Yuliya Piña RN  Supportive Measures:   active listening utilized   positive reinforcement provided  Taken 10/30/2024 0000 by Yuliya Piña RN  Supportive Measures:   active listening utilized   positive reinforcement provided  Taken 10/29/2024 2045 by Yuliya Piña RN  Supportive Measures:    active listening utilized   positive reinforcement provided  Intervention: Develop Pain Management Plan  Recent Flowsheet Documentation  Taken 10/29/2024 2244 by Yuliya Piña, RN  Pain Management Interventions: medication (see MAR)  Taken 10/29/2024 2000 by Yuliya Piña, RN  Pain Management Interventions:   distraction   medication offered but refused  Intervention: Prevent or Manage Pain  Recent Flowsheet Documentation  Taken 10/30/2024 0429 by Yuliya Piña, RN  Sensory Stimulation Regulation:   auditory stimulation minimized   care clustered   lighting decreased   quiet environment promoted   visual stimulation minimized  Medication Review/Management:   medications reviewed   high-risk medications identified  Taken 10/30/2024 0000 by Yuliya Piña, RN  Sensory Stimulation Regulation:   auditory stimulation minimized   care clustered   lighting decreased   quiet environment promoted   visual stimulation minimized  Medication Review/Management:   medications reviewed   high-risk medications identified  Taken 10/29/2024 2045 by Yuliya Piña, RN  Sensory Stimulation Regulation:   auditory stimulation minimized   care clustered   lighting decreased   quiet environment promoted   visual stimulation minimized  Medication Review/Management:   medications reviewed   high-risk medications identified

## 2024-10-30 NOTE — PROGRESS NOTES
A CPAP of +10 and 50% was applied to patient for overnight use. Tolerating well, skin integrity is good/intact. RT will monitor.    Pam Mayberry RT

## 2024-10-31 NOTE — CONSULTS
arrived to room full of family, welcomed sharing about patient, prayed and sang with the family. Son noticed that she had stopped breathing during the prayer.  remained available as family processed their grief with one another.

## 2024-10-31 NOTE — DISCHARGE SUMMARY
DEATH SUMMARY    [facility]    Death Summary - Hospitalist Service     Date of Admission:  10/26/2024  Date of Death: 10/30/2024  8:20 PM  Discharging Provider: Anatoly Romo MD  Primary Care Physician   Jayy Henson  Discharge Diagnoses   Acute on chronic hypoxic respiratory failure  Acute on chronic right-sided heart failure  Pulmonary hypertension with cor pulmonale  Cardiogenic shock  Streptococcus bacteremia  Acute renal failure due to [facility]  Cardiorenal syndrome  Chronic kidney disease  Paroxysmal atrial fibrillation  Thrombocytopenia  Elevated LFTs  Advance care planning   Failure to thrive      CAUSE OF DEATH: Right-sided heart failure, cardiogenic shock, cor pulmonale, cardiorenal syndrome, ATN    Hospital Course   Linda Otero is a 64 year old female admitted on 10/26/2024. She presents with worsening of shortness of breath, she has been found with atrial fibrillation with RVR.       #Acute on chronic hypoxemic respiratory failure.  Acute on chronic right-sided heart failure.  Pulmonary HTN with cor pulmonale. Cardiogenic shock:   She resides in an assisted living facility and has a very complicated past medical history.   She has had progressive SOB over last 2-3 days prior to admission.  She does have chronic SOB related to her underlying severe pulmonary HTN and core pulmonale.  She follows with cardiology.  She is chronically on sildenafil therapy.  Apparently she has had similar presentations in the past.  Uses 4L O2 at baseline.  In the emergency department she had Lasix 60 mg IV, started on BiPAP and a drip of diltiazem.  She was found to have acute renal failure.  She has been switched to amiodarone.  She was also started empirically on ceftriaxone and doxycycline.   She was started on heparin gtt for empiric treatment for possible PE given her history of DVT/PE and worsening respiratory status.  -Pt underwent TTE that showed EF 60-65% but RV severely dilated,  systolic function moderately reduced with moderately severe tricuspid regurg with RV pressure at 53 mm Hg, dilation of IVC noted.    -Pt remains on oxygen support at 8L on 10/28.    -Suspect her presentation is due to her RV failure leading to acute renal failure.  She has elevation of her LFT's likely due to hepatic congestion from the same.  She is essentially in multiorgan failure as a result of severity of her RV failure.  This was discussed with the patient on 10/28.  -Working closely with cardiology, nephrology and palliative care. Despite our efforts her creatinine continues to climb up to 4.03 and BNP is nearly 50150  -Was on norepinephrine for a bit on admission to ICU.  Discussed with cardiolgist.  Trialing bumex gtt, gave dose of digoxin 125 mcg once on 10/28, and start dobutamine at low dose to see if it helps with renal perfusion.  Renal function still worsening on 10/29 and concern she may be developing uremic symptoms.    -Patient did give me permission to call her son, Cosme, on 10/29.  He was updated.  My partner did relay to him our concern of her overall prognosis and that she has end-stage right-sided heart failure with resulting multiorgan failure.    -Her overall prognosis is poor.  Patient overall appears to be dying.  Palliative consulted.   -On 10/30/2024 had a family meeting with the patient's son bedside along with the patient who want to focus on her comfort.  Patient had withdrawal of cares and passed away peacefully later that day        #Strep bacteremia suspected from right ankle wound: Blood culture from admission showing strep species. Await speciation and susceptibility.  I don't think this is  of her presentation.  Podiatry evaluated and underwent MRI without deep infection or fluid collection.    Was treated with ceftriaxone.  ID consulted.  Repeat blood cultures from 10/27 still negative.        #acute renal failure due to ATN, cardiorenal on CKD2-3: Cr currently ~over 4  with oliguria from baseline 1.2.  Secondary to HF as above.  Difficult situation and trending BMP daily.  Potentially looking at CRRT but overall prognosis is poor.  Given need for pressors support not a good dialysis candidate.  Making minimal urine.  Nephrology consulted.  Unfortunately patient continued to get worse.  Eventually cares were withdrawn and patient was made comfort cares.   patient passed away 10/30/2024     #Paroxysmal atrial fibrillation with RVR:resolved now, converted.  Was treated with amiodarone.  Cardiology consult appreciated.       #Elevated LFT's markedly: Possible shock related or more congestion with cor pulmonale/severe pulm HTN.  Trended LFT's.      #History of DVT/PE: Not on anticoagulation baseline.  On heparin drip empirically currently.     ADVANCE CARE PLANNING, failure to thrive:  Son and daughter in law arrived and daughter is on her way in.  Patient continues to be critically ill and is dying.  Patient was having ongoing uremic symptoms with confusion, headache, metalic taste.  She wanted to be comfortable.  Patient was made comfort cares and passed away peacefully 10/30/2024         This document was created using voice recognition technology.  Please excuse any typographical errors that may have occurred.  Please call with any questions.       Anatoly Romo MD  [facility]  ______________________________________________________________________      Significant Results and Procedures   Most Recent 3 CBC's:  Recent Labs   Lab Test 10/30/24  0535 10/29/24  0555 10/28/24  0518   WBC 8.8 9.7 15.6*   HGB 9.2* 9.3* 10.2*   MCV 77* 76* 77*   PLT 77* 82* 120*     Most Recent 3 BMP's:  Recent Labs   Lab Test 10/30/24  1619 10/30/24  1215 10/30/24  0742 10/30/24  0535 10/29/24  0907 10/29/24  0555 10/28/24  0615 10/28/24  0518   NA  --   --   --  135  --  135  --  134*   POTASSIUM  --   --   --  5.3  --  5.2  --  5.3   CHLORIDE  --   --   --  95*  --  95*  --   95*   CO2  --   --   --  23  --  23  --  22   BUN  --   --   --  87.7*  --  78.7*  --  69.5*   CR  --   --   --  4.03*  --  3.72*  --  3.33*   ANIONGAP  --   --   --  17*  --  17*  --  17*   LUNA  --   --   --  9.3  --  8.9  --  9.6   * 138* 151* 157*   < > 191*   < > 152*    < > = values in this interval not displayed.     Most Recent 2 LFT's:  Recent Labs   Lab Test 10/30/24  0535 10/29/24  0555   * 195*   * 416*   ALKPHOS 115 113   BILITOTAL 1.0 1.1   ,   Results for orders placed or performed during the hospital encounter of 10/26/24   XR Chest Port 1 View    Narrative    EXAM: XR CHEST PORT 1 VIEW  LOCATION: [facility]  DATE: 10/26/2024    INDICATION: Shortness of breath.  COMPARISON: 4/26/2024.      Impression    IMPRESSION: Mild pulmonary vascular congestion, with associated small to moderate size right pleural effusion. No pneumothorax.    Unchanged cardiomegaly. Atherosclerotic calcifications of the thoracic aorta and mild enlargement of the main pulmonary artery.   MR Ankle Right w/o & w Contrast    Narrative    EXAM: MR ANKLE RIGHT W/O and W CONTRAST  LOCATION: [facility]  DATE: 10/27/2024    INDICATION: Wound with strep bacteremia.  COMPARISON: None.  TECHNIQUE: Routine. Additional postgadolinium T1 sequences were obtained.  IV CONTRAST: 13mL Gadavist.    FINDINGS:     TENDONS:   -Peroneal: Peroneus longus and brevis tendons are intact. No tendinopathy or tenosynovitis. No subluxation.  -Medial: Posterior tibialis tendon is intact. No tendinopathy or tenosynovitis. Flexor digitorum longus and flexor hallucis longus tendons are normal. No tenosynovitis.  -Anterior: Anterior tibialis, extensor hallucis longus, and extensor digitorum longus tendons are normal. No tenosynovitis.  -Achilles: No tendinopathy or paratenonitis.    LIGAMENTS:   -Anterior talofibular ligament: Intact.   -Calcaneofibular ligament: Intact.   -Posterior talofibular  ligament: Intact.  -Syndesmotic inferior tibiofibular ligaments: Intact.  -Deltoid ligament complex: Sequelae of chronic deltoid ligament sprain. No acute injury.  -Spring ligament complex: Intact.    JOINTS AND BONES:   -No evidence for acute ankle or hindfoot fracture. No calcaneus stress fracture. Tarsal bones and visualized metatarsals intact. Multifocal red marrow reconversion. Nothing definitive to suggest acute osteomyelitis.  -No advanced arthrosis in the ankle, hindfoot or midfoot. No sizable joint effusion or significant synovitis in the ankle or hindfoot or midfoot joints.    SOFT TISSUES:  -Plantar fascia: Intact. No acute fasciitis or tear.  -Sinus tarsi and tarsal tunnel: Preserved sinus Tarsi fat signal. No space-occupying mass is seen along the course of the tarsal tunnel.  -Muscles: Severe diffuse intrinsic foot muscle atrophy with multiple muscle group muscle atrophy in the distal leg. Patchy muscle edema without significant myositis.    There is likely a wound and subcutaneous fibrosis along the posterolateral ankle soft tissues. No well-defined drainable fluid collection to suggest abscess. Distal leg and ankle soft tissue swelling.    A small complex poorly defined fluid collection or area of soft tissue edema is present within a Kager's fat pad without organized drainable abscess.      Impression    IMPRESSION:  1.  Probable wound/ulcer posterolateral right ankle with likely subdermal fibrosis along the lateral ankle and hindfoot. No well-defined drainable fluid collection to suggest abscess.  2.  No evidence for acute osteomyelitis.  3.  No finding for septic arthritis.  4.  Additional incidental findings as detailed fully above.   US Abdomen Limited w Abdomen Doppler Limited    Narrative    EXAM: US ABDOMEN LIMITED W ABDOMEN DOPPLER LIMITED  LOCATION: [facility]  DATE: 10/27/2024    INDICATION: severe transaminitis  COMPARISON: CT 6/27/19  TECHNIQUE: Limited abdominal  ultrasound. Color flow with spectral Doppler and waveform analysis performed.     FINDINGS:    GALLBLADDER: Gallstones and sludge with mild wall thickening. No pericholecystic fluid. Negative sonographic Villar's sign.     BILE DUCTS: No biliary dilatation. The common duct measures 8 mm.    LIVER: Coarsened echotexture, suggesting underlying fibrosis. Nodular contour. No focal mass.     RIGHT KIDNEY: No hydronephrosis.     PANCREAS: The pancreas is largely obscured by overlying gas.    Small volume ascites.    ABDOMINAL DUPLEX: Very limited evaluation due to body habitus and labored breathing. The hepatic veins and main portal vein are patent with flow in the normal direction.       Impression    IMPRESSION:  1.  Coarsened hepatic echotexture with nodular contour suggestive of cirrhosis.  2.  Small volume ascites.  3.  Gallstones and gallbladder sludge with wall thickening, nonspecific in the setting of ascites.  4.  Limited Doppler evaluation to body habitus and labored breathing. The hepatic veins and main portal vein are patent.     US Lower Extremity Arterial Duplex Bilateral    Narrative    EXAM: US LOWER EXTREMITY ARTERIAL DUPLEX BILATERAL  LOCATION: [facility]  DATE: 10/27/2024    INDICATION: Assess blood flow due to chronic nonhealing ulceration on the right ankle.  COMPARISON: None.  TECHNIQUE: Duplex utilizing 2D gray-scale imaging, Doppler interrogation with color-flow and spectral waveform analysis.    FINDINGS: There is superficial edema in the right and left lower extremity. There is slight spectral broadening in the right lower extremity vessels and left SFA. Study is slightly compromised due to body habitus.    RIGHT LOWER EXTREMITY ARTERIAL ASSESSMENT:  Common femoral artery: 119/23 cm/s  Profunda femoris artery: 129 cm/s  SFA (proximal): 129/15 cm/s  SFA (mid): 120/19 cm/s  SFA (distal): Not seen  POP Prox: 90/16 cm/s  POP Dist: 38/8 cm/s  Posterior tibial artery: 91/25  cm/s  Peron Dist: 62/12 cm/s  Anterior tibial artery: Not seen    LEFT LOWER EXTREMITY ARTERIAL ASSESSMENT:  Common femoral artery: 132/20 cm/s  Profunda femoris artery: 60 cm/s  SFA (proximal): 104/14 cm/s  SFA (mid): 105/12 cm/s  SFA (distal): Not seen  POP Prox: 48 cm/s  Posterior tibial artery: 74/16 cm/s  Peron Dist: Not seen  Anterior tibial artery: 75/11 cm/s      Impression    IMPRESSION:  1.  Right lower extremity and left SFA spectral broadening suggest turbulence. Some of the vessels are not well assessed due to body habitus.   2.  There is bilateral lower extremity edema.   Echocardiogram Complete     Value    LVEF  60-65%    Narrative    [id number]  [id number]  [id number]  [id number]^OSMAN^GE^CHRISTIAN     [facility]  Echocardiography Laboratory  [address] [place] [address]     Name: LOKESH MCKEON  MRN: [id number]  : 1960  Study Date: 10/27/2024 10:42 AM  Age: 64 yrs  Gender: Female  Patient Location: [email or web address]  Reason For Study: Atrial Fibrillation  Ordering Physician: GE BREWER  Referring Physician: Jayy Henson  Performed By: Loulou Parra RDCS     BSA: 2.4 m2  Height: 66 in  Weight: 296 lb  HR: 83  BP: 118/62 mmHg  ______________________________________________________________________________  Procedure  Complete Portable Echo Adult.  ______________________________________________________________________________  Interpretation Summary     Left ventricular systolic function is normal. The visual ejection fraction is  60-65%.  Septal motion is consistent with conduction abnormality. Flattened septum is  consistent with RV pressure/volume overload.  The right ventricle is severely dilated. The right ventricular systolic  function is moderately reduced.  Moderately severe (3+) tricuspid regurgitation.  Pulmonary hypertension present. The right ventricular systolic pressure is  approximated at 53mmHg plus the right atrial pressure.  Dilation of the inferior vena  cava is present with abnormal respiratory  variation in diameter.  History of ASD closure device placement. No obvious interatrial shunt on color  Doppler interrogation.     This study was compared to a TTE from 4/1/2024. Global biventricular function  is stable.  ______________________________________________________________________________  Left Ventricle  The left ventricle is normal in size. There is mild concentric left  ventricular hypertrophy. Left ventricular systolic function is normal. The  visual ejection fraction is 60-65%. Left ventricular diastolic function is  indeterminate. Septal motion is consistent with conduction abnormality.  Flattened septum is consistent with RV pressure/volume overload.     Right Ventricle  The right ventricle is severely dilated. The right ventricular systolic  function is moderately reduced.     Atria  Normal left atrial size. Right atrial size is normal. History of ASD closure  device placement. No obvious interatrial shunt on color Doppler interrogation.     Mitral Valve  There is mild to moderate mitral annular calcification. There is trace to mild  mitral regurgitation.     Tricuspid Valve  There is moderately severe (3+) tricuspid regurgitation. Pulmonary  hypertension. The right ventricular systolic pressure is approximated at  53mmHg plus the right atrial pressure.     Aortic Valve  The aortic valve is trileaflet with aortic valve sclerosis.     Pulmonic Valve  The pulmonic valve is not well seen, but is grossly normal.     Vessels  The aortic root is normal size. Normal size ascending aorta. Dilation of the  inferior vena cava is present with abnormal respiratory variation in diameter.     Pericardium  There is no pericardial effusion.     ______________________________________________________________________________  MMode/2D Measurements & Calculations  IVSd: 1.3 cm  LVIDd: 4.8 cm  LVIDs: 2.8 cm  LVPWd: 1.3 cm  IVC diam: 3.7 cm  FS: 42.0 %  LV mass(C)d: 257.1  grams  LV mass(C)dI: 108.8 grams/m2     Ao root diam: 3.3 cm  LA dimension: 4.7 cm  asc Aorta Diam: 3.9 cm  LA/Ao: 1.4  LVOT diam: 2.0 cm  LVOT area: 3.2 cm2  Ao root diam index Ht(cm/m): 1.9  Ao root diam index BSA (cm/m2): 1.4  Asc Ao diam index BSA (cm/m2): 1.6  Asc Ao diam index Ht(cm/m): 2.3  LA Volume (BP): 51.2 ml  LA Volume Index (BP): 21.7 ml/m2  RV Base: 6.7 cm     RWT: 0.56  TAPSE: 2.8 cm     Doppler Measurements & Calculations  MV E max josh: 111.6 cm/sec  MV A max josh: 109.0 cm/sec  MV E/A: 1.0  MV dec time: 0.21 sec  Ao V2 max: 154.0 cm/sec  Ao max P.0 mmHg  PA acc time: 0.14 sec  TR max josh: 365.0 cm/sec  TR max P.3 mmHg  E/E' av.8  Lateral E/e': 7.9  Medial E/e': 11.7  RV S Josh: 11.0 cm/sec     ______________________________________________________________________________  Report approved by: Jazzmine Groves 10/27/2024 12:43 PM             Consultations This Hospital Stay   PHARMACY IP CONSULT  WOUND OSTOMY CONTINENCE NURSE  IP CONSULT  PHYSICAL THERAPY ADULT IP CONSULT  OCCUPATIONAL THERAPY ADULT IP CONSULT  CARDIOLOGY IP CONSULT  CARE MANAGEMENT / SOCIAL WORK IP CONSULT  PHARMACY TO DOSE VANCO  INFECTIOUS DISEASES IP CONSULT  PODIATRY IP CONSULT  PHARMACY TO DOSE VANCO  NEPHROLOGY IP CONSULT  PHARMACY IP CONSULT  PALLIATIVE CARE ADULT IP CONSULT  SPIRITUAL HEALTH SERVICES IP CONSULT  VASCULAR ACCESS ADULT IP CONSULT  INTERVENTIONAL RADIOLOGY ADULT/PEDS IP CONSULT  VASCULAR ACCESS ADULT IP CONSULT  SPIRITUAL HEALTH SERVICES IP CONSULT      Time Spent on this Encounter   I, Anatoly Romo MD, discharged this patient today but I did not personally see the patient today and will not be billing for the patient's discharge.

## 2024-10-31 NOTE — PROGRESS NOTES
Family has refused nebulizers and CPAP for the night for more of a comfort focused care. Plan is to remain off all respiratory support unless needed for patient comfort.    Gerardo Solis, RT on 10/30/2024 at 7:38 PM

## 2024-10-31 NOTE — PROGRESS NOTES
Pt in Comfort Care status passed away at 2015. Dr Lovely Bruno notified and pronounced the pt's death. All applicable calls made. [facility] released the body.   Body transported to [facility].

## 2024-11-01 LAB — BACTERIA BLD CULT: NO GROWTH

## 2025-04-16 NOTE — MR AVS SNAPSHOT
Linda Otero   3/12/2018 8:45 AM   Anticoagulation Therapy Visit    Description:  57 year old female   Provider:   ANTICOAGULATION CLINIC   Department:   Nurse           INR as of 3/12/2018     Today's INR 2.1 (3/9/2018)      Anticoagulation Summary as of 3/12/2018     INR goal 2.0-3.0   Today's INR 2.1 (3/9/2018)   Full instructions 10 mg every day   Next INR check 3/22/2018    Indications   Primary hypercoagulable state (H) [D68.59]  Long-term (current) use of anticoagulants [Z79.01] [Z79.01]         Your next Anticoagulation Clinic appointment(s)     Mar 12, 2018  8:45 AM CDT   Anticoagulation [facility] with  [facility] CLINIC   [facility]    [address] [place] [address]  [address]  [facility] [id number] [phone]            Mar 22, 2018  3:15 PM CDT   Anticoagulation Visit with  ANTICOAGULATION CLINIC   [facility]    [address] [place] [address]  [address]  [facility] [address] [phone]              Contact Numbers     [facility]  Please call  [phone] to cancel and/or reschedule your appointment   Please call  [phone] with any problems or questions regarding your therapy.        March 2018 Details    Sun Mon Tue Wed Thu Fri Sat         1               2               3                 4               5               6               7               8               9               10                 11               12      10 mg   See details      13      10 mg         14      10 mg         15      10 mg         16      10 mg         17      10 mg           18      10 mg         19      10 mg         20      10 mg         21      10 mg         22            23               24                 25               26               27               28               29               30               31                Date Details   03/12 This INR check       Date of next INR:  3/22/2018         How to  Patient arrived to [facility] per stretcher accompanied by OR staff.  Report received.  Upon initial assessment, hematoma noted to 1 of 2 lumbar incisions.  Dr. Tejada to bedside and notified.  Ice pack applied to site.  Will continue to monitor.  See nursing assessment.    take your warfarin dose     To take:  10 mg Take 2 of the 5 mg tablets.

## (undated) DEVICE — GUIDEWIRE VASC 0.014INX180CM RUNTHROUGH 25-1011

## (undated) DEVICE — INTRO SHEATH 7FRX10CM PINNACLE RSS702

## (undated) DEVICE — CATH ANGIO INFINITI JL3.5 4FRX100CM 538418

## (undated) DEVICE — CATH US OD 5FR OD SEC 2.9FR EAGLE EYE PLATINUM 0.014 85900P

## (undated) DEVICE — INTRODUCER SHEATH GREEN 6.5FRX11CM .038IN PSI-6F-11-038ACT

## (undated) DEVICE — CATH DIAG 4FR JL 4.5 538417

## (undated) DEVICE — CATH ANGIO INFINITI JR4 4FRX100CM 538421

## (undated) DEVICE — INTRO GLIDESHEATH SLENDER 6FR 10X45CM 60-1060

## (undated) DEVICE — SYR ANGIOGRAPHY MULTIUSE KIT ACIST 014612

## (undated) DEVICE — SYSTEM PANNUS RETENTION 4 PAD 2 STRAP CZ-PRS-04

## (undated) DEVICE — CATH GUIDING BLUE YELLOW PTFE XB3.5 6FRX100CM 67005400

## (undated) DEVICE — CATH ANGIO INFINITI 3DRC 4FRX100CM 538476

## (undated) DEVICE — RAD G/W INQWIRE .035X260CM J-TIP EXCHANGE IQ35F260J1O5RS

## (undated) DEVICE — INTRO SHEATH 4FRX10CM PINNACLE RSS402

## (undated) DEVICE — INTRODUCER CATH VASC 5FRX10CM  MPIS-501-NT-U-SST

## (undated) DEVICE — DEFIB PRO-PADZ LVP LQD GEL ADULT 8900-2105-01

## (undated) DEVICE — MANIFOLD KIT ANGIO AUTOMATED 014613

## (undated) DEVICE — Device

## (undated) DEVICE — TOTE ANGIO CORP PC15AT SAN32CC83O

## (undated) DEVICE — NDL PERC ENTRY THINWALL 18GA 7.0" G00166

## (undated) DEVICE — KIT HAND CONTROL ANGIOTOUCH ACIST 65CM AT-P65

## (undated) DEVICE — KIT LG BORE TOUHY ACCESS PLUS MAP152

## (undated) RX ORDER — FENTANYL CITRATE 50 UG/ML
INJECTION, SOLUTION INTRAMUSCULAR; INTRAVENOUS
Status: DISPENSED
Start: 2024-01-01

## (undated) RX ORDER — HEPARIN SODIUM 200 [USP'U]/100ML
INJECTION, SOLUTION INTRAVENOUS
Status: DISPENSED
Start: 2024-01-01

## (undated) RX ORDER — LIDOCAINE HYDROCHLORIDE 10 MG/ML
INJECTION, SOLUTION EPIDURAL; INFILTRATION; INTRACAUDAL; PERINEURAL
Status: DISPENSED
Start: 2024-01-01

## (undated) RX ORDER — HEPARIN SODIUM 1000 [USP'U]/ML
INJECTION, SOLUTION INTRAVENOUS; SUBCUTANEOUS
Status: DISPENSED
Start: 2024-01-01